# Patient Record
Sex: MALE | Race: WHITE | NOT HISPANIC OR LATINO | URBAN - METROPOLITAN AREA
[De-identification: names, ages, dates, MRNs, and addresses within clinical notes are randomized per-mention and may not be internally consistent; named-entity substitution may affect disease eponyms.]

---

## 2021-06-28 ENCOUNTER — INPATIENT (INPATIENT)
Facility: HOSPITAL | Age: 76
LOS: 3 days | Discharge: HOME | DRG: 308 | End: 2021-07-02
Attending: INTERNAL MEDICINE | Admitting: INTERNAL MEDICINE
Payer: MEDICARE

## 2021-06-28 VITALS
WEIGHT: 218.92 LBS | TEMPERATURE: 98 F | DIASTOLIC BLOOD PRESSURE: 94 MMHG | SYSTOLIC BLOOD PRESSURE: 137 MMHG | RESPIRATION RATE: 18 BRPM | HEART RATE: 66 BPM | HEIGHT: 70 IN | OXYGEN SATURATION: 96 %

## 2021-06-28 DIAGNOSIS — Z90.79 ACQUIRED ABSENCE OF OTHER GENITAL ORGAN(S): Chronic | ICD-10-CM

## 2021-06-28 DIAGNOSIS — Z98.890 OTHER SPECIFIED POSTPROCEDURAL STATES: Chronic | ICD-10-CM

## 2021-06-28 DIAGNOSIS — I50.33 ACUTE ON CHRONIC DIASTOLIC (CONGESTIVE) HEART FAILURE: ICD-10-CM

## 2021-06-28 DIAGNOSIS — D75.89 OTHER SPECIFIED DISEASES OF BLOOD AND BLOOD-FORMING ORGANS: ICD-10-CM

## 2021-06-28 DIAGNOSIS — E11.9 TYPE 2 DIABETES MELLITUS WITHOUT COMPLICATIONS: ICD-10-CM

## 2021-06-28 DIAGNOSIS — I48.91 UNSPECIFIED ATRIAL FIBRILLATION: ICD-10-CM

## 2021-06-28 DIAGNOSIS — I48.92 UNSPECIFIED ATRIAL FLUTTER: Chronic | ICD-10-CM

## 2021-06-28 LAB
ALBUMIN SERPL ELPH-MCNC: 4 G/DL — SIGNIFICANT CHANGE UP (ref 3.3–5)
ALP SERPL-CCNC: 136 U/L — HIGH (ref 40–120)
ALT FLD-CCNC: 18 U/L — SIGNIFICANT CHANGE UP (ref 10–45)
ANION GAP SERPL CALC-SCNC: 9 MMOL/L — SIGNIFICANT CHANGE UP (ref 5–17)
ANISOCYTOSIS BLD QL: SIGNIFICANT CHANGE UP
AST SERPL-CCNC: 17 U/L — SIGNIFICANT CHANGE UP (ref 10–40)
BASOPHILS # BLD AUTO: 0 K/UL — SIGNIFICANT CHANGE UP (ref 0–0.2)
BASOPHILS NFR BLD AUTO: 0 % — SIGNIFICANT CHANGE UP (ref 0–2)
BILIRUB SERPL-MCNC: 1.2 MG/DL — SIGNIFICANT CHANGE UP (ref 0.2–1.2)
BUN SERPL-MCNC: 18 MG/DL — SIGNIFICANT CHANGE UP (ref 7–23)
BURR CELLS BLD QL SMEAR: PRESENT — SIGNIFICANT CHANGE UP
CALCIUM SERPL-MCNC: 9.4 MG/DL — SIGNIFICANT CHANGE UP (ref 8.4–10.5)
CHLORIDE SERPL-SCNC: 108 MMOL/L — SIGNIFICANT CHANGE UP (ref 96–108)
CK MB CFR SERPL CALC: 2.2 NG/ML — SIGNIFICANT CHANGE UP (ref 0–6.7)
CK SERPL-CCNC: 55 U/L — SIGNIFICANT CHANGE UP (ref 30–200)
CO2 SERPL-SCNC: 27 MMOL/L — SIGNIFICANT CHANGE UP (ref 22–31)
CREAT SERPL-MCNC: 1.27 MG/DL — SIGNIFICANT CHANGE UP (ref 0.5–1.3)
DACRYOCYTES BLD QL SMEAR: SLIGHT — SIGNIFICANT CHANGE UP
EOSINOPHIL # BLD AUTO: 0 K/UL — SIGNIFICANT CHANGE UP (ref 0–0.5)
EOSINOPHIL NFR BLD AUTO: 0 % — SIGNIFICANT CHANGE UP (ref 0–6)
GIANT PLATELETS BLD QL SMEAR: PRESENT — SIGNIFICANT CHANGE UP
GLUCOSE BLDC GLUCOMTR-MCNC: 84 MG/DL — SIGNIFICANT CHANGE UP (ref 70–99)
GLUCOSE SERPL-MCNC: 95 MG/DL — SIGNIFICANT CHANGE UP (ref 70–99)
HCT VFR BLD CALC: 37.8 % — LOW (ref 39–50)
HGB BLD-MCNC: 12.9 G/DL — LOW (ref 13–17)
HIV 1+2 AB+HIV1 P24 AG SERPL QL IA: SIGNIFICANT CHANGE UP
LYMPHOCYTES # BLD AUTO: 1.08 K/UL — SIGNIFICANT CHANGE UP (ref 1–3.3)
LYMPHOCYTES # BLD AUTO: 30.7 % — SIGNIFICANT CHANGE UP (ref 13–44)
MACROCYTES BLD QL: SIGNIFICANT CHANGE UP
MANUAL SMEAR VERIFICATION: SIGNIFICANT CHANGE UP
MCHC RBC-ENTMCNC: 29.9 PG — SIGNIFICANT CHANGE UP (ref 27–34)
MCHC RBC-ENTMCNC: 34.1 GM/DL — SIGNIFICANT CHANGE UP (ref 32–36)
MCV RBC AUTO: 87.5 FL — SIGNIFICANT CHANGE UP (ref 80–100)
MONOCYTES # BLD AUTO: 0.25 K/UL — SIGNIFICANT CHANGE UP (ref 0–0.9)
MONOCYTES NFR BLD AUTO: 7 % — SIGNIFICANT CHANGE UP (ref 2–14)
NEUTROPHILS # BLD AUTO: 2.11 K/UL — SIGNIFICANT CHANGE UP (ref 1.8–7.4)
NEUTROPHILS NFR BLD AUTO: 59.7 % — SIGNIFICANT CHANGE UP (ref 43–77)
NT-PROBNP SERPL-SCNC: 7221 PG/ML — HIGH (ref 0–300)
PLAT MORPH BLD: ABNORMAL
PLATELET # BLD AUTO: 68 K/UL — LOW (ref 150–400)
POIKILOCYTOSIS BLD QL AUTO: SIGNIFICANT CHANGE UP
POLYCHROMASIA BLD QL SMEAR: SIGNIFICANT CHANGE UP
POTASSIUM SERPL-MCNC: 4.4 MMOL/L — SIGNIFICANT CHANGE UP (ref 3.5–5.3)
POTASSIUM SERPL-SCNC: 4.4 MMOL/L — SIGNIFICANT CHANGE UP (ref 3.5–5.3)
PROT SERPL-MCNC: 6.9 G/DL — SIGNIFICANT CHANGE UP (ref 6–8.3)
RBC # BLD: 4.32 M/UL — SIGNIFICANT CHANGE UP (ref 4.2–5.8)
RBC # FLD: 15.5 % — HIGH (ref 10.3–14.5)
RBC BLD AUTO: ABNORMAL
SARS-COV-2 RNA SPEC QL NAA+PROBE: NEGATIVE — SIGNIFICANT CHANGE UP
SCHISTOCYTES BLD QL AUTO: SLIGHT — SIGNIFICANT CHANGE UP
SODIUM SERPL-SCNC: 144 MMOL/L — SIGNIFICANT CHANGE UP (ref 135–145)
TROPONIN T SERPL-MCNC: 0.08 NG/ML — CRITICAL HIGH (ref 0–0.01)
TROPONIN T SERPL-MCNC: 0.08 NG/ML — CRITICAL HIGH (ref 0–0.01)
VARIANT LYMPHS # BLD: 2.6 % — SIGNIFICANT CHANGE UP (ref 0–6)
WBC # BLD: 3.53 K/UL — LOW (ref 3.8–10.5)
WBC # FLD AUTO: 3.53 K/UL — LOW (ref 3.8–10.5)

## 2021-06-28 PROCEDURE — 93010 ELECTROCARDIOGRAM REPORT: CPT

## 2021-06-28 PROCEDURE — 99285 EMERGENCY DEPT VISIT HI MDM: CPT | Mod: 25

## 2021-06-28 PROCEDURE — 71045 X-RAY EXAM CHEST 1 VIEW: CPT | Mod: 26

## 2021-06-28 RX ORDER — DEXTROSE 50 % IN WATER 50 %
15 SYRINGE (ML) INTRAVENOUS ONCE
Refills: 0 | Status: DISCONTINUED | OUTPATIENT
Start: 2021-06-28 | End: 2021-07-02

## 2021-06-28 RX ORDER — FUROSEMIDE 40 MG
40 TABLET ORAL ONCE
Refills: 0 | Status: COMPLETED | OUTPATIENT
Start: 2021-06-28 | End: 2021-06-28

## 2021-06-28 RX ORDER — INSULIN LISPRO 100/ML
VIAL (ML) SUBCUTANEOUS
Refills: 0 | Status: DISCONTINUED | OUTPATIENT
Start: 2021-06-28 | End: 2021-07-02

## 2021-06-28 RX ORDER — DEXTROSE 50 % IN WATER 50 %
12.5 SYRINGE (ML) INTRAVENOUS ONCE
Refills: 0 | Status: DISCONTINUED | OUTPATIENT
Start: 2021-06-28 | End: 2021-07-02

## 2021-06-28 RX ORDER — APIXABAN 2.5 MG/1
0 TABLET, FILM COATED ORAL
Qty: 0 | Refills: 0 | DISCHARGE

## 2021-06-28 RX ORDER — FUROSEMIDE 40 MG
80 TABLET ORAL EVERY 12 HOURS
Refills: 0 | Status: DISCONTINUED | OUTPATIENT
Start: 2021-06-29 | End: 2021-06-30

## 2021-06-28 RX ORDER — SODIUM CHLORIDE 9 MG/ML
1000 INJECTION, SOLUTION INTRAVENOUS
Refills: 0 | Status: DISCONTINUED | OUTPATIENT
Start: 2021-06-28 | End: 2021-07-02

## 2021-06-28 RX ORDER — TAMSULOSIN HYDROCHLORIDE 0.4 MG/1
0.4 CAPSULE ORAL AT BEDTIME
Refills: 0 | Status: DISCONTINUED | OUTPATIENT
Start: 2021-06-28 | End: 2021-07-02

## 2021-06-28 RX ORDER — IPRATROPIUM/ALBUTEROL SULFATE 18-103MCG
3 AEROSOL WITH ADAPTER (GRAM) INHALATION EVERY 6 HOURS
Refills: 0 | Status: DISCONTINUED | OUTPATIENT
Start: 2021-06-28 | End: 2021-07-02

## 2021-06-28 RX ORDER — DEXTROSE 50 % IN WATER 50 %
25 SYRINGE (ML) INTRAVENOUS ONCE
Refills: 0 | Status: DISCONTINUED | OUTPATIENT
Start: 2021-06-28 | End: 2021-07-02

## 2021-06-28 RX ORDER — FUROSEMIDE 40 MG
40 TABLET ORAL ONCE
Refills: 0 | Status: DISCONTINUED | OUTPATIENT
Start: 2021-06-28 | End: 2021-06-28

## 2021-06-28 RX ORDER — FUROSEMIDE 40 MG
0 TABLET ORAL
Qty: 0 | Refills: 0 | DISCHARGE

## 2021-06-28 RX ORDER — GLUCAGON INJECTION, SOLUTION 0.5 MG/.1ML
1 INJECTION, SOLUTION SUBCUTANEOUS ONCE
Refills: 0 | Status: DISCONTINUED | OUTPATIENT
Start: 2021-06-28 | End: 2021-07-02

## 2021-06-28 RX ORDER — SACUBITRIL AND VALSARTAN 24; 26 MG/1; MG/1
0 TABLET, FILM COATED ORAL
Qty: 0 | Refills: 0 | DISCHARGE

## 2021-06-28 RX ORDER — METOPROLOL TARTRATE 50 MG
12.5 TABLET ORAL EVERY 24 HOURS
Refills: 0 | Status: DISCONTINUED | OUTPATIENT
Start: 2021-06-29 | End: 2021-07-02

## 2021-06-28 RX ORDER — METFORMIN HYDROCHLORIDE 850 MG/1
0 TABLET ORAL
Qty: 0 | Refills: 0 | DISCHARGE

## 2021-06-28 RX ADMIN — TAMSULOSIN HYDROCHLORIDE 0.4 MILLIGRAM(S): 0.4 CAPSULE ORAL at 21:27

## 2021-06-28 RX ADMIN — Medication 40 MILLIGRAM(S): at 18:13

## 2021-06-28 NOTE — H&P ADULT - ASSESSMENT
76 yo M, h/o medication non-compliance, works with [employer] and was in [place] until 5/2021, PMHx DM2, Aflutter s/p ablatio  6/2016 & found to be in Afib as of 5/2021 (on Eliquis), HFmrEF (EF 45-50% by outpatient ECHO 5/2017, EF was normal per outpatient ECHO 3/2019), non-obstructive CAD (s/p diagnostic cath in 2014 @ [facility]), CKD stage III (baseline Cr unknown), BPH s/p TURP,  pituitary adenoma s/p transphenoidal rxn in 1990s,  prostate CA (dx in 2018 s/p chemo), chronic venous insufficiency who was referred from. Dr. Anton’s office with c/o progressively worsening SOB on minimal exertion and when speaking/increasing abdominal girth/LE edema over past 3 month despite resuming CHF meds including PO Lasix.  Pt also endorses scrotal edema over past 1 month and orthopnea to the point where he has to sit in chair to sleep. Patient states he ran out of Entresto ~1 year ago while in [place] but had continued on comparable meds to BB/Eliquis/Lasix while in [place] but he ran out ~a couple weeks before seeing Dr. Anton in 5/2021.   Pt endorses 2 syncopal episodes with prodrome dizziness (2/2021 & 3/2021) with head strike 2/2021. Went to clinic with unremarkable workup.  Furthermore, pt endorses 2 episodes of non-radiating SSCP described as sharp and of severe intensity, occurring independent of exertion, lasting <1 minute before spontaneously resolving (last episode 8/2020).  Pt admitted to [facility] for r/o ACS, management of exacerbation of HFmrEF likely in setting of Afib with medication non-compliance, heme consult for thrombocytopenia.  74 yo M, h/o medication non-compliance, works with [employer] and was in [place] until 5/2021, PMHx DM2, Aflutter s/p ablation  6/2016 & found to be in Afib as of 5/2021 (on Eliquis), HFmrEF (EF 45-50% by outpatient ECHO 5/2021, EF was normal per outpatient ECHO 3/2019), non-obstructive CAD (s/p diagnostic cath in 2014 @ [facility]), CKD stage III (baseline Cr unknown), BPH s/p TURP,  pituitary adenoma s/p transphenoidal rxn in 1990s,  prostate CA (dx in 2018 s/p chemo), chronic venous insufficiency who was referred from. Dr. Anton’s office with c/o progressively worsening SOB on minimal exertion and when speaking/increasing abdominal girth/LE edema over past 3 month despite resuming CHF meds including PO Lasix.  Pt also endorses scrotal edema over past 1 month and orthopnea to the point where he has to sit in chair to sleep. Patient states he ran out of Entresto ~1 year ago while in [place] but had continued on comparable meds to BB/Eliquis/Lasix while in [place] but he ran out ~a couple weeks before seeing Dr. Anton in 5/2021.   Pt endorses 2 syncopal episodes with prodrome dizziness (2/2021 & 3/2021) with head strike 2/2021. Went to clinic with unremarkable workup.  Furthermore, pt endorses 2 episodes of non-radiating SSCP described as sharp and of severe intensity, occurring independent of exertion, lasting <1 minute before spontaneously resolving (last episode 8/2020).  Pt admitted to [facility] for r/o ACS, management of exacerbation of HFmrEF likely in setting of Afib with medication non-compliance, heme consult for thrombocytopenia.

## 2021-06-28 NOTE — PATIENT PROFILE ADULT - NSPRESCRALCSIXMORE_GEN_A_NUR
LVM for patient to call back. Due for apt for annual physical and BP check. Patient not seen since 2016. Never

## 2021-06-28 NOTE — H&P ADULT - PROBLEM SELECTOR PLAN 1
- Trop 0.08, f/u repeat CE @ 10pm, AM  - BNP 7221  - ECG 6/28/21:  - CXR wet read 6/28/21: pulmonary venous congestion, B/L pleural effusions, f/u official read  - ECHO 5/17/21: EF 45-50%, indeterminate diastolic pattern, severe LVH, infiltrative disorder, moderate MR/TR, D-shaped septum which is indicative of significant pulmonary HTN, mild SC, LA severely dilated, RA moderately dilated.   - ECHO 3/2019: normal LVEF per outpatient records  - Obtain repeat ECHO (ordered)  - Concern for possible infiltrative disease, ?cardiac MRI when more euvolemic  - Last [facility] in 2014 @ [place]: non-obstructive CAD, obtain cath report as able  - c/w Lasix 80 mg IV BID (home med Lasix 40 mg PO QD)   - Strict I&Os, daily weights, 1L fluid restriction, core measures ordered 3+ pitting edema to B/L ankles/shins, 1+ pitting to dependent thighs into posterior back, +ascites, +scrotal edema,  +JVD, bibasilar crackles, expiratory wheezing anteriorly, saturating well on RA,  cc while in ED at time of exam  - Trop 0.08, f/u repeat CE @ 10pm, AM; BNP 7221  - ECG 6/28/21: Afib @ 67 BPM with LAD & flattened T waves in I/AVL & poor R wave progression.   - CXR wet read 6/28/21: pulmonary venous congestion, B/L pleural effusions, f/u official read  - ECHO 5/17/21: EF 45-50%, indeterminate diastolic pattern, severe LVH, infiltrative disorder, moderate MR/TR, D-shaped septum which is indicative of significant pulmonary HTN, mild MS, LA severely dilated, RA moderately dilated.   - ECHO 3/2019: normal LVEF per outpatient records  - Obtain repeat ECHO (ordered)  - US B/L LE venous duplex outpatient 6/28/21 as per Dr. Anton: No DVT  - Concern for possible infiltrative disease, ?cardiac MRI when more euvolemic  - h/o syncope x2 with head strike 2/2021, f/u CT head (ordered)  - Last [facility] in 2014 @ [facility]: non-obstructive CAD, obtain cath report as able, ?ischemic w/u this admit  - c/w Lasix 80 mg IV BID (home med Lasix 40 mg PO QD)   - Holding home Entresto 24-26 BID in setting of aggressive diuresis with h/o CKD III  - Holding home Toprol XL 25 mg PO QD in setting over CHF exacerbation  - Strict I&Os, daily weights, 1L fluid restriction, core measures ordered 3+ pitting edema to B/L ankles/shins, 1+ pitting to dependent thighs into posterior back, +ascites, +scrotal edema,  +JVD, bibasilar crackles, expiratory wheezing anteriorly, saturating well on RA,  cc while in ED at time of exam  - Trop 0.08, f/u repeat CE @ 10pm, AM; BNP 7221  - ECG 6/28/21: Afib @ 67 BPM with LAD & flattened T waves in I/AVL & poor R wave progression.   - CXR wet read 6/28/21: pulmonary venous congestion, B/L pleural effusions, f/u official read  - ECHO 5/17/21: EF 45-50%, indeterminate diastolic pattern, severe LVH, infiltrative disorder, moderate MR/TR, D-shaped septum which is indicative of significant pulmonary HTN, mild SD, LA severely dilated, RA moderately dilated.   - ECHO 3/2019: normal LVEF per outpatient records  - Obtain repeat ECHO (ordered)  - US B/L LE venous duplex outpatient 6/28/21 as per Dr. Anton: No DVT  - Concern for possible infiltrative disease, ?cardiac MRI when more euvolemic  - h/o syncope x2 with head strike 2/2021, f/u CT head (ordered)  - Last [facility] in 2014 @ [facility]: non-obstructive CAD, obtain cath report as able, ?ischemic w/u this admit  - c/w Lasix 80 mg IV BID (home med Lasix 40 mg PO QD)   - Holding home Entresto 24-26 BID in setting of aggressive diuresis with h/o CKD III  - Holding home Toprol XL 12.5 mg PO QD (1/2 dose home Toprol XL) in setting of CHF exacerbation  - Strict I&Os, daily weights, 1L fluid restriction, core measures ordered 3+ pitting edema to B/L ankles/shins, 1+ pitting to dependent thighs into posterior back, +ascites, +scrotal edema,  +JVD, bibasilar crackles, expiratory wheezing anteriorly, saturating well on RA,  cc while in ED at time of exam  - Trop 0.08, f/u repeat CE @ 10pm, AM; BNP 7221  - ECG 6/28/21: Afib @ 67 BPM with LAD & flattened T waves in I/AVL & poor R wave progression.   - CXR wet read 6/28/21: pulmonary venous congestion, B/L pleural effusions, f/u official read  - ECHO 5/17/21: EF 45-50%, indeterminate diastolic pattern, severe LVH, infiltrative disorder, moderate MR/TR, D-shaped septum which is indicative of significant pulmonary HTN, mild TN, LA severely dilated, RA moderately dilated.   - ECHO 3/2019: normal LVEF per outpatient records  - Obtain repeat ECHO (ordered)  - US B/L LE venous duplex outpatient 6/28/21 as per Dr. Anton: No DVT  - Concern for possible infiltrative disease, ?cardiac MRI when more euvolemic  - h/o syncope x2 with head strike 2/2021, f/u CT head (ordered)  - Last [facility] in 2014 @ [facility]: non-obstructive CAD, obtain cath report as able, ?ischemic w/u this admit  - c/w Lasix 80 mg IV BID (home med Lasix 40 mg PO QD)   - Holding home Entresto 24-26 BID in setting of aggressive diuresis with h/o CKD III  - Holding home Toprol XL 12.5 mg PO QD (1/2 dose home Toprol XL) in setting of CHF exacerbation  - Strict I&Os, daily weights, 1L fluid restriction, core measures ordered

## 2021-06-28 NOTE — ED PROVIDER NOTE - INTERPRETATION
Mom called, Eben Mcgovern had surgery today and wants to know if you can order some tylenol abnormal

## 2021-06-28 NOTE — ED PROVIDER NOTE - CLINICAL SUMMARY MEDICAL DECISION MAKING FREE TEXT BOX
Pt w diffuse anasarca Pt w diffuse anasarca, no acute resp distress, will admit for cont'd diuresis.

## 2021-06-28 NOTE — ED ADULT NURSE NOTE - PMH
Atrial flutter  s/p Ablation 2016  Chronic venous insufficiency of lower extremity    Congestive heart failure (CHF)    Diabetes    Prostate CA

## 2021-06-28 NOTE — H&P ADULT - PROBLEM SELECTOR PLAN 4
- YANCI  - f/u AM HgbA1C, lipid panel - Hold home Metformin 500 mg PO BID  - [place]  - f/u AM HgbA1C, lipid panel    VTE ppx: SCDs only  Dispo: Pending euvoelmia, heme consult    Case d/w Dr. Anton

## 2021-06-28 NOTE — ED ADULT NURSE REASSESSMENT NOTE - NS ED NURSE REASSESS COMMENT FT1
Patient a/oX3, anxious, c/o of dyspnea on exertion w/ bilateral lower leg swelling, abdominal distention, no chest pain, no cough, no fever.  Afib on EKG, vital signs stable.  Noted bilateral lower leg swelling/edema +2, unable to palpate pedal pulses, and abdominal distention/ anasarca, no tenderness.  Left AC PIV #18 in place, all labs sent, no complications.  Results and disposition pending. Patient a/oX3, anxious, c/o of dyspnea on exertion w/ bilateral lower leg swelling, abdominal distention, no chest pain, no cough, no fever.  Afib on EKG, vital signs stable.  Noted bilateral lower leg swelling/edema +2, unable to palpate pedal pulses, and abdominal distention/ anasarca, no tenderness.  Noted bilateral lower lobe lung rales.   Left AC PIV #18 in place, all labs sent, no complications.  Results and disposition pending.

## 2021-06-28 NOTE — H&P ADULT - PROBLEM SELECTOR PLAN 3
- H/H 12.9/37.8, platelet 68  - f/u iron studies, vitamin B12, folate, haptoglobin, LDH  - Consult Dr. Concepcion in AM

## 2021-06-28 NOTE — H&P ADULT - HISTORY OF PRESENT ILLNESS
74 yo M, h/o medication non-compliance, works with [employer] and was in [place] until 1 month ago, PMHx of  DM2, Aflutter s/p ablation 6/2016 & found to be in Afib as of 5/2021 (on Eliquis), HFmrEF (EF 45-50% by outpatient ECHO 5/2017, EF was normal per outpatient ECHO 3/2019), non-obstructive CAD (s/p diagnostic cath in 2014 @ [facility]), ??Amyloid,  Prostate CA (dx ___, ?XRT, surgery), chronic venous insufficiency who was referred from. Dr. Anton’s office with c/o progressively worsening SOB on minimal exertion & abdominal/scrotal/LE edema over past 1 month despite resuming CHF meds including PO Lasix.   Denies CP, dizziness, diaphoresis, palpitations, fatigue, LE edema, orthopnea, PND, syncope, N/V, abdominal pain.   Upon admit, VS – T 98.3F, HR 66 BPM, /94, RR 18, SpO2 96% on RA. Labs significant H/H 12.9/37.8, platelet 68, BUN/Cr 18/1.27, Alk phos 136, BNP 7221, Trop 0.08, HIV nonreactive, covid neg x1. CXR wet read 6/28/21: pulmonary venous congestion, B/L pleural effusions. ECG 6/28/21: ____ While in ED, pt received Lasix 40 mg IV x1. Pt admitted to [facility] for r/o ACS, management of exacerbation of HFmrEF likely in setting of Afib with medication non-compliance, ?ischemic w/u this admit. 76 yo M, h/o medication non-compliance, works with [employer] and was in [place] until 5/2021, PMHx DM2, Aflutter s/p ablatio  6/2016 & found to be in Afib as of 5/2021 (on Eliquis), HFmrEF (EF 45-50% by outpatient ECHO 5/2017, EF was normal per outpatient ECHO 3/2019), non-obstructive CAD (s/p diagnostic cath in 2014 @ [facility]),  CKD stage III (baseline Cr unknown), BPH s/p TURP,  pituitary adenoma s/p transphenoidal rxn in 1990s,  prostate CA (dx in 2018 s/p chemo), chronic venous insufficiency who was referred from. Dr. Anton’s office with c/o progressively worsening SOB on minimal exertion and when speaking/increasing abdominal girth/LE edema over past 3 month despite resuming CHF meds including PO Lasix.  Pt also endorses scrotal edema over past 1 month and orthopnea to the point where he has to sit in chair to sleep. Patient states he ran out of Entresto ~1 year ago while in [place] but had continued on comparable meds to BB/Eliquis/Lasix while in [place] but he ran out ~a couple weeks before seeing Dr. Anton in 5/2021.  Pt state LE edema/SOB has been ongoing over the past 1 year but Lasix increased to 40 mg PO TID while in [place] 2/2021 which was decreased back to 40 mg PO QD 3/2021 after his LE edema/SOB resolved.  Pt endorses 2 syncopal episodes with prodrome dizziness (2/2021 & 3/2021) with head strike 2/2021. Went to clinic with unremarkable workup.  Pt endorses MARTINEZ/fatigue upon ambulation of 1-2 city blocks, resolving with rest over past 3 years. Furthermore, pt endorses 2 episodes of non-radiating SSCP described as sharp and of severe intensity, occurring independent of exertion, lasting <1 minute before spontaneously resolving (last episode 8/2020). Denies  diaphoresis, palpitations, , PND, N/V, abdominal pain. Upon admit, VS – T 98.3F, HR 66 BPM, /94, RR 18, SpO2 96% on RA. Labs significant H/H 12.9/37.8, platelet 68, BUN/Cr 18/1.27, Alk phos 136, BNP 7221, Trop 0.08, HIV nonreactive, covid neg x1. CXR wet read 6/28/21: pulmonary venous congestion, B/L pleural effusions. ECG 6/28/21: Afib @ 67 BPM with LAD & flattened T waves in I/AVL & poor R wave progression. While in ED, pt received Lasix 40 mg IV x1.  Pt admitted to [facility] tele for r/o ACS, management of exacerbation of HFmrEF likely in setting of Afib with medication non-compliance, heme consult for thrombocytopenia.  74 yo M, h/o medication non-compliance, works with [employer] and was in [place] until 5/2021, PMHx DM2, Aflutter s/p ablation  6/2016 & found to be in Afib as of 5/2021 (on Eliquis), HFmrEF (EF 45-50% by outpatient ECHO 5/2021, EF was normal per outpatient ECHO 3/2019), non-obstructive CAD (s/p diagnostic cath in 2014 @ [facility]),  CKD stage III (baseline Cr unknown), BPH s/p TURP,  pituitary adenoma s/p transphenoidal rxn in 1990s,  prostate CA (dx in 2018 s/p chemo), chronic venous insufficiency who was referred from. Dr. Anton’s office with c/o progressively worsening SOB on minimal exertion and when speaking/increasing abdominal girth/LE edema over past 3 month despite resuming CHF meds including PO Lasix.  Pt also endorses scrotal edema over past 1 month and orthopnea to the point where he has to sit in chair to sleep. Patient states he ran out of Entresto ~1 year ago while in [place] but had continued on comparable meds to BB/Eliquis/Lasix while in [place] but he ran out ~a couple weeks before seeing Dr. Anton in 5/2021.  Pt state LE edema/SOB has been ongoing over the past 1 year but Lasix increased to 40 mg PO TID while in [place] 2/2021 which was decreased back to 40 mg PO QD 3/2021 after his LE edema/SOB resolved.  Pt endorses 2 syncopal episodes with prodrome dizziness (2/2021 & 3/2021) with head strike 2/2021. Went to clinic with unremarkable workup.  Pt endorses MARTINEZ/fatigue upon ambulation of 1-2 city blocks, resolving with rest over past 3 years. Furthermore, pt endorses 2 episodes of non-radiating SSCP described as sharp and of severe intensity, occurring independent of exertion, lasting <1 minute before spontaneously resolving (last episode 8/2020). Denies  diaphoresis, palpitations, , PND, N/V, abdominal pain. Upon admit, VS – T 98.3F, HR 66 BPM, /94, RR 18, SpO2 96% on RA. Labs significant H/H 12.9/37.8, platelet 68, BUN/Cr 18/1.27, Alk phos 136, BNP 7221, Trop 0.08, HIV nonreactive, covid neg x1. CXR wet read 6/28/21: pulmonary venous congestion, B/L pleural effusions. ECG 6/28/21: Afib @ 67 BPM with LAD & flattened T waves in I/AVL & poor R wave progression. While in ED, pt received Lasix 40 mg IV x1.  Pt admitted to [facility] tele for r/o ACS, management of exacerbation of HFmrEF likely in setting of Afib with medication non-compliance, heme consult for thrombocytopenia.

## 2021-06-28 NOTE — ED PROVIDER NOTE - NS ED MD DISPO DIVISION
Pt is belligerent and talking about wanting to call a .  Family member asking if radiology can be called about the gastrograffin results.  Radiology has already been called at 7pm.  Will continue to monitor.   [facility]

## 2021-06-28 NOTE — ED ADULT NURSE NOTE - CHIEF COMPLAINT
The patient is a 75y Male complaining of  The patient is a 75y Male complaining of bilateral lower leg swelling.
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
## 2021-06-28 NOTE — H&P ADULT - NSHPSOCIALHISTORY_GEN_ALL_CORE
Denies tobacco/alcohol/illicit drug use Denies tobacco/illicit drug use  Occasional glass of wine/beer

## 2021-06-28 NOTE — ED ADULT TRIAGE NOTE - OTHER COMPLAINTS
Pt was sent here by MD Anton due to "CHF" for IV dieresis. pt complains of swelling of lower extremities along with SOB on exertion. Pt denies chest pain,n/v, or dizziness.

## 2021-06-28 NOTE — H&P ADULT - NSHPLABSRESULTS_GEN_ALL_CORE
12.9   3.53  )-----------( 68       ( 28 Jun 2021 17:07 )             37.8       06-28    144  |  108  |  18  ----------------------------<  95  4.4   |  27  |  1.27    Ca    9.4      28 Jun 2021 17:07    TPro  6.9  /  Alb  4.0  /  TBili  1.2  /  DBili  x   /  AST  17  /  ALT  18  /  AlkPhos  136<H>  06-28          CARDIAC MARKERS ( 28 Jun 2021 17:07 )  x     / 0.08 ng/mL / x     / x     / x

## 2021-06-28 NOTE — ED ADULT NURSE NOTE - OBJECTIVE STATEMENT
Patient w/ Hx of CHF, Aflutter s/p ablation, Prostate problem, chronic bilateral lower leg venous insufficiency, on ELiquis, sent by PMD due to CHF and anasarca and for IV diuretic treatment.  Patient c/o of dyspnea on exertion, no chest pain or SOB.  EKG Afib in ED.

## 2021-06-28 NOTE — H&P ADULT - NSICDXPASTMEDICALHX_GEN_ALL_CORE_FT
PAST MEDICAL HISTORY:  Atrial flutter s/p Ablation 2016    Chronic venous insufficiency of lower extremity     Congestive heart failure (CHF)     Diabetes     Prostate CA      PAST MEDICAL HISTORY:  Atrial flutter s/p Ablation 2016    Chronic venous insufficiency of lower extremity     Congestive heart failure (CHF)     Diabetes     Prostate CA     Stage 3 chronic kidney disease

## 2021-06-28 NOTE — H&P ADULT - NSICDXPASTSURGICALHX_GEN_ALL_CORE_FT
PAST SURGICAL HISTORY:  Atrial flutter s/p ablation in 2016     PAST SURGICAL HISTORY:  Atrial flutter s/p ablation in 2016    History of surgical removal of pituitary gland 1990s    S/P TURP for BPH

## 2021-06-28 NOTE — H&P ADULT - PROBLEM SELECTOR PLAN 2
Currently in rate controlled Afib  - Aflutter s/p ablation 6/2016 & found to be in Afib as of 5/2021 (on Eliquis)  - f/u TSH  - Holding ELiquis/heparin gtt in setting of thrombocytopenia Currently in rate controlled Afib  - Aflutter s/p ablation 6/2016 & found to be in Afib as of 5/2021 (on Eliquis)  - f/u TSH  - Holding ELiquis/heparin gtt in setting of thrombocytopenia  - Ordered for Toprol XL 12.5 mg PO QD (1/2 dose home Toprol XL) Currently in rate controlled Afib  - Aflutter s/p ablation 6/2016 & found to be in Afib as of 5/2021 (on Eliquis)  - f/u TSH  - Holding ELiquis/heparin gtt in setting of thrombocytopenia  - Ordered for Toprol XL 12.5 mg PO QD (1/2 dose home Toprol XL)  - Discuss possible EP consult with Dr. Anton in AM

## 2021-06-29 DIAGNOSIS — R63.8 OTHER SYMPTOMS AND SIGNS CONCERNING FOOD AND FLUID INTAKE: ICD-10-CM

## 2021-06-29 DIAGNOSIS — D35.2 BENIGN NEOPLASM OF PITUITARY GLAND: ICD-10-CM

## 2021-06-29 LAB
A1C WITH ESTIMATED AVERAGE GLUCOSE RESULT: 5.8 % — HIGH (ref 4–5.6)
ALBUMIN SERPL ELPH-MCNC: 3.9 G/DL — SIGNIFICANT CHANGE UP (ref 3.3–5)
ALP SERPL-CCNC: 124 U/L — HIGH (ref 40–120)
ALT FLD-CCNC: 15 U/L — SIGNIFICANT CHANGE UP (ref 10–45)
ANION GAP SERPL CALC-SCNC: 11 MMOL/L — SIGNIFICANT CHANGE UP (ref 5–17)
AST SERPL-CCNC: 15 U/L — SIGNIFICANT CHANGE UP (ref 10–40)
BILIRUB SERPL-MCNC: 1.1 MG/DL — SIGNIFICANT CHANGE UP (ref 0.2–1.2)
BUN SERPL-MCNC: 21 MG/DL — SIGNIFICANT CHANGE UP (ref 7–23)
CALCIUM SERPL-MCNC: 9.1 MG/DL — SIGNIFICANT CHANGE UP (ref 8.4–10.5)
CHLORIDE SERPL-SCNC: 106 MMOL/L — SIGNIFICANT CHANGE UP (ref 96–108)
CHOLEST SERPL-MCNC: 127 MG/DL — SIGNIFICANT CHANGE UP
CK MB CFR SERPL CALC: 2.6 NG/ML — SIGNIFICANT CHANGE UP (ref 0–6.7)
CK SERPL-CCNC: 57 U/L — SIGNIFICANT CHANGE UP (ref 30–200)
CK SERPL-CCNC: 57 U/L — SIGNIFICANT CHANGE UP (ref 30–200)
CO2 SERPL-SCNC: 29 MMOL/L — SIGNIFICANT CHANGE UP (ref 22–31)
COVID-19 SPIKE DOMAIN AB INTERP: POSITIVE
COVID-19 SPIKE DOMAIN ANTIBODY RESULT: 3.52 U/ML — HIGH
CREAT SERPL-MCNC: 1.25 MG/DL — SIGNIFICANT CHANGE UP (ref 0.5–1.3)
ESTIMATED AVERAGE GLUCOSE: 120 MG/DL — HIGH (ref 68–114)
FERRITIN SERPL-MCNC: 108 NG/ML — SIGNIFICANT CHANGE UP (ref 30–400)
FOLATE SERPL-MCNC: 8 NG/ML — SIGNIFICANT CHANGE UP
GLUCOSE BLDC GLUCOMTR-MCNC: 116 MG/DL — HIGH (ref 70–99)
GLUCOSE BLDC GLUCOMTR-MCNC: 124 MG/DL — HIGH (ref 70–99)
GLUCOSE BLDC GLUCOMTR-MCNC: 138 MG/DL — HIGH (ref 70–99)
GLUCOSE BLDC GLUCOMTR-MCNC: 86 MG/DL — SIGNIFICANT CHANGE UP (ref 70–99)
GLUCOSE SERPL-MCNC: 86 MG/DL — SIGNIFICANT CHANGE UP (ref 70–99)
HAPTOGLOB SERPL-MCNC: 30 MG/DL — LOW (ref 34–200)
HCT VFR BLD CALC: 38.7 % — LOW (ref 39–50)
HCV AB S/CO SERPL IA: 0.04 S/CO — SIGNIFICANT CHANGE UP
HCV AB SERPL-IMP: SIGNIFICANT CHANGE UP
HDLC SERPL-MCNC: 42 MG/DL — SIGNIFICANT CHANGE UP
HGB BLD-MCNC: 13 G/DL — SIGNIFICANT CHANGE UP (ref 13–17)
IRON SATN MFR SERPL: 22 % — SIGNIFICANT CHANGE UP (ref 16–55)
IRON SATN MFR SERPL: 54 UG/DL — SIGNIFICANT CHANGE UP (ref 45–165)
LDH SERPL L TO P-CCNC: 194 U/L — SIGNIFICANT CHANGE UP (ref 50–242)
LIPID PNL WITH DIRECT LDL SERPL: 76 MG/DL — SIGNIFICANT CHANGE UP
MAGNESIUM SERPL-MCNC: 2.2 MG/DL — SIGNIFICANT CHANGE UP (ref 1.6–2.6)
MCHC RBC-ENTMCNC: 30.2 PG — SIGNIFICANT CHANGE UP (ref 27–34)
MCHC RBC-ENTMCNC: 33.6 GM/DL — SIGNIFICANT CHANGE UP (ref 32–36)
MCV RBC AUTO: 89.8 FL — SIGNIFICANT CHANGE UP (ref 80–100)
NON HDL CHOLESTEROL: 85 MG/DL — SIGNIFICANT CHANGE UP
NRBC # BLD: 0 /100 WBCS — SIGNIFICANT CHANGE UP (ref 0–0)
PLATELET # BLD AUTO: 67 K/UL — LOW (ref 150–400)
POTASSIUM SERPL-MCNC: 3.5 MMOL/L — SIGNIFICANT CHANGE UP (ref 3.5–5.3)
POTASSIUM SERPL-SCNC: 3.5 MMOL/L — SIGNIFICANT CHANGE UP (ref 3.5–5.3)
PROT SERPL-MCNC: 6.7 G/DL — SIGNIFICANT CHANGE UP (ref 6–8.3)
RBC # BLD: 4.31 M/UL — SIGNIFICANT CHANGE UP (ref 4.2–5.8)
RBC # FLD: 15.3 % — HIGH (ref 10.3–14.5)
SARS-COV-2 IGG+IGM SERPL QL IA: 3.52 U/ML — HIGH
SARS-COV-2 IGG+IGM SERPL QL IA: POSITIVE
SODIUM SERPL-SCNC: 146 MMOL/L — HIGH (ref 135–145)
TIBC SERPL-MCNC: 240 UG/DL — SIGNIFICANT CHANGE UP (ref 220–430)
TRANSFERRIN SERPL-MCNC: 198 MG/DL — LOW (ref 200–360)
TRIGL SERPL-MCNC: 46 MG/DL — SIGNIFICANT CHANGE UP
TROPONIN T SERPL-MCNC: 0.09 NG/ML — CRITICAL HIGH (ref 0–0.01)
TROPONIN T SERPL-MCNC: 0.09 NG/ML — CRITICAL HIGH (ref 0–0.01)
TSH SERPL-MCNC: 1.78 UIU/ML — SIGNIFICANT CHANGE UP (ref 0.27–4.2)
UIBC SERPL-MCNC: 186 UG/DL — SIGNIFICANT CHANGE UP (ref 110–370)
VIT B12 SERPL-MCNC: 1654 PG/ML — HIGH (ref 232–1245)
WBC # BLD: 2.62 K/UL — LOW (ref 3.8–10.5)
WBC # FLD AUTO: 2.62 K/UL — LOW (ref 3.8–10.5)

## 2021-06-29 PROCEDURE — 93306 TTE W/DOPPLER COMPLETE: CPT | Mod: 26

## 2021-06-29 PROCEDURE — 70450 CT HEAD/BRAIN W/O DYE: CPT | Mod: 26

## 2021-06-29 RX ADMIN — Medication 12.5 MILLIGRAM(S): at 12:44

## 2021-06-29 RX ADMIN — Medication 3 MILLILITER(S): at 02:34

## 2021-06-29 RX ADMIN — TAMSULOSIN HYDROCHLORIDE 0.4 MILLIGRAM(S): 0.4 CAPSULE ORAL at 21:26

## 2021-06-29 RX ADMIN — Medication 80 MILLIGRAM(S): at 05:54

## 2021-06-29 RX ADMIN — Medication 80 MILLIGRAM(S): at 18:50

## 2021-06-29 NOTE — PROGRESS NOTE ADULT - PROBLEM SELECTOR PLAN 2
Currently in rate controlled Afib  - Aflutter s/p ablation 6/2016 & found to be in Afib as of 5/2021 (on Eliquis)  - f/u TSH  - Holding ELiquis/heparin gtt in setting of thrombocytopenia  - Ordered for Toprol XL 12.5 mg PO QD (1/2 dose home Toprol XL)  - Discuss possible EP consult with Dr. Anton in AM Currently in rate controlled Afib  - Aflutter s/p ablation 6/2016 & found to be in Afib as of 5/2021 (on Eliquis)  - TSH 1.78  - Holding ELiquis/heparin gtt in setting of thrombocytopenia  - continue Toprol XL 12.5 mg PO QD (1/2 dose home Toprol XL)

## 2021-06-29 NOTE — PROGRESS NOTE ADULT - ASSESSMENT
76 yo M, h/o medication non-compliance, works with [employer] and was in [place] until 5/2021, PMHx DM2, Aflutter s/p ablation  6/2016 & found to be in Afib as of 5/2021 (on Eliquis), HFmrEF (EF 45-50% by outpatient ECHO 5/2021, EF was normal per outpatient ECHO 3/2019), non-obstructive CAD (s/p diagnostic cath in 2014 @ [facility]), CKD stage III (baseline Cr unknown), BPH s/p TURP,  pituitary adenoma s/p transphenoidal rxn in 1990s,  prostate CA (dx in 2018 s/p chemo), chronic venous insufficiency who was referred from. Dr. Anton’s office with c/o progressively worsening SOB on minimal exertion and when speaking/increasing abdominal girth/LE edema over past 3 month despite resuming CHF meds including PO Lasix.  Pt also endorses scrotal edema over past 1 month and orthopnea to the point where he has to sit in chair to sleep. Patient states he ran out of Entresto ~1 year ago while in [place] but had continued on comparable meds to BB/Eliquis/Lasix while in [place] but he ran out ~a couple weeks before seeing Dr. Anton in 5/2021.   Pt endorses 2 syncopal episodes with prodrome dizziness (2/2021 & 3/2021) with head strike 2/2021. Went to clinic with unremarkable workup.  Furthermore, pt endorses 2 episodes of non-radiating SSCP described as sharp and of severe intensity, occurring independent of exertion, lasting <1 minute before spontaneously resolving (last episode 8/2020).  Pt admitted to [facility] for r/o ACS, management of exacerbation of HFmrEF likely in setting of Afib with medication non-compliance, heme consult for thrombocytopenia.

## 2021-06-29 NOTE — PROGRESS NOTE ADULT - SUBJECTIVE AND OBJECTIVE BOX
O/N Events:    Subjective/ROS: Patient seen and examined at bedside.     Denies Fever/Chills, HA, CP, SOB, n/v, changes in bowel/urinary habits.  12pt ROS otherwise negative.    VITALS  Vital Signs Last 24 Hrs  T(C): 36.6 (29 Jun 2021 05:08), Max: 36.8 (28 Jun 2021 16:26)  T(F): 97.9 (29 Jun 2021 05:08), Max: 98.3 (28 Jun 2021 16:26)  HR: 61 (29 Jun 2021 05:54) (59 - 75)  BP: 128/87 (29 Jun 2021 05:54) (128/87 - 147/72)  BP(mean): 103 (29 Jun 2021 05:54) (103 - 105)  RR: 18 (29 Jun 2021 05:54) (16 - 19)  SpO2: 99% (29 Jun 2021 05:54) (96% - 100%)    CAPILLARY BLOOD GLUCOSE      POCT Blood Glucose.: 86 mg/dL (29 Jun 2021 06:42)  POCT Blood Glucose.: 84 mg/dL (28 Jun 2021 20:09)      PHYSICAL EXAM  General: NAD  Head: NC/AT; MMM; PERRL; EOMI;  Neck: Supple; no JVD  Respiratory: CTAB; no wheezes/rales/rhonchi  Cardiovascular: Regular rhythm/rate; S1/S2+, no murmurs, rubs gallops   Gastrointestinal: Soft; NTND; bowel sounds normal and present  Extremities: WWP; no edema/cyanosis  Neurological: A&Ox3, CNII-XII grossly intact; no obvious focal deficits    MEDICATIONS  (STANDING):  dextrose 40% Gel 15 Gram(s) Oral once  dextrose 5%. 1000 milliLiter(s) (50 mL/Hr) IV Continuous <Continuous>  dextrose 5%. 1000 milliLiter(s) (100 mL/Hr) IV Continuous <Continuous>  dextrose 50% Injectable 25 Gram(s) IV Push once  dextrose 50% Injectable 12.5 Gram(s) IV Push once  dextrose 50% Injectable 25 Gram(s) IV Push once  furosemide   Injectable 80 milliGRAM(s) IV Push every 12 hours  glucagon  Injectable 1 milliGRAM(s) IntraMuscular once  insulin lispro (ADMELOG) corrective regimen sliding scale   SubCutaneous Before meals and at bedtime  metoprolol succinate ER 12.5 milliGRAM(s) Oral every 24 hours  tamsulosin 0.4 milliGRAM(s) Oral at bedtime    MEDICATIONS  (PRN):  albuterol/ipratropium for Nebulization 3 milliLiter(s) Nebulizer every 6 hours PRN Shortness of Breath and/or Wheezing      No Known Allergies      LABS                        12.9   3.53  )-----------( 68       ( 28 Jun 2021 17:07 )             37.8     06-28    144  |  108  |  18  ----------------------------<  95  4.4   |  27  |  1.27    Ca    9.4      28 Jun 2021 17:07    TPro  6.9  /  Alb  4.0  /  TBili  1.2  /  DBili  x   /  AST  17  /  ALT  18  /  AlkPhos  136<H>  06-28        CARDIAC MARKERS ( 28 Jun 2021 22:10 )  x     / 0.08 ng/mL / 55 U/L / x     / 2.2 ng/mL  CARDIAC MARKERS ( 28 Jun 2021 17:07 )  x     / 0.08 ng/mL / x     / x     / x              IMAGING/EKG/ETC   O/N Events: patient was admitted last night. Trop came in at 0.08 and kasey 0.09    Subjective/ROS: Patient seen and examined at bedside.     Denies Fever/Chills, HA, CP, SOB, n/v, changes in bowel/urinary habits.  12pt ROS otherwise negative.    VITALS  Vital Signs Last 24 Hrs  T(C): 36.6 (29 Jun 2021 05:08), Max: 36.8 (28 Jun 2021 16:26)  T(F): 97.9 (29 Jun 2021 05:08), Max: 98.3 (28 Jun 2021 16:26)  HR: 61 (29 Jun 2021 05:54) (59 - 75)  BP: 128/87 (29 Jun 2021 05:54) (128/87 - 147/72)  BP(mean): 103 (29 Jun 2021 05:54) (103 - 105)  RR: 18 (29 Jun 2021 05:54) (16 - 19)  SpO2: 99% (29 Jun 2021 05:54) (96% - 100%)    CAPILLARY BLOOD GLUCOSE      POCT Blood Glucose.: 86 mg/dL (29 Jun 2021 06:42)  POCT Blood Glucose.: 84 mg/dL (28 Jun 2021 20:09)      PHYSICAL EXAM  General: NAD  Head: NC/AT; MMM; PERRL; EOMI;  Neck: Supple; no JVD  Respiratory: CTAB; no wheezes/rales/rhonchi  Cardiovascular: Regular rhythm/rate; S1/S2+, no murmurs, rubs gallops   Gastrointestinal: Soft; NTND; bowel sounds normal and present  Extremities: WWP; 1-2+ Pitting Edema  Neurological: A&Ox3, CNII-XII grossly intact; no obvious focal deficits    MEDICATIONS  (STANDING):  dextrose 40% Gel 15 Gram(s) Oral once  dextrose 5%. 1000 milliLiter(s) (50 mL/Hr) IV Continuous <Continuous>  dextrose 5%. 1000 milliLiter(s) (100 mL/Hr) IV Continuous <Continuous>  dextrose 50% Injectable 25 Gram(s) IV Push once  dextrose 50% Injectable 12.5 Gram(s) IV Push once  dextrose 50% Injectable 25 Gram(s) IV Push once  furosemide   Injectable 80 milliGRAM(s) IV Push every 12 hours  glucagon  Injectable 1 milliGRAM(s) IntraMuscular once  insulin lispro (ADMELOG) corrective regimen sliding scale   SubCutaneous Before meals and at bedtime  metoprolol succinate ER 12.5 milliGRAM(s) Oral every 24 hours  tamsulosin 0.4 milliGRAM(s) Oral at bedtime    MEDICATIONS  (PRN):  albuterol/ipratropium for Nebulization 3 milliLiter(s) Nebulizer every 6 hours PRN Shortness of Breath and/or Wheezing      No Known Allergies      LABS                        12.9   3.53  )-----------( 68       ( 28 Jun 2021 17:07 )             37.8     06-28    144  |  108  |  18  ----------------------------<  95  4.4   |  27  |  1.27    Ca    9.4      28 Jun 2021 17:07    TPro  6.9  /  Alb  4.0  /  TBili  1.2  /  DBili  x   /  AST  17  /  ALT  18  /  AlkPhos  136<H>  06-28        CARDIAC MARKERS ( 28 Jun 2021 22:10 )  x     / 0.08 ng/mL / 55 U/L / x     / 2.2 ng/mL  CARDIAC MARKERS ( 28 Jun 2021 17:07 )  x     / 0.08 ng/mL / x     / x     / x              IMAGING/EKG/ETC

## 2021-06-29 NOTE — PROGRESS NOTE ADULT - SUBJECTIVE AND OBJECTIVE BOX
CC/ HPI Patient is a 75 year old male with HFrEF, Afib, CKD stage II, chronic venous insufficiency, history syncopal episodes with prodrome dizziness, 2/2021 & 3/2021 Pt admitted to telemetry for r/o ACS, management of exacerbation of heart failure, thrombocytopenia, seen this morning feels less dyspneic denies acute complaint.        PAST MEDICAL & SURGICAL HISTORY:  Congestive heart failure (CHF)  Diabetes  Atrial flutter, s/p Ablation 2016  Chronic venous insufficiency of lower extremity  Prostate CA  Stage 3 chronic kidney disease  History of surgical removal of pituitary gland, 1990  S/P TURP for BPH    SOCHX:   tobacco,  -  alcohol    FAMILY HISTORY: FA/MO  No pertinent family history in first degree relatives      ROS reviewed below with positive findings marked (+) :  GEN:  fever, chills ENT: tracheostomy,   epistaxis,  sinusitis COR: +CAD, +CHF,  HTN, dysrhythmia PUL: COPD, ILD, asthma, pneumonia GI: PEG, dysphagia, hemorrhage, other KRISTIN: kidney disease, electrolyte disorder HEM:  anemia, thrombus, coagulopathy, cancer ENDO:  thyroid disease, diabetes mellitus CNS:  dementia, stroke, seizure, PSY:  depression, anxiety, other      MEDICATIONS  (STANDING):  dextrose 40% Gel 15 Gram(s) Oral once  dextrose 5%. 1000 milliLiter(s) (50 mL/Hr) IV Continuous <Continuous>  dextrose 5%. 1000 milliLiter(s) (100 mL/Hr) IV Continuous <Continuous>  dextrose 50% Injectable 25 Gram(s) IV Push once  dextrose 50% Injectable 12.5 Gram(s) IV Push once  dextrose 50% Injectable 25 Gram(s) IV Push once  furosemide   Injectable 80 milliGRAM(s) IV Push every 12 hours  glucagon  Injectable 1 milliGRAM(s) IntraMuscular once  insulin lispro (ADMELOG) corrective regimen sliding scale   SubCutaneous Before meals and at bedtime  metoprolol succinate ER 12.5 milliGRAM(s) Oral every 24 hours  tamsulosin 0.4 milliGRAM(s) Oral at bedtime    MEDICATIONS  (PRN):  albuterol/ipratropium for Nebulization 3 milliLiter(s) Nebulizer every 6 hours PRN Shortness of Breath and/or Wheezing      Vital Signs Last 24 Hrs  T(C): 36.7 (29 Jun 2021 18:17), Max: 36.8 (29 Jun 2021 11:30)  T(F): 98.1 (29 Jun 2021 18:17), Max: 98.3 (29 Jun 2021 11:30)  HR: 63 (29 Jun 2021 15:54) (59 - 75)  BP: 123/83 (29 Jun 2021 15:54) (120/80 - 147/72)  BP(mean): 100 (29 Jun 2021 15:54) (100 - 105)  RR: 19 (29 Jun 2021 15:54) (18 - 20)  SpO2: 97% (29 Jun 2021 15:54) (97% - 100%)    GENERAL:         comfortable,  - distress.  HEENT:            - trauma,  - icterus,  - injection,  - nasal discharge.  NECK:              - jugular venous distention, - thyromegaly.  LYMPH:           - lymphadenopathy, - masses.  RESP:               - clear,   - rales,   - rhonchi,   - wheezes.   COR:                S1S2   - gallops,  - rubs.  ABD:                bowel sounds,   soft, - tender, - distended.  EXT/MSC:         - cyanosis,  - clubbing,  edema.    NEURO:             alert                           13.0   2.62  )-----------( 67       ( 29 Jun 2021 08:24 )             38.7     06-29    146<H>  |  106  |  21  ----------------------------<  86  3.5   |  29  |  1.25    Ca    9.1      29 Jun 2021 08:24  Mg     2.2     06-29    TPro  6.7  /  Alb  3.9  /  TBili  1.1  /  DBili  x   /  AST  15  /  ALT  15  /  AlkPhos  124<H>  06-29    ASSESSMENT/PLAN    1)  2)  3)  4)    Oxygen as needed for a satisfactory SpO2  Bronchodilators:  Atrovent/ albuterol q 4 – 6 hours as needed  Corticosteroids:  ID/Antibiotics:  Cardiac/HTN:  GI: Rx/ prophylaxis c PPI/H2B  Heme: Rx/VT prophylaxis c SQH/SCD/AC  Discuss with medical team       CC/ HPI Patient is a 75 year old male with heart failure, Afib, CKD stage II, chronic venous insufficiency, history syncopal episodes with prodrome dizziness, 2/2021 & 3/2021 Pt admitted to telemetry for r/o ACS, management of exacerbation of heart failure, thrombocytopenia, seen this morning feels less dyspneic denies acute complaint.    PAST MEDICAL & SURGICAL HISTORY:  Congestive heart failure (CHF)  Diabetes  Atrial flutter, s/p Ablation 2016  Chronic venous insufficiency of lower extremity  Prostate CA  Stage 3 chronic kidney disease  History of surgical removal of pituitary gland, 1990  S/P TURP for BPH    SOCHX:   tobacco,  -  alcohol    FAMILY HISTORY: FA/MO  No pertinent family history in first degree relatives      ROS reviewed below with positive findings marked (+) :  GEN:  fever, chills ENT: tracheostomy,   epistaxis,  sinusitis COR: +CAD, +CHF,  HTN, dysrhythmia PUL: COPD, ILD, asthma, pneumonia GI: PEG, dysphagia, hemorrhage, other KRISTIN: kidney disease, electrolyte disorder HEM:  anemia, thrombus, coagulopathy, cancer ENDO:  thyroid disease, diabetes mellitus CNS:  dementia, stroke, seizure, PSY:  depression, anxiety, other      MEDICATIONS  (STANDING):  furosemide   Injectable 80 milliGRAM(s) IV Push every 12 hours  glucagon  Injectable 1 milliGRAM(s) IntraMuscular once  insulin lispro (ADMELOG) corrective regimen sliding scale   SubCutaneous Before meals and at bedtime  metoprolol succinate ER 12.5 milliGRAM(s) Oral every 24 hours  tamsulosin 0.4 milliGRAM(s) Oral at bedtime    MEDICATIONS  (PRN):  albuterol/ipratropium for Nebulization 3 milliLiter(s) Nebulizer every 6 hours PRN Shortness of Breath and/or Wheezing    Vital Signs Last 24 Hrs  T(C): 36.7 (29 Jun 2021 18:17), Max: 36.8 (29 Jun 2021 11:30)  T(F): 98.1 (29 Jun 2021 18:17), Max: 98.3 (29 Jun 2021 11:30)  HR: 63 (29 Jun 2021 15:54) (59 - 75)  BP: 123/83 (29 Jun 2021 15:54) (120/80 - 147/72)  BP(mean): 100 (29 Jun 2021 15:54) (100 - 105)  RR: 19 (29 Jun 2021 15:54) (18 - 20)  SpO2: 97% (29 Jun 2021 15:54) (97% - 100%)    GENERAL:         comfortable,  - distress.  HEENT:            - trauma,  - icterus,  - injection,  - nasal discharge.  NECK:              - jugular venous distention, - thyromegaly.  LYMPH:           - lymphadenopathy, - masses.  RESP:             + crackles,   - rhonchi,   - wheezes.   COR:                S1S2   - gallops,  - rubs.  ABD:                bowel sounds,   soft, - tender, - distended.  EXT/MSC:         - cyanosis,  - clubbing,  +edema.    NEURO:            + alert and oriented                          13.0   2.62  )-----------( 67       ( 29 Jun 2021 08:24 )             38.7     06-29    146<H>  |  106  |  21  ----------------------------<  86  3.5   |  29  |  1.25    Ca    9.1      29 Jun 2021 08:24  Mg     2.2     06-29      X-ray Chest (06.28.21)  Cardiomegaly, thoracic aortic calcification. Bilateral opacities/pleural effusions. Thoracic spine and bilateral AC joint degenerative changes.      ASSESSMENT/PLAN    1) Heart failure  2) Atrial fibrillation  3) Thrombocytopenia  4) Pleural effusions/atelectasis    Oxygen as needed for a satisfactory SpO2  Bedside spirometry  Bronchodilators:  Atrovent/ albuterol q 4 – 6 hours as needed  Cardiac/HTN: diuresis as needed.  PAS 36 mmHg  GI: Rx/ prophylaxis c PPI/H2B  Heme: heme w/u pending.  Discuss with medical team

## 2021-06-29 NOTE — PROGRESS NOTE ADULT - PROBLEM SELECTOR PLAN 3
- H/H 12.9/37.8, platelet 68  - f/u iron studies, vitamin B12, folate, haptoglobin, LDH  - Consult Dr. Concepcion in AM - H/H 12.9/37.8, platelet 68  - f/u iron studies, vitamin B12, folate, haptoglobin, LDH  - Consult heme/onc in AM

## 2021-06-29 NOTE — PROGRESS NOTE ADULT - SUBJECTIVE AND OBJECTIVE BOX
76 yo M, h/o medication non-compliance, works with [employer] and was in [place] until 5/2021, PMHx DM2, Aflutter s/p ablation  6/2016 & found to be in Afib as of 5/2021 (on Eliquis), HFmrEF (EF 45-50% by outpatient ECHO 5/2021, EF was normal per outpatient ECHO 3/2019), non-obstructive CAD (s/p diagnostic cath in 2014 @ [facility]),  CKD stage III (baseline Cr unknown), BPH s/p TURP,  pituitary adenoma s/p transphenoidal rxn in 1990s,  prostate CA (dx in 2018 s/p chemo), chronic venous insufficiency who was referred from. Dr. Anton’s office with c/o progressively worsening SOB on minimal exertion and when speaking/increasing abdominal girth/LE edema over past 3 month despite resuming CHF meds including PO Lasix.  Pt also endorses scrotal edema over past 1 month and orthopnea to the point where he has to sit in chair to sleep. Patient states he ran out of Entresto ~1 year ago while in [place] but had continued on comparable meds to BB/Eliquis/Lasix while in [place] but he ran out ~a couple weeks before seeing Dr. Anton in 5/2021.  Pt state LE edema/SOB has been ongoing over the past 1 year but Lasix increased to 40 mg PO TID while in [place] 2/2021 which was decreased back to 40 mg PO QD 3/2021 after his LE edema/SOB resolved.  Pt endorses 2 syncopal episodes with prodrome dizziness (2/2021 & 3/2021) with head strike 2/2021. Went to clinic with unremarkable workup.  Pt endorses MARTINEZ/fatigue upon ambulation of 1-2 city blocks, resolving with rest over past 3 years. Furthermore, pt endorses 2 episodes of non-radiating SSCP described as sharp and of severe intensity, occurring independent of exertion, lasting <1 minute before spontaneously resolving (last episode 8/2020). Denies  diaphoresis, palpitations, , PND, N/V, abdominal pain. Upon admit, VS – T 98.3F, HR 66 BPM, /94, RR 18, SpO2 96% on RA. Labs significant H/H 12.9/37.8, platelet 68, BUN/Cr 18/1.27, Alk phos 136, BNP 7221, Trop 0.08, HIV nonreactive, covid neg x1. CXR wet read 6/28/21: pulmonary venous congestion, B/L pleural effusions. ECG 6/28/21: Afib @ 67 BPM with LAD & flattened T waves in I/AVL & poor R wave progression. While in ED, pt received Lasix 40 mg IV x1.  Pt admitted to [facility] for r/o ACS, management of exacerbation of HFmrEF likely in setting of Afib with medication non-compliance, heme consult for thrombocytopenia.     Patient History:   Past Medical, Past Surgical, and Family History   PAST MEDICAL HISTORY:  Atrial flutter s/p Ablation 2016    Chronic venous insufficiency of lower extremity     Congestive heart failure (CHF)     Diabetes     Prostate CA     Stage 3 chronic kidney disease.     PAST SURGICAL HISTORY:  Atrial flutter s/p ablation in 2016    History of surgical removal of pituitary gland 1990s    S/P TURP for BPH.     	  MEDICATIONS:  furosemide   Injectable 80 milliGRAM(s) IV Push every 12 hours  metoprolol succinate ER 12.5 milliGRAM(s) Oral every 24 hours  tamsulosin 0.4 milliGRAM(s) Oral at bedtime      albuterol/ipratropium for Nebulization 3 milliLiter(s) Nebulizer every 6 hours PRN        dextrose 40% Gel 15 Gram(s) Oral once  dextrose 50% Injectable 25 Gram(s) IV Push once  dextrose 50% Injectable 12.5 Gram(s) IV Push once  dextrose 50% Injectable 25 Gram(s) IV Push once  glucagon  Injectable 1 milliGRAM(s) IntraMuscular once  insulin lispro (ADMELOG) corrective regimen sliding scale   SubCutaneous Before meals and at bedtime    dextrose 5%. 1000 milliLiter(s) IV Continuous <Continuous>  dextrose 5%. 1000 milliLiter(s) IV Continuous <Continuous>      Complaint:     Otherwise 12 point review of systems is normal.    PHYSICAL EXAM:    Constitutional: NAD  Eyes: PERRL, EOMI, sclera non-icteric  Neck: supple, no masses, no JVD  Respiratory: CTA b/l, good air entry b/l, no wheezing, rhonchi, rales,    Cardiovascular: RRR, normal S1S2, no M/R/G  Gastrointestinal: soft, NTND, no masses palpable,   Extremities:  +3 pitting edema  Neurological: AAOx3    ICU Vital Signs Last 24 Hrs  T(C): 36.6 (29 Jun 2021 13:56), Max: 36.8 (29 Jun 2021 11:30)  T(F): 97.9 (29 Jun 2021 13:56), Max: 98.3 (29 Jun 2021 11:30)  HR: 63 (29 Jun 2021 15:54) (59 - 75)  BP: 123/83 (29 Jun 2021 15:54) (120/80 - 147/72)  BP(mean): 100 (29 Jun 2021 15:54) (100 - 105)  ABP: --  ABP(mean): --  RR: 19 (29 Jun 2021 15:54) (16 - 20)  SpO2: 97% (29 Jun 2021 15:54) (97% - 100%)      < from: TTE Echo Complete w/o Contrast w/ Doppler (06.29.21 @ 10:07) >     1. Severe symmetric left ventricular hypertrophy.   2. There is severe concentric left ventricular hypertrophy. Borderline low left ventricular systolic function. Left ventricular ejection fraction is 50%.   3. The right ventricle is normal in size. Right ventricular systolic function is normal. Right ventricular hypertrophy is present.   4. Biatrial enlargement.   5. Mild mitral regurgitation.   6. Mild-to-moderate tricuspid regurgitation.   7. No evidence of pulmonary hypertension, pulmonary artery systolic pressure is 36 mmHg.   8. Small pericardial effusion without echocardiographic evidence of cardiac tamponade physiology.   9. Ascites present.      < end of copied text >      LABS:	 	  CARDIAC MARKERS:  Troponin T, Serum: 0.09 ng/mL *HH* [0.00 - 0.01] (06-29 @ 08:24)  Troponin T, Serum: 0.08 ng/mL *HH* [0.00 - 0.01] (06-28 @ 22:10)  Troponin T, Serum: 0.08 ng/mL *HH* [0.00 - 0.01] (06-28 @ 17:07)                              13.0   2.62  )-----------( 67       ( 29 Jun 2021 08:24 )             38.7     06-29    146<H>  |  106  |  21  ----------------------------<  86  3.5   |  29  |  1.25    Ca    9.1      29 Jun 2021 08:24  Mg     2.2     06-29    TPro  6.7  /  Alb  3.9  /  TBili  1.1  /  DBili  x   /  AST  15  /  ALT  15  /  AlkPhos  124<H>  06-29    proBNP: Serum Pro-Brain Natriuretic Peptide: 7221 pg/mL (06-28 @ 17:07)    TSH: Thyroid Stimulating Hormone, Serum: 1.78 uIU/mL (06-29 @ 13:30)            ASSESSMENT/PLAN: 	    76 yo M, h/o medication non-compliance, works with [employer] and was in [place] until 5/2021, PMHx DM2, Aflutter s/p ablation  6/2016 & found to be in Afib as of 5/2021 (on Eliquis), HFmrEF (EF 45-50% by outpatient ECHO 5/2021, EF was normal per outpatient ECHO 3/2019), non-obstructive CAD (s/p diagnostic cath in 2014 @ [facility]), CKD stage III (baseline Cr unknown), BPH s/p TURP,  pituitary adenoma s/p transphenoidal rxn in 1990s,  prostate CA (dx in 2018 s/p chemo), chronic venous insufficiency who was referred from. Dr. Anton’s office with c/o progressively worsening SOB on minimal exertion and when speaking/increasing abdominal girth/LE edema over past 3 month despite resuming CHF meds including PO Lasix.  Pt also endorses scrotal edema over past 1 month and orthopnea to the point where he has to sit in chair to sleep. Patient states he ran out of Entresto ~1 year ago while in [place] but had continued on comparable meds to BB/Eliquis/Lasix while in [place] but he ran out ~a couple weeks before seeing Dr. Anton in 5/2021.   Pt endorses 2 syncopal episodes with prodrome dizziness (2/2021 & 3/2021) with head strike 2/2021. Went to clinic with unremarkable workup.  Furthermore, pt endorses 2 episodes of non-radiating SSCP described as sharp and of severe intensity, occurring independent of exertion, lasting <1 minute before spontaneously resolving (last episode 8/2020).  Pt admitted to [facility] for r/o ACS, management of exacerbation of HFmrEF likely in setting of Afib with medication non-compliance, heme consult for thrombocytopenia.

## 2021-06-29 NOTE — CONSULT NOTE ADULT - TIME BILLING
74 yo M with hx of DM presenting for management of CHF exacerbation.   - glucose well controlled, HbA1C 5.8  - can c/w sliding scale moderate dose scale with meals and bedtimes  - continue consistent carb diet, glucose goal 100-180  - nutrition consult  - as outpatient, can dc metformin and start instead jardiance 10mg once daily.   - will follow  - outpatient weight management. 74 yo M with hx of DM presenting for management of CHF exacerbation.   - glucose well controlled, HbA1C 5.8  - can c/w sliding scale moderate dose scale with meals and bedtimes  - continue consistent carb diet, glucose goal 100-180  - nutrition consult  - as outpatient, can dc metformin and start instead jardiance 10mg once daily.   - will follow  - outpatient weight management.  - hx of pituitary adenoma - please check FSH,LH,ACTH,TSH, free T4, am cortisol, prolactin, IGF-1

## 2021-06-29 NOTE — PROGRESS NOTE ADULT - PROBLEM SELECTOR PLAN 1
3+ pitting edema to B/L ankles/shins, 1+ pitting to dependent thighs into posterior back, +ascites, +scrotal edema,  +JVD, bibasilar crackles, expiratory wheezing anteriorly, saturating well on RA,  cc while in ED at time of exam  - Trop 0.08, f/u repeat CE @ 10pm, AM; BNP 7221  - ECG 6/28/21: Afib @ 67 BPM with LAD & flattened T waves in I/AVL & poor R wave progression.   - CXR wet read 6/28/21: pulmonary venous congestion, B/L pleural effusions, f/u official read  - ECHO 5/17/21: EF 45-50%, indeterminate diastolic pattern, severe LVH, infiltrative disorder, moderate MR/TR, D-shaped septum which is indicative of significant pulmonary HTN, mild CA, LA severely dilated, RA moderately dilated.   - ECHO 3/2019: normal LVEF per outpatient records  - Obtain repeat ECHO (ordered)  - US B/L LE venous duplex outpatient 6/28/21 as per Dr. Anton: No DVT  - Concern for possible infiltrative disease, ?cardiac MRI when more euvolemic  - h/o syncope x2 with head strike 2/2021, f/u CT head (ordered)  - Last [facility] in 2014 @ [facility]: non-obstructive CAD, obtain cath report as able, ?ischemic w/u this admit  - c/w Lasix 80 mg IV BID (home med Lasix 40 mg PO QD)   - Holding home Entresto 24-26 BID in setting of aggressive diuresis with h/o CKD III  - Holding home Toprol XL 12.5 mg PO QD (1/2 dose home Toprol XL) in setting of CHF exacerbation  - Strict I&Os, daily weights, 1L fluid restriction, core measures ordered 3+ pitting edema to B/L ankles/shins, 1+ pitting to dependent thighs into posterior back, +ascites, +scrotal edema,  +JVD,bibasilar crackles, expiratory wheezing anteriorly, saturating well on RA,  cc while in ED at time of exam  - Trop 0.09, f/u ordered 6/29 @ 5pm, AM; BNP 7221  - ECG 6/28/21: Afib @ 67 BPM with LAD & flattened T waves in I/AVL & poor R wave progression.   - CXR wet read 6/28/21: pulmonary venous congestion, B/L pleural effusions, f/u official read  - ECHO 5/17/21: EF 45-50%, indeterminate diastolic pattern, severe LVH, infiltrative disorder, moderate MR/TR, D-shaped septum which is indicative of significant pulmonary HTN, mild DE, LA severely dilated, RA moderately dilated.   - ECHO 3/2019: normal LVEF per outpatient records  - Obtain repeat ECHO (ordered)  - US B/L LE venous duplex outpatient 6/28/21 as per Dr. Anton: No DVT  - Concern for possible infiltrative disease, ?cardiac MRI when more euvolemic  - h/o syncope x2 with head strike 2/2021, f/u CT head (ordered)  - Last [facility] in 2014 @ [facility]: non-obstructive CAD, obtain cath report as able, ?ischemic w/u this admit  - c/w Lasix 80 mg IV BID (home med Lasix 40 mg PO QD)   - Holding home Entresto 24-26 BID in setting of aggressive diuresis with h/o CKD III  - Holding home Toprol XL 12.5 mg PO QD (1/2 dose home Toprol XL) in setting of CHF exacerbation  - Strict I&Os, daily weights, 1L fluid restriction, core measures ordered

## 2021-06-29 NOTE — PROGRESS NOTE ADULT - PROBLEM SELECTOR PLAN 4
- Hold home Metformin 500 mg PO BID  - YANCI  - f/u AM HgbA1C, lipid panel    VTE ppx: SCDs only  Dispo: Pending euvoelmia, heme consult    Case d/w Dr. Anton - Hold home Metformin 500 mg PO BID  - YANCI  - f/u AM HgbA1C, lipid panel  -Consulted Endocrine, Dr. Somers reccomends GLP-1 inhibitor jardiance 10mg POD and d/c metformin due to HF mortality benefit. and patient is very well controlled on Metformin, A1c 5.8.     VTE ppx: SCDs only  Dispo: Pending euvoelmia, heme consult    Case d/w Dr. Anton - Hold home Metformin 500 mg PO BID  - YANCI  - f/u AM HgbA1C, lipid panel  -Consulted Endocrine, Dr. Somers reccomends GLP-1 inhibitor jardiance 10mg POD and d/c metformin due to HF mortality benefit. and patient is very well controlled on Metformin, A1c 5.8.

## 2021-06-30 DIAGNOSIS — D61.818 OTHER PANCYTOPENIA: ICD-10-CM

## 2021-06-30 LAB
ACTH SER-ACNC: 27.2 PG/ML — SIGNIFICANT CHANGE UP (ref 7.2–63.3)
ALBUMIN SERPL ELPH-MCNC: 3.7 G/DL — SIGNIFICANT CHANGE UP (ref 3.3–5)
ALP SERPL-CCNC: 115 U/L — SIGNIFICANT CHANGE UP (ref 40–120)
ALT FLD-CCNC: 15 U/L — SIGNIFICANT CHANGE UP (ref 10–45)
ANION GAP SERPL CALC-SCNC: 10 MMOL/L — SIGNIFICANT CHANGE UP (ref 5–17)
AST SERPL-CCNC: 14 U/L — SIGNIFICANT CHANGE UP (ref 10–40)
BASOPHILS # BLD AUTO: 0.02 K/UL — SIGNIFICANT CHANGE UP (ref 0–0.2)
BASOPHILS NFR BLD AUTO: 0.7 % — SIGNIFICANT CHANGE UP (ref 0–2)
BILIRUB SERPL-MCNC: 1 MG/DL — SIGNIFICANT CHANGE UP (ref 0.2–1.2)
BUN SERPL-MCNC: 20 MG/DL — SIGNIFICANT CHANGE UP (ref 7–23)
CALCIUM SERPL-MCNC: 9 MG/DL — SIGNIFICANT CHANGE UP (ref 8.4–10.5)
CHLORIDE SERPL-SCNC: 106 MMOL/L — SIGNIFICANT CHANGE UP (ref 96–108)
CO2 SERPL-SCNC: 29 MMOL/L — SIGNIFICANT CHANGE UP (ref 22–31)
CORTIS AM PEAK SERPL-MCNC: 8.87 UG/DL — SIGNIFICANT CHANGE UP (ref 6.02–18.4)
CREAT SERPL-MCNC: 1.25 MG/DL — SIGNIFICANT CHANGE UP (ref 0.5–1.3)
EOSINOPHIL # BLD AUTO: 0.03 K/UL — SIGNIFICANT CHANGE UP (ref 0–0.5)
EOSINOPHIL NFR BLD AUTO: 1.1 % — SIGNIFICANT CHANGE UP (ref 0–6)
FSH SERPL-MCNC: 14.5 IU/L — HIGH (ref 1.5–12.4)
GLUCOSE BLDC GLUCOMTR-MCNC: 108 MG/DL — HIGH (ref 70–99)
GLUCOSE BLDC GLUCOMTR-MCNC: 110 MG/DL — HIGH (ref 70–99)
GLUCOSE BLDC GLUCOMTR-MCNC: 121 MG/DL — HIGH (ref 70–99)
GLUCOSE BLDC GLUCOMTR-MCNC: 85 MG/DL — SIGNIFICANT CHANGE UP (ref 70–99)
GLUCOSE SERPL-MCNC: 90 MG/DL — SIGNIFICANT CHANGE UP (ref 70–99)
HCT VFR BLD CALC: 35.6 % — LOW (ref 39–50)
HGB BLD-MCNC: 12.1 G/DL — LOW (ref 13–17)
IMM GRANULOCYTES NFR BLD AUTO: 0.4 % — SIGNIFICANT CHANGE UP (ref 0–1.5)
INSULIN-LIKE GROWTH FACTOR 1 INTERPRETATION: SIGNIFICANT CHANGE UP
INSULIN-LIKE GROWTH FACTOR 1: 69 NG/ML — SIGNIFICANT CHANGE UP (ref 23–229)
LH SERPL-ACNC: 15.8 IU/L — SIGNIFICANT CHANGE UP
LYMPHOCYTES # BLD AUTO: 0.84 K/UL — LOW (ref 1–3.3)
LYMPHOCYTES # BLD AUTO: 30.1 % — SIGNIFICANT CHANGE UP (ref 13–44)
MAGNESIUM SERPL-MCNC: 2.3 MG/DL — SIGNIFICANT CHANGE UP (ref 1.6–2.6)
MCHC RBC-ENTMCNC: 29.7 PG — SIGNIFICANT CHANGE UP (ref 27–34)
MCHC RBC-ENTMCNC: 34 GM/DL — SIGNIFICANT CHANGE UP (ref 32–36)
MCV RBC AUTO: 87.5 FL — SIGNIFICANT CHANGE UP (ref 80–100)
MONOCYTES # BLD AUTO: 0.37 K/UL — SIGNIFICANT CHANGE UP (ref 0–0.9)
MONOCYTES NFR BLD AUTO: 13.3 % — SIGNIFICANT CHANGE UP (ref 2–14)
NEUTROPHILS # BLD AUTO: 1.52 K/UL — LOW (ref 1.8–7.4)
NEUTROPHILS NFR BLD AUTO: 54.4 % — SIGNIFICANT CHANGE UP (ref 43–77)
NRBC # BLD: 0 /100 WBCS — SIGNIFICANT CHANGE UP (ref 0–0)
PLATELET # BLD AUTO: 67 K/UL — LOW (ref 150–400)
POTASSIUM SERPL-MCNC: 4.1 MMOL/L — SIGNIFICANT CHANGE UP (ref 3.5–5.3)
POTASSIUM SERPL-SCNC: 4.1 MMOL/L — SIGNIFICANT CHANGE UP (ref 3.5–5.3)
PROLACTIN SERPL-MCNC: 9.7 NG/ML — SIGNIFICANT CHANGE UP (ref 4.1–18.4)
PROT SERPL-MCNC: 6.2 G/DL — SIGNIFICANT CHANGE UP (ref 6–8.3)
RBC # BLD: 4.07 M/UL — LOW (ref 4.2–5.8)
RBC # FLD: 15.5 % — HIGH (ref 10.3–14.5)
SODIUM SERPL-SCNC: 145 MMOL/L — SIGNIFICANT CHANGE UP (ref 135–145)
T4 FREE SERPL-MCNC: 1.18 NG/DL — SIGNIFICANT CHANGE UP (ref 0.93–1.7)
WBC # BLD: 2.79 K/UL — LOW (ref 3.8–10.5)
WBC # FLD AUTO: 2.79 K/UL — LOW (ref 3.8–10.5)

## 2021-06-30 PROCEDURE — 71250 CT THORAX DX C-: CPT | Mod: 26

## 2021-06-30 PROCEDURE — 99222 1ST HOSP IP/OBS MODERATE 55: CPT

## 2021-06-30 RX ORDER — FUROSEMIDE 40 MG
60 TABLET ORAL EVERY 12 HOURS
Refills: 0 | Status: DISCONTINUED | OUTPATIENT
Start: 2021-06-30 | End: 2021-07-02

## 2021-06-30 RX ADMIN — Medication 80 MILLIGRAM(S): at 05:50

## 2021-06-30 RX ADMIN — Medication 12.5 MILLIGRAM(S): at 05:50

## 2021-06-30 RX ADMIN — TAMSULOSIN HYDROCHLORIDE 0.4 MILLIGRAM(S): 0.4 CAPSULE ORAL at 21:44

## 2021-06-30 RX ADMIN — Medication 60 MILLIGRAM(S): at 18:32

## 2021-06-30 NOTE — PROGRESS NOTE ADULT - PROBLEM SELECTOR PLAN 2
Currently in rate controlled Afib  - Aflutter s/p ablation 6/2016 & found to be in Afib as of 5/2021 (on Eliquis)  - TSH 1.78  - Holding ELiquis/heparin gtt in setting of thrombocytopenia  - continue Toprol XL 12.5 mg PO QD (1/2 dose home Toprol XL)

## 2021-06-30 NOTE — PROGRESS NOTE ADULT - PROBLEM SELECTOR PLAN 1
3+ pitting edema to B/L ankles/shins, 1+ pitting to dependent thighs into posterior back, +ascites, +scrotal edema,  +JVD,bibasilar crackles, expiratory wheezing anteriorly, saturating well on RA,  cc while in ED at time of exam  - Trop 0.09, f/u ordered 6/29 @ 5pm, AM; BNP 7221  - ECG 6/28/21: Afib @ 67 BPM with LAD & flattened T waves in I/AVL & poor R wave progression.   - CXR wet read 6/28/21: pulmonary venous congestion, B/L pleural effusions, f/u official read  - ECHO 5/17/21: EF 45-50%, indeterminate diastolic pattern, severe LVH, infiltrative disorder, moderate MR/TR, D-shaped septum which is indicative of significant pulmonary HTN, mild [place], LA severely dilated, RA moderately dilated.   - ECHO 3/2019: normal LVEF per outpatient records  - Obtain repeat ECHO (ordered)  - US B/L LE venous duplex outpatient 6/28/21 as per Dr. Anton: No DVT  - Concern for possible infiltrative disease, ?cardiac MRI when more euvolemic  - h/o syncope x2 with head strike 2/2021, f/u CT head (ordered)  - Last [facility] in 2014 @ [facility]: non-obstructive CAD, obtain cath report as able, ?ischemic w/u this admit  - c/w Lasix 80 mg IV BID (home med Lasix 40 mg PO QD)   - Holding home Entresto 24-26 BID in setting of aggressive diuresis with h/o CKD III  - Holding home Toprol XL 12.5 mg PO QD (1/2 dose home Toprol XL) in setting of CHF exacerbation  - Strict I&Os, daily weights, 1L fluid restriction, core measures ordered

## 2021-06-30 NOTE — PROGRESS NOTE ADULT - PROBLEM SELECTOR PLAN 4
- Hold home Metformin 500 mg PO BID  - YANCI  - f/u AM HgbA1C, lipid panel  -Consulted Endocrine, Dr. Somers recommends GLP-1 inhibitor Jardiance 10mg POD upon discharge and d/c metformin due to HF mortality benefit. and patient is very well controlled on Metformin, A1c 5.8.

## 2021-06-30 NOTE — CONSULT NOTE ADULT - ASSESSMENT
75 year old male with DM2, Aflutter s/p ablation, A. fib (on Eliquis), HFmrEF (EF 45-50% by outpatient ECHO 5/2021, EF was normal per outpatient ECHO 3/2019), non-obstructive CAD, CKD stage III, BPH s/p TURP,  pituitary adenoma s/p transphenoidal rxn in 1990s, prostate CA (dx in 2018 s/p chemo, no longer on treatment), chronic venous insufficiency admitted for CHF exacerbation. Hematology consulted for anemia and thrombocytopenia.     Anemia   - Hb on admission 12.9, now 12.1   - no evidence of bleeding, hemodynamically stable  - Iron studies normal. Elevated B12 and normal folate   - Check retic count. Normal LDH, haptoglobin is >25, and normal T. bili which is less likely to be hemolysis.     Leukopenia w/ lymphocytopenia   - ANC 1520,    - Afebrile, no evidence of active infections   - HIV negative, Hep C negative. Check hepatitis B panel  - Check TANYA and RF     Thrombocytopenia   - platelet count on admission 68K, stable today   - No evidence of mucosal bleeding or bruising   - PBS reviewed: No platelet clumping, giant platelets, target cells, 0-2 schistocytes  - Plasmic score 4 less likely to be TTP   - Check PT/PTT/INR and fibrinogen     To discuss with Dr. Almonte

## 2021-06-30 NOTE — PROGRESS NOTE ADULT - SUBJECTIVE AND OBJECTIVE BOX
O/N Events: No acute events.    Subjective/ROS: Patient seen and examined at bedside.     Denies Fever/Chills, HA, CP, SOB, n/v, changes in bowel/urinary habits.  12pt ROS otherwise negative.    VITALS  Vital Signs Last 24 Hrs  T(C): 36.5 (30 Jun 2021 17:22), Max: 36.9 (30 Jun 2021 09:32)  T(F): 97.7 (30 Jun 2021 17:22), Max: 98.5 (30 Jun 2021 09:32)  HR: 58 (30 Jun 2021 09:30) (58 - 65)  BP: 100/64 (30 Jun 2021 09:30) (100/64 - 133/86)  BP(mean): 76 (30 Jun 2021 09:30) (76 - 105)  RR: 18 (30 Jun 2021 09:30) (18 - 19)  SpO2: 96% (30 Jun 2021 09:30) (95% - 98%)    CAPILLARY BLOOD GLUCOSE      POCT Blood Glucose.: 108 mg/dL (30 Jun 2021 16:40)  POCT Blood Glucose.: 110 mg/dL (30 Jun 2021 12:11)  POCT Blood Glucose.: 85 mg/dL (30 Jun 2021 06:42)  POCT Blood Glucose.: 124 mg/dL (29 Jun 2021 21:15)      PHYSICAL EXAM  General: NAD  Head: NC/AT; MMM; PERRL; EOMI;  Neck: Supple; no JVD  Respiratory: CTAB; no wheezes/rales/rhonchi  Cardiovascular: Regular rhythm/rate; S1/S2+, no murmurs, rubs gallops   Gastrointestinal: Soft; NTND; bowel sounds normal and present  Extremities: WWP; no edema/cyanosis  Neurological: A&Ox3, CNII-XII grossly intact; no obvious focal deficits    MEDICATIONS  (STANDING):  furosemide   Injectable 80 milliGRAM(s) IV Push every 12 hours  insulin lispro (ADMELOG) corrective regimen sliding scale   SubCutaneous Before meals and at bedtime  metoprolol succinate ER 12.5 milliGRAM(s) Oral every 24 hours  tamsulosin 0.4 milliGRAM(s) Oral at bedtime    MEDICATIONS  (PRN):  albuterol/ipratropium for Nebulization 3 milliLiter(s) Nebulizer every 6 hours PRN Shortness of Breath and/or Wheezing      No Known Allergies      LABS                        12.1   2.79  )-----------( 67       ( 30 Jun 2021 06:02 )             35.6     06-30    145  |  106  |  20  ----------------------------<  90  4.1   |  29  |  1.25    Ca    9.0      30 Jun 2021 06:02  Mg     2.3     06-30    TPro  6.2  /  Alb  3.7  /  TBili  1.0  /  DBili  x   /  AST  14  /  ALT  15  /  AlkPhos  115  06-30        CARDIAC MARKERS ( 29 Jun 2021 17:09 )  x     / 0.09 ng/mL / x     / x     / x      CARDIAC MARKERS ( 29 Jun 2021 08:24 )  x     / 0.09 ng/mL / 57 U/L / x     / 2.6 ng/mL  CARDIAC MARKERS ( 28 Jun 2021 22:10 )  x     / 0.08 ng/mL / 55 U/L / x     / 2.2 ng/mL          IMAGING/EKG/ETC

## 2021-06-30 NOTE — PROGRESS NOTE ADULT - ASSESSMENT
74 yo M, h/o medication non-compliance, works with [employer] and was in [place] until 5/2021, PMHx DM2, Aflutter s/p ablation  6/2016 & found to be in Afib as of 5/2021 (on Eliquis), HFmrEF (EF 45-50% by outpatient ECHO 5/2021, EF was normal per outpatient ECHO 3/2019), non-obstructive CAD (s/p diagnostic cath in 2014 @ [facility]), CKD stage III (baseline Cr unknown), BPH s/p TURP,  pituitary adenoma s/p transphenoidal rxn in 1990s,  prostate CA (dx in 2018 s/p chemo), chronic venous insufficiency who was referred from. Dr. Anton’s office with c/o progressively worsening SOB on minimal exertion and when speaking/increasing abdominal girth/LE edema over past 3 month despite resuming CHF meds including PO Lasix.  Pt also endorses scrotal edema over past 1 month and orthopnea to the point where he has to sit in chair to sleep. Patient states he ran out of Entresto ~1 year ago while in [place] but had continued on comparable meds to BB/Eliquis/Lasix while in [place] but he ran out ~a couple weeks before seeing Dr. Anton in 5/2021.   Pt endorses 2 syncopal episodes with prodrome dizziness (2/2021 & 3/2021) with head strike 2/2021. Went to clinic with unremarkable workup.  Furthermore, pt endorses 2 episodes of non-radiating SSCP described as sharp and of severe intensity, occurring independent of exertion, lasting <1 minute before spontaneously resolving (last episode 8/2020).  Pt admitted to [facility] for r/o ACS, management of exacerbation of HFmrEF likely in setting of Afib with medication non-compliance, heme consult for thrombocytopenia.

## 2021-06-30 NOTE — PROGRESS NOTE ADULT - SUBJECTIVE AND OBJECTIVE BOX
INTERVAL HPI/OVERNIGHT EVENTS:    Patient is a 75y old  Male who presents with a chief complaint of CHF (30 Jun 2021 17:06)  No acute overnight events  insulin administration reviewed  eating well      Pt reports the following symptoms:    CONSTITUTIONAL:  Negative fever or chills, feels well, good appetite  EYES:  Negative  blurry vision or double vision  CARDIOVASCULAR:  Negative for chest pain or palpitations  RESPIRATORY:  Negative for cough, wheezing, or SOB   GASTROINTESTINAL:  Negative for nausea, vomiting, diarrhea, constipation, or abdominal pain  GENITOURINARY:  Negative frequency, urgency or dysuria  NEUROLOGIC:  No headache, confusion, dizziness, lightheadedness      Past medical history reviewed  Family history reviewed  Social history reviewed    MEDICATIONS  (STANDING):  dextrose 40% Gel 15 Gram(s) Oral once  dextrose 5%. 1000 milliLiter(s) (50 mL/Hr) IV Continuous <Continuous>  dextrose 5%. 1000 milliLiter(s) (100 mL/Hr) IV Continuous <Continuous>  dextrose 50% Injectable 25 Gram(s) IV Push once  dextrose 50% Injectable 12.5 Gram(s) IV Push once  dextrose 50% Injectable 25 Gram(s) IV Push once  furosemide   Injectable 60 milliGRAM(s) IV Push every 12 hours  glucagon  Injectable 1 milliGRAM(s) IntraMuscular once  insulin lispro (ADMELOG) corrective regimen sliding scale   SubCutaneous Before meals and at bedtime  metoprolol succinate ER 12.5 milliGRAM(s) Oral every 24 hours  tamsulosin 0.4 milliGRAM(s) Oral at bedtime    MEDICATIONS  (PRN):  albuterol/ipratropium for Nebulization 3 milliLiter(s) Nebulizer every 6 hours PRN Shortness of Breath and/or Wheezing      Diet:  Diet, DASH/TLC:   Sodium & Cholesterol Restricted  Consistent Carbohydrate Evening Snack (CSTCHOSN)  1000mL Fluid Restriction ([id number]) (06-28-21 @ 19:11) [Active]      PHYSICAL EXAM  Vital Signs Last 24 Hrs  T(C): 36.7 (30 Jun 2021 22:20), Max: 36.9 (30 Jun 2021 09:32)  T(F): 98 (30 Jun 2021 22:20), Max: 98.5 (30 Jun 2021 09:32)  HR: 59 (30 Jun 2021 17:50) (58 - 64)  BP: 104/75 (30 Jun 2021 17:50) (100/64 - 133/86)  BP(mean): 86 (30 Jun 2021 17:50) (76 - 105)  RR: 18 (30 Jun 2021 09:30) (18 - 19)  SpO2: 98% (30 Jun 2021 17:50) (95% - 98%)    Constitutional: wn/wd in NAD.   HEENT: NCAT, MMM, OP clear, EOMI, no proptosis or lid retraction  Neck: no thyromegaly or palpable thyroid nodules   Respiratory: lungs CTAB.  Cardiovascular: regular rhythm, normal S1 and S2, no audible murmurs, no peripheral edema  GI: soft, NT/ND, no masses/HSM appreciated.  Neurology: no tremors, DTR 2+  Skin: no visible rashes/lesions  Psychiatric: AAO x 3, normal affect/mood.    LABS:                        12.1   2.79  )-----------( 67       ( 30 Jun 2021 06:02 )             35.6     06-30    145  |  106  |  20  ----------------------------<  90  4.1   |  29  |  1.25    Ca    9.0      30 Jun 2021 06:02  Mg     2.3     06-30    TPro  6.2  /  Alb  3.7  /  TBili  1.0  /  DBili  x   /  AST  14  /  ALT  15  /  AlkPhos  115  06-30        Thyroid Stimulating Hormone, Serum: 1.78 uIU/mL (06-29 @ 13:30)      HbA1C: 5.8 % (06-29 @ 08:24)      CAPILLARY BLOOD GLUCOSE      POCT Blood Glucose.: 121 mg/dL (30 Jun 2021 21:22)  POCT Blood Glucose.: 108 mg/dL (30 Jun 2021 16:40)  POCT Blood Glucose.: 110 mg/dL (30 Jun 2021 12:11)  POCT Blood Glucose.: 85 mg/dL (30 Jun 2021 06:42)      Free Thyroxine, Serum: 1.180 ng/dL (06-30-21 @ 06:02)  Cholesterol, Serum: 127 mg/dL (06-29-21 @ 08:24)  HDL Cholesterol, Serum: 42 mg/dL (06-29-21 @ 08:24)  Triglycerides, Serum: 46 mg/dL (06-29-21 @ 08:24)  LDL Cholesterol Calculated: 76 mg/dL (06-29-21 @ 08:24)    Free Thyroxine, Serum: 1.180 ng/dL (06.30.21 @ 06:02)    Adrenocorticotropic Hormone, Serum: 27.2 pg/mL (06.30.21 @ 11:33)    Cortisol AM, Serum: 8.870 ug/dL (06.30.21 @ 06:02)    Follicle Stimulating Hormone, Serum: 14.5: FSH (IU/L)  Follicular:                      3.5-12.5  Ovulation:                     4.7-21.5  Luteal:                           1.7-7.7  Postmenopause:         25.8-134.8  Children:                       0.2-3.8  Male:                             1.5-12.4 IU/L (06.30.21 @ 12:08)    Luteinizing Hormone, Serum (06.30.21 @ 12:08)    Luteinizing Hormone, Serum: 15.8: LH (IU/L)  Follicular:     2.4-12.6  Ovulation:    14.0-95.6  Luteal:        1.0-11.4  Postmenopause: 7.7-58.5  Children:      0.2-1.4  Male:          1.7-8.6 IU/L        Prolactin, Serum (06.30.21 @ 12:08)    Prolactin, Serum: 9.7 ng/mL

## 2021-06-30 NOTE — PROGRESS NOTE ADULT - SUBJECTIVE AND OBJECTIVE BOX
76 yo M, h/o medication non-compliance, works with [employer] and was in [place] until 5/2021, PMHx DM2, Aflutter s/p ablation  6/2016 & found to be in Afib as of 5/2021 (on Eliquis), HFmrEF (EF 45-50% by outpatient ECHO 5/2021, EF was normal per outpatient ECHO 3/2019), non-obstructive CAD (s/p diagnostic cath in 2014 @ [facility]),  CKD stage III (baseline Cr unknown), BPH s/p TURP,  pituitary adenoma s/p transphenoidal rxn in 1990s,  prostate CA (dx in 2018 s/p chemo), chronic venous insufficiency who was referred from. Dr. Anton’s office with c/o progressively worsening SOB on minimal exertion and when speaking/increasing abdominal girth/LE edema over past 3 month despite resuming CHF meds including PO Lasix.  Pt also endorses scrotal edema over past 1 month and orthopnea to the point where he has to sit in chair to sleep. Patient states he ran out of Entresto ~1 year ago while in [place] but had continued on comparable meds to BB/Eliquis/Lasix while in [place] but he ran out ~a couple weeks before seeing Dr. Anton in 5/2021.  Pt state LE edema/SOB has been ongoing over the past 1 year but Lasix increased to 40 mg PO TID while in [place] 2/2021 which was decreased back to 40 mg PO QD 3/2021 after his LE edema/SOB resolved.  Pt endorses 2 syncopal episodes with prodrome dizziness (2/2021 & 3/2021) with head strike 2/2021. Went to clinic with unremarkable workup.  Pt endorses MARTINEZ/fatigue upon ambulation of 1-2 city blocks, resolving with rest over past 3 years. Furthermore, pt endorses 2 episodes of non-radiating SSCP described as sharp and of severe intensity, occurring independent of exertion, lasting <1 minute before spontaneously resolving (last episode 8/2020). Denies  diaphoresis, palpitations, , PND, N/V, abdominal pain. Upon admit, VS – T 98.3F, HR 66 BPM, /94, RR 18, SpO2 96% on RA. Labs significant H/H 12.9/37.8, platelet 68, BUN/Cr 18/1.27, Alk phos 136, BNP 7221, Trop 0.08, HIV nonreactive, covid neg x1. CXR wet read 6/28/21: pulmonary venous congestion, B/L pleural effusions. ECG 6/28/21: Afib @ 67 BPM with LAD & flattened T waves in I/AVL & poor R wave progression. While in ED, pt received Lasix 40 mg IV x1.  Pt admitted to [facility] for r/o ACS, management of exacerbation of HFmrEF likely in setting of Afib with medication non-compliance, heme consult for thrombocytopenia.     Patient History:   Past Medical, Past Surgical, and Family History   PAST MEDICAL HISTORY:  Atrial flutter s/p Ablation 2016    Chronic venous insufficiency of lower extremity     Congestive heart failure (CHF)     Diabetes     Prostate CA     Stage 3 chronic kidney disease.     PAST SURGICAL HISTORY:  Atrial flutter s/p ablation in 2016    History of surgical removal of pituitary gland 1990s    S/P TURP for BPH.     	  MEDICATIONS:  furosemide   Injectable 60 milliGRAM(s) IV Push every 12 hours  metoprolol succinate ER 12.5 milliGRAM(s) Oral every 24 hours  tamsulosin 0.4 milliGRAM(s) Oral at bedtime      albuterol/ipratropium for Nebulization 3 milliLiter(s) Nebulizer every 6 hours PRN        dextrose 40% Gel 15 Gram(s) Oral once  dextrose 50% Injectable 25 Gram(s) IV Push once  dextrose 50% Injectable 12.5 Gram(s) IV Push once  dextrose 50% Injectable 25 Gram(s) IV Push once  glucagon  Injectable 1 milliGRAM(s) IntraMuscular once  insulin lispro (ADMELOG) corrective regimen sliding scale   SubCutaneous Before meals and at bedtime    dextrose 5%. 1000 milliLiter(s) IV Continuous <Continuous>  dextrose 5%. 1000 milliLiter(s) IV Continuous <Continuous>      Complaint:     Otherwise 12 point review of systems is normal.    PHYSICAL EXAM:    Constitutional: NAD  Eyes: PERRL, EOMI, sclera non-icteric  Neck: supple, no masses, no JVD  Respiratory: CTA b/l, good air entry b/l, +crackles  Cardiovascular: RRR, normal S1S2, no M/R/G  Gastrointestinal: soft, NTND, no masses palpable, BS normal in all four quadrants,   Extremities:  +edema  Neurological: AAOx3    ICU Vital Signs Last 24 Hrs  T(C): 36.5 (30 Jun 2021 17:22), Max: 36.9 (30 Jun 2021 09:32)  T(F): 97.7 (30 Jun 2021 17:22), Max: 98.5 (30 Jun 2021 09:32)  HR: 59 (30 Jun 2021 17:50) (58 - 65)  BP: 104/75 (30 Jun 2021 17:50) (100/64 - 133/86)  BP(mean): 86 (30 Jun 2021 17:50) (76 - 105)  ABP: --  ABP(mean): --  RR: 18 (30 Jun 2021 09:30) (18 - 19)  SpO2: 98% (30 Jun 2021 17:50) (95% - 98%)        LABS:	 	  CARDIAC MARKERS:  Troponin T, Serum: 0.09 ng/mL *HH* [0.00 - 0.01] (06-29 @ 17:09)  Troponin T, Serum: 0.09 ng/mL *HH* [0.00 - 0.01] (06-29 @ 08:24)  Troponin T, Serum: 0.08 ng/mL *HH* [0.00 - 0.01] (06-28 @ 22:10)  Troponin T, Serum: 0.08 ng/mL *HH* [0.00 - 0.01] (06-28 @ 17:07)                              12.1   2.79  )-----------( 67       ( 30 Jun 2021 06:02 )             35.6     06-30    145  |  106  |  20  ----------------------------<  90  4.1   |  29  |  1.25    Ca    9.0      30 Jun 2021 06:02  Mg     2.3     06-30    TPro  6.2  /  Alb  3.7  /  TBili  1.0  /  DBili  x   /  AST  14  /  ALT  15  /  AlkPhos  115  06-30    proBNP: Serum Pro-Brain Natriuretic Peptide: 7221 pg/mL (06-28 @ 17:07)  TSH: Thyroid Stimulating Hormone, Serum: 1.78 uIU/mL (06-29 @ 13:30)            ASSESSMENT/PLAN: 	    76 yo M, h/o medication non-compliance, works with [employer] and was in [place] until 5/2021, PMHx DM2, Aflutter s/p ablation  6/2016 & found to be in Afib as of 5/2021 (on Eliquis), HFmrEF (EF 45-50% by outpatient ECHO 5/2021, EF was normal per outpatient ECHO 3/2019), non-obstructive CAD (s/p diagnostic cath in 2014 @ [facility]), CKD stage III (baseline Cr unknown), BPH s/p TURP,  pituitary adenoma s/p transphenoidal rxn in 1990s,  prostate CA (dx in 2018 s/p chemo), chronic venous insufficiency who was referred from. Dr. Anton’s office with c/o progressively worsening SOB on minimal exertion and when speaking/increasing abdominal girth/LE edema over past 3 month despite resuming CHF meds including PO Lasix.  Pt also endorses scrotal edema over past 1 month and orthopnea to the point where he has to sit in chair to sleep. Patient states he ran out of Entresto ~1 year ago while in [place] but had continued on comparable meds to BB/Eliquis/Lasix while in [place] but he ran out ~a couple weeks before seeing Dr. Anton in 5/2021.   Pt endorses 2 syncopal episodes with prodrome dizziness (2/2021 & 3/2021) with head strike 2/2021. Went to clinic with unremarkable workup.  Furthermore, pt endorses 2 episodes of non-radiating SSCP described as sharp and of severe intensity, occurring independent of exertion, lasting <1 minute before spontaneously resolving (last episode 8/2020).  Pt admitted to [facility] for r/o ACS, management of exacerbation of HFmrEF likely in setting of Afib with medication non-compliance, heme consult for thrombocytopenia.     Problem/Plan - 1:  ·  Problem: Acute on chronic diastolic congestive heart failure.  Plan: 3+ pitting edema to B/L ankles/shins, 1+ pitting to dependent thighs into posterior back, +ascites, +scrotal edema,  +JVD,bibasilar crackles, expiratory wheezing anteriorly, saturating well on RA,  cc while in ED at time of exam  - Trop 0.09, f/u ordered 6/29 @ 5pm, AM; BNP 7221  - ECG 6/28/21: Afib @ 67 BPM with LAD & flattened T waves in I/AVL & poor R wave progression.   - CXR wet read 6/28/21: pulmonary venous congestion, B/L pleural effusions, f/u official read  - ECHO 5/17/21: EF 45-50%, indeterminate diastolic pattern, severe LVH, infiltrative disorder, moderate MR/TR, D-shaped septum which is indicative of significant pulmonary HTN, mild [place], LA severely dilated, RA moderately dilated.   - ECHO 3/2019: normal LVEF per outpatient records

## 2021-06-30 NOTE — PROGRESS NOTE ADULT - ASSESSMENT
ASSESSMENT/PLAN  1) CHF  Heart failure  2) Atrial fibrillation  3) Thrombocytopenia  4) Pleural effusions/atelectasis    1. O2 use 2LNC humidified at this time  2. Bronchodilators:  Atrovent/ albuterol q 4 – 6 hours as needed  3. Corticosteroids: off  4. ID/Antibiotics: off  5. Cardiac/HTN: optimize CHF mngmnt, continue diuresis  6. GI: Rx/ prophylaxis c PPI/H2B  7. Heme: Rx/VT prophylaxis c SCD in view of thrombocytopenia  8. Aspiration precautions  9. Obtain CT chest non contrast  Discussed with managing team,

## 2021-06-30 NOTE — CONSULT NOTE ADULT - SUBJECTIVE AND OBJECTIVE BOX
HPI:   76 yo M, h/o medication non-compliance, works with [employer] and was in [place] until 5/2021, PMHx DM2, Aflutter s/p ablation  6/2016 & found to be in Afib as of 5/2021 (on Eliquis), HFmrEF (EF 45-50% by outpatient ECHO 5/2021, EF was normal per outpatient ECHO 3/2019), non-obstructive CAD (s/p diagnostic cath in 2014 @ [facility]),  CKD stage III (baseline Cr unknown), BPH s/p TURP,  pituitary adenoma s/p transphenoidal rxn in 1990s,  prostate CA (dx in 2018 s/p chemo), chronic venous insufficiency who was referred from. Dr. Anton’s office with c/o progressively worsening SOB on minimal exertion and when speaking/increasing abdominal girth/LE edema over past 3 month despite resuming CHF meds including PO Lasix.  Pt also endorses scrotal edema over past 1 month and orthopnea to the point where he has to sit in chair to sleep. Patient states he ran out of Entresto ~1 year ago while in [place] but had continued on comparable meds to BB/Eliquis/Lasix while in [place] but he ran out ~a couple weeks before seeing Dr. Anton in 5/2021.  Pt state LE edema/SOB has been ongoing over the past 1 year but Lasix increased to 40 mg PO TID while in [place] 2/2021 which was decreased back to 40 mg PO QD 3/2021 after his LE edema/SOB resolved.  Pt endorses 2 syncopal episodes with prodrome dizziness (2/2021 & 3/2021) with head strike 2/2021. Went to clinic with unremarkable workup.  Pt endorses MARTINEZ/fatigue upon ambulation of 1-2 city blocks, resolving with rest over past 3 years. Furthermore, pt endorses 2 episodes of non-radiating SSCP described as sharp and of severe intensity, occurring independent of exertion, lasting <1 minute before spontaneously resolving (last episode 8/2020). Denies  diaphoresis, palpitations, , PND, N/V, abdominal pain. Upon admit, VS – T 98.3F, HR 66 BPM, /94, RR 18, SpO2 96% on RA. Labs significant H/H 12.9/37.8, platelet 68, BUN/Cr 18/1.27, Alk phos 136, BNP 7221, Trop 0.08, HIV nonreactive, covid neg x1. CXR wet read 6/28/21: pulmonary venous congestion, B/L pleural effusions. ECG 6/28/21: Afib @ 67 BPM with LAD & flattened T waves in I/AVL & poor R wave progression. While in ED, pt received Lasix 40 mg IV x1.  Pt admitted to [facility] for r/o ACS, management of exacerbation of HFmrEF likely in setting of Afib with medication non-compliance, heme consult for thrombocytopenia.     Pt endorses long hx of type 2 DM, on metformin 500mg BID, reports unrestricted diet, consumes juice, soda, does not engage in regular physical activity, does not check glucose at home.  Insulin administration since admission reviewed.     PMH:   DM  aflutter  CHF  CAD  CKD   BPH  pituitary adenoma s/p resection   prostate CA    FH:  type 2 DM in father and siblings    SH:  non smoker  minimal etoh  lives w wife  MEDICATIONS  (STANDING):  dextrose 40% Gel 15 Gram(s) Oral once  dextrose 5%. 1000 milliLiter(s) (50 mL/Hr) IV Continuous <Continuous>  dextrose 5%. 1000 milliLiter(s) (100 mL/Hr) IV Continuous <Continuous>  dextrose 50% Injectable 25 Gram(s) IV Push once  dextrose 50% Injectable 12.5 Gram(s) IV Push once  dextrose 50% Injectable 25 Gram(s) IV Push once  furosemide   Injectable 80 milliGRAM(s) IV Push every 12 hours  glucagon  Injectable 1 milliGRAM(s) IntraMuscular once  insulin lispro (ADMELOG) corrective regimen sliding scale   SubCutaneous Before meals and at bedtime  metoprolol succinate ER 12.5 milliGRAM(s) Oral every 24 hours  tamsulosin 0.4 milliGRAM(s) Oral at bedtime    MEDICATIONS  (PRN):  albuterol/ipratropium for Nebulization 3 milliLiter(s) Nebulizer every 6 hours PRN Shortness of Breath and/or Wheezing      No Known Allergies    Exam:   VITALS  Vital Signs Last 24 Hrs  T(C): 36.6 (29 Jun 2021 05:08), Max: 36.8 (28 Jun 2021 16:26)  T(F): 97.9 (29 Jun 2021 05:08), Max: 98.3 (28 Jun 2021 16:26)  HR: 61 (29 Jun 2021 05:54) (59 - 75)  BP: 128/87 (29 Jun 2021 05:54) (128/87 - 147/72)  BP(mean): 103 (29 Jun 2021 05:54) (103 - 105)  RR: 18 (29 Jun 2021 05:54) (16 - 19)  SpO2: 99% (29 Jun 2021 05:54) (96% - 100%)      PHYSICAL EXAM  General: NAD  Head: NC/AT; MMM; PERRL; EOMI;  Neck: Supple; no JVD  Respiratory: CTAB; no wheezes/rales/rhonchi  Cardiovascular: Regular rhythm/rate; S1/S2+, no murmurs, rubs gallops   Gastrointestinal: Soft; NTND; bowel sounds normal and present  Extremities: 3+ edema bilateral lower extremities to the hips  Neurological: A&Ox3, CNII-XII grossly intact; no obvious focal deficits        LABS                        12.9   3.53  )-----------( 68       ( 28 Jun 2021 17:07 )             37.8     06-28    144  |  108  |  18  ----------------------------<  95  4.4   |  27  |  1.27    Ca    9.4      28 Jun 2021 17:07    TPro  6.9  /  Alb  4.0  /  TBili  1.2  /  DBili  x   /  AST  17  /  ALT  18  /  AlkPhos  136<H>  06-28    CAPILLARY BLOOD GLUCOSE      POCT Blood Glucose.: 86 mg/dL (29 Jun 2021 06:42)  POCT Blood Glucose.: 84 mg/dL (28 Jun 2021 20:09)    HbA1C 5.8  LDL 76      
Hematology Oncology Consult Note (Dr. Almonte)  Discussed with Dr. Almonte and recommendations reviewed with the primary team.    HPI: 75 year old male with PMH of DM2, Aflutter s/p ablation  6/2016 & found to be in Afib as of 5/2021 (on Eliquis), HFmrEF (EF 45-50% by outpatient ECHO 5/2021, EF was normal per outpatient ECHO 3/2019), non-obstructive CAD (s/p diagnostic cath in 2014 @ [facility]), CKD stage III (baseline Cr unknown), BPH s/p TURP,  pituitary adenoma s/p transphenoidal rxn in 1990s, prostate CA (dx in 2018 s/p chemo, no longer on treatment), chronic venous insufficiency who was referred from. Dr. Anton’s office with progressively worsening SOB on minimal exertion and when speaking/increasing abdominal girth/LE edema over past 3 month despite resuming CHF meds including PO Lasix.  Pt also endorses scrotal edema over past 1 month and orthopnea to the point where he has to sit in chair to sleep. Patient states he ran out of Entresto ~1 year ago while in [place] but had continued on comparable meds to BB/Eliquis/Lasix while in [place] but he ran out ~a couple weeks before seeing Dr. Anton in 5/2021. Admitted for CHF exacerbation. Hematology consulted for anemia and thrombocytopenia.    Patient denies any prior h/o anemia or thrombocytopenia. He was never seen by a hematologist in the past. He denies starting any new medications or antibiotics recently. Takes OTC vitamins, but denies being on any herbal supplements. Denies any recent illness or infections. No recent sick contacts. Recent travel to [facility] from Jan 2021 to May 2021, [place] in mid[place]may. Denies any further travel or outdoor activities. Denies any h/o epistaxis, hematuria, melena, BRBPR, or excessive bruising. Denies any fevers, night sweats, or LAD. Reports mild gum bleeding when brushing his teeth. Reports mild weight loss while in [facility], but was able to gain it back after coming back to the [facility]. Denies any chest pain, sob, palpitations, abdominal pain, n/v, early satiety, or LUQ fullness. Denies any rashes or joint pain/swelling.  Has a prior history of prostate ca but completed chemo in 2018, not currently on treatment.       PAST MEDICAL & SURGICAL HISTORY:  Congestive heart failure (CHF)  Diabetes  Atrial flutter s/p Ablation 2016  Chronic venous insufficiency of lower extremity  Prostate CA  Stage 3 chronic kidney disease  History of surgical removal of pituitary gland 1990s  S/P TURP for BPH    Allergies: NKDA    Medications:  Home Medications:  Eliquis 5 mg oral tablet: 1 tab(s) orally 2 times a day (28 Jun 2021 19:26)  Entresto 24 mg-26 mg oral tablet: 1 tab(s) orally 2 times a day (28 Jun 2021 19:26)  Flomax 0.4 mg oral capsule: 1 cap(s) orally once a day (28 Jun 2021 19:26)  furosemide 40 mg oral tablet: 1 tab(s) orally once a day (28 Jun 2021 19:26)  metFORMIN 500 mg oral tablet: 1 tab(s) orally 2 times a day (28 Jun 2021 19:26)  Metoprolol Succinate ER 25 mg oral tablet, extended release: 1 tab(s) orally once a day (28 Jun 2021 19:26)    Social History: Denies any h/o smoking. Occasional alcohol use. Lives with his wife in [place]     FAMILY HISTORY:  No pertinent family history of anemia or thrombocytopenia.     MEDICATIONS  (STANDING):  dextrose 40% Gel 15 Gram(s) Oral once  dextrose 5%. 1000 milliLiter(s) (50 mL/Hr) IV Continuous <Continuous>  dextrose 5%. 1000 milliLiter(s) (100 mL/Hr) IV Continuous <Continuous>  dextrose 50% Injectable 25 Gram(s) IV Push once  dextrose 50% Injectable 12.5 Gram(s) IV Push once  dextrose 50% Injectable 25 Gram(s) IV Push once  furosemide   Injectable 80 milliGRAM(s) IV Push every 12 hours  glucagon  Injectable 1 milliGRAM(s) IntraMuscular once  insulin lispro (ADMELOG) corrective regimen sliding scale   SubCutaneous Before meals and at bedtime  metoprolol succinate ER 12.5 milliGRAM(s) Oral every 24 hours  tamsulosin 0.4 milliGRAM(s) Oral at bedtime    MEDICATIONS  (PRN):  albuterol/ipratropium for Nebulization 3 milliLiter(s) Nebulizer every 6 hours PRN Shortness of Breath and/or Wheezing      PHYSICAL EXAM:    Vital Signs Last 24 Hrs  T(C): 36.6 (30 Jun 2021 14:05), Max: 36.9 (30 Jun 2021 09:32)  T(F): 97.9 (30 Jun 2021 14:05), Max: 98.5 (30 Jun 2021 09:32)  HR: 58 (30 Jun 2021 09:30) (58 - 65)  BP: 100/64 (30 Jun 2021 09:30) (100/64 - 133/86)  BP(mean): 76 (30 Jun 2021 09:30) (76 - 105)  RR: 18 (30 Jun 2021 09:30) (18 - 19)  SpO2: 96% (30 Jun 2021 09:30) (95% - 98%)    Gen: comfortable in NAD   HEENT: normocephalic/atraumatic, no conjunctival pallor, no scleral icterus, no oral thrush/mucosal bleeding/mucositis  Neck: supple  Cardiovascular: RR, nl S1S2, no murmurs  Respiratory: clear air entry b/l  Gastrointestinal: BS+, soft, NT/ND  Extremities: b/l edema, no calf tenderness  Neurological: AAOx3, no focal deficits  Skin: no rash on visible skin  Lymph Nodes:  no cervical/supraclavicular LAD, no axillary LAD  Musculoskeletal:  full ROM  Psychiatric:  mood stable      Labs:                          12.1   2.79  )-----------( 67       ( 30 Jun 2021 06:02 )             35.6     CBC Full  -  ( 30 Jun 2021 06:02 )  WBC Count : 2.79 K/uL  RBC Count : 4.07 M/uL  Hemoglobin : 12.1 g/dL  Hematocrit : 35.6 %  Platelet Count - Automated : 67 K/uL  Mean Cell Volume : 87.5 fl  Mean Cell Hemoglobin : 29.7 pg  Mean Cell Hemoglobin Concentration : 34.0 gm/dL  Auto Neutrophil # : 1.52 K/uL  Auto Lymphocyte # : 0.84 K/uL  Auto Monocyte # : 0.37 K/uL  Auto Eosinophil # : 0.03 K/uL  Auto Basophil # : 0.02 K/uL  Auto Neutrophil % : 54.4 %  Auto Lymphocyte % : 30.1 %  Auto Monocyte % : 13.3 %  Auto Eosinophil % : 1.1 %  Auto Basophil % : 0.7 %    06-30    145  |  106  |  20  ----------------------------<  90  4.1   |  29  |  1.25    Ca    9.0      30 Jun 2021 06:02  Mg     2.3     06-30    TPro  6.2  /  Alb  3.7  /  TBili  1.0  /  DBili  x   /  AST  14  /  ALT  15  /  AlkPhos  115  06-30    Imaging Studies:    CT Head No Cont (06.29.21 @ 09:20)   IMPRESSION:  No gross evidence of acute intracranial hemorrhage or calvarial fracture. Examination is degraded by motion.      < end of copied text >    Xray Chest 1 View-PORTABLE IMMEDIATE (06.28.21 @ 17:28)     Findings/  impression: Cardiomegaly, thoracic aortic calcification. Bilateral opacities/pleural effusions. Thoracic spine and bilateral AC joint degenerative changes.    < end of copied text >

## 2021-06-30 NOTE — PROGRESS NOTE ADULT - SUBJECTIVE AND OBJECTIVE BOX
Interventional, Pulmonary, Critical, Chest Special Procedures.    Pt was seen and fully examined by myself.     Time spent with patient in minutes:127    Patient is a 75y old  Male who presents with a chief complaint of CHF exacerbation (29 Jun 2021 13:46) Events leading to this admission reviewed. The patient ill appearing, reporting SOB when talking, pain free and eupneic at rest.    HPI:  76 yo M, h/o medication non-compliance, works with [employer] and was in [place] until 5/2021, PMHx DM2, Aflutter s/p ablation  6/2016 & found to be in Afib as of 5/2021 (on Eliquis), HFmrEF (EF 45-50% by outpatient ECHO 5/2021, EF was normal per outpatient ECHO 3/2019), non-obstructive CAD (s/p diagnostic cath in 2014 @ [facility]),  CKD stage III (baseline Cr unknown), BPH s/p TURP,  pituitary adenoma s/p transphenoidal rxn in 1990s,  prostate CA (dx in 2018 s/p chemo), chronic venous insufficiency who was referred from. Dr. Anton’s office with c/o progressively worsening SOB on minimal exertion and when speaking/increasing abdominal girth/LE edema over past 3 month despite resuming CHF meds including PO Lasix.  Pt also endorses scrotal edema over past 1 month and orthopnea to the point where he has to sit in chair to sleep. Patient states he ran out of Entresto ~1 year ago while in [place] but had continued on comparable meds to BB/Eliquis/Lasix while in [place] but he ran out ~a couple weeks before seeing Dr. Anton in 5/2021.  Pt state LE edema/SOB has been ongoing over the past 1 year but Lasix increased to 40 mg PO TID while in [place] 2/2021 which was decreased back to 40 mg PO QD 3/2021 after his LE edema/SOB resolved.  Pt endorses 2 syncopal episodes with prodrome dizziness (2/2021 & 3/2021) with head strike 2/2021. Went to clinic with unremarkable workup.  Pt endorses MARTINEZ/fatigue upon ambulation of 1-2 city blocks, resolving with rest over past 3 years. Furthermore, pt endorses 2 episodes of non-radiating SSCP described as sharp and of severe intensity, occurring independent of exertion, lasting <1 minute before spontaneously resolving (last episode 8/2020). Denies  diaphoresis, palpitations, , PND, N/V, abdominal pain. Upon admit, VS – T 98.3F, HR 66 BPM, /94, RR 18, SpO2 96% on RA. Labs significant H/H 12.9/37.8, platelet 68, BUN/Cr 18/1.27, Alk phos 136, BNP 7221, Trop 0.08, HIV nonreactive, covid neg x1. CXR wet read 6/28/21: pulmonary venous congestion, B/L pleural effusions. ECG 6/28/21: Afib @ 67 BPM with LAD & flattened T waves in I/AVL & poor R wave progression. While in ED, pt received Lasix 40 mg IV x1.  Pt admitted to [facility] for r/o ACS, management of exacerbation of HFmrEF likely in setting of Afib with medication non-compliance, heme consult for thrombocytopenia.  (28 Jun 2021 18:07)      REVIEW OF SYSTEMS:  ROS reviewed below with positive findings marked (+) :  GEN:  fever, chills ENT: tracheostomy,   epistaxis,  sinusitis COR: +CAD, +CHF,  HTN, dysrhythmia PUL: COPD, ILD, asthma, pneumonia GI: PEG, dysphagia, hemorrhage, other KRISTIN: kidney disease, electrolyte disorder HEM:  anemia, thrombus, coagulopathy, cancer ENDO:  thyroid disease, diabetes mellitus CNS:  dementia, stroke, seizure, PSY:  depression, anxiety, other    PAST MEDICAL & SURGICAL HISTORY:  Congestive heart failure (CHF)    Diabetes    Atrial flutter  s/p Ablation 2016    Chronic venous insufficiency of lower extremity    Prostate CA    Stage 3 chronic kidney disease    Atrial flutter  s/p ablation in 2016    History of surgical removal of pituitary gland  1990s    S/P TURP  for BPH      FAMILY HISTORY:  No pertinent family history in first degree relatives      SOCIAL HISTORY:      - Tobacco     - ETOH    Allergies    No Known Allergies    Intolerances      Vital Signs Last 24 Hrs  T(C): 36.9 (30 Jun 2021 09:32), Max: 36.9 (30 Jun 2021 09:32)  T(F): 98.5 (30 Jun 2021 09:32), Max: 98.5 (30 Jun 2021 09:32)  HR: 58 (30 Jun 2021 09:30) (58 - 65)  BP: 100/64 (30 Jun 2021 09:30) (100/64 - 133/86)  BP(mean): 76 (30 Jun 2021 09:30) (76 - 105)  RR: 18 (30 Jun 2021 09:30) (18 - 19)  SpO2: 96% (30 Jun 2021 09:30) (95% - 98%)    06-29 @ 07:01 - 06-30 @ 07:00  --------------------------------------------------------  IN: 677 mL / OUT: 4900 mL / NET: -4223 mL    06-30 @ 07:01  - 06-30 @ 13:28  --------------------------------------------------------  IN: 630 mL / OUT: 1000 mL / NET: -370 mL          MEDICATIONS:  MEDICATIONS  (STANDING):  dextrose 40% Gel 15 Gram(s) Oral once  dextrose 5%. 1000 milliLiter(s) (50 mL/Hr) IV Continuous <Continuous>  dextrose 5%. 1000 milliLiter(s) (100 mL/Hr) IV Continuous <Continuous>  dextrose 50% Injectable 25 Gram(s) IV Push once  dextrose 50% Injectable 12.5 Gram(s) IV Push once  dextrose 50% Injectable 25 Gram(s) IV Push once  furosemide   Injectable 80 milliGRAM(s) IV Push every 12 hours  glucagon  Injectable 1 milliGRAM(s) IntraMuscular once  insulin lispro (ADMELOG) corrective regimen sliding scale   SubCutaneous Before meals and at bedtime  metoprolol succinate ER 12.5 milliGRAM(s) Oral every 24 hours  tamsulosin 0.4 milliGRAM(s) Oral at bedtime    MEDICATIONS  (PRN):  albuterol/ipratropium for Nebulization 3 milliLiter(s) Nebulizer every 6 hours PRN Shortness of Breath and/or Wheezing      PHYSICAL EXAM:  Un Comfortable, no immediate distress  Eyes: PERRL, EOM intact; conjunctiva and sclera clear  Head: Normocephalic;  No Trauma  ENMT: No nasal discharge, hoarseness, cough or hemoptysis  Neck: Supple; non tender; no masses or deformities.    No JVD  Respiratory:  - WHEEZING   - RHONCHI  - RALES  + posterior CRACKLES.  Diminished breath sounds  BILATERAL  RIGHT > LEFT base  Cardiovascular: Regular rate and rhythm. S1 and S2 Normal; No murmurs, gallops or rubs     - PPM/AICD  Gastrointestinal: Soft non-tender, non-distended; Normal bowel sounds; No hepatosplenomegaly.     -PEG    -  GT   - GONZALEZ  Genitourinary: No costovertebral angle tenderness. No dysuria  Extremities: AROM, No clubbing, cyanosis ++ LE  edema    Vascular: Peripheral pulses palpable 2+ bilaterally  Neurological: Alert and responisve to stimuli   Skin: Warm and dry. No obvious rash  Lymph Nodes: No acute cervical or supraclavicular adenopathy  Psychiatric: Cooperative and appropriate mood    DEVICES:  - DENTURES   +IV R / L     - ETUBE   -TRACH   -CTUBE  R / L    LABS:                          12.1   2.79  )-----------( 67       ( 30 Jun 2021 06:02 )             35.6     06-30    145  |  106  |  20  ----------------------------<  90  4.1   |  29  |  1.25    Ca    9.0      30 Jun 2021 06:02  Mg     2.3     06-30    TPro  6.2  /  Alb  3.7  /  TBili  1.0  /  DBili  x   /  AST  14  /  ALT  15  /  AlkPhos  115  06-30      RADIOLOGY & ADDITIONAL STUDIES (The following images were personally reviewed):< from: Xray Chest 1 View-PORTABLE IMMEDIATE (06.28.21 @ 17:28) >    EXAM:  XR CHEST PORTABLE IMMED 1V                          PROCEDURE DATE:  06/28/2021          INTERPRETATION:  Clinical history/reason for exam: Shortness of breath.    Frontal chest.    No comparison.    Findings/  impression: Cardiomegaly, thoracic aortic calcification. Bilateral opacities/pleural effusions. Thoracic spine and bilateral AC joint degenerative changes.    < end of copied text >

## 2021-06-30 NOTE — PROGRESS NOTE ADULT - PROBLEM SELECTOR PLAN 3
- H/H 12.9/37.8, platelet 68  - f/u iron studies, vitamin B12, folate, haptoglobin, LDH --> all normal  - Consult heme/onc in AM  -Recommendations from Heme/Onc  --retic count  --hep b panel  --TANYA and Rheumatoid factor  --PT,PTT, INR, fibrinogen levels  --Abdominal US

## 2021-07-01 LAB
ANION GAP SERPL CALC-SCNC: 11 MMOL/L — SIGNIFICANT CHANGE UP (ref 5–17)
APTT BLD: 30.6 SEC — SIGNIFICANT CHANGE UP (ref 27.5–35.5)
BASOPHILS # BLD AUTO: 0.02 K/UL — SIGNIFICANT CHANGE UP (ref 0–0.2)
BASOPHILS NFR BLD AUTO: 0.7 % — SIGNIFICANT CHANGE UP (ref 0–2)
BUN SERPL-MCNC: 25 MG/DL — HIGH (ref 7–23)
CALCIUM SERPL-MCNC: 9 MG/DL — SIGNIFICANT CHANGE UP (ref 8.4–10.5)
CHLORIDE SERPL-SCNC: 106 MMOL/L — SIGNIFICANT CHANGE UP (ref 96–108)
CO2 SERPL-SCNC: 29 MMOL/L — SIGNIFICANT CHANGE UP (ref 22–31)
CREAT SERPL-MCNC: 1.09 MG/DL — SIGNIFICANT CHANGE UP (ref 0.5–1.3)
EOSINOPHIL # BLD AUTO: 0.03 K/UL — SIGNIFICANT CHANGE UP (ref 0–0.5)
EOSINOPHIL NFR BLD AUTO: 1 % — SIGNIFICANT CHANGE UP (ref 0–6)
FIBRINOGEN PPP-MCNC: 200 MG/DL — LOW (ref 258–438)
GLUCOSE BLDC GLUCOMTR-MCNC: 106 MG/DL — HIGH (ref 70–99)
GLUCOSE BLDC GLUCOMTR-MCNC: 107 MG/DL — HIGH (ref 70–99)
GLUCOSE BLDC GLUCOMTR-MCNC: 109 MG/DL — HIGH (ref 70–99)
GLUCOSE BLDC GLUCOMTR-MCNC: 84 MG/DL — SIGNIFICANT CHANGE UP (ref 70–99)
GLUCOSE SERPL-MCNC: 98 MG/DL — SIGNIFICANT CHANGE UP (ref 70–99)
HBV CORE AB SER-ACNC: REACTIVE
HBV CORE IGM SER-ACNC: SIGNIFICANT CHANGE UP
HBV SURFACE AB SER-ACNC: SIGNIFICANT CHANGE UP
HBV SURFACE AG SER-ACNC: REACTIVE
HCT VFR BLD CALC: 36.7 % — LOW (ref 39–50)
HGB BLD-MCNC: 12.5 G/DL — LOW (ref 13–17)
IMM GRANULOCYTES NFR BLD AUTO: 0.3 % — SIGNIFICANT CHANGE UP (ref 0–1.5)
INR BLD: 1.23 — HIGH (ref 0.88–1.16)
LYMPHOCYTES # BLD AUTO: 0.79 K/UL — LOW (ref 1–3.3)
LYMPHOCYTES # BLD AUTO: 26.3 % — SIGNIFICANT CHANGE UP (ref 13–44)
MAGNESIUM SERPL-MCNC: 2.1 MG/DL — SIGNIFICANT CHANGE UP (ref 1.6–2.6)
MCHC RBC-ENTMCNC: 30.3 PG — SIGNIFICANT CHANGE UP (ref 27–34)
MCHC RBC-ENTMCNC: 34.1 GM/DL — SIGNIFICANT CHANGE UP (ref 32–36)
MCV RBC AUTO: 88.9 FL — SIGNIFICANT CHANGE UP (ref 80–100)
MONOCYTES # BLD AUTO: 0.38 K/UL — SIGNIFICANT CHANGE UP (ref 0–0.9)
MONOCYTES NFR BLD AUTO: 12.7 % — SIGNIFICANT CHANGE UP (ref 2–14)
NEUTROPHILS # BLD AUTO: 1.77 K/UL — LOW (ref 1.8–7.4)
NEUTROPHILS NFR BLD AUTO: 59 % — SIGNIFICANT CHANGE UP (ref 43–77)
NRBC # BLD: 0 /100 WBCS — SIGNIFICANT CHANGE UP (ref 0–0)
PHOSPHATE SERPL-MCNC: 3.4 MG/DL — SIGNIFICANT CHANGE UP (ref 2.5–4.5)
PLATELET # BLD AUTO: 67 K/UL — LOW (ref 150–400)
POTASSIUM SERPL-MCNC: 3.6 MMOL/L — SIGNIFICANT CHANGE UP (ref 3.5–5.3)
POTASSIUM SERPL-SCNC: 3.6 MMOL/L — SIGNIFICANT CHANGE UP (ref 3.5–5.3)
PROTHROM AB SERPL-ACNC: 14.6 SEC — HIGH (ref 10.6–13.6)
RBC # BLD: 4.13 M/UL — LOW (ref 4.2–5.8)
RBC # BLD: 4.13 M/UL — LOW (ref 4.2–5.8)
RBC # FLD: 15.1 % — HIGH (ref 10.3–14.5)
RETICS #: 27.3 K/UL — SIGNIFICANT CHANGE UP (ref 25–125)
RETICS/RBC NFR: 0.7 % — SIGNIFICANT CHANGE UP (ref 0.5–2.5)
SODIUM SERPL-SCNC: 146 MMOL/L — HIGH (ref 135–145)
WBC # BLD: 3 K/UL — LOW (ref 3.8–10.5)
WBC # FLD AUTO: 3 K/UL — LOW (ref 3.8–10.5)

## 2021-07-01 PROCEDURE — 76700 US EXAM ABDOM COMPLETE: CPT | Mod: 26

## 2021-07-01 RX ORDER — POTASSIUM CHLORIDE 20 MEQ
40 PACKET (EA) ORAL ONCE
Refills: 0 | Status: COMPLETED | OUTPATIENT
Start: 2021-07-01 | End: 2021-07-01

## 2021-07-01 RX ADMIN — Medication 40 MILLIEQUIVALENT(S): at 19:05

## 2021-07-01 RX ADMIN — Medication 12.5 MILLIGRAM(S): at 06:28

## 2021-07-01 RX ADMIN — Medication 60 MILLIGRAM(S): at 06:29

## 2021-07-01 RX ADMIN — TAMSULOSIN HYDROCHLORIDE 0.4 MILLIGRAM(S): 0.4 CAPSULE ORAL at 22:02

## 2021-07-01 RX ADMIN — Medication 60 MILLIGRAM(S): at 18:03

## 2021-07-01 NOTE — DIETITIAN INITIAL EVALUATION ADULT. - ADD RECOMMEND
1. Continue DASH/TLC with fluid restriction per team 2. Monitor %PO intake 3. Monitor BM 4. Monites BMP, BG q6hrs, lytes, replete prn 5. diet edu at next f/u 1. Continue DASH/TLC + CST CHO with fluid restriction per team 2. Monitor %PO intake 3. Monitor BM 4. Monites BMP, BG q6hrs, lytes, replete prn 5. diet edu at next f/u

## 2021-07-01 NOTE — DIETITIAN INITIAL EVALUATION ADULT. - PHYSCIAL ASSESSMENT
adm wt: 216  6/29 wt: 205.2 lbs    Wt difference likely d/t fluid retention, utilize lowest wt to in assessment obese

## 2021-07-01 NOTE — PROGRESS NOTE ADULT - SUBJECTIVE AND OBJECTIVE BOX
O/N Events: the patient had was wearing the ACE bandages overnight to help with the swelling.    Subjective/ROS: Patient seen and examined at bedside.     Denies Fever/Chills, HA, CP, SOB, n/v, changes in bowel/urinary habits.  12pt ROS otherwise negative.    VITALS  Vital Signs Last 24 Hrs  T(C): 36.4 (01 Jul 2021 09:26), Max: 36.7 (30 Jun 2021 22:20)  T(F): 97.6 (01 Jul 2021 09:26), Max: 98 (30 Jun 2021 22:20)  HR: 62 (01 Jul 2021 12:45) (59 - 71)  BP: 109/74 (01 Jul 2021 12:45) (104/75 - 128/86)  BP(mean): 86 (01 Jul 2021 12:45) (86 - 104)  RR: 18 (01 Jul 2021 12:45) (18 - 20)  SpO2: 96% (01 Jul 2021 12:45) (95% - 98%)    CAPILLARY BLOOD GLUCOSE      POCT Blood Glucose.: 106 mg/dL (01 Jul 2021 11:51)  POCT Blood Glucose.: 84 mg/dL (01 Jul 2021 06:57)  POCT Blood Glucose.: 121 mg/dL (30 Jun 2021 21:22)  POCT Blood Glucose.: 108 mg/dL (30 Jun 2021 16:40)      PHYSICAL EXAM  General: NAD  Head: NC/AT; MMM; PERRL; EOMI;  Neck: Supple; no JVD  Respiratory: CTAB; no wheezes/rales/rhonchi  Cardiovascular: Regular rhythm/rate; S1/S2+, no murmurs, rubs gallops   Gastrointestinal: Soft; NTND; bowel sounds normal and present  Extremities: WWP; no edema/cyanosis  Neurological: A&Ox3, CNII-XII grossly intact; no obvious focal deficits    MEDICATIONS  (STANDING):  dextrose 40% Gel 15 Gram(s) Oral once  dextrose 5%. 1000 milliLiter(s) (50 mL/Hr) IV Continuous <Continuous>  dextrose 5%. 1000 milliLiter(s) (100 mL/Hr) IV Continuous <Continuous>  dextrose 50% Injectable 25 Gram(s) IV Push once  dextrose 50% Injectable 12.5 Gram(s) IV Push once  dextrose 50% Injectable 25 Gram(s) IV Push once  furosemide   Injectable 60 milliGRAM(s) IV Push every 12 hours  glucagon  Injectable 1 milliGRAM(s) IntraMuscular once  insulin lispro (ADMELOG) corrective regimen sliding scale   SubCutaneous Before meals and at bedtime  metoprolol succinate ER 12.5 milliGRAM(s) Oral every 24 hours  tamsulosin 0.4 milliGRAM(s) Oral at bedtime    MEDICATIONS  (PRN):  albuterol/ipratropium for Nebulization 3 milliLiter(s) Nebulizer every 6 hours PRN Shortness of Breath and/or Wheezing      No Known Allergies      LABS                        12.5   3.00  )-----------( 67       ( 01 Jul 2021 12:18 )             36.7     07-01    146<H>  |  106  |  25<H>  ----------------------------<  98  3.6   |  29  |  1.09    Ca    9.0      01 Jul 2021 12:18  Phos  3.4     07-01  Mg     2.1     07-01    TPro  6.2  /  Alb  3.7  /  TBili  1.0  /  DBili  x   /  AST  14  /  ALT  15  /  AlkPhos  115  06-30    PT/INR - ( 01 Jul 2021 12:18 )   PT: 14.6 sec;   INR: 1.23          PTT - ( 01 Jul 2021 12:18 )  PTT:30.6 sec    CARDIAC MARKERS ( 29 Jun 2021 17:09 )  x     / 0.09 ng/mL / x     / x     / x              IMAGING/EKG/ETC

## 2021-07-01 NOTE — PROGRESS NOTE ADULT - ASSESSMENT
ASSESSMENT/PLAN  1) CHF  Heart failure  2) Atrial fibrillation  3) Thrombocytopenia  4) Pleural effusions/atelectasis    1. O2 use 2LNC humidified at this time  2. Bronchodilators:  Atrovent/ albuterol q 4 – 6 hours as needed  3. Corticosteroids: off  4. ID/Antibiotics: off  5. Cardiac/HTN: optimize CHF mngmnt, continue diuresis  6. GI: Rx/ prophylaxis c PPI/H2B  7. Heme: Rx/VT prophylaxis c SCD in view of thrombocytopenia, hematology feedback.work up appreciated  8. Aspiration precautions  9. IR evaluation for Right Thoracentesis in pt c Thrombocytopenia.   Discussed with managing team,

## 2021-07-01 NOTE — DIETITIAN INITIAL EVALUATION ADULT. - OTHER CALCULATIONS
IBW used to calculate energy needs due to pt's current body weight exceeding 120% of IBW (124%). Needs adjusted for age and wt. Increased needs 2/2 HF Fluids per team 2/2 HF/edema

## 2021-07-01 NOTE — DIETITIAN INITIAL EVALUATION ADULT. - OTHER INFO
76 yo M, h/o medication non-compliance, works with [employer] and was in [place] until 5/2021, PMHx DM2, Aflutter s/p ablation  6/2016 & found to be in Afib as of 5/2021 (on Eliquis), HFmrEF (EF 45-50% by outpatient ECHO 5/2021, EF was normal per outpatient ECHO 3/2019), non-obstructive CAD (s/p diagnostic cath in 2014 @ [facility]), CKD stage III (baseline Cr unknown), BPH s/p TURP,  pituitary adenoma s/p transphenoidal rxn in 1990s,  prostate CA (dx in 2018 s/p chemo), chronic venous insufficiency who was referred from. Dr. Anton’s office with c/o progressively worsening SOB on minimal exertion and when speaking/increasing abdominal girth/LE edema over past 3 month despite resuming CHF meds including PO Lasix.  Pt also endorses scrotal edema over past 1 month and orthopnea to the point where he has to sit in chair to sleep. Patient states he ran out of Entresto ~1 year ago while in [place] but had continued on comparable meds to BB/Eliquis/Lasix while in [place] but he ran out ~a couple weeks before seeing Dr. Anton in 5/2021.   Pt endorses 2 syncopal episodes with prodrome dizziness (2/2021 & 3/2021) with head strike 2/2021. Went to clinic with unremarkable workup.  Furthermore, pt endorses 2 episodes of non-radiating SSCP described as sharp and of severe intensity, occurring independent of exertion, lasting <1 minute before spontaneously resolving (last episode 8/2020).  Pt admitted to [facility] for r/o ACS, management of exacerbation of HFmrEF likely in setting of Afib with medication non-compliance, heme consult for thrombocytopenia.    Attempted to speak to pt this AM but pt reported fatigue and tiredness, unable to interview as pt wanted to get some rest and requested to RD to come back in afternoon. Attempted to see pt in afternoon but pt not in room. Per RN report, pt had good PO intake and good appetite in the AM. No complaints today. No reports of n/v/d/c. GI: WDL, no reported BM. Skin integrity: Toby 23, skin intact. Edema 4+ R and L leg. Will continue to follow per RD protocol.

## 2021-07-01 NOTE — GOALS OF CARE CONVERSATION - ADVANCED CARE PLANNING - CONVERSATION DETAILS
Patient states that he would want CPR if his hear were to stop and that he would want intubation if he was unable to breathe.

## 2021-07-01 NOTE — PROGRESS NOTE ADULT - SUBJECTIVE AND OBJECTIVE BOX
Interventional, Pulmonary, Critical, Chest Special Procedures.    Pt was seen and fully examined by myself.     Time spent with patient in minutes:77    Patient is a 75y old  Male who presents with a chief complaint of CHF (01 Jul 2021 06:52)The patient ill appearing, reporting SOB when talking,  Undergoing Thrombocytopenia work up    HPI:  76 yo M, h/o medication non-compliance, works with [employer] and was in [place] until 5/2021, PMHx DM2, Aflutter s/p ablation  6/2016 & found to be in Afib as of 5/2021 (on Eliquis), HFmrEF (EF 45-50% by outpatient ECHO 5/2021, EF was normal per outpatient ECHO 3/2019), non-obstructive CAD (s/p diagnostic cath in 2014 @ [facility]),  CKD stage III (baseline Cr unknown), BPH s/p TURP,  pituitary adenoma s/p transphenoidal rxn in 1990s,  prostate CA (dx in 2018 s/p chemo), chronic venous insufficiency who was referred from. Dr. Anton’s office with c/o progressively worsening SOB on minimal exertion and when speaking/increasing abdominal girth/LE edema over past 3 month despite resuming CHF meds including PO Lasix.  Pt also endorses scrotal edema over past 1 month and orthopnea to the point where he has to sit in chair to sleep. Patient states he ran out of Entresto ~1 year ago while in [place] but had continued on comparable meds to BB/Eliquis/Lasix while in [place] but he ran out ~a couple weeks before seeing Dr. Anton in 5/2021.  Pt state LE edema/SOB has been ongoing over the past 1 year but Lasix increased to 40 mg PO TID while in [place] 2/2021 which was decreased back to 40 mg PO QD 3/2021 after his LE edema/SOB resolved.  Pt endorses 2 syncopal episodes with prodrome dizziness (2/2021 & 3/2021) with head strike 2/2021. Went to clinic with unremarkable workup.  Pt endorses MARTINEZ/fatigue upon ambulation of 1-2 city blocks, resolving with rest over past 3 years. Furthermore, pt endorses 2 episodes of non-radiating SSCP described as sharp and of severe intensity, occurring independent of exertion, lasting <1 minute before spontaneously resolving (last episode 8/2020). Denies  diaphoresis, palpitations, , PND, N/V, abdominal pain. Upon admit, VS – T 98.3F, HR 66 BPM, /94, RR 18, SpO2 96% on RA. Labs significant H/H 12.9/37.8, platelet 68, BUN/Cr 18/1.27, Alk phos 136, BNP 7221, Trop 0.08, HIV nonreactive, covid neg x1. CXR wet read 6/28/21: pulmonary venous congestion, B/L pleural effusions. ECG 6/28/21: Afib @ 67 BPM with LAD & flattened T waves in I/AVL & poor R wave progression. While in ED, pt received Lasix 40 mg IV x1.  Pt admitted to [facility] for r/o ACS, management of exacerbation of HFmrEF likely in setting of Afib with medication non-compliance, heme consult for thrombocytopenia.  (28 Jun 2021 18:07)      REVIEW OF SYSTEMS:  ROS reviewed below with positive findings marked (+) :  GEN:  fever, chills ENT: tracheostomy,   epistaxis,  sinusitis COR: +CAD, +CHF,  HTN, dysrhythmia PUL: COPD, ILD, asthma, pneumonia GI: PEG, dysphagia, hemorrhage, other KRISTIN: kidney disease, electrolyte disorder HEM:  anemia, thrombus, coagulopathy, cancer ENDO:  thyroid disease, diabetes mellitus CNS:  dementia, stroke, seizure, PSY:  depression, anxiety, other    PAST MEDICAL & SURGICAL HISTORY:  Congestive heart failure (CHF)    Diabetes    Atrial flutter  s/p Ablation 2016    Chronic venous insufficiency of lower extremity    Prostate CA    Stage 3 chronic kidney disease    Atrial flutter  s/p ablation in 2016    History of surgical removal of pituitary gland  1990s    S/P TURP  for BPH      FAMILY HISTORY:  No pertinent family history in first degree relatives      SOCIAL HISTORY:      - Tobacco     - ETOH    Allergies    No Known Allergies    Intolerances      Vital Signs Last 24 Hrs  T(C): 36.4 (01 Jul 2021 09:26), Max: 36.7 (30 Jun 2021 22:20)  T(F): 97.6 (01 Jul 2021 09:26), Max: 98 (30 Jun 2021 22:20)  HR: 62 (01 Jul 2021 12:45) (59 - 71)  BP: 109/74 (01 Jul 2021 12:45) (104/75 - 128/86)  BP(mean): 86 (01 Jul 2021 12:45) (86 - 104)  RR: 18 (01 Jul 2021 12:45) (18 - 20)  SpO2: 96% (01 Jul 2021 12:45) (95% - 98%)    06-30 @ 07:01 - 07-01 @ 07:00  --------------------------------------------------------  IN: 1090 mL / OUT: 2700 mL / NET: -1610 mL    07-01 @ 07:01 - 07-01 @ 14:52  --------------------------------------------------------  IN: 225 mL / OUT: 2175 mL / NET: -1950 mL          MEDICATIONS:  MEDICATIONS  (STANDING):  dextrose 40% Gel 15 Gram(s) Oral once  dextrose 5%. 1000 milliLiter(s) (50 mL/Hr) IV Continuous <Continuous>  dextrose 5%. 1000 milliLiter(s) (100 mL/Hr) IV Continuous <Continuous>  dextrose 50% Injectable 25 Gram(s) IV Push once  dextrose 50% Injectable 12.5 Gram(s) IV Push once  dextrose 50% Injectable 25 Gram(s) IV Push once  furosemide   Injectable 60 milliGRAM(s) IV Push every 12 hours  glucagon  Injectable 1 milliGRAM(s) IntraMuscular once  insulin lispro (ADMELOG) corrective regimen sliding scale   SubCutaneous Before meals and at bedtime  metoprolol succinate ER 12.5 milliGRAM(s) Oral every 24 hours  tamsulosin 0.4 milliGRAM(s) Oral at bedtime    MEDICATIONS  (PRN):  albuterol/ipratropium for Nebulization 3 milliLiter(s) Nebulizer every 6 hours PRN Shortness of Breath and/or Wheezing      PHYSICAL EXAM:  Un Comfortable, no immediate distress  Eyes: PERRL, EOM intact; conjunctiva and sclera clear  Head: Normocephalic;  No Trauma  ENMT: No nasal discharge, hoarseness, cough or hemoptysis  Neck: Supple; non tender; no masses or deformities.    No JVD  Respiratory:  - WHEEZING   - RHONCHI  - RALES  + posterior CRACKLES.  Diminished breath sounds  BILATERAL  RIGHT > LEFT base  Cardiovascular: Regular rate and rhythm. S1 and S2 Normal; No murmurs, gallops or rubs     - PPM/AICD  Gastrointestinal: Soft non-tender, non-distended; Normal bowel sounds; No hepatosplenomegaly.     -PEG    -  GT   - GONZALEZ  Genitourinary: No costovertebral angle tenderness. No dysuria  Extremities: AROM, No clubbing, cyanosis ++ LE  edema    Vascular: Peripheral pulses palpable 2+ bilaterally  Neurological: Alert and responisve to stimuli   Skin: Warm and dry. No obvious rash  Lymph Nodes: No acute cervical or supraclavicular adenopathy  Psychiatric: Cooperative and appropriate mood    DEVICES:  - DENTURES   +IV R / L     - ETUBE   -TRACH   -CTUBE  R / L    LABS:                          12.5   3.00  )-----------( 67       ( 01 Jul 2021 12:18 )             36.7     07-01    146<H>  |  106  |  25<H>  ----------------------------<  98  3.6   |  29  |  1.09    Ca    9.0      01 Jul 2021 12:18  Phos  3.4     07-01  Mg     2.1     07-01    TPro  6.2  /  Alb  3.7  /  TBili  1.0  /  DBili  x   /  AST  14  /  ALT  15  /  AlkPhos  115  06-30    PT/INR - ( 01 Jul 2021 12:18 )   PT: 14.6 sec;   INR: 1.23          PTT - ( 01 Jul 2021 12:18 )  PTT:30.6 sec  RADIOLOGY & ADDITIONAL STUDIES (The following images were personally reviewed): CT chest preliminary Interventional, Pulmonary, Critical, Chest Special Procedures.    Pt was seen and fully examined by myself.     Time spent with patient in minutes:77    Patient is a 75y old  Male who presents with a chief complaint of CHF (01 Jul 2021 06:52)The patient ill appearing, reporting SOB when talking,  Undergoing Thrombocytopenia work up    HPI:  74 yo M, h/o medication non-compliance,  PMHx DM2, Aflutter s/p ablation  6/2016 & found to be in Afib as of 5/2021 (on Eliquis), HFmrEF (EF 45-50% by outpatient ECHO 5/2021, EF was normal per outpatient ECHO 3/2019), non-obstructive CAD (s/p diagnostic cath in 2014 @ [facility]),  CKD stage III (baseline Cr unknown), BPH s/p TURP,  pituitary adenoma s/p transphenoidal rxn in 1990s,  prostate CA (dx in 2018 s/p chemo), chronic venous insufficiency who was referred from. Dr. Anton’s office with c/o progressively worsening SOB on minimal exertion and when speaking/increasing abdominal girth/LE edema over past 3 month despite resuming CHF meds including PO Lasix.  Pt also endorses scrotal edema over past 1 month and orthopnea to the point where he has to sit in chair to sleep. Patient states he ran out of Entresto ~1 year ago while in [place] but had continued on comparable meds to BB/Eliquis/Lasix while in [place] but he ran out ~a couple weeks before seeing Dr. Anton in 5/2021.  Pt state LE edema/SOB has been ongoing over the past 1 year but Lasix increased to 40 mg PO TID while in [place] 2/2021 which was decreased back to 40 mg PO QD 3/2021 after his LE edema/SOB resolved.  Pt endorses 2 syncopal episodes with prodrome dizziness (2/2021 & 3/2021) with head strike 2/2021. Went to clinic with unremarkable workup.  Pt endorses MARTINEZ/fatigue upon ambulation of 1-2 city blocks, resolving with rest over past 3 years. Furthermore, pt endorses 2 episodes of non-radiating SSCP described as sharp and of severe intensity, occurring independent of exertion, lasting <1 minute before spontaneously resolving (last episode 8/2020). Denies  diaphoresis, palpitations, , PND, N/V, abdominal pain. Upon admit, VS – T 98.3F, HR 66 BPM, /94, RR 18, SpO2 96% on RA. Labs significant H/H 12.9/37.8, platelet 68, BUN/Cr 18/1.27, Alk phos 136, BNP 7221, Trop 0.08, HIV nonreactive, covid neg x1. CXR wet read 6/28/21: pulmonary venous congestion, B/L pleural effusions. ECG 6/28/21: Afib @ 67 BPM with LAD & flattened T waves in I/AVL & poor R wave progression. While in ED, pt received Lasix 40 mg IV x1.  Pt admitted to [facility] for r/o ACS, management of exacerbation of HFmrEF likely in setting of Afib with medication non-compliance, heme consult for thrombocytopenia.  (28 Jun 2021 18:07)      REVIEW OF SYSTEMS:  ROS reviewed below with positive findings marked (+) :  GEN:  fever, chills ENT: tracheostomy,   epistaxis,  sinusitis COR: +CAD, +CHF,  HTN, dysrhythmia PUL: COPD, ILD, asthma, pneumonia GI: PEG, dysphagia, hemorrhage, other KRISTIN: kidney disease, electrolyte disorder HEM:  anemia, thrombus, coagulopathy, cancer ENDO:  thyroid disease, diabetes mellitus CNS:  dementia, stroke, seizure, PSY:  depression, anxiety, other    PAST MEDICAL & SURGICAL HISTORY:  Congestive heart failure (CHF)    Diabetes    Atrial flutter  s/p Ablation 2016    Chronic venous insufficiency of lower extremity    Prostate CA    Stage 3 chronic kidney disease    Atrial flutter  s/p ablation in 2016    History of surgical removal of pituitary gland  1990s    S/P TURP  for BPH      FAMILY HISTORY:  No pertinent family history in first degree relatives      SOCIAL HISTORY:      - Tobacco     - ETOH    Allergies    No Known Allergies    Intolerances      Vital Signs Last 24 Hrs  T(C): 36.4 (01 Jul 2021 09:26), Max: 36.7 (30 Jun 2021 22:20)  T(F): 97.6 (01 Jul 2021 09:26), Max: 98 (30 Jun 2021 22:20)  HR: 62 (01 Jul 2021 12:45) (59 - 71)  BP: 109/74 (01 Jul 2021 12:45) (104/75 - 128/86)  BP(mean): 86 (01 Jul 2021 12:45) (86 - 104)  RR: 18 (01 Jul 2021 12:45) (18 - 20)  SpO2: 96% (01 Jul 2021 12:45) (95% - 98%)    06-30 @ 07:01 - 07-01 @ 07:00  --------------------------------------------------------  IN: 1090 mL / OUT: 2700 mL / NET: -1610 mL    07-01 @ 07:01 - 07-01 @ 14:52  --------------------------------------------------------  IN: 225 mL / OUT: 2175 mL / NET: -1950 mL          MEDICATIONS:  MEDICATIONS  (STANDING):  dextrose 40% Gel 15 Gram(s) Oral once  dextrose 5%. 1000 milliLiter(s) (50 mL/Hr) IV Continuous <Continuous>  dextrose 5%. 1000 milliLiter(s) (100 mL/Hr) IV Continuous <Continuous>  dextrose 50% Injectable 25 Gram(s) IV Push once  dextrose 50% Injectable 12.5 Gram(s) IV Push once  dextrose 50% Injectable 25 Gram(s) IV Push once  furosemide   Injectable 60 milliGRAM(s) IV Push every 12 hours  glucagon  Injectable 1 milliGRAM(s) IntraMuscular once  insulin lispro (ADMELOG) corrective regimen sliding scale   SubCutaneous Before meals and at bedtime  metoprolol succinate ER 12.5 milliGRAM(s) Oral every 24 hours  tamsulosin 0.4 milliGRAM(s) Oral at bedtime    MEDICATIONS  (PRN):  albuterol/ipratropium for Nebulization 3 milliLiter(s) Nebulizer every 6 hours PRN Shortness of Breath and/or Wheezing      PHYSICAL EXAM:  Un Comfortable, no immediate distress  Eyes: PERRL, EOM intact; conjunctiva and sclera clear  Head: Normocephalic;  No Trauma  ENMT: No nasal discharge, hoarseness, cough or hemoptysis  Neck: Supple; non tender; no masses or deformities.    No JVD  Respiratory:  - WHEEZING   - RHONCHI  - RALES  + posterior CRACKLES.  Diminished breath sounds  BILATERAL  RIGHT > LEFT base  Cardiovascular: Regular rate and rhythm. S1 and S2 Normal; No murmurs, gallops or rubs     - PPM/AICD  Gastrointestinal: Soft non-tender, non-distended; Normal bowel sounds; No hepatosplenomegaly.     -PEG    -  GT   - GONZALEZ  Genitourinary: No costovertebral angle tenderness. No dysuria  Extremities: AROM, No clubbing, cyanosis ++ LE  edema    Vascular: Peripheral pulses palpable 2+ bilaterally  Neurological: Alert and responisve to stimuli   Skin: Warm and dry. No obvious rash  Lymph Nodes: No acute cervical or supraclavicular adenopathy  Psychiatric: Cooperative and appropriate mood    DEVICES:  - DENTURES   +IV R / L     - ETUBE   -TRACH   -CTUBE  R / L    LABS:                          12.5   3.00  )-----------( 67       ( 01 Jul 2021 12:18 )             36.7     07-01    146<H>  |  106  |  25<H>  ----------------------------<  98  3.6   |  29  |  1.09    Ca    9.0      01 Jul 2021 12:18  Phos  3.4     07-01  Mg     2.1     07-01    TPro  6.2  /  Alb  3.7  /  TBili  1.0  /  DBili  x   /  AST  14  /  ALT  15  /  AlkPhos  115  06-30    PT/INR - ( 01 Jul 2021 12:18 )   PT: 14.6 sec;   INR: 1.23          PTT - ( 01 Jul 2021 12:18 )  PTT:30.6 sec  RADIOLOGY & ADDITIONAL STUDIES (The following images were personally reviewed): CT chest preliminary

## 2021-07-01 NOTE — DIETITIAN INITIAL EVALUATION ADULT. - PROBLEM SELECTOR PLAN 1
3+ pitting edema to B/L ankles/shins, 1+ pitting to dependent thighs into posterior back, +ascites, +scrotal edema,  +JVD, bibasilar crackles, expiratory wheezing anteriorly, saturating well on RA,  cc while in ED at time of exam  - Trop 0.08, f/u repeat CE @ 10pm, AM; BNP 7221  - ECG 6/28/21: Afib @ 67 BPM with LAD & flattened T waves in I/AVL & poor R wave progression.   - CXR wet read 6/28/21: pulmonary venous congestion, B/L pleural effusions, f/u official read  - ECHO 5/17/21: EF 45-50%, indeterminate diastolic pattern, severe LVH, infiltrative disorder, moderate MR/TR, D-shaped septum which is indicative of significant pulmonary HTN, mild SD, LA severely dilated, RA moderately dilated.   - ECHO 3/2019: normal LVEF per outpatient records  - Obtain repeat ECHO (ordered)  - US B/L LE venous duplex outpatient 6/28/21 as per Dr. Anton: No DVT  - Concern for possible infiltrative disease, ?cardiac MRI when more euvolemic  - h/o syncope x2 with head strike 2/2021, f/u CT head (ordered)  - Last [facility] in 2014 @ [facility]: non-obstructive CAD, obtain cath report as able, ?ischemic w/u this admit  - c/w Lasix 80 mg IV BID (home med Lasix 40 mg PO QD)   - Holding home Entresto 24-26 BID in setting of aggressive diuresis with h/o CKD III  - Holding home Toprol XL 12.5 mg PO QD (1/2 dose home Toprol XL) in setting of CHF exacerbation  - Strict I&Os, daily weights, 1L fluid restriction, core measures ordered

## 2021-07-01 NOTE — PROGRESS NOTE ADULT - PROBLEM SELECTOR PLAN 1
3+ pitting edema to B/L ankles/shins, 1+ pitting to dependent thighs into posterior back, +ascites, +scrotal edema,  +JVD,bibasilar crackles, expiratory wheezing anteriorly, saturating well on RA,  cc while in ED at time of exam  - Trop 0.09, f/u ordered 6/29 @ 5pm, AM; BNP 7221  - ECG 6/28/21: Afib @ 67 BPM with LAD & flattened T waves in I/AVL & poor R wave progression.   - CXR wet read 6/28/21: pulmonary venous congestion, B/L pleural effusions, f/u official read  - ECHO 5/17/21: EF 45-50%, indeterminate diastolic pattern, severe LVH, infiltrative disorder, moderate MR/TR, D-shaped septum which is indicative of significant pulmonary HTN, mild WA, LA severely dilated, RA moderately dilated.   - ECHO 3/2019: normal LVEF per outpatient records  - Obtain repeat ECHO (ordered)  - US B/L LE venous duplex outpatient 6/28/21 as per Dr. Anton: No DVT  - Concern for possible infiltrative disease, ?cardiac MRI when more euvolemic  - h/o syncope x2 with head strike 2/2021, f/u CT head (ordered)  - Last [facility] in 2014 @ [facility]: non-obstructive CAD, obtain cath report as able, ?ischemic w/u this admit  - c/w Lasix 80 mg IV BID (home med Lasix 40 mg PO QD)   - Holding home Entresto 24-26 BID in setting of aggressive diuresis with h/o CKD III  - Holding home Toprol XL 12.5 mg PO QD (1/2 dose home Toprol XL) in setting of CHF exacerbation  - Strict I&Os, daily weights, 1L fluid restriction, core measures ordered 3+ pitting edema to B/L ankles/shins, 1+ pitting to dependent thighs into posterior back, +ascites, +scrotal edema,  +JVD,bibasilar crackles, expiratory wheezing anteriorly, saturating well on RA,  cc while in ED at time of exam  - Trop 0.09, f/u ordered 6/29 @ 5pm, AM; BNP 7221  - ECG 6/28/21: Afib @ 67 BPM with LAD & flattened T waves in I/AVL & poor R wave progression.   - CXR wet read 6/28/21: pulmonary venous congestion, B/L pleural effusions vs atelectasis. CT scan ordered (7/1) to differentiate. CT scan showed asymmetric pleural effusions with right greater than left. Suspected liver pathology as cause due to abnormal Hep B panel. patient agreed to thoracentesis to drain right side.   - ECHO 5/17/21: EF 45-50%, indeterminate diastolic pattern, severe LVH, infiltrative disorder, moderate MR/TR, D-shaped septum which is indicative of significant pulmonary HTN, mild GA, LA severely dilated, RA moderately dilated.   - ECHO 3/2019: normal LVEF per outpatient records  - Obtain repeat ECHO (ordered)  - US B/L LE venous duplex outpatient 6/28/21 as per Dr. Anton: No DVT  - Concern for possible infiltrative disease, ?cardiac MRI when more euvolemic  - h/o syncope x2 with head strike 2/2021, f/u CT head (ordered)  - Last [facility] in 2014 @ [facility]: non-obstructive CAD, obtain cath report as able, ?ischemic w/u this admit  - c/w Lasix 80 mg IV BID (home med Lasix 40 mg PO QD)   - Holding home Entresto 24-26 BID in setting of aggressive diuresis with h/o CKD III  - Holding home Toprol XL 12.5 mg PO QD (1/2 dose home Toprol XL) in setting of CHF exacerbation  - Strict I&Os, daily weights, 1L fluid restriction, core measures ordered

## 2021-07-01 NOTE — PROGRESS NOTE ADULT - SUBJECTIVE AND OBJECTIVE BOX
INTERVAL HPI/OVERNIGHT EVENTS:    Patient is a 75y old  Male who presents with a chief complaint of CHF (01 Jul 2021 15:33)  No acute events  eating well  ambulating  insulin administration reviewed    Pt reports the following symptoms:    CONSTITUTIONAL:  Negative fever or chills, feels well, good appetite  EYES:  Negative  blurry vision or double vision  CARDIOVASCULAR:  Negative for chest pain or palpitations  RESPIRATORY:  Negative for cough, wheezing, or SOB   GASTROINTESTINAL:  Negative for nausea, vomiting, diarrhea, constipation, or abdominal pain  GENITOURINARY:  Negative frequency, urgency or dysuria  NEUROLOGIC:  No headache, confusion, dizziness, lightheadedness      Past medical history reviewed  Family history reviewed  Social history reviewed    MEDICATIONS  (STANDING):  dextrose 40% Gel 15 Gram(s) Oral once  dextrose 5%. 1000 milliLiter(s) (50 mL/Hr) IV Continuous <Continuous>  dextrose 5%. 1000 milliLiter(s) (100 mL/Hr) IV Continuous <Continuous>  dextrose 50% Injectable 25 Gram(s) IV Push once  dextrose 50% Injectable 12.5 Gram(s) IV Push once  dextrose 50% Injectable 25 Gram(s) IV Push once  furosemide   Injectable 60 milliGRAM(s) IV Push every 12 hours  glucagon  Injectable 1 milliGRAM(s) IntraMuscular once  insulin lispro (ADMELOG) corrective regimen sliding scale   SubCutaneous Before meals and at bedtime  metoprolol succinate ER 12.5 milliGRAM(s) Oral every 24 hours  tamsulosin 0.4 milliGRAM(s) Oral at bedtime    MEDICATIONS  (PRN):  albuterol/ipratropium for Nebulization 3 milliLiter(s) Nebulizer every 6 hours PRN Shortness of Breath and/or Wheezing      Diet:  Diet, DASH/TLC:   Sodium & Cholesterol Restricted  Consistent Carbohydrate Evening Snack (CSTCHOSN)  1000mL Fluid Restriction ([id number]) (06-28-21 @ 19:11) [Active]      PHYSICAL EXAM  Vital Signs Last 24 Hrs  T(C): 36.7 (01 Jul 2021 17:13), Max: 36.7 (30 Jun 2021 22:20)  T(F): 98 (01 Jul 2021 17:13), Max: 98 (30 Jun 2021 22:20)  HR: 63 (01 Jul 2021 18:25) (60 - 71)  BP: 119/82 (01 Jul 2021 18:25) (109/74 - 128/86)  BP(mean): 95 (01 Jul 2021 18:25) (86 - 104)  RR: 18 (01 Jul 2021 18:25) (18 - 20)  SpO2: 98% (01 Jul 2021 18:25) (95% - 98%)    Constitutional: wn/wd in NAD.   HEENT: NCAT, MMM, OP clear, EOMI, no proptosis or lid retraction  Neck: no thyromegaly or palpable thyroid nodules   Respiratory: lungs CTAB.  Cardiovascular: regular rhythm, normal S1 and S2, no audible murmurs, no peripheral edema  GI: soft, NT/ND, no masses/HSM appreciated.  Neurology: no tremors, DTR 2+  Skin: no visible rashes/lesions  Psychiatric: AAO x 3, normal affect/mood.    LABS:                        12.5   3.00  )-----------( 67       ( 01 Jul 2021 12:18 )             36.7     07-01    146<H>  |  106  |  25<H>  ----------------------------<  98  3.6   |  29  |  1.09    Ca    9.0      01 Jul 2021 12:18  Phos  3.4     07-01  Mg     2.1     07-01    TPro  6.2  /  Alb  3.7  /  TBili  1.0  /  DBili  x   /  AST  14  /  ALT  15  /  AlkPhos  115  06-30    PT/INR - ( 01 Jul 2021 12:18 )   PT: 14.6 sec;   INR: 1.23          PTT - ( 01 Jul 2021 12:18 )  PTT:30.6 sec    Thyroid Stimulating Hormone, Serum: 1.78 uIU/mL (06-29 @ 13:30)      HbA1C: 5.8 % (06-29 @ 08:24)      CAPILLARY BLOOD GLUCOSE      POCT Blood Glucose.: 107 mg/dL (01 Jul 2021 16:28)  POCT Blood Glucose.: 106 mg/dL (01 Jul 2021 11:51)  POCT Blood Glucose.: 84 mg/dL (01 Jul 2021 06:57)  POCT Blood Glucose.: 121 mg/dL (30 Jun 2021 21:22)      Free Thyroxine, Serum: 1.180 ng/dL (06-30-21 @ 06:02)  Cholesterol, Serum: 127 mg/dL (06-29-21 @ 08:24)  HDL Cholesterol, Serum: 42 mg/dL (06-29-21 @ 08:24)  Triglycerides, Serum: 46 mg/dL (06-29-21 @ 08:24)  LDL Cholesterol Calculated: 76 mg/dL (06-29-21 @ 08:24)

## 2021-07-01 NOTE — PROGRESS NOTE ADULT - ASSESSMENT
74 yo M, h/o medication non-compliance, works with [employer] and was in [place] until 5/2021, PMHx DM2, Aflutter s/p ablation  6/2016 & found to be in Afib as of 5/2021 (on Eliquis), HFmrEF (EF 45-50% by outpatient ECHO 5/2021, EF was normal per outpatient ECHO 3/2019), non-obstructive CAD (s/p diagnostic cath in 2014 @ [facility]), CKD stage III (baseline Cr unknown), BPH s/p TURP,  pituitary adenoma s/p transphenoidal rxn in 1990s,  prostate CA (dx in 2018 s/p chemo), chronic venous insufficiency who was referred from. Dr. Anton’s office with c/o progressively worsening SOB on minimal exertion and when speaking/increasing abdominal girth/LE edema over past 3 month despite resuming CHF meds including PO Lasix.  Pt also endorses scrotal edema over past 1 month and orthopnea to the point where he has to sit in chair to sleep. Patient states he ran out of Entresto ~1 year ago while in [place] but had continued on comparable meds to BB/Eliquis/Lasix while in [place] but he ran out ~a couple weeks before seeing Dr. Anton in 5/2021.   Pt endorses 2 syncopal episodes with prodrome dizziness (2/2021 & 3/2021) with head strike 2/2021. Went to clinic with unremarkable workup.  Furthermore, pt endorses 2 episodes of non-radiating SSCP described as sharp and of severe intensity, occurring independent of exertion, lasting <1 minute before spontaneously resolving (last episode 8/2020).  Pt admitted to [facility] for r/o ACS, management of exacerbation of HFmrEF likely in setting of Afib with medication non-compliance, heme consult for thrombocytopenia.

## 2021-07-01 NOTE — PROGRESS NOTE ADULT - TIME BILLING
A/P 75yMale with hx of DM presenting for management of CHF with good glycemic control    1.  DM: type 2, controlled  Continue lispro moderate dose scale with meals and at bedtime.   Continue consistent carb diet  FSG Goal 100-180    can start jardiance 10mg once daily as outpatient      Pt is advised to follow up with me at discharge by calling .      Airam Somers MD, PhD  Endocrinology  [address]  [place] [address]  [phone] Tel  [phone] Fax  [email or web address]
A/P 75yMale with hx of DM presenting for management of CHF exacerbation with good glycemic control    1.  DM: type 2, controlled  Continue lispro moderate dose scale with meals and at bedtime.   Continue consistent carb diet  FSG Goal 100-180    2.  Hyperlipidemia - goal LDL < 70      Pt is advised to follow up with me at discharge by calling .      Airam Somers MD, PhD  Endocrinology  [address]  [place] [address]  [phone] Tel  [phone] Fax  [email or web address]

## 2021-07-01 NOTE — DIETITIAN INITIAL EVALUATION ADULT. - PROBLEM SELECTOR PLAN 4
- Hold home Metformin 500 mg PO BID  - YANCI  - f/u AM HgbA1C, lipid panel    VTE ppx: SCDs only  Dispo: Pending euvoelmia, heme consult    Case d/w Dr. Anton

## 2021-07-01 NOTE — DIETITIAN INITIAL EVALUATION ADULT. - PROBLEM SELECTOR PLAN 2
Currently in rate controlled Afib  - Aflutter s/p ablation 6/2016 & found to be in Afib as of 5/2021 (on Eliquis)  - f/u TSH  - Holding ELiquis/heparin gtt in setting of thrombocytopenia  - Ordered for Toprol XL 12.5 mg PO QD (1/2 dose home Toprol XL)  - Discuss possible EP consult with Dr. Anton in AM

## 2021-07-02 ENCOUNTER — TRANSCRIPTION ENCOUNTER (OUTPATIENT)
Age: 76
End: 2021-07-02

## 2021-07-02 VITALS — TEMPERATURE: 99 F

## 2021-07-02 LAB
ANA TITR SER: NEGATIVE — SIGNIFICANT CHANGE UP
ANION GAP SERPL CALC-SCNC: 12 MMOL/L — SIGNIFICANT CHANGE UP (ref 5–17)
BASOPHILS # BLD AUTO: 0.02 K/UL — SIGNIFICANT CHANGE UP (ref 0–0.2)
BASOPHILS NFR BLD AUTO: 0.6 % — SIGNIFICANT CHANGE UP (ref 0–2)
BUN SERPL-MCNC: 23 MG/DL — SIGNIFICANT CHANGE UP (ref 7–23)
CALCIUM SERPL-MCNC: 10 MG/DL — SIGNIFICANT CHANGE UP (ref 8.4–10.5)
CHLORIDE SERPL-SCNC: 103 MMOL/L — SIGNIFICANT CHANGE UP (ref 96–108)
CO2 SERPL-SCNC: 30 MMOL/L — SIGNIFICANT CHANGE UP (ref 22–31)
CREAT SERPL-MCNC: 1.18 MG/DL — SIGNIFICANT CHANGE UP (ref 0.5–1.3)
EOSINOPHIL # BLD AUTO: 0.03 K/UL — SIGNIFICANT CHANGE UP (ref 0–0.5)
EOSINOPHIL NFR BLD AUTO: 0.9 % — SIGNIFICANT CHANGE UP (ref 0–6)
GLUCOSE BLDC GLUCOMTR-MCNC: 125 MG/DL — HIGH (ref 70–99)
GLUCOSE BLDC GLUCOMTR-MCNC: 71 MG/DL — SIGNIFICANT CHANGE UP (ref 70–99)
GLUCOSE SERPL-MCNC: 104 MG/DL — HIGH (ref 70–99)
HCT VFR BLD CALC: 40.8 % — SIGNIFICANT CHANGE UP (ref 39–50)
HGB BLD-MCNC: 14.1 G/DL — SIGNIFICANT CHANGE UP (ref 13–17)
IMM GRANULOCYTES NFR BLD AUTO: 0 % — SIGNIFICANT CHANGE UP (ref 0–1.5)
LYMPHOCYTES # BLD AUTO: 0.92 K/UL — LOW (ref 1–3.3)
LYMPHOCYTES # BLD AUTO: 28.8 % — SIGNIFICANT CHANGE UP (ref 13–44)
MAGNESIUM SERPL-MCNC: 2.4 MG/DL — SIGNIFICANT CHANGE UP (ref 1.6–2.6)
MCHC RBC-ENTMCNC: 30.7 PG — SIGNIFICANT CHANGE UP (ref 27–34)
MCHC RBC-ENTMCNC: 34.6 GM/DL — SIGNIFICANT CHANGE UP (ref 32–36)
MCV RBC AUTO: 88.9 FL — SIGNIFICANT CHANGE UP (ref 80–100)
MONOCYTES # BLD AUTO: 0.4 K/UL — SIGNIFICANT CHANGE UP (ref 0–0.9)
MONOCYTES NFR BLD AUTO: 12.5 % — SIGNIFICANT CHANGE UP (ref 2–14)
NEUTROPHILS # BLD AUTO: 1.83 K/UL — SIGNIFICANT CHANGE UP (ref 1.8–7.4)
NEUTROPHILS NFR BLD AUTO: 57.2 % — SIGNIFICANT CHANGE UP (ref 43–77)
NRBC # BLD: 0 /100 WBCS — SIGNIFICANT CHANGE UP (ref 0–0)
PLATELET # BLD AUTO: 78 K/UL — LOW (ref 150–400)
POTASSIUM SERPL-MCNC: 4.2 MMOL/L — SIGNIFICANT CHANGE UP (ref 3.5–5.3)
POTASSIUM SERPL-SCNC: 4.2 MMOL/L — SIGNIFICANT CHANGE UP (ref 3.5–5.3)
RBC # BLD: 4.59 M/UL — SIGNIFICANT CHANGE UP (ref 4.2–5.8)
RBC # FLD: 15.1 % — HIGH (ref 10.3–14.5)
RHEUMATOID FACT SERPL-ACNC: 10 IU/ML — SIGNIFICANT CHANGE UP (ref 0–13)
SODIUM SERPL-SCNC: 145 MMOL/L — SIGNIFICANT CHANGE UP (ref 135–145)
WBC # BLD: 3.2 K/UL — LOW (ref 3.8–10.5)
WBC # FLD AUTO: 3.2 K/UL — LOW (ref 3.8–10.5)

## 2021-07-02 PROCEDURE — 80053 COMPREHEN METABOLIC PANEL: CPT

## 2021-07-02 PROCEDURE — 83735 ASSAY OF MAGNESIUM: CPT

## 2021-07-02 PROCEDURE — 83615 LACTATE (LD) (LDH) ENZYME: CPT

## 2021-07-02 PROCEDURE — 82553 CREATINE MB FRACTION: CPT

## 2021-07-02 PROCEDURE — 86038 ANTINUCLEAR ANTIBODIES: CPT

## 2021-07-02 PROCEDURE — 87350 HEPATITIS BE AG IA: CPT

## 2021-07-02 PROCEDURE — 96374 THER/PROPH/DIAG INJ IV PUSH: CPT

## 2021-07-02 PROCEDURE — 93005 ELECTROCARDIOGRAM TRACING: CPT

## 2021-07-02 PROCEDURE — 84439 ASSAY OF FREE THYROXINE: CPT

## 2021-07-02 PROCEDURE — 80048 BASIC METABOLIC PNL TOTAL CA: CPT

## 2021-07-02 PROCEDURE — 87635 SARS-COV-2 COVID-19 AMP PRB: CPT

## 2021-07-02 PROCEDURE — 83010 ASSAY OF HAPTOGLOBIN QUANT: CPT

## 2021-07-02 PROCEDURE — 84443 ASSAY THYROID STIM HORMONE: CPT

## 2021-07-02 PROCEDURE — 70450 CT HEAD/BRAIN W/O DYE: CPT

## 2021-07-02 PROCEDURE — 76700 US EXAM ABDOM COMPLETE: CPT

## 2021-07-02 PROCEDURE — 87340 HEPATITIS B SURFACE AG IA: CPT

## 2021-07-02 PROCEDURE — 86803 HEPATITIS C AB TEST: CPT

## 2021-07-02 PROCEDURE — 86705 HEP B CORE ANTIBODY IGM: CPT

## 2021-07-02 PROCEDURE — 99285 EMERGENCY DEPT VISIT HI MDM: CPT | Mod: 25

## 2021-07-02 PROCEDURE — 83001 ASSAY OF GONADOTROPIN (FSH): CPT

## 2021-07-02 PROCEDURE — 93306 TTE W/DOPPLER COMPLETE: CPT

## 2021-07-02 PROCEDURE — 85045 AUTOMATED RETICULOCYTE COUNT: CPT

## 2021-07-02 PROCEDURE — 85025 COMPLETE CBC W/AUTO DIFF WBC: CPT

## 2021-07-02 PROCEDURE — 87389 HIV-1 AG W/HIV-1&-2 AB AG IA: CPT

## 2021-07-02 PROCEDURE — 85610 PROTHROMBIN TIME: CPT

## 2021-07-02 PROCEDURE — 83036 HEMOGLOBIN GLYCOSYLATED A1C: CPT

## 2021-07-02 PROCEDURE — 84466 ASSAY OF TRANSFERRIN: CPT

## 2021-07-02 PROCEDURE — 83550 IRON BINDING TEST: CPT

## 2021-07-02 PROCEDURE — 86769 SARS-COV-2 COVID-19 ANTIBODY: CPT

## 2021-07-02 PROCEDURE — 71045 X-RAY EXAM CHEST 1 VIEW: CPT

## 2021-07-02 PROCEDURE — 86706 HEP B SURFACE ANTIBODY: CPT

## 2021-07-02 PROCEDURE — 82607 VITAMIN B-12: CPT

## 2021-07-02 PROCEDURE — 87517 HEPATITIS B DNA QUANT: CPT

## 2021-07-02 PROCEDURE — 82550 ASSAY OF CK (CPK): CPT

## 2021-07-02 PROCEDURE — 84305 ASSAY OF SOMATOMEDIN: CPT

## 2021-07-02 PROCEDURE — 80061 LIPID PANEL: CPT

## 2021-07-02 PROCEDURE — 86431 RHEUMATOID FACTOR QUANT: CPT

## 2021-07-02 PROCEDURE — 85027 COMPLETE CBC AUTOMATED: CPT

## 2021-07-02 PROCEDURE — 82728 ASSAY OF FERRITIN: CPT

## 2021-07-02 PROCEDURE — 94640 AIRWAY INHALATION TREATMENT: CPT

## 2021-07-02 PROCEDURE — 82962 GLUCOSE BLOOD TEST: CPT

## 2021-07-02 PROCEDURE — 82533 TOTAL CORTISOL: CPT

## 2021-07-02 PROCEDURE — 84100 ASSAY OF PHOSPHORUS: CPT

## 2021-07-02 PROCEDURE — 83540 ASSAY OF IRON: CPT

## 2021-07-02 PROCEDURE — 83880 ASSAY OF NATRIURETIC PEPTIDE: CPT

## 2021-07-02 PROCEDURE — 82024 ASSAY OF ACTH: CPT

## 2021-07-02 PROCEDURE — 82746 ASSAY OF FOLIC ACID SERUM: CPT

## 2021-07-02 PROCEDURE — 85384 FIBRINOGEN ACTIVITY: CPT

## 2021-07-02 PROCEDURE — 86704 HEP B CORE ANTIBODY TOTAL: CPT

## 2021-07-02 PROCEDURE — 84484 ASSAY OF TROPONIN QUANT: CPT

## 2021-07-02 PROCEDURE — 85730 THROMBOPLASTIN TIME PARTIAL: CPT

## 2021-07-02 PROCEDURE — 83002 ASSAY OF GONADOTROPIN (LH): CPT

## 2021-07-02 PROCEDURE — 84146 ASSAY OF PROLACTIN: CPT

## 2021-07-02 PROCEDURE — 71250 CT THORAX DX C-: CPT

## 2021-07-02 PROCEDURE — 36415 COLL VENOUS BLD VENIPUNCTURE: CPT

## 2021-07-02 PROCEDURE — 86708 HEPATITIS A ANTIBODY: CPT

## 2021-07-02 RX ORDER — METFORMIN HYDROCHLORIDE 850 MG/1
1 TABLET ORAL
Qty: 0 | Refills: 0 | DISCHARGE

## 2021-07-02 RX ORDER — SACUBITRIL AND VALSARTAN 24; 26 MG/1; MG/1
1 TABLET, FILM COATED ORAL
Qty: 0 | Refills: 0 | DISCHARGE

## 2021-07-02 RX ORDER — FUROSEMIDE 40 MG
1 TABLET ORAL
Qty: 30 | Refills: 1
Start: 2021-07-02 | End: 2021-08-30

## 2021-07-02 RX ORDER — APIXABAN 2.5 MG/1
1 TABLET, FILM COATED ORAL
Qty: 0 | Refills: 0 | DISCHARGE

## 2021-07-02 RX ORDER — FUROSEMIDE 40 MG
1 TABLET ORAL
Qty: 0 | Refills: 0 | DISCHARGE

## 2021-07-02 RX ORDER — EMPAGLIFLOZIN 10 MG/1
1 TABLET, FILM COATED ORAL
Qty: 30 | Refills: 0
Start: 2021-07-02 | End: 2021-07-31

## 2021-07-02 RX ORDER — FUROSEMIDE 40 MG
1 TABLET ORAL
Qty: 30 | Refills: 0
Start: 2021-07-02 | End: 2021-07-31

## 2021-07-02 RX ORDER — POTASSIUM CHLORIDE 20 MEQ
1 PACKET (EA) ORAL
Qty: 30 | Refills: 0
Start: 2021-07-02 | End: 2021-07-31

## 2021-07-02 RX ADMIN — Medication 60 MILLIGRAM(S): at 06:05

## 2021-07-02 RX ADMIN — Medication 12.5 MILLIGRAM(S): at 06:04

## 2021-07-02 NOTE — DISCHARGE NOTE PROVIDER - NPI NUMBER (FOR SYSADMIN USE ONLY) :
[[id number]],[[id number]] [[id number]],[[id number]]

## 2021-07-02 NOTE — DISCHARGE NOTE PROVIDER - HOSPITAL COURSE
74 yo M, h/o medication non-compliance, works with [employer] and was in [place] until 5/2021, PMHx DM2, Aflutter s/p ablation  6/2016 & found to be in Afib as of 5/2021 (on Eliquis), HFmrEF (EF 45-50% by outpatient ECHO 5/2021, EF was normal per outpatient ECHO 3/2019), non-obstructive CAD (s/p diagnostic cath in 2014 @ [facility]), CKD stage III (baseline Cr unknown), BPH s/p TURP,  pituitary adenoma s/p transphenoidal rxn in 1990s,  prostate CA (dx in 2018 s/p chemo), chronic venous insufficiency who was referred from. Dr. Anton’s office with c/o progressively worsening SOB on minimal exertion and when speaking/increasing abdominal girth/LE edema over past 3 month despite resuming CHF meds including PO Lasix.  Pt also endorses scrotal edema over past 1 month and orthopnea to the point where he has to sit in chair to sleep. Patient states he ran out of Entresto ~1 year ago while in [place] but had continued on comparable meds to BB/Eliquis/Lasix while in [place] but he ran out ~a couple weeks before seeing Dr. Anton in 5/2021.   Pt endorses 2 syncopal episodes with prodrome dizziness (2/2021 & 3/2021) with head strike 2/2021. Furthermore, pt endorses 2 episodes of non-radiating substernal chest pain described as sharp and of severe intensity, occurring independent of exertion, lasting <1 minute before spontaneously resolving (last episode 8/2020).  Pt admitted to [facility] for r/o ACS, management of exacerbation of HFmrEF likely in setting of Afib with medication non-compliance, heme consult for pancytopenia.   The patient was diuresed and had a net negative of 14 liters.  Pancytopenia was worked up with PT, INR, Fibrinogen, haptoglobin, hepatitis panel which were inconclusive, except for the Hep B which revealed a chronic infection picture. But DIC was ruled out.   Chest CT scan showed asymmetric pleural effusion with the right cavity having a significant effusion. A diagnostic thoracentesis was considered but ultimately aborted due to low platelet count.  The patient also complained of scrotal/testicular pain and was scheduled for a testicular ultrasound; however, the patient refused the study and stated he would follow up outpatient.      Dx: Heart Failure this Admit or History of Heart Failure: YES           If Yes to CHF: 7 day Follow-Up Appointment Made: Yes. July 6th 10:30am           Cardiac Rehab Indications CHF            *Education on benefits of Cardiac Rehab provided to patient: Yes         *Referral and Prescription Given for Cardiac Rehab: Yes         *Pt given list of locations & instructed to contact their insurance company to review list of participating providers. Yes         *Pt instructed to bring Cardiac Rehab prescription with them to Cardiology Follow up appointment for assistance with enrollment: Yes      CHF: Beta Blocker Prescribed: Yes.           ACE-I/ARB/Entresto Prescribed: renal function  Statin Prescribed (STEMI/NSTEMI/ACS/UA &/OR Post PCI this admission):  No; Patient has chronic infection with Hepatitis B and it is questionable if the infection is latent or active. Will follow up with cardiologist.   DAPT: Prescriptions for Aspirin/Plavix/Brilinta/Effient e-prescribed to patient's pharmacy: No ACS event/history and patient has thrombocytopenia

## 2021-07-02 NOTE — PROGRESS NOTE ADULT - NSICDXPILOT_GEN_ALL_CORE
[place]

## 2021-07-02 NOTE — PROGRESS NOTE ADULT - SUBJECTIVE AND OBJECTIVE BOX
74 yo M, h/o medication non-compliance, works with [employer] and was in [place] until 5/2021, PMHx DM2, Aflutter s/p ablation  6/2016 & found to be in Afib as of 5/2021 (on Eliquis), HFmrEF (EF 45-50% by outpatient ECHO 5/2021, EF was normal per outpatient ECHO 3/2019), non-obstructive CAD (s/p diagnostic cath in 2014 @ [facility]),  CKD stage III (baseline Cr unknown), BPH s/p TURP,  pituitary adenoma s/p transphenoidal rxn in 1990s,  prostate CA (dx in 2018 s/p chemo), chronic venous insufficiency who was referred from. Dr. Anton’s office with c/o progressively worsening SOB on minimal exertion and when speaking/increasing abdominal girth/LE edema over past 3 month despite resuming CHF meds including PO Lasix.  Pt also endorses scrotal edema over past 1 month and orthopnea to the point where he has to sit in chair to sleep. Patient states he ran out of Entresto ~1 year ago while in [place] but had continued on comparable meds to BB/Eliquis/Lasix while in [place] but he ran out ~a couple weeks before seeing Dr. Anton in 5/2021.  Pt state LE edema/SOB has been ongoing over the past 1 year but Lasix increased to 40 mg PO TID while in [place] 2/2021 which was decreased back to 40 mg PO QD 3/2021 after his LE edema/SOB resolved.  Pt endorses 2 syncopal episodes with prodrome dizziness (2/2021 & 3/2021) with head strike 2/2021. Went to clinic with unremarkable workup.  Pt endorses MARTINEZ/fatigue upon ambulation of 1-2 city blocks, resolving with rest over past 3 years. Furthermore, pt endorses 2 episodes of non-radiating SSCP described as sharp and of severe intensity, occurring independent of exertion, lasting <1 minute before spontaneously resolving (last episode 8/2020). Denies  diaphoresis, palpitations, , PND, N/V, abdominal pain. Upon admit, VS – T 98.3F, HR 66 BPM, /94, RR 18, SpO2 96% on RA. Labs significant H/H 12.9/37.8, platelet 68, BUN/Cr 18/1.27, Alk phos 136, BNP 7221, Trop 0.08, HIV nonreactive, covid neg x1. CXR wet read 6/28/21: pulmonary venous congestion, B/L pleural effusions. ECG 6/28/21: Afib @ 67 BPM with LAD & flattened T waves in I/AVL & poor R wave progression. While in ED, pt received Lasix 40 mg IV x1.  Pt admitted to [facility] for r/o ACS, management of exacerbation of HFmrEF likely in setting of Afib with medication non-compliance, heme consult for thrombocytopenia.     Patient History:   Past Medical, Past Surgical, and Family History   PAST MEDICAL HISTORY:  Atrial flutter s/p Ablation 2016    Chronic venous insufficiency of lower extremity     Congestive heart failure (CHF)     Diabetes     Prostate CA     Stage 3 chronic kidney disease.     PAST SURGICAL HISTORY:  Atrial flutter s/p ablation in 2016      PAST SURGICAL HISTORY:  Atrial flutter s/p ablation in 2016    History of surgical removal of pituitary gland 1990s    S/P TURP for BPH.     	  MEDICATIONS:  furosemide   Injectable 60 milliGRAM(s) IV Push every 12 hours  metoprolol succinate ER 12.5 milliGRAM(s) Oral every 24 hours  tamsulosin 0.4 milliGRAM(s) Oral at bedtime      albuterol/ipratropium for Nebulization 3 milliLiter(s) Nebulizer every 6 hours PRN        dextrose 40% Gel 15 Gram(s) Oral once  dextrose 50% Injectable 25 Gram(s) IV Push once  dextrose 50% Injectable 12.5 Gram(s) IV Push once  dextrose 50% Injectable 25 Gram(s) IV Push once  glucagon  Injectable 1 milliGRAM(s) IntraMuscular once  insulin lispro (ADMELOG) corrective regimen sliding scale   SubCutaneous Before meals and at bedtime    dextrose 5%. 1000 milliLiter(s) IV Continuous <Continuous>  dextrose 5%. 1000 milliLiter(s) IV Continuous <Continuous>      Complaint:     Otherwise 12 point review of systems is normal.    PHYSICAL EXAM:    Constitutional: NAD  Eyes: PERRL, EOMI, sclera non-icteric  Neck: supple, no masses, no JVD  Respiratory: CTA b/l, good air entry b/l,+crackles  Cardiovascular: RRR, normal S1S2, no M/R/G  Gastrointestinal: soft, NTND, no masses palpable, BS normal in all four quadrants,   Extremities:    +edema  Neurological: AAOx3      Vital Signs Last 24 Hrs  T(C): 37 (07-02-21 @ 17:12), Max: 37.2 (07-02-21 @ 14:39)  T(F): 98.6 (07-02-21 @ 17:12), Max: 98.9 (07-02-21 @ 14:39)  HR: 63 (07-02-21 @ 12:10) (60 - 75)  BP: 124/77 (07-02-21 @ 12:10) (106/72 - 151/68)  BP(mean): 94 (07-02-21 @ 12:10) (85 - 101)  RR: 18 (07-02-21 @ 12:10) (18 - 18)  SpO2: 97% (07-02-21 @ 12:10) (97% - 99%)              LABS:	 	  CARDIAC MARKERS:                              14.1   3.20  )-----------( 78       ( 02 Jul 2021 08:14 )             40.8     07-02    145  |  103  |  23  ----------------------------<  104<H>  4.2   |  30  |  1.18    Ca    10.0      02 Jul 2021 08:14  Phos  3.4     07-01  Mg     2.4     07-02            ASSESSMENT/PLAN: 	    74 yo M, h/o medication non-compliance, works with [employer] and was in [place] until 5/2021, PMHx DM2, Aflutter s/p ablation  6/2016 & found to be in Afib as of 5/2021 (on Eliquis), HFmrEF (EF 45-50% by outpatient ECHO 5/2021, EF was normal per outpatient ECHO 3/2019), non-obstructive CAD (s/p diagnostic cath in 2014 @ [facility]), CKD stage III (baseline Cr unknown), BPH s/p TURP,  pituitary adenoma s/p transphenoidal rxn in 1990s,  prostate CA (dx in 2018 s/p chemo), chronic venous insufficiency who was referred from. Dr. Anton’s office with c/o progressively worsening SOB on minimal exertion and when speaking/increasing abdominal girth/LE edema over past 3 month despite resuming CHF meds including PO Lasix.  Pt also endorses scrotal edema over past 1 month and orthopnea to the point where he has to sit in chair to sleep. Patient states he ran out of Entresto ~1 year ago while in [place] but had continued on comparable meds to BB/Eliquis/Lasix while in [place] but he ran out ~a couple weeks before seeing Dr. Anton in 5/2021.   Pt endorses 2 syncopal episodes with prodrome dizziness (2/2021 & 3/2021) with head strike 2/2021. Furthermore, pt endorses 2 episodes of non-radiating substernal chest pain described as sharp and of severe intensity, occurring independent of exertion, lasting <1 minute before spontaneously resolving (last episode 8/2020).  Pt admitted to [facility] for r/o ACS, management of exacerbation of HFmrEF likely in setting of Afib with medication non-compliance, heme consult for pancytopenia.   The patient was diuresed and had a net negative of 14 liters.  Pancytopenia was worked up with PT, INR, Fibrinogen, haptoglobin, hepatitis panel which were inconclusive, except for the Hep B which revealed a chronic infection picture. But DIC was ruled out.   Chest CT scan showed asymmetric pleural effusion with the right cavity having a significant effusion. A diagnostic thoracentesis was considered but ultimately aborted due to low platelet count.  The patient also complained of scrotal/testicular pain and was scheduled for a testicular ultrasound; however, the patient refused the study and stated he would follow up outpatient.

## 2021-07-02 NOTE — PROGRESS NOTE ADULT - PROBLEM SELECTOR PLAN 1
3+ pitting edema to B/L ankles/shins, 1+ pitting to dependent thighs into posterior back, +ascites, +scrotal edema,  +JVD,bibasilar crackles, expiratory wheezing anteriorly, saturating well on RA,  cc while in ED at time of exam  - Trop 0.09, f/u ordered 6/29 @ 5pm, AM; BNP 7221  - ECG 6/28/21: Afib @ 67 BPM with LAD & flattened T waves in I/AVL & poor R wave progression.   - CXR wet read 6/28/21: pulmonary venous congestion, B/L pleural effusions vs atelectasis. CT scan ordered (7/1) to differentiate. CT scan showed asymmetric pleural effusions with right greater than left. Suspected liver pathology as cause due to abnormal Hep B panel. patient agreed to thoracentesis to drain right side.   - ECHO 5/17/21: EF 45-50%, indeterminate diastolic pattern, severe LVH, infiltrative disorder, moderate MR/TR, D-shaped septum which is indicative of significant pulmonary HTN, mild HI, LA severely dilated, RA moderately dilated.   - ECHO 3/2019: normal LVEF per outpatient records  - Obtain repeat ECHO (ordered)  - US B/L LE venous duplex outpatient 6/28/21 as per Dr. Anton: No DVT  - Concern for possible infiltrative disease, ?cardiac MRI when more euvolemic  - h/o syncope x2 with head strike 2/2021, f/u CT head (ordered)  - Last [facility] in 2014 @ [facility]: non-obstructive CAD, obtain cath report as able, ?ischemic w/u this admit  - c/w Lasix 80 mg IV BID (home med Lasix 40 mg PO QD)   - Holding home Entresto 24-26 BID in setting of aggressive diuresis with h/o CKD III  - Holding home Toprol XL 12.5 mg PO QD (1/2 dose home Toprol XL) in setting of CHF exacerbation  - Strict I&Os, daily weights, 1L fluid restriction, core measures ordered

## 2021-07-02 NOTE — DISCHARGE NOTE PROVIDER - NSDCMRMEDTOKEN_GEN_ALL_CORE_FT
Cardiac Rehab 3 days/week x 12 weeks for Diagnosis of CHF: Present script to your cardiologist at follow up  Flomax 0.4 mg oral capsule: 1 cap(s) orally once a day  furosemide 80 mg oral tablet: 1 tab(s) orally once a day   Jardiance 10 mg oral tablet: 1 tab(s) orally once a day   Metoprolol Succinate ER 25 mg oral tablet, extended release: 1 tab(s) orally once a day  potassium chloride 20 mEq oral tablet, extended release: 1 tab(s) orally once a day

## 2021-07-02 NOTE — PROGRESS NOTE ADULT - ASSESSMENT
75 year old male with DM2, Aflutter s/p ablation, A. fib (on Eliquis), HFmrEF (EF 45-50% by outpatient ECHO 5/2021, EF was normal per outpatient ECHO 3/2019), non-obstructive CAD, CKD stage III, BPH s/p TURP,  pituitary adenoma s/p transphenoidal rxn in 1990s, prostate CA (dx in 2018 s/p chemo, no longer on treatment), chronic venous insufficiency admitted for CHF exacerbation. Hematology consulted for anemia and thrombocytopenia.     Anemia   - Hb on admission 12.9, now 14.1  - no evidence of bleeding, hemodynamically stable  - Iron studies normal. Elevated B12 and normal folate   - Normal LDH, haptoglobin is >25, and normal T. bili which is less likely to be hemolysis.     Leukopenia w/ lymphocytopenia   - ANC 1520,    - Afebrile, no evidence of active infections   - HIV negative, Hep C negative. Hepatitis panel c/w chronic hepatitis B infection which could possibly explain the leukopenia and thrombocytopenia   - Pending TANYA. RF negative     Thrombocytopenia   - platelet count on admission 68K, stable today   - No evidence of mucosal bleeding or bruising   - PBS reviewed: No platelet clumping, giant platelets, target cells, 0-2 schistocytes  - Plasmic score 4 less likely to be TTP   - PT/INR mildly elevated, normal PTT and fibrinogen >150 less likely to DIC. Send mixing study      On discharge, can follow up with Dr. Sampson within 1 week at [facility] at [address] [place] [address]. Office number [phone]  To discuss with Dr. Sampson     75 year old male with DM2, Aflutter s/p ablation, A. fib (on Eliquis), HFmrEF (EF 45-50% by outpatient ECHO 5/2021, EF was normal per outpatient ECHO 3/2019), non-obstructive CAD, CKD stage III, BPH s/p TURP,  pituitary adenoma s/p transphenoidal rxn in 1990s, prostate CA (dx in 2018 s/p chemo, no longer on treatment), chronic venous insufficiency admitted for CHF exacerbation. Hematology consulted for anemia and thrombocytopenia.     Anemia   - Hb on admission 12.9, now 14.1  - no evidence of bleeding, hemodynamically stable  - Iron studies normal. Elevated B12 and normal folate   - Normal LDH, haptoglobin is >25, and normal T. bili which is less likely to be hemolysis.     Leukopenia w/ lymphocytopenia   - ANC 1520, . Neutropenia resolved, lymphocytopenia improving.    - Afebrile, no evidence of active infections   - HIV negative, Hep C negative. Hepatitis panel c/w chronic hepatitis B infection which could possibly explain the leukopenia/lymphocytopenia and thrombocytopenia. Needs follow up with PCP and possibly GI   - Pending TANYA. RF negative     Thrombocytopenia   - platelet count on admission 68K, improved to 78K  - No evidence of mucosal bleeding or bruising   - PBS reviewed: No platelet clumping, giant platelets, target cells, 0-2 schistocytes  - Plasmic score 4 less likely to be TTP   - PT/INR mildly elevated, normal PTT and fibrinogen >150 less likely to DIC. Send mixing study    - Abdominal US with hepatomegaly, no splenomegaly     On discharge, can follow up with Dr. Sampson within 1 week at [facility] at [address] [place] [address]. Office number [phone]  To discuss with Dr. Smapson

## 2021-07-02 NOTE — DISCHARGE NOTE PROVIDER - PROVIDER TOKENS
PROVIDER:[TOKEN:[[id number]],SCHEDULEDAPPT:[07/06/2021],SCHEDULEDAPPTTIME:[10:30 AM]],PROVIDER:[[id number]],SCHEDULEDAPPT:[07/06/2021],SCHEDULEDAPPTTIME:[02:30 PM]],PROVIDER:[TOKEN:[[id number]],FOLLOWUP:[1 week]],PROVIDER:[TOKEN:[[id number]],FOLLOWUP:[2 weeks]] PROVIDER:[TOKEN:[[id number]],SCHEDULEDAPPT:[07/06/2021],SCHEDULEDAPPTTIME:[10:30 AM]],PROVIDER:[TOKEN:[[id number]],SCHEDULEDAPPT:[07/06/2021],SCHEDULEDAPPTTIME:[02:30 PM]],PROVIDER:[TOKEN:[[id number]],FOLLOWUP:[1 week]],PROVIDER:[TOKEN:[[id number]],FOLLOWUP:[2 weeks]],PROVIDER:[TOKEN:[[id number]],FOLLOWUP:[2 weeks]]

## 2021-07-02 NOTE — PROGRESS NOTE ADULT - SUBJECTIVE AND OBJECTIVE BOX
O/N Events:    Subjective/ROS: Patient seen and examined at bedside.     Denies Fever/Chills, HA, CP, SOB, n/v, changes in bowel/urinary habits.  12pt ROS otherwise negative.    VITALS  Vital Signs Last 24 Hrs  T(C): 36.8 (02 Jul 2021 04:30), Max: 36.9 (01 Jul 2021 22:22)  T(F): 98.3 (02 Jul 2021 04:30), Max: 98.4 (01 Jul 2021 22:22)  HR: 75 (02 Jul 2021 05:13) (60 - 75)  BP: 124/85 (02 Jul 2021 05:13) (109/74 - 151/68)  BP(mean): 101 (02 Jul 2021 05:13) (86 - 101)  RR: 18 (02 Jul 2021 05:13) (18 - 18)  SpO2: 97% (02 Jul 2021 05:13) (96% - 98%)    CAPILLARY BLOOD GLUCOSE      POCT Blood Glucose.: 71 mg/dL (02 Jul 2021 07:00)  POCT Blood Glucose.: 109 mg/dL (01 Jul 2021 22:03)  POCT Blood Glucose.: 107 mg/dL (01 Jul 2021 16:28)  POCT Blood Glucose.: 106 mg/dL (01 Jul 2021 11:51)      PHYSICAL EXAM  General: NAD  Head: NC/AT; MMM; PERRL; EOMI;  Neck: Supple; no JVD  Respiratory: CTAB; no wheezes/rales/rhonchi  Cardiovascular: Regular rhythm/rate; S1/S2+, no murmurs, rubs gallops   Gastrointestinal: Soft; NTND; bowel sounds normal and present  Extremities: WWP; no edema/cyanosis  Neurological: A&Ox3, CNII-XII grossly intact; no obvious focal deficits    MEDICATIONS  (STANDING):  dextrose 40% Gel 15 Gram(s) Oral once  dextrose 5%. 1000 milliLiter(s) (50 mL/Hr) IV Continuous <Continuous>  dextrose 5%. 1000 milliLiter(s) (100 mL/Hr) IV Continuous <Continuous>  dextrose 50% Injectable 25 Gram(s) IV Push once  dextrose 50% Injectable 12.5 Gram(s) IV Push once  dextrose 50% Injectable 25 Gram(s) IV Push once  furosemide   Injectable 60 milliGRAM(s) IV Push every 12 hours  glucagon  Injectable 1 milliGRAM(s) IntraMuscular once  insulin lispro (ADMELOG) corrective regimen sliding scale   SubCutaneous Before meals and at bedtime  metoprolol succinate ER 12.5 milliGRAM(s) Oral every 24 hours  tamsulosin 0.4 milliGRAM(s) Oral at bedtime    MEDICATIONS  (PRN):  albuterol/ipratropium for Nebulization 3 milliLiter(s) Nebulizer every 6 hours PRN Shortness of Breath and/or Wheezing      No Known Allergies      LABS                        12.5   3.00  )-----------( 67       ( 01 Jul 2021 12:18 )             36.7     07-01    146<H>  |  106  |  25<H>  ----------------------------<  98  3.6   |  29  |  1.09    Ca    9.0      01 Jul 2021 12:18  Phos  3.4     07-01  Mg     2.1     07-01      PT/INR - ( 01 Jul 2021 12:18 )   PT: 14.6 sec;   INR: 1.23          PTT - ( 01 Jul 2021 12:18 )  PTT:30.6 sec            IMAGING/EKG/ETC

## 2021-07-02 NOTE — DISCHARGE NOTE PROVIDER - NSDCFUADDAPPT_GEN_ALL_CORE_FT
Please go to [address] [place] [address] instead of the address that has been listed. Call and confirm the appointment time and location with the number that has been provided.

## 2021-07-02 NOTE — PROGRESS NOTE ADULT - ASSESSMENT
ASSESSMENT/PLAN  1) CHF  Heart failure  2) Atrial fibrillation  3) Thrombocytopenia  4) Pleural effusions/atelectasis  5) Bonchiectases    1. O2 attempt of at this time  2. Bronchodilators:  Atrovent/ albuterol q 4 – 6 hours as needed  3. Corticosteroids: off  4. ID/Antibiotics: off  5. Cardiac/HTN: optimize CHF mngmnt, continue diuresis  6. GI: Rx/ prophylaxis c PPI/H2B  7. Heme: Rx/VT prophylaxis c SCD in view of thrombocytopenia, hematology feedback.work up appreciated  8. Aspiration precautions  9. IThoracentesis deferred by Cardiology in view of Thrombocytopenia.   Discussed with managing team, Dr Meyer  Follow up O/P c Dr. Smith secured, the patient stated he understood.

## 2021-07-02 NOTE — PROGRESS NOTE ADULT - SUBJECTIVE AND OBJECTIVE BOX
Hematology Oncology Progress Note (Dr. Sampson)  Discussed with Dr. Sampson and recommendations reviewed with the primary team.    SUBJECTIVE: Patient seen and examined. No new complaints. LE swelling improving. Denies any epistaxis, gum bleeding, hematuria, melena, hematochezia. Possible discharge to home today.     HPI: 75 year old male with PMH of DM2, Aflutter s/p ablation  6/2016 & found to be in Afib as of 5/2021 (on Eliquis), HFmrEF (EF 45-50% by outpatient ECHO 5/2021, EF was normal per outpatient ECHO 3/2019), non-obstructive CAD (s/p diagnostic cath in 2014 @ [facility]), CKD stage III (baseline Cr unknown), BPH s/p TURP,  pituitary adenoma s/p transphenoidal rxn in 1990s, prostate CA (dx in 2018 s/p chemo, no longer on treatment), chronic venous insufficiency who was referred from. Dr. Anton’s office with progressively worsening SOB on minimal exertion and when speaking/increasing abdominal girth/LE edema over past 3 month despite resuming CHF meds including PO Lasix.  Pt also endorses scrotal edema over past 1 month and orthopnea to the point where he has to sit in chair to sleep. Patient states he ran out of Entresto ~1 year ago while in [place] but had continued on comparable meds to BB/Eliquis/Lasix while in [place] but he ran out ~a couple weeks before seeing Dr. Anton in 5/2021. Admitted for CHF exacerbation. Hematology consulted for anemia and thrombocytopenia.    Patient denies any prior h/o anemia or thrombocytopenia. He was never seen by a hematologist in the past. He denies starting any new medications or antibiotics recently. Takes OTC vitamins, but denies being on any herbal supplements. Denies any recent illness or infections. No recent sick contacts. Recent travel to [facility] from Jan 2021 to May 2021, [place] in mid[place]may. Denies any further travel or outdoor activities. Denies any h/o epistaxis, hematuria, melena, BRBPR, or excessive bruising. Denies any fevers, night sweats, or LAD. Reports mild gum bleeding when brushing his teeth. Reports mild weight loss while in [facility], but was able to gain it back after coming back to the \[facility]\"". Denies any chest pain, sob, palpitations, abdominal pain, n/v, early satiety, or LUQ fullness. Denies any rashes or joint pain/swelling.  Has a prior history of prostate ca but completed chemo in 2018, not currently on treatment.       PAST MEDICAL & SURGICAL HISTORY:  Congestive heart failure (CHF)  Diabetes  Atrial flutter s/p Ablation 2016  Chronic venous insufficiency of lower extremity  Prostate CA  Stage 3 chronic kidney disease  History of surgical removal of pituitary gland 1990s  S/P TURP for BPH    Allergies: NKDA    Medications:  Home Medications:  Eliquis 5 mg oral tablet: 1 tab(s) orally 2 times a day (28 Jun 2021 19:26)  Entresto 24 mg-26 mg oral tablet: 1 tab(s) orally 2 times a day (28 Jun 2021 19:26)  Flomax 0.4 mg oral capsule: 1 cap(s) orally once a day (28 Jun 2021 19:26)  furosemide 40 mg oral tablet: 1 tab(s) orally once a day (28 Jun 2021 19:26)  metFORMIN 500 mg oral tablet: 1 tab(s) orally 2 times a day (28 Jun 2021 19:26)  Metoprolol Succinate ER 25 mg oral tablet, extended release: 1 tab(s) orally once a day (28 Jun 2021 19:26)    Social History: Denies any h/o smoking. Occasional alcohol use. Lives with his wife in [place]     FAMILY HISTORY:  No pertinent family history of anemia or thrombocytopenia.     MEDICATIONS  (STANDING):  dextrose 40% Gel 15 Gram(s) Oral once  dextrose 5%. 1000 milliLiter(s) (50 mL/Hr) IV Continuous <Continuous>  dextrose 5%. 1000 milliLiter(s) (100 mL/Hr) IV Continuous <Continuous>  dextrose 50% Injectable 25 Gram(s) IV Push once  dextrose 50% Injectable 12.5 Gram(s) IV Push once  dextrose 50% Injectable 25 Gram(s) IV Push once  furosemide   Injectable 60 milliGRAM(s) IV Push every 12 hours  glucagon  Injectable 1 milliGRAM(s) IntraMuscular once  insulin lispro (ADMELOG) corrective regimen sliding scale   SubCutaneous Before meals and at bedtime  metoprolol succinate ER 12.5 milliGRAM(s) Oral every 24 hours  tamsulosin 0.4 milliGRAM(s) Oral at bedtime    MEDICATIONS  (PRN):  albuterol/ipratropium for Nebulization 3 milliLiter(s) Nebulizer every 6 hours PRN Shortness of Breath and/or Wheezing    PHYSICAL EXAM:    Vital Signs Last 24 Hrs  T(C): 36.7 (02 Jul 2021 09:15), Max: 36.9 (01 Jul 2021 22:22)  T(F): 98 (02 Jul 2021 09:15), Max: 98.4 (01 Jul 2021 22:22)  HR: 70 (02 Jul 2021 08:30) (60 - 75)  BP: 106/72 (02 Jul 2021 08:30) (106/72 - 151/68)  BP(mean): 85 (02 Jul 2021 08:30) (85 - 101)  RR: 18 (02 Jul 2021 08:30) (18 - 18)  SpO2: 99% (02 Jul 2021 08:30) (96% - 99%)    Gen: comfortable in NAD   HEENT: normocephalic/atraumatic  Neck: supple  Cardiovascular: RR, nl S1S2, no murmurs  Respiratory: clear air entry b/l  Gastrointestinal: BS+, soft, NT/ND  Extremities: b/l edema, no calf tenderness  Neurological: AAOx3, no focal deficits  Skin: no rash on visible skin  Musculoskeletal:  full ROM  Psychiatric:  mood stable      Labs:                          14.1   3.20  )-----------( 78       ( 02 Jul 2021 08:14 )             40.8     07-02    145  |  103  |  23  ----------------------------<  104<H>  4.2   |  30  |  1.18    Ca    10.0      02 Jul 2021 08:14  Phos  3.4     07-01  Mg     2.4     07-02    PT/INR - ( 01 Jul 2021 12:18 )   PT: 14.6 sec;   INR: 1.23     PTT - ( 01 Jul 2021 12:18 )  PTT:30.6 sec    Imaging Studies:  US Abdomen Complete (US Abdomen Complete .) (07.01.21 @ 14:49)    IMPRESSION:    1.  Increased hepatic size measuring 19.4 cm. Normal echogenicity. No splenomegaly.  2.  Prominent main portal vein measuring 1.5 cm with mildly pulsatile portal venous waveform. Findings can be seen secondary to diastolic dysfunction or tricuspid regurgitation.  3.  Cholelithiasis without definite evidence of acute cholecystitis. Dilated common bile duct, correlation with MRCP for further characterization if clinically warranted.  4.  Small bilateral upper quadrant ascites. Right pleural effusion.  5.  Increased renal echogenicity bilaterally consistent with medical renal disease.    < end of copied text >    CT Head No Cont (06.29.21 @ 09:20)   IMPRESSION:  No gross evidence of acute intracranial hemorrhage or calvarial fracture. Examination is degraded by motion.      < end of copied text >    Xray Chest 1 View-PORTABLE IMMEDIATE (06.28.21 @ 17:28)     Findings/  impression: Cardiomegaly, thoracic aortic calcification. Bilateral opacities/pleural effusions. Thoracic spine and bilateral AC joint degenerative changes.    < end of copied text >

## 2021-07-02 NOTE — PROGRESS NOTE ADULT - SUBJECTIVE AND OBJECTIVE BOX
Interventional, Pulmonary, Critical, Chest Special Procedures.    Pt was seen and fully examined by myself.     Time spent with patient in minutes:37    Patient is a 75y old  Male who presents with a chief complaint of CHF (02 Jul 2021 07:57) The patient reports feeling better today, eupneic on RA and pain free when seen.  Risk of thoracentesis discussed c     HPI:  76 yo M, h/o medication non-compliance, works with [employer] and was in [place] until 5/2021, PMHx DM2, Aflutter s/p ablation  6/2016 & found to be in Afib as of 5/2021 (on Eliquis), HFmrEF (EF 45-50% by outpatient ECHO 5/2021, EF was normal per outpatient ECHO 3/2019), non-obstructive CAD (s/p diagnostic cath in 2014 @ [facility]),  CKD stage III (baseline Cr unknown), BPH s/p TURP,  pituitary adenoma s/p transphenoidal rxn in 1990s,  prostate CA (dx in 2018 s/p chemo), chronic venous insufficiency who was referred from. Dr. Anton’s office with c/o progressively worsening SOB on minimal exertion and when speaking/increasing abdominal girth/LE edema over past 3 month despite resuming CHF meds including PO Lasix.  Pt also endorses scrotal edema over past 1 month and orthopnea to the point where he has to sit in chair to sleep. Patient states he ran out of Entresto ~1 year ago while in [place] but had continued on comparable meds to BB/Eliquis/Lasix while in [place] but he ran out ~a couple weeks before seeing Dr. Anton in 5/2021.  Pt state LE edema/SOB has been ongoing over the past 1 year but Lasix increased to 40 mg PO TID while in [place] 2/2021 which was decreased back to 40 mg PO QD 3/2021 after his LE edema/SOB resolved.  Pt endorses 2 syncopal episodes with prodrome dizziness (2/2021 & 3/2021) with head strike 2/2021. Went to clinic with unremarkable workup.  Pt endorses MARTINEZ/fatigue upon ambulation of 1-2 city blocks, resolving with rest over past 3 years. Furthermore, pt endorses 2 episodes of non-radiating SSCP described as sharp and of severe intensity, occurring independent of exertion, lasting <1 minute before spontaneously resolving (last episode 8/2020). Denies  diaphoresis, palpitations, , PND, N/V, abdominal pain. Upon admit, VS – T 98.3F, HR 66 BPM, /94, RR 18, SpO2 96% on RA. Labs significant H/H 12.9/37.8, platelet 68, BUN/Cr 18/1.27, Alk phos 136, BNP 7221, Trop 0.08, HIV nonreactive, covid neg x1. CXR wet read 6/28/21: pulmonary venous congestion, B/L pleural effusions. ECG 6/28/21: Afib @ 67 BPM with LAD & flattened T waves in I/AVL & poor R wave progression. While in ED, pt received Lasix 40 mg IV x1.  Pt admitted to [facility] for r/o ACS, management of exacerbation of HFmrEF likely in setting of Afib with medication non-compliance, heme consult for thrombocytopenia.  (28 Jun 2021 18:07)      REVIEW OF SYSTEMS:  ROS reviewed below with positive findings marked (+) :  GEN:  fever, chills ENT: tracheostomy,   epistaxis,  sinusitis COR: +CAD, +CHF,  HTN, dysrhythmia PUL: COPD, ILD, asthma, pneumonia GI: PEG, dysphagia, hemorrhage, other KRISTIN: kidney disease, electrolyte disorder HEM:  anemia, thrombus, coagulopathy, cancer ENDO:  thyroid disease, diabetes mellitus CNS:  dementia, stroke, seizure, PSY:  depression, anxiety, other  PAST MEDICAL & SURGICAL HISTORY:  Congestive heart failure (CHF)    Diabetes    Atrial flutter  s/p Ablation 2016    Chronic venous insufficiency of lower extremity    Prostate CA    Stage 3 chronic kidney disease    Atrial flutter  s/p ablation in 2016    History of surgical removal of pituitary gland  1990s    S/P TURP  for BPH      FAMILY HISTORY:  No pertinent family history in first degree relatives      SOCIAL HISTORY:      - Tobacco     - ETOH    Allergies    No Known Allergies    Intolerances      Vital Signs Last 24 Hrs  T(C): 36.7 (02 Jul 2021 09:15), Max: 36.9 (01 Jul 2021 22:22)  T(F): 98 (02 Jul 2021 09:15), Max: 98.4 (01 Jul 2021 22:22)  HR: 63 (02 Jul 2021 12:10) (60 - 75)  BP: 124/77 (02 Jul 2021 12:10) (106/72 - 151/68)  BP(mean): 94 (02 Jul 2021 12:10) (85 - 101)  RR: 18 (02 Jul 2021 12:10) (18 - 18)  SpO2: 97% (02 Jul 2021 12:10) (97% - 99%)    07-01 @ 07:01 - 07-02 @ 07:00  --------------------------------------------------------  IN: 345 mL / OUT: 5000 mL / NET: -4655 mL    07-02 @ 07:01 - 07-02 @ 12:57  --------------------------------------------------------  IN: 180 mL / OUT: 1000 mL / NET: -820 mL          MEDICATIONS:  MEDICATIONS  (STANDING):  dextrose 40% Gel 15 Gram(s) Oral once  dextrose 5%. 1000 milliLiter(s) (50 mL/Hr) IV Continuous <Continuous>  dextrose 5%. 1000 milliLiter(s) (100 mL/Hr) IV Continuous <Continuous>  dextrose 50% Injectable 25 Gram(s) IV Push once  dextrose 50% Injectable 12.5 Gram(s) IV Push once  dextrose 50% Injectable 25 Gram(s) IV Push once  furosemide   Injectable 60 milliGRAM(s) IV Push every 12 hours  glucagon  Injectable 1 milliGRAM(s) IntraMuscular once  insulin lispro (ADMELOG) corrective regimen sliding scale   SubCutaneous Before meals and at bedtime  metoprolol succinate ER 12.5 milliGRAM(s) Oral every 24 hours  tamsulosin 0.4 milliGRAM(s) Oral at bedtime    MEDICATIONS  (PRN):  albuterol/ipratropium for Nebulization 3 milliLiter(s) Nebulizer every 6 hours PRN Shortness of Breath and/or Wheezing      PHYSICAL EXAM:  More Comfortable, no immediate distress  Eyes: PERRL, EOM intact; conjunctiva and sclera clear  Head: Normocephalic;  No Trauma  ENMT: No nasal discharge, hoarseness, cough or hemoptysis  Neck: Supple; non tender; no masses or deformities.    No JVD  Respiratory:  - WHEEZING   - RHONCHI  - RALES  + posterior CRACKLES.  Diminished breath sounds  BILATERAL  RIGHT > LEFT base  Cardiovascular: Regular rate and rhythm. S1 and S2 Normal; No murmurs, gallops or rubs     - PPM/AICD  Gastrointestinal: Soft non-tender, non-distended; Normal bowel sounds; No hepatosplenomegaly.     -PEG    -  GT   - GONZALEZ  Genitourinary: No costovertebral angle tenderness. No dysuria  Extremities: AROM, No clubbing, cyanosis ++ LE  edema    Vascular: Peripheral pulses palpable 2+ bilaterally  Neurological: Alert and responisve to stimuli   Skin: Warm and dry. No obvious rash  Lymph Nodes: No acute cervical or supraclavicular adenopathy  Psychiatric: Cooperative and appropriate mood    DEVICES:  - DENTURES   +IV R / L     - ETUBE   -TRACH   -CTUBE  R / L    LABS:                          14.1   3.20  )-----------( 78       ( 02 Jul 2021 08:14 )             40.8     07-02    145  |  103  |  23  ----------------------------<  104<H>  4.2   |  30  |  1.18    Ca    10.0      02 Jul 2021 08:14  Phos  3.4     07-01  Mg     2.4     07-02      PT/INR - ( 01 Jul 2021 12:18 )   PT: 14.6 sec;   INR: 1.23          PTT - ( 01 Jul 2021 12:18 )  PTT:30.6 sec  RADIOLOGY & ADDITIONAL STUDIES (The following images were personally reviewed):< from: CT Chest No Cont (06.30.21 @ 20:02) >  EXAM:  CT CHEST                          PROCEDURE DATE:  06/30/2021          INTERPRETATION:  CT SCAN OF CHEST    History: CHF exacerbation.    Technique: CT scan of chest performed from lung apices through lung bases. Axial, coronal, and sagittalmultiplanar reformatted images were produced. Thin section axial images and axial MIPS were also produced. Intravenous contrast material was not administered, as ordered.    Comparison: Radiographs 6/28/2021    Findings:    Lungs and large airways and pleura: The tracheal bronchial tree is patent. There are bibasilar effusions trace on the left side and mild to moderate on the right. There may be minimal interlobular septal thickening involving the lung bases (4:236-248). Passive atelectasis involving the posterior basal segments of both lower lobes more pronounced on the right side. Bibasilar parenchymal calcifications which may be dystrophic. There is an area of nodular consolidation/rounded atelectasis within the posterior basal segment ofthe left lower lobe measuring 38 x 17 mm (4:247). There may be a linear area of parenchymal banding representing linear atelectasis and/or scarring with adjacent traction bronchiectasis (4:243-247). Scattered both solid and calcified micronodules which are delineated with arrows in series 4 are noted. Clustered tree-in-bud micronodules are noted within the anterior aspect of the apical segment of the right upper lobe (4:81) mild cylindrical bronchiectasis most pronounced within the inferior aspect of the right middle lobe lingula and both lower lobes.    Mediastinum and hilar regions: No thoracic lymphadenopathy.    Heart and pericardium:  Cardiomegaly. No pericardial effusion.    Vessels:  Pulmonary artery dilatation measuring 3.8 cm. Small calcified aortic mural plaque. Aneurysmal dilatation of the aortic root at the sinus of Valsalva measuring 4.3 cm and the ascending aorta measuring 4 cm aortic arch measuring 3.3 cm, and proximal descending thoracic aorta measuring 3 cm.    Chest walland lower neck:  There is anasarca. Moderate symmetric gynecomastia.    Upper abdomen: Bilateral renal cortical cysts largest on the left measuring 5.1 cm and 5.6 cm on the right increased retained gastric contents which may represent gastroparesis with mild dilatation of the gastric lumen.    Bones: Mild multilevel degenerative disc disease more pronounced in the upper thoracic and lower thoracic spine. Small sclerotic focus involving T4 vertebral body.      Impression:  1. Cardiomegaly with pulmonary venous congestion.  2. Question an area of rounded atelectasis involving the posterior basal segment left lower lobe measuring up to 38 mm. Short interval surveillance in 3 months with a contrast-enhanced thoracic CT is suggested for more complete evaluation.  3. Mild tree-in-bud micronodules as well as bronchiectasis which may represent the sequela of chronic inflammatory process such as pulmonary nontuberculous mycobacterial infection.  4. Pulmonary artery dilatation measuring 3.8 cm. Pulmonary hypertension cannot be excluded.  5. Mild aneurysmal dilatation of the aortic root, ascending segment and proximal descending segments of the thoracic aorta measuring 4.3, 4 and 3 cm.    < end of copied text >

## 2021-07-02 NOTE — DISCHARGE NOTE PROVIDER - NSDCCPCAREPLAN_GEN_ALL_CORE_FT
PRINCIPAL DISCHARGE DIAGNOSIS  Diagnosis: Acute on chronic diastolic congestive heart failure  Assessment and Plan of Treatment: You have a known history of congestive heart failure prior to your admission. To manage this you are on the following medications: Lasix 80mg tab daily and Potassium Chloride 20mEq daily.  Congestive heart failure can cause make it harder for your heart to pump blood to the rest of your body. As a result, blood can get backed up into your lungs or into your legs. In order to avoid this, make sure to take all of your medications for heart failure as prescribed. This will keep your heart functioning well. If you notice increased difficulty with breathing, cough with bloody mucous, increased swelling in the legs, or chest pain be sure to contact your PCP or call 911 as you may need more treatment. Additionally be sure to follow up with your PCP and Cardiologist on a regular basis to make sure no additional medications or medication changes need to be mades        SECONDARY DISCHARGE DIAGNOSES  Diagnosis: Pancytopenia  Assessment and Plan of Treatment: Pancytopenia is a decrease in the number of cells from all the types of cells that you have in your blood. There are multiple causes of pancytopenia such as infecion and autoimmune disease. The cause of your pancytopenia is still being worked up. We have scheduled you an outpatient visit to continue looking into the cause.    Diagnosis: Diabetes mellitus  Assessment and Plan of Treatment: Stop taking metformin. You were started on Jardiance 10mg daily as this has been shown to provided benefits for heart failure as well as help with Diabetes.

## 2021-07-02 NOTE — PROGRESS NOTE ADULT - PROVIDER SPECIALTY LIST ADULT
Pulmonology
Internal Medicine
Endocrinology
Pulmonology
Cardiology
Endocrinology
Heme/Onc
Pulmonology
Pulmonology
Cardiology

## 2021-07-02 NOTE — DISCHARGE NOTE NURSING/CASE MANAGEMENT/SOCIAL WORK - PATIENT PORTAL LINK FT
You can access the FollowMyHealth Patient Portal offered by [facility] by registering at the following website: [email or web address]. By joining [employer]’s FollowMyHealth portal, you will also be able to view your health information using other applications (apps) compatible with our system.

## 2021-07-02 NOTE — PROGRESS NOTE ADULT - PROBLEM SELECTOR PLAN 6
F- restriction due to HF exacerbation. 1000mL daily  E- Replete when K < 4 or Mg < 2  N- DASH/TLC  VTE ppx: SCDs only given thrombocytopenia,

## 2021-07-02 NOTE — DISCHARGE NOTE PROVIDER - CARE PROVIDER_API CALL
Yara Anton (MD)  Cardiovascular Disease; Interventional Cardiology  [address] [facility], [address]  [place] [address]  Phone: [phone]  Fax: [phone]  Scheduled Appointment: 07/06/2021 10:30 AM    Rody Sampson)  Hematology; Medical Oncology  [address], [address]  [place] [address]  Phone: [phone]  Fax: [phone]  Scheduled Appointment: 07/06/2021 02:30 PM    Manjinder Mariano)  Medicine  [address] [place] [address]  Phone: [phone]  Fax: [phone]  Follow Up Time: 1 week    Brenden Smith)  Critical Care Medicine; Pulmonary Disease  [address] [place] [address], [address]  [place] [address]  Phone: [phone]  Fax: [phone]  Follow Up Time: 2 weeks   Yara Anton)  Cardiovascular Disease; Interventional Cardiology  [address] [facility], [address]  [place] [address]  Phone: [phone]  Fax: [phone]  Scheduled Appointment: 07/06/2021 10:30 AM    Rody Sampson)  Hematology; Medical Oncology  [address], [address]  [place] [address]  Phone: [phone]  Fax: [phone]  Scheduled Appointment: 07/06/2021 02:30 PM    Manjinder Mariano)  Medicine  [address] [place] [address]  Phone: [phone]  Fax: [phone]  Follow Up Time: 1 week    Brenden Smith)  Critical Care Medicine; Pulmonary Disease  [address] [place] [address], [address]  [place] [address]  Phone: [phone]  Fax: [phone]  Follow Up Time: 2 weeks    Airam Somers  INTERNAL MEDICINE  [address]  [place] [address]  Phone: [phone]  Fax: [phone]  Follow Up Time: 2 weeks

## 2021-07-02 NOTE — PROGRESS NOTE ADULT - PROBLEM SELECTOR PROBLEM 2
Atrial fibrillation and flutter

## 2021-07-02 NOTE — PROGRESS NOTE ADULT - PROBLEM SELECTOR PLAN 5
- S/P transphenoidal resection in 1990s  - hx of pituitary adenoma - please check FSH,LH,ACTH,TSH, free T4, am cortisol, prolactin, IGF-1.  -FSH was elevated to 14.5. LH is wnl. Prolactin wnl, ACTH & Cortisol wnl.  -TSH, fT4 wnl.  -IGF-1 still pending
- S/P transphenoidal resection in 1990s  - hx of pituitary adenoma - please check FSH,LH,ACTH,TSH, free T4, am cortisol, prolactin, IGF-1.

## 2021-07-04 LAB
HAV IGG SER QL IA: REACTIVE
HBV E AG SER-ACNC: SIGNIFICANT CHANGE UP

## 2021-07-06 LAB
HBV DNA # SERPL NAA+PROBE: 423 IU/ML — SIGNIFICANT CHANGE UP
HBV DNA SERPL NAA+PROBE-LOG#: 2.63 LOG10IU/ML — SIGNIFICANT CHANGE UP

## 2021-07-14 DIAGNOSIS — N50.82 SCROTAL PAIN: ICD-10-CM

## 2021-07-14 DIAGNOSIS — J90 PLEURAL EFFUSION, NOT ELSEWHERE CLASSIFIED: ICD-10-CM

## 2021-07-14 DIAGNOSIS — I48.92 UNSPECIFIED ATRIAL FLUTTER: ICD-10-CM

## 2021-07-14 DIAGNOSIS — Z53.09 PROCEDURE AND TREATMENT NOT CARRIED OUT BECAUSE OF OTHER CONTRAINDICATION: ICD-10-CM

## 2021-07-14 DIAGNOSIS — I25.10 ATHEROSCLEROTIC HEART DISEASE OF NATIVE CORONARY ARTERY WITHOUT ANGINA PECTORIS: ICD-10-CM

## 2021-07-14 DIAGNOSIS — I87.2 VENOUS INSUFFICIENCY (CHRONIC) (PERIPHERAL): ICD-10-CM

## 2021-07-14 DIAGNOSIS — J98.11 ATELECTASIS: ICD-10-CM

## 2021-07-14 DIAGNOSIS — R06.02 SHORTNESS OF BREATH: ICD-10-CM

## 2021-07-14 DIAGNOSIS — Z91.19 PATIENT'S NONCOMPLIANCE WITH OTHER MEDICAL TREATMENT AND REGIMEN: ICD-10-CM

## 2021-07-14 DIAGNOSIS — I27.20 PULMONARY HYPERTENSION, UNSPECIFIED: ICD-10-CM

## 2021-07-14 DIAGNOSIS — E11.22 TYPE 2 DIABETES MELLITUS WITH DIABETIC CHRONIC KIDNEY DISEASE: ICD-10-CM

## 2021-07-14 DIAGNOSIS — B18.1 CHRONIC VIRAL HEPATITIS B WITHOUT DELTA-AGENT: ICD-10-CM

## 2021-07-14 DIAGNOSIS — N18.30 CHRONIC KIDNEY DISEASE, STAGE 3 UNSPECIFIED: ICD-10-CM

## 2021-07-14 DIAGNOSIS — R18.8 OTHER ASCITES: ICD-10-CM

## 2021-07-14 DIAGNOSIS — I48.91 UNSPECIFIED ATRIAL FIBRILLATION: ICD-10-CM

## 2021-07-14 DIAGNOSIS — Z85.46 PERSONAL HISTORY OF MALIGNANT NEOPLASM OF PROSTATE: ICD-10-CM

## 2021-07-14 DIAGNOSIS — D61.818 OTHER PANCYTOPENIA: ICD-10-CM

## 2021-07-14 DIAGNOSIS — I50.33 ACUTE ON CHRONIC DIASTOLIC (CONGESTIVE) HEART FAILURE: ICD-10-CM

## 2021-07-17 LAB — HDV IGM SER QL IA: SIGNIFICANT CHANGE UP

## 2021-11-19 ENCOUNTER — INPATIENT (INPATIENT)
Facility: HOSPITAL | Age: 76
LOS: 4 days | Discharge: HOME HEALTH | DRG: 292 | End: 2021-11-24
Attending: INTERNAL MEDICINE | Admitting: INTERNAL MEDICINE
Payer: MEDICARE

## 2021-11-19 VITALS
OXYGEN SATURATION: 98 % | WEIGHT: 199.96 LBS | RESPIRATION RATE: 16 BRPM | TEMPERATURE: 97 F | HEIGHT: 70 IN | HEART RATE: 65 BPM | SYSTOLIC BLOOD PRESSURE: 127 MMHG | DIASTOLIC BLOOD PRESSURE: 92 MMHG

## 2021-11-19 DIAGNOSIS — N18.30 CHRONIC KIDNEY DISEASE, STAGE 3 UNSPECIFIED: ICD-10-CM

## 2021-11-19 DIAGNOSIS — I48.92 UNSPECIFIED ATRIAL FLUTTER: Chronic | ICD-10-CM

## 2021-11-19 DIAGNOSIS — D69.6 THROMBOCYTOPENIA, UNSPECIFIED: ICD-10-CM

## 2021-11-19 DIAGNOSIS — Z90.79 ACQUIRED ABSENCE OF OTHER GENITAL ORGAN(S): Chronic | ICD-10-CM

## 2021-11-19 DIAGNOSIS — N40.0 BENIGN PROSTATIC HYPERPLASIA WITHOUT LOWER URINARY TRACT SYMPTOMS: ICD-10-CM

## 2021-11-19 DIAGNOSIS — I48.92 UNSPECIFIED ATRIAL FLUTTER: ICD-10-CM

## 2021-11-19 DIAGNOSIS — Z98.890 OTHER SPECIFIED POSTPROCEDURAL STATES: Chronic | ICD-10-CM

## 2021-11-19 DIAGNOSIS — E11.9 TYPE 2 DIABETES MELLITUS WITHOUT COMPLICATIONS: ICD-10-CM

## 2021-11-19 DIAGNOSIS — I50.9 HEART FAILURE, UNSPECIFIED: ICD-10-CM

## 2021-11-19 PROBLEM — C61 MALIGNANT NEOPLASM OF PROSTATE: Chronic | Status: ACTIVE | Noted: 2021-06-28

## 2021-11-19 PROBLEM — I87.2 VENOUS INSUFFICIENCY (CHRONIC) (PERIPHERAL): Chronic | Status: ACTIVE | Noted: 2021-06-28

## 2021-11-19 LAB
ALBUMIN SERPL ELPH-MCNC: 4 G/DL — SIGNIFICANT CHANGE UP (ref 3.3–5)
ALP SERPL-CCNC: 151 U/L — HIGH (ref 40–120)
ALT FLD-CCNC: 23 U/L — SIGNIFICANT CHANGE UP (ref 10–45)
ANION GAP SERPL CALC-SCNC: 10 MMOL/L — SIGNIFICANT CHANGE UP (ref 5–17)
APPEARANCE UR: CLEAR — SIGNIFICANT CHANGE UP
AST SERPL-CCNC: 17 U/L — SIGNIFICANT CHANGE UP (ref 10–40)
BASOPHILS # BLD AUTO: 0.03 K/UL — SIGNIFICANT CHANGE UP (ref 0–0.2)
BASOPHILS NFR BLD AUTO: 0.8 % — SIGNIFICANT CHANGE UP (ref 0–2)
BILIRUB SERPL-MCNC: 1.8 MG/DL — HIGH (ref 0.2–1.2)
BILIRUB UR-MCNC: NEGATIVE — SIGNIFICANT CHANGE UP
BUN SERPL-MCNC: 20 MG/DL — SIGNIFICANT CHANGE UP (ref 7–23)
CALCIUM SERPL-MCNC: 9.2 MG/DL — SIGNIFICANT CHANGE UP (ref 8.4–10.5)
CHLORIDE SERPL-SCNC: 110 MMOL/L — HIGH (ref 96–108)
CO2 SERPL-SCNC: 27 MMOL/L — SIGNIFICANT CHANGE UP (ref 22–31)
COLOR SPEC: YELLOW — SIGNIFICANT CHANGE UP
CREAT SERPL-MCNC: 1.32 MG/DL — HIGH (ref 0.5–1.3)
DIFF PNL FLD: NEGATIVE — SIGNIFICANT CHANGE UP
EOSINOPHIL # BLD AUTO: 0.01 K/UL — SIGNIFICANT CHANGE UP (ref 0–0.5)
EOSINOPHIL NFR BLD AUTO: 0.3 % — SIGNIFICANT CHANGE UP (ref 0–6)
GLUCOSE SERPL-MCNC: 91 MG/DL — SIGNIFICANT CHANGE UP (ref 70–99)
GLUCOSE UR QL: NEGATIVE — SIGNIFICANT CHANGE UP
HCT VFR BLD CALC: 43.1 % — SIGNIFICANT CHANGE UP (ref 39–50)
HGB BLD-MCNC: 14.1 G/DL — SIGNIFICANT CHANGE UP (ref 13–17)
IMM GRANULOCYTES NFR BLD AUTO: 0.3 % — SIGNIFICANT CHANGE UP (ref 0–1.5)
KETONES UR-MCNC: NEGATIVE — SIGNIFICANT CHANGE UP
LEUKOCYTE ESTERASE UR-ACNC: NEGATIVE — SIGNIFICANT CHANGE UP
LYMPHOCYTES # BLD AUTO: 0.98 K/UL — LOW (ref 1–3.3)
LYMPHOCYTES # BLD AUTO: 26.7 % — SIGNIFICANT CHANGE UP (ref 13–44)
MAGNESIUM SERPL-MCNC: 2.5 MG/DL — SIGNIFICANT CHANGE UP (ref 1.6–2.6)
MCHC RBC-ENTMCNC: 29.2 PG — SIGNIFICANT CHANGE UP (ref 27–34)
MCHC RBC-ENTMCNC: 32.7 GM/DL — SIGNIFICANT CHANGE UP (ref 32–36)
MCV RBC AUTO: 89.2 FL — SIGNIFICANT CHANGE UP (ref 80–100)
MONOCYTES # BLD AUTO: 0.36 K/UL — SIGNIFICANT CHANGE UP (ref 0–0.9)
MONOCYTES NFR BLD AUTO: 9.8 % — SIGNIFICANT CHANGE UP (ref 2–14)
NEUTROPHILS # BLD AUTO: 2.28 K/UL — SIGNIFICANT CHANGE UP (ref 1.8–7.4)
NEUTROPHILS NFR BLD AUTO: 62.1 % — SIGNIFICANT CHANGE UP (ref 43–77)
NITRITE UR-MCNC: NEGATIVE — SIGNIFICANT CHANGE UP
NRBC # BLD: 0 /100 WBCS — SIGNIFICANT CHANGE UP (ref 0–0)
NT-PROBNP SERPL-SCNC: HIGH PG/ML (ref 0–300)
PH UR: 6.5 — SIGNIFICANT CHANGE UP (ref 5–8)
PLATELET # BLD AUTO: 89 K/UL — LOW (ref 150–400)
POTASSIUM SERPL-MCNC: 4.5 MMOL/L — SIGNIFICANT CHANGE UP (ref 3.5–5.3)
POTASSIUM SERPL-SCNC: 4.5 MMOL/L — SIGNIFICANT CHANGE UP (ref 3.5–5.3)
PROT SERPL-MCNC: 7 G/DL — SIGNIFICANT CHANGE UP (ref 6–8.3)
PROT UR-MCNC: NEGATIVE MG/DL — SIGNIFICANT CHANGE UP
RBC # BLD: 4.83 M/UL — SIGNIFICANT CHANGE UP (ref 4.2–5.8)
RBC # FLD: 18.5 % — HIGH (ref 10.3–14.5)
SARS-COV-2 RNA SPEC QL NAA+PROBE: SIGNIFICANT CHANGE UP
SODIUM SERPL-SCNC: 147 MMOL/L — HIGH (ref 135–145)
SP GR SPEC: 1.01 — SIGNIFICANT CHANGE UP (ref 1–1.03)
TROPONIN T SERPL-MCNC: 0.12 NG/ML — CRITICAL HIGH (ref 0–0.01)
UROBILINOGEN FLD QL: 0.2 E.U./DL — SIGNIFICANT CHANGE UP
WBC # BLD: 3.67 K/UL — LOW (ref 3.8–10.5)
WBC # FLD AUTO: 3.67 K/UL — LOW (ref 3.8–10.5)

## 2021-11-19 PROCEDURE — 93010 ELECTROCARDIOGRAM REPORT: CPT

## 2021-11-19 PROCEDURE — 71046 X-RAY EXAM CHEST 2 VIEWS: CPT | Mod: 26

## 2021-11-19 PROCEDURE — 99285 EMERGENCY DEPT VISIT HI MDM: CPT | Mod: CS

## 2021-11-19 RX ORDER — FUROSEMIDE 40 MG
80 TABLET ORAL ONCE
Refills: 0 | Status: DISCONTINUED | OUTPATIENT
Start: 2021-11-19 | End: 2021-11-19

## 2021-11-19 RX ORDER — FUROSEMIDE 40 MG
40 TABLET ORAL ONCE
Refills: 0 | Status: DISCONTINUED | OUTPATIENT
Start: 2021-11-19 | End: 2021-11-19

## 2021-11-19 RX ORDER — METOPROLOL TARTRATE 50 MG
1 TABLET ORAL
Qty: 0 | Refills: 0 | DISCHARGE

## 2021-11-19 RX ORDER — NITROGLYCERIN 6.5 MG
1 CAPSULE, EXTENDED RELEASE ORAL DAILY
Refills: 0 | Status: DISCONTINUED | OUTPATIENT
Start: 2021-11-19 | End: 2021-11-19

## 2021-11-19 RX ORDER — TAMSULOSIN HYDROCHLORIDE 0.4 MG/1
1 CAPSULE ORAL
Qty: 0 | Refills: 0 | DISCHARGE

## 2021-11-19 RX ORDER — FUROSEMIDE 40 MG
80 TABLET ORAL ONCE
Refills: 0 | Status: COMPLETED | OUTPATIENT
Start: 2021-11-19 | End: 2021-11-19

## 2021-11-19 RX ORDER — FUROSEMIDE 40 MG
80 TABLET ORAL
Refills: 0 | Status: DISCONTINUED | OUTPATIENT
Start: 2021-11-19 | End: 2021-11-21

## 2021-11-19 RX ADMIN — Medication 1 PATCH: at 16:22

## 2021-11-19 RX ADMIN — Medication 80 MILLIGRAM(S): at 16:10

## 2021-11-19 RX ADMIN — Medication 1 PATCH: at 19:42

## 2021-11-19 RX ADMIN — Medication 80 MILLIGRAM(S): at 22:56

## 2021-11-19 NOTE — H&P ADULT - PROBLEM SELECTOR PLAN 6
Home medication: Tamsulosin 0.4 mg daily   - continue home medication       VTE PPX: f/u in AM about AC initiation   Dispo: pending diuresis and clinical progression     PT consulted

## 2021-11-19 NOTE — ED PROVIDER NOTE - CLINICAL SUMMARY MEDICAL DECISION MAKING FREE TEXT BOX
Patient in ED as sent by Dr. Prescott for acute exacerbation HF.  Patient with mild conversational dyspnea, otherwise in NAD.  Rales to lung bases noted.  Labs, imaging reviewed and consistent with HR.  Trop noted to be elevated.  Case discussed with Dr. Prescott, cardio PA.  Lasix 80 mg IV given in addition to nitropaste to chest wall.  No additional ED recs from Dr. Prescott at this time.  Will admit to cardiac tele for close monitoring and continued diuresis.  Patient is aware of plan and in agreement.

## 2021-11-19 NOTE — H&P ADULT - NSHPLABSRESULTS_GEN_ALL_CORE
14.1   3.67  )-----------( 89       ( 19 Nov 2021 16:08 )             43.1       11-19    147<H>  |  110<H>  |  20  ----------------------------<  91  4.5   |  27  |  1.32<H>    Ca    9.2      19 Nov 2021 16:08  Mg     2.5     11-19    TPro  7.0  /  Alb  4.0  /  TBili  1.8<H>  /  DBili  x   /  AST  17  /  ALT  23  /  AlkPhos  151<H>  11-19

## 2021-11-19 NOTE — H&P ADULT - NSICDXPASTSURGICALHX_GEN_ALL_CORE_FT
PAST SURGICAL HISTORY:  Atrial flutter s/p ablation in 2016    History of surgical removal of pituitary gland 1990s    S/P TURP for BPH

## 2021-11-19 NOTE — H&P ADULT - ASSESSMENT
75 y/o male, prior history of medication non-compliance, w/ PMHx type II DM (not currently on medications), A-Flutter/A-Fib (s/p prior ablation in 06/2016, most recently found to be in A-fib in 05/2021, previously on Eliquis but was discontinued), thrombocytopenia (baseline Plt 60-70's on prior admission), stage III CKD (baseline Cr 1.0-1.2), non-obstructive CAD, HFmrEF (EF 45-50% by outpatient Echocardiogram from 05/2021), BPH (s/p TURP procedure), pituitary adenoma (s/p transphenoidal treatment in 1990's), prostate cancer (s/p chemotherapy), chronic venous insufficiency, and prior admission from 06/28/2021 to 07/02/2021 for CHF exacerbation who presented for worsening SOB and LE edema for the past 1-2 weeks, followed up w/ Dr. Anton and was instructed to present to the ED, and admits to not taking his home medication of Lasix as regularly as he should. Of note, patient had previously been on Metformin, transitioned to Jardiance, and now on no home diabetes medications but states he has noticed increased thirst and dry mouth during the evening. In ED, VS initially showed temp 97.4 F, HR 65 bpm, /92 mmHg, SPO2 98% on room air, and RR 16. EKG showed atrial fibrillation at 63 bpm w/o acute ischemic changes. Labs significant for Plt 89, BUN/Cr 20/1.32, troponin 0.12, BNP 98586. Chest X-ray showed significant pulmonary congestion. COVID PCR pending. In ED, patient was provided w/ Nitroglycerin 0.1 mg/hour patch and Lasix 80 mg IV once. Patient is now admitted to cardiology/telemetry for further management of acute on chronic heart failure exacerbation.

## 2021-11-19 NOTE — ED PROVIDER NOTE - PHYSICAL EXAMINATION
VITAL SIGNS: I have reviewed nursing notes and confirm.  CONSTITUTIONAL: Non toxic; in no acute distress.   SKIN:  Warm and dry, no acute rash.   HEAD:  Normocephalic, atraumatic.  EYES: PERRL.  EOM intact; conjunctiva and sclera clear.  ENT: No nasal discharge; airway clear.   NECK: Supple; non tender.  CARD: S1, S2 normal; no murmurs, gallops, or rubs. Regular rate and rhythm.   RESP:  + Rales to bilateral lung bases, + mild conversational dyspnea, no retractions.  ABD: Normal bowel sounds; soft; non-distended; non-tender; no guarding/rebound.  EXT: Normal ROM. No clubbing, cyanosis, 3+ pitting edema to bilateral LEs.   NEURO: Alert, oriented, grossly unremarkable.  5/5 strength x 4 extremities against gravity and external force.  No drift x 4 extremities.  Sensation intact and symmetric x 4 extremities.  No facial asymmetry.    PSYCH: Cooperative, mood and affect appropriate.

## 2021-11-19 NOTE — H&P ADULT - PROBLEM SELECTOR PLAN 3
Known history of A-flutter/A-Fib w/ prior ablation in 2016  - was previously on Eliquis but was discontinued due to thrombocytopenia as per patient   - EKG showed rate controlled A-fib w/o acute ischemic changes  - follow up about initiating AC in AM

## 2021-11-19 NOTE — ED PROVIDER NOTE - NS ED ROS FT
Constitutional: No fever or chills.   Eyes: No pain, blurry vision, or discharge.  ENMT: No hearing changes, pain, discharge or infections. No neck pain or stiffness.  Cardiac: No chest pain, + SOB, + LE edema.   Respiratory: + SOB.  No cough or respiratory distress. No hemoptysis.   GI: No nausea, vomiting, diarrhea or abdominal pain.  : No dysuria, frequency or burning.  MS: No myalgia, muscle weakness, joint pain or back pain.  Neuro: No headache or weakness. No LOC.  Skin: No skin rash.   Endocrine: No history of thyroid disease or diabetes.  Except as documented in the HPI, all other systems are negative.

## 2021-11-19 NOTE — ED PROVIDER NOTE - CARE PLAN
1 Principal Discharge DX:	Acute exacerbation of congestive heart failure  Secondary Diagnosis:	Shortness of breath

## 2021-11-19 NOTE — H&P ADULT - NSICDXPASTMEDICALHX_GEN_ALL_CORE_FT
PAST MEDICAL HISTORY:  Atrial flutter s/p Ablation 2016    Chronic venous insufficiency of lower extremity     Heart failure with mid-range ejection fraction     Prostate CA     Stage 3 chronic kidney disease     Thrombocytopenia     Type 2 diabetes mellitus

## 2021-11-19 NOTE — H&P ADULT - HISTORY OF PRESENT ILLNESS
77 y/o male, prior history of medication non-compliance, w/ PMHx type II DM (not currently on medications), A-Flutter/A-Fib (s/p prior ablation in 06/2016, most recently found to be in A-fib in 05/2021, previously on Eliquis but was discontinued), thrombocytopenia (baseline Plt 60-70's on prior admission), stage III CKD (baseline Cr 1.0-1.2), non-obstructive CAD, HFmrEF (EF 45-50% by outpatient Echocardiogram from 05/2021), BPH (s/p TURP procedure), pituitary adenoma (s/p transphenoidal treatment in 1990's), prostate cancer (s/p chemotherapy), chronic venous insufficiency, and prior admission from 06/28/2021 to 07/02/2021 for CHF exacerbation who presented to [facility] ED from Dr. Anton office due to worsening SOB and LE edema. Patient reports he has noticed increased LE edema for the last two weeks and worsening SOB w/ minimal exertion for the last week. Patient stated he followed up w/ Dr. Anton today and was directed to come to the ED. Of note, patient also admits to not taking his home medication of Lasix 40 mg daily on a regular basis. Patient denies any syncope, dizziness, chest pain, palpitations, abdominal pain, nausea/vomiting/diarrhea, melena, fever, chills, cough. Of note, patient had previously been on Metformin, transitioned to Jardiance, and now on no home diabetes medications but states he has noticed increased thirst and dry mouth during the evening.     In ED, VS initially showed temp 97.4 F, HR 65 bpm, /92 mmHg, SPO2 98% on room air, and RR 16. EKG showed atrial fibrillation at 63 bpm w/o acute ischemic changes. Labs significant for Plt 89, BUN/Cr 20/1.32, troponin 0.12, BNP 51843. Chest X-ray showed significant pulmonary congestion. COVID PCR pending. In ED, patient was provided w/ Nitroglycerin 0.1 mg/hour patch and Lasix 80 mg IV once. Patient is now admitted to cardiology/telemetry for further management of acute on chronic heart failure exacerbation.

## 2021-11-19 NOTE — H&P ADULT - PROBLEM SELECTOR PLAN 2
Previously on Metformin, transitioned to Jardiance, and now discontinued from both and on now home medications   - reports recently experiencing increased thirst at night   - A1c ordered for AM lab draw   - ISS ordered   - consider Endocrinology consult in AM

## 2021-11-19 NOTE — ED PROVIDER NOTE - OBJECTIVE STATEMENT
76 year old male with history of medication non-compliance, works with [employer] and was in [place] until 5/2021, PMHx DM2, Aflutter s/p ablation  6/2016 & found to be in Afib as of 5/2021 (on Eliquis), HFmrEF (EF 45-50% by outpatient ECHO 5/2021, EF was normal per outpatient ECHO 3/2019), non-obstructive CAD (s/p diagnostic cath in 2014 @ [facility]), CKD stage III (baseline Cr unknown), BPH s/p TURP,  pituitary adenoma s/p transphenoidal rxn in 1990s,  prostate CA (dx in 2018 s/p chemo), chronic venous insufficiency who was referred from. Dr. Anton’s office secondary to concern for progressive shortness of breath, swelling to bilateral LEs over the past 4 days.  Patient notes shortness of breath at rest and worse with any exertion.  Denies home oxygen dependence, states he is not taking his Lasix consistently as prescribed.  Patient denies associated fever, chills, headache, visual changes, chest pain, abdominal pain, nausea, emesis, changes to bowel movements, calf pain/tenderness, recent travel, known sick contacts or any additional acute complaints or concerns at this time. 76 year old male with history of medication non-compliance, works with [employer] and was in [place] until 5/2021, PMHx DM2, Aflutter s/p ablation 6/2016 & found to be in Afib as of 5/2021, HFmrEF (EF 45-50% by outpatient ECHO 5/2021, EF was normal per outpatient ECHO 3/2019), non-obstructive CAD (s/p diagnostic cath in 2014 @ [facility]), CKD stage III (baseline Cr unknown), BPH s/p TURP,  pituitary adenoma s/p transphenoidal rxn in 1990s,  prostate CA (dx in 2018 s/p chemo), chronic venous insufficiency who was referred from. Dr. Anton’s office secondary to concern for progressive shortness of breath, swelling to bilateral LEs over the past 4 days.  Patient notes shortness of breath at rest and worse with any exertion.  Denies home oxygen dependence, states he is not taking his Lasix consistently as prescribed.  Patient denies associated fever, chills, headache, visual changes, chest pain, abdominal pain, nausea, emesis, changes to bowel movements, calf pain/tenderness, recent travel, known sick contacts or any additional acute complaints or concerns at this time.

## 2021-11-19 NOTE — ED ADULT NURSE NOTE - OBJECTIVE STATEMENT
76 year old M patient, A+OX3, ambulatory w steady gait c/o of dyspnea on exertion and bilateral leg & stomach swelling "For a while".  Sent in by PMD due to CHF exacerbation.  Placed on CM.  IV #20G placed to LAC, flushing well no redness swelling or pain to site.  Denies chest pain.  Appears dyspneic when walking, at rest is comfortable.  Speaking full clear sentences without distress.

## 2021-11-19 NOTE — H&P ADULT - PROBLEM SELECTOR PLAN 4
Platelets 89 on admission, baseline from prior admission 60-70's  - monitor CBC  - follow up about initiating AC in AM (for A-Fib, see above)

## 2021-11-19 NOTE — H&P ADULT - PROBLEM SELECTOR PLAN 1
Presented w/ worsening LE edema for two weeks and worsening SOB w/ minimal exertion for one week   - known history of HFmrEF w/ EF 45-50% by outpatient Echocardiogram from 05/2021   - BNP 12K and trop 0.12 (likely in the setting of CHF)  - EKG showed atrial fibrillation at 63 bpm w/o acute ischemic changes   - Chest X-ray showed significant pulmonary congestion   - currently satting well on room air   - physical exam significant for 2-3+ pitting edema up to the thighs, ascites, and rales throughout lung fields   - given Lasix 80 mg IV once in ED   - home medications: Lasix 40 mg daily, Metolazone 5 mg daily, and Toprol XL 25 mg daily   - admits to not taking his home Lasix as regularly as he should   - continue Lasix 80 mg IV BID at this time   - continue home Metolazone 5 mg daily and Toprol XL 25 mg daily   - repeat Echocardiogram ordered   - core measures, strict I's and O's, daily weight, fluid restriction   - ACE wrap legs

## 2021-11-19 NOTE — H&P ADULT - PROBLEM SELECTOR PLAN 5
Baseline Cr 1.0-1.2, Cr 1.32 on admission   - monitor BMP  - IV diuresis as above   - above additional nephrotoxic agents

## 2021-11-19 NOTE — ED ADULT TRIAGE NOTE - CHIEF COMPLAINT QUOTE
Pt reports "I have fluid in my belly and in my legs." Reports sob, denies chest pain. Pt sent by MD for IV diuretics, has hx of chf.

## 2021-11-19 NOTE — ED ADULT NURSE NOTE - NSIMPLEMENTINTERV_GEN_ALL_ED
Implemented All Fall Risk Interventions:  [place] to call system. Call bell, personal items and telephone within reach. Instruct patient to call for assistance. Room bathroom lighting operational. Non-slip footwear when patient is off stretcher. Physically safe environment: no spills, clutter or unnecessary equipment. Stretcher in lowest position, wheels locked, appropriate side rails in place. Provide visual cue, wrist band, yellow gown, etc. Monitor gait and stability. Monitor for mental status changes and reorient to person, place, and time. Review medications for side effects contributing to fall risk. Reinforce activity limits and safety measures with patient and family.

## 2021-11-20 LAB
A1C WITH ESTIMATED AVERAGE GLUCOSE RESULT: 6.1 % — HIGH (ref 4–5.6)
ANION GAP SERPL CALC-SCNC: 9 MMOL/L — SIGNIFICANT CHANGE UP (ref 5–17)
APTT BLD: 31.1 SEC — SIGNIFICANT CHANGE UP (ref 27.5–35.5)
BASOPHILS # BLD AUTO: 0.01 K/UL — SIGNIFICANT CHANGE UP (ref 0–0.2)
BASOPHILS NFR BLD AUTO: 0.3 % — SIGNIFICANT CHANGE UP (ref 0–2)
BUN SERPL-MCNC: 25 MG/DL — HIGH (ref 7–23)
CALCIUM SERPL-MCNC: 9.3 MG/DL — SIGNIFICANT CHANGE UP (ref 8.4–10.5)
CHLORIDE SERPL-SCNC: 108 MMOL/L — SIGNIFICANT CHANGE UP (ref 96–108)
CHOLEST SERPL-MCNC: 132 MG/DL — SIGNIFICANT CHANGE UP
CK MB CFR SERPL CALC: 3.2 NG/ML — SIGNIFICANT CHANGE UP (ref 0–6.7)
CK SERPL-CCNC: 48 U/L — SIGNIFICANT CHANGE UP (ref 30–200)
CO2 SERPL-SCNC: 28 MMOL/L — SIGNIFICANT CHANGE UP (ref 22–31)
COVID-19 NUCLEOCAPSID GAM AB INTERP: NEGATIVE — SIGNIFICANT CHANGE UP
COVID-19 NUCLEOCAPSID TOTAL GAM ANTIBODY RESULT: 0.08 INDEX — SIGNIFICANT CHANGE UP
COVID-19 SPIKE DOMAIN AB INTERP: POSITIVE
COVID-19 SPIKE DOMAIN ANTIBODY RESULT: >250 U/ML — HIGH
CREAT SERPL-MCNC: 1.27 MG/DL — SIGNIFICANT CHANGE UP (ref 0.5–1.3)
EOSINOPHIL # BLD AUTO: 0.02 K/UL — SIGNIFICANT CHANGE UP (ref 0–0.5)
EOSINOPHIL NFR BLD AUTO: 0.5 % — SIGNIFICANT CHANGE UP (ref 0–6)
ESTIMATED AVERAGE GLUCOSE: 128 MG/DL — HIGH (ref 68–114)
GLUCOSE BLDC GLUCOMTR-MCNC: 126 MG/DL — HIGH (ref 70–99)
GLUCOSE SERPL-MCNC: 99 MG/DL — SIGNIFICANT CHANGE UP (ref 70–99)
HCT VFR BLD CALC: 40.4 % — SIGNIFICANT CHANGE UP (ref 39–50)
HDLC SERPL-MCNC: 43 MG/DL — SIGNIFICANT CHANGE UP
HGB BLD-MCNC: 13.2 G/DL — SIGNIFICANT CHANGE UP (ref 13–17)
IMM GRANULOCYTES NFR BLD AUTO: 0.3 % — SIGNIFICANT CHANGE UP (ref 0–1.5)
LIPID PNL WITH DIRECT LDL SERPL: 78 MG/DL — SIGNIFICANT CHANGE UP
LYMPHOCYTES # BLD AUTO: 1.14 K/UL — SIGNIFICANT CHANGE UP (ref 1–3.3)
LYMPHOCYTES # BLD AUTO: 28.6 % — SIGNIFICANT CHANGE UP (ref 13–44)
MAGNESIUM SERPL-MCNC: 2.3 MG/DL — SIGNIFICANT CHANGE UP (ref 1.6–2.6)
MCHC RBC-ENTMCNC: 28.3 PG — SIGNIFICANT CHANGE UP (ref 27–34)
MCHC RBC-ENTMCNC: 32.7 GM/DL — SIGNIFICANT CHANGE UP (ref 32–36)
MCV RBC AUTO: 86.7 FL — SIGNIFICANT CHANGE UP (ref 80–100)
MONOCYTES # BLD AUTO: 0.45 K/UL — SIGNIFICANT CHANGE UP (ref 0–0.9)
MONOCYTES NFR BLD AUTO: 11.3 % — SIGNIFICANT CHANGE UP (ref 2–14)
NEUTROPHILS # BLD AUTO: 2.36 K/UL — SIGNIFICANT CHANGE UP (ref 1.8–7.4)
NEUTROPHILS NFR BLD AUTO: 59 % — SIGNIFICANT CHANGE UP (ref 43–77)
NON HDL CHOLESTEROL: 89 MG/DL — SIGNIFICANT CHANGE UP
NRBC # BLD: 0 /100 WBCS — SIGNIFICANT CHANGE UP (ref 0–0)
PLATELET # BLD AUTO: 85 K/UL — LOW (ref 150–400)
POTASSIUM SERPL-MCNC: 3.9 MMOL/L — SIGNIFICANT CHANGE UP (ref 3.5–5.3)
POTASSIUM SERPL-SCNC: 3.9 MMOL/L — SIGNIFICANT CHANGE UP (ref 3.5–5.3)
RBC # BLD: 4.66 M/UL — SIGNIFICANT CHANGE UP (ref 4.2–5.8)
RBC # FLD: 18.1 % — HIGH (ref 10.3–14.5)
SARS-COV-2 IGG+IGM SERPL QL IA: 0.08 INDEX — SIGNIFICANT CHANGE UP
SARS-COV-2 IGG+IGM SERPL QL IA: >250 U/ML — HIGH
SARS-COV-2 IGG+IGM SERPL QL IA: NEGATIVE — SIGNIFICANT CHANGE UP
SARS-COV-2 IGG+IGM SERPL QL IA: POSITIVE
SODIUM SERPL-SCNC: 145 MMOL/L — SIGNIFICANT CHANGE UP (ref 135–145)
TRIGL SERPL-MCNC: 56 MG/DL — SIGNIFICANT CHANGE UP
TROPONIN T SERPL-MCNC: 0.11 NG/ML — CRITICAL HIGH (ref 0–0.01)
WBC # BLD: 3.99 K/UL — SIGNIFICANT CHANGE UP (ref 3.8–10.5)
WBC # FLD AUTO: 3.99 K/UL — SIGNIFICANT CHANGE UP (ref 3.8–10.5)

## 2021-11-20 PROCEDURE — 71045 X-RAY EXAM CHEST 1 VIEW: CPT | Mod: 26

## 2021-11-20 RX ORDER — TAMSULOSIN HYDROCHLORIDE 0.4 MG/1
0.4 CAPSULE ORAL AT BEDTIME
Refills: 0 | Status: DISCONTINUED | OUTPATIENT
Start: 2021-11-20 | End: 2021-11-24

## 2021-11-20 RX ORDER — METOPROLOL TARTRATE 50 MG
25 TABLET ORAL DAILY
Refills: 0 | Status: DISCONTINUED | OUTPATIENT
Start: 2021-11-20 | End: 2021-11-21

## 2021-11-20 RX ORDER — INSULIN LISPRO 100/ML
VIAL (ML) SUBCUTANEOUS
Refills: 0 | Status: DISCONTINUED | OUTPATIENT
Start: 2021-11-20 | End: 2021-11-24

## 2021-11-20 RX ORDER — SACUBITRIL AND VALSARTAN 24; 26 MG/1; MG/1
1 TABLET, FILM COATED ORAL
Refills: 0 | Status: DISCONTINUED | OUTPATIENT
Start: 2021-11-20 | End: 2021-11-21

## 2021-11-20 RX ORDER — SODIUM CHLORIDE 9 MG/ML
1000 INJECTION, SOLUTION INTRAVENOUS
Refills: 0 | Status: DISCONTINUED | OUTPATIENT
Start: 2021-11-20 | End: 2021-11-24

## 2021-11-20 RX ORDER — DEXTROSE 50 % IN WATER 50 %
12.5 SYRINGE (ML) INTRAVENOUS ONCE
Refills: 0 | Status: DISCONTINUED | OUTPATIENT
Start: 2021-11-20 | End: 2021-11-24

## 2021-11-20 RX ORDER — DEXTROSE 50 % IN WATER 50 %
25 SYRINGE (ML) INTRAVENOUS ONCE
Refills: 0 | Status: DISCONTINUED | OUTPATIENT
Start: 2021-11-20 | End: 2021-11-24

## 2021-11-20 RX ORDER — DEXTROSE 50 % IN WATER 50 %
15 SYRINGE (ML) INTRAVENOUS ONCE
Refills: 0 | Status: DISCONTINUED | OUTPATIENT
Start: 2021-11-20 | End: 2021-11-24

## 2021-11-20 RX ORDER — METOPROLOL TARTRATE 50 MG
25 TABLET ORAL DAILY
Refills: 0 | Status: DISCONTINUED | OUTPATIENT
Start: 2021-11-20 | End: 2021-11-20

## 2021-11-20 RX ORDER — GLUCAGON INJECTION, SOLUTION 0.5 MG/.1ML
1 INJECTION, SOLUTION SUBCUTANEOUS ONCE
Refills: 0 | Status: DISCONTINUED | OUTPATIENT
Start: 2021-11-20 | End: 2021-11-24

## 2021-11-20 RX ADMIN — Medication 80 MILLIGRAM(S): at 06:54

## 2021-11-20 RX ADMIN — Medication 1 PATCH: at 04:00

## 2021-11-20 RX ADMIN — Medication 80 MILLIGRAM(S): at 17:29

## 2021-11-20 RX ADMIN — TAMSULOSIN HYDROCHLORIDE 0.4 MILLIGRAM(S): 0.4 CAPSULE ORAL at 22:07

## 2021-11-20 RX ADMIN — SACUBITRIL AND VALSARTAN 1 TABLET(S): 24; 26 TABLET, FILM COATED ORAL at 17:30

## 2021-11-20 NOTE — PROGRESS NOTE ADULT - SUBJECTIVE AND OBJECTIVE BOX
INTERVAL HPI/OVERNIGHT EVENTS: NAOE      - physical exam significant for 2-3+ pitting edema up to the thighs, ascites, and rales throughout lung fields         VITAL SIGNS:  T(F): 98.3 (21 @ 17:31)  HR: 62 (21 @ 16:14)  BP: 128/89 (21 @ 16:14)  RR: 14 (21 @ 16:14)  SpO2: 93% (21 @ 16:14)  Wt(kg): --    PHYSICAL EXAM:    Constitutional: NAD, well-groomed, well-developed  HEENT: PERRLA, EOMI, Normal Hearing, MMM  Neck: No LAD, No JVD  Back: Normal spine flexure, No CVA tenderness  Respiratory: CTAB   Cardiovascular: S1 and S2, RRR, no M/G/R  Gastrointestinal: BS+, soft, NT/ND  Extremities: No peripheral edema  Vascular: 2+ peripheral pulses  Neurological: A/O x 3, no focal deficits  Psychiatric: Normal mood, normal affect  Musculoskeletal: 5/5 strength b/l upper and lower extremities  Skin: No rashes      MEDICATIONS  (STANDING):  furosemide   Injectable 80 milliGRAM(s) IV Push two times a day  metolazone 5 milliGRAM(s) Oral daily  metoprolol succinate ER 25 milliGRAM(s) Oral daily  sacubitril 24 mG/valsartan 26 mG 1 Tablet(s) Oral two times a day  tamsulosin 0.4 milliGRAM(s) Oral at bedtime    MEDICATIONS  (PRN):      Allergies    No Known Allergies    Intolerances        LABS:                        13.2   3.99  )-----------( 85       ( 2021 06:32 )             40.4         145  |  108  |  25<H>  ----------------------------<  99  3.9   |  28  |  1.27    Ca    9.3      2021 06:32  Mg     2.3         TPro  7.0  /  Alb  4.0  /  TBili  1.8<H>  /  DBili  x   /  AST  17  /  ALT  23  /  AlkPhos  151<H>      PTT - ( 2021 06:32 )  PTT:31.1 sec  Urinalysis Basic - ( 2021 18:46 )    Color: Yellow / Appearance: Clear / S.015 / pH: x  Gluc: x / Ketone: NEGATIVE  / Bili: Negative / Urobili: 0.2 E.U./dL   Blood: x / Protein: NEGATIVE mg/dL / Nitrite: NEGATIVE   Leuk Esterase: NEGATIVE / RBC: x / WBC x   Sq Epi: x / Non Sq Epi: x / Bacteria: x        RADIOLOGY & ADDITIONAL TESTS:     INTERVAL HPI/OVERNIGHT EVENTS: JACIEL    VITAL SIGNS:  T(F): 98.3 (21 @ 17:31)  HR: 62 (21 @ 16:14)  BP: 128/89 (21 @ 16:14)  RR: 14 (21 @ 16:14)  SpO2: 93% (21 @ 16:14)  Wt(kg): --    PHYSICAL EXAM:    Constitutional: NAD, well-groomed, well-developed  HEENT: PERRLA, EOMI, Normal Hearing, MMM  Respiratory: rales throughout lung fields   Cardiovascular: S1 and S2, RRR  Gastrointestinal: BS+, soft, NT/ND  Extremities: 2-3+ pitting edema up to the thighs, b/l ace wraps  Neurological: A/O x 3, no focal deficits  Psychiatric: Normal mood, normal affect  Musculoskeletal: 5/5 strength b/l upper and lower extremities  Skin: No rashes      MEDICATIONS  (STANDING):  furosemide   Injectable 80 milliGRAM(s) IV Push two times a day  metolazone 5 milliGRAM(s) Oral daily  metoprolol succinate ER 25 milliGRAM(s) Oral daily  sacubitril 24 mG/valsartan 26 mG 1 Tablet(s) Oral two times a day  tamsulosin 0.4 milliGRAM(s) Oral at bedtime    MEDICATIONS  (PRN):      Allergies    No Known Allergies    Intolerances        LABS:                        13.2   3.99  )-----------( 85       ( 2021 06:32 )             40.4     -    145  |  108  |  25<H>  ----------------------------<  99  3.9   |  28  |  1.27    Ca    9.3      2021 06:32  Mg     2.3         TPro  7.0  /  Alb  4.0  /  TBili  1.8<H>  /  DBili  x   /  AST  17  /  ALT  23  /  AlkPhos  151<H>  11-    PTT - ( 2021 06:32 )  PTT:31.1 sec  Urinalysis Basic - ( 2021 18:46 )    Color: Yellow / Appearance: Clear / S.015 / pH: x  Gluc: x / Ketone: NEGATIVE  / Bili: Negative / Urobili: 0.2 E.U./dL   Blood: x / Protein: NEGATIVE mg/dL / Nitrite: NEGATIVE   Leuk Esterase: NEGATIVE / RBC: x / WBC x   Sq Epi: x / Non Sq Epi: x / Bacteria: x        RADIOLOGY & ADDITIONAL TESTS:

## 2021-11-20 NOTE — PROGRESS NOTE ADULT - PROBLEM SELECTOR PLAN 3
Known history of A-flutter/A-Fib w/ prior ablation in 2016  - was previously on Eliquis but was discontinued due to thrombocytopenia as per patient   - EKG showed rate controlled A-fib w/o acute ischemic changes  - As per hematology, platelets over 50 for which AC can be started  - Will proceed to hold AC in the setting of possible bone marrow biopsy on Monday  - c/w w rate control w metoprolol succinate 25 mg daily

## 2021-11-20 NOTE — PROGRESS NOTE ADULT - PROBLEM SELECTOR PLAN 1
Presented w/ worsening LE edema for two weeks and worsening SOB w/ minimal exertion for one week. Not compliant w home medications: Lasix 40 mg daily, Metolazone 5 mg daily, Entresto 49/51 mg daily and Toprol XL 25 mg daily   - HFmrEF w/ EF 45-50% by outpatient Echocardiogram from 05/2021   - BNP 12K and trop 0.12 (likely in the setting of CHF)  - EKG showed atrial fibrillation at 63 bpm w/o acute ischemic changes   - Chest X-ray showed significant pulmonary congestion     Plan  - core measures, strict I's and O's, daily weight, fluid restriction   - c/w Lasix 80 mg IV BID at this time   - c/w Metolazone 5 mg daily   - c/w Toprol XL 25 mg daily   - started on Entresto 24/26 daily  - F/u Echocardiogram  - ACE wrap legs  - c/w daily UA, CMP  - Consulted Hematology for r/o amyloidosis        - As per Hematology, will schedule for possible BM biopsy on Monday, declined as an outpatient

## 2021-11-20 NOTE — PROGRESS NOTE ADULT - PROBLEM SELECTOR PLAN 6
Home medication: Tamsulosin 0.4 mg daily   - continue home medication       VTE PPX: Holding in the setting of thrombocytopenia and possible BM biopsy   Dispo: 5 [place]  Code: Full code

## 2021-11-20 NOTE — PROGRESS NOTE ADULT - PROBLEM SELECTOR PLAN 4
Platelets 89 on admission, baseline from prior admission 60-70's  - monitor CBC  - Will proceed to hold AC in the setting of possible bone marrow biopsy on Monday

## 2021-11-20 NOTE — PROGRESS NOTE ADULT - ASSESSMENT
75 y/o male, prior history of medication non-compliance, w/ PMHx type II DM (not currently on medications), A-Flutter/A-Fib (s/p prior ablation in 06/2016, most recently found to be in A-fib in 05/2021, previously on Eliquis but was discontinued), thrombocytopenia (baseline Plt 60-70's on prior admission), stage III CKD (baseline Cr 1.0-1.2), non-obstructive CAD, HFmrEF (EF 45-50% by outpatient Echocardiogram from 05/2021), BPH (s/p TURP procedure), pituitary adenoma (s/p transphenoidal treatment in 1990's), prostate cancer (s/p chemotherapy), chronic venous insufficiency, and prior admission from 06/28/2021 to 07/02/2021 for CHF exacerbation who presented for worsening SOB and LE edema for the past 1-2 weeks, followed up w/ Dr. Anton and was instructed to present to the ED, and admits to not taking his home medication of Lasix as regularly as he should. Of note, patient had previously been on Metformin, transitioned to Jardiance, and now on no home diabetes medications but states he has noticed increased thirst and dry mouth during the evening. In ED, VS initially showed temp 97.4 F, HR 65 bpm, /92 mmHg, SPO2 98% on room air, and RR 16. EKG showed atrial fibrillation at 63 bpm w/o acute ischemic changes. Labs significant for Plt 89, BUN/Cr 20/1.32, troponin 0.12, BNP 52216. Chest X-ray showed significant pulmonary congestion. COVID PCR pending. In ED, patient was provided w/ Nitroglycerin 0.1 mg/hour patch and Lasix 80 mg IV once. Patient is now admitted to cardiology/telemetry for further management of acute on chronic heart failure exacerbation.

## 2021-11-20 NOTE — PROGRESS NOTE ADULT - PROBLEM SELECTOR PLAN 2
Previously on Metformin, transitioned to Jardiance, and now discontinued from both and on now home medications   - reports recently experiencing increased thirst at night   - A1c 6.1  - moderate ISS

## 2021-11-21 LAB
ALBUMIN SERPL ELPH-MCNC: 3.1 G/DL — LOW (ref 3.3–5)
ALP SERPL-CCNC: 113 U/L — SIGNIFICANT CHANGE UP (ref 40–120)
ALT FLD-CCNC: 18 U/L — SIGNIFICANT CHANGE UP (ref 10–45)
ANION GAP SERPL CALC-SCNC: 9 MMOL/L — SIGNIFICANT CHANGE UP (ref 5–17)
APPEARANCE UR: CLEAR — SIGNIFICANT CHANGE UP
AST SERPL-CCNC: 14 U/L — SIGNIFICANT CHANGE UP (ref 10–40)
BILIRUB SERPL-MCNC: 1.3 MG/DL — HIGH (ref 0.2–1.2)
BILIRUB UR-MCNC: NEGATIVE — SIGNIFICANT CHANGE UP
BUN SERPL-MCNC: 26 MG/DL — HIGH (ref 7–23)
CALCIUM SERPL-MCNC: 8.7 MG/DL — SIGNIFICANT CHANGE UP (ref 8.4–10.5)
CHLORIDE SERPL-SCNC: 104 MMOL/L — SIGNIFICANT CHANGE UP (ref 96–108)
CO2 SERPL-SCNC: 29 MMOL/L — SIGNIFICANT CHANGE UP (ref 22–31)
COLOR SPEC: YELLOW — SIGNIFICANT CHANGE UP
CREAT SERPL-MCNC: 1.18 MG/DL — SIGNIFICANT CHANGE UP (ref 0.5–1.3)
DIFF PNL FLD: NEGATIVE — SIGNIFICANT CHANGE UP
GLUCOSE BLDC GLUCOMTR-MCNC: 137 MG/DL — HIGH (ref 70–99)
GLUCOSE BLDC GLUCOMTR-MCNC: 140 MG/DL — HIGH (ref 70–99)
GLUCOSE BLDC GLUCOMTR-MCNC: 168 MG/DL — HIGH (ref 70–99)
GLUCOSE BLDC GLUCOMTR-MCNC: 81 MG/DL — SIGNIFICANT CHANGE UP (ref 70–99)
GLUCOSE SERPL-MCNC: 178 MG/DL — HIGH (ref 70–99)
GLUCOSE UR QL: NEGATIVE — SIGNIFICANT CHANGE UP
HCT VFR BLD CALC: 38.5 % — LOW (ref 39–50)
HGB BLD-MCNC: 12.9 G/DL — LOW (ref 13–17)
KETONES UR-MCNC: NEGATIVE — SIGNIFICANT CHANGE UP
LEUKOCYTE ESTERASE UR-ACNC: NEGATIVE — SIGNIFICANT CHANGE UP
MAGNESIUM SERPL-MCNC: 2 MG/DL — SIGNIFICANT CHANGE UP (ref 1.6–2.6)
MCHC RBC-ENTMCNC: 28.9 PG — SIGNIFICANT CHANGE UP (ref 27–34)
MCHC RBC-ENTMCNC: 33.5 GM/DL — SIGNIFICANT CHANGE UP (ref 32–36)
MCV RBC AUTO: 86.1 FL — SIGNIFICANT CHANGE UP (ref 80–100)
NITRITE UR-MCNC: NEGATIVE — SIGNIFICANT CHANGE UP
NRBC # BLD: 0 /100 WBCS — SIGNIFICANT CHANGE UP (ref 0–0)
PH UR: 6 — SIGNIFICANT CHANGE UP (ref 5–8)
PHOSPHATE SERPL-MCNC: 3.4 MG/DL — SIGNIFICANT CHANGE UP (ref 2.5–4.5)
PLATELET # BLD AUTO: 89 K/UL — LOW (ref 150–400)
POTASSIUM SERPL-MCNC: 3.3 MMOL/L — LOW (ref 3.5–5.3)
POTASSIUM SERPL-SCNC: 3.3 MMOL/L — LOW (ref 3.5–5.3)
PROT SERPL-MCNC: 5.6 G/DL — LOW (ref 6–8.3)
PROT UR-MCNC: NEGATIVE MG/DL — SIGNIFICANT CHANGE UP
RBC # BLD: 4.47 M/UL — SIGNIFICANT CHANGE UP (ref 4.2–5.8)
RBC # FLD: 17.7 % — HIGH (ref 10.3–14.5)
SODIUM SERPL-SCNC: 142 MMOL/L — SIGNIFICANT CHANGE UP (ref 135–145)
SP GR SPEC: 1.01 — SIGNIFICANT CHANGE UP (ref 1–1.03)
UROBILINOGEN FLD QL: 1 E.U./DL — SIGNIFICANT CHANGE UP
WBC # BLD: 3.44 K/UL — LOW (ref 3.8–10.5)
WBC # FLD AUTO: 3.44 K/UL — LOW (ref 3.8–10.5)

## 2021-11-21 RX ORDER — POTASSIUM CHLORIDE 20 MEQ
40 PACKET (EA) ORAL ONCE
Refills: 0 | Status: COMPLETED | OUTPATIENT
Start: 2021-11-21 | End: 2021-11-21

## 2021-11-21 RX ORDER — SODIUM CHLORIDE 9 MG/ML
250 INJECTION INTRAMUSCULAR; INTRAVENOUS; SUBCUTANEOUS
Refills: 0 | Status: DISCONTINUED | OUTPATIENT
Start: 2021-11-21 | End: 2021-11-22

## 2021-11-21 RX ADMIN — SODIUM CHLORIDE 65 MILLILITER(S): 9 INJECTION INTRAMUSCULAR; INTRAVENOUS; SUBCUTANEOUS at 10:48

## 2021-11-21 RX ADMIN — Medication 80 MILLIGRAM(S): at 06:00

## 2021-11-21 RX ADMIN — SACUBITRIL AND VALSARTAN 1 TABLET(S): 24; 26 TABLET, FILM COATED ORAL at 05:03

## 2021-11-21 RX ADMIN — Medication 40 MILLIEQUIVALENT(S): at 13:45

## 2021-11-21 RX ADMIN — Medication 2: at 21:45

## 2021-11-21 RX ADMIN — Medication 25 MILLIGRAM(S): at 05:03

## 2021-11-21 RX ADMIN — TAMSULOSIN HYDROCHLORIDE 0.4 MILLIGRAM(S): 0.4 CAPSULE ORAL at 21:45

## 2021-11-21 NOTE — PHYSICAL THERAPY INITIAL EVALUATION ADULT - PERTINENT HX OF CURRENT PROBLEM, REHAB EVAL
76M presented to [facility] ED from Dr. Anton office due to worsening SOB and LE edema. Patient reports he has noticed increased LE edema for the last two weeks and worsening SOB w/ minimal exertion for the last week.

## 2021-11-21 NOTE — PROGRESS NOTE ADULT - PROBLEM SELECTOR PLAN 1
Presented w/ worsening LE edema for two weeks and worsening SOB w/ minimal exertion for one week. Not compliant w home medications: Lasix 40 mg daily, Metolazone 5 mg daily, Entresto 49/51 mg daily and Toprol XL 25 mg daily   - HFrEF w/ EF 45-50% by outpatient Echocardiogram from 05/2021   - BNP 12K and trop 0.12 (likely in the setting of CHF)  - EKG showed atrial fibrillation at 63 bpm w/o acute ischemic changes   - Chest X-ray showed significant pulmonary congestion     Plan  - core measures, strict I's and O's, daily weight, fluid restriction   - c/w Lasix 80 mg IV BID at this time   - c/w Metolazone 5 mg daily   - c/w Toprol XL 25 mg daily   - c/w Entresto 24/26 daily  - F/u Echocardiogram  - ACE wrap legs  - c/w daily UA, CMP  - Consulted Hematology for r/o amyloidosis. Plan for BM biopsy Monday 11/22 (declined as outpt)

## 2021-11-21 NOTE — PROGRESS NOTE ADULT - ASSESSMENT
77 yo M with hx as above being seen for cytopenias.    Cytopenias- evaluated in office back in July 2021.  No follow up due to cancellations until 10/22/21.  Initial extensive work up was inconclusive as to the cause of low counts.  CBC did remain stable btw July and Oct 2021.   A bone marrow was recommended however pt declined and opted for close observation.   Amyloidosis is in question given cardiac dysfunction and heme findings.   My prior work up for light chain amyloidosis was neg but other forms of amyloid will have to be ruled out. This will require more invasive testing.   Case was discussed with cardiology.  Will push for a bone marrow biopsy hopefully on Monday 11/22/21 if pt agrees. If bone marrow is neg for amyloid he may need a fat pad or cardiac biopsy.  Discussed the bone marrow biopsy with pt again today. He remains hesitant and would like to speak with Dr. Anton before committing to the procedure.   AC also discussed. If plt count remains above 50 blood thinners for the A-fib may be resumed.   Will follow with you.   Thank you.    Anjelica Bernal MD  [phone]

## 2021-11-21 NOTE — PROGRESS NOTE ADULT - SUBJECTIVE AND OBJECTIVE BOX
OVERNIGHT EVENTS: RE     SUBJECTIVE / INTERVAL HPI: Patient seen and examined at bedside. Reports feeling much better than when he presented to the hospital. He is aware that a bone marrow biopsy will be obtained tomorrow. Denies fevers, chills, HA, chest pain, sob, nausea, vomiting, abdominal pain, diarrhea, constipation, dysuria.      VITAL SIGNS:  Vital Signs Last 24 Hrs  T(C): 36.6 (2021 04:30), Max: 36.9 (2021 21:12)  T(F): 97.8 (2021 04:30), Max: 98.4 (2021 21:12)  HR: 64 (2021 06:00) (59 - 65)  BP: 111/63 (2021 06:00) (106/69 - 146/107)  BP(mean): 81 (2021 06:00) (81 - 120)  RR: 16 (2021 06:00) (14 - 18)  SpO2: 97% (2021 06:00) (93% - 98%)    PHYSICAL EXAM:    General: Appears stated age, NAD   HEENT: NC/AT; PERRL, anicteric sclera; MMM  Neck: supple  Cardiovascular: +S1/S2; sinus skip, regular rhythm   Respiratory: bibasilar crackles   Gastrointestinal: soft, NT/ND; +BSx4  Extremities: WWP; 1+ pitting edeam LEs b/l; LE wrapped in compression stockings b/l   Vascular: 2+ radial, DP/PT pulses B/L  Neurological: AAOx3; no focal deficits    MEDICATIONS:  MEDICATIONS  (STANDING):  dextrose 40% Gel 15 Gram(s) Oral once  dextrose 5%. 1000 milliLiter(s) (50 mL/Hr) IV Continuous <Continuous>  dextrose 5%. 1000 milliLiter(s) (100 mL/Hr) IV Continuous <Continuous>  dextrose 50% Injectable 25 Gram(s) IV Push once  dextrose 50% Injectable 12.5 Gram(s) IV Push once  dextrose 50% Injectable 25 Gram(s) IV Push once  furosemide   Injectable 80 milliGRAM(s) IV Push two times a day  glucagon  Injectable 1 milliGRAM(s) IntraMuscular once  insulin lispro (ADMELOG) corrective regimen sliding scale   SubCutaneous Before meals and at bedtime  metolazone 5 milliGRAM(s) Oral daily  metoprolol succinate ER 25 milliGRAM(s) Oral daily  sacubitril 24 mG/valsartan 26 mG 1 Tablet(s) Oral two times a day  tamsulosin 0.4 milliGRAM(s) Oral at bedtime    MEDICATIONS  (PRN):      ALLERGIES:  Allergies    No Known Allergies    Intolerances        LABS:                        13.2   3.99  )-----------( 85       ( 2021 06:32 )             40.4     11    145  |  108  |  25<H>  ----------------------------<  99  3.9   |  28  |  1.27    Ca    9.3      2021 06:32  Mg     2.3         TPro  7.0  /  Alb  4.0  /  TBili  1.8<H>  /  DBili  x   /  AST  17  /  ALT  23  /  AlkPhos  151<H>  11-    PTT - ( 2021 06:32 )  PTT:31.1 sec  Urinalysis Basic - ( 2021 18:46 )    Color: Yellow / Appearance: Clear / S.015 / pH: x  Gluc: x / Ketone: NEGATIVE  / Bili: Negative / Urobili: 0.2 E.U./dL   Blood: x / Protein: NEGATIVE mg/dL / Nitrite: NEGATIVE   Leuk Esterase: NEGATIVE / RBC: x / WBC x   Sq Epi: x / Non Sq Epi: x / Bacteria: x      CAPILLARY BLOOD GLUCOSE      POCT Blood Glucose.: 81 mg/dL (2021 06:28)      RADIOLOGY & ADDITIONAL TESTS: Reviewed.

## 2021-11-21 NOTE — PHYSICAL THERAPY INITIAL EVALUATION ADULT - GENERAL OBSERVATIONS, REHAB EVAL
Received supine denies pain +EKG, IV hep. left as found +hypotensive, asymptomatic, RN José Miguel aware

## 2021-11-21 NOTE — PHYSICAL THERAPY INITIAL EVALUATION ADULT - GAIT DEVIATIONS NOTED, PT EVAL
steady, no LOB, no SOB, further ambulation not appropriate 2/2 hypotensive, see flow sheet, asymptomatic

## 2021-11-21 NOTE — PROGRESS NOTE ADULT - SUBJECTIVE AND OBJECTIVE BOX
Interval history:  No new complaints.   Persistent leg swelling.     77 y/o male, prior history of medication non-compliance, w/ PMHx type II DM (not currently on medications), A-Flutter/A-Fib (s/p prior ablation in 06/2016, most recently found to be in A-fib in 05/2021, previously on Eliquis but was discontinued), thrombocytopenia (baseline Plt 60-70's on prior admission), stage III CKD (baseline Cr 1.0-1.2), non-obstructive CAD, HFmrEF (EF 45-50% by outpatient Echocardiogram from 05/2021), BPH (s/p TURP procedure), pituitary adenoma (s/p transphenoidal treatment in 1990's), prostate cancer (s/p chemotherapy), chronic venous insufficiency, and prior admission from 06/28/2021 to 07/02/2021 for CHF exacerbation who presented to [facility] ED from Dr. Anton office due to worsening SOB and LE edema. Patient reports he has noticed increased LE edema for the last two weeks and worsening SOB w/ minimal exertion for the last week. Patient stated he followed up w/ Dr. Anton today and was directed to come to the ED. Of note, patient also admits to not taking his home medication of Lasix 40 mg daily on a regular basis.    PAST MEDICAL HISTORY:  Atrial flutter s/p Ablation 2016  Chronic venous insufficiency of lower extremity   Heart failure with mid-range ejection fraction   Prostate CA   Stage 3 chronic kidney disease   Thrombocytopenia   Type 2 diabetes mellitus.     PAST SURGICAL HISTORY:  Atrial flutter s/p ablation in 2016  History of surgical removal of pituitary gland 1990s  S/P TURP for BPH.    SOCIAL HISTORY:  No drugs, no EtOH abuse    FAMILY HISTORY:  Not pertinent at this time    Home Medications:  ammonium lactate 12% topical cream: Apply topically to affected area 2 times a day (19 Nov 2021 18:36)  ibuprofen 600 mg oral tablet: 1 tab(s) orally every 6 hours, As Needed (19 Nov 2021 18:36)  lactulose 10 g/15 mL oral solution: 15 milliliter(s) orally once a day (at bedtime) (19 Nov 2021 18:36)  Lasix 40 mg oral tablet: 1 tab(s) orally once a day (19 Nov 2021 18:36)  metOLazone 5 mg oral tablet: 1 tab(s) orally once a day (19 Nov 2021 18:36)  potassium chloride 10 mEq oral capsule, extended release: 1 cap(s) orally once a day (19 Nov 2021 18:36)  tamsulosin 0.4 mg oral capsule: 1 cap(s) orally once a day (19 Nov 2021 18:36)  Toprol-XL 25 mg oral tablet, extended release: 1 tab(s) orally once a day (19 Nov 2021 18:36)    Allergies  No Known Allergies    Vital Signs Last 24 Hrs  T(C): 36.8 (21 Nov 2021 09:29), Max: 36.9 (20 Nov 2021 21:12)  T(F): 98.3 (21 Nov 2021 09:29), Max: 98.4 (20 Nov 2021 21:12)  HR: 57 (21 Nov 2021 13:21) (51 - 64)  BP: 82/52 (21 Nov 2021 13:21) (73/56 - 136/86)  BP(mean): 62 (21 Nov 2021 13:21) (61 - 107)  RR: 16 (21 Nov 2021 11:48) (14 - 17)  SpO2: 97% (21 Nov 2021 13:21) (82% - 98%)    AAOx3  NAD  Bibasilar crackles  Irregular rhythm  +BS, Soft, NT, no organomegaly  +Pulses, equal, bilateral LE edema    Labs: reviewed    Imaging: reviewed

## 2021-11-21 NOTE — PROGRESS NOTE ADULT - PROBLEM SELECTOR PLAN 3
Known history of A-flutter/A-Fib w/ prior ablation in 2016  - was previously on Eliquis but was discontinued due to thrombocytopenia as per patient   - EKG showed rate controlled A-fib w/o acute ischemic changes  - As per hematology, if platelets > 50k AC can be restarted  - Will proceed to hold AC in the setting of possible bone marrow biopsy on Monday  - c/w metoprolol succinate 25 mg daily for rate control

## 2021-11-21 NOTE — PHYSICAL THERAPY INITIAL EVALUATION ADULT - ADDITIONAL COMMENTS
lost straight cane on way to hospital, no falls in past year, apartment, elevator, however elevator frequently broken, on 3rd floor, independent prior to arrival

## 2021-11-21 NOTE — PROGRESS NOTE ADULT - PROBLEM SELECTOR PLAN 2
Previously on Metformin, transitioned to Jardiance, and now discontinued from both and on now home medications   - A1c 6.1  - moderate ISS  - monitor FSG

## 2021-11-21 NOTE — PROGRESS NOTE ADULT - ASSESSMENT
75 y/o male, prior history of medication non-compliance, w/ PMHx type II DM (not currently on medications), A-Flutter/A-Fib (s/p prior ablation in 06/2016, most recently found to be in A-fib in 05/2021, previously on Eliquis but was discontinued), thrombocytopenia (baseline Plt 60-70's on prior admission), stage III CKD (baseline Cr 1.0-1.2), non-obstructive CAD, HFmrEF (EF 45-50% by outpatient Echocardiogram from 05/2021), BPH (s/p TURP procedure), pituitary adenoma (s/p transphenoidal treatment in 1990's), prostate cancer (s/p chemotherapy), chronic venous insufficiency, and prior admission from 06/28/2021 to 07/02/2021 for CHF exacerbation who presented for worsening SOB and LE edema for the past 1-2 weeks, followed up w/ Dr. Anton and was instructed to present to the ED, and admits to not taking his home medication of Lasix as regularly as he should. Of note, patient had previously been on Metformin, transitioned to Jardiance, and now on no home diabetes medications but states he has noticed increased thirst and dry mouth during the evening. In ED, VS initially showed temp 97.4 F, HR 65 bpm, /92 mmHg, SPO2 98% on room air, and RR 16. EKG showed atrial fibrillation at 63 bpm w/o acute ischemic changes. Labs significant for Plt 89, BUN/Cr 20/1.32, troponin 0.12, BNP 13268. Chest X-ray showed significant pulmonary congestion. COVID PCR pending. In ED, patient was provided w/ Nitroglycerin 0.1 mg/hour patch and Lasix 80 mg IV once. Patient is now admitted to cardiology/telemetry for further management of acute on chronic heart failure exacerbation.

## 2021-11-22 DIAGNOSIS — R63.8 OTHER SYMPTOMS AND SIGNS CONCERNING FOOD AND FLUID INTAKE: ICD-10-CM

## 2021-11-22 LAB
ALBUMIN SERPL ELPH-MCNC: 2.6 G/DL — LOW (ref 3.3–5)
ALBUMIN SERPL ELPH-MCNC: 2.8 G/DL — LOW (ref 3.3–5)
ALP SERPL-CCNC: 105 U/L — SIGNIFICANT CHANGE UP (ref 40–120)
ALP SERPL-CCNC: 114 U/L — SIGNIFICANT CHANGE UP (ref 40–120)
ALT FLD-CCNC: 16 U/L — SIGNIFICANT CHANGE UP (ref 10–45)
ALT FLD-CCNC: 18 U/L — SIGNIFICANT CHANGE UP (ref 10–45)
ANION GAP SERPL CALC-SCNC: 8 MMOL/L — SIGNIFICANT CHANGE UP (ref 5–17)
ANION GAP SERPL CALC-SCNC: 9 MMOL/L — SIGNIFICANT CHANGE UP (ref 5–17)
AST SERPL-CCNC: 14 U/L — SIGNIFICANT CHANGE UP (ref 10–40)
AST SERPL-CCNC: 16 U/L — SIGNIFICANT CHANGE UP (ref 10–40)
BASE EXCESS BLDA CALC-SCNC: 5.8 MMOL/L — HIGH (ref -2–3)
BASOPHILS # BLD AUTO: 0.02 K/UL — SIGNIFICANT CHANGE UP (ref 0–0.2)
BASOPHILS NFR BLD AUTO: 0.5 % — SIGNIFICANT CHANGE UP (ref 0–2)
BILIRUB SERPL-MCNC: 1.2 MG/DL — SIGNIFICANT CHANGE UP (ref 0.2–1.2)
BILIRUB SERPL-MCNC: 1.2 MG/DL — SIGNIFICANT CHANGE UP (ref 0.2–1.2)
BUN SERPL-MCNC: 27 MG/DL — HIGH (ref 7–23)
BUN SERPL-MCNC: 30 MG/DL — HIGH (ref 7–23)
CALCIUM SERPL-MCNC: 8.3 MG/DL — LOW (ref 8.4–10.5)
CALCIUM SERPL-MCNC: 8.4 MG/DL — SIGNIFICANT CHANGE UP (ref 8.4–10.5)
CHLORIDE SERPL-SCNC: 106 MMOL/L — SIGNIFICANT CHANGE UP (ref 96–108)
CHLORIDE SERPL-SCNC: 108 MMOL/L — SIGNIFICANT CHANGE UP (ref 96–108)
CK MB CFR SERPL CALC: 2.3 NG/ML — SIGNIFICANT CHANGE UP (ref 0–6.7)
CK SERPL-CCNC: 50 U/L — SIGNIFICANT CHANGE UP (ref 30–200)
CO2 BLDA-SCNC: 32 MMOL/L — HIGH (ref 19–24)
CO2 SERPL-SCNC: 28 MMOL/L — SIGNIFICANT CHANGE UP (ref 22–31)
CO2 SERPL-SCNC: 30 MMOL/L — SIGNIFICANT CHANGE UP (ref 22–31)
CREAT SERPL-MCNC: 1.22 MG/DL — SIGNIFICANT CHANGE UP (ref 0.5–1.3)
CREAT SERPL-MCNC: 1.33 MG/DL — HIGH (ref 0.5–1.3)
EOSINOPHIL # BLD AUTO: 0.03 K/UL — SIGNIFICANT CHANGE UP (ref 0–0.5)
EOSINOPHIL NFR BLD AUTO: 0.7 % — SIGNIFICANT CHANGE UP (ref 0–6)
GAS PNL BLDA: SIGNIFICANT CHANGE UP
GLUCOSE BLDC GLUCOMTR-MCNC: 101 MG/DL — HIGH (ref 70–99)
GLUCOSE BLDC GLUCOMTR-MCNC: 222 MG/DL — HIGH (ref 70–99)
GLUCOSE BLDC GLUCOMTR-MCNC: 81 MG/DL — SIGNIFICANT CHANGE UP (ref 70–99)
GLUCOSE BLDC GLUCOMTR-MCNC: 88 MG/DL — SIGNIFICANT CHANGE UP (ref 70–99)
GLUCOSE SERPL-MCNC: 143 MG/DL — HIGH (ref 70–99)
GLUCOSE SERPL-MCNC: 89 MG/DL — SIGNIFICANT CHANGE UP (ref 70–99)
HCO3 BLDA-SCNC: 31 MMOL/L — HIGH (ref 21–28)
HCT VFR BLD CALC: 38.5 % — LOW (ref 39–50)
HCT VFR BLD CALC: 40.8 % — SIGNIFICANT CHANGE UP (ref 39–50)
HGB BLD-MCNC: 12.8 G/DL — LOW (ref 13–17)
HGB BLD-MCNC: 13.7 G/DL — SIGNIFICANT CHANGE UP (ref 13–17)
IMM GRANULOCYTES NFR BLD AUTO: 0.2 % — SIGNIFICANT CHANGE UP (ref 0–1.5)
LACTATE SERPL-SCNC: 1.7 MMOL/L — SIGNIFICANT CHANGE UP (ref 0.5–2)
LYMPHOCYTES # BLD AUTO: 0.79 K/UL — LOW (ref 1–3.3)
LYMPHOCYTES # BLD AUTO: 18.9 % — SIGNIFICANT CHANGE UP (ref 13–44)
MAGNESIUM SERPL-MCNC: 2 MG/DL — SIGNIFICANT CHANGE UP (ref 1.6–2.6)
MCHC RBC-ENTMCNC: 28.2 PG — SIGNIFICANT CHANGE UP (ref 27–34)
MCHC RBC-ENTMCNC: 28.8 PG — SIGNIFICANT CHANGE UP (ref 27–34)
MCHC RBC-ENTMCNC: 33.2 GM/DL — SIGNIFICANT CHANGE UP (ref 32–36)
MCHC RBC-ENTMCNC: 33.6 GM/DL — SIGNIFICANT CHANGE UP (ref 32–36)
MCV RBC AUTO: 84.8 FL — SIGNIFICANT CHANGE UP (ref 80–100)
MCV RBC AUTO: 85.7 FL — SIGNIFICANT CHANGE UP (ref 80–100)
MONOCYTES # BLD AUTO: 0.45 K/UL — SIGNIFICANT CHANGE UP (ref 0–0.9)
MONOCYTES NFR BLD AUTO: 10.7 % — SIGNIFICANT CHANGE UP (ref 2–14)
NEUTROPHILS # BLD AUTO: 2.89 K/UL — SIGNIFICANT CHANGE UP (ref 1.8–7.4)
NEUTROPHILS NFR BLD AUTO: 69 % — SIGNIFICANT CHANGE UP (ref 43–77)
NRBC # BLD: 0 /100 WBCS — SIGNIFICANT CHANGE UP (ref 0–0)
NRBC # BLD: 0 /100 WBCS — SIGNIFICANT CHANGE UP (ref 0–0)
PCO2 BLDA: 44 MMHG — SIGNIFICANT CHANGE UP (ref 35–48)
PH BLDA: 7.45 — SIGNIFICANT CHANGE UP (ref 7.35–7.45)
PLATELET # BLD AUTO: 88 K/UL — LOW (ref 150–400)
PLATELET # BLD AUTO: 95 K/UL — LOW (ref 150–400)
PO2 BLDA: 85 MMHG — SIGNIFICANT CHANGE UP (ref 83–108)
POTASSIUM SERPL-MCNC: 3.6 MMOL/L — SIGNIFICANT CHANGE UP (ref 3.5–5.3)
POTASSIUM SERPL-MCNC: 3.7 MMOL/L — SIGNIFICANT CHANGE UP (ref 3.5–5.3)
POTASSIUM SERPL-SCNC: 3.6 MMOL/L — SIGNIFICANT CHANGE UP (ref 3.5–5.3)
POTASSIUM SERPL-SCNC: 3.7 MMOL/L — SIGNIFICANT CHANGE UP (ref 3.5–5.3)
PROT SERPL-MCNC: 5.3 G/DL — LOW (ref 6–8.3)
PROT SERPL-MCNC: 5.6 G/DL — LOW (ref 6–8.3)
RBC # BLD: 4.54 M/UL — SIGNIFICANT CHANGE UP (ref 4.2–5.8)
RBC # BLD: 4.76 M/UL — SIGNIFICANT CHANGE UP (ref 4.2–5.8)
RBC # FLD: 17.5 % — HIGH (ref 10.3–14.5)
RBC # FLD: 17.7 % — HIGH (ref 10.3–14.5)
SAO2 % BLDA: 97.6 % — SIGNIFICANT CHANGE UP (ref 94–98)
SODIUM SERPL-SCNC: 144 MMOL/L — SIGNIFICANT CHANGE UP (ref 135–145)
SODIUM SERPL-SCNC: 145 MMOL/L — SIGNIFICANT CHANGE UP (ref 135–145)
TROPONIN T SERPL-MCNC: 0.13 NG/ML — CRITICAL HIGH (ref 0–0.01)
WBC # BLD: 3.72 K/UL — LOW (ref 3.8–10.5)
WBC # BLD: 4.19 K/UL — SIGNIFICANT CHANGE UP (ref 3.8–10.5)
WBC # FLD AUTO: 3.72 K/UL — LOW (ref 3.8–10.5)
WBC # FLD AUTO: 4.19 K/UL — SIGNIFICANT CHANGE UP (ref 3.8–10.5)

## 2021-11-22 PROCEDURE — 93306 TTE W/DOPPLER COMPLETE: CPT | Mod: 26

## 2021-11-22 RX ORDER — POTASSIUM CHLORIDE 20 MEQ
20 PACKET (EA) ORAL ONCE
Refills: 0 | Status: COMPLETED | OUTPATIENT
Start: 2021-11-22 | End: 2021-11-22

## 2021-11-22 RX ORDER — SODIUM CHLORIDE 9 MG/ML
250 INJECTION INTRAMUSCULAR; INTRAVENOUS; SUBCUTANEOUS ONCE
Refills: 0 | Status: COMPLETED | OUTPATIENT
Start: 2021-11-22 | End: 2021-11-22

## 2021-11-22 RX ORDER — SODIUM CHLORIDE 9 MG/ML
500 INJECTION INTRAMUSCULAR; INTRAVENOUS; SUBCUTANEOUS
Refills: 0 | Status: DISCONTINUED | OUTPATIENT
Start: 2021-11-22 | End: 2021-11-23

## 2021-11-22 RX ORDER — SACUBITRIL AND VALSARTAN 24; 26 MG/1; MG/1
1 TABLET, FILM COATED ORAL EVERY 12 HOURS
Refills: 0 | Status: DISCONTINUED | OUTPATIENT
Start: 2021-11-22 | End: 2021-11-22

## 2021-11-22 RX ADMIN — TAMSULOSIN HYDROCHLORIDE 0.4 MILLIGRAM(S): 0.4 CAPSULE ORAL at 22:25

## 2021-11-22 RX ADMIN — SODIUM CHLORIDE 100 MILLILITER(S): 9 INJECTION INTRAMUSCULAR; INTRAVENOUS; SUBCUTANEOUS at 19:17

## 2021-11-22 RX ADMIN — SODIUM CHLORIDE 500 MILLILITER(S): 9 INJECTION INTRAMUSCULAR; INTRAVENOUS; SUBCUTANEOUS at 19:17

## 2021-11-22 RX ADMIN — Medication 20 MILLIEQUIVALENT(S): at 13:13

## 2021-11-22 RX ADMIN — SODIUM CHLORIDE 500 MILLILITER(S): 9 INJECTION INTRAMUSCULAR; INTRAVENOUS; SUBCUTANEOUS at 18:54

## 2021-11-22 RX ADMIN — Medication 4: at 18:01

## 2021-11-22 NOTE — PROGRESS NOTE ADULT - PROBLEM SELECTOR PLAN 1
Presented w/ worsening LE edema for two weeks and worsening SOB w/ minimal exertion for one week. Not compliant w home medications: Lasix 40 mg daily, Metolazone 5 mg daily, Entresto 49/51 mg daily and Toprol XL 25 mg daily   - HFrEF w/ EF 45-50% by outpatient Echocardiogram from 05/2021   - BNP 12K and trop 0.12 (likely in the setting of CHF)  - EKG showed atrial fibrillation at 63 bpm w/o acute ischemic changes   - Chest X-ray showed significant pulmonary congestion     Plan  - core measures, strict I's and O's, daily weight, fluid restriction   - Holding Lasix 80 mg IV BID, Metolazone 5 mg daily, Toprol XL 25 mg daily, Entresto 24/26 daily due to overdiuresis with low blood pressures: patient with total output of net negative 11.7L   - restart BP and goal directed therapy as appropriated  - F/U Echocardiogram  - ACE wrap legs  - Consulted Hematology for r/o amyloidosis. Patient and son were informed of the indication of biopsy to rule in or rule out amyloidosis as a cause of his restrictive HF pattern.        Possible BM biopsy today 11/22, patient understands importance Presented w/ worsening LE edema for two weeks and worsening SOB w/ minimal exertion for one week. Not compliant w home medications: Lasix 40 mg daily, Metolazone 5 mg daily, Entresto 49/51 mg daily and Toprol XL 25 mg daily   - HFrEF w/ EF 45-50% by outpatient Echocardiogram from 05/2021   - BNP 12K and trop 0.12 (likely in the setting of CHF)  - EKG showed atrial fibrillation at 63 bpm w/o acute ischemic changes   - Chest X-ray showed significant pulmonary congestion     Plan  - core measures, strict I's and O's, daily weight, fluid restriction   - Holding Lasix 80 mg IV BID, Metolazone 5 mg daily, Toprol XL 25 mg daily, Entresto 24/26 daily due to overdiuresis with low blood pressures: patient with total output of net negative 11.7L   - restart BP and goal directed therapy as appropriated  - F/U Echocardiogram  - ACE wrap legs  - Consulted Hematology for r/o amyloidosis. Patient and son were informed of the indication of biopsy to rule in or rule out amyloidosis as a cause of his restrictive HF pattern.        Possible BM biopsy tomorrow 11/23, patient understands importance Presented w/ worsening LE edema for two weeks and worsening SOB w/ minimal exertion for one week. Not compliant w home medications: Lasix 40 mg daily, Metolazone 5 mg daily, Entresto 49/51 mg daily and Toprol XL 25 mg daily   - HFrEF w/ EF 45-50% by outpatient Echocardiogram from 05/2021   - BNP 12K and trop 0.12 (likely in the setting of CHF)  - EKG showed atrial fibrillation at 63 bpm w/o acute ischemic changes   - Chest X-ray showed significant pulmonary congestion   - TTE 11/22: Left ventricular systolic function is moderately reduced with a calculated LVEF 35% with global hypokinesis. Severe concentric LVH. Biatrial enlargement. Normal right ventricular size. Small-to-moderate pericardial effusion, no evidence of tamponade. The proximal ascending aorta is dilated measuring 4.10 cm. Findings suggestive of infiltrative cardiomyopathy.    Plan  - core measures, strict I's and O's, daily weight, fluid restriction   - Holding Lasix 80 mg IV BID, Metolazone 5 mg daily, Toprol XL 25 mg daily, Entresto 24/26 daily due to overdiuresis with low blood pressures: patient with total output of net negative 11.7L   - restart BP and goal directed therapy as appropriated  - ACE wrap legs  - Consulted Hematology for r/o amyloidosis. Patient and son were informed of the indication of biopsy to rule in or rule out amyloidosis as a cause of his restrictive HF pattern.        Possible BM biopsy tomorrow 11/23, patient understands importance Presented w/ worsening LE edema for two weeks and worsening SOB w/ minimal exertion for one week. Not compliant w home medications: Lasix 40 mg daily, Metolazone 5 mg daily, Entresto 49/51 mg daily and Toprol XL 25 mg daily.  - HFrEF w/ EF 50% by outpatient Echocardiogram from 06/2021   - TTE 11/22: Left ventricular systolic function is moderately reduced with a calculated LVEF 35% with global hypokinesis. Severe concentric LVH. Biatrial enlargement. Normal right ventricular size. Small-to-moderate pericardial effusion, no evidence of tamponade. The proximal ascending aorta is dilated measuring 4.10 cm. Findings suggestive of infiltrative cardiomyopathy.  - BNP 12K and trop 0.12 (likely in the setting of CHF)  - EKG showed atrial fibrillation at 63 bpm w/o acute ischemic changes   - Chest X-ray showed significant pulmonary congestion     Plan  - core measures, strict I's and O's, daily weight, fluid restriction   - Holding Lasix 80 mg IV BID, Metolazone 5 mg daily, Toprol XL 25 mg daily, Entresto 24/26 daily due to overdiuresis with low blood pressures: patient with total output of net negative 11.7L   - restart BP and goal directed therapy as appropriated  - ACE wrap legs  - Consulted Hematology for r/o amyloidosis. Patient and son were informed of the indication of biopsy to rule in or rule out amyloidosis as a cause of his restrictive HF pattern.        Possible BM biopsy tomorrow 11/23, patient understands importance Presented w/ worsening LE edema for two weeks and worsening SOB w/ minimal exertion for one week. Not compliant w home medications: Lasix 40 mg daily, Metolazone 5 mg daily, Entresto 49/51 mg daily and Toprol XL 25 mg daily.  - HFrEF w/ EF 50% by outpatient Echocardiogram from 06/2021   - TTE 11/22: Left ventricular systolic function is moderately reduced with a calculated LVEF 35% with global hypokinesis. Severe concentric LVH. Biatrial enlargement. Normal right ventricular size. Small-to-moderate pericardial effusion, no evidence of tamponade. The proximal ascending aorta is dilated measuring 4.10 cm. Findings suggestive of infiltrative cardiomyopathy.  - BNP 12K and trop 0.12 (likely in the setting of CHF)  - EKG showed atrial fibrillation at 63 bpm w/o acute ischemic changes   - Chest X-ray showed significant pulmonary congestion     Plan  - core measures, strict I's and O's, daily weight, fluid restriction   - Holding Lasix 80 mg IV BID, Metolazone 5 mg daily and  Toprol XL 25 mg daily due to overdiuresis with low blood pressures: patient with total output of net negative 11.7L   - restarted  Entresto 24/26 daily  - restart BP and goal directed therapy as appropriated  - ACE wrap legs  - Consulted Hematology for r/o amyloidosis. Patient and son were informed of the indication of biopsy to rule in or rule out amyloidosis as a cause of his restrictive HF pattern.        Possible BM biopsy tomorrow 11/23, patient understands importance

## 2021-11-22 NOTE — DIETITIAN INITIAL EVALUATION ADULT. - OTHER CALCULATIONS
%LIF=830; IBW used to calculate energy needs due to pt's current body weight exceeding 120% of IBW; adjusted for age and CHF-Fluids per team

## 2021-11-22 NOTE — DIETITIAN INITIAL EVALUATION ADULT. - PERTINENT LABORATORY DATA
low HH  sodium WDL, low prior  phosphorus WDL, low prior  CRP 22.5  A1c 5.7%  Lipids WDL   BUN Cr WDL

## 2021-11-22 NOTE — PROGRESS NOTE ADULT - ASSESSMENT
77 y/o male, prior history of medication non-compliance, w/ PMHx type II DM (not currently on medications), A-Flutter/A-Fib (s/p prior ablation in 06/2016, most recently found to be in A-fib in 05/2021, previously on Eliquis but was discontinued), thrombocytopenia (baseline Plt 60-70's on prior admission), stage III CKD (baseline Cr 1.0-1.2), non-obstructive CAD, HFmrEF (EF 45-50% by outpatient Echocardiogram from 05/2021), BPH (s/p TURP procedure), pituitary adenoma (s/p transphenoidal treatment in 1990's), prostate cancer (s/p chemotherapy), chronic venous insufficiency, and prior admission from 06/28/2021 to 07/02/2021 for CHF exacerbation who presented for worsening SOB and LE edema for the past 1-2 weeks, followed up w/ Dr. Anton and was instructed to present to the ED, and admits to not taking his home medication of Lasix as regularly as he should. Of note, patient had previously been on Metformin, transitioned to Jardiance, and now on no home diabetes medications but states he has noticed increased thirst and dry mouth during the evening. In ED, VS initially showed temp 97.4 F, HR 65 bpm, /92 mmHg, SPO2 98% on room air, and RR 16. EKG showed atrial fibrillation at 63 bpm w/o acute ischemic changes. Labs significant for Plt 89, BUN/Cr 20/1.32, troponin 0.12, BNP 89615. Chest X-ray showed significant pulmonary congestion. COVID PCR pending. In ED, patient was provided w/ Nitroglycerin 0.1 mg/hour patch and Lasix 80 mg IV once. Patient is now admitted to cardiology/telemetry for further management of acute on chronic heart failure exacerbation.

## 2021-11-22 NOTE — PROGRESS NOTE ADULT - SUBJECTIVE AND OBJECTIVE BOX
Interventional Cardiology Adult Progress Note    Subjective Assessment:  Improved dyspnea and hemodynamics  	  MEDICATIONS:  tamsulosin 0.4 milliGRAM(s) Oral at bedtime            dextrose 40% Gel 15 Gram(s) Oral once  dextrose 50% Injectable 25 Gram(s) IV Push once  dextrose 50% Injectable 12.5 Gram(s) IV Push once  dextrose 50% Injectable 25 Gram(s) IV Push once  glucagon  Injectable 1 milliGRAM(s) IntraMuscular once  insulin lispro (ADMELOG) corrective regimen sliding scale   SubCutaneous Before meals and at bedtime    dextrose 5%. 1000 milliLiter(s) IV Continuous <Continuous>  dextrose 5%. 1000 milliLiter(s) IV Continuous <Continuous>      	    [PHYSICAL EXAM:  TELEMETRY:  T(C): 36.9 (11-22-21 @ 09:45), Max: 36.9 (11-21-21 @ 17:19)  HR: 57 (11-22-21 @ 16:52) (57 - 66)  BP: 85/54 (11-22-21 @ 16:52) (82/56 - 124/77)  RR: 18 (11-22-21 @ 16:52) (16 - 18)  SpO2: 95% (11-22-21 @ 16:52) (95% - 100%)  Wt(kg): --  I&O's Summary    21 Nov 2021 07:01  -  22 Nov 2021 07:00  --------------------------------------------------------  IN: 997 mL / OUT: 4540 mL / NET: -3543 mL    22 Nov 2021 07:01  -  22 Nov 2021 17:09  --------------------------------------------------------  IN: 236 mL / OUT: 375 mL / NET: -139 mL        Moore:  Central/PICC/Mid Line:                                         Appearance: Normal	  HEENT:   Normal oral mucosa, PERRL, EOMI	  Neck: Supple, + JVD; Carotid Bruit   Cardiovascular: Normal S1 S2, No JVD, No murmurs,   Respiratory: Lungs clear to auscultation/Decreased Breath Sounds/No Rales, Rhonchi, Wheezing	  Gastrointestinal:  Soft, Non-tender, + BS	  Skin: No rashes, No ecchymoses, No cyanosis  1+edema    	    ECG:  	Afib  RADIOLOGY:   DIAGNOSTIC TESTING:  [ ] Echocardiogram:  [ ]  Catheterization:  [ ] Stress Test:    [ ] KHUSHI  OTHER: 	NSVT                              13.7   4.19  )-----------( 95       ( 22 Nov 2021 08:15 )             40.8     11-22    145  |  106  |  30<H>  ----------------------------<  89  3.7   |  30  |  1.33<H>    Ca    8.4      22 Nov 2021 08:15  Phos  3.4     11-21  Mg     2.0     11-22    TPro  5.6<L>  /  Alb  2.8<L>  /  TBili  1.2  /  DBili  x   /  AST  16  /  ALT  18  /  AlkPhos  114  11-22    proBNP:   Lipid Profile:   HgA1c:   TSH:

## 2021-11-22 NOTE — DIETITIAN INITIAL EVALUATION ADULT. - OTHER INFO
75 y/o male, prior history of medication non-compliance, PMHx type II DM (not currently on medications), A-Flutter/A-Fib (s/p prior ablation 6/2016, most recently found to be in A-fib in 5/2021), thrombocytopenia (baseline Plt 60-70's on prior admission), stage III CKD (baseline Cr 1.0-1.2), non-obstructive CAD, HFmrEF (EF 45-50% 05/2021), BPH (s/p TURP procedure), pituitary adenoma (s/p transphenoidal treatment in 1990s), prostate cancer (s/p chemotherapy), chronic venous insufficiency who presented to [facility] ED Due to worsening SOB and LE edema. Pt now admitted to cardiology/telemetry for further management of acute on chronic heart failure exacerbation. Consulted Hematology for r/o amyloidosis, Possible BM biopsy today 11/22.    Pt seen alert in room. Reports good PO PTA, wife cooks, tries to follow a low salt diet, likes pasta and fruit. Good PO remains now. UBW ~190 pounds, admitted at 197 pounds-assume wt increase d/t edema. 3+BL foot edema presently. +BM11/20, denies NVDC now however does get constipated from time to time PTA. NKFA. Toby 20. No pain. No pressure ulcers.   Please see below for nutritions recommendations.

## 2021-11-22 NOTE — PROGRESS NOTE ADULT - PROBLEM SELECTOR PLAN 4
Platelets 89 on admission, baseline from prior admission 60-70's  - monitor CBC  - Transfuse for plts <50k + bleeding, <20k + fever, or <10k   - Will proceed to hold AC in the setting of possible bone marrow biopsy on Monday Platelets 89 on admission, baseline from prior admission 60-70's  - monitor CBC  - Transfuse for plts <50k + bleeding, <20k + fever, or <10k   - Will proceed to hold AC in the setting of possible bone marrow biopsy

## 2021-11-22 NOTE — PROGRESS NOTE ADULT - PROBLEM SELECTOR PLAN 3
Known history of A-flutter/A-Fib w/ prior ablation in 2016  - was previously on Eliquis but was discontinued due to thrombocytopenia as per patient   - EKG showed rate controlled A-fib w/o acute ischemic changes  - As per hematology, if platelets > 50k AC can be restarted    Plan  - Will proceed to hold AC in the setting of possible bone marrow biopsy today  - holding metoprolol succinate 25 mg daily for rate control due to overdiuresis and hypotension, restart as appropriated Known history of A-flutter/A-Fib w/ prior ablation in 2016  - was previously on Eliquis but was discontinued due to thrombocytopenia as per patient   - EKG showed rate controlled A-fib w/o acute ischemic changes  - As per hematology, if platelets > 50k AC can be restarted    Plan  - Will proceed to hold AC in the setting of possible bone marrow biopsy tomorrow   - holding metoprolol succinate 25 mg daily for rate control due to overdiuresis and hypotension, restart as appropriated

## 2021-11-22 NOTE — PROGRESS NOTE ADULT - SUBJECTIVE AND OBJECTIVE BOX
INTERVAL HPI/OVERNIGHT EVENTS: NAOE    SUBJECTIVE: Patient evaluated at bedside and found in NAD and HD stable. Patient reports improvement of bl lower extremity edema, and denies chest pain, palpitation, SOB, weakness and dizziness. ROS negative.     VITAL SIGNS:  T(F): 98.4 (21 @ 09:45)  HR: 63 (21 @ 09:07)  BP: 106/74 (21 @ 09:07)  RR: 18 (21 @ 09:07)  SpO2: 100% (21 @ 09:07)  Wt(kg): --    PHYSICAL EXAM:    General: NAD, cooperative, well developed   HEENT: NC/AT; PERRL, anicteric sclera; MMM  Neck: supple, no LAD, no JVD  Cardiovascular: +S1/S2; sinus skip, regular rhythm   Respiratory: mild bibasilar crackles   Gastrointestinal: soft, NT/ND; +BSx4  Extremities: WWP; 1+ pitting edema LEs b/l; LE wrapped in compression stockings b/l   Vascular: 2+ radial, DP/PT pulses B/L  Neurological: AAOx3; no focal deficits      MEDICATIONS  (STANDING):  dextrose 40% Gel 15 Gram(s) Oral once  dextrose 5%. 1000 milliLiter(s) (50 mL/Hr) IV Continuous <Continuous>  dextrose 5%. 1000 milliLiter(s) (100 mL/Hr) IV Continuous <Continuous>  dextrose 50% Injectable 25 Gram(s) IV Push once  dextrose 50% Injectable 12.5 Gram(s) IV Push once  dextrose 50% Injectable 25 Gram(s) IV Push once  glucagon  Injectable 1 milliGRAM(s) IntraMuscular once  insulin lispro (ADMELOG) corrective regimen sliding scale   SubCutaneous Before meals and at bedtime  tamsulosin 0.4 milliGRAM(s) Oral at bedtime    MEDICATIONS  (PRN):      Allergies    No Known Allergies    Intolerances        LABS:                        13.7   4.19  )-----------( 95       ( 2021 08:15 )             40.8         145  |  106  |  30<H>  ----------------------------<  89  3.7   |  30  |  1.33<H>    Ca    8.4      2021 08:15  Phos  3.4     -  Mg     2.0         TPro  5.6<L>  /  Alb  2.8<L>  /  TBili  1.2  /  DBili  x   /  AST  16  /  ALT  18  /  AlkPhos  114  -      Urinalysis Basic - ( 2021 22:33 )    Color: Yellow / Appearance: Clear / S.015 / pH: x  Gluc: x / Ketone: NEGATIVE  / Bili: Negative / Urobili: 1.0 E.U./dL   Blood: x / Protein: NEGATIVE mg/dL / Nitrite: NEGATIVE   Leuk Esterase: NEGATIVE / RBC: x / WBC x   Sq Epi: x / Non Sq Epi: x / Bacteria: x        RADIOLOGY & ADDITIONAL TESTS:

## 2021-11-22 NOTE — PROGRESS NOTE ADULT - ASSESSMENT
77 y/o male, prior history of medication non-compliance, w/ PMHx type II DM (not currently on medications), A-Flutter/A-Fib (s/p prior ablation in 06/2016, most recently found to be in A-fib in 05/2021, previously on Eliquis but was discontinued), thrombocytopenia (baseline Plt 60-70's on prior admission), stage III CKD (baseline Cr 1.0-1.2), non-obstructive CAD, HFrEF (EF 45-50% by outpatient Echocardiogram from 05/2021), admitted to cardiology/telemetry for further management of acute on chronic heart failure exacerbation.  Held meds yesterday due to overdiuresis.  Will resume meds today Entresto, Metop and lasix 40 daily  EPS consult for NSVT  Bone marrow biopsy tomorrow   Resume AC with Eliquis 2.5 bid afterwards

## 2021-11-22 NOTE — DIETITIAN INITIAL EVALUATION ADULT. - ADD RECOMMEND
DASH TLC diet; Fluids per team. Monitor %PO intake, Diet tolerance. Monitor Skin, Pain, GI, GOC. RD to remain available for additional nutrition interventions as needed.

## 2021-11-22 NOTE — DIETITIAN INITIAL EVALUATION ADULT. - PERSON TAUGHT/METHOD
Tips for GI distress reviewed. Attempted to review low sodium diet education however pt reports [place] and limited extensive education as result - handout from [facility] with contact info left./verbal instruction/written material/patient instructed

## 2021-11-22 NOTE — PROGRESS NOTE ADULT - PROBLEM SELECTOR PLAN 7
F:  cc to run in 4 hrs  E: Replete PRN to K>4, Mg>2  N: DASH?TLC, consistent carb   DVT PPx: Holding in the setting of Thrombocytopenia and possible BM biopsy  GI PPx:None  Dispo: [facility]   Code: FULL CODE

## 2021-11-23 ENCOUNTER — RESULT REVIEW (OUTPATIENT)
Age: 76
End: 2021-11-23

## 2021-11-23 DIAGNOSIS — I47.2 VENTRICULAR TACHYCARDIA: ICD-10-CM

## 2021-11-23 LAB
ALBUMIN SERPL ELPH-MCNC: 3.1 G/DL — LOW (ref 3.3–5)
ALP SERPL-CCNC: 115 U/L — SIGNIFICANT CHANGE UP (ref 40–120)
ALT FLD-CCNC: 18 U/L — SIGNIFICANT CHANGE UP (ref 10–45)
ANION GAP SERPL CALC-SCNC: 5 MMOL/L — SIGNIFICANT CHANGE UP (ref 5–17)
APTT BLD: 30.5 SEC — SIGNIFICANT CHANGE UP (ref 27.5–35.5)
AST SERPL-CCNC: 14 U/L — SIGNIFICANT CHANGE UP (ref 10–40)
BILIRUB SERPL-MCNC: 1.3 MG/DL — HIGH (ref 0.2–1.2)
BUN SERPL-MCNC: 34 MG/DL — HIGH (ref 7–23)
CALCIUM SERPL-MCNC: 8.8 MG/DL — SIGNIFICANT CHANGE UP (ref 8.4–10.5)
CHLORIDE SERPL-SCNC: 105 MMOL/L — SIGNIFICANT CHANGE UP (ref 96–108)
CK MB CFR SERPL CALC: 2.3 NG/ML — SIGNIFICANT CHANGE UP (ref 0–6.7)
CK SERPL-CCNC: 44 U/L — SIGNIFICANT CHANGE UP (ref 30–200)
CO2 SERPL-SCNC: 32 MMOL/L — HIGH (ref 22–31)
CREAT SERPL-MCNC: 1.26 MG/DL — SIGNIFICANT CHANGE UP (ref 0.5–1.3)
GLUCOSE BLDC GLUCOMTR-MCNC: 105 MG/DL — HIGH (ref 70–99)
GLUCOSE BLDC GLUCOMTR-MCNC: 116 MG/DL — HIGH (ref 70–99)
GLUCOSE BLDC GLUCOMTR-MCNC: 128 MG/DL — HIGH (ref 70–99)
GLUCOSE BLDC GLUCOMTR-MCNC: 97 MG/DL — SIGNIFICANT CHANGE UP (ref 70–99)
GLUCOSE SERPL-MCNC: 114 MG/DL — HIGH (ref 70–99)
HCT VFR BLD CALC: 40.3 % — SIGNIFICANT CHANGE UP (ref 39–50)
HGB BLD-MCNC: 13.6 G/DL — SIGNIFICANT CHANGE UP (ref 13–17)
INR BLD: 1.33 — HIGH (ref 0.88–1.16)
MAGNESIUM SERPL-MCNC: 2 MG/DL — SIGNIFICANT CHANGE UP (ref 1.6–2.6)
MCHC RBC-ENTMCNC: 29.1 PG — SIGNIFICANT CHANGE UP (ref 27–34)
MCHC RBC-ENTMCNC: 33.7 GM/DL — SIGNIFICANT CHANGE UP (ref 32–36)
MCV RBC AUTO: 86.1 FL — SIGNIFICANT CHANGE UP (ref 80–100)
NRBC # BLD: 0 /100 WBCS — SIGNIFICANT CHANGE UP (ref 0–0)
PHOSPHATE SERPL-MCNC: 3 MG/DL — SIGNIFICANT CHANGE UP (ref 2.5–4.5)
PLATELET # BLD AUTO: 100 K/UL — LOW (ref 150–400)
POTASSIUM SERPL-MCNC: 3.9 MMOL/L — SIGNIFICANT CHANGE UP (ref 3.5–5.3)
POTASSIUM SERPL-SCNC: 3.9 MMOL/L — SIGNIFICANT CHANGE UP (ref 3.5–5.3)
PROT SERPL-MCNC: 5.8 G/DL — LOW (ref 6–8.3)
PROTHROM AB SERPL-ACNC: 15.8 SEC — HIGH (ref 10.6–13.6)
RBC # BLD: 4.68 M/UL — SIGNIFICANT CHANGE UP (ref 4.2–5.8)
RBC # FLD: 17.6 % — HIGH (ref 10.3–14.5)
SODIUM SERPL-SCNC: 142 MMOL/L — SIGNIFICANT CHANGE UP (ref 135–145)
TROPONIN T SERPL-MCNC: 0.13 NG/ML — CRITICAL HIGH (ref 0–0.01)
WBC # BLD: 4.56 K/UL — SIGNIFICANT CHANGE UP (ref 3.8–10.5)
WBC # FLD AUTO: 4.56 K/UL — SIGNIFICANT CHANGE UP (ref 3.8–10.5)

## 2021-11-23 PROCEDURE — 88365 INSITU HYBRIDIZATION (FISH): CPT | Mod: 26,59

## 2021-11-23 PROCEDURE — 88189 FLOWCYTOMETRY/READ 16 & >: CPT

## 2021-11-23 PROCEDURE — 88311 DECALCIFY TISSUE: CPT | Mod: 26

## 2021-11-23 PROCEDURE — 88341 IMHCHEM/IMCYTCHM EA ADD ANTB: CPT | Mod: 26,59

## 2021-11-23 PROCEDURE — 88342 IMHCHEM/IMCYTCHM 1ST ANTB: CPT | Mod: 26,59

## 2021-11-23 PROCEDURE — 85097 BONE MARROW INTERPRETATION: CPT

## 2021-11-23 PROCEDURE — 88364 INSITU HYBRIDIZATION (FISH): CPT | Mod: 26

## 2021-11-23 PROCEDURE — 88313 SPECIAL STAINS GROUP 2: CPT | Mod: 26

## 2021-11-23 PROCEDURE — 88305 TISSUE EXAM BY PATHOLOGIST: CPT | Mod: 26

## 2021-11-23 RX ORDER — SACUBITRIL AND VALSARTAN 24; 26 MG/1; MG/1
1 TABLET, FILM COATED ORAL EVERY 12 HOURS
Refills: 0 | Status: DISCONTINUED | OUTPATIENT
Start: 2021-11-23 | End: 2021-11-24

## 2021-11-23 RX ORDER — APIXABAN 2.5 MG/1
2.5 TABLET, FILM COATED ORAL EVERY 12 HOURS
Refills: 0 | Status: DISCONTINUED | OUTPATIENT
Start: 2021-11-23 | End: 2021-11-24

## 2021-11-23 RX ORDER — LIDOCAINE HCL 20 MG/ML
50 VIAL (ML) INJECTION ONCE
Refills: 0 | Status: COMPLETED | OUTPATIENT
Start: 2021-11-23 | End: 2021-11-23

## 2021-11-23 RX ADMIN — SACUBITRIL AND VALSARTAN 1 TABLET(S): 24; 26 TABLET, FILM COATED ORAL at 18:19

## 2021-11-23 RX ADMIN — APIXABAN 2.5 MILLIGRAM(S): 2.5 TABLET, FILM COATED ORAL at 22:29

## 2021-11-23 RX ADMIN — TAMSULOSIN HYDROCHLORIDE 0.4 MILLIGRAM(S): 0.4 CAPSULE ORAL at 22:28

## 2021-11-23 RX ADMIN — Medication 50 MILLILITER(S): at 07:42

## 2021-11-23 NOTE — PROGRESS NOTE ADULT - PROBLEM SELECTOR PLAN 7
F: s/p 1L NS   E: Replete PRN to K>4, Mg>2  N: DASH/TLC, consistent carb   DVT PPx: Eliquis 2.5 mg BID   GI PPx:None  Dispo: [facility]   Code: FULL CODE Home medication: Tamsulosin 0.4 mg daily   - continue home medication

## 2021-11-23 NOTE — PROGRESS NOTE ADULT - SUBJECTIVE AND OBJECTIVE BOX
INTERVAL HPI/OVERNIGHT EVENTS:    VITAL SIGNS:  T(F): 97 (21 @ 10:18)  HR: 64 (21 @ 08:39)  BP: 106/71 (21 @ 08:39)  RR: 16 (21 @ 08:39)  SpO2: 97% (21 @ 08:39)  Wt(kg): --    PHYSICAL EXAM:    Constitutional: NAD, well-groomed, well-developed  HEENT: PERRLA, EOMI, Normal Hearing, MMM  Neck: No LAD, No JVD  Back: Normal spine flexure, No CVA tenderness  Respiratory: CTAB   Cardiovascular: S1 and S2, RRR, no M/G/R  Gastrointestinal: BS+, soft, NT/ND  Extremities: No peripheral edema  Vascular: 2+ peripheral pulses  Neurological: A/O x 3, no focal deficits  Psychiatric: Normal mood, normal affect  Musculoskeletal: 5/5 strength b/l upper and lower extremities  Skin: No rashes      MEDICATIONS  (STANDING):  dextrose 40% Gel 15 Gram(s) Oral once  dextrose 5%. 1000 milliLiter(s) (50 mL/Hr) IV Continuous <Continuous>  dextrose 5%. 1000 milliLiter(s) (100 mL/Hr) IV Continuous <Continuous>  dextrose 50% Injectable 25 Gram(s) IV Push once  dextrose 50% Injectable 12.5 Gram(s) IV Push once  dextrose 50% Injectable 25 Gram(s) IV Push once  glucagon  Injectable 1 milliGRAM(s) IntraMuscular once  insulin lispro (ADMELOG) corrective regimen sliding scale   SubCutaneous Before meals and at bedtime  sodium chloride 0.9%. 500 milliLiter(s) (100 mL/Hr) IV Continuous <Continuous>  tamsulosin 0.4 milliGRAM(s) Oral at bedtime    MEDICATIONS  (PRN):      Allergies    No Known Allergies    Intolerances        LABS:                        13.6   4.56  )-----------( 100      ( 2021 06:08 )             40.3     11    142  |  105  |  34<H>  ----------------------------<  114<H>  3.9   |  32<H>  |  1.26    Ca    8.8      2021 06:08  Phos  3.0     11-  Mg     2.0         TPro  5.8<L>  /  Alb  3.1<L>  /  TBili  1.3<H>  /  DBili  x   /  AST  14  /  ALT  18  /  AlkPhos  115      PT/INR - ( 2021 06:08 )   PT: 15.8 sec;   INR: 1.33          PTT - ( 2021 06:08 )  PTT:30.5 sec  Urinalysis Basic - ( 2021 22:33 )    Color: Yellow / Appearance: Clear / S.015 / pH: x  Gluc: x / Ketone: NEGATIVE  / Bili: Negative / Urobili: 1.0 E.U./dL   Blood: x / Protein: NEGATIVE mg/dL / Nitrite: NEGATIVE   Leuk Esterase: NEGATIVE / RBC: x / WBC x   Sq Epi: x / Non Sq Epi: x / Bacteria: x        RADIOLOGY & ADDITIONAL TESTS:     INTERVAL HPI/OVERNIGHT EVENTS: NAOE     SUBJECTIVE: Patient evaluated at bedside and NAD and HD stable. Patient with no complaints and ROS negative. Patient denies chest pain, palpitations, SOB, MARTINEZ, headaches, changes in vision, lightheadedness and syncope.    VITAL SIGNS:  T(F): 97 (21 @ 10:18)  HR: 64 (21 @ 08:39)  BP: 106/71 (21 @ 08:39)  RR: 16 (21 @ 08:39)  SpO2: 97% (21 @ 08:39)  Wt(kg): --    PHYSICAL EXAM:    General: NAD, cooperative, well developed   HEENT: NC/AT; PERRL, anicteric sclera; dry MM  Neck: supple, no LAD, + JVD  Cardiovascular: +S1/S2; sinus skip, regular rhythm   Respiratory: CTA bl, no wheezes   Gastrointestinal: soft, NT/ND; +BSx4  Extremities: WWP; 1+ pitting edema LEs b/l; LE wrapped in compression stockings b/l   Vascular: 2+ radial, DP/PT pulses B/L  Neurological: AAOx3; no focal deficits      MEDICATIONS  (STANDING):  dextrose 40% Gel 15 Gram(s) Oral once  dextrose 5%. 1000 milliLiter(s) (50 mL/Hr) IV Continuous <Continuous>  dextrose 5%. 1000 milliLiter(s) (100 mL/Hr) IV Continuous <Continuous>  dextrose 50% Injectable 25 Gram(s) IV Push once  dextrose 50% Injectable 12.5 Gram(s) IV Push once  dextrose 50% Injectable 25 Gram(s) IV Push once  glucagon  Injectable 1 milliGRAM(s) IntraMuscular once  insulin lispro (ADMELOG) corrective regimen sliding scale   SubCutaneous Before meals and at bedtime  sodium chloride 0.9%. 500 milliLiter(s) (100 mL/Hr) IV Continuous <Continuous>  tamsulosin 0.4 milliGRAM(s) Oral at bedtime    MEDICATIONS  (PRN):      Allergies    No Known Allergies    Intolerances        LABS:                        13.6   4.56  )-----------( 100      ( 2021 06:08 )             40.3         142  |  105  |  34<H>  ----------------------------<  114<H>  3.9   |  32<H>  |  1.26    Ca    8.8      2021 06:08  Phos  3.0     11-  Mg     2.0     -    TPro  5.8<L>  /  Alb  3.1<L>  /  TBili  1.3<H>  /  DBili  x   /  AST  14  /  ALT  18  /  AlkPhos  115  11-    PT/INR - ( 2021 06:08 )   PT: 15.8 sec;   INR: 1.33          PTT - ( 2021 06:08 )  PTT:30.5 sec  Urinalysis Basic - ( 2021 22:33 )    Color: Yellow / Appearance: Clear / S.015 / pH: x  Gluc: x / Ketone: NEGATIVE  / Bili: Negative / Urobili: 1.0 E.U./dL   Blood: x / Protein: NEGATIVE mg/dL / Nitrite: NEGATIVE   Leuk Esterase: NEGATIVE / RBC: x / WBC x   Sq Epi: x / Non Sq Epi: x / Bacteria: x        RADIOLOGY & ADDITIONAL TESTS:

## 2021-11-23 NOTE — PROGRESS NOTE ADULT - ASSESSMENT
77 y/o male, prior history of medication non-compliance, w/ PMHx type II DM (not currently on medications), A-Flutter/A-Fib (s/p prior ablation in 06/2016, most recently found to be in A-fib in 05/2021, previously on Eliquis but was discontinued), thrombocytopenia (baseline Plt 60-70's on prior admission), stage III CKD (baseline Cr 1.0-1.2), non-obstructive CAD, HFrEF (EF 45-50% by outpatient Echocardiogram from 05/2021), admitted to cardiology/telemetry for further management of acute on chronic heart failure exacerbation.  Held meds yesterday due to overdiuresis.  Will resume meds today Entresto, Metop and lasix 40 daily  EPS consult for NSVT  Bone marrow biopsy tomorrow   Resume AC with Eliquis 2.5 bid afterwards 75 y/o male, prior history of medication non-compliance, w/ PMHx type II DM (not currently on medications), A-Flutter/A-Fib (s/p prior ablation in 06/2016, most recently found to be in A-fib in 05/2021, previously on Eliquis but was discontinued), thrombocytopenia (baseline Plt 60-70's on prior admission), stage III CKD (baseline Cr 1.0-1.2), non-obstructive CAD, HFrEF (EF 45-50% by outpatient Echocardiogram from 05/2021), admitted to cardiology/telemetry for further management of acute on chronic heart failure exacerbation.

## 2021-11-23 NOTE — PROCEDURE NOTE - ADDITIONAL PROCEDURE DETAILS
Verbal consent obtained from pt and his daughter with whom I spoke on the phone the morning of the BM biopsy.

## 2021-11-23 NOTE — PROGRESS NOTE ADULT - PROBLEM SELECTOR PLAN 4
Platelets 89 on admission, baseline from prior admission 60-70's  - monitor CBC  - Transfuse for plts <50k + bleeding, <20k + fever, or <10k   - F/U BM bipsy 11/23/21  - F/U hematology recs

## 2021-11-23 NOTE — CONSULT NOTE ADULT - SUBJECTIVE AND OBJECTIVE BOX
Electrophysiology Consult Note:     CHIEF COMPLAINT:  Patient is a 76y old  Male who presents with a chief complaint of Acute on Chronic Heart Failure Exacerbation (2021 10:55)        HISTORY OF PRESENT ILLNESS:   HPI:  77 y/o M  prior history of medication non-compliance, w/ PMHx type II DM (not currently on medications), A-Flutter/A-Fib (s/p prior ablation in 2016, most recently found to be in A-fib in 2021, previously on Eliquis but was discontinued), thrombocytopenia (baseline Plt 60-70's on prior admission), stage III CKD (baseline Cr 1.0-1.2), non-obstructive CAD, HFmrEF (EF 45-50% by outpatient Echocardiogram from 2021), BPH (s/p TURP procedure), pituitary adenoma (s/p transphenoidal treatment in ), prostate cancer (s/p chemotherapy), chronic venous insufficiency, and prior admission from 2021 to 2021 for CHF exacerbation who presented to [facility] ED from Dr. Anton office due to worsening SOB and LE edema.  Telemetry monitoring was significant for 4 beats of NSVT.  EPS called to evaluate.     PAST MEDICAL & SURGICAL HISTORY:  Atrial flutter  s/p Ablation     Chronic venous insufficiency of lower extremity    Prostate CA    Stage 3 chronic kidney disease    Heart failure with mid-range ejection fraction    Type 2 diabetes mellitus    Thrombocytopenia    Atrial flutter  s/p ablation in     History of surgical removal of pituitary gland  1990s    S/P TURP  for BPH        FAMILY HISTORY:      SOCIAL HISTORY:    [ x] Non-smoker      Allergies    No Known Allergies    Intolerances    	    MEDICATIONS:  MEDICATIONS  (STANDING):  dextrose 40% Gel 15 Gram(s) Oral once  dextrose 5%. 1000 milliLiter(s) (50 mL/Hr) IV Continuous <Continuous>  dextrose 5%. 1000 milliLiter(s) (100 mL/Hr) IV Continuous <Continuous>  dextrose 50% Injectable 25 Gram(s) IV Push once  dextrose 50% Injectable 12.5 Gram(s) IV Push once  dextrose 50% Injectable 25 Gram(s) IV Push once  glucagon  Injectable 1 milliGRAM(s) IntraMuscular once  insulin lispro (ADMELOG) corrective regimen sliding scale   SubCutaneous Before meals and at bedtime  sodium chloride 0.9%. 500 milliLiter(s) (100 mL/Hr) IV Continuous <Continuous>  tamsulosin 0.4 milliGRAM(s) Oral at bedtime    MEDICATIONS  (PRN):        REVIEW OF SYSTEMS:  CONSTITUTIONAL: No fever, weight loss, or fatigue  EYES: No eye pain, visual disturbances, or discharge  ENMT:  No difficulty hearing, tinnitus, vertigo; No sinus or throat pain  NECK: No pain or stiffness  BREASTS: No pain, masses, or nipple discharge  RESPIRATORY: No cough, wheezing, chills or hemoptysis; No Shortness of Breath  CARDIOVASCULAR: No chest pain, palpitations, dizziness, or leg swelling  GASTROINTESTINAL: No abdominal or epigastric pain. No nausea, vomiting, or hematemesis; No diarrhea or constipation.   GENITOURINARY: No dysuria, frequency, hematuria, or incontinence  NEUROLOGICAL: No headaches, memory loss, loss of strength, numbness, or tremors  SKIN: No itching, burning, rashes, or lesions   LYMPH Nodes: No enlarged glands    PHYSICAL EXAM:  Vital Signs Last 24 Hrs  T(C): 36.1 (2021 10:18), Max: 36.7 (2021 22:24)  T(F): 97 (2021 10:18), Max: 98 (2021 22:24)  HR: 64 (2021 08:39) (54 - 66)  BP: 106/71 (2021 08:39) (79/53 - 124/77)  BP(mean): 84 (2021 08:39) (60 - 94)  RR: 16 (2021 08:39) (16 - 18)  SpO2: 97% (2021 08:39) (95% - 99%)  Daily     Daily Weight in k.6 (2021 04:30)    Constitutional: NAD	  HEENT:   PERRL, EOMI	  CVS: S1 S2, No JVD, + edema  Pulm: Lungs clear to auscultation	  GI:  Soft, Non-tender, + BS	  Ext: + LE edema  Neurologic: A&O x 3, Non-focal  Skin: No rash or lesion       	  LABS:	                         13.6   4.56  )-----------( 100      ( 2021 06:08 )             40.3     11-23    142  |  105  |  34<H>  ----------------------------<  114<H>  3.9   |  32<H>  |  1.26    Ca    8.8      2021 06:08  Phos  3.0       Mg     2.0         TPro  5.8<L>  /  Alb  3.1<L>  /  TBili  1.3<H>  /  DBili  x   /  AST  14  /  ALT  18  /  AlkPhos  115      proBNP:   Lipid Profile:   HgA1c:   TSH: 	      EKG: < from: 12 Lead ECG (21 @ 16:50) >  Ventricular Rate 67 BPM    Atrial Rate 156 BPM    QRS Duration 92 ms    Q-T Interval 434 ms    QTC Calculation(Bazett) 458 ms    R Axis -78 degrees    T Axis -59 degrees    Diagnosis Line Atrial fibrillation with a competing junctional pacemaker  Poor R Wave Progression  Nonspecific ST - T abnormalities      Telemetry:  atrial fibrillation     Echo: < from: TTE Echo Complete w/o Contrast w/ Doppler (21 @ 12:56) >   1. Biatrial enlargement.   2. Mild pulmonic regurgitation.   3. Pulmonary artery systolic pressure is 38 mmHg.   4. No other significant valvular disease.   5. Normal right ventricular size.   6. Reduced right ventricular systolic function.   7. There is severe concentric left ventricular hypertrophy. Left ventricular systolic function is moderately reduced with a calculated ejection fraction of 35% with global hypokinesis.   8. Small-to-moderate pericardial effusion without echocardiographic evidence of cardiac tamponade physiology.   9. The proximal ascending aorta is dilated measuring 4.10 cm.  10. Findings suggestive of infiltrative cardiomyopathy.  11. Compared to the previous TTE performed on 2021, LV systolic function appears reduced.      
77 y/o male, prior history of medication non-compliance, w/ PMHx type II DM (not currently on medications), A-Flutter/A-Fib (s/p prior ablation in 06/2016, most recently found to be in A-fib in 05/2021, previously on Eliquis but was discontinued), thrombocytopenia (baseline Plt 60-70's on prior admission), stage III CKD (baseline Cr 1.0-1.2), non-obstructive CAD, HFmrEF (EF 45-50% by outpatient Echocardiogram from 05/2021), BPH (s/p TURP procedure), pituitary adenoma (s/p transphenoidal treatment in 1990's), prostate cancer (s/p chemotherapy), chronic venous insufficiency, and prior admission from 06/28/2021 to 07/02/2021 for CHF exacerbation who presented to [facility] ED from Dr. Anton office due to worsening SOB and LE edema. Patient reports he has noticed increased LE edema for the last two weeks and worsening SOB w/ minimal exertion for the last week. Patient stated he followed up w/ Dr. Anton today and was directed to come to the ED. Of note, patient also admits to not taking his home medication of Lasix 40 mg daily on a regular basis.    PAST MEDICAL HISTORY:  Atrial flutter s/p Ablation 2016  Chronic venous insufficiency of lower extremity   Heart failure with mid-range ejection fraction   Prostate CA   Stage 3 chronic kidney disease   Thrombocytopenia   Type 2 diabetes mellitus.     PAST SURGICAL HISTORY:  Atrial flutter s/p ablation in 2016  History of surgical removal of pituitary gland 1990s  S/P TURP for BPH.    SOCIAL HISTORY:  No drugs, no EtOH abuse    FAMILY HISTORY:  Not pertinent at this time    Home Medications:  ammonium lactate 12% topical cream: Apply topically to affected area 2 times a day (19 Nov 2021 18:36)  ibuprofen 600 mg oral tablet: 1 tab(s) orally every 6 hours, As Needed (19 Nov 2021 18:36)  lactulose 10 g/15 mL oral solution: 15 milliliter(s) orally once a day (at bedtime) (19 Nov 2021 18:36)  Lasix 40 mg oral tablet: 1 tab(s) orally once a day (19 Nov 2021 18:36)  metOLazone 5 mg oral tablet: 1 tab(s) orally once a day (19 Nov 2021 18:36)  potassium chloride 10 mEq oral capsule, extended release: 1 cap(s) orally once a day (19 Nov 2021 18:36)  tamsulosin 0.4 mg oral capsule: 1 cap(s) orally once a day (19 Nov 2021 18:36)  Toprol-XL 25 mg oral tablet, extended release: 1 tab(s) orally once a day (19 Nov 2021 18:36)    Allergies  No Known Allergies    Vital Signs Last 24 Hrs  T(C): 36.4 (20 Nov 2021 09:10), Max: 36.4 (19 Nov 2021 19:18)  T(F): 97.6 (20 Nov 2021 09:10), Max: 97.6 (19 Nov 2021 19:18)  HR: 61 (20 Nov 2021 09:10) (58 - 85)  BP: 146/107 (20 Nov 2021 09:10) (127/92 - 146/107)  BP(mean): 120 (20 Nov 2021 09:10) (120 - 120)  RR: 18 (20 Nov 2021 09:10) (16 - 18)  SpO2: 97% (20 Nov 2021 09:10) (94% - 100%)    AAOx3  NAD  Bibasilar crackles  Irregular rhythm  +BS, Soft, NT, no organomegaly  +Pulses, equal, bilateral LE edema    Labs: reviewed    Imaging: reviewed

## 2021-11-23 NOTE — PROGRESS NOTE ADULT - PROBLEM SELECTOR PLAN 5
Baseline Cr 1.0-1.2, Cr 1.32 on admission   - monitor BMP  - IV diuresis as above   - above additional nephrotoxic agents
Baseline Cr 1.0-1.2, Cr 1.32 on admission   - monitor BMP  - IV diuresis as above   - avoid additional nephrotoxic agents
Baseline Cr 1.0-1.2, Cr 1.32 on admission   - monitor BMP
Baseline Cr 1.0-1.2, Cr 1.32 on admission   - monitor BMP  - Holding IV diuresis at this time   - avoid additional nephrotoxic agents

## 2021-11-23 NOTE — PROGRESS NOTE ADULT - PROBLEM SELECTOR PLAN 1
Presented w/ worsening LE edema for two weeks and worsening SOB w/ minimal exertion for one week. Not compliant w home medications: Lasix 40 mg daily, Metolazone 5 mg daily, Entresto 49/51 mg daily and Toprol XL 25 mg daily.  - HFrEF w/ EF 50% by outpatient Echocardiogram from 06/2021   - TTE 11/22: Left ventricular systolic function is moderately reduced with a calculated LVEF 35% with global hypokinesis. Severe concentric LVH. Biatrial enlargement. Normal right ventricular size. Small-to-moderate pericardial effusion, no evidence of tamponade. The proximal ascending aorta is dilated measuring 4.10 cm. Findings suggestive of infiltrative cardiomyopathy.  - BNP 12K and trop 0.12 (likely in the setting of CHF)  - EKG showed atrial fibrillation at 63 bpm w/o acute ischemic changes   - Chest X-ray showed significant pulmonary congestion     Plan  - core measures, strict I's and O's, daily weight, fluid restriction   - ACE wrap legs  - BM biopsy done 11/23/21; AC resumed   - Holding Lasix 80 mg IV BID, Metolazone 5 mg daily and  Toprol XL 25 mg daily due to overdiuresis with low blood pressures: patient with total output of net negative 11.7L   - restart BP and goal directed therapy as appropriated  - restarted  Entresto 24/26 mg today at 6 pm  - monitor BP

## 2021-11-23 NOTE — PROGRESS NOTE ADULT - PROBLEM SELECTOR PLAN 3
Known history of A-flutter/A-Fib w/ prior ablation in 2016  - was previously on Eliquis but was discontinued due to thrombocytopenia as per patient   - EKG showed rate controlled A-fib w/o acute ischemic changes  - As per hematology, if platelets > 50k AC can be restarted    Plan  - holding metoprolol succinate 25 mg daily for rate control due to overdiuresis and hypotension, restart as appropriated  - started on eliquis 2.5 mg BID

## 2021-11-23 NOTE — CONSULT NOTE ADULT - ASSESSMENT
75 y/o M  prior history of medication non-compliance, w/ PMHx type II DM (not currently on medications), A-Flutter/A-Fib (s/p prior ablation in 06/2016, most recently found to be in A-fib in 05/2021, previously on Eliquis but was discontinued), thrombocytopenia (baseline Plt 60-70's on prior admission), stage III CKD (baseline Cr 1.0-1.2), non-obstructive CAD, HFmrEF (EF 45-50% by outpatient Echocardiogram from 05/2021), BPH (s/p TURP procedure), pituitary adenoma (s/p transphenoidal treatment in 1990's), prostate cancer (s/p chemotherapy), chronic venous insufficiency, and prior admission from 06/28/2021 to 07/02/2021 for CHF exacerbation who presented to [facility] ED from Dr. Anton office due to worsening SOB and LE edema.  Telemetry monitoring was significant for 4 beats of NSVT. Patient denies any palpitations, SOB, dizziness, syncope, he reports improvement in his LE edema. Patient needs to be on OMT for his HF, would continue anticoagulation, repeat echocardiogram in 3 month to re evaluate EF. His heart rate is controlled in atrial fibrillation and he is asymptomatic.    
75 yo M with hx as above being seen for cytopenias.    Cytopenias- evaluated in office back in July 2021.  No follow up due to cancellations until 10/22/21.  Initial extensive work up was inconclusive as to the cause of low counts.  CBC did remain stable btw July and Oct 2021.   A bone marrow was recommended however pt declined and opted for close observation.   Amyloidosis is in question given cardiac dysfunction and heme findings.   My prior work up for light chain amyloidosis was neg but other forms of amyloid will have to be ruled out. This will require more invasive testing.   Case was discussed with cardiology.  Will push for a bone marrow biopsy hopefully on Monday 11/22/21 if pt agrees. If bone marrow is neg for amyloid he may need a fat pad or cardiac biopsy.  Bone marrow may be done in inpt setting.   AC also discussed. If plt count remains above 50 blood thinners for the A-fib may be resumed.   Will follow with you.   Thank you.    Anjelica Bernal MD  [phone]

## 2021-11-23 NOTE — PROGRESS NOTE ADULT - PROBLEM SELECTOR PLAN 6
Home medication: Tamsulosin 0.4 mg daily   - continue home medication Previously on Metformin, transitioned to Jardiance, and now discontinued from both and on now home medications   - A1c 6.1  - moderate ISS  - monitor FSG

## 2021-11-23 NOTE — PROGRESS NOTE ADULT - PROBLEM SELECTOR PLAN 2
Previously on Metformin, transitioned to Jardiance, and now discontinued from both and on now home medications   - A1c 6.1  - moderate ISS  - monitor FSG EP consulted for telemetry recordings significant for 4 beats of NSVT.  - Outpatient EP evaluation for OMT   - As per EP recs, repeat TTE in 3 months

## 2021-11-23 NOTE — PROGRESS NOTE ADULT - PROBLEM SELECTOR PLAN 8
F: s/p 1L NS   E: Replete PRN to K>4, Mg>2  N: DASH/TLC, consistent carb   DVT PPx: Eliquis 2.5 mg BID   GI PPx:None  Dispo: 5 [facility]   Code: FULL CODE

## 2021-11-24 ENCOUNTER — TRANSCRIPTION ENCOUNTER (OUTPATIENT)
Age: 76
End: 2021-11-24

## 2021-11-24 VITALS
OXYGEN SATURATION: 99 % | RESPIRATION RATE: 18 BRPM | SYSTOLIC BLOOD PRESSURE: 128 MMHG | DIASTOLIC BLOOD PRESSURE: 76 MMHG | HEART RATE: 68 BPM

## 2021-11-24 LAB
ALBUMIN SERPL ELPH-MCNC: 3.1 G/DL — LOW (ref 3.3–5)
ALP SERPL-CCNC: 111 U/L — SIGNIFICANT CHANGE UP (ref 40–120)
ALT FLD-CCNC: 16 U/L — SIGNIFICANT CHANGE UP (ref 10–45)
ANION GAP SERPL CALC-SCNC: 5 MMOL/L — SIGNIFICANT CHANGE UP (ref 5–17)
AST SERPL-CCNC: 15 U/L — SIGNIFICANT CHANGE UP (ref 10–40)
BILIRUB SERPL-MCNC: 1.7 MG/DL — HIGH (ref 0.2–1.2)
BUN SERPL-MCNC: 27 MG/DL — HIGH (ref 7–23)
CALCIUM SERPL-MCNC: 8.8 MG/DL — SIGNIFICANT CHANGE UP (ref 8.4–10.5)
CHLORIDE SERPL-SCNC: 106 MMOL/L — SIGNIFICANT CHANGE UP (ref 96–108)
CO2 SERPL-SCNC: 30 MMOL/L — SIGNIFICANT CHANGE UP (ref 22–31)
CREAT SERPL-MCNC: 1.1 MG/DL — SIGNIFICANT CHANGE UP (ref 0.5–1.3)
GLUCOSE BLDC GLUCOMTR-MCNC: 121 MG/DL — HIGH (ref 70–99)
GLUCOSE BLDC GLUCOMTR-MCNC: 166 MG/DL — HIGH (ref 70–99)
GLUCOSE BLDC GLUCOMTR-MCNC: 96 MG/DL — SIGNIFICANT CHANGE UP (ref 70–99)
GLUCOSE SERPL-MCNC: 103 MG/DL — HIGH (ref 70–99)
HCT VFR BLD CALC: 41.7 % — SIGNIFICANT CHANGE UP (ref 39–50)
HGB BLD-MCNC: 13.8 G/DL — SIGNIFICANT CHANGE UP (ref 13–17)
MAGNESIUM SERPL-MCNC: 2.1 MG/DL — SIGNIFICANT CHANGE UP (ref 1.6–2.6)
MCHC RBC-ENTMCNC: 29.1 PG — SIGNIFICANT CHANGE UP (ref 27–34)
MCHC RBC-ENTMCNC: 33.1 GM/DL — SIGNIFICANT CHANGE UP (ref 32–36)
MCV RBC AUTO: 87.8 FL — SIGNIFICANT CHANGE UP (ref 80–100)
NRBC # BLD: 0 /100 WBCS — SIGNIFICANT CHANGE UP (ref 0–0)
PHOSPHATE SERPL-MCNC: 2.8 MG/DL — SIGNIFICANT CHANGE UP (ref 2.5–4.5)
PLATELET # BLD AUTO: 90 K/UL — LOW (ref 150–400)
POTASSIUM SERPL-MCNC: 3.6 MMOL/L — SIGNIFICANT CHANGE UP (ref 3.5–5.3)
POTASSIUM SERPL-SCNC: 3.6 MMOL/L — SIGNIFICANT CHANGE UP (ref 3.5–5.3)
PROT SERPL-MCNC: 6.1 G/DL — SIGNIFICANT CHANGE UP (ref 6–8.3)
RBC # BLD: 4.75 M/UL — SIGNIFICANT CHANGE UP (ref 4.2–5.8)
RBC # FLD: 17.5 % — HIGH (ref 10.3–14.5)
SODIUM SERPL-SCNC: 141 MMOL/L — SIGNIFICANT CHANGE UP (ref 135–145)
WBC # BLD: 5.76 K/UL — SIGNIFICANT CHANGE UP (ref 3.8–10.5)
WBC # FLD AUTO: 5.76 K/UL — SIGNIFICANT CHANGE UP (ref 3.8–10.5)

## 2021-11-24 RX ORDER — SOD,AMMONIUM,POTASSIUM LACTATE
1 CREAM (GRAM) TOPICAL
Qty: 0 | Refills: 0 | DISCHARGE

## 2021-11-24 RX ORDER — POTASSIUM CHLORIDE 20 MEQ
40 PACKET (EA) ORAL ONCE
Refills: 0 | Status: COMPLETED | OUTPATIENT
Start: 2021-11-24 | End: 2021-11-24

## 2021-11-24 RX ORDER — SACUBITRIL AND VALSARTAN 24; 26 MG/1; MG/1
1 TABLET, FILM COATED ORAL
Qty: 60 | Refills: 5
Start: 2021-11-24 | End: 2022-05-22

## 2021-11-24 RX ORDER — FUROSEMIDE 40 MG
40 TABLET ORAL DAILY
Refills: 0 | Status: DISCONTINUED | OUTPATIENT
Start: 2021-11-24 | End: 2021-11-24

## 2021-11-24 RX ORDER — METOPROLOL TARTRATE 50 MG
0.5 TABLET ORAL
Qty: 15 | Refills: 5
Start: 2021-11-24 | End: 2022-05-22

## 2021-11-24 RX ORDER — LACTULOSE 10 G/15ML
15 SOLUTION ORAL
Qty: 0 | Refills: 0 | DISCHARGE

## 2021-11-24 RX ORDER — FUROSEMIDE 40 MG
1 TABLET ORAL
Qty: 30 | Refills: 5
Start: 2021-11-24 | End: 2022-05-22

## 2021-11-24 RX ORDER — FUROSEMIDE 40 MG
1 TABLET ORAL
Qty: 0 | Refills: 0 | DISCHARGE

## 2021-11-24 RX ORDER — APIXABAN 2.5 MG/1
1 TABLET, FILM COATED ORAL
Qty: 60 | Refills: 5
Start: 2021-11-24 | End: 2022-05-22

## 2021-11-24 RX ORDER — APIXABAN 2.5 MG/1
1 TABLET, FILM COATED ORAL
Qty: 0 | Refills: 0 | DISCHARGE
Start: 2021-11-24

## 2021-11-24 RX ORDER — SACUBITRIL AND VALSARTAN 24; 26 MG/1; MG/1
1 TABLET, FILM COATED ORAL
Qty: 0 | Refills: 0 | DISCHARGE
Start: 2021-11-24

## 2021-11-24 RX ORDER — POTASSIUM CHLORIDE 20 MEQ
1 PACKET (EA) ORAL
Qty: 0 | Refills: 0 | DISCHARGE
Start: 2021-11-24

## 2021-11-24 RX ORDER — POTASSIUM CHLORIDE 20 MEQ
1 PACKET (EA) ORAL
Qty: 0 | Refills: 0 | DISCHARGE

## 2021-11-24 RX ORDER — IBUPROFEN 200 MG
1 TABLET ORAL
Qty: 0 | Refills: 0 | DISCHARGE

## 2021-11-24 RX ORDER — METOPROLOL TARTRATE 50 MG
1 TABLET ORAL
Qty: 0 | Refills: 0 | DISCHARGE

## 2021-11-24 RX ADMIN — APIXABAN 2.5 MILLIGRAM(S): 2.5 TABLET, FILM COATED ORAL at 06:06

## 2021-11-24 RX ADMIN — Medication 40 MILLIGRAM(S): at 11:45

## 2021-11-24 RX ADMIN — Medication 2: at 11:44

## 2021-11-24 RX ADMIN — SACUBITRIL AND VALSARTAN 1 TABLET(S): 24; 26 TABLET, FILM COATED ORAL at 06:06

## 2021-11-24 RX ADMIN — Medication 40 MILLIEQUIVALENT(S): at 11:45

## 2021-11-24 NOTE — PROGRESS NOTE ADULT - SUBJECTIVE AND OBJECTIVE BOX
EPS Progress Note    S: in bed, NAD, no c/o palpitations     MEDICATIONS  (STANDING):  apixaban 2.5 milliGRAM(s) Oral every 12 hours  dextrose 40% Gel 15 Gram(s) Oral once  dextrose 5%. 1000 milliLiter(s) (50 mL/Hr) IV Continuous <Continuous>  dextrose 5%. 1000 milliLiter(s) (100 mL/Hr) IV Continuous <Continuous>  dextrose 50% Injectable 25 Gram(s) IV Push once  dextrose 50% Injectable 12.5 Gram(s) IV Push once  dextrose 50% Injectable 25 Gram(s) IV Push once  furosemide    Tablet 40 milliGRAM(s) Oral daily  glucagon  Injectable 1 milliGRAM(s) IntraMuscular once  insulin lispro (ADMELOG) corrective regimen sliding scale   SubCutaneous Before meals and at bedtime  sacubitril 24 mG/valsartan 26 mG 1 Tablet(s) Oral every 12 hours  tamsulosin 0.4 milliGRAM(s) Oral at bedtime      Telemetry: atrial fibrillation           General:  NAD        HEENT:   PERRL, EOMI	  Neck: Supple, - JVD  Cardiovascular:  S1 S2, No JVD  Respiratory: CTA B/L       Gastrointestinal:  Soft, Non-tender, + BS	  Skin: No rashes, No ecchymoses, No cyanosis  Extremities: No edema  Psychiatry: A & O x 3	         Labs:                                                               13.8   5.76  )-----------( 90       ( 24 Nov 2021 06:17 )             41.7     11-24    141  |  106  |  27<H>  ----------------------------<  103<H>  3.6   |  30  |  1.10    Ca    8.8      24 Nov 2021 06:17  Phos  2.8     11-24  Mg     2.1     11-24    TPro  6.1  /  Alb  3.1<L>  /  TBili  1.7<H>  /  DBili  x   /  AST  15  /  ALT  16  /  AlkPhos  111  11-24    PT/INR - ( 23 Nov 2021 06:08 )   PT: 15.8 sec;   INR: 1.33          PTT - ( 23 Nov 2021 06:08 )  PTT:30.5 sec    Assessment/Plan:  75 y/o M  prior history of medication non-compliance, w/ PMHx type II DM (not currently on medications), A-Flutter/A-Fib (s/p prior ablation in 06/2016, most recently found to be in A-fib in 05/2021, previously on Eliquis but was discontinued), thrombocytopenia (baseline Plt 60-70's on prior admission), stage III CKD (baseline Cr 1.0-1.2), non-obstructive CAD, HFmrEF (EF 45-50% by outpatient Echocardiogram from 05/2021), BPH (s/p TURP procedure), pituitary adenoma (s/p transphenoidal treatment in 1990's), prostate cancer (s/p chemotherapy), chronic venous insufficiency, and prior admission from 06/28/2021 to 07/02/2021 for CHF exacerbation who presented to [facility] ED from Dr. Anton office due to worsening SOB and LE edema.  Telemetry monitoring was significant for 4 beats of NSVT. Patient denies any palpitations, SOB, dizziness, syncope, he reports improvement in his LE edema. Patient needs to be on OMT for his HF, would continue anticoagulation, repeat echocardiogram in 3 month to re evaluate EF. His heart rate is controlled in atrial fibrillation and he is asymptomatic. No NSVT on telemetry today,   no acute EEP interventions. Patient can follow up with  12/20/21 @ 10:40 am, [phone]     EPS Progress Note    S: in bed, NAD, no c/o palpitations     MEDICATIONS  (STANDING):  apixaban 2.5 milliGRAM(s) Oral every 12 hours  dextrose 40% Gel 15 Gram(s) Oral once  dextrose 5%. 1000 milliLiter(s) (50 mL/Hr) IV Continuous <Continuous>  dextrose 5%. 1000 milliLiter(s) (100 mL/Hr) IV Continuous <Continuous>  dextrose 50% Injectable 25 Gram(s) IV Push once  dextrose 50% Injectable 12.5 Gram(s) IV Push once  dextrose 50% Injectable 25 Gram(s) IV Push once  furosemide    Tablet 40 milliGRAM(s) Oral daily  glucagon  Injectable 1 milliGRAM(s) IntraMuscular once  insulin lispro (ADMELOG) corrective regimen sliding scale   SubCutaneous Before meals and at bedtime  sacubitril 24 mG/valsartan 26 mG 1 Tablet(s) Oral every 12 hours  tamsulosin 0.4 milliGRAM(s) Oral at bedtime      Telemetry: atrial fibrillation           General:  NAD        HEENT:   PERRL, EOMI	  Neck: Supple, - JVD  Cardiovascular:  S1 S2, No JVD  Respiratory: CTA B/L       Gastrointestinal:  Soft, Non-tender, + BS	  Skin: No rashes, No ecchymoses, No cyanosis  Extremities: No edema  Psychiatry: A & O x 3	         Labs:                                                               13.8   5.76  )-----------( 90       ( 24 Nov 2021 06:17 )             41.7     11-24    141  |  106  |  27<H>  ----------------------------<  103<H>  3.6   |  30  |  1.10    Ca    8.8      24 Nov 2021 06:17  Phos  2.8     11-24  Mg     2.1     11-24    TPro  6.1  /  Alb  3.1<L>  /  TBili  1.7<H>  /  DBili  x   /  AST  15  /  ALT  16  /  AlkPhos  111  11-24    PT/INR - ( 23 Nov 2021 06:08 )   PT: 15.8 sec;   INR: 1.33          PTT - ( 23 Nov 2021 06:08 )  PTT:30.5 sec    Assessment/Plan:  77 y/o M  prior history of medication non-compliance, w/ PMHx type II DM (not currently on medications), A-Flutter/A-Fib (s/p prior ablation in 06/2016, most recently found to be in A-fib in 05/2021, previously on Eliquis but was discontinued), thrombocytopenia (baseline Plt 60-70's on prior admission), stage III CKD (baseline Cr 1.0-1.2), non-obstructive CAD, HFmrEF (EF 45-50% by outpatient Echocardiogram from 05/2021), BPH (s/p TURP procedure), pituitary adenoma (s/p transphenoidal treatment in 1990's), prostate cancer (s/p chemotherapy), chronic venous insufficiency, and prior admission from 06/28/2021 to 07/02/2021 for CHF exacerbation who presented to [facility] ED from Dr. Anton office due to worsening SOB and LE edema.  Telemetry monitoring was significant for 4 beats of NSVT. Patient denies any palpitations, SOB, dizziness, syncope, he reports improvement in his LE edema.   Patient needs to be on OMT for his HF, would continue anticoagulation, repeat echocardiogram in 3 month to re evaluate EF. His heart rate is controlled in atrial fibrillation and he is asymptomatic. No NSVT on telemetry today,   no acute EP interventions. Patient can follow up with  12/20/21 @ 10:40 am, [phone]

## 2021-11-24 NOTE — DISCHARGE NOTE PROVIDER - CARE PROVIDER_API CALL
Yara Anton (MD)  Cardiovascular Disease; Interventional Cardiology  [address], [address]  [place] [address]  Phone: [phone]  Fax: [phone]  Scheduled Appointment: 11/29/2021 11:30 AM    Jimi Pemberton)  Cardiac Electrophysiology; Cardiovascular Disease; Internal Medicine  [address] [address]  Phone: [phone]  Fax: [phone]  Scheduled Appointment: 12/20/2021 10:40 AM    Anjelica Bernal  INTERNAL MEDICINE  [address], [address]  [place] [address]  Phone: [phone]  Fax: [phone]  Scheduled Appointment: 12/02/2021 01:45 AM

## 2021-11-24 NOTE — PROGRESS NOTE ADULT - REASON FOR ADMISSION
Acute on Chronic Heart Failure Exacerbation

## 2021-11-24 NOTE — DISCHARGE NOTE PROVIDER - PROVIDER TOKENS
PROVIDER:[TOKEN:[[id number]],SCHEDULEDAPPT:[11/29/2021],SCHEDULEDAPPTTIME:[11:30 AM]],PROVIDER:[TOKEN:[[id number]],SCHEDULEDAPPT:[12/20/2021],SCHEDULEDAPPTTIME:[10:40 AM]],PROVIDER:[TOKEN:[[id number]],SCHEDULEDAPPT:[12/02/2021],SCHEDULEDAPPTTIME:[01:45 AM]]

## 2021-11-24 NOTE — DISCHARGE NOTE PROVIDER - NSDCCPCAREPLAN_GEN_ALL_CORE_FT
PRINCIPAL DISCHARGE DIAGNOSIS  Diagnosis: Acute exacerbation of congestive heart failure  Assessment and Plan of Treatment: # Acute on chronic congestive heart failure.   You presented with worsening lower extremity edema for two weeks and worsening shortness of breath, and found to have acute on chronic congestive heart failure. You wer   w/ minimal exertion for one week. Not compliant w home medications: Lasix 40 mg daily, Metolazone 5 mg daily, Entresto 49/51 mg daily and Toprol XL 25 mg daily.  - HFrEF w/ EF 50% by outpatient Echocardiogram from 06/2021   - TTE 11/22: Left ventricular systolic function is moderately reduced with a calculated LVEF 35% with global hypokinesis. Severe concentric LVH. Biatrial enlargement. Normal right ventricular size. Small-to-moderate pericardial effusion, no evidence of tamponade. The proximal ascending aorta is dilated measuring 4.10 cm. Findings suggestive of infiltrative cardiomyopathy.  - BNP 12K and trop 0.12 (likely in the setting of CHF)  - EKG showed atrial fibrillation at 63 bpm w/o acute ischemic changes   - Chest X-ray showed significant pulmonary congestion   - BM biopsy done 11/23/21 for suspicious of infiltrative disease as IVSd (2D):1.80 cm  (men 0.6-1.0)    - will resume lasix 40 mg qd  - will modify to continue with Entresto 24/26 mg BID, Toprol 12.5 mg daily  - dc metolazone 5  mg daily         SECONDARY DISCHARGE DIAGNOSES  Diagnosis: NSVT (nonsustained ventricular tachycardia)  Assessment and Plan of Treatment: # NSVT (nonsustained ventricular tachycardia).  EP consulted for telemetry recordings significant for 4 beats of NSVT.  - Patient evaluated by EP, and no intervention needed and no need for life vest at this time. Will schedule outpatient EP evaluation for OMT   - As per EP recs, repeat TTE in 3 months.  - C/w with current medical management    Diagnosis: Atrial fibrillation and flutter  Assessment and Plan of Treatment:   # Atrial fibrillation and flutter.   Known history of A-flutter/A-Fib w/ prior ablation in 2016.   - EKG showed rate controlled A-fib w/o acute ischemic changes  - Was previously on Eliquis but was discontinued due to thrombocytopenia as per patient   - As per hematology, if platelets > 50k AC can be restarted  - holding metoprolol succinate 25 mg daily for rate control due to overdiuresis and hypotension      - restart metoprolol 12.5 mg daily as outpatient   - started on eliquis 2.5 mg BID.    Diagnosis: Thrombocytopenia  Assessment and Plan of Treatment: You have history of low platelet count (Moderate thrombocytopenia) for which anticoagulation for your atrial fibrillation was stopped. You underwent bone marrow biopsy on 11/23/21 and will be following results for further underlying process leading to your low platelets. You will follow up as outpatient with Hematologist Dr. Anjelica Bernal for results and further workup and/or management.       Diagnosis: Stage 3 chronic kidney disease  Assessment and Plan of Treatment: You have chronic kidney disease stage 3 with a baseline creatinine level of 1.0-1.2. On admission your creatinine was found to be elevated at 1.32, and we continued to monitor as appropiated. Upon discharge your creatinine improved to 1.10.     PRINCIPAL DISCHARGE DIAGNOSIS  Diagnosis: Acute exacerbation of congestive heart failure  Assessment and Plan of Treatment: You presented with worsening lower extremity edema for two weeks and worsening shortness of breath, and found to have acute on chronic congestive heart failure. You were found to have an worsening reduce of yoy left ventricular ejection fraction from 50% now at 35%. You were admitted and treated with diuresis for fluid overload management. You will be discharge home to continue lasix 40 mg daily, with a lower dose of Entresto 24/26 mg 2 times a day and metoprolol succinate 12.5 mg daily. You will stop taking your home metolazone 5  mg daily. Due to finding in your echocardiogram suggestive of infiltrative disease we pursued a bone marrow biopsy on 11/23/21 in [facility] to further evaluate the etiology of your heart failure and possible amyloidosis disease. You will continue to follow up outpatient with your cardiologist Dr. Anton in 1 weeks.      SECONDARY DISCHARGE DIAGNOSES  Diagnosis: NSVT (nonsustained ventricular tachycardia)  Assessment and Plan of Treatment: Duering your admission there was an abnormal rhythm seen on telemetry called nonsustained ventricular tachycardia for which the electrophysiologist were consulted. They found no indication for intervention nor placement of life vest at this time. We proceded to schedule an outpatient appointment with Dr. Pemberton for further evaluation and management.    Diagnosis: Atrial fibrillation and flutter  Assessment and Plan of Treatment: You have known history of Atrial fibrillation and flutter with history of prior ablation in 2016 currently on rate control. You will be restarted on anticoagulation with Eliquis 2.5 mg 2 times a day and will continue on rate control with metoprolol 12.5 mg daily.    Diagnosis: Thrombocytopenia  Assessment and Plan of Treatment: You have history of low platelet count (Moderate thrombocytopenia) for which anticoagulation for your atrial fibrillation was stopped. You underwent bone marrow biopsy on 11/23/21 and will be following results for further underlying process leading to your low platelets. You will follow up as outpatient with Hematologist Dr. Anjelica Bernal for results and further workup and/or management.       Diagnosis: Stage 3 chronic kidney disease  Assessment and Plan of Treatment: You have chronic kidney disease stage 3 with a baseline creatinine level of 1.0-1.2. On admission your creatinine was found to be elevated at 1.32, and we continued to monitor as appropiated. Upon discharge your creatinine improved to 1.10.

## 2021-11-24 NOTE — DISCHARGE NOTE PROVIDER - NSDCMRMEDTOKEN_GEN_ALL_CORE_FT
ammonium lactate 12% topical cream: Apply topically to affected area 2 times a day  ibuprofen 600 mg oral tablet: 1 tab(s) orally every 6 hours, As Needed  lactulose 10 g/15 mL oral solution: 15 milliliter(s) orally once a day (at bedtime)  Lasix 40 mg oral tablet: 1 tab(s) orally once a day  metOLazone 5 mg oral tablet: 1 tab(s) orally once a day  potassium chloride 10 mEq oral capsule, extended release: 1 cap(s) orally once a day  tamsulosin 0.4 mg oral capsule: 1 cap(s) orally once a day  Toprol-XL 25 mg oral tablet, extended release: 1 tab(s) orally once a day   apixaban 2.5 mg oral tablet: 1 tab(s) orally every 12 hours  Lasix 40 mg oral tablet: 1 tab(s) orally once a day  metoprolol succinate 25 mg oral tablet, extended release: 0.5 tab(s) orally once a day   potassium chloride 10 mEq oral capsule, extended release: 1 cap(s) orally once a day  sacubitril-valsartan 24 mg-26 mg oral tablet: 1 tab(s) orally every 12 hours  sacubitril-valsartan 24 mg-26 mg oral tablet: 1 tab(s) orally every 12 hours  tamsulosin 0.4 mg oral capsule: 1 cap(s) orally once a day

## 2021-11-24 NOTE — DISCHARGE NOTE PROVIDER - CARE PROVIDERS DIRECT ADDRESSES
,DirectAddress_Unknown,[email or web address],DirectAddress_Unknown

## 2021-11-24 NOTE — DISCHARGE NOTE NURSING/CASE MANAGEMENT/SOCIAL WORK - PATIENT PORTAL LINK FT
You can access the FollowMyHealth Patient Portal offered by [facility] by registering at the following website: [email or web address]. By joining [facility]’s FollowMyHealth portal, you will also be able to view your health information using other applications (apps) compatible with our system.

## 2021-11-24 NOTE — DISCHARGE NOTE PROVIDER - HOSPITAL COURSE
#Discharge: do not delete    Patient is __ yo M/F with past medical history of _____ presented with _____, found to have _____ (one liner)    Hospital course (by problem):     Patient was discharged to: (home/COMFORT/acute rehab/hospice, etc, and with what services – home health PT/RN? Home O2?)    New medications:   Changes to old medications:  Medications that were stopped:    Items to follow up as outpatient:Labs to be followed outpatient:     Exam to be followed outpatient:     Physical exam at the time of discharge:    Dx: TREATMENT FOR STEMI or HEART FAILURE THIS ADMISSION: YES/NO            If Yes to STEMI or Heart Failure: 7 day Follow-Up Appointment Made: Yes/No, Yes: Date/Time; IF No, why not?           Cardiac Rehab Indications (STEMI/NSTEMI/ACS/Unstable Angina/CHF/Chronic Stable Angina/Heart Surgery (CABG,Valve)/Post PCI):            *Education on benefits of Cardiac Rehab provided to patient: Yes         *Referral and Prescription Given for Cardiac Rehab: Yes/No.  If No, Why Not?  (see reasons below)         *Pt given list of locations & instructed to contact their insurance company to review list of participating providers. Yes          *Pt instructed to bring Cardiac Rehab prescription with them to Cardiology Follow up appointment for assistance with enrollment: Yes    (Reasons for No Cardiac Rehab Referral Rx - must document 1 or more options):                Patient Refused            Medical Reason: ex: needs Home Care, Home PT, severe or symptomatic AS            Patient lacks medical coverage for Cardiac Rehab            Pt discharged to Nursing Care/COMFORT/Long term Care Facility            Patient Lacks Transportation or no cardiac rehab within 60 minutes driving range            Patient already participates in Cardiac Rehab            Other: (provide details) ex: Pt discharged to Hospice; prescription printer not working & pt was instructed to obtain Rx from outpt Cardiologist.    AMI: Beta Blocker Prescribed: Yes/No. If no, Why not?            ACE-I/ARB Prescribed: Yes/No. If no, Why not? ex: Entresto prescribed, Not indicated at this time, worsening renal function  CHF: Beta Blocker Prescribed: Yes/No.  If No, Why Not?           ACE-I/ARB/Entresto Prescribed: Yes/No.  If No, Why Not? ex: hypotension, worsening renal function  Statin Prescribed (STEMI/NSTEMI/ACS/UA &/OR Post PCI this admission):  Yes/No; If No, No Statin Prescribed due to______  DAPT Post PCI: Prescriptions for Aspirin/Plavix/Brilinta/Effient e-prescribed to patient's pharmacy: Yes/No__.                *No Aspirin/Plavix/Brilinta/Effient prescribed due to ___.       75 y/o male, prior history of medication non-compliance, w/ PMHx type II DM (not currently on medications), A-Flutter/A-Fib (s/p prior ablation in 06/2016, most recently found to be in A-fib in 05/2021, previously on Eliquis but was discontinued), thrombocytopenia (baseline Plt 60-70's on prior admission), stage III CKD (baseline Cr 1.0-1.2), non-obstructive CAD, HFrEF (EF 45-50% by outpatient Echocardiogram from 05/2021), admitted to cardiology/telemetry for further management of acute on chronic heart failure exacerbation and infiltrative disease workup.     Hospital course (by problem):   # Acute on chronic congestive heart failure.   Presented w/ worsening LE edema for two weeks and worsening SOB w/ minimal exertion for one week. Not compliant w home medications: Lasix 40 mg daily, Metolazone 5 mg daily, Entresto 49/51 mg daily and Toprol XL 25 mg daily.  - HFrEF w/ EF 50% by outpatient Echocardiogram from 06/2021   - TTE 11/22: Left ventricular systolic function is moderately reduced with a calculated LVEF 35% with global hypokinesis. Severe concentric LVH. Biatrial enlargement. Normal right ventricular size. Small-to-moderate pericardial effusion, no evidence of tamponade. The proximal ascending aorta is dilated measuring 4.10 cm. Findings suggestive of infiltrative cardiomyopathy.  - BNP 12K and trop 0.12 (likely in the setting of CHF)  - EKG showed atrial fibrillation at 63 bpm w/o acute ischemic changes   - Chest X-ray showed significant pulmonary congestion       - ACE wrap legs  - BM biopsy done 11/23/21; AC resumed   - Holding Lasix 80 mg IV BID, Metolazone 5 mg daily and  Toprol XL 25 mg daily due to overdiuresis with low blood pressures: patient with total output of net negative 11.7L   - restart BP and goal directed therapy as appropriated  - restarted  Entresto 24/26 mg BID, lasix 40 mg daily  - upon discharge restart metoprolop       Problem/Plan - 2:  ·  Problem: NSVT (nonsustained ventricular tachycardia).  ·  Plan: EP consulted for telemetry recordings significant for 4 beats of NSVT.  - Outpatient EP evaluation for OMT   - As per EP recs, repeat TTE in 3 months.     Problem/Plan - 3:  ·  Problem: Atrial fibrillation and flutter.   ·  Plan: Known history of A-flutter/A-Fib w/ prior ablation in 2016  - was previously on Eliquis but was discontinued due to thrombocytopenia as per patient   - EKG showed rate controlled A-fib w/o acute ischemic changes  - As per hematology, if platelets > 50k AC can be restarted    Plan  - holding metoprolol succinate 25 mg daily for rate control due to overdiuresis and hypotension, restart as appropriated  - started on eliquis 2.5 mg BID.     Problem/Plan - 4:  ·  Problem: Thrombocytopenia.   ·  Plan: Platelets 89 on admission, baseline from prior admission 60-70's  - monitor CBC  - Transfuse for plts <50k + bleeding, <20k + fever, or <10k   - F/U BM bipsy 11/23/21  - F/U hematology recs.     Problem/Plan - 5:  ·  Problem: Stage 3 chronic kidney disease.   ·  Plan: Baseline Cr 1.0-1.2, Cr 1.32 on admission   - monitor BMP.     Problem/Plan - 6:    Patient was discharged to: Home  New medications:   Changes to old medications:  Medications that were stopped:    Items to follow up as outpatient: Bone marrow biopsy     Physical exam at the time of discharge:  General: NAD, cooperative, well developed   HEENT: NC/AT; PERRL, anicteric sclera; dry MM  Neck: supple, no LAD, + JVD  Cardiovascular: +S1/S2; sinus skip, regular rhythm   Respiratory: CTA bl, no wheezes   Gastrointestinal: soft, NT/ND; +BSx4  Extremities: WWP; 1+ pitting edema LEs b/l; LE wrapped in compression stockings b/l   Vascular: 2+ radial, DP/PT pulses B/L  Neurological: AAOx3; no focal deficits    Dx: TREATMENT FOR STEMI or HEART FAILURE THIS ADMISSION: YES/NO            If Yes to STEMI or Heart Failure: 7 day Follow-Up Appointment Made: Yes/No, Yes: Date/Time; IF No, why not?           Cardiac Rehab Indications (STEMI/NSTEMI/ACS/Unstable Angina/CHF/Chronic Stable Angina/Heart Surgery (CABG,Valve)/Post PCI):            *Education on benefits of Cardiac Rehab provided to patient: Yes         *Referral and Prescription Given for Cardiac Rehab: Yes/No.  If No, Why Not?  (see reasons below)         *Pt given list of locations & instructed to contact their insurance company to review list of participating providers. Yes          *Pt instructed to bring Cardiac Rehab prescription with them to Cardiology Follow up appointment for assistance with enrollment: Yes    (Reasons for No Cardiac Rehab Referral Rx - must document 1 or more options):              Patient Refused            Medical Reason: ex: needs Home Care, Home PT, severe or symptomatic AS            Patient lacks medical coverage for Cardiac Rehab            Pt discharged to Nursing Care/COMFORT/Long term Care Facility            Patient Lacks Transportation or no cardiac rehab within 60 minutes driving range            Patient already participates in Cardiac Rehab            Other: (provide details) ex: Pt discharged to Hospice; prescription printer not working & pt was instructed to obtain Rx from outpt Cardiologist.    CHF: Beta Blocker Prescribed: Yes, metoprolol succinate 12.5 mg daily___________           ACE-I/ARB/Entresto Prescribed: Yes, Entresto 24/26 mg BID_____________________ 77 y/o male, prior history of medication non-compliance, w/ PMHx type II DM (not currently on medications), A-Flutter/A-Fib (s/p prior ablation in 06/2016, most recently found to be in A-fib in 05/2021, previously on Eliquis but was discontinued), thrombocytopenia (baseline Plt 60-70's on prior admission), stage III CKD (baseline Cr 1.0-1.2), non-obstructive CAD, HFrEF (EF 45-50% by outpatient Echocardiogram from 05/2021), admitted to cardiology/telemetry for further management of acute on chronic heart failure exacerbation and infiltrative disease workup.     Hospital course (by problem):   # Acute on chronic congestive heart failure.   Presented w/ worsening LE edema for two weeks and worsening SOB w/ minimal exertion for one week. Not compliant w home medications: Lasix 40 mg daily, Metolazone 5 mg daily, Entresto 49/51 mg daily and Toprol XL 25 mg daily.  - HFrEF w/ EF 50% by outpatient Echocardiogram from 06/2021   - TTE 11/22: Left ventricular systolic function is moderately reduced with a calculated LVEF 35% with global hypokinesis. Severe concentric LVH. Biatrial enlargement. Normal right ventricular size. Small-to-moderate pericardial effusion, no evidence of tamponade. The proximal ascending aorta is dilated measuring 4.10 cm. Findings suggestive of infiltrative cardiomyopathy.  - BNP 12K and trop 0.12 (likely in the setting of CHF)  - EKG showed atrial fibrillation at 63 bpm w/o acute ischemic changes   - Chest X-ray showed significant pulmonary congestion   - BM biopsy done 11/23/21 for suspicious of infiltrative disease as IVSd (2D):1.80 cm  (men 0.6-1.0)    - will resume lasix 40 mg qd  - will modify to continue with Entresto 24/26 mg BID, Toprol 12.5 mg daily  - dc metolazone 5  mg daily     # NSVT (nonsustained ventricular tachycardia).  EP consulted for telemetry recordings significant for 4 beats of NSVT.  - Patient evaluated by EP, and no intervention needed and no need for life vest at this time. Will schedule outpatient EP evaluation for OMT   - As per EP recs, repeat TTE in 3 months.  - C/w with current medical management     # Atrial fibrillation and flutter.   Known history of A-flutter/A-Fib w/ prior ablation in 2016.   - EKG showed rate controlled A-fib w/o acute ischemic changes  - Was previously on Eliquis but was discontinued due to thrombocytopenia as per patient   - As per hematology, if platelets > 50k AC can be restarted  - holding metoprolol succinate 25 mg daily for rate control due to overdiuresis and hypotension      - restart metoprolol 12.5 mg daily as outpatient   - started on eliquis 2.5 mg BID.    # Thrombocytopenia.   Platelets 89 on admission, baseline from prior admission 60-70's  - Transfuse for plts <50k + bleeding, <20k + fever, or <10k   - F/U BM bipsy 11/23/21  - F/U hematology recs.    # Stage 3 chronic kidney disease.   Baseline Cr 1.0-1.2, Cr 1.32 on admission   - monitor BMP.    Patient was discharged to: Home  New medications: Eliquis 2.5mg BID   Changes to old medications: Entresto 24/26 mg BID, Toprol 12.5 mg daily  Medications that were stopped: metolazone 5  mg daily     Items to follow up as outpatient: Bone marrow biopsy     Physical exam at the time of discharge:  General: NAD, cooperative, well developed   HEENT: NC/AT; PERRL, anicteric sclera; dry MM  Neck: supple, no LAD, + JVD  Cardiovascular: +S1/S2; sinus skip, regular rhythm   Respiratory: CTA bl, no wheezes   Gastrointestinal: soft, NT/ND; +BSx4  Extremities: WWP; 1+ pitting edema LEs b/l; LE wrapped in compression stockings b/l   Vascular: 2+ radial, DP/PT pulses B/L  Neurological: AAOx3; no focal deficits    Dx: HEART FAILURE THIS ADMISSION: YES/NO            If Yes to STEMI or Heart Failure: 7 day Follow-Up Appointment            Cardiac Rehab Indications (STEMI/NSTEMI/ACS/Unstable Angina/CHF/Chronic Stable Angina/Heart Surgery (CABG,Valve)/Post PCI):            *Education on benefits of Cardiac Rehab provided to patient: Yes         *Referral and Prescription Given for Cardiac Rehab: No, pt was instructed to obtain Rx from outpt Cardiologist.         *Pt given list of locations & instructed to contact their insurance company to review list of participating providers. Yes          *Pt instructed to bring Cardiac Rehab prescription with them to Cardiology Follow up appointment for assistance with enrollment: Yes      CHF: Beta Blocker Prescribed: Yes, metoprolol succinate 12.5 mg daily           ACE-I/ARB/Entresto Prescribed: Yes, Entresto 24/26 mg BID

## 2021-11-30 DIAGNOSIS — E11.22 TYPE 2 DIABETES MELLITUS WITH DIABETIC CHRONIC KIDNEY DISEASE: ICD-10-CM

## 2021-11-30 DIAGNOSIS — I47.1 SUPRAVENTRICULAR TACHYCARDIA: ICD-10-CM

## 2021-11-30 DIAGNOSIS — N18.30 CHRONIC KIDNEY DISEASE, STAGE 3 UNSPECIFIED: ICD-10-CM

## 2021-11-30 DIAGNOSIS — N40.0 BENIGN PROSTATIC HYPERPLASIA WITHOUT LOWER URINARY TRACT SYMPTOMS: ICD-10-CM

## 2021-11-30 DIAGNOSIS — I48.20 CHRONIC ATRIAL FIBRILLATION, UNSPECIFIED: ICD-10-CM

## 2021-11-30 DIAGNOSIS — I48.92 UNSPECIFIED ATRIAL FLUTTER: ICD-10-CM

## 2021-11-30 DIAGNOSIS — I95.9 HYPOTENSION, UNSPECIFIED: ICD-10-CM

## 2021-11-30 DIAGNOSIS — D69.6 THROMBOCYTOPENIA, UNSPECIFIED: ICD-10-CM

## 2021-11-30 DIAGNOSIS — D75.9 DISEASE OF BLOOD AND BLOOD-FORMING ORGANS, UNSPECIFIED: ICD-10-CM

## 2021-11-30 DIAGNOSIS — I50.23 ACUTE ON CHRONIC SYSTOLIC (CONGESTIVE) HEART FAILURE: ICD-10-CM

## 2021-11-30 DIAGNOSIS — Z91.14 PATIENT'S OTHER NONCOMPLIANCE WITH MEDICATION REGIMEN: ICD-10-CM

## 2021-11-30 DIAGNOSIS — I25.10 ATHEROSCLEROTIC HEART DISEASE OF NATIVE CORONARY ARTERY WITHOUT ANGINA PECTORIS: ICD-10-CM

## 2021-11-30 DIAGNOSIS — I87.2 VENOUS INSUFFICIENCY (CHRONIC) (PERIPHERAL): ICD-10-CM

## 2021-12-20 ENCOUNTER — NON-APPOINTMENT (OUTPATIENT)
Age: 76
End: 2021-12-20

## 2021-12-20 ENCOUNTER — APPOINTMENT (OUTPATIENT)
Dept: HEART AND VASCULAR | Facility: CLINIC | Age: 76
End: 2021-12-20
Payer: MEDICARE

## 2021-12-20 VITALS
SYSTOLIC BLOOD PRESSURE: 112 MMHG | HEART RATE: 65 BPM | BODY MASS INDEX: 26.66 KG/M2 | HEIGHT: 69 IN | TEMPERATURE: 97.7 F | DIASTOLIC BLOOD PRESSURE: 74 MMHG | WEIGHT: 180 LBS

## 2021-12-20 PROCEDURE — 93000 ELECTROCARDIOGRAM COMPLETE: CPT

## 2021-12-20 PROCEDURE — 99214 OFFICE O/P EST MOD 30 MIN: CPT

## 2021-12-22 PROCEDURE — 84100 ASSAY OF PHOSPHORUS: CPT

## 2021-12-22 PROCEDURE — 71046 X-RAY EXAM CHEST 2 VIEWS: CPT

## 2021-12-22 PROCEDURE — 99285 EMERGENCY DEPT VISIT HI MDM: CPT | Mod: 25

## 2021-12-22 PROCEDURE — 85730 THROMBOPLASTIN TIME PARTIAL: CPT

## 2021-12-22 PROCEDURE — 85610 PROTHROMBIN TIME: CPT

## 2021-12-22 PROCEDURE — 97116 GAIT TRAINING THERAPY: CPT

## 2021-12-22 PROCEDURE — 82962 GLUCOSE BLOOD TEST: CPT

## 2021-12-22 PROCEDURE — 83735 ASSAY OF MAGNESIUM: CPT

## 2021-12-22 PROCEDURE — 88364 INSITU HYBRIDIZATION (FISH): CPT

## 2021-12-22 PROCEDURE — 82553 CREATINE MB FRACTION: CPT

## 2021-12-22 PROCEDURE — 81003 URINALYSIS AUTO W/O SCOPE: CPT

## 2021-12-22 PROCEDURE — 86769 SARS-COV-2 COVID-19 ANTIBODY: CPT

## 2021-12-22 PROCEDURE — 83605 ASSAY OF LACTIC ACID: CPT

## 2021-12-22 PROCEDURE — 82803 BLOOD GASES ANY COMBINATION: CPT

## 2021-12-22 PROCEDURE — 83880 ASSAY OF NATRIURETIC PEPTIDE: CPT

## 2021-12-22 PROCEDURE — 93306 TTE W/DOPPLER COMPLETE: CPT

## 2021-12-22 PROCEDURE — 80061 LIPID PANEL: CPT

## 2021-12-22 PROCEDURE — 96374 THER/PROPH/DIAG INJ IV PUSH: CPT

## 2021-12-22 PROCEDURE — 82550 ASSAY OF CK (CPK): CPT

## 2021-12-22 PROCEDURE — 36415 COLL VENOUS BLD VENIPUNCTURE: CPT

## 2021-12-22 PROCEDURE — 80048 BASIC METABOLIC PNL TOTAL CA: CPT

## 2021-12-22 PROCEDURE — 85025 COMPLETE CBC W/AUTO DIFF WBC: CPT

## 2021-12-22 PROCEDURE — 88365 INSITU HYBRIDIZATION (FISH): CPT

## 2021-12-22 PROCEDURE — U0005: CPT

## 2021-12-22 PROCEDURE — 88305 TISSUE EXAM BY PATHOLOGIST: CPT

## 2021-12-22 PROCEDURE — 71045 X-RAY EXAM CHEST 1 VIEW: CPT

## 2021-12-22 PROCEDURE — 88313 SPECIAL STAINS GROUP 2: CPT

## 2021-12-22 PROCEDURE — 84484 ASSAY OF TROPONIN QUANT: CPT

## 2021-12-22 PROCEDURE — 93005 ELECTROCARDIOGRAM TRACING: CPT

## 2021-12-22 PROCEDURE — 88311 DECALCIFY TISSUE: CPT

## 2021-12-22 PROCEDURE — 88341 IMHCHEM/IMCYTCHM EA ADD ANTB: CPT

## 2021-12-22 PROCEDURE — 85097 BONE MARROW INTERPRETATION: CPT

## 2021-12-22 PROCEDURE — 83036 HEMOGLOBIN GLYCOSYLATED A1C: CPT

## 2021-12-22 PROCEDURE — U0003: CPT

## 2021-12-22 PROCEDURE — 80053 COMPREHEN METABOLIC PANEL: CPT

## 2021-12-22 PROCEDURE — 97161 PT EVAL LOW COMPLEX 20 MIN: CPT

## 2021-12-22 PROCEDURE — 85027 COMPLETE CBC AUTOMATED: CPT

## 2021-12-23 NOTE — DISCUSSION/SUMMARY
[FreeTextEntry1] : Mr. Wise is a pleasant 76 year-old gentleman with a past medical history significant for DM II, Thrombocytopenia, CKD St III,  non-obstructive CAD, HFmrEF (EF 35%), BPH s/p TURP, pituitary adenoma (s/p transphenoidal treatment in 1990's), Prostate CA s/p chemo, chronic venous insufficiency, and persistent atrial flutter/fibrillation.  He had a prior ablation in 2016 at [facility].  He remains in persistent atrial fibrillation with CVR.  Unclear duration if his recurrent arrhythmia.  We discussed the importance of medical therapy for management of CHF.  I recommended a cardiac MRI to evaluate for infiltrative disease and reassess his LVEF in February.  Options for management of arrhythmia are limited at this time as he remains on reduced dose Eliquis pending further heme/onc recommendations.  He will return for follow-up in 1-2 months and knows to call with any questions or concerns in the interim.

## 2021-12-23 NOTE — CARDIOLOGY SUMMARY
[de-identified] : 12/20/21 AF @ 63 bpm  [de-identified] : 11/22/21\par  1. Biatrial enlargement.\par  2. Mild pulmonic regurgitation.\par  3. Pulmonary artery systolic pressure is 38 mmHg.\par  4. No other significant valvular disease.\par  5. Normal right ventricular size.\par  6. Reduced right ventricular systolic function.\par  7. There is severe concentric left ventricular hypertrophy. Left ventricular systolic function is moderately reduced with a calculated ejection fraction of 35% with global hypokinesis.\par  8. Small-to-moderate pericardial effusion without echocardiographic evidence of cardiac tamponade physiology.\par  9. The proximal ascending aorta is dilated measuring 4.10 cm.\par 10. Findings suggestive of infiltrative cardiomyopathy.\par 11. Compared to the previous TTE performed on 6/29/2021, LV systolic function appears reduced.\par \par

## 2021-12-23 NOTE — HISTORY OF PRESENT ILLNESS
[FreeTextEntry1] : Mr. Wise is a pleasant 76 year-old gentleman with a past medical history significant for DM II, Thrombocytopenia, CKD St III,  non-obstructive CAD, HFmrEF (EF 35%), BPH s/p TURP, pituitary adenoma (s/p transphenoidal treatment in 1990's), Prostate CA s/p chemo, chronic venous insufficiency, and persistent atrial flutter/fibrillation.  He had a prior ablation in 2016 at [facility].  He was noted to be in atrial fibrillation during an admission to [facility] 5/2021.  He was readmitted to [facility] 11/2021 with acute on chronic CHF.  Telemetry significant for atrial fibrillation with brief NSVT (4 beats).  He had a bone marrow biopsy to evaluate for amyloid; the [place] red stain is negative for amyloid, however, the biopsy is very small and may not be representative of the underlying pathology.  As per patient, his outpatient hematologist is considering a repeat biopsy.  He is on reduced dose Eliquis 2.5 mg BID. He reports compliance with his medication regimen and is able to recall them during office visit today.

## 2021-12-23 NOTE — PHYSICAL EXAM
[Well Developed] : well developed [Well Nourished] : well nourished [No Acute Distress] : no acute distress [Normal Conjunctiva] : normal conjunctiva [Normal Venous Pressure] : normal venous pressure [No Carotid Bruit] : no carotid bruit [Normal S1, S2] : normal S1, S2 [No Murmur] : no murmur [No Rub] : no rub [No Gallop] : no gallop [5th Left ICS - MCL] : palpated at the 5th LICS in the midclavicular line [Normal Rate] : normal [Irregularly Irregular] : irregularly irregular [Clear Lung Fields] : clear lung fields [Good Air Entry] : good air entry [No Respiratory Distress] : no respiratory distress  [Soft] : abdomen soft [Non Tender] : non-tender [No Masses/organomegaly] : no masses/organomegaly [Normal Bowel Sounds] : normal bowel sounds [Normal Gait] : normal gait [No Edema] : no edema [No Cyanosis] : no cyanosis [No Clubbing] : no clubbing [No Varicosities] : no varicosities [No Rash] : no rash [No Skin Lesions] : no skin lesions [Moves all extremities] : moves all extremities [No Focal Deficits] : no focal deficits [Normal Speech] : normal speech [Alert and Oriented] : alert and oriented [Normal memory] : normal memory

## 2021-12-23 NOTE — REVIEW OF SYSTEMS
[Dyspnea on exertion] : dyspnea during exertion [Palpitations] : no palpitations [Negative] : Heme/Lymph

## 2022-02-06 VITALS
WEIGHT: 188.05 LBS | HEIGHT: 69 IN | DIASTOLIC BLOOD PRESSURE: 78 MMHG | RESPIRATION RATE: 18 BRPM | SYSTOLIC BLOOD PRESSURE: 117 MMHG | HEART RATE: 90 BPM | TEMPERATURE: 98 F | OXYGEN SATURATION: 94 %

## 2022-02-06 LAB
BASE EXCESS BLDV CALC-SCNC: 1.1 MMOL/L — SIGNIFICANT CHANGE UP (ref -2–3)
CA-I SERPL-SCNC: 1.12 MMOL/L — LOW (ref 1.15–1.33)
CO2 BLDV-SCNC: 30 MMOL/L — HIGH (ref 22–26)
GAS PNL BLDV: 140 MMOL/L — SIGNIFICANT CHANGE UP (ref 136–145)
GAS PNL BLDV: SIGNIFICANT CHANGE UP
HCO3 BLDV-SCNC: 28 MMOL/L — SIGNIFICANT CHANGE UP (ref 22–29)
PCO2 BLDV: 55 MMHG — SIGNIFICANT CHANGE UP (ref 42–55)
PH BLDV: 7.32 — SIGNIFICANT CHANGE UP (ref 7.32–7.43)
PO2 BLDV: <29 MMHG — LOW (ref 25–45)
POTASSIUM BLDV-SCNC: 3.7 MMOL/L — SIGNIFICANT CHANGE UP (ref 3.5–5.1)
SAO2 % BLDV: 29.4 % — LOW (ref 67–88)

## 2022-02-06 PROCEDURE — 99285 EMERGENCY DEPT VISIT HI MDM: CPT | Mod: 25

## 2022-02-06 PROCEDURE — 93010 ELECTROCARDIOGRAM REPORT: CPT

## 2022-02-06 PROCEDURE — 71045 X-RAY EXAM CHEST 1 VIEW: CPT | Mod: 26

## 2022-02-06 RX ORDER — FUROSEMIDE 40 MG
40 TABLET ORAL ONCE
Refills: 0 | Status: COMPLETED | OUTPATIENT
Start: 2022-02-06 | End: 2022-02-06

## 2022-02-06 NOTE — ED ADULT TRIAGE NOTE - CHIEF COMPLAINT QUOTE
Patient c/o of facial swelling w/ SOB, abdominal and bilateral feet swelling, symptoms X 4 days ago, no chest pain.  Patient states has CHF.  EKG in progress.

## 2022-02-06 NOTE — ED ADULT NURSE NOTE - OBJECTIVE STATEMENT
Pt presented to the ED with complaints of edema. As per pt, he has a history of congestive heart failure, on lasix 40mg, pt states that he has been complaint with his medications. Pt states that in the last few day, his edema has worsen which prompted him to come to the ED. Pt is alert and oriented, ambulatory, denies chest pain, palpitations, fever, nausea, vomiting or diarrhea.

## 2022-02-07 ENCOUNTER — INPATIENT (INPATIENT)
Facility: HOSPITAL | Age: 77
LOS: 9 days | Discharge: HOME HEALTH | DRG: 242 | End: 2022-02-17
Attending: INTERNAL MEDICINE | Admitting: INTERNAL MEDICINE
Payer: MEDICARE

## 2022-02-07 DIAGNOSIS — Z29.9 ENCOUNTER FOR PROPHYLACTIC MEASURES, UNSPECIFIED: ICD-10-CM

## 2022-02-07 DIAGNOSIS — N18.30 CHRONIC KIDNEY DISEASE, STAGE 3 UNSPECIFIED: ICD-10-CM

## 2022-02-07 DIAGNOSIS — Z98.890 OTHER SPECIFIED POSTPROCEDURAL STATES: Chronic | ICD-10-CM

## 2022-02-07 DIAGNOSIS — I48.0 PAROXYSMAL ATRIAL FIBRILLATION: ICD-10-CM

## 2022-02-07 DIAGNOSIS — Z90.79 ACQUIRED ABSENCE OF OTHER GENITAL ORGAN(S): Chronic | ICD-10-CM

## 2022-02-07 DIAGNOSIS — D69.6 THROMBOCYTOPENIA, UNSPECIFIED: ICD-10-CM

## 2022-02-07 DIAGNOSIS — I50.23 ACUTE ON CHRONIC SYSTOLIC (CONGESTIVE) HEART FAILURE: ICD-10-CM

## 2022-02-07 DIAGNOSIS — N40.0 BENIGN PROSTATIC HYPERPLASIA WITHOUT LOWER URINARY TRACT SYMPTOMS: ICD-10-CM

## 2022-02-07 DIAGNOSIS — I48.92 UNSPECIFIED ATRIAL FLUTTER: Chronic | ICD-10-CM

## 2022-02-07 LAB
ALBUMIN SERPL ELPH-MCNC: 3.6 G/DL — SIGNIFICANT CHANGE UP (ref 3.3–5)
ALBUMIN SERPL ELPH-MCNC: 3.8 G/DL — SIGNIFICANT CHANGE UP (ref 3.3–5)
ALP SERPL-CCNC: 137 U/L — HIGH (ref 40–120)
ALP SERPL-CCNC: 143 U/L — HIGH (ref 40–120)
ALT FLD-CCNC: 19 U/L — SIGNIFICANT CHANGE UP (ref 10–45)
ALT FLD-CCNC: 19 U/L — SIGNIFICANT CHANGE UP (ref 10–45)
ANION GAP SERPL CALC-SCNC: 8 MMOL/L — SIGNIFICANT CHANGE UP (ref 5–17)
ANION GAP SERPL CALC-SCNC: 9 MMOL/L — SIGNIFICANT CHANGE UP (ref 5–17)
APPEARANCE UR: CLEAR — SIGNIFICANT CHANGE UP
APTT BLD: 33.2 SEC — SIGNIFICANT CHANGE UP (ref 27.5–35.5)
APTT BLD: 36.3 SEC — HIGH (ref 27.5–35.5)
AST SERPL-CCNC: 14 U/L — SIGNIFICANT CHANGE UP (ref 10–40)
AST SERPL-CCNC: 17 U/L — SIGNIFICANT CHANGE UP (ref 10–40)
BASOPHILS # BLD AUTO: 0.01 K/UL — SIGNIFICANT CHANGE UP (ref 0–0.2)
BASOPHILS # BLD AUTO: 0.02 K/UL — SIGNIFICANT CHANGE UP (ref 0–0.2)
BASOPHILS NFR BLD AUTO: 0.3 % — SIGNIFICANT CHANGE UP (ref 0–2)
BASOPHILS NFR BLD AUTO: 0.6 % — SIGNIFICANT CHANGE UP (ref 0–2)
BILIRUB DIRECT SERPL-MCNC: 0.5 MG/DL — HIGH (ref 0–0.3)
BILIRUB INDIRECT FLD-MCNC: 0.6 MG/DL — SIGNIFICANT CHANGE UP (ref 0.2–1)
BILIRUB SERPL-MCNC: 1 MG/DL — SIGNIFICANT CHANGE UP (ref 0.2–1.2)
BILIRUB SERPL-MCNC: 1 MG/DL — SIGNIFICANT CHANGE UP (ref 0.2–1.2)
BILIRUB UR-MCNC: NEGATIVE — SIGNIFICANT CHANGE UP
BUN SERPL-MCNC: 16 MG/DL — SIGNIFICANT CHANGE UP (ref 7–23)
BUN SERPL-MCNC: 17 MG/DL — SIGNIFICANT CHANGE UP (ref 7–23)
CALCIUM SERPL-MCNC: 8.8 MG/DL — SIGNIFICANT CHANGE UP (ref 8.4–10.5)
CALCIUM SERPL-MCNC: 9 MG/DL — SIGNIFICANT CHANGE UP (ref 8.4–10.5)
CHLORIDE SERPL-SCNC: 108 MMOL/L — SIGNIFICANT CHANGE UP (ref 96–108)
CHLORIDE SERPL-SCNC: 108 MMOL/L — SIGNIFICANT CHANGE UP (ref 96–108)
CHOLEST SERPL-MCNC: 123 MG/DL — SIGNIFICANT CHANGE UP
CK MB CFR SERPL CALC: 3.3 NG/ML — SIGNIFICANT CHANGE UP (ref 0–6.7)
CK SERPL-CCNC: 58 U/L — SIGNIFICANT CHANGE UP (ref 30–200)
CO2 SERPL-SCNC: 29 MMOL/L — SIGNIFICANT CHANGE UP (ref 22–31)
CO2 SERPL-SCNC: 29 MMOL/L — SIGNIFICANT CHANGE UP (ref 22–31)
COLOR SPEC: YELLOW — SIGNIFICANT CHANGE UP
CREAT SERPL-MCNC: 1.2 MG/DL — SIGNIFICANT CHANGE UP (ref 0.5–1.3)
CREAT SERPL-MCNC: 1.23 MG/DL — SIGNIFICANT CHANGE UP (ref 0.5–1.3)
DIFF PNL FLD: NEGATIVE — SIGNIFICANT CHANGE UP
EOSINOPHIL # BLD AUTO: 0.01 K/UL — SIGNIFICANT CHANGE UP (ref 0–0.5)
EOSINOPHIL # BLD AUTO: 0.02 K/UL — SIGNIFICANT CHANGE UP (ref 0–0.5)
EOSINOPHIL NFR BLD AUTO: 0.3 % — SIGNIFICANT CHANGE UP (ref 0–6)
EOSINOPHIL NFR BLD AUTO: 0.6 % — SIGNIFICANT CHANGE UP (ref 0–6)
GLUCOSE BLDC GLUCOMTR-MCNC: 102 MG/DL — HIGH (ref 70–99)
GLUCOSE BLDC GLUCOMTR-MCNC: 77 MG/DL — SIGNIFICANT CHANGE UP (ref 70–99)
GLUCOSE BLDC GLUCOMTR-MCNC: 89 MG/DL — SIGNIFICANT CHANGE UP (ref 70–99)
GLUCOSE BLDC GLUCOMTR-MCNC: 95 MG/DL — SIGNIFICANT CHANGE UP (ref 70–99)
GLUCOSE SERPL-MCNC: 116 MG/DL — HIGH (ref 70–99)
GLUCOSE SERPL-MCNC: 80 MG/DL — SIGNIFICANT CHANGE UP (ref 70–99)
GLUCOSE UR QL: NEGATIVE — SIGNIFICANT CHANGE UP
HCT VFR BLD CALC: 40.2 % — SIGNIFICANT CHANGE UP (ref 39–50)
HCT VFR BLD CALC: 42 % — SIGNIFICANT CHANGE UP (ref 39–50)
HDLC SERPL-MCNC: 48 MG/DL — SIGNIFICANT CHANGE UP
HGB BLD-MCNC: 13.3 G/DL — SIGNIFICANT CHANGE UP (ref 13–17)
HGB BLD-MCNC: 13.4 G/DL — SIGNIFICANT CHANGE UP (ref 13–17)
IMM GRANULOCYTES NFR BLD AUTO: 0.3 % — SIGNIFICANT CHANGE UP (ref 0–1.5)
IMM GRANULOCYTES NFR BLD AUTO: 0.3 % — SIGNIFICANT CHANGE UP (ref 0–1.5)
INR BLD: 1.49 — HIGH (ref 0.88–1.16)
INR BLD: 1.53 — HIGH (ref 0.88–1.16)
KETONES UR-MCNC: NEGATIVE — SIGNIFICANT CHANGE UP
LACTATE SERPL-SCNC: 1.5 MMOL/L — SIGNIFICANT CHANGE UP (ref 0.5–2)
LEUKOCYTE ESTERASE UR-ACNC: NEGATIVE — SIGNIFICANT CHANGE UP
LIPID PNL WITH DIRECT LDL SERPL: 64 MG/DL — SIGNIFICANT CHANGE UP
LYMPHOCYTES # BLD AUTO: 0.77 K/UL — LOW (ref 1–3.3)
LYMPHOCYTES # BLD AUTO: 0.81 K/UL — LOW (ref 1–3.3)
LYMPHOCYTES # BLD AUTO: 22.4 % — SIGNIFICANT CHANGE UP (ref 13–44)
LYMPHOCYTES # BLD AUTO: 22.8 % — SIGNIFICANT CHANGE UP (ref 13–44)
MAGNESIUM SERPL-MCNC: 2.2 MG/DL — SIGNIFICANT CHANGE UP (ref 1.6–2.6)
MAGNESIUM SERPL-MCNC: 2.2 MG/DL — SIGNIFICANT CHANGE UP (ref 1.6–2.6)
MCHC RBC-ENTMCNC: 27.7 PG — SIGNIFICANT CHANGE UP (ref 27–34)
MCHC RBC-ENTMCNC: 28.9 PG — SIGNIFICANT CHANGE UP (ref 27–34)
MCHC RBC-ENTMCNC: 31.9 GM/DL — LOW (ref 32–36)
MCHC RBC-ENTMCNC: 33.1 GM/DL — SIGNIFICANT CHANGE UP (ref 32–36)
MCV RBC AUTO: 87 FL — SIGNIFICANT CHANGE UP (ref 80–100)
MCV RBC AUTO: 87.4 FL — SIGNIFICANT CHANGE UP (ref 80–100)
MONOCYTES # BLD AUTO: 0.32 K/UL — SIGNIFICANT CHANGE UP (ref 0–0.9)
MONOCYTES # BLD AUTO: 0.33 K/UL — SIGNIFICANT CHANGE UP (ref 0–0.9)
MONOCYTES NFR BLD AUTO: 9.1 % — SIGNIFICANT CHANGE UP (ref 2–14)
MONOCYTES NFR BLD AUTO: 9.5 % — SIGNIFICANT CHANGE UP (ref 2–14)
NEUTROPHILS # BLD AUTO: 2.24 K/UL — SIGNIFICANT CHANGE UP (ref 1.8–7.4)
NEUTROPHILS # BLD AUTO: 2.45 K/UL — SIGNIFICANT CHANGE UP (ref 1.8–7.4)
NEUTROPHILS NFR BLD AUTO: 66.2 % — SIGNIFICANT CHANGE UP (ref 43–77)
NEUTROPHILS NFR BLD AUTO: 67.6 % — SIGNIFICANT CHANGE UP (ref 43–77)
NITRITE UR-MCNC: NEGATIVE — SIGNIFICANT CHANGE UP
NON HDL CHOLESTEROL: 75 MG/DL — SIGNIFICANT CHANGE UP
NRBC # BLD: 0 /100 WBCS — SIGNIFICANT CHANGE UP (ref 0–0)
NRBC # BLD: 0 /100 WBCS — SIGNIFICANT CHANGE UP (ref 0–0)
NT-PROBNP SERPL-SCNC: HIGH PG/ML (ref 0–300)
PH UR: 6 — SIGNIFICANT CHANGE UP (ref 5–8)
PHOSPHATE SERPL-MCNC: 3.3 MG/DL — SIGNIFICANT CHANGE UP (ref 2.5–4.5)
PLATELET # BLD AUTO: 104 K/UL — LOW (ref 150–400)
PLATELET # BLD AUTO: 107 K/UL — LOW (ref 150–400)
POTASSIUM SERPL-MCNC: 4 MMOL/L — SIGNIFICANT CHANGE UP (ref 3.5–5.3)
POTASSIUM SERPL-MCNC: 4.5 MMOL/L — SIGNIFICANT CHANGE UP (ref 3.5–5.3)
POTASSIUM SERPL-SCNC: 4 MMOL/L — SIGNIFICANT CHANGE UP (ref 3.5–5.3)
POTASSIUM SERPL-SCNC: 4.5 MMOL/L — SIGNIFICANT CHANGE UP (ref 3.5–5.3)
PROT SERPL-MCNC: 6.2 G/DL — SIGNIFICANT CHANGE UP (ref 6–8.3)
PROT SERPL-MCNC: 6.7 G/DL — SIGNIFICANT CHANGE UP (ref 6–8.3)
PROT UR-MCNC: NEGATIVE MG/DL — SIGNIFICANT CHANGE UP
PROTHROM AB SERPL-ACNC: 17.5 SEC — HIGH (ref 10.6–13.6)
PROTHROM AB SERPL-ACNC: 18 SEC — HIGH (ref 10.6–13.6)
RBC # BLD: 4.6 M/UL — SIGNIFICANT CHANGE UP (ref 4.2–5.8)
RBC # BLD: 4.83 M/UL — SIGNIFICANT CHANGE UP (ref 4.2–5.8)
RBC # FLD: 16.3 % — HIGH (ref 10.3–14.5)
RBC # FLD: 16.6 % — HIGH (ref 10.3–14.5)
SARS-COV-2 RNA SPEC QL NAA+PROBE: NEGATIVE — SIGNIFICANT CHANGE UP
SODIUM SERPL-SCNC: 145 MMOL/L — SIGNIFICANT CHANGE UP (ref 135–145)
SODIUM SERPL-SCNC: 146 MMOL/L — HIGH (ref 135–145)
SP GR SPEC: 1.02 — SIGNIFICANT CHANGE UP (ref 1–1.03)
TRIGL SERPL-MCNC: 56 MG/DL — SIGNIFICANT CHANGE UP
TROPONIN T SERPL-MCNC: 0.11 NG/ML — CRITICAL HIGH (ref 0–0.01)
TROPONIN T SERPL-MCNC: 0.11 NG/ML — CRITICAL HIGH (ref 0–0.01)
UROBILINOGEN FLD QL: 0.2 E.U./DL — SIGNIFICANT CHANGE UP
WBC # BLD: 3.38 K/UL — LOW (ref 3.8–10.5)
WBC # BLD: 3.62 K/UL — LOW (ref 3.8–10.5)
WBC # FLD AUTO: 3.38 K/UL — LOW (ref 3.8–10.5)
WBC # FLD AUTO: 3.62 K/UL — LOW (ref 3.8–10.5)

## 2022-02-07 PROCEDURE — 93010 ELECTROCARDIOGRAM REPORT: CPT

## 2022-02-07 PROCEDURE — 76705 ECHO EXAM OF ABDOMEN: CPT | Mod: 26

## 2022-02-07 PROCEDURE — 74176 CT ABD & PELVIS W/O CONTRAST: CPT | Mod: 26,MA

## 2022-02-07 PROCEDURE — 71045 X-RAY EXAM CHEST 1 VIEW: CPT | Mod: 26

## 2022-02-07 RX ORDER — DEXTROSE 50 % IN WATER 50 %
12.5 SYRINGE (ML) INTRAVENOUS ONCE
Refills: 0 | Status: ACTIVE | OUTPATIENT
Start: 2022-02-07

## 2022-02-07 RX ORDER — FUROSEMIDE 40 MG
20 TABLET ORAL ONCE
Refills: 0 | Status: COMPLETED | OUTPATIENT
Start: 2022-02-07 | End: 2022-02-07

## 2022-02-07 RX ORDER — SODIUM CHLORIDE 9 MG/ML
1000 INJECTION, SOLUTION INTRAVENOUS
Refills: 0 | Status: DISCONTINUED | OUTPATIENT
Start: 2022-02-07 | End: 2022-02-10

## 2022-02-07 RX ORDER — METOPROLOL TARTRATE 50 MG
12.5 TABLET ORAL DAILY
Refills: 0 | Status: DISCONTINUED | OUTPATIENT
Start: 2022-02-07 | End: 2022-02-09

## 2022-02-07 RX ORDER — FUROSEMIDE 40 MG
60 TABLET ORAL
Refills: 0 | Status: DISCONTINUED | OUTPATIENT
Start: 2022-02-07 | End: 2022-02-07

## 2022-02-07 RX ORDER — DEXTROSE 50 % IN WATER 50 %
15 SYRINGE (ML) INTRAVENOUS ONCE
Refills: 0 | Status: DISCONTINUED | OUTPATIENT
Start: 2022-02-07 | End: 2022-02-10

## 2022-02-07 RX ORDER — GLUCAGON INJECTION, SOLUTION 0.5 MG/.1ML
1 INJECTION, SOLUTION SUBCUTANEOUS ONCE
Refills: 0 | Status: ACTIVE | OUTPATIENT
Start: 2022-02-07 | End: 2023-01-06

## 2022-02-07 RX ORDER — APIXABAN 2.5 MG/1
2.5 TABLET, FILM COATED ORAL EVERY 12 HOURS
Refills: 0 | Status: DISCONTINUED | OUTPATIENT
Start: 2022-02-07 | End: 2022-02-09

## 2022-02-07 RX ORDER — FUROSEMIDE 40 MG
80 TABLET ORAL
Refills: 0 | Status: DISCONTINUED | OUTPATIENT
Start: 2022-02-07 | End: 2022-02-08

## 2022-02-07 RX ORDER — DEXTROSE 50 % IN WATER 50 %
25 SYRINGE (ML) INTRAVENOUS ONCE
Refills: 0 | Status: ACTIVE | OUTPATIENT
Start: 2022-02-07

## 2022-02-07 RX ORDER — TAMSULOSIN HYDROCHLORIDE 0.4 MG/1
0.4 CAPSULE ORAL AT BEDTIME
Refills: 0 | Status: ACTIVE | OUTPATIENT
Start: 2022-02-07 | End: 2023-01-06

## 2022-02-07 RX ORDER — IPRATROPIUM BROMIDE 0.2 MG/ML
500 SOLUTION, NON-ORAL INHALATION EVERY 6 HOURS
Refills: 0 | Status: DISCONTINUED | OUTPATIENT
Start: 2022-02-07 | End: 2022-02-07

## 2022-02-07 RX ORDER — SACUBITRIL AND VALSARTAN 24; 26 MG/1; MG/1
1 TABLET, FILM COATED ORAL
Refills: 0 | Status: DISCONTINUED | OUTPATIENT
Start: 2022-02-07 | End: 2022-02-15

## 2022-02-07 RX ORDER — FUROSEMIDE 40 MG
20 TABLET ORAL ONCE
Refills: 0 | Status: DISCONTINUED | OUTPATIENT
Start: 2022-02-07 | End: 2022-02-07

## 2022-02-07 RX ORDER — INSULIN LISPRO 100/ML
VIAL (ML) SUBCUTANEOUS
Refills: 0 | Status: ACTIVE | OUTPATIENT
Start: 2022-02-07 | End: 2023-01-06

## 2022-02-07 RX ADMIN — Medication 40 MILLIGRAM(S): at 00:00

## 2022-02-07 RX ADMIN — SACUBITRIL AND VALSARTAN 1 TABLET(S): 24; 26 TABLET, FILM COATED ORAL at 07:22

## 2022-02-07 RX ADMIN — TAMSULOSIN HYDROCHLORIDE 0.4 MILLIGRAM(S): 0.4 CAPSULE ORAL at 22:25

## 2022-02-07 RX ADMIN — Medication 60 MILLIGRAM(S): at 06:49

## 2022-02-07 RX ADMIN — APIXABAN 2.5 MILLIGRAM(S): 2.5 TABLET, FILM COATED ORAL at 17:37

## 2022-02-07 RX ADMIN — APIXABAN 2.5 MILLIGRAM(S): 2.5 TABLET, FILM COATED ORAL at 06:50

## 2022-02-07 RX ADMIN — Medication 80 MILLIGRAM(S): at 17:37

## 2022-02-07 RX ADMIN — Medication 12.5 MILLIGRAM(S): at 06:49

## 2022-02-07 RX ADMIN — Medication 500 MICROGRAM(S): at 06:49

## 2022-02-07 RX ADMIN — Medication 20 MILLIGRAM(S): at 07:54

## 2022-02-07 RX ADMIN — SACUBITRIL AND VALSARTAN 1 TABLET(S): 24; 26 TABLET, FILM COATED ORAL at 19:37

## 2022-02-07 NOTE — H&P ADULT - NSHPPHYSICALEXAM_GEN_ALL_CORE
T(C): 36.8 (02-07-22 @ 04:15), Max: 36.8 (02-07-22 @ 04:15)  HR: 62 (02-07-22 @ 04:15) (62 - 90)  BP: 132/95 (02-07-22 @ 04:15) (117/78 - 132/95)  RR: 18 (02-07-22 @ 04:15) (18 - 18)  SpO2: 100% (02-07-22 @ 04:15) (94% - 100%)  Wt(kg): --    Appearance: NAD	  HEENT:   Normal oral mucosa, PERRL, EOMI	  Neck: Supple, + JV; No Carotid Bruit and 2+ pulses B/L  Cardiovascular:Irregular, irregular,, No murmurs  Respiratory: Lungs  b/l  Gastrointestinal:  Soft, Non-tender, + BS	  Skin: No rashes, No ecchymoses, No cyanosis  Extremities: Normal range of motion, No clubbing, cyanosis or b/l +3 LE pittingedema  Vascular: Femoral pulses 2+ b/l without bruit, DP 1+ b/l, PT 1+ b/l  Neurologic: Non-focal  Psychiatry: A & O x 3, Mood & affect appropriate T(C): 36.8 (02-07-22 @ 04:15), Max: 36.8 (02-07-22 @ 04:15)  HR: 62 (02-07-22 @ 04:15) (62 - 90)  BP: 132/95 (02-07-22 @ 04:15) (117/78 - 132/95)  RR: 18 (02-07-22 @ 04:15) (18 - 18)  SpO2: 100% (02-07-22 @ 04:15) (94% - 100%)  Wt(kg): --    Appearance: NAD	  HEENT:   Normal oral mucosa, PERRL, EOMI	  Neck: Supple, + JV; No Carotid Bruit and 2+ pulses B/L  Cardiovascular:Irregular, irregular,, No murmurs  Respiratory: Lungs  wheezing throughout lung fields, decrease right base lung sounds  Gastrointestinal:  Soft, Non-tender, + BS	  Skin: No rashes, No ecchymoses, No cyanosis  Extremities: Normal range of motion, No clubbing, cyanosis or b/l +3-4 LE pittingedema  Vascular: Femoral pulses 2+ b/l without bruit, DP 1+ b/l, PT 1+ b/l  Neurologic: Non-focal  Psychiatry: A & O x 3, Mood & affect appropriate

## 2022-02-07 NOTE — H&P ADULT - NSICDXPASTMEDICALHX_GEN_ALL_CORE_FT
PAST MEDICAL HISTORY:  Atrial flutter s/p Ablation 2016    Chronic venous insufficiency of lower extremity     Heart failure with mid-range ejection fraction     Prostate CA     Stage 3 chronic kidney disease     Thrombocytopenia     Type 2 diabetes mellitus      PAST MEDICAL HISTORY:  Acute on chronic systolic congestive heart failure     Atrial flutter s/p Ablation 2016    Chronic atrial fibrillation     Chronic venous insufficiency of lower extremity     Prostate CA     Stage 3 chronic kidney disease 1.2-1.2 baseline   On admission 1.2    Thrombocytopenia 60-70's baseline    Type 2 diabetes mellitus not on medication

## 2022-02-07 NOTE — PROGRESS NOTE ADULT - PROBLEM SELECTOR PLAN 2
Atrial Fibrillation and Flutter    Known history of A-flutter/A-Fib w/ prior ablation in 2016.  -ECG reveals rate controlled A-Fib w/o acute ischemic changes  -Continue Eliquis 2.5mg PO bid  -As per Hematology from prior admission, if platelets <50 hold AC  -Metoprolol 12.5 mg daily for rate control Known history of A-flutter/A-Fib w/ prior ablation in 2016.  -ECG reveals rate controlled A-Fib w/o acute ischemic changes  -Continue Eliquis 2.5mg PO bid  -As per Hematology from prior admission, if platelets <50 hold AC  -Metoprolol 12.5 mg daily for rate control

## 2022-02-07 NOTE — H&P ADULT - PROBLEM SELECTOR PLAN 2
Atrial Fibrillation and Flutter    Known history of A-flutter/A-Fib w/ prior ablation in 2016.  -ECG reveals rate controlled A-Fib w/o acute ischemic changes  -Continue Eliquis 2.5mg PO bid  -As per Hematology from prior admission, if platelets <50 hold AC  -Metoprolol 12.5 mg daily for rate control

## 2022-02-07 NOTE — H&P ADULT - PROBLEM SELECTOR PLAN 1
Core Measures   Acute on Chronic systolic CHF  EF 35%  worsening b/l LE +3-4 pitting edema one week and dyspnea with minimal exertion. Orthopnea 3-4 pillows.  Pt. has hx of non compliance with medication; however, on this admission he reports compliance.  -TTE 11/2021 LV Systolic function is moderately reduced with calculated LVEF 35% with global hypokinesis.  Severe concentric LVH, b/l enlargement.  Normal RV size; small to moderate pericardial effusion, no evidence of tamponade.  The proximal ascending aorta is dilated measuring 4.10 cm.  Suggestive of infiltrative cardiomyopathy.  -BNP 42763 K and Trop 0.11 (baseline; likely in setting of acute on chronic CHF exacerbation)  -ECG showed atrial fibrillation @65bpm/w/o acute ischemic changes  -CXR AP showed significant pulmonary congestion R>L pleural effusion  -2/7/22 CT of chest reveals R pleural effusion, small left, ascites noted throughout abdomen/pelvis and Mesenteric infiltrate extension anasarca.  -2/7/22 US of abdomen reveals cholelithiasis, moderate ascites, R pleural effusion and dilatation of hepatic veins.  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 was on Metoprolol XL 25mg PO daily)

## 2022-02-07 NOTE — H&P ADULT - NSHPSOCIALHISTORY_GEN_ALL_CORE
Past History


Past Medical History:  Seizure


 (ALIX DAVIDSON)


Past Surgical History:  Hip Replacement, Tubal ligation


 (ALIX DAVIDSON)


Alcohol Use:  None


 (ALIX DAVIDSON)





General Adult


EDM:


Chief Complaint:  HYPERTENSION





HPI:


HPI:





Patient is a 70-year-old female who presents with high blood pressure.  Home 

health was at the patient's house today they took her blood pressure and told 

her it was 193/101.  Patient was instructed to come to the ER to be checked.   

Patient denies having a history of hypertension But admits she has not seen a 

primary care doctor in 7 years.Patient is asymptomatic.  Patient denies 

shortness of breath, dizziness, chest pain. 


 (ALIX DAVIDSON)





Review of Systems:


Review of Systems:


Constitutional:  Denies fever or chills 


Eyes:  Denies change in visual acuity 


HENT:  Denies nasal congestion or sore throat 


Respiratory:  Denies cough or shortness of breath 


Cardiovascular:  Denies chest pain or edema 


GI:  Denies abdominal pain, nausea, vomiting, bloody stools or diarrhea 


: Denies dysuria 


Musculoskeletal:  Denies back pain or joint pain 


Integument:  Denies rash 


Neurologic:  Denies headache, focal weakness or sensory changes 


Endocrine:  Denies polyuria or polydipsia 


Lymphatic:  Denies swollen glands 


Psychiatric:  Denies depression or anxiety


 (ALIX DAVIDSON)





Allergies:


Allergies:





Allergies








Coded Allergies Type Severity Reaction Last Updated Verified


 


  chlorzoxazone Allergy Severe Shortness of Air 6/16/14 Yes


 


  influenza virus vaccine, specific Allergy Severe Swelling 6/16/14 Yes








 (ALIX DAVIDSON)





Physical Exam:


PE:





Constitutional: Well developed, well nourished, no acute distress, non-toxic 

appearance. []


HENT: Normocephalic, atraumatic, bilateral external ears normal, oropharynx 

moist, no oral exudates, nose normal. []


Eyes: PERRLA, EOMI, conjunctiva normal, no discharge. [] 


Neck: Normal range of motion, no tenderness, supple, no stridor. [] 


Cardiovascular:Heart rate regular rhythm, no murmur []


Lungs & Thorax:  Bilateral breath sounds clear to auscultation []


Abdomen: Bowel sounds normal, soft, no tenderness, no masses, no pulsatile 

masses. [] 


Skin: Warm, dry, no erythema, no rash. [] 


Back: No tenderness, no CVA tenderness. [] 


Extremities: No tenderness, no cyanosis, no clubbing, ROM intact, no edema. [] 


Neurologic: Alert and oriented X 3, normal motor function, normal sensory 

function, no focal deficits noted. []


Psychologic: Affect normal, judgement normal, mood normal. []


 (ALIX DAVIDSON)





Current Patient Data:


Vital Signs:





                                   Vital Signs








  Date Time  Temp Pulse Resp B/P (MAP) Pulse Ox O2 Delivery O2 Flow Rate FiO2


 


12/30/20 17:59 97.0 132 16 195/102 (133) 97 Room Air  








 (ALIX DAVIDSON)





EKG:


EKG:


Sinus Tachycardia,  BPM[]


 (ALIX DAVIDSON)





Radiology/Procedures:


Radiology/Procedures:


[]


 (ALIX DAVIDSON)





Heart Score:


HEART Score for Chest Pain:  








HEART Score for Chest Pain Response (Comments) Value


 


History Slighlty/Non-Suspicious 0


 


ECG Normal 0


 


Age > 65 2


 


Risk Factors                            1 or 2 Risk Factors 1


 


Troponin < Normal Limit 0


 


Total  3








Risk Factors:


Risk Factors:  DM, Current or recent (<one month) smoker, HTN, HLP, family 

history of CAD, obesity.


Risk Scores:


Score 0 - 3:  2.5% MACE over next 6 weeks - Discharge Home


Score 4 - 6:  20.3% MACE over next 6 weeks - Admit for Clinical Observation


Score 7 - 10:  72.7% MACE over next 6 weeks - Early Invasive Strategies


 (ALIX DAVIDSON)





Course & Med Decision Making:


Course & Med Decision Making


Pertinent Labs and Imaging studies reviewed. (See chart for details)





[] C 10-year-old female presents with high blood pressure.  Home health was 

visiting with the patient today when they took her blood pressure is it was 

193/101.  Patient was instructed to come to the ER.  Patient is asymptomatic.





We will order EKG, chest x-ray.





EKG shows sinus tach, heart rate 108 bpm.  Chest x-ray is negative.  Labs are 

all within normal limits. Trop. is negative.  Heart score is 3.





Patients HR now 88BPM. Will repeat EKG. Repeat EKG- Normal Sinus Rhythm. 





1 nitro was given.  Patient's blood pressure is now 148/81.  Patient is still 

asymptomatic.





Will DC to home.  Referral for PCP given to patient.  Patient instructed to make

 an appointment and follow-up with PCP to evaluate blood pressure.


 (ALIX DAVIDSON)


Course & Med Decision Making


I oversaw care of patient while in ER. I discussed case with NP. Asymptomatic 

HTN. Patient improved with ER intervention. Has good access to care with PCP for

 follow-up for likely RX HTN med. 


 (RENETTA JAY DO)


Brandonon Disclaimer:


Dragon Disclaimer:


This electronic medical record was generated, in whole or in part, using a voice

 recognition dictation system.


 (ALIX DAVIDSON)





Departure


Departure:


Impression:  


   Primary Impression:  


   Hypertension


   Qualified Codes:  I10 - Essential (primary) hypertension


Disposition:  01 DC HOME SELF CARE/HOMELESS


Condition:  IMPROVED


Referrals:  


PCP,NO (PCP)


Patient Instructions:  Hypertension





Additional Instructions:  


FOLLOW UP WITH PCP. PCP LIST WITH DISCHARGE PAPERWORK.











EMERGENCY DEPARTMENT GENERAL DISCHARGE INSTRUCTIONS





Thank you for coming to [facility] (ED) today and trusting us

 with you 


care.  We trust that you had a positivie experience in our Emergency Department.

  If you 


wish to speak to the department management, you may call the director at 

[phone].





YOUR FOLLOW UP INSTRUCTIONS ARE AS FOLLOWS:





1.  Do you have a private Doctor?  If you do not have a private doctor, please 

ask for a 


resource list of physicians or clinics that may be able to assist you with fol

low up care.





2.  The Emergency Physician has interpreted your x-rays.  The X-Ray specialist 

will also 


review them.  If there is a change in the findings, you will be notified in 48 

hours when at 


all possible.





3.  A lab test or culture has been done, your results will be reviewed and you 

will be 


notified if you need a change in treatment.





ADDITIONAL INSTRUCTIONS AND INFORMATION:





1.  Your care today has been supervised by a physician who is specially trained 

in emergency 


care.  Many problems require more than one evaluation for a complete diagnosis 

and 


treatment.  We recommend that you schedule your follow up appointment as r

ecommended to 


ensure complete treatment of you illness or injury.  If you are unable to obtain

 follow up 


care and continue to have a problem, or if your condition worsens, we recommend 

that you 


return to the ED.





2.  We are not able to safely determine your condition over the phone nor are we

 able to 


give sound medical advice over the phone.  For these safety reasons, if you call

 for medical 


advice we will ask you to come to the ED for further evaluation.





3.  If you have any questions regarding these discharge instructions please call

 the ED at 


[phone].





SAFETY INFORMATION:





In the interest of safety, wellness, and injury prevention; we encourage you to 

wear your 


sealbelt, if you smoke; quite smoking, and we encourage family to use a 

protective helmet 


for bicycling and other sporting events that present an increased risk for head 

injury.





IF YOUR SYMPTOMS WORSEN OR NEW SYMPTOMS DEVELOP, OR YOU HAVE CONCERNS ABOUT YOUR

 CONDITION; 


OR IF YOUR CONDITION WORSENS WHILE YOU ARE WAITING FOR YOUR FOLLOW UP 

APPOINTMENT; EITHER 


CONTACT YOUR PRIMARY CARE DOCTOR, THE PHYSICIAN WHOSE NAME AND NUMBER YOU WERE 

GIVEN, OR 


RETURN TO THE ED IMMEDIATELY.











ALIX DAVIDSON               Dec 30, 2020 18:11


RENETTA JAY DO                  Jan 4, 2021 21:16
, lives with wife, denies Tobacco, EtOH and illicit drug use.

## 2022-02-07 NOTE — H&P ADULT - NSHPLABSRESULTS_GEN_ALL_CORE
13.3   3.62  )-----------( 107      ( 2022 00:03 )             40.2       02-07    145  |  108  |  17  ----------------------------<  116<H>  4.5   |  29  |  1.20    Ca    8.8      2022 00:03  Mg     2.2     02-07    TPro  6.2  /  Alb  3.6  /  TBili  1.0  /  DBili  x   /  AST  17  /  ALT  19  /  AlkPhos  137<H>  02-07      PT/INR - ( 2022 00:03 )   PT: 17.5 sec;   INR: 1.49          PTT - ( 2022 00:03 )  PTT:33.2 sec    CARDIAC MARKERS ( 2022 00:03 )  x     / 0.11 ng/mL / x     / x     / x            Urinalysis Basic - ( 2022 00:54 )    Color: Yellow / Appearance: Clear / S.020 / pH: x  Gluc: x / Ketone: NEGATIVE  / Bili: Negative / Urobili: 0.2 E.U./dL   Blood: x / Protein: NEGATIVE mg/dL / Nitrite: NEGATIVE   Leuk Esterase: NEGATIVE / RBC: x / WBC x   Sq Epi: x / Non Sq Epi: x / Bacteria: x        EKG: Atrial Fibrillation @65bpm with non specific ST-T wave changes

## 2022-02-07 NOTE — ED PROVIDER NOTE - CLINICAL SUMMARY MEDICAL DECISION MAKING FREE TEXT BOX
LE edema, swelling to abd and face, SOB, likely cardiac wheeze on exam d/t pulm vasc congestion. no chest pain  -check labs  -ekg  -cxr  -CT a/p  -lasix

## 2022-02-07 NOTE — PROGRESS NOTE ADULT - PROBLEM SELECTOR PLAN 1
Core Measures   Acute on Chronic systolic CHF  EF 35%  worsening b/l LE +3-4 pitting edema one week and dyspnea with minimal exertion. Orthopnea 3-4 pillows.  Pt. has hx of non compliance with medication; however, on this admission he reports compliance.  -TTE 11/2021 LV Systolic function is moderately reduced with calculated LVEF 35% with global hypokinesis.  Severe concentric LVH, b/l enlargement.  Normal RV size; small to moderate pericardial effusion, no evidence of tamponade.  The proximal ascending aorta is dilated measuring 4.10 cm.  Suggestive of infiltrative cardiomyopathy.  -BNP 69139 K and Trop 0.11 (baseline; likely in setting of acute on chronic CHF exacerbation)  -ECG showed atrial fibrillation @65bpm/w/o acute ischemic changes  -CXR AP showed significant pulmonary congestion R>L pleural effusion  -2/7/22 CT of chest reveals R pleural effusion, small left, ascites noted throughout abdomen/pelvis and Mesenteric infiltrate extension anasarca.  -2/7/22 US of abdomen reveals cholelithiasis, moderate ascites, R pleural effusion and dilatation of hepatic veins.  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 was on Metoprolol XL 25mg PO daily) -Hx HFrEF w/ EF 35-40%  -pt anasarcic, +JVD, originally requiring supplemental O2 now on RA, orthopnea 3-4 pillows. Pt. has hx of non compliance with medication; however, on this admission he reports compliance  -BNP 11K and Trop 0.11 (baseline; likely i/s/o CHF exacerbation)  -CXR AP showed significant pulmonary congestion R>L pleural effusion  -TTE 11/2021: LVEF 35% with global hypokinesis. Severe concentric LVH. Small to moderate pericardial effusion. The proximal ascending aorta is dilated measuring 4.10 cm.  Suggestive of infiltrative cardiomyopathy  -f/u repeat TTE  -2/7/22 CT of chest reveals R pleural effusion, small left, ascites noted throughout abdomen/pelvis and Mesenteric infiltrate extension anasarca   -Abd u/s 2/6 w/ moderate ascites   -BM biopsy done 11/23/2021 to r/o infiltrative cardiomyopathy. Negative for amyloid, however sample size was small, follows with Dr Bernal [phone]   -Called outpatient office for consult, but did not return calls. Please try in am  -On Lasix 40mg PO daily at home  -C/w lasix 80mg IVP BID   - Net neg ~4L since admission this am  -Continue Entresto 24/26 mg bid, Toprol 12.5mg daily  -strict I/Os, core measure, daily weight -Hx HFrEF w/ EF 35-40%  -pt anasarcic, +JVD, originally requiring supplemental O2 now on RA, orthopnea 3-4 pillows. Pt. has hx of non compliance with medication; however, on this admission he reports compliance  -BNP 11K and Trop 0.11 (baseline; likely i/s/o CHF exacerbation)  -CXR AP showed significant pulmonary congestion R>L pleural effusion  -TTE 11/2021: LVEF 35% with global hypokinesis. Severe concentric LVH. Small to moderate pericardial effusion. The proximal ascending aorta is dilated measuring 4.10 cm.  Suggestive of infiltrative cardiomyopathy  -f/u repeat TTE  -2/7/22 CT of chest reveals R pleural effusion, small left, ascites noted throughout abdomen/pelvis and Mesenteric infiltrate extension anasarca   -Abd u/s 2/6 w/ moderate ascites   -BM biopsy done 11/23/2021 to r/o infiltrative cardiomyopathy. Negative for amyloid, however sample size was small, follows with Dr Bernal [phone]   -Called outpatient office for consult, but did not return calls. Please try in am  -Pulm consult Dr Smith/Dr Guido, appreciate recs  -No thoracentesis at this time. Continue to diurese  -Rec CT chest non-con to r/o secondary cause of effusion, consider if respiratory status not improving w/ diuresis  -On Lasix 40mg PO daily at home  -C/w lasix 80mg IVP BID   - Net neg ~4L since admission this am  -Continue Entresto 24/26 mg bid, Toprol 12.5mg daily  -strict I/Os, core measure, daily weight

## 2022-02-07 NOTE — ED PROVIDER NOTE - WET READ LAUNCH FT
Attempt to reach pt unsuccessful. Unable to LVM due to VM not being set up. Will re-attempt at a later time. There are no Wet Read(s) to document.

## 2022-02-07 NOTE — ED PROVIDER NOTE - OBJECTIVE STATEMENT
76M hx dm, ckd, CHF (35%), afib, thrombocytopenia, prostate ca, c/o swelling and SOB.  pt states symptoms worsening over past 4-5 days.  increased LE swelling. worse SOB with laying flat.  states abd also feels swollen with some right sided pain. no n/v/d. no fevers. +cough. +facial swelling. pt states he takes his lasix daily. daughter states he has not been urinating as much lately.

## 2022-02-07 NOTE — PATIENT PROFILE ADULT - FALL HARM RISK - HARM RISK INTERVENTIONS

## 2022-02-07 NOTE — ED PROVIDER NOTE - CARE PLAN
1 Principal Discharge DX:	Pleural effusion  Secondary Diagnosis:	Elevated troponin  Secondary Diagnosis:	Peripheral edema

## 2022-02-07 NOTE — PROGRESS NOTE ADULT - PROBLEM SELECTOR PLAN 4
Baseline Creatinine 1.0-1.2; Creatinine on this admission baseline 1.2  -monitor BMP Baseline Creatinine 1.0-1.2; Creatinine on this admission baseline 1.23  -monitor BMP

## 2022-02-07 NOTE — PROGRESS NOTE ADULT - SUBJECTIVE AND OBJECTIVE BOX
Interventional, Pulmonary, Critical, Chest Special Procedures.    Pt was seen and fully examined by myself.     Time spent with patient in minutes:137    Patient is a 76y old  Male who presents with a chief complaint of worsening Dyspnea and recent weight gain 4-5 days (2022 09:33) events leading to this admission reviewed. The patient ill appearing, eupneic at rest on RA, denies any cough, denies any recent URI, denies chest trauma or aspiration.     HPI:  A&O X3 Full Code      In terms of COVID-19 pt denies hx of COVID-19 and has received full Pfizer COVID-19 vaccination.  Patient plans on receiving Pfizer booster in May 2022.    75 y/o male, prior history of medication non-compliance, w/ PMHx type II DM (not currently on medications), A-Flutter/A-Fib (s/p prior ablation in 2016, most recently found to be in A-fib on Eliquis 2.5mg PO bid), thrombocytopenia (baseline Plt 60-70's on prior admission), stage III CKD (baseline Cr 1.0-1.2 ), non-obstructive CAD, Chronic Systolic CHF(EF 35% by  Echocardiogram 2022), hx of NSVT (EP evaluated pt. 2021 recommended repeat TTE in three months), BPH (s/p TURP procedure), pituitary adenoma (s/p transphenoidal treatment in ), prostate cancer (s/p chemotherapy), chronic venous insufficiency, and multiple admissions for CHF exacerbation most recent 2021,  who presents to [facility] ER this morning, 22, with worsening dyspnea (with minimal exertion), increase in abdominal girth, b/l LE swelling, facial swelling and dry cough for 4-5 days.  Pt. reports compliance with medication and medical follow up.   In light of patient's risk factors, and acute on chronic systolic CHF exacerbation pt. is now admitted to [facility]  for further medical management.    In ER ECG reveals Atrial Fibrillation HR@65bpm with non specific ST-T wave changes, Troponin 0.11 (patient's baseline), BNP 11,635, WBC 3.6, H/H 13.3/40.2, Platelets 107 (base line 60-70's), Blood Glucose 116.  CXR AP reveals R>L pleural effusion with Cardiomegaly, CT of Abdomen reveals R>L pleural effusion, ascites noted throughout abdomen and plevis, mesenteric infiltrate extensive anasarca.  US Abdomen reveals cholelithiasis, moderate ascites, right pleural effusion (moderate) and dilatation of hepatic vein.        (2022 05:13)      REVIEW OF SYSTEMS:  Constitutional: No fever, weight loss, chills +++ fatigue  Eyes: No eye pain, visual disturbances, or discharge  ENMT:  No difficulty hearing, tinnitus, vertigo; No sinus or throat pain. No epistaxis, dysphagia, dysphonia, hoarseness or odynophagia  Neck: No pain, stiffness or neck swelling.  No masses or deformities  Respiratory: No cough, wheezing, hemoptysis  - COPD  - ILD   - PE   - ASTHMA     - PNEUMONIA  Cardiovascular: No chest pain, + AF dysrhythmia, palpitations, dizziness or edema - CAD   -+CHF   + HTN  Gastrointestinal: No abdominal or epigastric pain. No nausea, vomiting or hematemesis; No diarrhea or constipation. No melena or hematochezia, Icterus.          Genitourinary: No dysuria, frequency, hematuria or incontinence   + CKD/KAYLI      - ESRD  Neurological: No headaches, +memory loss, loss of strength, numbness or tremors      -DEMENTIA     - STROKE    - SEIZURE  Skin: No itching, burning, rashes or lesions   Lymph Nodes: No enlarged glands  Endocrine: No heat or cold intolerance; No hair loss       + DM     - THYROID DISORDER  Musculoskeletal: No joint pain or swelling; No muscle, back or extremity pain, No edema  Psychiatric: No depression, anxiety, mood swings or difficulty sleeping  Heme/Lymph: No easy bruising or bleeding gums         -+ANEMIA      + CANCER   -COAGULOPATHY  Allergy and Immunologic: No hives or eczema    PAST MEDICAL & SURGICAL HISTORY:  Atrial flutter  s/p Ablation 2016    Chronic venous insufficiency of lower extremity    Prostate CA    Stage 3 chronic kidney disease  1.2-1.2 baseline   On admission 1.2    Type 2 diabetes mellitus  not on medication    Thrombocytopenia  60-70&#x27;s baseline    Acute on chronic systolic congestive heart failure    Chronic atrial fibrillation    Atrial flutter  s/p ablation in 2016    History of surgical removal of pituitary gland  1990s    S/P TURP  for BPH      FAMILY HISTORY:    SOCIAL HISTORY:      - Tobacco     - ETOH    Allergies    No Known Allergies    Intolerances      Vital Signs Last 24 Hrs  T(C): 37.1 (2022 14:19), Max: 37.1 (2022 14:19)  T(F): 98.8 (2022 14:19), Max: 98.8 (2022 14:19)  HR: 56 (2022 12:21) (50 - 90)  BP: 114/75 (2022 12:21) (113/75 - 132/95)  BP(mean): 91 (:) (91 - 107)  RR: 17 (2022 12:21) (17 - 22)  SpO2: 97% (2022 12:) (94% - 100%)     @ 07: @ 07:00  --------------------------------------------------------  IN: 0 mL / OUT: 1325 mL / NET: -1325 mL     @ 07: @ 16:32  --------------------------------------------------------  IN: 420 mL / OUT: 3175 mL / NET: -2755 mL          MEDICATIONS:  MEDICATIONS  (STANDING):  apixaban 2.5 milliGRAM(s) Oral every 12 hours  dextrose 40% Gel 15 Gram(s) Oral once  dextrose 5%. 1000 milliLiter(s) (50 mL/Hr) IV Continuous <Continuous>  dextrose 5%. 1000 milliLiter(s) (100 mL/Hr) IV Continuous <Continuous>  dextrose 50% Injectable 25 Gram(s) IV Push once  dextrose 50% Injectable 12.5 Gram(s) IV Push once  dextrose 50% Injectable 25 Gram(s) IV Push once  furosemide   Injectable 80 milliGRAM(s) IV Push two times a day  glucagon  Injectable 1 milliGRAM(s) IntraMuscular once  insulin lispro (ADMELOG) corrective regimen sliding scale   SubCutaneous Before meals and at bedtime  metoprolol succinate ER 12.5 milliGRAM(s) Oral daily  sacubitril 24 mG/valsartan 26 mG 1 Tablet(s) Oral two times a day  tamsulosin 0.4 milliGRAM(s) Oral at bedtime    MEDICATIONS  (PRN):  ipratropium    for Nebulization 500 MICROGram(s) Nebulizer every 6 hours PRN Shortness of Breath and/or Wheezing      PHYSICAL EXAM:  Un Comfortable, no immediate distress  Eyes: PERRL, EOM intact; conjunctiva and sclera clear  Head: Normocephalic;  No Trauma  ENMT: No nasal discharge, hoarseness, cough or hemoptysis  Neck: Supple; non tender; no masses or deformities.    No JVD  Respiratory:  - WHEEZING   - RHONCHI  - RALES  - CRACKLES.  Diminished breath sounds  BILATERAL  RIGHT 1/4 caudad,   LEFT base   Cardiovascular: Regular rate and rhythm. S1 and S2 Normal; No murmurs, gallops or rubs     - PPM/AICD  Gastrointestinal: Soft non-tender, mildly distended; Normal bowel sounds; No hepatosplenomegaly.     -PEG    -  GT   - GONZALEZ  Genitourinary: No costovertebral angle tenderness. No dysuria  Extremities: AROM, No clubbing, cyanosis + diffuse  edema    Vascular: Peripheral pulses palpable 2+ bilaterally  Neurological: Alert and responisve to stimuli   Skin: Warm and dry. No obvious rash  Lymph Nodes: No acute cervical or supraclavicular adenopathy  Psychiatric: Cooperative and appropriate mood    DEVICES:  - DENTURES   +IV R / L     - ETUBE   -TRACH   -CTUBE  R / L    LABS:                          13.4   3.38  )-----------( 104      ( 2022 10:57 )             42.0     02-07    146<H>  |  108  |  16  ----------------------------<  80  4.0   |  29  |  1.23    Ca    9.0      2022 10:57  Phos  3.3     02-07  Mg     2.2     02-07    TPro  6.7  /  Alb  3.8  /  TBili  1.0  /  DBili  0.5<H>  /  AST  14  /  ALT  19  /  AlkPhos  143<H>  02-07    PT/INR - ( 2022 10:57 )   PT: 18.0 sec;   INR: 1.53          PTT - ( 2022 10:57 )  PTT:36.3 sec  RADIOLOGY & ADDITIONAL STUDIES (The following images were personally reviewed):< from: CT Abdomen and Pelvis No Cont (22 @ 00:56) >  ACC: [id number] EXAM:  CT ABDOMEN AND PELVIS                          PROCEDURE DATE:  2022          INTERPRETATION:  Attending over read. Agree with the below report with   following modifications. Severe anasarca. Severe cardiomegaly. Small   right pleural effusion. Trace left pleural effusion. Small amount of   ascites. Dilated IVC. Findings likely due to cardiac failure/fluid   overload. Fatty infiltration of liver. Liver appears enlarged. Radiopaque   cholelithiasis. Thick-walled urinary bladder could be due to bladder   outlet obstruction. Prostate measures 4.6 x 3.8 x 4.0 cm. normal   appendix. Rectum underdistended versus wall thickening. Collapse   consolidation right lower lobe containing radiopaque   material/calcification. Enlarged left groin node measuring 1.6 x 3.5 x   5.4 cm..    MD Judah        Patient Name: TINA ELISE MRN: [id number]   (Age): 1945 76 Gender: M  Date of Exam: 2022 Accession: [id number]  Referring Physician: ELMIRA ZAFAR # of Images: [id number]  Ordered As: CT ABDOMEN/PELVIS WO    PROCEDURE INFORMATION:  Exam: CT Abdomen And Pelvis Without Contrast  Exam date and time: 2022 12:24 AM  Age: 76 years old  Clinical indication: Other: Right sided abd pain    TECHNIQUE:  Imaging protocol: Computed tomography of the abdomen and pelvis without   contrast.    COMPARISON:  US ABDOMEN COMPLETE 2021 1:46 PM    FINDINGS:  Pleural spaces: As noted on recent abdominal sonogram there is a right   pleural effusion. Compressive atelectasiscannot be excluded. Small left   pleural effusion with likely mild atelectasis left lung base.  Liver: Unenhanced liver homogeneous.  Gallbladder and bile ducts: There are gallstones in the gallbladder.   Cannot exclude pericholecystic fluid and wallthickening. Poor assessment   common bile duct due to patient body habitus and motion artifact.   Consider further evaluation with right upper quadrant sonography.  Pancreas: The pancreas appears grossly unremarkable.  Spleen: Spleen unremarkable.  Adrenal glands: Adrenal glands poorly seen due to motion artifact in   fluid. This could be further evaluated with dedicated imaging if there is   a clinical suspicion of pathology.  Kidneys and ureters: Normal. No hydronephrosis.  Stomach and bowel: There is fecal impaction throughout the colon. There   is no evidence of bowel obstruction or pneumoperitoneum. A tubular   air-filled structure in the right lower quadrant likely represents a   normal appendix. However, if there is a strong clinical suspicion of   acute appendicitis, consider further evaluation with intravenous and if   possible enteric contrast. Cannot exclude pancolitis.  Appendix: See "Stomach and bowel" finding.  Intraperitoneal space: There is ascites noted throughout the abdomen and   pelvis with diffuse mesenteric infiltration. There is extensive anasarca   surrounding soft tissue of the lower chest abdomen and pelvis.  Vasculature: No aneurysm abdominal aorta.  Lymph nodes: Adenopathy difficult to assess due to extensive fluid and   absence of contrast.  Urinary bladder: Markedly thickened urinary bladder. Cannot exclude   cystitis. Clinical correlation.  Reproductive: An enlarged heterogeneous calcified prostate gland appears   to indent the undersurface of urinary bladder. Clinical and PSA   correlation recommended..  Bones/joints: Advanced degenerative changes lumbar spine particularly   L5-S1.  Soft tissues: See "Intraperitoneal space" finding.  Other findings: . This study is limited due to absence of contrast.   Multiple bilateral low-attenuation lesions likely represent cysts. See   results recent sonogram.    IMPRESSION:  1. This study is limited due to absence of contrast.  2. Markedly thickened urinary bladder. Cannot exclude cystitis. Clinical   correlation.  3. There are gallstones in the gallbladder. Cannot exclude   pericholecystic fluid and wall thickening. Poor assessment common bile   duct due to patient body habitus and motion artifact. Consider further   evaluation with right upper quadrant sonography.  4. There is colonic wall thickening particularly the rectosigmoid. There   is also some degree of colonic wall thickening involving the remainder of   the colon. Although this may in part be due to adjacent ascites, the   possibility of pancolitis cannot be excluded. Clinical correlation and if   needed colonoscopy for further assessment. There is fecal impaction   throughout the colon. There is no evidence of bowel obstruction or   pneumoperitoneum. A tubular air-filled structure in the right lower   quadrant likely represents a normal appendix. See coronal image 50-61 of   series 5. However, if there is a strong clinical suspicion of acute   appendicitis, consider further evaluation with intravenous and if   possible enteric contrast.  5. There is a right pleural effusion. Compressive atelectasis of the   right lower lobe is present. Small left pleural effusion with likely mild   atelectasis left lung base.  6. There is ascites noted throughout the abdomen and pelvis with diffuse   mesenteric infiltration. There is extensive anasarca surrounding soft   tissue of the lower chest abdomen and pelvis.  7. Other incidental findings as above.    < end of copied text >

## 2022-02-07 NOTE — PROGRESS NOTE ADULT - PROBLEM SELECTOR PLAN 3
Platelets 107 on admission, baseline from prior admission 60-70's  -Transfuse for plts <50K+bleeding, <20 +fever or <10K  -11/23/21 BM biopsy done for suspicious of infiltrative disease as IVSd (2D) 1.80cm (men 0.6-1.0)  -Hem Consult Platelets 107 on admission, baseline from prior admission 60-70's  -Transfuse for plts <50K and pt w/ evidence of bleeding, <20  and w/ fever or <10K  -11/23/21 BM biopsy done as above  -Hem Consult as above, retry tomorrow

## 2022-02-07 NOTE — PROGRESS NOTE ADULT - ASSESSMENT
75 y/o male, prior history of medication non-compliance, w/ PMHx type II DM (not currently on medications), A-Flutter/A-Fib (s/p prior ablation in 06/2016, most recently found to be in A-fib on Eliquis 2.5mg PO bid), thrombocytopenia (baseline Plt 60-70's on prior admission), stage III CKD (baseline Cr 1.0-1.2 ), non-obstructive CAD, Chronic Systolic CHF(EF 35% by  Echocardiogram 11/2022), hx of NSVT (EP evaluated pt. 11/2021 recommended repeat TTE in three months), BPH (s/p TURP procedure), pituitary adenoma (s/p transphenoidal treatment in 1990's), prostate cancer (s/p chemotherapy), chronic venous insufficiency, and multiple admissions for CHF exacerbation most recent 11/19/2021,  who presents to [facility] ER this morning, 2/7/22, with worsening dyspnea (with minimal exertion), increase in abdominal girth, b/l LE swelling, facial swelling and dry cough for 4-5 days.  Pt. reports compliance with medication and medical follow up.   In light of patient's risk factors, and acute on chronic systolic CHF exacerbation pt. is now admitted to [facility]  for further medical management.     75 y/o male, prior history of medication non-compliance, w/ PMHx type II DM (not currently on medications), A-Flutter/A-Fib (s/p prior ablation in 06/2016, most recently found to be in A-fib on Eliquis 2.5mg PO bid), thrombocytopenia (baseline Plt 60-70's on prior admission), stage III CKD (baseline Cr 1.0-1.2 ), non-obstructive CAD, Chronic HFrEF (EF 35%), hx of NSVT, pituitary adenoma (s/p transphenoidal treatment in 1990's), prostate cancer (s/p chemo), chronic venous insufficiency, and multiple admissions for CHF exacerbation most recent 11/19/2021,  who presents to [facility] ER with worsening dyspnea, increase in abdominal girth, b/l LE swelling, facial swelling and dry cough for 4-5 days. Pt admitted to HFrEF exacerbation and is continuing IV diuresis. Pulm consulted today by Dr Anton to evaluate for possible R thoracentesis for pleural effusion

## 2022-02-07 NOTE — H&P ADULT - NSHPREVIEWOFSYSTEMS_GEN_ALL_CORE
GENERAL, CONSTITUTIONAL : recent weight gain, fever, chills  EYES, VISION: denies changes in vision   EARS, NOSE, THROAT: denies hearing loss  HEART, CARDIOVASCULAR: denies chest pain, arrhythmia, palpitations, SOB,  LE edema, claudication  RESPIRATORY:admits to worsening Dyspnea with minimal exertion,  cough, and Orthopnea denies wheezing, PND  GASTROINTESTINAL: abdominal discomfort 2/2 ascites  heartburn, denies bloody stool, dark tarry stool  GENITOURINARY: Denies frequent urination, urgency  MUSCULOSKELETAL admits to joint pain or swelling,, musculoskeletal pain.   SKIN & INTEGUMENTARY Denies rashes, sores, blisters, blisters, growths.  NEUROLOGICAL: Denies numbness or tingling sensations, sensation loss, burning.   PSYCHIATRIC: Denies nervousness, anxiety, depression  ENDOCRINE Denies heat or cold intolerance, excessive thirst  HEMATOLOGIC/LYMPHATIC: Hx of Thrombocytopenia GENERAL, CONSTITUTIONAL : recent weight gain, fever, chills  EYES, VISION: denies changes in vision   EARS, NOSE, THROAT: denies hearing loss  HEART, CARDIOVASCULAR: denies chest pain, arrhythmia, palpitations,   RESPIRATORY:admits to worsening Dyspnea with minimal exertion,  cough, and Orthopnea wheezing, denies PND  GASTROINTESTINAL: abdominal discomfort 2/2 ascites  denies heartburn, denies bloody stool, dark tarry stool  GENITOURINARY: admits to  frequent urination, urgency  MUSCULOSKELETAL admits to joint pain or swelling,, musculoskeletal pain.   SKIN & INTEGUMENTARY Denies rashes, sores, blisters, blisters, growths.  NEUROLOGICAL: Denies numbness or tingling sensations, sensation loss, burning.   PSYCHIATRIC: Denies nervousness, anxiety, depression  ENDOCRINE Denies heat or cold intolerance, excessive thirst  HEMATOLOGIC/LYMPHATIC: Hx of Thrombocytopenia plts baseline 60-70's

## 2022-02-07 NOTE — H&P ADULT - HISTORY OF PRESENT ILLNESS
A&O X3 Full Code      In terms of COVID-19 pt       75 y/o male, prior history of medication non-compliance, w/ PMHx type II DM (not currently on medications), A-Flutter/A-Fib (s/p prior ablation in 06/2016, most recently found to be in A-fib in 05/2021, previously on Eliquis but was discontinued), thrombocytopenia (baseline Plt 60-70's on prior admission), stage III CKD (baseline Cr 1.0-1.2), non-obstructive CAD, HFrEF (EF 45-50% by outpatient Echocardiogram from 05/2021), admitted to cardiology/telemetry for further management of acute on chronic heart failure exacerbation and infiltrative disease workup.     Hospital course (by problem):   # Acute on chronic congestive heart failure.   Presented w/ worsening LE edema for two weeks and worsening SOB w/ minimal exertion for one week. Not compliant w home medications: Lasix 40 mg daily, Metolazone 5 mg daily, Entresto 49/51 mg daily and Toprol XL 25 mg daily.  - HFrEF w/ EF 50% by outpatient Echocardiogram from 06/2021   - TTE 11/22: Left ventricular systolic function is moderately reduced with a calculated LVEF 35% with global hypokinesis. Severe concentric LVH. Biatrial enlargement. Normal right ventricular size. Small-to-moderate pericardial effusion, no evidence of tamponade. The proximal ascending aorta is dilated measuring 4.10 cm. Findings suggestive of infiltrative cardiomyopathy.  - BNP 12K and trop 0.12 (likely in the setting of CHF)  - EKG showed atrial fibrillation at 63 bpm w/o acute ischemic changes   - Chest X-ray showed significant pulmonary congestion   - BM biopsy done 11/23/21 for suspicious of infiltrative disease as IVSd (2D):1.80 cm  (men 0.6-1.0)    - will resume lasix 40 mg qd  - will modify to continue with Entresto 24/26 mg BID, Toprol 12.5 mg daily  - dc metolazone 5  mg daily     # NSVT (nonsustained ventricular tachycardia).  EP consulted for telemetry recordings significant for 4 beats of NSVT.  - Patient evaluated by EP, and no intervention needed and no need for life vest at this time. Will schedule outpatient EP evaluation for OMT   - As per EP recs, repeat TTE in 3 months.  - C/w with current medical management     # Atrial fibrillation and flutter.   Known history of A-flutter/A-Fib w/ prior ablation in 2016.   - EKG showed rate controlled A-fib w/o acute ischemic changes  - Was previously on Eliquis but was discontinued due to thrombocytopenia as per patient   - As per hematology, if platelets > 50k AC can be restarted  - holding metoprolol succinate 25 mg daily for rate control due to overdiuresis and hypotension      - restart metoprolol 12.5 mg daily as outpatient   - started on eliquis 2.5 mg BID.    # Thrombocytopenia.   Platelets 89 on admission, baseline from prior admission 60-70's  - Transfuse for plts <50k + bleeding, <20k + fever, or <10k   - F/U BM bipsy 11/23/21  - F/U hematology recs.    # Stage 3 chronic kidney disease.   Baseline Cr 1.0-1.2, Cr 1.32 on admission   - monitor BMP.    Patient was discharged to: Home  New medications: Eliquis 2.5mg BID   Changes to old medications: Entresto 24/26 mg BID, Toprol 12.5 mg daily  Medications that were stopped: metolazone 5  mg daily     Items to follow up as outpatient: Bone marrow biopsy     Physical exam at the time of discharge:  General: NAD, cooperative, well developed   HEENT: NC/AT; PERRL, anicteric sclera; dry MM  Neck: supple, no LAD, + JVD  Cardiovascular: +S1/S2; sinus skip, regular rhythm   Respiratory: CTA bl, no wheezes   Gastrointestinal: soft, NT/ND; +BSx4  Extremities: WWP; 1+ pitting edema LEs b/l; LE wrapped in compression stockings b/l   Vascular: 2+ radial, DP/PT pulses B/L  Neurological: AAOx3; no focal deficits    Dx: HEART FAILURE THIS ADMISSION: YES/NO            If Yes to STEMI or Heart Failure: 7 day Follow-Up Appointment            Cardiac Rehab Indications (STEMI/NSTEMI/ACS/Unstable Angina/CHF/Chronic Stable Angina/Heart Surgery (CABG,Valve)/Post PCI):            *Education on benefits of Cardiac Rehab provided to patient: Yes         *Referral and Prescription Given for Cardiac Rehab: No, pt was instructed to obtain Rx from outpt Cardiologist.         *Pt given list of locations & instructed to contact their insurance company to review list of participating providers. Yes          *Pt instructed to bring Cardiac Rehab prescription with them to Cardiology Follow up appointment for assistance with enrollment: Yes      CHF: Beta Blocker Prescribed: Yes, metoprolol succinate 12.5 mg daily           ACE-I/ARB/Entresto Prescribed: Yes, Entresto 24/26 mg BID A&O X3 Full Code      In terms of COVID-19 pt     75 y/o male, prior history of medication non-compliance, w/ PMHx type II DM (not currently on medications), A-Flutter/A-Fib (s/p prior ablation in 06/2016, most recently found to be in A-fib in 05/2021, previously on Eliquis but was discontinued), thrombocytopenia (baseline Plt 60-70's on prior admission), stage III CKD (baseline Cr 1.0-1.2), non-obstructive CAD, HFmrEF (EF 45-50% by outpatient Echocardiogram from 05/2021), BPH (s/p TURP procedure), pituitary adenoma (s/p transphenoidal treatment in 1990's), prostate cancer (s/p chemotherapy), chronic venous insufficiency, and prior admission from 06/28/2021 to 07/02/2021 for CHF exacerbation who presented to [facility] ED           Hospital course (by problem):   # Acute on chronic congestive heart failure.   Presented w/ worsening LE edema for two weeks and worsening SOB w/ minimal exertion for one week. Not compliant w home medications: Lasix 40 mg daily, Metolazone 5 mg daily, Entresto 49/51 mg daily and Toprol XL 25 mg daily.  - HFrEF w/ EF 50% by outpatient Echocardiogram from 06/2021   - TTE 11/22: Left ventricular systolic function is moderately reduced with a calculated LVEF 35% with global hypokinesis. Severe concentric LVH. Biatrial enlargement. Normal right ventricular size. Small-to-moderate pericardial effusion, no evidence of tamponade. The proximal ascending aorta is dilated measuring 4.10 cm. Findings suggestive of infiltrative cardiomyopathy.  - BNP 12K and trop 0.12 (likely in the setting of CHF)  - EKG showed atrial fibrillation at 63 bpm w/o acute ischemic changes   - Chest X-ray showed significant pulmonary congestion   - BM biopsy done 11/23/21 for suspicious of infiltrative disease as IVSd (2D):1.80 cm  (men 0.6-1.0)    - will resume lasix 40 mg qd  - will modify to continue with Entresto 24/26 mg BID, Toprol 12.5 mg daily  - dc metolazone 5  mg daily     # NSVT (nonsustained ventricular tachycardia).  EP consulted for telemetry recordings significant for 4 beats of NSVT.  - Patient evaluated by EP, and no intervention needed and no need for life vest at this time. Will schedule outpatient EP evaluation for OMT   - As per EP recs, repeat TTE in 3 months.  - C/w with current medical management     # Atrial fibrillation and flutter.   Known history of A-flutter/A-Fib w/ prior ablation in 2016.   - EKG showed rate controlled A-fib w/o acute ischemic changes  - Was previously on Eliquis but was discontinued due to thrombocytopenia as per patient   - As per hematology, if platelets > 50k AC can be restarted  - holding metoprolol succinate 25 mg daily for rate control due to overdiuresis and hypotension      - restart metoprolol 12.5 mg daily as outpatient   - started on eliquis 2.5 mg BID.    # Thrombocytopenia.   Platelets 89 on admission, baseline from prior admission 60-70's  - Transfuse for plts <50k + bleeding, <20k + fever, or <10k   - F/U BM bipsy 11/23/21  - F/U hematology recs.    # Stage 3 chronic kidney disease.   Baseline Cr 1.0-1.2, Cr 1.32 on admission   - monitor BMP.    Patient was discharged to: Home  New medications: Eliquis 2.5mg BID   Changes to old medications: Entresto 24/26 mg BID, Toprol 12.5 mg daily  Medications that were stopped: metolazone 5  mg daily     Items to follow up as outpatient: Bone marrow biopsy     Physical exam at the time of discharge:  General: NAD, cooperative, well developed   HEENT: NC/AT; PERRL, anicteric sclera; dry MM  Neck: supple, no LAD, + JVD  Cardiovascular: +S1/S2; sinus skip, regular rhythm   Respiratory: CTA bl, no wheezes   Gastrointestinal: soft, NT/ND; +BSx4  Extremities: WWP; 1+ pitting edema LEs b/l; LE wrapped in compression stockings b/l   Vascular: 2+ radial, DP/PT pulses B/L  Neurological: AAOx3; no focal deficits    Dx: HEART FAILURE THIS ADMISSION: YES/NO            If Yes to STEMI or Heart Failure: 7 day Follow-Up Appointment            Cardiac Rehab Indications (STEMI/NSTEMI/ACS/Unstable Angina/CHF/Chronic Stable Angina/Heart Surgery (CABG,Valve)/Post PCI):            *Education on benefits of Cardiac Rehab provided to patient: Yes         *Referral and Prescription Given for Cardiac Rehab: No, pt was instructed to obtain Rx from outpt Cardiologist.         *Pt given list of locations & instructed to contact their insurance company to review list of participating providers. Yes          *Pt instructed to bring Cardiac Rehab prescription with them to Cardiology Follow up appointment for assistance with enrollment: Yes      CHF: Beta Blocker Prescribed: Yes, metoprolol succinate 12.5 mg daily           ACE-I/ARB/Entresto Prescribed: Yes, Entresto 24/26 mg BID A&O X3 Full Code      In terms of COVID-19 pt denies hx of COVID-19 and has received full Pfizer COVID-19 vaccination.  Patient plans on receiving Pfizer booster in May 2022.    75 y/o male, prior history of medication non-compliance, w/ PMHx type II DM (not currently on medications), A-Flutter/A-Fib (s/p prior ablation in 06/2016, most recently found to be in A-fib on Eliquis 2.5mg PO bid), thrombocytopenia (baseline Plt 60-70's on prior admission), stage III CKD (baseline Cr 1.0-1.2 ), non-obstructive CAD, Chronic Systolic CHF(EF 35% by  Echocardiogram 11/2022), hx of NSVT (EP evaluated pt. 11/2021 recommended repeat TTE in three months), BPH (s/p TURP procedure), pituitary adenoma (s/p transphenoidal treatment in 1990's), prostate cancer (s/p chemotherapy), chronic venous insufficiency, and multiple admissions for CHF exacerbation most recent 11/19/2021,  who presents to [facility] ER this morning, 2/7/22, with worsening dyspnea (with minimal exertion), increase in abdominal girth, b/l LE swelling, facial swelling and dry cough for 4-5 days.  Pt. reports compliance with medication and medical follow up.   In light of patient's risk factors, and acute on chronic systolic CHF exacerbation pt. is now admitted to [facility]  for further medical management.    In ER ECG reveals Atrial Fibrillation HR@65bpm with non specific ST-T wave changes, Troponin 0.11 (patient's baseline), BNP 11,635, WBC 3.6, H/H 13.3/40.2, Platelets 107 (base line 60-70's), Blood Glucose 116.  CXR AP reveals R>L pleural effusion with Cardiomegaly, CT of Abdomen reveals R>L pleural effusion, ascites noted throughout abdomen and plevis, mesenteric infiltrate extensive anasarca.  US Abdomen reveals cholelithiasis, moderate ascites, right pleural effusion (moderate) and dilatation of hepatic vein.       # Acute on chronic systolic congestive heart failure.   Worsening b/l  LE +3-4 pitting edema one week and worsening dyspnea,  w/ minimal exertion for one week.  Orthopnea 3-4 pillows.  Pt. has hx of non compliance with medication; however, on this admission he reports compliance.  - TTE 11/22: Left ventricular systolic function is moderately reduced with a calculated LVEF 35% with global hypokinesis. Severe concentric LVH. Biatrial enlargement. Normal right ventricular size. Small-to-moderate pericardial effusion, no evidence of tamponade. The proximal ascending aorta is dilated measuring 4.10 cm. Findings suggestive of infiltrative cardiomyopathy.  - BNP 11,635 K and trop 0.11 (baseline likely in the setting of CHF)  - EKG showed atrial fibrillation at 65 bpm w/o acute ischemic changes   - Chest X-ray showed significant pulmonary congestion R>L pleural effusion   - BM biopsy done 11/23/21 for suspicious of infiltrative disease as IVSd (2D):1.80 cm  (men 0.6-1.0)    - On Lasix 40mg PO daily at home; continue Lasix 60mg IV bid  (discuss in AM)  - continue with Entresto 24/26 mg BID, Toprol 12.5 mg daily (Pt. was on Metoprolol XL 25mg PO daily)      # Atrial fibrillation and flutter.   Known history of A-flutter/A-Fib w/ prior ablation in 2016.   - EKG showed rate controlled A-fib w/o acute ischemic changes  - Continue Eliquis 2.5mg PO bid  - As per hematology from prior admission, if platelets <50 hold AC  - holding metoprolol succinate 12.5mg daily for rate control due to overdiuresis and hypotension        # Thrombocytopenia.   [id number] on admission, baseline from prior admission 60-70's  - Transfuse for plts <50k + bleeding, <20k + fever, or <10k   - F/U BM bipsy 11/23/21BM biopsy done for suspicious of infiltrative disease as IVSd (2D):1.80 cm  (men 0.6-1.0)    - F/U hematology recs.    # Stage 3 chronic kidney disease.   Baseline Cr 1.0-1.2, Cr 1.2  on admission   - monitor BMP.    Patient was discharged to: Home  New medications: Eliquis 2.5mg BID   Changes to old medications: Entresto 24/26 mg BID, Toprol 12.5 mg daily   A&O X3 Full Code      In terms of COVID-19 pt denies hx of COVID-19 and has received full Pfizer COVID-19 vaccination.  Patient plans on receiving Pfizer booster in May 2022.    77 y/o male, prior history of medication non-compliance, w/ PMHx type II DM (not currently on medications), A-Flutter/A-Fib (s/p prior ablation in 06/2016, most recently found to be in A-fib on Eliquis 2.5mg PO bid), thrombocytopenia (baseline Plt 60-70's on prior admission), stage III CKD (baseline Cr 1.0-1.2 ), non-obstructive CAD, Chronic Systolic CHF(EF 35% by  Echocardiogram 11/2022), hx of NSVT (EP evaluated pt. 11/2021 recommended repeat TTE in three months), BPH (s/p TURP procedure), pituitary adenoma (s/p transphenoidal treatment in 1990's), prostate cancer (s/p chemotherapy), chronic venous insufficiency, and multiple admissions for CHF exacerbation most recent 11/19/2021,  who presents to [facility] ER this morning, 2/7/22, with worsening dyspnea (with minimal exertion), increase in abdominal girth, b/l LE swelling, facial swelling and dry cough for 4-5 days.  Pt. reports compliance with medication and medical follow up.   In light of patient's risk factors, and acute on chronic systolic CHF exacerbation pt. is now admitted to [facility]  for further medical management.    In ER ECG reveals Atrial Fibrillation HR@65bpm with non specific ST-T wave changes, Troponin 0.11 (patient's baseline), BNP 11,635, WBC 3.6, H/H 13.3/40.2, Platelets 107 (base line 60-70's), Blood Glucose 116.  CXR AP reveals R>L pleural effusion with Cardiomegaly, CT of Abdomen reveals R>L pleural effusion, ascites noted throughout abdomen and plevis, mesenteric infiltrate extensive anasarca.  US Abdomen reveals cholelithiasis, moderate ascites, right pleural effusion (moderate) and dilatation of hepatic vein.

## 2022-02-07 NOTE — PROGRESS NOTE ADULT - PROBLEM SELECTOR PLAN 6
-Pt. is on Eliquis 2.5mg PO bid; ambulate as tolerated. -Pt. is on Eliquis 2.5mg PO bid; ambulate as tolerated      Dispo: pending diuresis  D/w Dr Anton

## 2022-02-07 NOTE — ED PROVIDER NOTE - PROGRESS NOTE DETAILS
UA without evidence of infection. +anasarca on CT, +gallstones, will get US for further eval.  doubt appendicitis no acute rajinder on US.  spoke with Dr. Anton - will admit to cards for continued diuresis

## 2022-02-07 NOTE — H&P ADULT - PROBLEM SELECTOR PLAN 3
Platelets 107 on admission, baseline from prior admission 60-70's  -Transfuse for plts <50K+bleeding, <20 +fever or <10K  -11/23/21 BM biopsy done for suspicious of infiltrative disease as IVSd (2D) 1.80cm (men 0.6-1.0)  -Hem Consult

## 2022-02-07 NOTE — PROGRESS NOTE ADULT - SUBJECTIVE AND OBJECTIVE BOX
Interventional Cardiology PA Adult Progress Note    INCOMPLETE    Subjective Assessment:      ROS Negative except as per Subjective and HPI  	  MEDICATIONS:  furosemide   Injectable 80 milliGRAM(s) IV Push two times a day  metoprolol succinate ER 12.5 milliGRAM(s) Oral daily  sacubitril 24 mG/valsartan 26 mG 1 Tablet(s) Oral two times a day  tamsulosin 0.4 milliGRAM(s) Oral at bedtime      ipratropium    for Nebulization 500 MICROGram(s) Nebulizer every 6 hours PRN        dextrose 40% Gel 15 Gram(s) Oral once  dextrose 50% Injectable 25 Gram(s) IV Push once  dextrose 50% Injectable 12.5 Gram(s) IV Push once  dextrose 50% Injectable 25 Gram(s) IV Push once  glucagon  Injectable 1 milliGRAM(s) IntraMuscular once  insulin lispro (ADMELOG) corrective regimen sliding scale   SubCutaneous Before meals and at bedtime    apixaban 2.5 milliGRAM(s) Oral every 12 hours  dextrose 5%. 1000 milliLiter(s) IV Continuous <Continuous>  dextrose 5%. 1000 milliLiter(s) IV Continuous <Continuous>      	    [PHYSICAL EXAM:  TELEMETRY:  T(C): 36.8 (02-07-22 @ 04:15), Max: 36.8 (02-07-22 @ 04:15)  HR: 50 (02-07-22 @ 08:37) (50 - 90)  BP: 113/75 (02-07-22 @ 08:37) (113/75 - 132/95)  RR: 20 (02-07-22 @ 08:37) (18 - 22)  SpO2: 100% (02-07-22 @ 08:37) (94% - 100%)  Wt(kg): --  I&O's Summary    06 Feb 2022 07:01  -  07 Feb 2022 07:00  --------------------------------------------------------  IN: 0 mL / OUT: 1325 mL / NET: -1325 mL    07 Feb 2022 07:01  -  07 Feb 2022 09:34  --------------------------------------------------------  IN: 0 mL / OUT: 700 mL / NET: -700 mL      Height (cm): 175.3 (02-06 @ 23:10)  Weight (kg): 95.3 (02-07 @ 06:25)  BMI (kg/m2): 31 (02-07 @ 06:25)  BSA (m2): 2.11 (02-07 @ 06:25)  Moore:  Central/PICC/Mid Line:                                         Appearance: Normal	  HEENT:   Normal oral mucosa, PERRL, EOMI	  Neck: Supple, + JVD/ - JVD; Carotid Bruit   Cardiovascular: Normal S1 S2, No JVD, No murmurs,   Respiratory: Lungs clear to auscultation/Decreased Breath Sounds/No Rales, Rhonchi, Wheezing	  Gastrointestinal:  Soft, Non-tender, + BS	  Skin: No rashes, No ecchymoses, No cyanosis  Extremities: Normal range of motion, No clubbing, cyanosis or edema  Vascular: Peripheral pulses palpable 2+ bilaterally  Neurologic: Non-focal  Psychiatry: A & O x 3, Mood & affect appropriate      	    ECG:  	  RADIOLOGY:   DIAGNOSTIC TESTING:  [ ] Echocardiogram:  [ ]  Catheterization:  [ ] Stress Test:    [ ] KHUSHI  OTHER: 	    LABS:	 	  CARDIAC MARKERS:                                  13.3   3.62  )-----------( 107      ( 07 Feb 2022 00:03 )             40.2     02-07    145  |  108  |  17  ----------------------------<  116<H>  4.5   |  29  |  1.20    Ca    8.8      07 Feb 2022 00:03  Mg     2.2     02-07    TPro  6.2  /  Alb  3.6  /  TBili  1.0  /  DBili  x   /  AST  17  /  ALT  19  /  AlkPhos  137<H>  02-07    proBNP: Serum Pro-Brain Natriuretic Peptide: 75726 pg/mL (02-07 @ 00:03)    Lipid Profile:   HgA1c:   TSH:   PT/INR - ( 07 Feb 2022 00:03 )   PT: 17.5 sec;   INR: 1.49          PTT - ( 07 Feb 2022 00:03 )  PTT:33.2 sec    ASSESSMENT/PLAN: 	        DVT ppx:  Dispo:     Interventional Cardiology PA Adult Progress Note    Subjective Assessment:  Pt seen and examined at bedside. He is comfortable with no acute complaints.     ROS Negative except as per Subjective and HPI  	  MEDICATIONS:  furosemide   Injectable 80 milliGRAM(s) IV Push two times a day  metoprolol succinate ER 12.5 milliGRAM(s) Oral daily  sacubitril 24 mG/valsartan 26 mG 1 Tablet(s) Oral two times a day  tamsulosin 0.4 milliGRAM(s) Oral at bedtime  ipratropium    for Nebulization 500 MICROGram(s) Nebulizer every 6 hours PRN  dextrose 40% Gel 15 Gram(s) Oral once  dextrose 50% Injectable 25 Gram(s) IV Push once  dextrose 50% Injectable 12.5 Gram(s) IV Push once  dextrose 50% Injectable 25 Gram(s) IV Push once  glucagon  Injectable 1 milliGRAM(s) IntraMuscular once  insulin lispro (ADMELOG) corrective regimen sliding scale   SubCutaneous Before meals and at bedtime  apixaban 2.5 milliGRAM(s) Oral every 12 hours  dextrose 5%. 1000 milliLiter(s) IV Continuous <Continuous>  dextrose 5%. 1000 milliLiter(s) IV Continuous <Continuous>      [PHYSICAL EXAM:  TELEMETRY:  T(C): 36.8 (02-07-22 @ 04:15), Max: 36.8 (02-07-22 @ 04:15)  HR: 50 (02-07-22 @ 08:37) (50 - 90)  BP: 113/75 (02-07-22 @ 08:37) (113/75 - 132/95)  RR: 20 (02-07-22 @ 08:37) (18 - 22)  SpO2: 100% (02-07-22 @ 08:37) (94% - 100%)  Wt(kg): --  I&O's Summary    06 Feb 2022 07:01  -  07 Feb 2022 07:00  --------------------------------------------------------  IN: 0 mL / OUT: 1325 mL / NET: -1325 mL    07 Feb 2022 07:01  -  07 Feb 2022 09:34  --------------------------------------------------------  IN: 0 mL / OUT: 700 mL / NET: -700 mL      Height (cm): 175.3 (02-06 @ 23:10)  Weight (kg): 95.3 (02-07 @ 06:25)  BMI (kg/m2): 31 (02-07 @ 06:25)  BSA (m2): 2.11 (02-07 @ 06:25)  Moore:  Central/PICC/Mid Line:                                         Appearance: Normal	  Neck: Supple, + JVD  Cardiovascular: Normal S1 S2, No JVD, No murmurs,   Respiratory: Dec breath sounds R>L. No increased WOB  Gastrointestinal: Distended, firm. Nontender   Skin: No rashes, No ecchymoses, No cyanosis  Extremities: 3+ pitting edema b/l  Neurologic: Non-focal  Psychiatry: A & O x 3, Mood & affect appropriate      	    ECG:  	  RADIOLOGY:   DIAGNOSTIC TESTING:  [ ] Echocardiogram:  [ ]  Catheterization:  [ ] Stress Test:    [ ] KHUSHI  OTHER: 	    LABS:	 	  CARDIAC MARKERS:                                  13.3   3.62  )-----------( 107      ( 07 Feb 2022 00:03 )             40.2     02-07    145  |  108  |  17  ----------------------------<  116<H>  4.5   |  29  |  1.20    Ca    8.8      07 Feb 2022 00:03  Mg     2.2     02-07    TPro  6.2  /  Alb  3.6  /  TBili  1.0  /  DBili  x   /  AST  17  /  ALT  19  /  AlkPhos  137<H>  02-07    proBNP: Serum Pro-Brain Natriuretic Peptide: 74958 pg/mL (02-07 @ 00:03)    Lipid Profile:   HgA1c:   TSH:   PT/INR - ( 07 Feb 2022 00:03 )   PT: 17.5 sec;   INR: 1.49          PTT - ( 07 Feb 2022 00:03 )  PTT:33.2 sec    ASSESSMENT/PLAN: 	        DVT ppx:  Dispo:

## 2022-02-07 NOTE — PROGRESS NOTE ADULT - ASSESSMENT
ASSESSMENT/PLAN 77 y/o male,  w/ PMHx type II DM (not currently on medications), A-Flutter/A-Fib (s/p prior ablation in 06/2016, most  A-fib on Eliquis 2.5mg PO bid), thrombocytopenia (baseline Plt 60-70's on prior admission), stage III CKD (baseline Cr 1.0-1.2 ), non-obstructive CAD, Chronic Systolic CHF(EF 35% by  Echocardiogram 11/2022), hx of NSVT (EP evaluated pt. 11/2021 BPH (s/p TURP procedure), pituitary adenoma (s/p transphenoidal treatment in 1990's), prostate cancer (s/p chemotherapy), chronic venous insufficiency, Impression of acute on chronic CHF, R>L pleural effusions, compressive atelectases    1. O2 attempt now off   2. Bronchodilators: off, start and encourage incentive spirometry   3. Corticosteroids: off  4. ID/Antibiotics: off  5. Cardiac/HTN: diures, optimize CHF mngmnt   6. GI: Rx/ prophylaxis c PPI/H2B  7. Heme: Rx/VT prophylaxis c SQH/SCD/AC on Eliquis   8. Aspiration precautions  9. Obtain dedicated non contrast CTchest   Discussed with managing team Cardiology

## 2022-02-08 LAB
A1C WITH ESTIMATED AVERAGE GLUCOSE RESULT: 6.4 % — HIGH (ref 4–5.6)
ALBUMIN SERPL ELPH-MCNC: 3.3 G/DL — SIGNIFICANT CHANGE UP (ref 3.3–5)
ALP SERPL-CCNC: 123 U/L — HIGH (ref 40–120)
ALT FLD-CCNC: 16 U/L — SIGNIFICANT CHANGE UP (ref 10–45)
ANION GAP SERPL CALC-SCNC: 13 MMOL/L — SIGNIFICANT CHANGE UP (ref 5–17)
APTT BLD: 31.5 SEC — SIGNIFICANT CHANGE UP (ref 27.5–35.5)
AST SERPL-CCNC: 14 U/L — SIGNIFICANT CHANGE UP (ref 10–40)
BILIRUB SERPL-MCNC: 1 MG/DL — SIGNIFICANT CHANGE UP (ref 0.2–1.2)
BUN SERPL-MCNC: 22 MG/DL — SIGNIFICANT CHANGE UP (ref 7–23)
CALCIUM SERPL-MCNC: 8.2 MG/DL — LOW (ref 8.4–10.5)
CHLORIDE SERPL-SCNC: 111 MMOL/L — HIGH (ref 96–108)
CO2 SERPL-SCNC: 24 MMOL/L — SIGNIFICANT CHANGE UP (ref 22–31)
CREAT SERPL-MCNC: 1.14 MG/DL — SIGNIFICANT CHANGE UP (ref 0.5–1.3)
ESTIMATED AVERAGE GLUCOSE: 137 MG/DL — HIGH (ref 68–114)
GLUCOSE BLDC GLUCOMTR-MCNC: 108 MG/DL — HIGH (ref 70–99)
GLUCOSE BLDC GLUCOMTR-MCNC: 116 MG/DL — HIGH (ref 70–99)
GLUCOSE BLDC GLUCOMTR-MCNC: 131 MG/DL — HIGH (ref 70–99)
GLUCOSE BLDC GLUCOMTR-MCNC: 76 MG/DL — SIGNIFICANT CHANGE UP (ref 70–99)
GLUCOSE SERPL-MCNC: 79 MG/DL — SIGNIFICANT CHANGE UP (ref 70–99)
HCT VFR BLD CALC: 39 % — SIGNIFICANT CHANGE UP (ref 39–50)
HGB BLD-MCNC: 12.5 G/DL — LOW (ref 13–17)
INR BLD: 1.51 — HIGH (ref 0.88–1.16)
MAGNESIUM SERPL-MCNC: 2.1 MG/DL — SIGNIFICANT CHANGE UP (ref 1.6–2.6)
MCHC RBC-ENTMCNC: 27.8 PG — SIGNIFICANT CHANGE UP (ref 27–34)
MCHC RBC-ENTMCNC: 32.1 GM/DL — SIGNIFICANT CHANGE UP (ref 32–36)
MCV RBC AUTO: 86.9 FL — SIGNIFICANT CHANGE UP (ref 80–100)
NRBC # BLD: 0 /100 WBCS — SIGNIFICANT CHANGE UP (ref 0–0)
PLATELET # BLD AUTO: 100 K/UL — LOW (ref 150–400)
POTASSIUM SERPL-MCNC: 3.8 MMOL/L — SIGNIFICANT CHANGE UP (ref 3.5–5.3)
POTASSIUM SERPL-SCNC: 3.8 MMOL/L — SIGNIFICANT CHANGE UP (ref 3.5–5.3)
PROT SERPL-MCNC: 5.7 G/DL — LOW (ref 6–8.3)
PROTHROM AB SERPL-ACNC: 17.8 SEC — HIGH (ref 10.6–13.6)
RBC # BLD: 4.49 M/UL — SIGNIFICANT CHANGE UP (ref 4.2–5.8)
RBC # FLD: 16.6 % — HIGH (ref 10.3–14.5)
SODIUM SERPL-SCNC: 148 MMOL/L — HIGH (ref 135–145)
WBC # BLD: 3.11 K/UL — LOW (ref 3.8–10.5)
WBC # FLD AUTO: 3.11 K/UL — LOW (ref 3.8–10.5)

## 2022-02-08 PROCEDURE — 71045 X-RAY EXAM CHEST 1 VIEW: CPT | Mod: 26

## 2022-02-08 PROCEDURE — 93306 TTE W/DOPPLER COMPLETE: CPT | Mod: 26

## 2022-02-08 PROCEDURE — 71045 X-RAY EXAM CHEST 1 VIEW: CPT | Mod: 26,77

## 2022-02-08 RX ORDER — FUROSEMIDE 40 MG
40 TABLET ORAL
Refills: 0 | Status: DISCONTINUED | OUTPATIENT
Start: 2022-02-08 | End: 2022-02-12

## 2022-02-08 RX ORDER — POTASSIUM CHLORIDE 20 MEQ
20 PACKET (EA) ORAL ONCE
Refills: 0 | Status: COMPLETED | OUTPATIENT
Start: 2022-02-08 | End: 2022-02-08

## 2022-02-08 RX ADMIN — APIXABAN 2.5 MILLIGRAM(S): 2.5 TABLET, FILM COATED ORAL at 06:16

## 2022-02-08 RX ADMIN — APIXABAN 2.5 MILLIGRAM(S): 2.5 TABLET, FILM COATED ORAL at 17:43

## 2022-02-08 RX ADMIN — Medication 80 MILLIGRAM(S): at 06:15

## 2022-02-08 RX ADMIN — Medication 12.5 MILLIGRAM(S): at 10:15

## 2022-02-08 RX ADMIN — SACUBITRIL AND VALSARTAN 1 TABLET(S): 24; 26 TABLET, FILM COATED ORAL at 07:51

## 2022-02-08 RX ADMIN — TAMSULOSIN HYDROCHLORIDE 0.4 MILLIGRAM(S): 0.4 CAPSULE ORAL at 21:25

## 2022-02-08 RX ADMIN — Medication 40 MILLIGRAM(S): at 17:46

## 2022-02-08 RX ADMIN — SACUBITRIL AND VALSARTAN 1 TABLET(S): 24; 26 TABLET, FILM COATED ORAL at 17:43

## 2022-02-08 RX ADMIN — Medication 20 MILLIEQUIVALENT(S): at 10:06

## 2022-02-08 NOTE — DIETITIAN INITIAL EVALUATION ADULT. - PROBLEM SELECTOR PLAN 6
The Delivery OB Provider certifies that vaginal examination and/or abdominal examination after the delivery was done and no foreign body was found. -Pt. is on Eliquis 2.5mg PO bid; ambulate as tolerated.

## 2022-02-08 NOTE — PROGRESS NOTE ADULT - ASSESSMENT
Patient is a 77 y/o male, prior history of medication non-compliance, w/ PMHx type II DM (not currently on medications), A-Flutter/A-Fib (s/p prior ablation in 06/2016, most recently found to be in A-fib on Eliquis 2.5mg PO bid), thrombocytopenia (baseline Plt 60-70's on prior admission), stage III CKD (baseline Cr 1.0-1.2 ), non-obstructive CAD, Chronic HFrEF (EF 35%), hx of NSVT, pituitary adenoma (s/p transphenoidal treatment in 1990's), prostate cancer (s/p chemo), chronic venous insufficiency, and multiple admissions for CHF exacerbation most recent 11/19/2021,  who presents to [facility] ER with worsening dyspnea, increase in abdominal girth, b/l LE swelling, facial swelling and dry cough for 4-5 days. Pt admitted to HFrEF exacerbation, right sided pleural effusion and is continuing IV diuresis.

## 2022-02-08 NOTE — PROGRESS NOTE ADULT - PROBLEM SELECTOR PLAN 2
Known history of A-flutter/A-Fib w/ prior ablation in 2016.  - ECG reveals rate controlled A-Fib w/o acute ischemic changes  - Continue Eliquis 2.5mg PO bid  - As per Hematology from prior admission, if platelets <50 hold AC  - Metoprolol 12.5 mg daily for rate control  - EP consulted

## 2022-02-08 NOTE — PROGRESS NOTE ADULT - NUTRITIONAL ASSESSMENT
Severe symmetric left ventricular hypertrophy. Moderately reduced   left ventricular systolic function. Apical segments are more vigorous   than the base, consider infiltrative cardiomyopathy (such as amyloid).   2. Mildly reduced right ventricular systolic function.   3. Biatrial enlargement.   4. Tethered mitral valve. Mild mitral regurgitation.   5. Mild tricuspid regurgitation.   6. Pulmonary hypertension present, pulmonary artery systolic pressure is   40 mmHg.   7. Small pericardial effusion.   8. Bilateral pleural effusion.   9. Borderline dilated ascending aorta.  10. Compared to the previous TTE performed on 11/22/2021, given technical   differences, LV function is similar to slightly more vigorous. Comparable   views of proximal ascending aorta were not obtained.      ECHO 2/8/22: Severe symmetric LVH. Moderately reduced LVSF. Apical segments are more vigorous than the base, consider infiltrative cardiomyopathy, such as amyloid. Mildly reduced RVSF. Biatrial enlargement. Tethered MV. Mild MR/TR. Pulmonary HTN present. PASP: 40 mmHg. Small pericardial effusion. Bilateral pleural effusion. Compared to 11/22 - LV function is similar, but more vigorous.

## 2022-02-08 NOTE — PROGRESS NOTE ADULT - PROBLEM SELECTOR PLAN 1
Patient admitted with anasarca, + JVD, originally requiring supplemental O2, now on RA, orthopnea 3-4 pillows   - BNP 11K and Trop 0.11 (baseline; likely i/s/o CHF exacerbation)  - CXR AP showed significant pulmonary congestion R>L pleural effusion  - ECHO 2/8/22: Severe symmetric LVH. Moderately reduced LVSF. Apical segments are more vigorous than the base, consider infiltrative cardiomyopathy, such as amyloid. Mildly reduced RVSF. Biatrial enlargement. Tethered MV. Mild MR/TR. Pulmonary HTN present. PASP: 40 mmHg. Small pericardial effusion. Bilateral pleural effusion. Compared to 11/22 - LV function is similar, but more vigorous.   - CT of chest 2/7/22: R pleural effusion, small left, ascites noted throughout abdomen/pelvis and Mesenteric infiltrate extension anasarca   -Abd u/s 2/6 w/ moderate ascites   -BM biopsy done 11/23/2021 to r/o infiltrative cardiomyopathy. Negative for amyloid, however sample size was small, follows with Dr Bernal [phone] (called 2/8/22 - no return calls)   - Pulm consult Dr Smith/Dr Guido - no need for tap at this time, continue diuresis   - F/u CT chest non-contrast   - Diuresis: continue Lasix 40 mg IV BID, Entresto 24/26 mg BID, Toprol XL 12.5 mg once daily   - Strict I/Os, core measure, daily weight Patient admitted with anasarca, + JVD, originally requiring supplemental O2, now on RA, orthopnea 3-4 pillows   - BNP 11K and Trop 0.11 (baseline; likely i/s/o CHF exacerbation)  - CXR AP showed significant pulmonary congestion R>L pleural effusion  - ECHO 2/8/22: Severe symmetric LVH. Moderately reduced LVSF. Apical segments are more vigorous than the base, consider infiltrative cardiomyopathy, such as amyloid. Mildly reduced RVSF. Biatrial enlargement. Tethered MV. Mild MR/TR. Pulmonary HTN present. PASP: 40 mmHg. Small pericardial effusion. Bilateral pleural effusion. Compared to 11/22 - LV function is similar, but more vigorous.   - CT of chest 2/7/22: R pleural effusion, small left, ascites noted throughout abdomen/pelvis and Mesenteric infiltrate extension anasarca   -Abd u/s 2/6 w/ moderate ascites   -BM biopsy done 11/23/2021 to r/o infiltrative cardiomyopathy. Negative for amyloid, however sample size was small, follows with Dr Bernal [phone] (called 2/8/22 - no return calls)   - Pulm consult Dr Smith/Dr Guido - no need for tap at this time, continue diuresis   - F/u CT chest non-contrast   - Adv HF consulted - rec amyloid scan and serum light chains   - Diuresis: continue Lasix 40 mg IV BID, Entresto 24/26 mg BID, Toprol XL 12.5 mg once daily   - Strict I/Os, core measure, daily weight

## 2022-02-08 NOTE — PROGRESS NOTE ADULT - SUBJECTIVE AND OBJECTIVE BOX
Interventional Cardiology PA Adult Progress Note    C.C.:     Subjective Assessment:      ROS Negative except as per Subjective and HPI  	  MEDICATIONS:  furosemide   Injectable 80 milliGRAM(s) IV Push two times a day  metoprolol succinate ER 12.5 milliGRAM(s) Oral daily  sacubitril 24 mG/valsartan 26 mG 1 Tablet(s) Oral two times a day  tamsulosin 0.4 milliGRAM(s) Oral at bedtime            dextrose 40% Gel 15 Gram(s) Oral once  dextrose 50% Injectable 25 Gram(s) IV Push once  dextrose 50% Injectable 12.5 Gram(s) IV Push once  dextrose 50% Injectable 25 Gram(s) IV Push once  glucagon  Injectable 1 milliGRAM(s) IntraMuscular once  insulin lispro (ADMELOG) corrective regimen sliding scale   SubCutaneous Before meals and at bedtime    apixaban 2.5 milliGRAM(s) Oral every 12 hours  dextrose 5%. 1000 milliLiter(s) IV Continuous <Continuous>  dextrose 5%. 1000 milliLiter(s) IV Continuous <Continuous>      	    [PHYSICAL EXAM:  TELEMETRY:  T(C): 36.6 (02-08-22 @ 10:18), Max: 37.1 (02-07-22 @ 14:19)  HR: 57 (02-08-22 @ 12:22) (53 - 65)  BP: 101/67 (02-08-22 @ 12:22) (101/67 - 130/86)  RR: 18 (02-08-22 @ 06:11) (15 - 18)  SpO2: 98% (02-08-22 @ 12:22) (94% - 98%)  Wt(kg): --  I&O's Summary    07 Feb 2022 07:01  -  08 Feb 2022 07:00  --------------------------------------------------------  IN: 840 mL / OUT: 6250 mL / NET: -5410 mL    08 Feb 2022 07:01  -  08 Feb 2022 12:46  --------------------------------------------------------  IN: 180 mL / OUT: 1400 mL / NET: -1220 mL        Moore:  Central/PICC/Mid Line:                                         Appearance: Normal	  HEENT:   Normal oral mucosa, PERRL, EOMI	  Neck: Supple, + JVD/ - JVD; Carotid Bruit   Cardiovascular: Normal S1 S2, No JVD, No murmurs,   Respiratory: Lungs clear to auscultation/Decreased Breath Sounds/No Rales, Rhonchi, Wheezing	  Gastrointestinal:  Soft, Non-tender, + BS	  Skin: No rashes, No ecchymoses, No cyanosis  Extremities: Normal range of motion, No clubbing, cyanosis or edema  Vascular: Peripheral pulses palpable 2+ bilaterally  Neurologic: Non-focal  Psychiatry: A & O x 3, Mood & affect appropriate      	    ECG:  	  RADIOLOGY:   DIAGNOSTIC TESTING:  [ ] Echocardiogram:  [ ]  Catheterization:  [ ] Stress Test:    [ ] KHUSHI  OTHER: 	    LABS:	 	  CARDIAC MARKERS:                                  12.5   3.11  )-----------( 100      ( 08 Feb 2022 06:48 )             39.0     02-08    148<H>  |  111<H>  |  22  ----------------------------<  79  3.8   |  24  |  1.14    Ca    8.2<L>      08 Feb 2022 06:48  Phos  3.3     02-07  Mg     2.1     02-08    TPro  5.7<L>  /  Alb  3.3  /  TBili  1.0  /  DBili  x   /  AST  14  /  ALT  16  /  AlkPhos  123<H>  02-08    proBNP:   Lipid Profile:   HgA1c:   TSH:   PT/INR - ( 08 Feb 2022 06:48 )   PT: 17.8 sec;   INR: 1.51          PTT - ( 08 Feb 2022 06:48 )  PTT:31.5 sec    ASSESSMENT/PLAN: 	        DVT ppx:  Dispo:     Interventional Cardiology PA Adult Progress Note    Subjective Assessment: Patient seen and examined at bedside. Patient stating that his breathing has improved today, but his legs still feel swollen. Patient denies any other complaints.     ROS Negative except as per Subjective and HPI  	  MEDICATIONS:  furosemide   Injectable 80 milliGRAM(s) IV Push two times a day  metoprolol succinate ER 12.5 milliGRAM(s) Oral daily  sacubitril 24 mG/valsartan 26 mG 1 Tablet(s) Oral two times a day  tamsulosin 0.4 milliGRAM(s) Oral at bedtime  dextrose 40% Gel 15 Gram(s) Oral once  dextrose 50% Injectable 25 Gram(s) IV Push once  dextrose 50% Injectable 12.5 Gram(s) IV Push once  dextrose 50% Injectable 25 Gram(s) IV Push once  glucagon  Injectable 1 milliGRAM(s) IntraMuscular once  insulin lispro (ADMELOG) corrective regimen sliding scale   SubCutaneous Before meals and at bedtime  apixaban 2.5 milliGRAM(s) Oral every 12 hours  dextrose 5%. 1000 milliLiter(s) IV Continuous <Continuous>  dextrose 5%. 1000 milliLiter(s) IV Continuous <Continuous>      [PHYSICAL EXAM:  TELEMETRY:  T(C): 36.6 (02-08-22 @ 10:18), Max: 37.1 (02-07-22 @ 14:19)  HR: 57 (02-08-22 @ 12:22) (53 - 65)  BP: 101/67 (02-08-22 @ 12:22) (101/67 - 130/86)  RR: 18 (02-08-22 @ 06:11) (15 - 18)  SpO2: 98% (02-08-22 @ 12:22) (94% - 98%)  Wt(kg): --  I&O's Summary    07 Feb 2022 07:01  -  08 Feb 2022 07:00  --------------------------------------------------------  IN: 840 mL / OUT: 6250 mL / NET: -5410 mL    08 Feb 2022 07:01  -  08 Feb 2022 12:46  --------------------------------------------------------  IN: 180 mL / OUT: 1400 mL / NET: -1220 mL                                Appearance: Normal	  Neck: Supple, + JVD  Cardiovascular: Normal S1 S2, No JVD, No murmurs,   Respiratory: Dec breath sounds R>L. No increased WOB  Gastrointestinal: Distended, firm. Nontender   Skin: No rashes, No ecchymoses, No cyanosis  Extremities: 3+ pitting edema b/l  Neurologic: Non-focal  Psychiatry: A & O x 3, Mood & affect appropriate  	    ECG:  	  RADIOLOGY:     DIAGNOSTIC TESTING:  [x ] Echocardiogram 02/08/2022   1. Severe symmetric left ventricular hypertrophy. Moderately reduced   left ventricular systolic function. Apical segments are more vigorous   than the base, consider infiltrative cardiomyopathy (such as amyloid). EF: 37%   2. Mildly reduced right ventricular systolic function.   3. Biatrial enlargement.   4. Tethered mitral valve. Mild mitral regurgitation.   5. Mild tricuspid regurgitation.   6. Pulmonary hypertension present, pulmonary artery systolic pressure is   40 mmHg.   7. Small pericardial effusion.   8. Bilateral pleural effusion.   9. Borderline dilated ascending aorta.  10. Compared to the previous TTE performed on 11/22/2021, given technical   differences, LV function is similar to slightly more vigorous. Comparable   views of proximal ascending aorta were not obtained.        LABS:	 	             12.5   3.11  )-----------( 100      ( 08 Feb 2022 06:48 )             39.0     02-08    148<H>  |  111<H>  |  22  ----------------------------<  79  3.8   |  24  |  1.14    Ca    8.2<L>      08 Feb 2022 06:48  Phos  3.3     02-07  Mg     2.1     02-08    TPro  5.7<L>  /  Alb  3.3  /  TBili  1.0  /  DBili  x   /  AST  14  /  ALT  16  /  AlkPhos  123<H>  02-08    proBNP: 45672  Lipid Profile: Total cholesterol: 123, HDL: 48, LDL: 64  HgA1c: 6.4  PT/INR - ( 08 Feb 2022 06:48 )   PT: 17.8 sec;   INR: 1.51     PTT - ( 08 Feb 2022 06:48 )  PTT:31.5 sec

## 2022-02-08 NOTE — DIETITIAN INITIAL EVALUATION ADULT. - OTHER INFO
77 y/o male, PMHx type II DM, AFlutter/A-Fib, thrombocytopenia, stage III CKD (baseline Cr 1.0-1.2 ), non-obstructive CAD, Chronic Systolic CHF(EF 35% Echocardiogram 11/2022), NSVT, BPH (s/p TURP procedure), pituitary adenoma (s/p transphenoidal treatment in 1990's), prostate cancer (s/p chemotherapy), chronic venous insufficiency, and multiple admissions for CHF exacerbation most recent 11/19/2021, who presents to [facility] ER 2/7/22, with worsening dyspnea (with minimal exertion), increase in abdominal girth, b/l LE swelling, facial swelling and dry cough for 4-5 days. In light of risk factors, acute on chronic systolic CHF exacerbation pt now admitted to [facility]  for further medical management. CT of chest reveals 2/7 R pleural effusion, small left, ascites noted throughout abdomen/pelvis and Mesenteric infiltrate extension anasarca. Possible plan for R thoracentesis for pleural effusion.    Pt visited on 5UR. Pt appeared to have little concern for nutrition at this time - negative eye contact, falling in/out of sleep. Reports wife does cooking however when RD offered to call wife pt declined reporting she is at work. Wife does not tend to cook with salt. Makes homemade soup, does not use broth or stock. Drinks mostly water. Good PO intake PTA/now.  pounds, reports wt gain. Admit wt 210 pounds. Wt up 20 pounds, assume d/t fluid shifts. Pt with 2+edema (generalized), 3+edema (right leg; left ankle), 4+edema (left leg; right ankle). +BM 2/6. No pressure ulcers. No pain.   Please see below for nutritions recommendations.

## 2022-02-08 NOTE — PROVIDER CONTACT NOTE (CRITICAL VALUE NOTIFICATION) - SITUATION
Pt became sinus skip in the 40s briefly at 5:26 but RN was not notified until 6:37. At that time, pt's HR was in the 50s.

## 2022-02-08 NOTE — DIETITIAN INITIAL EVALUATION ADULT. - PROBLEM SELECTOR PLAN 1
Core Measures   Acute on Chronic systolic CHF  EF 35%  worsening b/l LE +3-4 pitting edema one week and dyspnea with minimal exertion. Orthopnea 3-4 pillows.  Pt. has hx of non compliance with medication; however, on this admission he reports compliance.  -TTE 11/2021 LV Systolic function is moderately reduced with calculated LVEF 35% with global hypokinesis.  Severe concentric LVH, b/l enlargement.  Normal RV size; small to moderate pericardial effusion, no evidence of tamponade.  The proximal ascending aorta is dilated measuring 4.10 cm.  Suggestive of infiltrative cardiomyopathy.  -BNP 32545 K and Trop 0.11 (baseline; likely in setting of acute on chronic CHF exacerbation)  -ECG showed atrial fibrillation @65bpm/w/o acute ischemic changes  -CXR AP showed significant pulmonary congestion R>L pleural effusion  -2/7/22 CT of chest reveals R pleural effusion, small left, ascites noted throughout abdomen/pelvis and Mesenteric infiltrate extension anasarca.  -2/7/22 US of abdomen reveals cholelithiasis, moderate ascites, R pleural effusion and dilatation of hepatic veins.  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 was on Metoprolol XL 25mg PO daily)

## 2022-02-08 NOTE — PROGRESS NOTE ADULT - ASSESSMENT
ASSESSMENT/PLAN 75 y/o male,  w/ PMHx type II DM (not currently on medications), A-Flutter/A-Fib (s/p prior ablation in 06/2016, most  A-fib on Eliquis 2.5mg PO bid), thrombocytopenia (baseline Plt 60-70's on prior admission), stage III CKD (baseline Cr 1.0-1.2 ), non-obstructive CAD, Chronic Systolic CHF(EF 35% by  Echocardiogram 11/2022), hx of NSVT (EP evaluated pt. 11/2021 BPH (s/p TURP procedure), pituitary adenoma (s/p transphenoidal treatment in 1990's), prostate cancer (s/p chemotherapy), chronic venous insufficiency, Impression of acute on chronic CHF, R>L pleural effusions, compressive atelectases    1. O2 attempt now off   2. Bronchodilators: off, start and encourage incentive spirometry   3. Corticosteroids: off  4. ID/Antibiotics: off  5. Cardiac/HTN: continue diuresis , optimize CHF mngmnt   6. GI: Rx/ prophylaxis c PPI/H2B  7. Heme: Rx/VT prophylaxis c SQH/SCD/AC on Eliquis   8. Aspiration precautions  9. Obtain dedicated non contrast CTchest  today to further elucidate on lung pathology   Discussed with managing team Cardiology

## 2022-02-08 NOTE — DIETITIAN INITIAL EVALUATION ADULT. - OTHER CALCULATIONS
%SVT=855; IBW used to calculate energy needs due to pt's current body weight exceeding 120% of IBW; adjusted for age and CHF; Fluids per team

## 2022-02-08 NOTE — PROGRESS NOTE ADULT - SUBJECTIVE AND OBJECTIVE BOX
Interventional, Pulmonary, Critical, Chest Special Procedures.    Pt was seen and fully examined by myself.     Time spent with patient in minutes: 67    Patient is a 76y old  Male who presents with a chief complaint of worsening Dyspnea and recent weight gain 4-5 days (08 Feb 2022 12:45) The patient tolerating diuresis, denies any dysuria, eupneic on RA, answering questions, not really engaging     HPI:  A&O X3 Full Code      In terms of COVID-19 pt denies hx of COVID-19 and has received full Pfizer COVID-19 vaccination.  Patient plans on receiving Pfizer booster in May 2022.    75 y/o male, prior history of medication non-compliance, w/ PMHx type II DM (not currently on medications), A-Flutter/A-Fib (s/p prior ablation in 06/2016, most recently found to be in A-fib on Eliquis 2.5mg PO bid), thrombocytopenia (baseline Plt 60-70's on prior admission), stage III CKD (baseline Cr 1.0-1.2 ), non-obstructive CAD, Chronic Systolic CHF(EF 35% by  Echocardiogram 11/2022), hx of NSVT (EP evaluated pt. 11/2021 recommended repeat TTE in three months), BPH (s/p TURP procedure), pituitary adenoma (s/p transphenoidal treatment in 1990's), prostate cancer (s/p chemotherapy), chronic venous insufficiency, and multiple admissions for CHF exacerbation most recent 11/19/2021,  who presents to [facility] ER this morning, 2/7/22, with worsening dyspnea (with minimal exertion), increase in abdominal girth, b/l LE swelling, facial swelling and dry cough for 4-5 days.  Pt. reports compliance with medication and medical follow up.   In light of patient's risk factors, and acute on chronic systolic CHF exacerbation pt. is now admitted to [facility]  for further medical management.    In ER ECG reveals Atrial Fibrillation HR@65bpm with non specific ST-T wave changes, Troponin 0.11 (patient's baseline), BNP 11,635, WBC 3.6, H/H 13.3/40.2, Platelets 107 (base line 60-70's), Blood Glucose 116.  CXR AP reveals R>L pleural effusion with Cardiomegaly, CT of Abdomen reveals R>L pleural effusion, ascites noted throughout abdomen and plevis, mesenteric infiltrate extensive anasarca.  US Abdomen reveals cholelithiasis, moderate ascites, right pleural effusion (moderate) and dilatation of hepatic vein.        (07 Feb 2022 05:13)      REVIEW OF SYSTEMS:  Constitutional: No fever, weight loss, chills +++ fatigue  Eyes: No eye pain, visual disturbances, or discharge  ENMT:  No difficulty hearing, tinnitus, vertigo; No sinus or throat pain. No epistaxis, dysphagia, dysphonia, hoarseness or odynophagia  Neck: No pain, stiffness or neck swelling.  No masses or deformities  Respiratory: No cough, wheezing, hemoptysis  - COPD  - ILD   - PE   - ASTHMA     - PNEUMONIA  Cardiovascular: No chest pain, + AF dysrhythmia, palpitations, dizziness or edema - CAD   -+CHF   + HTN  Gastrointestinal: No abdominal or epigastric pain. No nausea, vomiting or hematemesis; No diarrhea or constipation. No melena or hematochezia, Icterus.          Genitourinary: No dysuria, frequency, hematuria or incontinence   + CKD/KAYLI      - ESRD  Neurological: No headaches, +memory loss, loss of strength, numbness or tremors      -DEMENTIA     - STROKE    - SEIZURE  Skin: No itching, burning, rashes or lesions   Lymph Nodes: No enlarged glands  Endocrine: No heat or cold intolerance; No hair loss       + DM     - THYROID DISORDER  Musculoskeletal: No joint pain or swelling; No muscle, back or extremity pain, No edema  Psychiatric: No depression, anxiety, mood swings or difficulty sleeping  Heme/Lymph: No easy bruising or bleeding gums         -+ANEMIA      + CANCER   -COAGULOPATHY  Allergy and Immunologic: No hives or eczema    PAST MEDICAL & SURGICAL HISTORY:  Atrial flutter  s/p Ablation 2016    Chronic venous insufficiency of lower extremity    Prostate CA    Stage 3 chronic kidney disease  1.2-1.2 baseline   On admission 1.2    Type 2 diabetes mellitus  not on medication    Thrombocytopenia  60-70&#x27;s baseline    Acute on chronic systolic congestive heart failure    Chronic atrial fibrillation    Atrial flutter  s/p ablation in 2016    History of surgical removal of pituitary gland  1990s    S/P TURP  for BPH      FAMILY HISTORY:    SOCIAL HISTORY:      - Tobacco     - ETOH    Allergies    No Known Allergies    Intolerances      Vital Signs Last 24 Hrs  T(C): 36.5 (08 Feb 2022 13:41), Max: 36.8 (08 Feb 2022 05:58)  T(F): 97.7 (08 Feb 2022 13:41), Max: 98.2 (08 Feb 2022 05:58)  HR: 57 (08 Feb 2022 12:22) (53 - 65)  BP: 101/67 (08 Feb 2022 12:22) (101/67 - 130/86)  BP(mean): 102 (07 Feb 2022 19:36) (88 - 102)  RR: 18 (08 Feb 2022 06:11) (15 - 18)  SpO2: 98% (08 Feb 2022 12:22) (94% - 98%)    02-07 @ 07:01  -  02-08 @ 07:00  --------------------------------------------------------  IN: 840 mL / OUT: 6250 mL / NET: -5410 mL    02-08 @ 07:01 - 02-08 @ 16:10  --------------------------------------------------------  IN: 180 mL / OUT: 1400 mL / NET: -1220 mL          MEDICATIONS:  MEDICATIONS  (STANDING):  apixaban 2.5 milliGRAM(s) Oral every 12 hours  dextrose 40% Gel 15 Gram(s) Oral once  dextrose 5%. 1000 milliLiter(s) (50 mL/Hr) IV Continuous <Continuous>  dextrose 5%. 1000 milliLiter(s) (100 mL/Hr) IV Continuous <Continuous>  dextrose 50% Injectable 25 Gram(s) IV Push once  dextrose 50% Injectable 12.5 Gram(s) IV Push once  dextrose 50% Injectable 25 Gram(s) IV Push once  furosemide   Injectable 40 milliGRAM(s) IV Push two times a day  glucagon  Injectable 1 milliGRAM(s) IntraMuscular once  insulin lispro (ADMELOG) corrective regimen sliding scale   SubCutaneous Before meals and at bedtime  metoprolol succinate ER 12.5 milliGRAM(s) Oral daily  sacubitril 24 mG/valsartan 26 mG 1 Tablet(s) Oral two times a day  tamsulosin 0.4 milliGRAM(s) Oral at bedtime    MEDICATIONS  (PRN):      PHYSICAL EXAM:  Un Comfortable, no immediate distress  Eyes: PERRL, EOM intact; conjunctiva and sclera clear  Head: Normocephalic;  No Trauma  ENMT: No nasal discharge, hoarseness, cough or hemoptysis  Neck: Supple; non tender; no masses or deformities.    No JVD  Respiratory:  - WHEEZING   - RHONCHI  - RALES  - CRACKLES.  Diminished breath sounds  BILATERAL  RIGHT 1/4 caudad,   LEFT base   Cardiovascular: Regular rate and rhythm. S1 and S2 Normal; No murmurs, gallops or rubs     - PPM/AICD  Gastrointestinal: Soft non-tender, mildly distended; Normal bowel sounds; No hepatosplenomegaly.     -PEG    -  GT   - GONZALEZ  Genitourinary: No costovertebral angle tenderness. No dysuria  Extremities: AROM, No clubbing, cyanosis + diffuse  edema    Vascular: Peripheral pulses palpable 2+ bilaterally  Neurological: Alert and responisve to stimuli   Skin: Warm and dry. No obvious rash  Lymph Nodes: No acute cervical or supraclavicular adenopathy  Psychiatric: not really engaging     DEVICES:  - DENTURES   +IV R / L     - ETUBE   -TRACH   -CTUBE  R / L    LABS:                          12.5   3.11  )-----------( 100      ( 08 Feb 2022 06:48 )             39.0     02-08    148<H>  |  111<H>  |  22  ----------------------------<  79  3.8   |  24  |  1.14    Ca    8.2<L>      08 Feb 2022 06:48  Phos  3.3     02-07  Mg     2.1     02-08    TPro  5.7<L>  /  Alb  3.3  /  TBili  1.0  /  DBili  x   /  AST  14  /  ALT  16  /  AlkPhos  123<H>  02-08    PT/INR - ( 08 Feb 2022 06:48 )   PT: 17.8 sec;   INR: 1.51          PTT - ( 08 Feb 2022 06:48 )  PTT:31.5 sec  RADIOLOGY & ADDITIONAL STUDIES (The following images were personally reviewed):< from: Xray Chest 1 View- PORTABLE-Routine (Xray Chest 1 View- PORTABLE-Routine .) (02.08.22 @ 11:05) >  ACC: [id number] EXAM:  XR CHEST PORTABLE ROUTINE 1V                          PROCEDURE DATE:  02/08/2022          INTERPRETATION:  Clinical history/reason for exam: Pleural effusion.    Frontal chest.    COMPARISON: February 7, 2022.    Findings/  impression: Stable cardiomegaly, right opacity/pleural effusion and bony   structures.. Left pleural effusion.    < end of copied text >

## 2022-02-08 NOTE — PROGRESS NOTE ADULT - PROBLEM SELECTOR PLAN 3
Platelets 107 on admission, baseline from prior admission 60-70's  -Transfuse for plts <50K and pt w/ evidence of bleeding, <20  and w/ fever or <10K  -11/23/21 BM biopsy done as above  -Hem Consulted, awaiting call back

## 2022-02-08 NOTE — PROGRESS NOTE ADULT - PROBLEM SELECTOR PLAN 6
-Pt. is on Eliquis 2.5mg PO bid; ambulate as tolerated      F;None  E: K >4, Mg > 2  N: DASH/TLC, fluid restriction   Dvt: eliquis   Dispo: pending diuresis, amyloid workup     Case discussed with Dr. Anton

## 2022-02-09 LAB
ALBUMIN SERPL ELPH-MCNC: 3.3 G/DL — SIGNIFICANT CHANGE UP (ref 3.3–5)
ALP SERPL-CCNC: 111 U/L — SIGNIFICANT CHANGE UP (ref 40–120)
ALT FLD-CCNC: 13 U/L — SIGNIFICANT CHANGE UP (ref 10–45)
ANION GAP SERPL CALC-SCNC: 10 MMOL/L — SIGNIFICANT CHANGE UP (ref 5–17)
APTT BLD: 35.5 SEC — SIGNIFICANT CHANGE UP (ref 27.5–35.5)
AST SERPL-CCNC: 10 U/L — SIGNIFICANT CHANGE UP (ref 10–40)
BASOPHILS # BLD AUTO: 0.01 K/UL — SIGNIFICANT CHANGE UP (ref 0–0.2)
BASOPHILS NFR BLD AUTO: 0.3 % — SIGNIFICANT CHANGE UP (ref 0–2)
BILIRUB SERPL-MCNC: 0.9 MG/DL — SIGNIFICANT CHANGE UP (ref 0.2–1.2)
BUN SERPL-MCNC: 23 MG/DL — SIGNIFICANT CHANGE UP (ref 7–23)
CALCIUM SERPL-MCNC: 8.1 MG/DL — LOW (ref 8.4–10.5)
CHLORIDE SERPL-SCNC: 109 MMOL/L — HIGH (ref 96–108)
CO2 SERPL-SCNC: 27 MMOL/L — SIGNIFICANT CHANGE UP (ref 22–31)
CREAT SERPL-MCNC: 1.18 MG/DL — SIGNIFICANT CHANGE UP (ref 0.5–1.3)
EOSINOPHIL # BLD AUTO: 0.02 K/UL — SIGNIFICANT CHANGE UP (ref 0–0.5)
EOSINOPHIL NFR BLD AUTO: 0.7 % — SIGNIFICANT CHANGE UP (ref 0–6)
GLUCOSE BLDC GLUCOMTR-MCNC: 115 MG/DL — HIGH (ref 70–99)
GLUCOSE BLDC GLUCOMTR-MCNC: 126 MG/DL — HIGH (ref 70–99)
GLUCOSE BLDC GLUCOMTR-MCNC: 128 MG/DL — HIGH (ref 70–99)
GLUCOSE BLDC GLUCOMTR-MCNC: 88 MG/DL — SIGNIFICANT CHANGE UP (ref 70–99)
GLUCOSE SERPL-MCNC: 90 MG/DL — SIGNIFICANT CHANGE UP (ref 70–99)
HCT VFR BLD CALC: 37 % — LOW (ref 39–50)
HGB BLD-MCNC: 12.3 G/DL — LOW (ref 13–17)
IMM GRANULOCYTES NFR BLD AUTO: 0 % — SIGNIFICANT CHANGE UP (ref 0–1.5)
INR BLD: 1.41 — HIGH (ref 0.88–1.16)
KAPPA LC SER QL IFE: 5.66 MG/DL — HIGH (ref 0.33–1.94)
KAPPA/LAMBDA FREE LIGHT CHAIN RATIO, SERUM: 1.48 RATIO — SIGNIFICANT CHANGE UP (ref 0.26–1.65)
LAMBDA LC SER QL IFE: 3.83 MG/DL — HIGH (ref 0.57–2.63)
LYMPHOCYTES # BLD AUTO: 0.71 K/UL — LOW (ref 1–3.3)
LYMPHOCYTES # BLD AUTO: 23.1 % — SIGNIFICANT CHANGE UP (ref 13–44)
MAGNESIUM SERPL-MCNC: 1.9 MG/DL — SIGNIFICANT CHANGE UP (ref 1.6–2.6)
MCHC RBC-ENTMCNC: 28.5 PG — SIGNIFICANT CHANGE UP (ref 27–34)
MCHC RBC-ENTMCNC: 33.2 GM/DL — SIGNIFICANT CHANGE UP (ref 32–36)
MCV RBC AUTO: 85.6 FL — SIGNIFICANT CHANGE UP (ref 80–100)
MONOCYTES # BLD AUTO: 0.33 K/UL — SIGNIFICANT CHANGE UP (ref 0–0.9)
MONOCYTES NFR BLD AUTO: 10.7 % — SIGNIFICANT CHANGE UP (ref 2–14)
NEUTROPHILS # BLD AUTO: 2 K/UL — SIGNIFICANT CHANGE UP (ref 1.8–7.4)
NEUTROPHILS NFR BLD AUTO: 65.2 % — SIGNIFICANT CHANGE UP (ref 43–77)
NRBC # BLD: 0 /100 WBCS — SIGNIFICANT CHANGE UP (ref 0–0)
PLATELET # BLD AUTO: 99 K/UL — LOW (ref 150–400)
POTASSIUM SERPL-MCNC: 3.8 MMOL/L — SIGNIFICANT CHANGE UP (ref 3.5–5.3)
POTASSIUM SERPL-SCNC: 3.8 MMOL/L — SIGNIFICANT CHANGE UP (ref 3.5–5.3)
PROT SERPL-MCNC: 5.5 G/DL — LOW (ref 6–8.3)
PROTHROM AB SERPL-ACNC: 16.6 SEC — HIGH (ref 10.6–13.6)
RBC # BLD: 4.32 M/UL — SIGNIFICANT CHANGE UP (ref 4.2–5.8)
RBC # FLD: 16.2 % — HIGH (ref 10.3–14.5)
SODIUM SERPL-SCNC: 146 MMOL/L — HIGH (ref 135–145)
WBC # BLD: 3.07 K/UL — LOW (ref 3.8–10.5)
WBC # FLD AUTO: 3.07 K/UL — LOW (ref 3.8–10.5)

## 2022-02-09 PROCEDURE — 71250 CT THORAX DX C-: CPT | Mod: 26

## 2022-02-09 PROCEDURE — 99221 1ST HOSP IP/OBS SF/LOW 40: CPT

## 2022-02-09 RX ORDER — HEPARIN SODIUM 5000 [USP'U]/ML
7500 INJECTION INTRAVENOUS; SUBCUTANEOUS EVERY 6 HOURS
Refills: 0 | Status: DISCONTINUED | OUTPATIENT
Start: 2022-02-09 | End: 2022-02-10

## 2022-02-09 RX ORDER — POTASSIUM CHLORIDE 20 MEQ
20 PACKET (EA) ORAL ONCE
Refills: 0 | Status: COMPLETED | OUTPATIENT
Start: 2022-02-09 | End: 2022-02-09

## 2022-02-09 RX ORDER — APIXABAN 2.5 MG/1
2.5 TABLET, FILM COATED ORAL EVERY 12 HOURS
Refills: 0 | Status: DISCONTINUED | OUTPATIENT
Start: 2022-02-09 | End: 2022-02-09

## 2022-02-09 RX ORDER — HEPARIN SODIUM 5000 [USP'U]/ML
3500 INJECTION INTRAVENOUS; SUBCUTANEOUS EVERY 6 HOURS
Refills: 0 | Status: DISCONTINUED | OUTPATIENT
Start: 2022-02-09 | End: 2022-02-10

## 2022-02-09 RX ORDER — HEPARIN SODIUM 5000 [USP'U]/ML
INJECTION INTRAVENOUS; SUBCUTANEOUS
Qty: 25000 | Refills: 0 | Status: DISCONTINUED | OUTPATIENT
Start: 2022-02-09 | End: 2022-02-10

## 2022-02-09 RX ORDER — MAGNESIUM OXIDE 400 MG ORAL TABLET 241.3 MG
400 TABLET ORAL ONCE
Refills: 0 | Status: COMPLETED | OUTPATIENT
Start: 2022-02-09 | End: 2022-02-09

## 2022-02-09 RX ADMIN — Medication 40 MILLIGRAM(S): at 17:40

## 2022-02-09 RX ADMIN — SACUBITRIL AND VALSARTAN 1 TABLET(S): 24; 26 TABLET, FILM COATED ORAL at 07:08

## 2022-02-09 RX ADMIN — HEPARIN SODIUM 1700 UNIT(S)/HR: 5000 INJECTION INTRAVENOUS; SUBCUTANEOUS at 20:21

## 2022-02-09 RX ADMIN — SACUBITRIL AND VALSARTAN 1 TABLET(S): 24; 26 TABLET, FILM COATED ORAL at 21:18

## 2022-02-09 RX ADMIN — Medication 20 MILLIEQUIVALENT(S): at 12:30

## 2022-02-09 RX ADMIN — TAMSULOSIN HYDROCHLORIDE 0.4 MILLIGRAM(S): 0.4 CAPSULE ORAL at 21:18

## 2022-02-09 RX ADMIN — APIXABAN 2.5 MILLIGRAM(S): 2.5 TABLET, FILM COATED ORAL at 07:08

## 2022-02-09 RX ADMIN — Medication 12.5 MILLIGRAM(S): at 07:06

## 2022-02-09 RX ADMIN — Medication 40 MILLIGRAM(S): at 07:08

## 2022-02-09 RX ADMIN — MAGNESIUM OXIDE 400 MG ORAL TABLET 400 MILLIGRAM(S): 241.3 TABLET ORAL at 12:30

## 2022-02-09 NOTE — CONSULT NOTE ADULT - ATTENDING COMMENTS
76/M with HTN, DM, A.fib s/p ablation in 2016 who was admitted in June 2021 with leg swelling and MARTINEZ and noted to have severe LVH and EF 50% with recurrent admission in Nov 2021 with worsening symptoms, abdominal swelling and TTE showing worsening EF 35%, following with , with chronic thrombocytopenia s/p bone marrow biopsy without plasma call neoplasm, presents to ER with worsening leg swelling, SOB on exertion and abdominal swelling.   reports he can barely walk 1 block w/o stopping. orthopnoea +, Left leg swelling worse than right leg. reports taking lasix regularly but notes he use to urinate more with lasix in the beginning but recently u/o has decreased.     Exam:  JVP 10-12  b/l ae + crackles +  s1s2+ irregular rhythm,  soft, distention  b/l LE oedema    Labs:                        12.3   3.07  )-----------( 99       ( 09 Feb 2022 05:27 )             37.0   02-09    146<H>  |  109<H>  |  23  ----------------------------<  90  3.8   |  27  |  1.18    Ca    8.1<L>      09 Feb 2022 05:27  Mg     1.9     02-09    TPro  5.5<L>  /  Alb  3.3  /  TBili  0.9  /  DBili  x   /  AST  10  /  ALT  13  /  AlkPhos  111  02-09    [place]/lambda free light chain ratio: 1.4    ECHO:  concentric LVH,biatrial enlargement --> suggestive of infiltrative CM   EF 35%,     CT chest with large Rt pleural effusion    Imp:  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 if pt is on AC for a.fib  pt had rt pleural effusion and likely ascites  Will need drain   will cont follow

## 2022-02-09 NOTE — PROGRESS NOTE ADULT - PROBLEM SELECTOR PLAN 3
Platelets 107 on admission, baseline from prior admission 60-70's, today 99  -Transfuse for plts <50K and pt w/ evidence of bleeding, <20  and w/ fever or <10K  -11/23/21 BM biopsy done as above  -Heme Consulted, to see patient tomorrow. Platelets 107 on admission, baseline from prior admission 60-70's, today 99  -Transfuse for plts <50K and pt w/ evidence of bleeding, <20  and w/ fever or <10K  -11/23/21 BM biopsy done as above  -Heme Consulted, Dr. Rendon to see patient

## 2022-02-09 NOTE — PROGRESS NOTE ADULT - SUBJECTIVE AND OBJECTIVE BOX
Interventional Cardiology PA Adult Progress Note    C.C.:     Subjective Assessment:      ROS Negative except as per Subjective and HPI  	  MEDICATIONS:  furosemide   Injectable 40 milliGRAM(s) IV Push two times a day  metoprolol succinate ER 12.5 milliGRAM(s) Oral daily  sacubitril 24 mG/valsartan 26 mG 1 Tablet(s) Oral two times a day  tamsulosin 0.4 milliGRAM(s) Oral at bedtime            dextrose 40% Gel 15 Gram(s) Oral once  dextrose 50% Injectable 25 Gram(s) IV Push once  dextrose 50% Injectable 12.5 Gram(s) IV Push once  dextrose 50% Injectable 25 Gram(s) IV Push once  glucagon  Injectable 1 milliGRAM(s) IntraMuscular once  insulin lispro (ADMELOG) corrective regimen sliding scale   SubCutaneous Before meals and at bedtime    apixaban 2.5 milliGRAM(s) Oral every 12 hours  dextrose 5%. 1000 milliLiter(s) IV Continuous <Continuous>  dextrose 5%. 1000 milliLiter(s) IV Continuous <Continuous>  magnesium oxide 400 milliGRAM(s) Oral once  potassium chloride    Tablet ER 20 milliEquivalent(s) Oral once      	    [PHYSICAL EXAM:  TELEMETRY:  T(C): 36.5 (02-09-22 @ 06:28), Max: 36.6 (02-08-22 @ 10:18)  HR: 54 (02-09-22 @ 08:45) (54 - 64)  BP: 116/78 (02-09-22 @ 08:45) (101/67 - 119/80)  RR: 18 (02-09-22 @ 08:45) (15 - 18)  SpO2: 98% (02-09-22 @ 08:45) (97% - 98%)  Wt(kg): --  I&O's Summary    08 Feb 2022 07:01  -  09 Feb 2022 07:00  --------------------------------------------------------  IN: 1260 mL / OUT: 2950 mL / NET: -1690 mL        Moore:  Central/PICC/Mid Line:                                         Appearance: Normal	  HEENT:   Normal oral mucosa, PERRL, EOMI	  Neck: Supple, + JVD/ - JVD; Carotid Bruit   Cardiovascular: Normal S1 S2, No JVD, No murmurs,   Respiratory: Lungs clear to auscultation/Decreased Breath Sounds/No Rales, Rhonchi, Wheezing	  Gastrointestinal:  Soft, Non-tender, + BS	  Skin: No rashes, No ecchymoses, No cyanosis  Extremities: Normal range of motion, No clubbing, cyanosis or edema  Vascular: Peripheral pulses palpable 2+ bilaterally  Neurologic: Non-focal  Psychiatry: A & O x 3, Mood & affect appropriate      	    ECG:  	  RADIOLOGY:   DIAGNOSTIC TESTING:  [ ] Echocardiogram:  [ ]  Catheterization:  [ ] Stress Test:    [ ] KHUSHI  OTHER: 	    LABS:	 	  CARDIAC MARKERS:                                  12.3   3.07  )-----------( 99       ( 09 Feb 2022 05:27 )             37.0     02-09    146<H>  |  109<H>  |  23  ----------------------------<  90  3.8   |  27  |  1.18    Ca    8.1<L>      09 Feb 2022 05:27  Phos  3.3     02-07  Mg     1.9     02-09    TPro  5.5<L>  /  Alb  3.3  /  TBili  0.9  /  DBili  x   /  AST  10  /  ALT  13  /  AlkPhos  111  02-09    proBNP:   Lipid Profile:   HgA1c:   TSH:   PT/INR - ( 08 Feb 2022 06:48 )   PT: 17.8 sec;   INR: 1.51          PTT - ( 08 Feb 2022 06:48 )  PTT:31.5 sec    ASSESSMENT/PLAN: 	        DVT ppx:  Dispo:     Interventional Cardiology PA Adult Progress Note      Subjective Assessment:  Patient seen at bedside, no complaints. Denies CP, dizziness, SOB, HA, dizziness, lightheadedness HA, N/V/D.       ROS Negative except as per Subjective and HPI  	  MEDICATIONS:  furosemide   Injectable 40 milliGRAM(s) IV Push two times a day  metoprolol succinate ER 12.5 milliGRAM(s) Oral daily  sacubitril 24 mG/valsartan 26 mG 1 Tablet(s) Oral two times a day  tamsulosin 0.4 milliGRAM(s) Oral at bedtime  dextrose 40% Gel 15 Gram(s) Oral once  dextrose 50% Injectable 25 Gram(s) IV Push once  dextrose 50% Injectable 12.5 Gram(s) IV Push once  dextrose 50% Injectable 25 Gram(s) IV Push once  glucagon  Injectable 1 milliGRAM(s) IntraMuscular once  insulin lispro (ADMELOG) corrective regimen sliding scale   SubCutaneous Before meals and at bedtime  apixaban 2.5 milliGRAM(s) Oral every 12 hours  dextrose 5%. 1000 milliLiter(s) IV Continuous <Continuous>  dextrose 5%. 1000 milliLiter(s) IV Continuous <Continuous>  magnesium oxide 400 milliGRAM(s) Oral once  potassium chloride    Tablet ER 20 milliEquivalent(s) Oral once	    [PHYSICAL EXAM:  TELEMETRY:  T(C): 36.5 (02-09-22 @ 06:28), Max: 36.6 (02-08-22 @ 10:18)  HR: 54 (02-09-22 @ 08:45) (54 - 64)  BP: 116/78 (02-09-22 @ 08:45) (101/67 - 119/80)  RR: 18 (02-09-22 @ 08:45) (15 - 18)  SpO2: 98% (02-09-22 @ 08:45) (97% - 98%)  Wt(kg): --  I&O's Summary    08 Feb 2022 07:01  -  09 Feb 2022 07:00  --------------------------------------------------------  IN: 1260 mL / OUT: 2950 mL / NET: -1690 mL      Appearance: Normal	  HEENT:   Normal oral mucosa, PERRL, EOMI	  Neck: Supple, - JVD; - Carotid Bruit   Cardiovascular: Normal S1 S2, No JVD, No murmurs,   Respiratory: Lungs clear to auscultation  Gastrointestinal:  Soft, Non-tender, + BS	  Skin: No rashes, No ecchymoses, No cyanosis  Extremities: Normal range of motion, No clubbing, cyanosis or edema  Vascular: Peripheral pulses palpable 2+ bilaterally  Neurologic: Non-focal  Psychiatry: A & O x 3, Mood & affect appropriate  	    ECG:  	  RADIOLOGY:   DIAGNOSTIC TESTING:  [x ] Echocardiogram 02/08/2022   1. Severe symmetric left ventricular hypertrophy. Moderately reduced   left ventricular systolic function. Apical segments are more vigorous   than the base, consider infiltrative cardiomyopathy (such as amyloid). EF: 37%   2. Mildly reduced right ventricular systolic function.   3. Biatrial enlargement.   4. Tethered mitral valve. Mild mitral regurgitation.   5. Mild tricuspid regurgitation.   6. Pulmonary hypertension present, pulmonary artery systolic pressure is   40 mmHg.   7. Small pericardial effusion.   8. Bilateral pleural effusion.   9. Borderline dilated ascending aorta.  10. Compared to the previous TTE performed on 11/22/2021, given technical   differences, LV function is similar to slightly more vigorous. Comparable   views of proximal ascending aorta were not obtained.  	    LABS:	 	                   12.3   3.07  )-----------( 99       ( 09 Feb 2022 05:27 )             37.0     02-09    146<H>  |  109<H>  |  23  ----------------------------<  90  3.8   |  27  |  1.18    Ca    8.1<L>      09 Feb 2022 05:27  Phos  3.3     02-07  Mg     1.9     02-09    TPro  5.5<L>  /  Alb  3.3  /  TBili  0.9  /  DBili  x   /  AST  10  /  ALT  13  /  AlkPhos  111  02-09    proBNP: 44406  Lipid Profile: TC: 123, HDL 48, LDL 64, TG 56  HgA1c: 6.4  PT/INR - ( 08 Feb 2022 06:48 )   PT: 17.8 sec;   INR: 1.51     PTT - ( 08 Feb 2022 06:48 )  PTT:31.5 sec   Interventional Cardiology PA Adult Progress Note    Subjective Assessment: Patient seen at bedside, no complaints. Denies CP, dizziness, SOB, HA, dizziness, lightheadedness HA, N/V/D.     ROS Negative except as per Subjective and HPI  	  MEDICATIONS:  furosemide   Injectable 40 milliGRAM(s) IV Push two times a day  metoprolol succinate ER 12.5 milliGRAM(s) Oral daily  sacubitril 24 mG/valsartan 26 mG 1 Tablet(s) Oral two times a day  tamsulosin 0.4 milliGRAM(s) Oral at bedtime  dextrose 40% Gel 15 Gram(s) Oral once  dextrose 50% Injectable 25 Gram(s) IV Push once  dextrose 50% Injectable 12.5 Gram(s) IV Push once  dextrose 50% Injectable 25 Gram(s) IV Push once  glucagon  Injectable 1 milliGRAM(s) IntraMuscular once  insulin lispro (ADMELOG) corrective regimen sliding scale   SubCutaneous Before meals and at bedtime  apixaban 2.5 milliGRAM(s) Oral every 12 hours  dextrose 5%. 1000 milliLiter(s) IV Continuous <Continuous>  dextrose 5%. 1000 milliLiter(s) IV Continuous <Continuous>  magnesium oxide 400 milliGRAM(s) Oral once  potassium chloride    Tablet ER 20 milliEquivalent(s) Oral once	    [PHYSICAL EXAM:  TELEMETRY:  T(C): 36.5 (02-09-22 @ 06:28), Max: 36.6 (02-08-22 @ 10:18)  HR: 54 (02-09-22 @ 08:45) (54 - 64)  BP: 116/78 (02-09-22 @ 08:45) (101/67 - 119/80)  RR: 18 (02-09-22 @ 08:45) (15 - 18)  SpO2: 98% (02-09-22 @ 08:45) (97% - 98%)  Wt(kg): --  I&O's Summary    08 Feb 2022 07:01  -  09 Feb 2022 07:00  --------------------------------------------------------  IN: 1260 mL / OUT: 2950 mL / NET: -1690 mL      Appearance: Normal	  HEENT:   Normal oral mucosa, PERRL, EOMI	  Neck: Supple, - JVD; - Carotid Bruit   Cardiovascular: Normal S1 S2, + JVD, No murmurs,   Respiratory: + bibasilar rales   Gastrointestinal:  Soft, Non-tender, + BS	  Skin: No rashes, No ecchymoses, No cyanosis  Extremities: Normal range of motion, No clubbing, cyanosis or edema  Vascular: Peripheral pulses palpable 2+ bilaterally  Neurologic: Non-focal  Psychiatry: A & O x 3, Mood & affect appropriate  	    ECG:  	  RADIOLOGY:   DIAGNOSTIC TESTING:  [x ] Echocardiogram 02/08/2022   1. Severe symmetric left ventricular hypertrophy. Moderately reduced   left ventricular systolic function. Apical segments are more vigorous   than the base, consider infiltrative cardiomyopathy (such as amyloid). EF: 37%   2. Mildly reduced right ventricular systolic function.   3. Biatrial enlargement.   4. Tethered mitral valve. Mild mitral regurgitation.   5. Mild tricuspid regurgitation.   6. Pulmonary hypertension present, pulmonary artery systolic pressure is   40 mmHg.   7. Small pericardial effusion.   8. Bilateral pleural effusion.   9. Borderline dilated ascending aorta.  10. Compared to the previous TTE performed on 11/22/2021, given technical   differences, LV function is similar to slightly more vigorous. Comparable   views of proximal ascending aorta were not obtained.  	    LABS:	 	                   12.3   3.07  )-----------( 99       ( 09 Feb 2022 05:27 )             37.0     02-09    146<H>  |  109<H>  |  23  ----------------------------<  90  3.8   |  27  |  1.18    Ca    8.1<L>      09 Feb 2022 05:27  Phos  3.3     02-07  Mg     1.9     02-09    TPro  5.5<L>  /  Alb  3.3  /  TBili  0.9  /  DBili  x   /  AST  10  /  ALT  13  /  AlkPhos  111  02-09    proBNP: 34266  Lipid Profile: TC: 123, HDL 48, LDL 64, TG 56  HgA1c: 6.4  PT/INR - ( 08 Feb 2022 06:48 )   PT: 17.8 sec;   INR: 1.51     PTT - ( 08 Feb 2022 06:48 )  PTT:31.5 sec   Interventional Cardiology PA Adult Progress Note    Subjective Assessment: Patient seen at bedside, no complaints, notes feeling "so-so", states breathing is improving. Denies CP, dizziness, SOB, HA, dizziness, lightheadedness HA, N/V/D.     ROS Negative except as per Subjective and HPI  	  MEDICATIONS:  furosemide   Injectable 40 milliGRAM(s) IV Push two times a day  metoprolol succinate ER 12.5 milliGRAM(s) Oral daily  sacubitril 24 mG/valsartan 26 mG 1 Tablet(s) Oral two times a day  tamsulosin 0.4 milliGRAM(s) Oral at bedtime  dextrose 40% Gel 15 Gram(s) Oral once  dextrose 50% Injectable 25 Gram(s) IV Push once  dextrose 50% Injectable 12.5 Gram(s) IV Push once  dextrose 50% Injectable 25 Gram(s) IV Push once  glucagon  Injectable 1 milliGRAM(s) IntraMuscular once  insulin lispro (ADMELOG) corrective regimen sliding scale   SubCutaneous Before meals and at bedtime  apixaban 2.5 milliGRAM(s) Oral every 12 hours  dextrose 5%. 1000 milliLiter(s) IV Continuous <Continuous>  dextrose 5%. 1000 milliLiter(s) IV Continuous <Continuous>  magnesium oxide 400 milliGRAM(s) Oral once  potassium chloride    Tablet ER 20 milliEquivalent(s) Oral once	    [PHYSICAL EXAM:  TELEMETRY:  T(C): 36.5 (02-09-22 @ 06:28), Max: 36.6 (02-08-22 @ 10:18)  HR: 54 (02-09-22 @ 08:45) (54 - 64)  BP: 116/78 (02-09-22 @ 08:45) (101/67 - 119/80)  RR: 18 (02-09-22 @ 08:45) (15 - 18)  SpO2: 98% (02-09-22 @ 08:45) (97% - 98%)  Wt(kg): --  I&O's Summary    08 Feb 2022 07:01  -  09 Feb 2022 07:00  --------------------------------------------------------  IN: 1260 mL / OUT: 2950 mL / NET: -1690 mL      Appearance: Normal	  HEENT:   Normal oral mucosa, PERRL, EOMI	  Neck: Supple, - JVD; - Carotid Bruit   Cardiovascular: Normal S1 S2, + JVD, No murmurs,   Respiratory: + bibasilar rales   Gastrointestinal:  Soft, Non-tender, + BS	  Skin: No rashes, No ecchymoses, No cyanosis  Extremities: Normal range of motion, No clubbing, cyanosis, b/l 1-2+ edema , ACE wrapped.   Vascular: Peripheral pulses palpable 2+ bilaterally  Neurologic: Non-focal  Psychiatry: A & O x 3, Mood & affect appropriate  	    ECG:  	  RADIOLOGY:   DIAGNOSTIC TESTING:  [x ] Echocardiogram 02/08/2022   1. Severe symmetric left ventricular hypertrophy. Moderately reduced   left ventricular systolic function. Apical segments are more vigorous   than the base, consider infiltrative cardiomyopathy (such as amyloid). EF: 37%   2. Mildly reduced right ventricular systolic function.   3. Biatrial enlargement.   4. Tethered mitral valve. Mild mitral regurgitation.   5. Mild tricuspid regurgitation.   6. Pulmonary hypertension present, pulmonary artery systolic pressure is   40 mmHg.   7. Small pericardial effusion.   8. Bilateral pleural effusion.   9. Borderline dilated ascending aorta.  10. Compared to the previous TTE performed on 11/22/2021, given technical   differences, LV function is similar to slightly more vigorous. Comparable   views of proximal ascending aorta were not obtained.  	    LABS:	 	                   12.3   3.07  )-----------( 99       ( 09 Feb 2022 05:27 )             37.0     02-09    146<H>  |  109<H>  |  23  ----------------------------<  90  3.8   |  27  |  1.18    Ca    8.1<L>      09 Feb 2022 05:27  Phos  3.3     02-07  Mg     1.9     02-09    TPro  5.5<L>  /  Alb  3.3  /  TBili  0.9  /  DBili  x   /  AST  10  /  ALT  13  /  AlkPhos  111  02-09    proBNP: 02104  Lipid Profile: TC: 123, HDL 48, LDL 64, TG 56  HgA1c: 6.4  PT/INR - ( 08 Feb 2022 06:48 )   PT: 17.8 sec;   INR: 1.51     PTT - ( 08 Feb 2022 06:48 )  PTT:31.5 sec

## 2022-02-09 NOTE — PROGRESS NOTE ADULT - PROBLEM SELECTOR PLAN 1
Patient admitted with anasarca, + JVD, originally requiring supplemental O2, now on RA, orthopnea 3-4 pillows   - BNP 11K and Trop 0.11 (baseline; likely i/s/o CHF exacerbation)  - CXR AP showed significant pulmonary congestion R>L pleural effusion  - ECHO 2/8/22: Severe symmetric LVH. Moderately reduced LVSF. Apical segments are more vigorous than the base, consider infiltrative cardiomyopathy, such as amyloid. Mildly reduced RVSF. Biatrial enlargement. Tethered MV. Mild MR/TR. Pulmonary HTN present. PASP: 40 mmHg. Small pericardial effusion. Bilateral pleural effusion. Compared to 11/22 - LV function is similar, but more vigorous.   - CT of chest 2/7/22: R pleural effusion, small left, ascites noted throughout abdomen/pelvis and Mesenteric infiltrate extension anasarca   -Abd u/s 2/6 w/ moderate ascites   -BM biopsy done 11/23/2021 to r/o infiltrative cardiomyopathy. Negative for amyloid, however sample size was small, follows with Dr Bernal [phone] (called 2/8/22 - no return calls)   - Pulm consult Dr Smith/Dr Guido - no need for tap at this time, continue diuresis   - Adv HF following--plan for Amyloid scan tomorrow. Will d/c bbl as per HF as likely Amyloidosis.   - Diuresis: continue Lasix 40 mg IV BID, Entresto 24/26 mg BID  - Strict I/Os, core measure, daily weight Patient admitted with anasarca, + JVD, originally requiring supplemental O2, now on RA, orthopnea 3-4 pillows   - BNP 11K and Trop 0.11 (baseline; likely i/s/o CHF exacerbation)  - CXR AP showed significant pulmonary congestion R>L pleural effusion  - ECHO 2/8/22: Severe symmetric LVH. Moderately reduced LVSF. Apical segments are more vigorous than the base, consider infiltrative cardiomyopathy, such as amyloid. Mildly reduced RVSF. Biatrial enlargement. Tethered MV. Mild MR/TR. Pulmonary HTN present. PASP: 40 mmHg. Small pericardial effusion. Bilateral pleural effusion. Compared to 11/22 - LV function is similar, but more vigorous.   - CT of chest 2/7/22: R pleural effusion, small left, ascites noted throughout abdomen/pelvis and Mesenteric infiltrate extension anasarca   - Abd u/s 2/6 w/ moderate ascites   - BM biopsy done 11/23/2021 to r/o infiltrative cardiomyopathy. Negative for amyloid, however sample size was small, follows with Dr Bernal [phone] (called 2/8/22 - no return calls)   - Pulm consult Dr Smith/Dr Guido - no need for tap at this time, continue diuresis   - Adv HF following--plan for Amyloid scan tomorrow. Will d/c bbl as per HF as likely Amyloidosis.   - Diuresis: continue Lasix 40 mg IV BID, Entresto 24/26 mg BID  - Strict I/Os, core measure, daily weight Patient admitted with anasarca, + JVD, originally requiring supplemental O2, now on RA, orthopnea 3-4 pillows   - BNP 11K and Trop 0.11 (baseline; likely i/s/o CHF exacerbation)  - CXR AP showed significant pulmonary congestion R>L pleural effusion  - ECHO 2/8/22: Severe symmetric LVH. Moderately reduced LVSF. Apical segments are more vigorous than the base, consider infiltrative cardiomyopathy, such as amyloid. Mildly reduced RVSF. Biatrial enlargement. Tethered MV. Mild MR/TR. Pulmonary HTN present. PASP: 40 mmHg. Small pericardial effusion. Bilateral pleural effusion. Compared to 11/22 - LV function is similar, but more vigorous.   - CT of chest 2/7/22: R pleural effusion, small left, ascites noted throughout abdomen/pelvis and Mesenteric infiltrate extension anasarca   - Abd u/s 2/6 w/ moderate ascites   - BM biopsy done 11/23/2021 to r/o infiltrative cardiomyopathy. Negative for amyloid, however sample size was small, follows with Dr Bernal [phone] (called 2/8/22 - no return calls)   - Pulm consult Dr Smith/Dr Guido - no need for tap at this time, continue diuresis   - Adv HF following--plan for Amyloid scan tomorrow. Will d/c bbl as per HF as likely Amyloidosis.   - Serum light chain sent, SAUMYA kappa 5.6, SAUMYA Lambda 3.8, Ratio 1.48.   - Diuresis: continue Lasix 40 mg IV BID, Entresto 24/26 mg BID  - Strict I/Os, core measure, daily weight

## 2022-02-09 NOTE — PROGRESS NOTE ADULT - PROBLEM SELECTOR PLAN 2
Known history of A-flutter/A-Fib w/ prior ablation in 2016.  - ECG reveals rate controlled A-Fib w/o acute ischemic changes  - Continue Eliquis 2.5mg PO bid  - As per Hematology from prior admission, if platelets <50 hold AC  - Discontinued bbl as per HF as likely w/ amyloidosis.   - EP consulted, to see patient, recs appreciated.

## 2022-02-09 NOTE — PROGRESS NOTE ADULT - ASSESSMENT
ASSESSMENT/PLAN 75 y/o male,  w/ PMHx type II DM (not currently on medications), A-Flutter/A-Fib (s/p prior ablation in 06/2016, most  A-fib on Eliquis 2.5mg PO bid), thrombocytopenia (baseline Plt 60-70's on prior admission), stage III CKD (baseline Cr 1.0-1.2 ), non-obstructive CAD, Chronic Systolic CHF(EF 35% by  Echocardiogram 11/2022), hx of NSVT (EP evaluated pt. 11/2021 BPH (s/p TURP procedure), pituitary adenoma (s/p transphenoidal treatment in 1990's), prostate cancer (s/p chemotherapy), chronic venous insufficiency, Impression of acute on chronic CHF, R>L pleural effusions, compressive atelectases, concern for amyloidosis     1. O2 attempt now off   2. Bronchodilators: off, conmtinue  and encourage incentive spirometry   3. Corticosteroids: off  4. ID/Antibiotics: off  5. Cardiac/HTN: continue diuresis , optimize CHF mngmnt   6. GI: Rx/ prophylaxis c PPI/H2B  7. Heme: Rx/VT prophylaxis c SQH/SCD/AC on Eliquis   8. Aspiration precautions  9. In view of worsening R pleural effusion recommend Right thoracentesis  Discussed with managing team Cardiology,

## 2022-02-09 NOTE — GOALS OF CARE CONVERSATION - ADVANCED CARE PLANNING - CONVERSATION DETAILS
Spoke with patient and his daughter about current condition and plan to do pleurocentesis for right pleural effusion as it would be both diagnostic and therapeutic. Patient made aware that his pleural effusion has increased in size since his last CT scan on Sunday 2/6/22 and that Dr. Guido would like patient to undergo pleurocentesis. Patient made aware of his options and would like to discuss it with his daughter before making a decision. Dr. Anton on phone for conversation as well.

## 2022-02-09 NOTE — PROGRESS NOTE ADULT - SUBJECTIVE AND OBJECTIVE BOX
Interventional, Pulmonary, Critical, Chest Special Procedures.    Pt was seen and fully examined by myself.     Time spent with patient in minutes:     Patient is a 76y old  Male who presents with a chief complaint of worsening Dyspnea and recent weight gain 4-5 days (09 Feb 2022 15:55) The patient more engaging today, pain free when seen, sob when talking.     HPI:  A&O X3 Full Code      In terms of COVID-19 pt denies hx of COVID-19 and has received full Pfizer COVID-19 vaccination.  Patient plans on receiving Pfizer booster in May 2022.    77 y/o male, prior history of medication non-compliance, w/ PMHx type II DM (not currently on medications), A-Flutter/A-Fib (s/p prior ablation in 06/2016, most recently found to be in A-fib on Eliquis 2.5mg PO bid), thrombocytopenia (baseline Plt 60-70's on prior admission), stage III CKD (baseline Cr 1.0-1.2 ), non-obstructive CAD, Chronic Systolic CHF(EF 35% by  Echocardiogram 11/2022), hx of NSVT (EP evaluated pt. 11/2021 recommended repeat TTE in three months), BPH (s/p TURP procedure), pituitary adenoma (s/p transphenoidal treatment in 1990's), prostate cancer (s/p chemotherapy), chronic venous insufficiency, and multiple admissions for CHF exacerbation most recent 11/19/2021,  who presents to [facility] ER this morning, 2/7/22, with worsening dyspnea (with minimal exertion), increase in abdominal girth, b/l LE swelling, facial swelling and dry cough for 4-5 days.  Pt. reports compliance with medication and medical follow up.   In light of patient's risk factors, and acute on chronic systolic CHF exacerbation pt. is now admitted to [facility]  for further medical management.    In ER ECG reveals Atrial Fibrillation HR@65bpm with non specific ST-T wave changes, Troponin 0.11 (patient's baseline), BNP 11,635, WBC 3.6, H/H 13.3/40.2, Platelets 107 (base line 60-70's), Blood Glucose 116.  CXR AP reveals R>L pleural effusion with Cardiomegaly, CT of Abdomen reveals R>L pleural effusion, ascites noted throughout abdomen and plevis, mesenteric infiltrate extensive anasarca.  US Abdomen reveals cholelithiasis, moderate ascites, right pleural effusion (moderate) and dilatation of hepatic vein.        (07 Feb 2022 05:13)      REVIEW OF SYSTEMS:  Constitutional: No fever, weight loss, chills +++ fatigue  Eyes: No eye pain, visual disturbances, or discharge  ENMT:  No difficulty hearing, tinnitus, vertigo; No sinus or throat pain. No epistaxis, dysphagia, dysphonia, hoarseness or odynophagia  Neck: No pain, stiffness or neck swelling.  No masses or deformities  Respiratory: No cough, wheezing, hemoptysis  - COPD  - ILD   - PE   - ASTHMA     - PNEUMONIA  Cardiovascular: No chest pain, + AF dysrhythmia, palpitations, dizziness or edema - CAD   -+CHF   + HTN  Gastrointestinal: No abdominal or epigastric pain. No nausea, vomiting or hematemesis; No diarrhea or constipation. No melena or hematochezia, Icterus.          Genitourinary: No dysuria, frequency, hematuria or incontinence   + CKD/KAYLI      - ESRD  Neurological: No headaches, +memory loss, loss of strength, numbness or tremors      -DEMENTIA     - STROKE    - SEIZURE  Skin: No itching, burning, rashes or lesions   Lymph Nodes: No enlarged glands  Endocrine: No heat or cold intolerance; No hair loss       + DM     - THYROID DISORDER  Musculoskeletal: No joint pain or swelling; No muscle, back or extremity pain, No edema  Psychiatric: No depression, anxiety, mood swings or difficulty sleeping  Heme/Lymph: No easy bruising or bleeding gums         -+ANEMIA      + CANCER   -COAGULOPATHY  Allergy and Immunologic: No hives or eczema    PAST MEDICAL & SURGICAL HISTORY:  Atrial flutter  s/p Ablation 2016    Chronic venous insufficiency of lower extremity    Prostate CA    Stage 3 chronic kidney disease  1.2-1.2 baseline   On admission 1.2    Type 2 diabetes mellitus  not on medication    Thrombocytopenia  60-70&#x27;s baseline    Acute on chronic systolic congestive heart failure    Chronic atrial fibrillation    Atrial flutter  s/p ablation in 2016    History of surgical removal of pituitary gland  1990s    S/P TURP  for BPH      FAMILY HISTORY:    SOCIAL HISTORY:      - Tobacco     - ETOH    Allergies    No Known Allergies    Intolerances      Vital Signs Last 24 Hrs  T(C): 36.9 (09 Feb 2022 14:17), Max: 37 (09 Feb 2022 10:13)  T(F): 98.4 (09 Feb 2022 14:17), Max: 98.6 (09 Feb 2022 10:13)  HR: 55 (09 Feb 2022 12:32) (54 - 64)  BP: 101/71 (09 Feb 2022 12:32) (101/71 - 119/80)  BP(mean): 95 (09 Feb 2022 06:00) (83 - 95)  RR: 18 (09 Feb 2022 12:32) (15 - 18)  SpO2: 95% (09 Feb 2022 12:32) (95% - 98%)    02-08 @ 07:01  -  02-09 @ 07:00  --------------------------------------------------------  IN: 1260 mL / OUT: 2950 mL / NET: -1690 mL    02-09 @ 07:01  -  02-09 @ 16:46  --------------------------------------------------------  IN: 840 mL / OUT: 1450 mL / NET: -610 mL          MEDICATIONS:  MEDICATIONS  (STANDING):  dextrose 40% Gel 15 Gram(s) Oral once  dextrose 5%. 1000 milliLiter(s) (50 mL/Hr) IV Continuous <Continuous>  dextrose 5%. 1000 milliLiter(s) (100 mL/Hr) IV Continuous <Continuous>  dextrose 50% Injectable 25 Gram(s) IV Push once  dextrose 50% Injectable 12.5 Gram(s) IV Push once  dextrose 50% Injectable 25 Gram(s) IV Push once  furosemide   Injectable 40 milliGRAM(s) IV Push two times a day  glucagon  Injectable 1 milliGRAM(s) IntraMuscular once  insulin lispro (ADMELOG) corrective regimen sliding scale   SubCutaneous Before meals and at bedtime  sacubitril 24 mG/valsartan 26 mG 1 Tablet(s) Oral two times a day  tamsulosin 0.4 milliGRAM(s) Oral at bedtime    MEDICATIONS  (PRN):      PHYSICAL EXAM:  Un Comfortable, no immediate distress  Eyes: PERRL, EOM intact; conjunctiva and sclera clear  Head: Normocephalic;  No Trauma  ENMT: No nasal discharge, hoarseness, cough or hemoptysis  Neck: Supple; non tender; no masses or deformities.    No JVD  Respiratory:  - WHEEZING   - RHONCHI  - RALES  - CRACKLES.  Diminished breath sounds  BILATERAL  RIGHT 1/4 caudad,   LEFT base   Cardiovascular: Regular rate and rhythm. S1 and S2 Normal; No murmurs, gallops or rubs     - PPM/AICD  Gastrointestinal: Soft non-tender, mildly distended; Normal bowel sounds; No hepatosplenomegaly.     -PEG    -  GT   - GONZALEZ  Genitourinary: No costovertebral angle tenderness. No dysuria  Extremities: AROM, No clubbing, cyanosis + diffuse  edema    Vascular: Peripheral pulses palpable 2+ bilaterally  Neurological: Alert and responisve to stimuli   Skin: Warm and dry. No obvious rash  Lymph Nodes: No acute cervical or supraclavicular adenopathy  Psychiatric: More engaging today   DEVICES:  - DENTURES   +IV R / L     - ETUBE   -TRACH   -CTUBE  R / L    LABS:                          12.3   3.07  )-----------( 99       ( 09 Feb 2022 05:27 )             37.0     02-09    146<H>  |  109<H>  |  23  ----------------------------<  90  3.8   |  27  |  1.18    Ca    8.1<L>      09 Feb 2022 05:27  Mg     1.9     02-09    TPro  5.5<L>  /  Alb  3.3  /  TBili  0.9  /  DBili  x   /  AST  10  /  ALT  13  /  AlkPhos  111  02-09    PT/INR - ( 08 Feb 2022 06:48 )   PT: 17.8 sec;   INR: 1.51          PTT - ( 08 Feb 2022 06:48 )  PTT:31.5 sec  RADIOLOGY & ADDITIONAL STUDIES (The following images were personally reviewed):< from: CT Chest No Cont (02.09.22 @ 11:59) >  ACC: [id number] EXAM:  CT CHEST                          PROCEDURE DATE:  02/09/2022          INTERPRETATION:  CT SCAN OF CHEST    History: Shortness of breath, lower extremity swelling. History of   prostate cancer. Rule out malignancy.    Technique: CT scan of chest performed from lung apices through lung   bases. Axial, coronal, and sagittal multiplanar reformatted images were   produced. Thin section axial images and axial MIPS were also produced.   Intravenous contrast material was not administered, as ordered.    Comparison: CT chest June 30, 2021.    Findings:    Lungs and large airways: Patent central airways. Chronic partial   compressive atelectasis right middle lobe and right lower lobe. Mild   dependent groundglass attenuation mayrepresent edema or atelectatic   changes. Unchanged probable round atelectasis left lower lobe posterior   basal segment. Unchanged right basilar parenchymal calcifications.    Pleura:  Increased moderate to large right pleural effusion. Unchanged   small left pleural effusion.    Mediastinum and hilar regions: No thoracic lymphadenopathy.    Heart and pericardium:  Markedly enlarged heart. No pericardial effusion.    Vessels: Dilated main pulmonary artery up to 3.4 cm suggesting pulmonary   hypertension. Borderline fusiform dilatation ascending aorta up to 4.0   cm. Mild calcific plaque at aortic arch.    Chest wall and lower neck:  Anasarca.    Upper abdomen: Bilateral renal cysts. Upper abdominal ascites.   Cholelithiasis.    Bones: Mild spinal degenerative changes. Unchanged punctate sclerotic   focus T4 vertebral body.      Impression:  1.  Increased moderate to large right pleural effusion. Might consider   fluid sampling with cytologic analysis if clinically warranted.  2.  Unchanged small left pleural effusion.  3.  Ascites and upper abdomen with anasarca suggesting fluid overload.  4.  Cardiomegaly.  5.  Probable pulmonary hypertension.    < end of copied text >

## 2022-02-09 NOTE — CONSULT NOTE ADULT - ASSESSMENT
A/P: 76M w/HFrEF (35%), nonobs CAD, AF/fib w/prior ablation in 2016, DM2, CKD3, pituitary adenoma, prostate cancer s/p chemo, chronic venous insufficiency adm on 2/7 w/acute heart failure exacerbation.     #Acute on chronic HFrEF exacerbation - warm and wet. High  A/P: 76M w/HFrEF (35%), nonobs CAD, AF/fib w/prior ablation in 2016, DM2, CKD3, pituitary adenoma, prostate cancer s/p chemo, chronic venous insufficiency adm on 2/7 w/acute heart failure exacerbation.     #Acute on chronic HFrEF exacerbation - warm and wet. High suspicion for cardiac amyloidosis given appearance on transthoracic echo, low voltage on ECG, possible carpal tunnel syndrome on history.  - c/w IV lasix 40mg q12h  - c/w Entresto 24-26 bid  - would discontinue Metoprolol for now. Patient is bradycardic and if the patient may not benefit from beta blockade if he truly has cardiac amyloid  - check serum light chains  - will f/u results of ordered pyrophosphate scan  - strict I/Os  - daily standing weights    Recommendations are final when signed by attending.    --  Keegan Del Angel MD  Cardiology PGY6

## 2022-02-09 NOTE — PROGRESS NOTE ADULT - ASSESSMENT
Patient is a 75 y/o male, prior history of medication non-compliance, w/ PMHx type II DM (not currently on medications), A-Flutter/A-Fib (s/p prior ablation in 06/2016, most recently found to be in A-fib on Eliquis 2.5mg PO bid), thrombocytopenia (baseline Plt 60-70's on prior admission), stage III CKD (baseline Cr 1.0-1.2 ), non-obstructive CAD, Chronic HFrEF (EF 35%), hx of NSVT, pituitary adenoma (s/p transphenoidal treatment in 1990's), prostate cancer (s/p chemo), chronic venous insufficiency, and multiple admissions for CHF exacerbation most recent 11/19/2021,  who presents to [facility] ER with worsening dyspnea, increase in abdominal girth, b/l LE swelling, facial swelling and dry cough for 4-5 days. Pt admitted to HFrEF exacerbation, right sided pleural effusion and is continuing IV diuresis. Undergoing workup for possible cardiac amyloidosis.

## 2022-02-09 NOTE — CONSULT NOTE ADULT - SUBJECTIVE AND OBJECTIVE BOX
Clinical History: 77 y/o male, prior history of medication non-compliance PMHx type II DM, A-Flutter/A-Fib (s/p prior ablation in 06/2016, most recently found to be in A-fib on Eliquis 2.5mg), thrombocytopenia (baseline Plt 60-70's), stage III CKD, CAD, Chronic Systolic CHF, prostate cancer (s/p chemotherapy), chronic venous insufficiency, and multiple admissions for CHF exacerbation. Presented to [facility] 2/7/22 for dyspnea and abdominal swelling.    No pertinent family history in first degree relatives    Handoff    MEWS Score    Congestive heart failure (CHF)    Diabetes    Atrial flutter    Chronic venous insufficiency of lower extremity    Prostate CA    Stage 3 chronic kidney disease    Heart failure with mid-range ejection fraction    Type 2 diabetes mellitus    Thrombocytopenia    Acute on chronic systolic congestive heart failure    Chronic atrial fibrillation    Pleural effusion    Acute on chronic systolic congestive heart failure    Paroxysmal atrial fibrillation    Thrombocytopenic    Stage 3 chronic kidney disease    DVT prophylaxis    BPH (benign prostatic hyperplasia)    Atrial flutter    History of surgical removal of pituitary gland    S/P TURP    EDEMA    76    Elevated troponin    Peripheral edema    [email or web address]        Meds:dextrose 40% Gel 15 Gram(s) Oral once  dextrose 5%. 1000 milliLiter(s) IV Continuous <Continuous>  dextrose 5%. 1000 milliLiter(s) IV Continuous <Continuous>  dextrose 50% Injectable 25 Gram(s) IV Push once  dextrose 50% Injectable 12.5 Gram(s) IV Push once  dextrose 50% Injectable 25 Gram(s) IV Push once  furosemide   Injectable 40 milliGRAM(s) IV Push two times a day  glucagon  Injectable 1 milliGRAM(s) IntraMuscular once  insulin lispro (ADMELOG) corrective regimen sliding scale   SubCutaneous Before meals and at bedtime  sacubitril 24 mG/valsartan 26 mG 1 Tablet(s) Oral two times a day  tamsulosin 0.4 milliGRAM(s) Oral at bedtime      Allergies:No Known Allergies        Labs:                           12.3   3.07  )-----------( 99       ( 09 Feb 2022 05:27 )             37.0     PT/INR - ( 08 Feb 2022 06:48 )   PT: 17.8 sec;   INR: 1.51          PTT - ( 08 Feb 2022 06:48 )  PTT:31.5 sec  02-09    146<H>  |  109<H>  |  23  ----------------------------<  90  3.8   |  27  |  1.18    Ca    8.1<L>      09 Feb 2022 05:27  Mg     1.9     02-09    TPro  5.5<L>  /  Alb  3.3  /  TBili  0.9  /  DBili  x   /  AST  10  /  ALT  13  /  AlkPhos  111  02-09            Imaging Findings:    1. CT chest 2/9: "Increased moderate to large right pleural effusion."    Assessment: 77 yo M with moderate to large right pleural effusion. IR consulted for thoracentesis and pleural fluid specimen.         Recommendations: Hold Eliquis, NPO at midnight. IR amenable to thoracentesis        Communicated with: Dr. Dickson

## 2022-02-09 NOTE — CONSULT NOTE ADULT - SUBJECTIVE AND OBJECTIVE BOX
HPI:  A&O X3 Full Code      In terms of COVID-19 pt denies hx of COVID-19 and has received full Pfizer COVID-19 vaccination.  Patient plans on receiving Pfizer booster in May 2022.    77 y/o male, prior history of medication non-compliance, w/ PMHx type II DM (not currently on medications), A-Flutter/A-Fib (s/p prior ablation in 06/2016, most recently found to be in A-fib on Eliquis 2.5mg PO bid), thrombocytopenia (baseline Plt 60-70's on prior admission), stage III CKD (baseline Cr 1.0-1.2 ), non-obstructive CAD, Chronic Systolic CHF(EF 35% by  Echocardiogram 11/2022), hx of NSVT (EP evaluated pt. 11/2021 recommended repeat TTE in three months), BPH (s/p TURP procedure), pituitary adenoma (s/p transphenoidal treatment in 1990's), prostate cancer (s/p chemotherapy), chronic venous insufficiency, and multiple admissions for CHF exacerbation most recent 11/19/2021,  who presents to [facility] ER this morning, 2/7/22, with worsening dyspnea (with minimal exertion), increase in abdominal girth, b/l LE swelling, facial swelling and dry cough for 4-5 days.  Pt. reports compliance with medication and medical follow up.   In light of patient's risk factors, and acute on chronic systolic CHF exacerbation pt. is now admitted to [facility]  for further medical management.    In ER ECG reveals Atrial Fibrillation HR@65bpm with non specific ST-T wave changes, Troponin 0.11 (patient's baseline), BNP 11,635, WBC 3.6, H/H 13.3/40.2, Platelets 107 (base line 60-70's), Blood Glucose 116.  CXR AP reveals R>L pleural effusion with Cardiomegaly, CT of Abdomen reveals R>L pleural effusion, ascites noted throughout abdomen and plevis, mesenteric infiltrate extensive anasarca.  US Abdomen reveals cholelithiasis, moderate ascites, right pleural effusion (moderate) and dilatation of hepatic vein.        (07 Feb 2022 05:13)    The patient was seen and examined at bedside.    ROS: A 10-point review of systems was otherwise negative.    PAST MEDICAL & SURGICAL HISTORY:  Atrial flutter  s/p Ablation 2016    Chronic venous insufficiency of lower extremity    Prostate CA    Stage 3 chronic kidney disease  1.2-1.2 baseline   On admission 1.2    Type 2 diabetes mellitus  not on medication    Thrombocytopenia  60-70&#x27;s baseline    Acute on chronic systolic congestive heart failure    Chronic atrial fibrillation    Atrial flutter  s/p ablation in 2016    History of surgical removal of pituitary gland  1990s    S/P TURP  for BPH        Home Medications:  potassium chloride 10 mEq oral capsule, extended release: 1 cap(s) orally once a day (24 Nov 2021 13:03)  sacubitril-valsartan 24 mg-26 mg oral tablet: 1 tab(s) orally every 12 hours (24 Nov 2021 13:03)  tamsulosin 0.4 mg oral capsule: 1 cap(s) orally once a day (19 Nov 2021 18:36)      SOCIAL HISTORY:  FAMILY HISTORY:      ALLERGIES: 	  No Known Allergies            MEDICATIONS:  apixaban 2.5 milliGRAM(s) Oral every 12 hours  dextrose 40% Gel 15 Gram(s) Oral once  dextrose 5%. 1000 milliLiter(s) IV Continuous <Continuous>  dextrose 5%. 1000 milliLiter(s) IV Continuous <Continuous>  dextrose 50% Injectable 25 Gram(s) IV Push once  dextrose 50% Injectable 12.5 Gram(s) IV Push once  dextrose 50% Injectable 25 Gram(s) IV Push once  furosemide   Injectable 40 milliGRAM(s) IV Push two times a day  glucagon  Injectable 1 milliGRAM(s) IntraMuscular once  insulin lispro (ADMELOG) corrective regimen sliding scale   SubCutaneous Before meals and at bedtime  metoprolol succinate ER 12.5 milliGRAM(s) Oral daily  sacubitril 24 mG/valsartan 26 mG 1 Tablet(s) Oral two times a day  tamsulosin 0.4 milliGRAM(s) Oral at bedtime      PHYSICAL EXAM:  T(C): 37 (02-09-22 @ 10:13), Max: 37 (02-09-22 @ 10:13)  HR: 55 (02-09-22 @ 12:32) (54 - 64)  BP: 101/71 (02-09-22 @ 12:32) (101/71 - 119/80)  RR: 18 (02-09-22 @ 12:32) (15 - 18)  SpO2: 95% (02-09-22 @ 12:32) (95% - 98%)  Wt(kg): --    02-08-22 @ 07:01  -  02-09-22 @ 07:00  --------------------------------------------------------  IN: 1260 mL / OUT: 2950 mL / NET: -1690 mL    02-09-22 @ 07:01  -  02-09-22 @ 13:24  --------------------------------------------------------  IN: 240 mL / OUT: 1000 mL / NET: -760 mL        GEN: Awake, comfortable. NAD.   HEENT: NCAT, PERRL, EOMI. Mucosa moist.   NECK: Supple, no JVD.   RESP: CTA b/l  CV: RRR, normal s1/s2. No m/r/g.  ABD: Soft, NTND. BS+  EXT: Warm. No edema, clubbing, or cyanosis.   NEURO: AAOx3. No focal deficits.    I&O's Summary    08 Feb 2022 07:01  -  09 Feb 2022 07:00  --------------------------------------------------------  IN: 1260 mL / OUT: 2950 mL / NET: -1690 mL    09 Feb 2022 07:01  -  09 Feb 2022 13:24  --------------------------------------------------------  IN: 240 mL / OUT: 1000 mL / NET: -760 mL        	  LABS:	 	    CARDIAC MARKERS:                                  12.3   3.07  )-----------( 99       ( 09 Feb 2022 05:27 )             37.0     02-09    146<H>  |  109<H>  |  23  ----------------------------<  90  3.8   |  27  |  1.18    Ca    8.1<L>      09 Feb 2022 05:27  Mg     1.9     02-09    TPro  5.5<L>  /  Alb  3.3  /  TBili  0.9  /  DBili  x   /  AST  10  /  ALT  13  /  AlkPhos  111  02-09    ECG:  	  ECHO: HPI:  A&O X3 Full Code      In terms of COVID-19 pt denies hx of COVID-19 and has received full Pfizer COVID-19 vaccination.  Patient plans on receiving Pfizer booster in May 2022.    75 y/o male, prior history of medication non-compliance, w/ PMHx type II DM (not currently on medications), A-Flutter/A-Fib (s/p prior ablation in 06/2016, most recently found to be in A-fib on Eliquis 2.5mg PO bid), thrombocytopenia (baseline Plt 60-70's on prior admission), stage III CKD (baseline Cr 1.0-1.2 ), non-obstructive CAD, Chronic Systolic CHF(EF 35% by  Echocardiogram 11/2022), hx of NSVT (EP evaluated pt. 11/2021 recommended repeat TTE in three months), BPH (s/p TURP procedure), pituitary adenoma (s/p transphenoidal treatment in 1990's), prostate cancer (s/p chemotherapy), chronic venous insufficiency, and multiple admissions for CHF exacerbation most recent 11/19/2021,  who presents to [facility] ER this morning, 2/7/22, with worsening dyspnea (with minimal exertion), increase in abdominal girth, b/l LE swelling, facial swelling and dry cough for 4-5 days.  Pt. reports compliance with medication and medical follow up.   In light of patient's risk factors, and acute on chronic systolic CHF exacerbation pt. is now admitted to [facility]  for further medical management.    In ER ECG reveals Atrial Fibrillation HR@65bpm with non specific ST-T wave changes, Troponin 0.11 (patient's baseline), BNP 11,635, WBC 3.6, H/H 13.3/40.2, Platelets 107 (base line 60-70's), Blood Glucose 116.  CXR AP reveals R>L pleural effusion with Cardiomegaly, CT of Abdomen reveals R>L pleural effusion, ascites noted throughout abdomen and plevis, mesenteric infiltrate extensive anasarca.  US Abdomen reveals cholelithiasis, moderate ascites, right pleural effusion (moderate) and dilatation of hepatic vein.        (07 Feb 2022 05:13)    Admitted to cardiac telemetry on 2/7 for acute heart failure exacerbation  The patient was seen and examined at bedside.    ROS: A 10-point review of systems was otherwise negative.    PAST MEDICAL & SURGICAL HISTORY:  Atrial flutter  s/p Ablation 2016    Chronic venous insufficiency of lower extremity    Prostate CA    Stage 3 chronic kidney disease  1.2-1.2 baseline   On admission 1.2    Type 2 diabetes mellitus  not on medication    Thrombocytopenia  60-70&#x27;s baseline    Acute on chronic systolic congestive heart failure    Chronic atrial fibrillation    Atrial flutter  s/p ablation in 2016    History of surgical removal of pituitary gland  1990s    S/P TURP  for BPH        Home Medications:  potassium chloride 10 mEq oral capsule, extended release: 1 cap(s) orally once a day (24 Nov 2021 13:03)  sacubitril-valsartan 24 mg-26 mg oral tablet: 1 tab(s) orally every 12 hours (24 Nov 2021 13:03)  tamsulosin 0.4 mg oral capsule: 1 cap(s) orally once a day (19 Nov 2021 18:36)      SOCIAL HISTORY:  FAMILY HISTORY:      ALLERGIES: 	  No Known Allergies            MEDICATIONS:  apixaban 2.5 milliGRAM(s) Oral every 12 hours  dextrose 40% Gel 15 Gram(s) Oral once  dextrose 5%. 1000 milliLiter(s) IV Continuous <Continuous>  dextrose 5%. 1000 milliLiter(s) IV Continuous <Continuous>  dextrose 50% Injectable 25 Gram(s) IV Push once  dextrose 50% Injectable 12.5 Gram(s) IV Push once  dextrose 50% Injectable 25 Gram(s) IV Push once  furosemide   Injectable 40 milliGRAM(s) IV Push two times a day  glucagon  Injectable 1 milliGRAM(s) IntraMuscular once  insulin lispro (ADMELOG) corrective regimen sliding scale   SubCutaneous Before meals and at bedtime  metoprolol succinate ER 12.5 milliGRAM(s) Oral daily  sacubitril 24 mG/valsartan 26 mG 1 Tablet(s) Oral two times a day  tamsulosin 0.4 milliGRAM(s) Oral at bedtime      PHYSICAL EXAM:  T(C): 37 (02-09-22 @ 10:13), Max: 37 (02-09-22 @ 10:13)  HR: 55 (02-09-22 @ 12:32) (54 - 64)  BP: 101/71 (02-09-22 @ 12:32) (101/71 - 119/80)  RR: 18 (02-09-22 @ 12:32) (15 - 18)  SpO2: 95% (02-09-22 @ 12:32) (95% - 98%)  Wt(kg): --    02-08-22 @ 07:01  -  02-09-22 @ 07:00  --------------------------------------------------------  IN: 1260 mL / OUT: 2950 mL / NET: -1690 mL    02-09-22 @ 07:01  -  02-09-22 @ 13:24  --------------------------------------------------------  IN: 240 mL / OUT: 1000 mL / NET: -760 mL        GEN: Awake, comfortable. NAD.   HEENT: NCAT, PERRL, EOMI. Mucosa moist.   NECK: Supple, no JVD.   RESP: CTA b/l  CV: RRR, normal s1/s2. No m/r/g.  ABD: Soft, NTND. BS+  EXT: Warm. No edema, clubbing, or cyanosis.   NEURO: AAOx3. No focal deficits.    I&O's Summary    08 Feb 2022 07:01  -  09 Feb 2022 07:00  --------------------------------------------------------  IN: 1260 mL / OUT: 2950 mL / NET: -1690 mL    09 Feb 2022 07:01  -  09 Feb 2022 13:24  --------------------------------------------------------  IN: 240 mL / OUT: 1000 mL / NET: -760 mL        	  LABS:	 	    CARDIAC MARKERS:                                  12.3   3.07  )-----------( 99       ( 09 Feb 2022 05:27 )             37.0     02-09    146<H>  |  109<H>  |  23  ----------------------------<  90  3.8   |  27  |  1.18    Ca    8.1<L>      09 Feb 2022 05:27  Mg     1.9     02-09    TPro  5.5<L>  /  Alb  3.3  /  TBili  0.9  /  DBili  x   /  AST  10  /  ALT  13  /  AlkPhos  111  02-09    ECG:  	  ECHO: HPI:  A&O X3 Full Code      In terms of COVID-19 pt denies hx of COVID-19 and has received full Pfizer COVID-19 vaccination.  Patient plans on receiving Pfizer booster in May 2022.    75 y/o male, prior history of medication non-compliance, w/ PMHx type II DM (not currently on medications), A-Flutter/A-Fib (s/p prior ablation in 06/2016, most recently found to be in A-fib on Eliquis 2.5mg PO bid), thrombocytopenia (baseline Plt 60-70's on prior admission), stage III CKD (baseline Cr 1.0-1.2 ), non-obstructive CAD, Chronic Systolic CHF(EF 35% by  Echocardiogram 11/2022), hx of NSVT (EP evaluated pt. 11/2021 recommended repeat TTE in three months), BPH (s/p TURP procedure), pituitary adenoma (s/p transphenoidal treatment in 1990's), prostate cancer (s/p chemotherapy), chronic venous insufficiency, and multiple admissions for CHF exacerbation most recent 11/19/2021,  who presents to [facility] ER this morning, 2/7/22, with worsening dyspnea (with minimal exertion), increase in abdominal girth, b/l LE swelling, facial swelling and dry cough for 4-5 days.  Pt. reports compliance with medication and medical follow up.   In light of patient's risk factors, and acute on chronic systolic CHF exacerbation pt. is now admitted to [facility]  for further medical management.    In ER ECG reveals Atrial Fibrillation HR@65bpm with non specific ST-T wave changes, Troponin 0.11 (patient's baseline), BNP 11,635, WBC 3.6, H/H 13.3/40.2, Platelets 107 (base line 60-70's), Blood Glucose 116.  CXR AP reveals R>L pleural effusion with Cardiomegaly, CT of Abdomen reveals R>L pleural effusion, ascites noted throughout abdomen and plevis, mesenteric infiltrate extensive anasarca.  US Abdomen reveals cholelithiasis, moderate ascites, right pleural effusion (moderate) and dilatation of hepatic vein.     (07 Feb 2022 05:13)    Admitted to cardiac telemetry on 2/7 for acute heart failure exacerbation. HF consulted for management recommendations.    The patient was seen and examined at bedside. Denies chest pain, palpitations, SOB. Endorses numbness and tingling in fingers in an ulnar distribution.    ROS: A 10-point review of systems was otherwise negative.    PAST MEDICAL & SURGICAL HISTORY:  Atrial flutter  s/p Ablation 2016    Chronic venous insufficiency of lower extremity    Prostate CA    Stage 3 chronic kidney disease  1.2-1.2 baseline   On admission 1.2    Type 2 diabetes mellitus  not on medication    Thrombocytopenia  60-70&#x27;s baseline    Acute on chronic systolic congestive heart failure    Chronic atrial fibrillation    Atrial flutter  s/p ablation in 2016    History of surgical removal of pituitary gland  1990s    S/P TURP  for BPH        Home Medications:  potassium chloride 10 mEq oral capsule, extended release: 1 cap(s) orally once a day (24 Nov 2021 13:03)  sacubitril-valsartan 24 mg-26 mg oral tablet: 1 tab(s) orally every 12 hours (24 Nov 2021 13:03)  tamsulosin 0.4 mg oral capsule: 1 cap(s) orally once a day (19 Nov 2021 18:36)      SOCIAL HISTORY:  FAMILY HISTORY:      ALLERGIES: 	  No Known Allergies            MEDICATIONS:  apixaban 2.5 milliGRAM(s) Oral every 12 hours  dextrose 40% Gel 15 Gram(s) Oral once  dextrose 5%. 1000 milliLiter(s) IV Continuous <Continuous>  dextrose 5%. 1000 milliLiter(s) IV Continuous <Continuous>  dextrose 50% Injectable 25 Gram(s) IV Push once  dextrose 50% Injectable 12.5 Gram(s) IV Push once  dextrose 50% Injectable 25 Gram(s) IV Push once  furosemide   Injectable 40 milliGRAM(s) IV Push two times a day  glucagon  Injectable 1 milliGRAM(s) IntraMuscular once  insulin lispro (ADMELOG) corrective regimen sliding scale   SubCutaneous Before meals and at bedtime  metoprolol succinate ER 12.5 milliGRAM(s) Oral daily  sacubitril 24 mG/valsartan 26 mG 1 Tablet(s) Oral two times a day  tamsulosin 0.4 milliGRAM(s) Oral at bedtime      PHYSICAL EXAM:  T(C): 37 (02-09-22 @ 10:13), Max: 37 (02-09-22 @ 10:13)  HR: 55 (02-09-22 @ 12:32) (54 - 64)  BP: 101/71 (02-09-22 @ 12:32) (101/71 - 119/80)  RR: 18 (02-09-22 @ 12:32) (15 - 18)  SpO2: 95% (02-09-22 @ 12:32) (95% - 98%)  Wt(kg): --    02-08-22 @ 07:01  -  02-09-22 @ 07:00  --------------------------------------------------------  IN: 1260 mL / OUT: 2950 mL / NET: -1690 mL    02-09-22 @ 07:01  -  02-09-22 @ 13:24  --------------------------------------------------------  IN: 240 mL / OUT: 1000 mL / NET: -760 mL        GEN: Awake, comfortable. NAD.   HEENT: NCAT, PERRL, EOMI. Mucosa moist.   NECK: Supple, elevated JVP.   RESP: CTA b/l  CV: bradycardic, irregularly irregular, normal s1/s2. No m/r/g.  ABD: Soft, NTND. BS+  EXT: Warm. b/l LE edema extending to hips. No clubbing, or cyanosis.   NEURO: AAOx3. No focal deficits.    I&O's Summary    08 Feb 2022 07:01  -  09 Feb 2022 07:00  --------------------------------------------------------  IN: 1260 mL / OUT: 2950 mL / NET: -1690 mL    09 Feb 2022 07:01  -  09 Feb 2022 13:24  --------------------------------------------------------  IN: 240 mL / OUT: 1000 mL / NET: -760 mL        	  LABS:	 	    CARDIAC MARKERS:                                  12.3   3.07  )-----------( 99       ( 09 Feb 2022 05:27 )             37.0     02-09    146<H>  |  109<H>  |  23  ----------------------------<  90  3.8   |  27  |  1.18    Ca    8.1<L>      09 Feb 2022 05:27  Mg     1.9     02-09    TPro  5.5<L>  /  Alb  3.3  /  TBili  0.9  /  DBili  x   /  AST  10  /  ALT  13  /  AlkPhos  111  02-09    ECG:  	  ECHO: < from: TTE Echo Complete w/o Contrast w/ Doppler (02.08.22 @ 11:51) >  ---------------------------------------------------------------------------  -----  CONCLUSIONS:     1. Severe symmetric left ventricular hypertrophy. Moderately reduced   left ventricular systolic function. Apical segments are more vigorous   than the base, consider infiltrative cardiomyopathy (such as amyloid).   2. Mildly reduced right ventricular systolic function.   3. Biatrial enlargement.   4. Tethered mitral valve. Mild mitral regurgitation.   5. Mild tricuspid regurgitation.   6. Pulmonary hypertension present, pulmonary artery systolic pressure is   40 mmHg.   7. Small pericardial effusion.   8. Bilateral pleural effusion.   9. Borderline dilated ascending aorta.  10. Compared to the previous TTE performed on 11/22/2021, given technical   differences, LV function is similar to slightly more vigorous. Comparable   views ofproximal ascending aorta were not obtained.    ---------------------------------------------------------------------------    < end of copied text >   HPI:  A&O X3 Full Code      In terms of COVID-19 pt denies hx of COVID-19 and has received full Pfizer COVID-19 vaccination.  Patient plans on receiving Pfizer booster in May 2022.    75 y/o male, prior history of medication non-compliance, w/ PMHx type II DM (not currently on medications), A-Flutter/A-Fib (s/p prior ablation in 06/2016, most recently found to be in A-fib on Eliquis 2.5mg PO bid), thrombocytopenia (baseline Plt 60-70's on prior admission), stage III CKD (baseline Cr 1.0-1.2 ), non-obstructive CAD, Chronic Systolic CHF(EF 35% by  Echocardiogram 11/2022), hx of NSVT (EP evaluated pt. 11/2021 recommended repeat TTE in three months), BPH (s/p TURP procedure), pituitary adenoma (s/p transphenoidal treatment in 1990's), prostate cancer (s/p chemotherapy), chronic venous insufficiency, and multiple admissions for CHF exacerbation most recent 11/19/2021,  who presents to [facility] ER this morning, 2/7/22, with worsening dyspnea (with minimal exertion), increase in abdominal girth, b/l LE swelling, facial swelling and dry cough for 4-5 days.  Pt. reports compliance with medication and medical follow up.   In light of patient's risk factors, and acute on chronic systolic CHF exacerbation pt. is now admitted to [facility]  for further medical management.    In ER ECG reveals Atrial Fibrillation HR@65bpm with non specific ST-T wave changes, Troponin 0.11 (patient's baseline), BNP 11,635, WBC 3.6, H/H 13.3/40.2, Platelets 107 (base line 60-70's), Blood Glucose 116.  CXR AP reveals R>L pleural effusion with Cardiomegaly, CT of Abdomen reveals R>L pleural effusion, ascites noted throughout abdomen and plevis, mesenteric infiltrate extensive anasarca.  US Abdomen reveals cholelithiasis, moderate ascites, right pleural effusion (moderate) and dilatation of hepatic vein.     (07 Feb 2022 05:13)    Admitted to cardiac telemetry on 2/7 for acute heart failure exacerbation. HF consulted for management recommendations.    The patient was seen and examined at bedside. Denies chest pain, palpitations, SOB. Endorses numbness and tingling in fingers in an ulnar distribution.    ROS: A 10-point review of systems was otherwise negative.    PAST MEDICAL & SURGICAL HISTORY:  Atrial flutter  s/p Ablation 2016    Chronic venous insufficiency of lower extremity    Prostate CA    Stage 3 chronic kidney disease  1.2-1.2 baseline   On admission 1.2    Type 2 diabetes mellitus  not on medication    Thrombocytopenia  60-70&#x27;s baseline    Acute on chronic systolic congestive heart failure    Chronic atrial fibrillation    Atrial flutter  s/p ablation in 2016    History of surgical removal of pituitary gland  1990s    S/P TURP  for BPH        Home Medications:  potassium chloride 10 mEq oral capsule, extended release: 1 cap(s) orally once a day (24 Nov 2021 13:03)  sacubitril-valsartan 24 mg-26 mg oral tablet: 1 tab(s) orally every 12 hours (24 Nov 2021 13:03)  tamsulosin 0.4 mg oral capsule: 1 cap(s) orally once a day (19 Nov 2021 18:36)      SOCIAL HISTORY:  FAMILY HISTORY:      ALLERGIES: 	  No Known Allergies            MEDICATIONS:  apixaban 2.5 milliGRAM(s) Oral every 12 hours  dextrose 40% Gel 15 Gram(s) Oral once  dextrose 5%. 1000 milliLiter(s) IV Continuous <Continuous>  dextrose 5%. 1000 milliLiter(s) IV Continuous <Continuous>  dextrose 50% Injectable 25 Gram(s) IV Push once  dextrose 50% Injectable 12.5 Gram(s) IV Push once  dextrose 50% Injectable 25 Gram(s) IV Push once  furosemide   Injectable 40 milliGRAM(s) IV Push two times a day  glucagon  Injectable 1 milliGRAM(s) IntraMuscular once  insulin lispro (ADMELOG) corrective regimen sliding scale   SubCutaneous Before meals and at bedtime  metoprolol succinate ER 12.5 milliGRAM(s) Oral daily  sacubitril 24 mG/valsartan 26 mG 1 Tablet(s) Oral two times a day  tamsulosin 0.4 milliGRAM(s) Oral at bedtime      PHYSICAL EXAM:  T(C): 37 (02-09-22 @ 10:13), Max: 37 (02-09-22 @ 10:13)  HR: 55 (02-09-22 @ 12:32) (54 - 64)  BP: 101/71 (02-09-22 @ 12:32) (101/71 - 119/80)  RR: 18 (02-09-22 @ 12:32) (15 - 18)  SpO2: 95% (02-09-22 @ 12:32) (95% - 98%)  Wt(kg): --    02-08-22 @ 07:01  -  02-09-22 @ 07:00  --------------------------------------------------------  IN: 1260 mL / OUT: 2950 mL / NET: -1690 mL    02-09-22 @ 07:01  -  02-09-22 @ 13:24  --------------------------------------------------------  IN: 240 mL / OUT: 1000 mL / NET: -760 mL        GEN: Awake, comfortable. NAD.   HEENT: NCAT, PERRL, EOMI. Mucosa moist.   NECK: Supple, elevated JVP.   RESP: CTA b/l  CV: bradycardic, irregularly irregular, normal s1/s2. No m/r/g.  ABD: Soft, NTND. BS+  EXT: Warm. b/l LE edema extending to hips. No clubbing, or cyanosis.   NEURO: AAOx3. No focal deficits.    I&O's Summary    08 Feb 2022 07:01  -  09 Feb 2022 07:00  --------------------------------------------------------  IN: 1260 mL / OUT: 2950 mL / NET: -1690 mL    09 Feb 2022 07:01  -  09 Feb 2022 13:24  --------------------------------------------------------  IN: 240 mL / OUT: 1000 mL / NET: -760 mL        	  LABS:	 	    CARDIAC MARKERS:                                  12.3   3.07  )-----------( 99       ( 09 Feb 2022 05:27 )             37.0     02-09    146<H>  |  109<H>  |  23  ----------------------------<  90  3.8   |  27  |  1.18    Ca    8.1<L>      09 Feb 2022 05:27  Mg     1.9     02-09    TPro  5.5<L>  /  Alb  3.3  /  TBili  0.9  /  DBili  x   /  AST  10  /  ALT  13  /  AlkPhos  111  02-09    ECG:  AF, Low voltage, Age indeterminate or old inferior infarct.	  ECHO: < from: TTE Echo Complete w/o Contrast w/ Doppler (02.08.22 @ 11:51) >  ---------------------------------------------------------------------------  -----  CONCLUSIONS:     1. Severe symmetric left ventricular hypertrophy. Moderately reduced   left ventricular systolic function. Apical segments are more vigorous   than the base, consider infiltrative cardiomyopathy (such as amyloid).   2. Mildly reduced right ventricular systolic function.   3. Biatrial enlargement.   4. Tethered mitral valve. Mild mitral regurgitation.   5. Mild tricuspid regurgitation.   6. Pulmonary hypertension present, pulmonary artery systolic pressure is   40 mmHg.   7. Small pericardial effusion.   8. Bilateral pleural effusion.   9. Borderline dilated ascending aorta.  10. Compared to the previous TTE performed on 11/22/2021, given technical   differences, LV function is similar to slightly more vigorous. Comparable   views ofproximal ascending aorta were not obtained.    ---------------------------------------------------------------------------    < end of copied text >

## 2022-02-10 DIAGNOSIS — J90 PLEURAL EFFUSION, NOT ELSEWHERE CLASSIFIED: ICD-10-CM

## 2022-02-10 LAB
ANION GAP SERPL CALC-SCNC: 9 MMOL/L — SIGNIFICANT CHANGE UP (ref 5–17)
APTT BLD: 161.4 SEC — CRITICAL HIGH (ref 27.5–35.5)
APTT BLD: 34.2 SEC — SIGNIFICANT CHANGE UP (ref 27.5–35.5)
BASOPHILS # BLD AUTO: 0.01 K/UL — SIGNIFICANT CHANGE UP (ref 0–0.2)
BASOPHILS NFR BLD AUTO: 0.3 % — SIGNIFICANT CHANGE UP (ref 0–2)
BUN SERPL-MCNC: 26 MG/DL — HIGH (ref 7–23)
CALCIUM SERPL-MCNC: 8.8 MG/DL — SIGNIFICANT CHANGE UP (ref 8.4–10.5)
CHLORIDE SERPL-SCNC: 106 MMOL/L — SIGNIFICANT CHANGE UP (ref 96–108)
CO2 SERPL-SCNC: 29 MMOL/L — SIGNIFICANT CHANGE UP (ref 22–31)
CREAT SERPL-MCNC: 1.15 MG/DL — SIGNIFICANT CHANGE UP (ref 0.5–1.3)
EOSINOPHIL # BLD AUTO: 0.02 K/UL — SIGNIFICANT CHANGE UP (ref 0–0.5)
EOSINOPHIL NFR BLD AUTO: 0.6 % — SIGNIFICANT CHANGE UP (ref 0–6)
GLUCOSE BLDC GLUCOMTR-MCNC: 103 MG/DL — HIGH (ref 70–99)
GLUCOSE BLDC GLUCOMTR-MCNC: 121 MG/DL — HIGH (ref 70–99)
GLUCOSE BLDC GLUCOMTR-MCNC: 88 MG/DL — SIGNIFICANT CHANGE UP (ref 70–99)
GLUCOSE BLDC GLUCOMTR-MCNC: 93 MG/DL — SIGNIFICANT CHANGE UP (ref 70–99)
GLUCOSE SERPL-MCNC: 91 MG/DL — SIGNIFICANT CHANGE UP (ref 70–99)
HCT VFR BLD CALC: 37.6 % — LOW (ref 39–50)
HGB BLD-MCNC: 12.6 G/DL — LOW (ref 13–17)
IMM GRANULOCYTES NFR BLD AUTO: 0.3 % — SIGNIFICANT CHANGE UP (ref 0–1.5)
LYMPHOCYTES # BLD AUTO: 0.91 K/UL — LOW (ref 1–3.3)
LYMPHOCYTES # BLD AUTO: 26.5 % — SIGNIFICANT CHANGE UP (ref 13–44)
MAGNESIUM SERPL-MCNC: 2 MG/DL — SIGNIFICANT CHANGE UP (ref 1.6–2.6)
MCHC RBC-ENTMCNC: 28.1 PG — SIGNIFICANT CHANGE UP (ref 27–34)
MCHC RBC-ENTMCNC: 33.5 GM/DL — SIGNIFICANT CHANGE UP (ref 32–36)
MCV RBC AUTO: 83.7 FL — SIGNIFICANT CHANGE UP (ref 80–100)
MONOCYTES # BLD AUTO: 0.35 K/UL — SIGNIFICANT CHANGE UP (ref 0–0.9)
MONOCYTES NFR BLD AUTO: 10.2 % — SIGNIFICANT CHANGE UP (ref 2–14)
NEUTROPHILS # BLD AUTO: 2.13 K/UL — SIGNIFICANT CHANGE UP (ref 1.8–7.4)
NEUTROPHILS NFR BLD AUTO: 62.1 % — SIGNIFICANT CHANGE UP (ref 43–77)
NRBC # BLD: 0 /100 WBCS — SIGNIFICANT CHANGE UP (ref 0–0)
PLATELET # BLD AUTO: 100 K/UL — LOW (ref 150–400)
POTASSIUM SERPL-MCNC: 3.8 MMOL/L — SIGNIFICANT CHANGE UP (ref 3.5–5.3)
POTASSIUM SERPL-SCNC: 3.8 MMOL/L — SIGNIFICANT CHANGE UP (ref 3.5–5.3)
RBC # BLD: 4.49 M/UL — SIGNIFICANT CHANGE UP (ref 4.2–5.8)
RBC # FLD: 16.1 % — HIGH (ref 10.3–14.5)
SARS-COV-2 RNA SPEC QL NAA+PROBE: SIGNIFICANT CHANGE UP
SODIUM SERPL-SCNC: 144 MMOL/L — SIGNIFICANT CHANGE UP (ref 135–145)
WBC # BLD: 3.43 K/UL — LOW (ref 3.8–10.5)
WBC # FLD AUTO: 3.43 K/UL — LOW (ref 3.8–10.5)

## 2022-02-10 PROCEDURE — 78830 RP LOCLZJ TUM SPECT W/CT 1: CPT | Mod: 26

## 2022-02-10 RX ORDER — HEPARIN SODIUM 5000 [USP'U]/ML
INJECTION INTRAVENOUS; SUBCUTANEOUS
Qty: 25000 | Refills: 0 | Status: DISCONTINUED | OUTPATIENT
Start: 2022-02-10 | End: 2022-02-11

## 2022-02-10 RX ADMIN — HEPARIN SODIUM 1700 UNIT(S)/HR: 5000 INJECTION INTRAVENOUS; SUBCUTANEOUS at 18:47

## 2022-02-10 RX ADMIN — Medication 40 MILLIGRAM(S): at 06:27

## 2022-02-10 RX ADMIN — TAMSULOSIN HYDROCHLORIDE 0.4 MILLIGRAM(S): 0.4 CAPSULE ORAL at 21:48

## 2022-02-10 RX ADMIN — SACUBITRIL AND VALSARTAN 1 TABLET(S): 24; 26 TABLET, FILM COATED ORAL at 06:35

## 2022-02-10 RX ADMIN — HEPARIN SODIUM 1400 UNIT(S)/HR: 5000 INJECTION INTRAVENOUS; SUBCUTANEOUS at 03:58

## 2022-02-10 RX ADMIN — HEPARIN SODIUM 0 UNIT(S)/HR: 5000 INJECTION INTRAVENOUS; SUBCUTANEOUS at 02:55

## 2022-02-10 RX ADMIN — SACUBITRIL AND VALSARTAN 1 TABLET(S): 24; 26 TABLET, FILM COATED ORAL at 17:28

## 2022-02-10 RX ADMIN — Medication 40 MILLIGRAM(S): at 17:28

## 2022-02-10 NOTE — PROGRESS NOTE ADULT - PROBLEM SELECTOR PLAN 2
persistent right sided pleural effusion, despite IV diuretics; SpO2 98% RA  -Pulm following  -CT Chest 2/9: Increased mod-large right pleural effusion, unchanged small left pleural effusion, ascites and upper abd w/ anasarca, cardiomegaly, probably pulm HTN  -IR guided pleurocentesis friday 2/11

## 2022-02-10 NOTE — PROGRESS NOTE ADULT - ASSESSMENT
ASSESSMENT/PLAN 75 y/o male,  w/ PMHx type II DM (not currently on medications), A-Flutter/A-Fib (s/p prior ablation in 06/2016, most  A-fib on Eliquis 2.5mg PO bid), thrombocytopenia (baseline Plt 60-70's on prior admission), stage III CKD (baseline Cr 1.0-1.2 ), non-obstructive CAD, Chronic Systolic CHF(EF 35% by  Echocardiogram 11/2022), hx of NSVT (EP evaluated pt. 11/2021 BPH (s/p TURP procedure), pituitary adenoma (s/p transphenoidal treatment in 1990's), prostate cancer (s/p chemotherapy), chronic venous insufficiency, Impression of acute on chronic CHF, R>L pleural effusions, compressive atelectases, concern for amyloidosis     1. O2 attempt now off   2. Bronchodilators: off, continue  and encourage incentive spirometry   3. Corticosteroids: off  4. ID/Antibiotics: off  5. Cardiac/HTN: continue diuresis , optimize CHF mngmnt   6. GI: Rx/ prophylaxis c PPI/H2B  7. Heme: Rx/VT prophylaxis c SQH/SCD/AC on Eliquis   8. Aspiration precautions  9. In view of worsening R pleural effusion recommend Right thoracentesis tgoday   Discussed with managing team Cardiology,

## 2022-02-10 NOTE — PROGRESS NOTE ADULT - SUBJECTIVE AND OBJECTIVE BOX
Cardiology PA Adult Progress Note    SUBJECTIVE ASSESSMENT:  	  MEDICATIONS:  furosemide   Injectable 40 milliGRAM(s) IV Push two times a day  sacubitril 24 mG/valsartan 26 mG 1 Tablet(s) Oral two times a day  tamsulosin 0.4 milliGRAM(s) Oral at bedtime  insulin lispro (ADMELOG) corrective regimen sliding scale   SubCutaneous Before meals and at bedtime  	  VITAL SIGNS:  T(C): 37 (02-10-22 @ 06:17), Max: 37 (02-09-22 @ 10:13)  HR: 67 (02-09-22 @ 20:00) (54 - 67)  BP: 108/60 (02-10-22 @ 05:30) (93/61 - 116/78)  RR: 17 (02-10-22 @ 05:30) (17 - 18)  SpO2: 96% (02-10-22 @ 05:30) (93% - 98%)    I&O's Summary  09 Feb 2022 07:01  -  10 Feb 2022 07:00  --------------------------------------------------------  IN: 1020 mL / OUT: 3380 mL / NET: -2360 mL                                     PHYSICAL EXAM:  Appearance: Normal	  HEENT: Normal oral mucosa, PERRL, EOMI	  Neck: Supple, + JVD/ - JVD; ___ Carotid Bruit   Cardiovascular: Normal S1 S2, No murmurs  Respiratory: Lungs clear to auscultation/Decreased Breath Sounds/No Rales, Rhonchi, Wheezing	  Gastrointestinal:  Soft, Non-tender, + BS	  Skin: No rashes, No ecchymoses, No cyanosis  Extremities: Normal range of motion, No clubbing, cyanosis or edema  Vascular: Peripheral pulses palpable 2+ bilaterally  Neurologic: Non-focal  Psychiatry: A & O x 3, Mood & affect appropriate    LABS:	 	             12.6   3.43  )-----------( 100      ( 10 Feb 2022 06:17 )             37.6     144  |  106  |  26<H>  ----------------------------<  91  3.8   |  29  |  1.15    Ca    8.8      10 Feb 2022 06:17  Mg     2.0     02-10    TPro  5.5<L>  /  Alb  3.3  /  TBili  0.9  /  DBili  x   /  AST  10  /  ALT  13  /  AlkPhos  111  02-09    PT/INR - ( 09 Feb 2022 19:58 )   PT: 16.6 sec;   INR: 1.41       PTT - ( 10 Feb 2022 02:17 )  PTT:161.4 sec Cardiology PA Adult Progress Note    SUBJECTIVE ASSESSMENT: Pt seen and examined at bedside this AM laying comfortably in bed w/o any complaints or events overnight. He denies   	  MEDICATIONS:  furosemide   Injectable 40 milliGRAM(s) IV Push two times a day  sacubitril 24 mG/valsartan 26 mG 1 Tablet(s) Oral two times a day  tamsulosin 0.4 milliGRAM(s) Oral at bedtime  insulin lispro (ADMELOG) corrective regimen sliding scale   SubCutaneous Before meals and at bedtime  	  VITAL SIGNS:  T(C): 37 (02-10-22 @ 06:17), Max: 37 (02-09-22 @ 10:13)  HR: 67 (02-09-22 @ 20:00) (54 - 67)  BP: 108/60 (02-10-22 @ 05:30) (93/61 - 116/78)  RR: 17 (02-10-22 @ 05:30) (17 - 18)  SpO2: 96% (02-10-22 @ 05:30) (93% - 98%)    I&O's Summary  09 Feb 2022 07:01  -  10 Feb 2022 07:00  --------------------------------------------------------  IN: 1020 mL / OUT: 3380 mL / NET: -2360 mL                                     PHYSICAL EXAM:  Appearance: Normal	  HEENT: Normal oral mucosa, PERRL, EOMI	  Neck: Supple, + JVD/ - JVD; ___ Carotid Bruit   Cardiovascular: Normal S1 S2, No murmurs  Respiratory: Lungs clear to auscultation/Decreased Breath Sounds/No Rales, Rhonchi, Wheezing	  Gastrointestinal:  Soft, Non-tender, + BS	  Skin: No rashes, No ecchymoses, No cyanosis  Extremities: Normal range of motion, No clubbing, cyanosis or edema  Vascular: Peripheral pulses palpable 2+ bilaterally  Neurologic: Non-focal  Psychiatry: A & O x 3, Mood & affect appropriate    LABS:	 	             12.6   3.43  )-----------( 100      ( 10 Feb 2022 06:17 )             37.6     144  |  106  |  26<H>  ----------------------------<  91  3.8   |  29  |  1.15    Ca    8.8      10 Feb 2022 06:17  Mg     2.0     02-10    TPro  5.5<L>  /  Alb  3.3  /  TBili  0.9  /  DBili  x   /  AST  10  /  ALT  13  /  AlkPhos  111  02-09    PT/INR - ( 09 Feb 2022 19:58 )   PT: 16.6 sec;   INR: 1.41       PTT - ( 10 Feb 2022 02:17 )  PTT:161.4 sec Cardiology PA Adult Progress Note    SUBJECTIVE ASSESSMENT: Pt seen and examined at bedside this AM laying comfortably in bed w/o any complaints or events overnight. He states that he feels well and reports that SOB has significantly improved. He denies any CP, palpitations, dizziness/lightheadedness, abd pain or orhtopnea.   	  MEDICATIONS:  furosemide   Injectable 40 milliGRAM(s) IV Push two times a day  sacubitril 24 mG/valsartan 26 mG 1 Tablet(s) Oral two times a day  tamsulosin 0.4 milliGRAM(s) Oral at bedtime  insulin lispro (ADMELOG) corrective regimen sliding scale   SubCutaneous Before meals and at bedtime  	  VITAL SIGNS:  T(C): 37 (02-10-22 @ 06:17), Max: 37 (02-09-22 @ 10:13)  HR: 67 (02-09-22 @ 20:00) (54 - 67)  BP: 108/60 (02-10-22 @ 05:30) (93/61 - 116/78)  RR: 17 (02-10-22 @ 05:30) (17 - 18)  SpO2: 96% (02-10-22 @ 05:30) (93% - 98%)    I&O's Summary  09 Feb 2022 07:01  -  10 Feb 2022 07:00  --------------------------------------------------------  IN: 1020 mL / OUT: 3380 mL / NET: -2360 mL                                     PHYSICAL EXAM:  Appearance: Normal	  HEENT: Normal oral mucosa, PERRL, EOMI	  Neck: Supple, - JVD; no Carotid Bruit   Cardiovascular: Normal S1 S2, No murmurs  Respiratory: Decreased Breath Sounds, RLE crackles  Gastrointestinal:  Soft, Non-tender, + BS	  Skin: No rashes, No ecchymoses, No cyanosis  Extremities: Normal range of motion, No clubbing, cyanosis or edema  Vascular: Peripheral pulses palpable 2+ bilaterally  Neurologic: Non-focal  Psychiatry: A & O x 3, Mood & affect appropriate    LABS:	 	             12.6   3.43  )-----------( 100      ( 10 Feb 2022 06:17 )             37.6     144  |  106  |  26<H>  ----------------------------<  91  3.8   |  29  |  1.15    Ca    8.8      10 Feb 2022 06:17  Mg     2.0     02-10    TPro  5.5<L>  /  Alb  3.3  /  TBili  0.9  /  DBili  x   /  AST  10  /  ALT  13  /  AlkPhos  111  02-09    PT/INR - ( 09 Feb 2022 19:58 )   PT: 16.6 sec;   INR: 1.41       PTT - ( 10 Feb 2022 02:17 )  PTT:161.4 sec Cardiology PA Adult Progress Note    SUBJECTIVE ASSESSMENT: Pt seen and examined at bedside this AM laying comfortably in bed w/o any complaints or events overnight. He states that he feels well and reports that SOB has significantly improved. He denies any CP, palpitations, dizziness/lightheadedness, abd pain or orhtopnea.   	  MEDICATIONS:  furosemide   Injectable 40 milliGRAM(s) IV Push two times a day  sacubitril 24 mG/valsartan 26 mG 1 Tablet(s) Oral two times a day  tamsulosin 0.4 milliGRAM(s) Oral at bedtime  insulin lispro (ADMELOG) corrective regimen sliding scale   SubCutaneous Before meals and at bedtime  	  VITAL SIGNS:  T(C): 37 (02-10-22 @ 06:17), Max: 37 (02-09-22 @ 10:13)  HR: 67 (02-09-22 @ 20:00) (54 - 67)  BP: 108/60 (02-10-22 @ 05:30) (93/61 - 116/78)  RR: 17 (02-10-22 @ 05:30) (17 - 18)  SpO2: 96% (02-10-22 @ 05:30) (93% - 98%)    I&O's Summary  09 Feb 2022 07:01  -  10 Feb 2022 07:00  --------------------------------------------------------  IN: 1020 mL / OUT: 3380 mL / NET: -2360 mL                                     PHYSICAL EXAM:  Appearance: Normal	  HEENT: Normal oral mucosa, PERRL, EOMI	  Neck: Supple, - JVD; no Carotid Bruit   Cardiovascular: Normal S1 S2, No murmurs  Respiratory: Decreased Breath Sounds, RLE crackles  Gastrointestinal:  Soft, Non-tender, + BS	  Skin: No rashes, No ecchymoses, No cyanosis  Extremities: 1+ pitting edema B/L  Vascular: Peripheral pulses palpable 2+ bilaterally  Neurologic: Non-focal  Psychiatry: A & O x 3, Mood & affect appropriate    LABS:	 	             12.6   3.43  )-----------( 100      ( 10 Feb 2022 06:17 )             37.6     144  |  106  |  26<H>  ----------------------------<  91  3.8   |  29  |  1.15    Ca    8.8      10 Feb 2022 06:17  Mg     2.0     02-10    TPro  5.5<L>  /  Alb  3.3  /  TBili  0.9  /  DBili  x   /  AST  10  /  ALT  13  /  AlkPhos  111  02-09    PT/INR - ( 09 Feb 2022 19:58 )   PT: 16.6 sec;   INR: 1.41       PTT - ( 10 Feb 2022 02:17 )  PTT:161.4 sec

## 2022-02-10 NOTE — PROGRESS NOTE ADULT - PROBLEM SELECTOR PLAN 4
h/o thrombocytopenia w/ baseline PLT 60-70s  -PLT remains stable, today 100  -Transfuse for PLT <50 and hold AC

## 2022-02-10 NOTE — PROGRESS NOTE ADULT - PROBLEM SELECTOR PLAN 1
volume overload improving; SpO2 99% RA and net neg 2L/24H  -TTE 2/8: LVEF 35-40%, apical segments are more vigorous than the base (consider infiltrative CMP such as amyloid), severe LVH, mildly reduced RVSF, HYACINTH, tethered MV w/ MR, mild TR, PASP 40mmHg, small pericardial effusion, B/L pleural effusion, borderline dilated ascending aorta  -Advanced HF following- NM amyloid scan performed, pending results  -s/p BM Bx w/ Dr. Bernal 11/23/21 to r/o infiltrative CMP which was negative for amyloid however was a small sample size  -Continue Lasix 40mg IV BID and Entresto 24/26mg BID  -Discontinue BB in setting of possible amyloidosis  -Core measures, daily wt and strict I&Os

## 2022-02-10 NOTE — CONSULT NOTE ADULT - ASSESSMENT
Dyspnea 2/2 CHF exacerbation, cardiac amyloid is suspected  --For NM study to r/o cardiac amyloid deposition  --If inconclusive MRI or the heart and/or cardiac biopsy can be also considered   --Bone marrow biopsy 11/2021 did not reveal amyloid, however sample was small, can be repeated   --Fat pad biopsy can also be considered   --Please repeat MM panel including SPEP, serum immunofixation, quantitative immunoglobulins, light chain ratio    Thrombocytopenia  --? splenomegaly in the setting of CHF exacerbation   --At baseline, will follow     Case was discussed with PABLO Hodge    Will follow

## 2022-02-10 NOTE — PROGRESS NOTE ADULT - SUBJECTIVE AND OBJECTIVE BOX
Interventional, Pulmonary, Critical, Chest Special Procedures.    Pt was seen and fully examined by myself.     Time spent with patient in minutes: 37    Patient is a 76y old  Male who presents with a chief complaint of CHF (10 Feb 2022 08:31) The patient eupneic at rest, pain free when seen. Awaiting thoracentesis    HPI:  A&O X3 Full Code      In terms of COVID-19 pt denies hx of COVID-19 and has received full Pfizer COVID-19 vaccination.  Patient plans on receiving Pfizer booster in May 2022.    75 y/o male, prior history of medication non-compliance, w/ PMHx type II DM (not currently on medications), A-Flutter/A-Fib (s/p prior ablation in 06/2016, most recently found to be in A-fib on Eliquis 2.5mg PO bid), thrombocytopenia (baseline Plt 60-70's on prior admission), stage III CKD (baseline Cr 1.0-1.2 ), non-obstructive CAD, Chronic Systolic CHF(EF 35% by  Echocardiogram 11/2022), hx of NSVT (EP evaluated pt. 11/2021 recommended repeat TTE in three months), BPH (s/p TURP procedure), pituitary adenoma (s/p transphenoidal treatment in 1990's), prostate cancer (s/p chemotherapy), chronic venous insufficiency, and multiple admissions for CHF exacerbation most recent 11/19/2021,  who presents to [facility] ER this morning, 2/7/22, with worsening dyspnea (with minimal exertion), increase in abdominal girth, b/l LE swelling, facial swelling and dry cough for 4-5 days.  Pt. reports compliance with medication and medical follow up.   In light of patient's risk factors, and acute on chronic systolic CHF exacerbation pt. is now admitted to [facility]  for further medical management.    In ER ECG reveals Atrial Fibrillation HR@65bpm with non specific ST-T wave changes, Troponin 0.11 (patient's baseline), BNP 11,635, WBC 3.6, H/H 13.3/40.2, Platelets 107 (base line 60-70's), Blood Glucose 116.  CXR AP reveals R>L pleural effusion with Cardiomegaly, CT of Abdomen reveals R>L pleural effusion, ascites noted throughout abdomen and plevis, mesenteric infiltrate extensive anasarca.  US Abdomen reveals cholelithiasis, moderate ascites, right pleural effusion (moderate) and dilatation of hepatic vein.        (07 Feb 2022 05:13)      REVIEW OF SYSTEMS:  Constitutional: No fever, weight loss, chills +++ fatigue  Eyes: No eye pain, visual disturbances, or discharge  ENMT:  No difficulty hearing, tinnitus, vertigo; No sinus or throat pain. No epistaxis, dysphagia, dysphonia, hoarseness or odynophagia  Neck: No pain, stiffness or neck swelling.  No masses or deformities  Respiratory: No cough, wheezing, hemoptysis  - COPD  - ILD   - PE   - ASTHMA     - PNEUMONIA  Cardiovascular: No chest pain, + AF dysrhythmia, palpitations, dizziness or edema - CAD   -+CHF   + HTN  Gastrointestinal: No abdominal or epigastric pain. No nausea, vomiting or hematemesis; No diarrhea or constipation. No melena or hematochezia, Icterus.          Genitourinary: No dysuria, frequency, hematuria or incontinence   + CKD/KAYLI      - ESRD  Neurological: No headaches, +memory loss, loss of strength, numbness or tremors      -DEMENTIA     - STROKE    - SEIZURE  Skin: No itching, burning, rashes or lesions   Lymph Nodes: No enlarged glands  Endocrine: No heat or cold intolerance; No hair loss       + DM     - THYROID DISORDER  Musculoskeletal: No joint pain or swelling; No muscle, back or extremity pain, No edema  Psychiatric: No depression, anxiety, mood swings or difficulty sleeping  Heme/Lymph: No easy bruising or bleeding gums         -+ANEMIA      + CANCER   -COAGULOPATHY  Allergy and Immunologic: No hives or eczema  PAST MEDICAL & SURGICAL HISTORY:  Atrial flutter  s/p Ablation 2016    Chronic venous insufficiency of lower extremity    Prostate CA    Stage 3 chronic kidney disease  1.2-1.2 baseline   On admission 1.2    Type 2 diabetes mellitus  not on medication    Thrombocytopenia  60-70&#x27;s baseline    Acute on chronic systolic congestive heart failure    Chronic atrial fibrillation    Atrial flutter  s/p ablation in 2016    History of surgical removal of pituitary gland  1990s    S/P TURP  for BPH      FAMILY HISTORY:    SOCIAL HISTORY:      - Tobacco     - ETOH    Allergies    No Known Allergies    Intolerances      Vital Signs Last 24 Hrs  T(C): 36.4 (10 Feb 2022 18:09), Max: 37 (10 Feb 2022 06:17)  T(F): 97.5 (10 Feb 2022 18:09), Max: 98.6 (10 Feb 2022 06:17)  HR: 60 (10 Feb 2022 17:30) (54 - 67)  BP: 109/74 (10 Feb 2022 17:30) (93/61 - 116/74)  BP(mean): 74 (10 Feb 2022 05:30) (73 - 74)  RR: 17 (10 Feb 2022 17:30) (17 - 18)  SpO2: 99% (10 Feb 2022 17:30) (95% - 99%)    02-09 @ 07:01  -  02-10 @ 07:00  --------------------------------------------------------  IN: 1020 mL / OUT: 3380 mL / NET: -2360 mL    02-10 @ 07:01  -  02-10 @ 19:16  --------------------------------------------------------  IN: 360 mL / OUT: 1600 mL / NET: -1240 mL          MEDICATIONS:  MEDICATIONS  (STANDING):  dextrose 50% Injectable 25 Gram(s) IV Push once  dextrose 50% Injectable 12.5 Gram(s) IV Push once  dextrose 50% Injectable 25 Gram(s) IV Push once  furosemide   Injectable 40 milliGRAM(s) IV Push two times a day  glucagon  Injectable 1 milliGRAM(s) IntraMuscular once  heparin  Infusion.  Unit(s)/Hr (17 mL/Hr) IV Continuous <Continuous>  insulin lispro (ADMELOG) corrective regimen sliding scale   SubCutaneous Before meals and at bedtime  sacubitril 24 mG/valsartan 26 mG 1 Tablet(s) Oral two times a day  tamsulosin 0.4 milliGRAM(s) Oral at bedtime    MEDICATIONS  (PRN):      PHYSICAL EXAM:  Un Comfortable, no immediate distress  Eyes: PERRL, EOM intact; conjunctiva and sclera clear  Head: Normocephalic;  No Trauma  ENMT: No nasal discharge, hoarseness, cough or hemoptysis  Neck: Supple; non tender; no masses or deformities.    No JVD  Respiratory:  - WHEEZING   - RHONCHI  - RALES  - CRACKLES.  Diminished breath sounds  BILATERAL  RIGHT 1/4 caudad,   LEFT base   Cardiovascular: IR Regular rate and rhythm. S1 and S2 Normal; No murmurs, gallops or rubs     - PPM/AICD  Gastrointestinal: Soft non-tender, mildly distended; Normal bowel sounds; No hepatosplenomegaly.     -PEG    -  GT   - GONZALEZ  Genitourinary: No costovertebral angle tenderness. No dysuria  Extremities: AROM, No clubbing, cyanosis + diffuse  edema    Vascular: Peripheral pulses palpable 2+ bilaterally  Neurological: Alert and responisve to stimuli   Skin: Warm and dry. No obvious rash  Lymph Nodes: No acute cervical or supraclavicular adenopathy  Psychiatric: More engaging today     DEVICES:  - DENTURES   +IV R / L     - ETUBE   -TRACH   -CTUBE  R / L    LABS:                          12.6   3.43  )-----------( 100      ( 10 Feb 2022 06:17 )             37.6     02-10    144  |  106  |  26<H>  ----------------------------<  91  3.8   |  29  |  1.15    Ca    8.8      10 Feb 2022 06:17  Mg     2.0     02-10    TPro  5.5<L>  /  Alb  3.3  /  TBili  0.9  /  DBili  x   /  AST  10  /  ALT  13  /  AlkPhos  111  02-09    PT/INR - ( 09 Feb 2022 19:58 )   PT: 16.6 sec;   INR: 1.41          PTT - ( 10 Feb 2022 16:23 )  PTT:34.2 sec  RADIOLOGY & ADDITIONAL STUDIES (The following images were personally reviewed):

## 2022-02-10 NOTE — CONSULT NOTE ADULT - SUBJECTIVE AND OBJECTIVE BOX
Patient is a 76y old  Male who presents with a chief complaint of worsening Dyspnea and recent weight gain 4-5 days (10 Feb 2022 19:16)        HPI:  A&O X3 Full Code      In terms of COVID-19 pt denies hx of COVID-19 and has received full Pfizer COVID-19 vaccination.  Patient plans on receiving Pfizer booster in May 2022.    77 y/o male, prior history of medication non-compliance, w/ PMHx type II DM (not currently on medications), A-Flutter/A-Fib (s/p prior ablation in 06/2016, most recently found to be in A-fib on Eliquis 2.5mg PO bid), thrombocytopenia (baseline Plt 60-70's on prior admission), stage III CKD (baseline Cr 1.0-1.2 ), non-obstructive CAD, Chronic Systolic CHF(EF 35% by  Echocardiogram 11/2022), hx of NSVT (EP evaluated pt. 11/2021 recommended repeat TTE in three months), BPH (s/p TURP procedure), pituitary adenoma (s/p transphenoidal treatment in 1990's), prostate cancer (s/p chemotherapy), chronic venous insufficiency, and multiple admissions for CHF exacerbation most recent 11/19/2021,  who presents to [facility] ER this morning, 2/7/22, with worsening dyspnea (with minimal exertion), increase in abdominal girth, b/l LE swelling, facial swelling and dry cough for 4-5 days.  Pt. reports compliance with medication and medical follow up.   In light of patient's risk factors, and acute on chronic systolic CHF exacerbation pt. is now admitted to [facility]  for further medical management.    In ER ECG reveals Atrial Fibrillation HR@65bpm with non specific ST-T wave changes, Troponin 0.11 (patient's baseline), BNP 11,635, WBC 3.6, H/H 13.3/40.2, Platelets 107 (base line 60-70's), Blood Glucose 116.  CXR AP reveals R>L pleural effusion with Cardiomegaly, CT of Abdomen reveals R>L pleural effusion, ascites noted throughout abdomen and plevis, mesenteric infiltrate extensive anasarca.  US Abdomen reveals cholelithiasis, moderate ascites, right pleural effusion (moderate) and dilatation of hepatic vein.     2/10/2022: Reports improved SOB for NM amyloid study today.         (07 Feb 2022 05:13)           PAST MEDICAL & SURGICAL HISTORY:  Atrial flutter  s/p Ablation 2016    Chronic venous insufficiency of lower extremity    Prostate CA    Stage 3 chronic kidney disease  1.2-1.2 baseline   On admission 1.2    Type 2 diabetes mellitus  not on medication    Thrombocytopenia  60-70&#x27;s baseline    Acute on chronic systolic congestive heart failure    Chronic atrial fibrillation    Atrial flutter  s/p ablation in 2016    History of surgical removal of pituitary gland  1990s    S/P TURP  for BPH          FAMILY HISTORY:      Social History:    Allergies    No Known Allergies    Intolerances        MEDICATIONS  (STANDING):  dextrose 50% Injectable 25 Gram(s) IV Push once  dextrose 50% Injectable 12.5 Gram(s) IV Push once  dextrose 50% Injectable 25 Gram(s) IV Push once  furosemide   Injectable 40 milliGRAM(s) IV Push two times a day  glucagon  Injectable 1 milliGRAM(s) IntraMuscular once  heparin  Infusion.  Unit(s)/Hr (17 mL/Hr) IV Continuous <Continuous>  insulin lispro (ADMELOG) corrective regimen sliding scale   SubCutaneous Before meals and at bedtime  sacubitril 24 mG/valsartan 26 mG 1 Tablet(s) Oral two times a day  tamsulosin 0.4 milliGRAM(s) Oral at bedtime    MEDICATIONS  (PRN):      Vital Signs Last 24 Hrs  T(C): 36.4 (10 Feb 2022 18:09), Max: 36.7 (10 Feb 2022 14:03)  T(F): 97.5 (10 Feb 2022 18:09), Max: 98 (10 Feb 2022 14:03)  HR: 58 (11 Feb 2022 05:14) (54 - 60)  BP: 108/55 (11 Feb 2022 05:14) (91/63 - 109/74)  BP(mean): 74 (11 Feb 2022 05:14) (72 - 84)  RR: 16 (11 Feb 2022 05:14) (16 - 18)  SpO2: 93% (11 Feb 2022 05:14) (93% - 99%)    ROS:  Negative except for:    PHYSICAL EXAM  General: adult in NAD  HEENT: clear oropharynx, anicteric sclera, pink conjunctiva  Neck: supple  CV: normal S1/S2 with no murmur rubs or gallops  Lungs: positive air movement b/l ant lungs,clear to auscultation, no wheezes, no rales  Abdomen: soft non-tender non-distended, no hepatosplenomegaly  Ext: no clubbing cyanosis or edema  Skin: no rashes and no petechiae  Neuro: alert and oriented X 4, no focal deficits      LABS:                          12.9   3.21  )-----------( 96       ( 11 Feb 2022 00:38 )             37.4         Mean Cell Volume : 84.2 fl  Mean Cell Hemoglobin : 29.1 pg  Mean Cell Hemoglobin Concentration : 34.5 gm/dL  Auto Neutrophil # : x  Auto Lymphocyte # : x  Auto Monocyte # : x  Auto Eosinophil # : x  Auto Basophil # : x  Auto Neutrophil % : x  Auto Lymphocyte % : x  Auto Monocyte % : x  Auto Eosinophil % : x  Auto Basophil % : x      Serial CBC's  02-11 @ 00:38  Hct-37.4 / Hgb-12.9 / Plat-96 / RBC-4.44 / WBC-3.21  Serial CBC's  02-10 @ 06:17  Hct-37.6 / Hgb-12.6 / Plat-100 / RBC-4.49 / WBC-3.43  Serial CBC's  02-09 @ 05:27  Hct-37.0 / Hgb-12.3 / Plat-99 / RBC-4.32 / WBC-3.07  Serial CBC's  02-08 @ 06:48  Hct-39.0 / Hgb-12.5 / Plat-100 / RBC-4.49 / WBC-3.11  Serial CBC's  02-07 @ 10:57  Hct-42.0 / Hgb-13.4 / Plat-104 / RBC-4.83 / WBC-3.38      02-10    144  |  106  |  26<H>  ----------------------------<  91  3.8   |  29  |  1.15    Ca    8.8      10 Feb 2022 06:17  Mg     2.0     02-10        PT/INR - ( 09 Feb 2022 19:58 )   PT: 16.6 sec;   INR: 1.41          PTT - ( 11 Feb 2022 00:39 )  PTT:144.2 sec        SAUMYA Kappa: 5.66 mg/dL (02-09 @ 11:44)  SAUMYA Lambda: 3.83 mg/dL (02-09 @ 11:44)          BLOOD SMEAR INTERPRETATION:       RADIOLOGY & ADDITIONAL STUDIES:    Assessment And Plan:

## 2022-02-10 NOTE — PROGRESS NOTE ADULT - PROBLEM SELECTOR PLAN 3
rate controlled Afib @ 60bpm  -Continue w/ Heparin gtt. Holding home Eliquis 2/2 pleurocentesis Friday  -Hold AC if PLT <50, per hematology  -Discontinue BB due to possible amyloidosis

## 2022-02-10 NOTE — PROGRESS NOTE ADULT - ASSESSMENT
76Y M w/ h/o medication non compliance w/ PMHx Afib/Aflutter s/p ablation 6/2016 (on Eliquis), Thrombocytopenia (baseline PLT 60-70%), non obstructive CAD, HFrEF (EF 35%), CKD-III (baseline Cr 1-1.2), pituitary adenoma (s/p transphenoidal treatment in 1990's), Prostate CA (s/p chemo) and chronic venous insufficiency, presented to [facility] ED w/ anasarca, pt now admitted to cardiac telemetry for further management of acute CHF w/ concern for amyloidosis. Pt now pending IR guided pleurocentesis Friday 2/11 for persistent R pleural effusion

## 2022-02-11 ENCOUNTER — RESULT REVIEW (OUTPATIENT)
Age: 77
End: 2022-02-11

## 2022-02-11 DIAGNOSIS — E85.9 AMYLOIDOSIS, UNSPECIFIED: ICD-10-CM

## 2022-02-11 LAB
ALBUMIN FLD-MCNC: 0.5 G/DL — SIGNIFICANT CHANGE UP
ANION GAP SERPL CALC-SCNC: 10 MMOL/L — SIGNIFICANT CHANGE UP (ref 5–17)
APTT BLD: 127 SEC — CRITICAL HIGH (ref 27.5–35.5)
APTT BLD: 144.2 SEC — CRITICAL HIGH (ref 27.5–35.5)
APTT BLD: 54.7 SEC — HIGH (ref 27.5–35.5)
B PERT IGG+IGM PNL SER: CLEAR — SIGNIFICANT CHANGE UP
BUN SERPL-MCNC: 25 MG/DL — HIGH (ref 7–23)
CALCIUM SERPL-MCNC: 8.8 MG/DL — SIGNIFICANT CHANGE UP (ref 8.4–10.5)
CHLORIDE SERPL-SCNC: 104 MMOL/L — SIGNIFICANT CHANGE UP (ref 96–108)
CO2 SERPL-SCNC: 30 MMOL/L — SIGNIFICANT CHANGE UP (ref 22–31)
COLOR FLD: YELLOW — SIGNIFICANT CHANGE UP
COMMENT - FLUIDS: SIGNIFICANT CHANGE UP
CREAT SERPL-MCNC: 1.18 MG/DL — SIGNIFICANT CHANGE UP (ref 0.5–1.3)
FLUID INTAKE SUBSTANCE CLASS: SIGNIFICANT CHANGE UP
FLUID SEGMENTED GRANULOCYTES: 13 % — SIGNIFICANT CHANGE UP
GLUCOSE BLDC GLUCOMTR-MCNC: 115 MG/DL — HIGH (ref 70–99)
GLUCOSE BLDC GLUCOMTR-MCNC: 141 MG/DL — HIGH (ref 70–99)
GLUCOSE BLDC GLUCOMTR-MCNC: 89 MG/DL — SIGNIFICANT CHANGE UP (ref 70–99)
GLUCOSE BLDC GLUCOMTR-MCNC: 91 MG/DL — SIGNIFICANT CHANGE UP (ref 70–99)
GLUCOSE FLD-MCNC: 86 MG/DL — SIGNIFICANT CHANGE UP
GLUCOSE SERPL-MCNC: 89 MG/DL — SIGNIFICANT CHANGE UP (ref 70–99)
HCT VFR BLD CALC: 37.4 % — LOW (ref 39–50)
HCT VFR BLD CALC: 41.6 % — SIGNIFICANT CHANGE UP (ref 39–50)
HGB BLD-MCNC: 12.9 G/DL — LOW (ref 13–17)
HGB BLD-MCNC: 13.8 G/DL — SIGNIFICANT CHANGE UP (ref 13–17)
INR BLD: 1.27 — HIGH (ref 0.88–1.16)
LDH SERPL L TO P-CCNC: 41 U/L — SIGNIFICANT CHANGE UP
LYMPHOCYTES # FLD: 16 % — SIGNIFICANT CHANGE UP
MAGNESIUM SERPL-MCNC: 2.2 MG/DL — SIGNIFICANT CHANGE UP (ref 1.6–2.6)
MCHC RBC-ENTMCNC: 28.9 PG — SIGNIFICANT CHANGE UP (ref 27–34)
MCHC RBC-ENTMCNC: 29.1 PG — SIGNIFICANT CHANGE UP (ref 27–34)
MCHC RBC-ENTMCNC: 33.2 GM/DL — SIGNIFICANT CHANGE UP (ref 32–36)
MCHC RBC-ENTMCNC: 34.5 GM/DL — SIGNIFICANT CHANGE UP (ref 32–36)
MCV RBC AUTO: 84.2 FL — SIGNIFICANT CHANGE UP (ref 80–100)
MCV RBC AUTO: 87.2 FL — SIGNIFICANT CHANGE UP (ref 80–100)
MESOTHL CELL # FLD: 1 % — SIGNIFICANT CHANGE UP
MONOS+MACROS # FLD: 56 % — SIGNIFICANT CHANGE UP
NRBC # BLD: 0 /100 WBCS — SIGNIFICANT CHANGE UP (ref 0–0)
NRBC # BLD: 0 /100 WBCS — SIGNIFICANT CHANGE UP (ref 0–0)
PLATELET # BLD AUTO: 96 K/UL — LOW (ref 150–400)
PLATELET # BLD AUTO: 99 K/UL — LOW (ref 150–400)
POTASSIUM SERPL-MCNC: 3.8 MMOL/L — SIGNIFICANT CHANGE UP (ref 3.5–5.3)
POTASSIUM SERPL-SCNC: 3.8 MMOL/L — SIGNIFICANT CHANGE UP (ref 3.5–5.3)
PROT FLD-MCNC: 1 G/DL — SIGNIFICANT CHANGE UP
PROTHROM AB SERPL-ACNC: 15.1 SEC — HIGH (ref 10.6–13.6)
RBC # BLD: 4.44 M/UL — SIGNIFICANT CHANGE UP (ref 4.2–5.8)
RBC # BLD: 4.77 M/UL — SIGNIFICANT CHANGE UP (ref 4.2–5.8)
RBC # FLD: 16.3 % — HIGH (ref 10.3–14.5)
RBC # FLD: 16.6 % — HIGH (ref 10.3–14.5)
RCV VOL RI: 0 /UL — SIGNIFICANT CHANGE UP (ref 0–0)
SODIUM SERPL-SCNC: 144 MMOL/L — SIGNIFICANT CHANGE UP (ref 135–145)
SPECIMEN SOURCE FLD: SIGNIFICANT CHANGE UP
TOTAL NUCLEATED CELL COUNT, BODY FLUID: 86 /UL — SIGNIFICANT CHANGE UP
TUBE TYPE: SIGNIFICANT CHANGE UP
WBC # BLD: 3.17 K/UL — LOW (ref 3.8–10.5)
WBC # BLD: 3.21 K/UL — LOW (ref 3.8–10.5)
WBC # FLD AUTO: 3.17 K/UL — LOW (ref 3.8–10.5)
WBC # FLD AUTO: 3.21 K/UL — LOW (ref 3.8–10.5)

## 2022-02-11 PROCEDURE — 88341 IMHCHEM/IMCYTCHM EA ADD ANTB: CPT | Mod: 26

## 2022-02-11 PROCEDURE — 71045 X-RAY EXAM CHEST 1 VIEW: CPT | Mod: 26,76

## 2022-02-11 PROCEDURE — 88342 IMHCHEM/IMCYTCHM 1ST ANTB: CPT | Mod: 26

## 2022-02-11 PROCEDURE — 99232 SBSQ HOSP IP/OBS MODERATE 35: CPT

## 2022-02-11 PROCEDURE — 32555 ASPIRATE PLEURA W/ IMAGING: CPT | Mod: RT

## 2022-02-11 PROCEDURE — 32557 INSERT CATH PLEURA W/ IMAGE: CPT | Mod: RT

## 2022-02-11 PROCEDURE — 88305 TISSUE EXAM BY PATHOLOGIST: CPT | Mod: 26

## 2022-02-11 PROCEDURE — 88112 CYTOPATH CELL ENHANCE TECH: CPT | Mod: 26

## 2022-02-11 RX ORDER — HEPARIN SODIUM 5000 [USP'U]/ML
7500 INJECTION INTRAVENOUS; SUBCUTANEOUS EVERY 6 HOURS
Refills: 0 | Status: DISCONTINUED | OUTPATIENT
Start: 2022-02-11 | End: 2022-02-15

## 2022-02-11 RX ORDER — ACETAMINOPHEN 500 MG
650 TABLET ORAL ONCE
Refills: 0 | Status: COMPLETED | OUTPATIENT
Start: 2022-02-11 | End: 2022-02-11

## 2022-02-11 RX ORDER — HEPARIN SODIUM 5000 [USP'U]/ML
INJECTION INTRAVENOUS; SUBCUTANEOUS
Qty: 25000 | Refills: 0 | Status: DISCONTINUED | OUTPATIENT
Start: 2022-02-11 | End: 2022-02-15

## 2022-02-11 RX ORDER — HEPARIN SODIUM 5000 [USP'U]/ML
3500 INJECTION INTRAVENOUS; SUBCUTANEOUS EVERY 6 HOURS
Refills: 0 | Status: DISCONTINUED | OUTPATIENT
Start: 2022-02-11 | End: 2022-02-15

## 2022-02-11 RX ADMIN — Medication 40 MILLIGRAM(S): at 05:47

## 2022-02-11 RX ADMIN — TAMSULOSIN HYDROCHLORIDE 0.4 MILLIGRAM(S): 0.4 CAPSULE ORAL at 21:43

## 2022-02-11 RX ADMIN — HEPARIN SODIUM 1200 UNIT(S)/HR: 5000 INJECTION INTRAVENOUS; SUBCUTANEOUS at 07:44

## 2022-02-11 RX ADMIN — HEPARIN SODIUM 1700 UNIT(S)/HR: 5000 INJECTION INTRAVENOUS; SUBCUTANEOUS at 22:01

## 2022-02-11 RX ADMIN — HEPARIN SODIUM 1400 UNIT(S)/HR: 5000 INJECTION INTRAVENOUS; SUBCUTANEOUS at 02:31

## 2022-02-11 RX ADMIN — Medication 650 MILLIGRAM(S): at 17:36

## 2022-02-11 RX ADMIN — Medication 40 MILLIGRAM(S): at 19:25

## 2022-02-11 RX ADMIN — Medication 650 MILLIGRAM(S): at 18:36

## 2022-02-11 RX ADMIN — HEPARIN SODIUM 0 UNIT(S)/HR: 5000 INJECTION INTRAVENOUS; SUBCUTANEOUS at 01:30

## 2022-02-11 RX ADMIN — SACUBITRIL AND VALSARTAN 1 TABLET(S): 24; 26 TABLET, FILM COATED ORAL at 17:36

## 2022-02-11 NOTE — PROGRESS NOTE ADULT - ASSESSMENT
76/M with HTN, DM, A.fib s/p ablation in 2016 who was admitted in June 2021 with leg swelling and MARTINEZ and noted to have severe LVH and EF 50% with recurrent admission in Nov 2021 with worsening symptoms, abdominal swelling and TTE showing worsening EF 35%, following with , with chronic thrombocytopenia s/p bone marrow biopsy without plasma call neoplasm, presents to ER with worsening leg swelling, SOB on exertion and abdominal swelling.   reports he can barely walk 1 block w/o stopping. orthopnoea +, Left leg swelling worse than right leg. reports taking lasix regularly but notes he use to urinate more with lasix in the beginning but recently u/o has decreased.     Exam:  JVP 10-12  b/l ae + crackles +  s1s2+ irregular rhythm,  soft, distention  b/l LE oedema    Labs:                        12.3   3.07  )-----------( 99       ( 09 Feb 2022 05:27 )             37.0   02-09    146<H>  |  109<H>  |  23  ----------------------------<  90  3.8   |  27  |  1.18    Ca    8.1<L>      09 Feb 2022 05:27  Mg     1.9     02-09    TPro  5.5<L>  /  Alb  3.3  /  TBili  0.9  /  DBili  x   /  AST  10  /  ALT  13  /  AlkPhos  111  02-09    Soquel/lambda free light chain ratio: 1.4    ECHO:  concentric LVH,biatrial enlargement --> suggestive of infiltrative CM   EF 35%,     CT chest with large Rt pleural effusion    Imp:  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 if pt is on AC for a.fib  pt had rt pleural effusion and likely ascites  Will need drain   will cont follow 76/M with HTN, DM, A.fib s/p ablation in 2016 who was admitted in June 2021 with leg swelling and MARTINEZ and noted to have severe LVH and EF 50% with recurrent admission in Nov 2021 with worsening symptoms, abdominal swelling and TTE showing worsening EF 35%, following with , with chronic thrombocytopenia s/p bone marrow biopsy without plasma call neoplasm, presents to ER with worsening leg swelling, SOB on exertion and abdominal swelling.   reports he can barely walk 1 block w/o stopping. orthopnoea +, Left leg swelling worse than right leg. reports taking lasix regularly but notes he use to urinate more with lasix in the beginning but recently u/o has decreased.     Kappa/lambda free light chain ratio: 1.4  ECHO:  concentric LVH,biatrial enlargement --> suggestive of infiltrative CM   EF 35%,   CT chest with large Rt pleural effusion    PYP scan:  Intense uptake in the left ventricular and part of the right ventricular myocardium in a pattern consistent with TTR-mediated   amyloidosis.    Imp:  TTR Cardiac amyloidosis  ADHF   A.fib with slow ventricular rate  CAD  HTN    Plan:  lost weight from 90 to 83  Will cont lasix IV 40 q12  PYP scan positive for TTR cardiac amyloidosis  Will need outpt genetic testing to assess for Edilia 122 Ile mutation  Will start Tafamidis as outpt, will need to schedule follow up with Dr. Bradley   Will cont Entresto 24-26mg but will   Pt with A.fib, and has high risk of atrial thrombus in setting of cardiac amyloidosis  Will start AC with NOAC  will cont follow 76/M with HTN, DM, A.fib s/p ablation in 2016 who was admitted in June 2021 with leg swelling and MARTINEZ and noted to have severe LVH and EF 50% with recurrent admission in Nov 2021 with worsening symptoms, abdominal swelling and TTE showing worsening EF 35%, following with , with chronic thrombocytopenia s/p bone marrow biopsy without plasma call neoplasm, presents to ER with worsening leg swelling, SOB on exertion and abdominal swelling.   reports he can barely walk 1 block w/o stopping. orthopnoea +, Left leg swelling worse than right leg. reports taking lasix regularly but notes he use to urinate more with lasix in the beginning but recently u/o has decreased.     Kappa/lambda free light chain ratio: 1.4  ECHO:  concentric LVH,biatrial enlargement --> suggestive of infiltrative CM   EF 35%,   CT chest with large Rt pleural effusion  PYP scan:  Intense uptake in the left ventricular and part of the right ventricular myocardium in a pattern consistent with TTR-mediated   amyloidosis.    Imp:  TTR Cardiac amyloidosis  ADHF   A.fib with slow ventricular rate  CAD  HTN    Plan:  lost weight from 90 to 83kg   Will cont lasix IV 40 q12--> can switch to torsemide 20mg PO BID  PYP scan positive for TTR cardiac amyloidosis  Will need outpt genetic testing to assess for Edilia 122 Ile mutation  Will start Tafamidis as outpt, will need to schedule follow up with Dr. Bradley   Will cont Entresto 24-26mg but if hypotensive will d/c   Pt with A.fib, and has high risk of atrial thrombus in setting of cardiac amyloidosis  Will cont AC with NOAC  d/w cardiology

## 2022-02-11 NOTE — PROGRESS NOTE ADULT - PROBLEM SELECTOR PLAN 3
persistent right sided pleural effusion, despite IV diuretics; SpO2 98% RA  -Pulm following  -CT Chest 2/9: Increased mod-large right pleural effusion, unchanged small left pleural effusion, ascites and upper abd w/ anasarca, cardiomegaly, probably pulm HTN  -IR guided pleurocentesis friday 2/11 persistent right sided pleural effusion despite IV diuretics, SpO2 98% RA, pulm following  -CT Chest 2/9: Increased mod-large right pleural effusion, unchanged small left pleural effusion, ascites and upper abd w/ anasarca, cardiomegaly, probably pulm HTN  -s/p IR guided pleurocentesis 2/11 w/ 1L drained, c/b small pneumothorax  w/ chest tube in place  -Continue w/ low wall suction and trial clamp in AM. Per IR, chest tube can likely be removed in AM

## 2022-02-11 NOTE — PROGRESS NOTE ADULT - PROBLEM SELECTOR PLAN 2
Advanced HF following  -NM Amyloidosis scan 2/10: Intense uptake in the left ventricular and part of the right ventricular myocardium in a pattern consistent with TTR-mediated amyloidosis.  -s/p BM Bx w/ Dr. Bernal 11/23/21 to r/o infiltrative CMP which was negative for amyloid however was a small sample size  -Hematologist Dr. Escobar consulted, pt will need further w/u at [facility] Advanced HF following  -NM Amyloidosis scan 2/10: Intense uptake in the left ventricular and part of the right ventricular myocardium in a pattern consistent with TTR-mediated amyloidosis.  -s/p BM Bx w/ Dr. Bernal 11/23/21 to r/o infiltrative CMP which was negative for amyloid however was a small sample size  -Hematologist Dr. Escobar consulted and signed off due to amyloid is TTR mediated and not originating in BM.  -Pt is to follow up w/ cardiologist Dr. Finkelstein on d/c for further management of amyloid  -NO BETA BLOCKERS

## 2022-02-11 NOTE — PROGRESS NOTE ADULT - PROBLEM SELECTOR PLAN 1
near euvolemia; SpO2 99% RA and net neg 2L/24H; Advanced HF following  -TTE 2/8: LVEF 35-40%, apical segments are more vigorous than the base (consider infiltrative CMP such as amyloid), severe LVH, mildly reduced RVSF, HYACINTH, tethered MV w/ MR, mild TR, PASP 40mmHg, small pericardial effusion, B/L pleural effusion, borderline dilated ascending aorta  -Continue Lasix 40mg IV BID and likely transition to PO in AM  -Continue Entresto 24/26mg BID  -Discontinue BB in setting of possible amyloidosis  -Core measures, daily wt and strict I&Os near euvolemia; SpO2 99% RA and net neg 2L/24H; Advanced HF following  -TTE 2/8: LVEF 35-40%, apical segments are more vigorous than the base (consider infiltrative CMP such as amyloid), severe LVH, mildly reduced RVSF, HYACINTH, tethered MV w/ MR, mild TR, PASP 40mmHg, small pericardial effusion, B/L pleural effusion, borderline dilated ascending aorta  -Continue Lasix 40mg IV BID and likely transition to PO in AM  -Continue Entresto 24/26mg BID  -Discontinue BB 2/2 amyloidosis  -Core measures, daily wt and strict I&Os

## 2022-02-11 NOTE — CONSULT NOTE ADULT - SUBJECTIVE AND OBJECTIVE BOX
HPI:    PAST MEDICAL & SURGICAL HISTORY:  Atrial flutter s/p Ablation 2016  Chronic venous insufficiency of lower extremity  Prostate CA  Stage 3 chronic kidney disease 1.2-1.2 baseline   On admission 1.2  Type 2 diabetes mellitus not on medication  Thrombocytopenia --60-70 baseline  Acute on chronic systolic congestive heart failure  Chronic atrial fibrillation  Atrial flutter s/p ablation in 2016  History of surgical removal of pituitary gland 1990s  S/P TURP for BPH    Prior EP procedures:  aflutter ablation 2016- [facility]    No pertinent family history in first degree relatives    Social History:   no smoking, no drugs, no alcohol    Home medications: pt endorses missing doses of some medications including eliquis due to medications running out*  · 	sacubitril-valsartan 24 mg-26 mg oral tablet: 1 tab(s) orally every 12 hours  · 	Lasix 40 mg oral tablet: 1 tab(s) orally once a day  · 	metoprolol succinate 25 mg oral tablet, extended release: 0.5 tab(s) orally once a day   · 	sacubitril-valsartan 24 mg-26 mg oral tablet: 1 tab(s) orally every 12 hours  · 	apixaban 2.5 mg oral tablet: 1 tab(s) orally every 12 hours  · 	potassium chloride 10 mEq oral capsule, extended release: 1 cap(s) orally once a day          · 	tamsulosin 0.4 mg oral capsule: 1 cap(s) orally once a day    Inpatient Medications:   furosemide   Injectable 40 milliGRAM(s) IV Push two times a day  insulin lispro (ADMELOG) corrective regimen sliding scale   SubCutaneous Before meals and at bedtime  sacubitril 24 mG/valsartan 26 mG 1 Tablet(s) Oral two times a day  tamsulosin 0.4 milliGRAM(s) Oral at bedtime    Allergies:  No Known Allergies    ROS:   CONSTITUTIONAL: No fever, weight loss + fatigue  EYES: Pt denies  RESPIRATORY: No cough, wheezing, chills or hemoptysis; +Shortness of Breath, MARTINEZ  CARDIOVASCULAR: denies palpitations  GASTROINTESTINAL: Pt denies  NEUROLOGICAL: syncope w/o prodrome 1 year ago in January in [facility]; denies dizziness or lightheadedness  SKIN: Pt denies   PSYCHIATRIC: Pt denies  HEME/LYMPH: Pt denies    PHYSICAL:  T(C): 36.7 (02-11-22 @ 09:28), Max: 37.1 (02-11-22 @ 06:27)  HR: 54 (02-11-22 @ 09:17) (54 - 60)  BP: 94/58 (02-11-22 @ 09:17) (91/63 - 109/74)  RR: 18 (02-11-22 @ 09:17) (16 - 18)  SpO2: 96% (02-11-22 @ 09:17) (93% - 99%)    Appearance: No acute distress, well developed  Eyes: normal appearing conjunctiva, pupils and eyelids  Cardiovascular: irregular rate and rhythm, bilateral LE edema  Respiratory: fine crackles throughout and diminished at b/l bases.  No wheeze, rhonchi  Gastrointestinal:  Soft, NT/ND 	  Neurologic:  No deficit noted  Psych: A&Ox3, normal mood/affect  Skin: diffuse facial hyperpigmentation noted, no rash noted. B/l LEs are wrapped in ACE banadges     LABS:                      13.8   3.17  )-----------( 99       ( 11 Feb 2022 06:53 )             41.6     144  |  104  |  25<H>  ----------------------------<  89  3.8   |  30  |  1.18    Ca    8.8      11 Feb 2022 06:53  Mg     2.2     02-11      PT/INR - ( 11 Feb 2022 06:53 )   PT: 15.1 sec;   INR: 1.27     PTT - ( 11 Feb 2022 06:53 )  PTT:127.0 sec    TSH  Troponin    SAUMYA Kappa: 5.66 mg/dL (02-09 @ 11:44)  SAUMYA Lambda: 3.83 mg/dL (02-09 @ 11:44)    EKG:  Afib v-rate 50    Telemetry:   Afib with v-rates 50-60s      TTE Echo Complete w/o Contrast w/ Doppler (02.08.22 @ 11:51) >  CONCLUSIONS:   1. Severe symmetric left ventricular hypertrophy. Moderately reduced left ventricular systolic function. Apical segments are more vigorous than the base, consider infiltrative cardiomyopathy (such as amyloid).   2. Mildly reduced right ventricular systolic function.   3. Biatrial enlargement.   4. Tethered mitral valve. Mild mitral regurgitation.   5. Mild tricuspid regurgitation.   6. Pulmonary hypertension present, pulmonary artery systolic pressure is 40 mmHg.   7. Small pericardial effusion.   8. Bilateral pleural effusion.   9. Borderline dilated ascending aorta.  10. Compared to the previous TTE performed on 11/22/2021, given technical differences, LV function is similar to slightly more vigorous. Comparable views ofproximal ascending aorta were not obtained.  LV EF (MOD BP):  37 %      (>55%)    TTE Echo Complete w/o Contrast w/ Doppler (11.22.21 @ 12:56) >  CONCLUSIONS:   1. Biatrial enlargement.   2. Mild pulmonic regurgitation.   3. Pulmonary artery systolic pressure is 38 mmHg.   4. No other significant valvular disease.   5. Normal right ventricular size.   6. Reduced right ventricular systolic function.   7. There is severe concentric left ventricular hypertrophy. Left ventricular systolic function is moderately reduced with a calculated ejection fraction of 35% with global hypokinesis.   8. Small-to-moderate pericardial effusion without echocardiographic evidence of cardiac tamponade physiology.   9. The proximal ascending aorta is dilated measuring 4.10 cm.  10. Findings suggestive of infiltrative cardiomyopathy.  11. Compared to the previous TTE performed on 6/29/2021, LV systolic function appears reduced.    TTE Echo Complete w/o Contrast w/ Doppler (06.29.21 @ 10:07) >  CONCLUSIONS:   1. Severe symmetric left ventricular hypertrophy.   2. There is severe concentric left ventricular hypertrophy. Borderline low left ventricular systolic function. Left ventricular ejection fraction is 50%.   3. The right ventricle is normal in size. Right ventricular systolic function is normal. Right ventricular hypertrophy is present.   4. Biatrial enlargement.   5. Mild mitral regurgitation.   6. Mild-to-moderate tricuspid regurgitation.   7. No evidence of pulmonary hypertension, pulmonary artery systolic pressure is 36 mmHg.   8. Small pericardial effusion without echocardiographic evidence of cardiac tamponade physiology.   9. Ascites present.  LV EF: 50 %      (>55%)    NM Amyloidosis SPECT/CT, Single Area Single Day (02.10.22 @ 15:50) >  Indication: Congestive heart failure. This study is being performed to determine whether TTR-mediated amyloidosis can be detected.    Procedure: The patient was injected with approximately 20 mCi of technetium 99m labeled pyrophosphate and SPECT-CT imaging of the chest was performed at approximately two hours.    SPECT images were reconstructed in axial, sagittal and coronal planes using CT based scatter correction and attenuation correction as well as geometry based resolution recovery.  Images were displayed as SPECT, CT and fused data sets as well as maximum intensity pixel projections.    Findings: Planar imaging shows increased activity in the left anterior chest corresponding to the outline of the heart.    SPECT-CT imaging confirms the presence of intense uptake in the left ventricular myocardium and also significant uptake in the distal portion of the right ventricular myocardium.    Impression: Intense uptake in the left ventricular and part of the right ventricular myocardium in a pattern consistent with TTR-mediated amyloidosis.              Assessment Plan:         HPI:    Mr. Wise is a pleasant 76 year-old gentleman with a past medical history significant for DM II, chronic thrombocytopenia, CKD III, non-obstructive CAD, HFmrEF (EF 35%), BPH s/p TURP, pituitary adenoma (s/p transphenoidal treatment in 1990's), Prostate CA s/p chemo, chronic venous insufficiency, and persistent atrial flutter/fibrillation (s/p ablation in 2016). He presented to ER with worsening leg swelling, SOB on exertion and abdominal swelling in HF exacerbation. EP had been initially consulted for evaluation for device.      Briefly, He had a prior ablation in 2016 at [facility]. He was noted to be in atrial fibrillation during an admission for with leg swelling and MARTINEZ and noted to have severe LVH and EF 50% at [facility] in 5/2021. He was readmitted to [facility] 11/2021 with acute on chronic CHF. Telemetry at that time was significant for atrial fibrillation with brief NSVT (4 beats) with worsening symptoms, abdominal swelling and TTE showing worsening EF 37%, following with Dr. Anton. He had a bone marrow biopsy to evaluate for amyloid; the [place] red stain is negative for amyloid, however, the biopsy is very small and may not be representative of the underlying pathology. As per patient, his outpatient hematologist is considering a repeat biopsy. He is on reduced dose Eliquis 2.5 mg BID. He reports compliance with his medication regimen and is able to recall them during office visit today, however endorses that he has missed some doses of all of his medications due to running out of prescription.    On this admission, TTE shows EF 37%, he has asymptomatic slow afib with v-rates 50-60s without significant pauses or symptoms of dizziness. Of note, he endorsed a single episode of syncope without prodrome a year ago while in [place] which he described as seconds long and without warning without recurrence. He endorses that his symptoms have been worse since his bone marrow bopsy. While on 5 uris, he received a BB challenge and had v-rates down to 30s while awake. He had a nuclear scan which was c/w cardiac amyloidosis. BB has since been discontinued without recurrent skip or pause episode. He endorses breathing is better with diuresis. Pt now s/p IR guided right sided pleurocentesis 2/11, c/b pneumothorax, now w/ chest tube in place.    PAST MEDICAL & SURGICAL HISTORY:  Atrial flutter s/p Ablation 2016  Chronic venous insufficiency of lower extremity  Prostate CA  Stage 3 chronic kidney disease 1.2-1.2 baseline   On admission 1.2  Type 2 diabetes mellitus not on medication  Thrombocytopenia --60-70 baseline  Acute on chronic systolic congestive heart failure  Chronic atrial fibrillation  Atrial flutter s/p ablation in 2016  History of surgical removal of pituitary gland 1990s  S/P TURP for BPH    Prior EP procedures:  aflutter ablation 2016- [facility]    No pertinent family history in first degree relatives    Social History:   no smoking, no drugs, no alcohol    Home medications: pt endorses missing doses of some medications including eliquis due to medications running out*  · 	sacubitril-valsartan 24 mg-26 mg oral tablet: 1 tab(s) orally every 12 hours  · 	Lasix 40 mg oral tablet: 1 tab(s) orally once a day  · 	metoprolol succinate 25 mg oral tablet, extended release: 0.5 tab(s) orally once a day   · 	sacubitril-valsartan 24 mg-26 mg oral tablet: 1 tab(s) orally every 12 hours  · 	apixaban 2.5 mg oral tablet: 1 tab(s) orally every 12 hours  · 	potassium chloride 10 mEq oral capsule, extended release: 1 cap(s) orally once a day          · 	tamsulosin 0.4 mg oral capsule: 1 cap(s) orally once a day    Inpatient Medications:   furosemide   Injectable 40 milliGRAM(s) IV Push two times a day  insulin lispro (ADMELOG) corrective regimen sliding scale   SubCutaneous Before meals and at bedtime  sacubitril 24 mG/valsartan 26 mG 1 Tablet(s) Oral two times a day  tamsulosin 0.4 milliGRAM(s) Oral at bedtime    Allergies:  No Known Allergies    ROS:   CONSTITUTIONAL: No fever, weight loss + fatigue  EYES: Pt denies  RESPIRATORY: No cough, wheezing, chills or hemoptysis; +Shortness of Breath, MARTINEZ  CARDIOVASCULAR: denies palpitations  GASTROINTESTINAL: Pt denies  NEUROLOGICAL: syncope w/o prodrome 1 year ago in January in [facility]; denies dizziness or lightheadedness  SKIN: Pt denies   PSYCHIATRIC: Pt denies  HEME/LYMPH: Pt denies    PHYSICAL:  T(C): 36.7 (02-11-22 @ 09:28), Max: 37.1 (02-11-22 @ 06:27)  HR: 54 (02-11-22 @ 09:17) (54 - 60)  BP: 94/58 (02-11-22 @ 09:17) (91/63 - 109/74)  RR: 18 (02-11-22 @ 09:17) (16 - 18)  SpO2: 96% (02-11-22 @ 09:17) (93% - 99%)    Appearance: No acute distress, well developed  Eyes: normal appearing conjunctiva, pupils and eyelids  Cardiovascular: irregular rate and rhythm, bilateral LE edema  Respiratory: fine crackles throughout and diminished at b/l bases.  No wheeze, rhonchi  Gastrointestinal:  Soft, NT/ND 	  Neurologic:  No deficit noted  Psych: A&Ox3, normal mood/affect  Skin: diffuse facial hyperpigmentation noted, no rash noted. B/l LEs are wrapped in ACE banadges     LABS:                      13.8   3.17  )-----------( 99       ( 11 Feb 2022 06:53 )             41.6     144  |  104  |  25<H>  ----------------------------<  89  3.8   |  30  |  1.18    Ca    8.8      11 Feb 2022 06:53  Mg     2.2     02-11    PT/INR - ( 11 Feb 2022 06:53 )   PT: 15.1 sec;   INR: 1.27     PTT - ( 11 Feb 2022 06:53 )  PTT:127.0 sec    SAUMYA Kappa: 5.66 mg/dL (02-09 @ 11:44)  SAUMYA Lambda: 3.83 mg/dL (02-09 @ 11:44)  Oval/lambda free light chain ratio: 1.4    EKG:  Afib v-rate 50    Telemetry:   Afib with v-rates 50-60s      TTE Echo Complete w/o Contrast w/ Doppler (02.08.22 @ 11:51) >  CONCLUSIONS:   1. Severe symmetric left ventricular hypertrophy. Moderately reduced left ventricular systolic function. Apical segments are more vigorous than the base, consider infiltrative cardiomyopathy (such as amyloid).   2. Mildly reduced right ventricular systolic function.   3. Biatrial enlargement.   4. Tethered mitral valve. Mild mitral regurgitation.   5. Mild tricuspid regurgitation.   6. Pulmonary hypertension present, pulmonary artery systolic pressure is 40 mmHg.   7. Small pericardial effusion.   8. Bilateral pleural effusion.   9. Borderline dilated ascending aorta.  10. Compared to the previous TTE performed on 11/22/2021, given technical differences, LV function is similar to slightly more vigorous. Comparable views ofproximal ascending aorta were not obtained.  LV EF (MOD BP):  37 %      (>55%)    TTE Echo Complete w/o Contrast w/ Doppler (11.22.21 @ 12:56) >  CONCLUSIONS:   1. Biatrial enlargement.   2. Mild pulmonic regurgitation.   3. Pulmonary artery systolic pressure is 38 mmHg.   4. No other significant valvular disease.   5. Normal right ventricular size.   6. Reduced right ventricular systolic function.   7. There is severe concentric left ventricular hypertrophy. Left ventricular systolic function is moderately reduced with a calculated ejection fraction of 35% with global hypokinesis.   8. Small-to-moderate pericardial effusion without echocardiographic evidence of cardiac tamponade physiology.   9. The proximal ascending aorta is dilated measuring 4.10 cm.  10. Findings suggestive of infiltrative cardiomyopathy.  11. Compared to the previous TTE performed on 6/29/2021, LV systolic function appears reduced.    TTE Echo Complete w/o Contrast w/ Doppler (06.29.21 @ 10:07) >  CONCLUSIONS:   1. Severe symmetric left ventricular hypertrophy.   2. There is severe concentric left ventricular hypertrophy. Borderline low left ventricular systolic function. Left ventricular ejection fraction is 50%.   3. The right ventricle is normal in size. Right ventricular systolic function is normal. Right ventricular hypertrophy is present.   4. Biatrial enlargement.   5. Mild mitral regurgitation.   6. Mild-to-moderate tricuspid regurgitation.   7. No evidence of pulmonary hypertension, pulmonary artery systolic pressure is 36 mmHg.   8. Small pericardial effusion without echocardiographic evidence of cardiac tamponade physiology.   9. Ascites present.  LV EF: 50 %      (>55%)    NM Amyloidosis SPECT/CT, Single Area Single Day (PYP scan) (02.10.22 @ 15:50) >  Indication: Congestive heart failure. This study is being performed to determine whether TTR-mediated amyloidosis can be detected.    Procedure: pt was injected with approximately 20 mCi of technetium 99m labeled pyrophosphate and SPECT-CT imaging of the chest was performed at approximately two hours.    SPECT images were reconstructed in axial, sagittal and coronal planes using CT based scatter correction and attenuation correction as well as geometry based resolution recovery.  Images were displayed as SPECT, CT and fused data sets as well as maximum intensity pixel projections.    Findings: Planar imaging shows increased activity in the left anterior chest corresponding to the outline of the heart.    SPECT-CT imaging confirms the presence of intense uptake in the left ventricular myocardium and also significant uptake in the distal portion of the right ventricular myocardium.    Impression: Intense uptake in the left ventricular and part of the right ventricular myocardium in a pattern consistent with TTR-mediated amyloidosis.    CT Chest No Cont (02.09.22 @ 11:59) >  Impression:  1.  Increased moderate to large right pleural effusion. Might consider fluid sampling with cytologic analysis if clinically warranted.  2.  Unchanged small left pleural effusion.  3.  Ascites and upper abdomen with anasarca suggesting fluid overload.  4.  Cardiomegaly.  5.  Probable pulmonary hypertension.    Assessment & Plan:  77 yo M with HTN, DM, A.fib s/p ablation in 2016 who was recently admitted in June 2021 with leg swelling and MARTINEZ and noted to have severe LVH and EF 50% with recurrent admission in Nov 2021 with worsening symptoms, abdominal swelling and TTE showing worsening EF 37%, following with , with chronic thrombocytopenia s/p bone marrow biopsy without plasma call neoplasm who presented to ER with worsening leg swelling, SOB on exertion and abdominal swelling in HF exacerbation. EP had been initially consulted for evaluation for device. He has asymptomatic slow afib with v-rates 50-60s. BB challenged and had v-rates down to 30s awake. He had a nuclear scan which was c/w cardiac amyloidosis. BB has since been discontinued without recurrent skip or pause episode. He endorses breathing is better with diuresis. Pt now s/p IR guided right sided pleurocentesis 2/11, c/b pneumothorax, now w/ chest tube in place.    - No pauses or bradycardia on tele today  - actively being diuresed  - s/p pleurocentesis cb pneumthorax, now with CT in place  - reassess tele and need for device on Monday   - case and plan discussed with EP Dr. Jimi Pemberton

## 2022-02-11 NOTE — PROGRESS NOTE ADULT - SUBJECTIVE AND OBJECTIVE BOX
Cardiology PA Adult Progress Note    SUBJECTIVE ASSESSMENT:  	  MEDICATIONS:  furosemide   Injectable 40 milliGRAM(s) IV Push two times a day  sacubitril 24 mG/valsartan 26 mG 1 Tablet(s) Oral two times a day  tamsulosin 0.4 milliGRAM(s) Oral at bedtime  insulin lispro (ADMELOG) corrective regimen sliding scale   SubCutaneous Before meals and at bedtime  	  VITAL SIGNS:  T(C): 37.1 (02-11-22 @ 06:27), Max: 37.1 (02-11-22 @ 06:27)  HR: 58 (02-11-22 @ 05:14) (54 - 60)  BP: 108/55 (02-11-22 @ 05:14) (91/63 - 109/74)  RR: 16 (02-11-22 @ 05:14) (16 - 18)  SpO2: 93% (02-11-22 @ 05:14) (93% - 99%)    I&O's Summary  10 Feb 2022 07:01  -  11 Feb 2022 07:00  --------------------------------------------------------  IN: 532 mL / OUT: 2550 mL / NET: -2018 mL                                     PHYSICAL EXAM:  Appearance: Normal	  HEENT: Normal oral mucosa, PERRL, EOMI	  Neck: Supple, + JVD/ - JVD; ___ Carotid Bruit   Cardiovascular: Normal S1 S2, No murmurs  Respiratory: Lungs clear to auscultation/Decreased Breath Sounds/No Rales, Rhonchi, Wheezing	  Gastrointestinal:  Soft, Non-tender, + BS	  Skin: No rashes, No ecchymoses, No cyanosis  Extremities: Normal range of motion, No clubbing, cyanosis or edema  Vascular: Peripheral pulses palpable 2+ bilaterally  Neurologic: Non-focal  Psychiatry: A & O x 3, Mood & affect appropriate    LABS:	 	                     13.8   3.17  )-----------( 99       ( 11 Feb 2022 06:53 )             41.6     144  |  104  |  25<H>  ----------------------------<  89  3.8   |  30  |  1.18    Ca    8.8      11 Feb 2022 06:53  Mg     2.2     02-11    PT/INR - ( 11 Feb 2022 06:53 )   PT: 15.1 sec;   INR: 1.27        PTT - ( 11 Feb 2022 06:53 )  PTT:127.0 sec Cardiology PA Adult Progress Note    SUBJECTIVE ASSESSMENT: Pt seen and examined at bedside laying comfortably in bed w/o any complaints or events overnight. He reports that SOB has improved and denies any orthopnea, palpitations, CP, lightheadedness, dizziness, N/V or abd pain.  	  MEDICATIONS:  furosemide   Injectable 40 milliGRAM(s) IV Push two times a day  sacubitril 24 mG/valsartan 26 mG 1 Tablet(s) Oral two times a day  tamsulosin 0.4 milliGRAM(s) Oral at bedtime  insulin lispro (ADMELOG) corrective regimen sliding scale   SubCutaneous Before meals and at bedtime  	  VITAL SIGNS:  T(C): 37.1 (02-11-22 @ 06:27), Max: 37.1 (02-11-22 @ 06:27)  HR: 58 (02-11-22 @ 05:14) (54 - 60)  BP: 108/55 (02-11-22 @ 05:14) (91/63 - 109/74)  RR: 16 (02-11-22 @ 05:14) (16 - 18)  SpO2: 93% (02-11-22 @ 05:14) (93% - 99%)    I&O's Summary  10 Feb 2022 07:01  -  11 Feb 2022 07:00  --------------------------------------------------------  IN: 532 mL / OUT: 2550 mL / NET: -2018 mL                                     PHYSICAL EXAM:  Appearance: Normal	  HEENT: Normal oral mucosa, PERRL, EOMI	  Neck: Supple, - JVD; no Carotid Bruit   Cardiovascular: Normal S1 S2, No murmurs  Respiratory: Lungs clear to auscultation, No Rales, Rhonchi, Wheezing	  Gastrointestinal:  Soft, Non-tender, + BS	  Skin: No rashes, No ecchymoses, No cyanosis  Extremities: Normal range of motion, No clubbing, cyanosis or edema  Vascular: Peripheral pulses palpable 2+ bilaterally  Neurologic: Non-focal  Psychiatry: A & O x 3, Mood & affect appropriate    LABS:	 	                     13.8   3.17  )-----------( 99       ( 11 Feb 2022 06:53 )             41.6     144  |  104  |  25<H>  ----------------------------<  89  3.8   |  30  |  1.18    Ca    8.8      11 Feb 2022 06:53  Mg     2.2     02-11    PT/INR - ( 11 Feb 2022 06:53 )   PT: 15.1 sec;   INR: 1.27        PTT - ( 11 Feb 2022 06:53 )  PTT:127.0 sec

## 2022-02-11 NOTE — PROGRESS NOTE ADULT - PROBLEM SELECTOR PLAN 6
-Continue Tamsulosin 0.4mg QD    F: None  E: Replete if K<4 or Mag<2  N: DASH Diet  VTEppx: Heparin SQ   Dispo: cardiac telemetry  PT: pending eval

## 2022-02-11 NOTE — PROGRESS NOTE ADULT - SUBJECTIVE AND OBJECTIVE BOX
HPI:  A&O X3 Full Code      In terms of COVID-19 pt denies hx of COVID-19 and has received full Pfizer COVID-19 vaccination.  Patient plans on receiving Pfizer booster in May 2022.    75 y/o male, prior history of medication non-compliance, w/ PMHx type II DM (not currently on medications), A-Flutter/A-Fib (s/p prior ablation in 06/2016, most recently found to be in A-fib on Eliquis 2.5mg PO bid), thrombocytopenia (baseline Plt 60-70's on prior admission), stage III CKD (baseline Cr 1.0-1.2 ), non-obstructive CAD, Chronic Systolic CHF(EF 35% by  Echocardiogram 11/2022), hx of NSVT (EP evaluated pt. 11/2021 recommended repeat TTE in three months), BPH (s/p TURP procedure), pituitary adenoma (s/p transphenoidal treatment in 1990's), prostate cancer (s/p chemotherapy), chronic venous insufficiency, and multiple admissions for CHF exacerbation most recent 11/19/2021,  who presents to [facility] ER this morning, 2/7/22, with worsening dyspnea (with minimal exertion), increase in abdominal girth, b/l LE swelling, facial swelling and dry cough for 4-5 days.  Pt. reports compliance with medication and medical follow up.   In light of patient's risk factors, and acute on chronic systolic CHF exacerbation pt. is now admitted to [facility]  for further medical management.    In ER ECG reveals Atrial Fibrillation HR@65bpm with non specific ST-T wave changes, Troponin 0.11 (patient's baseline), BNP 11,635, WBC 3.6, H/H 13.3/40.2, Platelets 107 (base line 60-70's), Blood Glucose 116.  CXR AP reveals R>L pleural effusion with Cardiomegaly, CT of Abdomen reveals R>L pleural effusion, ascites noted throughout abdomen and plevis, mesenteric infiltrate extensive anasarca.  US Abdomen reveals cholelithiasis, moderate ascites, right pleural effusion (moderate) and dilatation of hepatic vein.     (07 Feb 2022 05:13)    Admitted to cardiac telemetry on 2/7 for acute heart failure exacerbation. HF consulted for management recommendations.    The patient was seen and examined at bedside. Denies chest pain, palpitations, SOB. Endorses numbness and tingling in fingers in an ulnar distribution.    ROS: A 10-point review of systems was otherwise negative.    PAST MEDICAL & SURGICAL HISTORY:  Atrial flutter  s/p Ablation 2016    Chronic venous insufficiency of lower extremity    Prostate CA    Stage 3 chronic kidney disease  1.2-1.2 baseline   On admission 1.2    Type 2 diabetes mellitus  not on medication    Thrombocytopenia  60-70&#x27;s baseline    Acute on chronic systolic congestive heart failure    Chronic atrial fibrillation    Atrial flutter  s/p ablation in 2016    History of surgical removal of pituitary gland  1990s    S/P TURP  for BPH        Home Medications:  potassium chloride 10 mEq oral capsule, extended release: 1 cap(s) orally once a day (24 Nov 2021 13:03)  sacubitril-valsartan 24 mg-26 mg oral tablet: 1 tab(s) orally every 12 hours (24 Nov 2021 13:03)  tamsulosin 0.4 mg oral capsule: 1 cap(s) orally once a day (19 Nov 2021 18:36)      SOCIAL HISTORY:  FAMILY HISTORY:      ALLERGIES: 	  No Known Allergies            MEDICATIONS:  apixaban 2.5 milliGRAM(s) Oral every 12 hours  dextrose 40% Gel 15 Gram(s) Oral once  dextrose 5%. 1000 milliLiter(s) IV Continuous <Continuous>  dextrose 5%. 1000 milliLiter(s) IV Continuous <Continuous>  dextrose 50% Injectable 25 Gram(s) IV Push once  dextrose 50% Injectable 12.5 Gram(s) IV Push once  dextrose 50% Injectable 25 Gram(s) IV Push once  furosemide   Injectable 40 milliGRAM(s) IV Push two times a day  glucagon  Injectable 1 milliGRAM(s) IntraMuscular once  insulin lispro (ADMELOG) corrective regimen sliding scale   SubCutaneous Before meals and at bedtime  metoprolol succinate ER 12.5 milliGRAM(s) Oral daily  sacubitril 24 mG/valsartan 26 mG 1 Tablet(s) Oral two times a day  tamsulosin 0.4 milliGRAM(s) Oral at bedtime      PHYSICAL EXAM:  T(C): 37 (02-09-22 @ 10:13), Max: 37 (02-09-22 @ 10:13)  HR: 55 (02-09-22 @ 12:32) (54 - 64)  BP: 101/71 (02-09-22 @ 12:32) (101/71 - 119/80)  RR: 18 (02-09-22 @ 12:32) (15 - 18)  SpO2: 95% (02-09-22 @ 12:32) (95% - 98%)  Wt(kg): --    02-08-22 @ 07:01  -  02-09-22 @ 07:00  --------------------------------------------------------  IN: 1260 mL / OUT: 2950 mL / NET: -1690 mL    02-09-22 @ 07:01  -  02-09-22 @ 13:24  --------------------------------------------------------  IN: 240 mL / OUT: 1000 mL / NET: -760 mL        GEN: Awake, comfortable. NAD.   HEENT: NCAT, PERRL, EOMI. Mucosa moist.   NECK: Supple, elevated JVP.   RESP: CTA b/l  CV: bradycardic, irregularly irregular, normal s1/s2. No m/r/g.  ABD: Soft, NTND. BS+  EXT: Warm. b/l LE edema extending to hips. No clubbing, or cyanosis.   NEURO: AAOx3. No focal deficits.    I&O's Summary    08 Feb 2022 07:01  -  09 Feb 2022 07:00  --------------------------------------------------------  IN: 1260 mL / OUT: 2950 mL / NET: -1690 mL    09 Feb 2022 07:01  -  09 Feb 2022 13:24  --------------------------------------------------------  IN: 240 mL / OUT: 1000 mL / NET: -760 mL        	  LABS:	 	    CARDIAC MARKERS:                                  12.3   3.07  )-----------( 99       ( 09 Feb 2022 05:27 )             37.0     02-09    146<H>  |  109<H>  |  23  ----------------------------<  90  3.8   |  27  |  1.18    Ca    8.1<L>      09 Feb 2022 05:27  Mg     1.9     02-09    TPro  5.5<L>  /  Alb  3.3  /  TBili  0.9  /  DBili  x   /  AST  10  /  ALT  13  /  AlkPhos  111  02-09    ECG:  AF, Low voltage, Age indeterminate or old inferior infarct.	  ECHO: < from: TTE Echo Complete w/o Contrast w/ Doppler (02.08.22 @ 11:51) >  ---------------------------------------------------------------------------  -----  CONCLUSIONS:     1. Severe symmetric left ventricular hypertrophy. Moderately reduced   left ventricular systolic function. Apical segments are more vigorous   than the base, consider infiltrative cardiomyopathy (such as amyloid).   2. Mildly reduced right ventricular systolic function.   3. Biatrial enlargement.   4. Tethered mitral valve. Mild mitral regurgitation.   5. Mild tricuspid regurgitation.   6. Pulmonary hypertension present, pulmonary artery systolic pressure is   40 mmHg.   7. Small pericardial effusion.   8. Bilateral pleural effusion.   9. Borderline dilated ascending aorta.  10. Compared to the previous TTE performed on 11/22/2021, given technical   differences, LV function is similar to slightly more vigorous. Comparable   views ofproximal ascending aorta were not obtained.    ---------------------------------------------------------------------------    < end of copied text >   Subjective:    Medications:  dextrose 50% Injectable 25 Gram(s) IV Push once  dextrose 50% Injectable 12.5 Gram(s) IV Push once  dextrose 50% Injectable 25 Gram(s) IV Push once  furosemide   Injectable 40 milliGRAM(s) IV Push two times a day  glucagon  Injectable 1 milliGRAM(s) IntraMuscular once  insulin lispro (ADMELOG) corrective regimen sliding scale   SubCutaneous Before meals and at bedtime  sacubitril 24 mG/valsartan 26 mG 1 Tablet(s) Oral two times a day  tamsulosin 0.4 milliGRAM(s) Oral at bedtime    Vitals:  T(C): 36.7 (22 @ 09:28), Max: 37.1 (22 @ 06:27)  HR: 54 (22 @ 09:17) (54 - 60)  BP: 94/58 (22 @ 09:17) (91/63 - 109/74)  BP(mean): 74 (22 @ 05:14) (72 - 84)  RR: 18 (22 @ 09:17) (16 - 18)  SpO2: 96% (22 @ 09:17) (93% - 99%)    Telemetry:    Daily     Daily Weight in k.9 (2022 06:27)      I&O's Summary    10 Feb 2022 07:01  -  2022 07:00  --------------------------------------------------------  IN: 532 mL / OUT: 2550 mL / NET: - mL    2022 07:01  -  2022 11:55  --------------------------------------------------------  IN: 180 mL / OUT: 400 mL / NET: -220 mL        Physical Exam:  Appearance: No Acute Distress  JVP 10-12  b/l ae + crackles +  s1s2+ irregular rhythm,  soft, distention  b/l LE oedema  Psychiatry: A & O x 3, Mood & affect appropriate      Labs:                        13.8   3.17  )-----------( 99       ( 2022 06:53 )             41.6     02    144  |  104  |  25<H>  ----------------------------<  89  3.8   |  30  |  1.18    Ca    8.8      2022 06:53  Mg     2.2     02-11        PT/INR - ( 2022 06:53 )   PT: 15.1 sec;   INR: 1.27          PTT - ( 2022 10:29 )  PTT:54.7 sec      Serum Pro-Brain Natriuretic Peptide: [id number] pg/mL ( @ 00:03)                   Subjective:  urinating well with wt decreasing by > 10 lbs  undergoing pleural drain with IR today    Medications:  dextrose 50% Injectable 25 Gram(s) IV Push once  dextrose 50% Injectable 12.5 Gram(s) IV Push once  dextrose 50% Injectable 25 Gram(s) IV Push once  furosemide   Injectable 40 milliGRAM(s) IV Push two times a day  glucagon  Injectable 1 milliGRAM(s) IntraMuscular once  insulin lispro (ADMELOG) corrective regimen sliding scale   SubCutaneous Before meals and at bedtime  sacubitril 24 mG/valsartan 26 mG 1 Tablet(s) Oral two times a day  tamsulosin 0.4 milliGRAM(s) Oral at bedtime    Vitals:  T(C): 36.7 (22 @ 09:28), Max: 37.1 (22 @ 06:27)  HR: 54 (22 @ 09:17) (54 - 60)  BP: 94/58 (22 @ 09:17) (91/63 - 109/74)  BP(mean): 74 (22 @ 05:14) (72 - 84)  RR: 18 (22 @ 09:17) (16 - 18)  SpO2: 96% (22 @ 09:17) (93% - 99%)    Daily     Daily Weight in k.9 (2022 06:27)      I&O's Summary    10 Feb 2022 07:01  -  2022 07:00  --------------------------------------------------------  IN: 532 mL / OUT: 2550 mL / NET: - mL    2022 07:01  -  2022 11:55  --------------------------------------------------------  IN: 180 mL / OUT: 400 mL / NET: -220 mL        Physical Exam:  Appearance: No Acute Distress  JVP 10-12  b/l ae + decreased BS at bases Rt> lt  s1s2+ irregular rhythm,  soft, distention  b/l LE oedema  Psychiatry: A & O x 3, Mood & affect appropriate      Labs:                        13.8   3.17  )-----------( 99       ( 2022 06:53 )             41.6     02-11    144  |  104  |  25<H>  ----------------------------<  89  3.8   |  30  |  1.18    Ca    8.8      2022 06:53  Mg     2.2     02-11        PT/INR - ( 2022 06:53 )   PT: 15.1 sec;   INR: 1.27          PTT - ( 2022 10:29 )  PTT:54.7 sec      Serum Pro-Brain Natriuretic Peptide: 20502 pg/mL ( @ 00:03)

## 2022-02-11 NOTE — PROGRESS NOTE ADULT - PROBLEM SELECTOR PLAN 4
rate controlled Afib @ 60bpm  -Continue w/ Heparin gtt. Holding home Eliquis 2/2 pleurocentesis Friday  -Hold AC if PLT <50, per hematology  -Discontinue BB due to possible amyloidosis rate controlled Afib @ 60bpm  -Restart home Eliquis in AM, held in setting of pleurocentesis today  -Hold AC if PLT <50, per hematology

## 2022-02-11 NOTE — PROGRESS NOTE ADULT - ASSESSMENT
76Y M w/ h/o medication non compliance and PMHx Afib/Aflutter s/p ablation 6/2016 (on Eliquis), Thrombocytopenia (baseline PLT 60-70%), non obstructive CAD, HFrEF (EF 35%), CKD-III (baseline Cr 1-1.2), pituitary adenoma (s/p transphenoidal treatment in 1990's), Prostate CA (s/p chemo) and chronic venous insufficiency, presented to [facility] ED w/ anasarca, pt now admitted to cardiac telemetry for further management of acute CHF w/ concern for amyloidosis. Pt now pending IR guided pleurocentesis Friday 2/11 for persistent R pleural effusion.  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 Pt now s/p IR guided right sided pleurocentesis 2/11, c/b pneumothorax, now w/ chest tube in place.

## 2022-02-11 NOTE — PROGRESS NOTE ADULT - ASSESSMENT
ASSESSMENT/PLAN 77 y/o male,  w/ PMHx type II DM (not currently on medications), A-Flutter/A-Fib (s/p prior ablation in 06/2016, most  A-fib on Eliquis 2.5mg PO bid), thrombocytopenia (baseline Plt 60-70's on prior admission), stage III CKD (baseline Cr 1.0-1.2 ), non-obstructive CAD, Chronic Systolic CHF(EF 35% by  Echocardiogram 11/2022), hx of NSVT (EP evaluated pt. 11/2021 BPH (s/p TURP procedure), pituitary adenoma (s/p transphenoidal treatment in 1990's), prostate cancer (s/p chemotherapy), chronic venous insufficiency, Impression of acute on chronic CHF, R>L pleural effusions, compressive atelectases, concern for amyloidosis  Now Right pneumothorax s/p post thoracentesis    1. O2 NRB until R CTube placed, transition to NC afterwards if ptstable  stable   2. Bronchodilators: off, continue  and encourage incentive spirometry   3. Corticosteroids: off  4. ID/Antibiotics: off  5. Cardiac/HTN: continue diuresis , optimize CHF mngmnt   6. GI: Rx/ prophylaxis c PPI/H2B  7. Heme: Rx/VT prophylaxis c SQH/SCD/AC resume  Eliquis when not IR contraindicated  8. Aspiration precautions  9 Right CTube now, attempt to waterseal  Discussed with managing team Cardiology , IR

## 2022-02-11 NOTE — PROGRESS NOTE ADULT - SUBJECTIVE AND OBJECTIVE BOX
Interventional, Pulmonary, Critical, Chest Special Procedures.    Pt was seen and fully examined by myself.     Time spent with patient in minutes:    Patient is a 76y old  Male who presents with a chief complaint of worsening Dyspnea and recent weight gain 4-5 days (11 Feb 2022 10:36)  Chart entry the pt in IR   HPI:  A&O X3 Full Code      In terms of COVID-19 pt denies hx of COVID-19 and has received full Pfizer COVID-19 vaccination.  Patient plans on receiving Pfizer booster in May 2022.    75 y/o male, prior history of medication non-compliance, w/ PMHx type II DM (not currently on medications), A-Flutter/A-Fib (s/p prior ablation in 06/2016, most recently found to be in A-fib on Eliquis 2.5mg PO bid), thrombocytopenia (baseline Plt 60-70's on prior admission), stage III CKD (baseline Cr 1.0-1.2 ), non-obstructive CAD, Chronic Systolic CHF(EF 35% by  Echocardiogram 11/2022), hx of NSVT (EP evaluated pt. 11/2021 recommended repeat TTE in three months), BPH (s/p TURP procedure), pituitary adenoma (s/p transphenoidal treatment in 1990's), prostate cancer (s/p chemotherapy), chronic venous insufficiency, and multiple admissions for CHF exacerbation most recent 11/19/2021,  who presents to [facility] ER this morning, 2/7/22, with worsening dyspnea (with minimal exertion), increase in abdominal girth, b/l LE swelling, facial swelling and dry cough for 4-5 days.  Pt. reports compliance with medication and medical follow up.   In light of patient's risk factors, and acute on chronic systolic CHF exacerbation pt. is now admitted to [facility]  for further medical management.    In ER ECG reveals Atrial Fibrillation HR@65bpm with non specific ST-T wave changes, Troponin 0.11 (patient's baseline), BNP 11,635, WBC 3.6, H/H 13.3/40.2, Platelets 107 (base line 60-70's), Blood Glucose 116.  CXR AP reveals R>L pleural effusion with Cardiomegaly, CT of Abdomen reveals R>L pleural effusion, ascites noted throughout abdomen and plevis, mesenteric infiltrate extensive anasarca.  US Abdomen reveals cholelithiasis, moderate ascites, right pleural effusion (moderate) and dilatation of hepatic vein.        (07 Feb 2022 05:13)      REVIEW OF SYSTEMS:  Constitutional: No fever, weight loss, chills or fatigue  Eyes: No eye pain, visual disturbances, or discharge  ENMT:  No difficulty hearing, tinnitus, vertigo; No sinus or throat pain. No epistaxis, dysphagia, dysphonia, hoarseness or odynophagia  Neck: No pain, stiffness or neck swelling.  No masses or deformities  Respiratory: No cough, wheezing, hemoptysis  - COPD  - ILD   - PE   - ASTHMA     - PNEUMONIA  Cardiovascular: No chest pain, dysrhythmia, palpitations, dizziness or edema - CAD   - CHF   - HTN  Gastrointestinal: No abdominal or epigastric pain. No nausea, vomiting or hematemesis; No diarrhea or constipation. No melena or hematochezia, Icterus.          Genitourinary: No dysuria, frequency, hematuria or incontinence   - CKD/KAYLI      - ESRD  Neurological: No headaches, memory loss, loss of strength, numbness or tremors      -DEMENTIA     - STROKE    - SEIZURE  Skin: No itching, burning, rashes or lesions   Lymph Nodes: No enlarged glands  Endocrine: No heat or cold intolerance; No hair loss       - DM     - THYROID DISORDER  Musculoskeletal: No joint pain or swelling; No muscle, back or extremity pain, No edema  Psychiatric: No depression, anxiety, mood swings or difficulty sleeping  Heme/Lymph: No easy bruising or bleeding gums         - ANEMIA      - CANCER   -COAGULOPATHY  Allergy and Immunologic: No hives or eczema    PAST MEDICAL & SURGICAL HISTORY:  Atrial flutter  s/p Ablation 2016    Chronic venous insufficiency of lower extremity    Prostate CA    Stage 3 chronic kidney disease  1.2-1.2 baseline   On admission 1.2    Type 2 diabetes mellitus  not on medication    Thrombocytopenia  60-70&#x27;s baseline    Acute on chronic systolic congestive heart failure    Chronic atrial fibrillation    Atrial flutter  s/p ablation in 2016    History of surgical removal of pituitary gland  1990s    S/P TURP  for BPH      FAMILY HISTORY:    SOCIAL HISTORY:      - Tobacco     - ETOH    Allergies    No Known Allergies    Intolerances      Vital Signs Last 24 Hrs  T(C): 36.7 (11 Feb 2022 09:28), Max: 37.1 (11 Feb 2022 06:27)  T(F): 98 (11 Feb 2022 09:28), Max: 98.7 (11 Feb 2022 06:27)  HR: 55 (11 Feb 2022 12:30) (54 - 60)  BP: 97/62 (11 Feb 2022 12:30) (91/63 - 109/74)  BP(mean): 74 (11 Feb 2022 05:14) (72 - 84)  RR: 18 (11 Feb 2022 12:30) (16 - 18)  SpO2: 95% (11 Feb 2022 12:30) (93% - 99%)    02-10 @ 07:01 - 02-11 @ 07:00  --------------------------------------------------------  IN: 532 mL / OUT: 2550 mL / NET: -2018 mL    02-11 @ 07:01 - 02-11 @ 15:27  --------------------------------------------------------  IN: 180 mL / OUT: 400 mL / NET: -220 mL          MEDICATIONS:  MEDICATIONS  (STANDING):  dextrose 50% Injectable 25 Gram(s) IV Push once  dextrose 50% Injectable 12.5 Gram(s) IV Push once  dextrose 50% Injectable 25 Gram(s) IV Push once  furosemide   Injectable 40 milliGRAM(s) IV Push two times a day  glucagon  Injectable 1 milliGRAM(s) IntraMuscular once  insulin lispro (ADMELOG) corrective regimen sliding scale   SubCutaneous Before meals and at bedtime  sacubitril 24 mG/valsartan 26 mG 1 Tablet(s) Oral two times a day  tamsulosin 0.4 milliGRAM(s) Oral at bedtime    MEDICATIONS  (PRN):      PHYSICAL EXAM:  Comfortable, no distress  Eyes: PERRL, EOM intact; conjunctiva and sclera clear  Head: Normocephalic;  No Trauma  ENMT: No nasal discharge, hoarseness, cough or hemoptysis  Neck: Supple; non tender; no masses or deformities.    No JVD  Respiratory: CTA,  - WHEEZING   - RHONCHI  - RALES  - CRACKLES.  Diminished breath sounds  BILATERAL  RIGHT  LEFT  Cardiovascular: Regular rate and rhythm. S1 and S2 Normal; No murmurs, gallops or rubs     - PPM/AICD  Gastrointestinal: Soft non-tender, non-distended; Normal bowel sounds; No hepatosplenomegaly.     -PEG    -  GT   - GONZALEZ  Genitourinary: No costovertebral angle tenderness. No dysuria  Extremities: AROM, No clubbing, cyanosis or edema    Vascular: Peripheral pulses palpable 2+ bilaterally  Neurological: Alert and responisve to stimuli   Skin: Warm and dry. No obvious rash  Lymph Nodes: No acute cervical or supraclavicular adenopathy  Psychiatric: Cooperative and appropriate mood    DEVICES:  - DENTURES   +IV R / L     - ETUBE   -TRACH   -CTUBE  R / L    LABS:                          13.8   3.17  )-----------( 99       ( 11 Feb 2022 06:53 )             41.6     02-11    144  |  104  |  25<H>  ----------------------------<  89  3.8   |  30  |  1.18    Ca    8.8      11 Feb 2022 06:53  Mg     2.2     02-11      PT/INR - ( 11 Feb 2022 06:53 )   PT: 15.1 sec;   INR: 1.27          PTT - ( 11 Feb 2022 10:29 )  PTT:54.7 sec  RADIOLOGY & ADDITIONAL STUDIES (The following images were personally reviewed): Interventional, Pulmonary, Critical, Chest Special Procedures.    Pt was seen and fully examined by myself.     Time spent with patient in minutes:97    Patient is a 76y old  Male who presents with a chief complaint of worsening Dyspnea and recent weight gain 4-5 days (11 Feb 2022 10:36)  Chart entry the pt in IR . Post Right thoracentesis PTX, Pt comfortable on NRB, pain free, Sat 100%. Serial CXR confirmatory for R PTX. IR attending Dr. Dickson at bedside. Further mngmnt discussed c him and . The patient now for R CTube   HPI:  A&O X3 Full Code      In terms of COVID-19 pt denies hx of COVID-19 and has received full Pfizer COVID-19 vaccination.  Patient plans on receiving Pfizer booster in May 2022.    75 y/o male, prior history of medication non-compliance, w/ PMHx type II DM (not currently on medications), A-Flutter/A-Fib (s/p prior ablation in 06/2016, most recently found to be in A-fib on Eliquis 2.5mg PO bid), thrombocytopenia (baseline Plt 60-70's on prior admission), stage III CKD (baseline Cr 1.0-1.2 ), non-obstructive CAD, Chronic Systolic CHF(EF 35% by  Echocardiogram 11/2022), hx of NSVT (EP evaluated pt. 11/2021 recommended repeat TTE in three months), BPH (s/p TURP procedure), pituitary adenoma (s/p transphenoidal treatment in 1990's), prostate cancer (s/p chemotherapy), chronic venous insufficiency, and multiple admissions for CHF exacerbation most recent 11/19/2021,  who presents to [facility] ER this morning, 2/7/22, with worsening dyspnea (with minimal exertion), increase in abdominal girth, b/l LE swelling, facial swelling and dry cough for 4-5 days.  Pt. reports compliance with medication and medical follow up.   In light of patient's risk factors, and acute on chronic systolic CHF exacerbation pt. is now admitted to [facility]  for further medical management.    In ER ECG reveals Atrial Fibrillation HR@65bpm with non specific ST-T wave changes, Troponin 0.11 (patient's baseline), BNP 11,635, WBC 3.6, H/H 13.3/40.2, Platelets 107 (base line 60-70's), Blood Glucose 116.  CXR AP reveals R>L pleural effusion with Cardiomegaly, CT of Abdomen reveals R>L pleural effusion, ascites noted throughout abdomen and plevis, mesenteric infiltrate extensive anasarca.  US Abdomen reveals cholelithiasis, moderate ascites, right pleural effusion (moderate) and dilatation of hepatic vein.        (07 Feb 2022 05:13)      REVIEW OF SYSTEMS:  Constitutional: No fever, weight loss, chills or fatigue  Eyes: No eye pain, visual disturbances, or discharge  ENMT:  No difficulty hearing, tinnitus, vertigo; No sinus or throat pain. No epistaxis, dysphagia, dysphonia, hoarseness or odynophagia  Neck: No pain, stiffness or neck swelling.  No masses or deformities  Respiratory: No cough, wheezing, hemoptysis  - COPD  - ILD   - PE   - ASTHMA     - PNEUMONIA  Cardiovascular: No chest pain, dysrhythmia, palpitations, dizziness or edema - CAD   - CHF   - HTN  Gastrointestinal: No abdominal or epigastric pain. No nausea, vomiting or hematemesis; No diarrhea or constipation. No melena or hematochezia, Icterus.          Genitourinary: No dysuria, frequency, hematuria or incontinence   - CKD/KAYLI      - ESRD  Neurological: No headaches, memory loss, loss of strength, numbness or tremors      -DEMENTIA     - STROKE    - SEIZURE  Skin: No itching, burning, rashes or lesions   Lymph Nodes: No enlarged glands  Endocrine: No heat or cold intolerance; No hair loss       - DM     - THYROID DISORDER  Musculoskeletal: No joint pain or swelling; No muscle, back or extremity pain, No edema  Psychiatric: No depression, anxiety, mood swings or difficulty sleeping  Heme/Lymph: No easy bruising or bleeding gums         - ANEMIA      - CANCER   -COAGULOPATHY  Allergy and Immunologic: No hives or eczema    PAST MEDICAL & SURGICAL HISTORY:  Atrial flutter  s/p Ablation 2016    Chronic venous insufficiency of lower extremity    Prostate CA    Stage 3 chronic kidney disease  1.2-1.2 baseline   On admission 1.2    Type 2 diabetes mellitus  not on medication    Thrombocytopenia  60-70&#x27;s baseline    Acute on chronic systolic congestive heart failure    Chronic atrial fibrillation    Atrial flutter  s/p ablation in 2016    History of surgical removal of pituitary gland  1990s    S/P TURP  for BPH      FAMILY HISTORY:    SOCIAL HISTORY:      - Tobacco     - ETOH    Allergies    No Known Allergies    Intolerances      Vital Signs Last 24 Hrs  T(C): 36.7 (11 Feb 2022 09:28), Max: 37.1 (11 Feb 2022 06:27)  T(F): 98 (11 Feb 2022 09:28), Max: 98.7 (11 Feb 2022 06:27)  HR: 55 (11 Feb 2022 12:30) (54 - 60)  BP: 97/62 (11 Feb 2022 12:30) (91/63 - 109/74)  BP(mean): 74 (11 Feb 2022 05:14) (72 - 84)  RR: 18 (11 Feb 2022 12:30) (16 - 18)  SpO2: 95% (11 Feb 2022 12:30) (93% - 99%)    02-10 @ 07:01  - 02-11 @ 07:00  --------------------------------------------------------  IN: 532 mL / OUT: 2550 mL / NET: -2018 mL    02-11 @ 07:01  - 02-11 @ 15:27  --------------------------------------------------------  IN: 180 mL / OUT: 400 mL / NET: -220 mL          MEDICATIONS:  MEDICATIONS  (STANDING):  dextrose 50% Injectable 25 Gram(s) IV Push once  dextrose 50% Injectable 12.5 Gram(s) IV Push once  dextrose 50% Injectable 25 Gram(s) IV Push once  furosemide   Injectable 40 milliGRAM(s) IV Push two times a day  glucagon  Injectable 1 milliGRAM(s) IntraMuscular once  insulin lispro (ADMELOG) corrective regimen sliding scale   SubCutaneous Before meals and at bedtime  sacubitril 24 mG/valsartan 26 mG 1 Tablet(s) Oral two times a day  tamsulosin 0.4 milliGRAM(s) Oral at bedtime    MEDICATIONS  (PRN):      PHYSICAL EXAM:  Un Comfortable, no distress  Eyes: PERRL, EOM intact; conjunctiva and sclera clear  Head: Normocephalic;  No Trauma  ENMT: No nasal discharge, hoarseness, cough or hemoptysis  Neck: Supple; non tender; no masses or deformities.    No JVD  Respiratory: - WHEEZING   - RHONCHI  - RALES  - CRACKLES.  Diminished breath sounds  RIGHT base and apex.   Cardiovascular: Regular rate and rhythm. S1 and S2 Normal; No murmurs, gallops or rubs     - PPM/AICD  Gastrointestinal: Soft non-tender, non-distended; Normal bowel sounds; No hepatosplenomegaly.     -PEG    -  GT   - GONZALEZ  Genitourinary: No costovertebral angle tenderness. No dysuria  Extremities: AROM, No clubbing, cyanosis or edema    Vascular: Peripheral pulses palpable 2+ bilaterally  Neurological: Alert and responisve to stimuli   Skin: Warm and dry. No obvious rash  Lymph Nodes: No acute cervical or supraclavicular adenopathy  Psychiatric: Cooperative and appropriate mood    DEVICES:  - DENTURES   +IV R / L     - ETUBE   -TRACH   -CTUBE  R / L    LABS:                          13.8   3.17  )-----------( 99       ( 11 Feb 2022 06:53 )             41.6     02-11    144  |  104  |  25<H>  ----------------------------<  89  3.8   |  30  |  1.18    Ca    8.8      11 Feb 2022 06:53  Mg     2.2     02-11      PT/INR - ( 11 Feb 2022 06:53 )   PT: 15.1 sec;   INR: 1.27          PTT - ( 11 Feb 2022 10:29 )  PTT:54.7 sec  RADIOLOGY & ADDITIONAL STUDIES (The following images were personally reviewed):< from: Xray Chest 1 View AP/PA (02.11.22 @ 15:04) >  ACC: [id number] EXAM:  XR CHEST AP OR PA 1V                          PROCEDURE DATE:  02/11/2022          INTERPRETATION:  XR CHEST dated 2/11/2022 3:04 PM    CLINICAL INFORMATION: s/p right thora, small PTX    COMPARISON: February 11, 2022    FINDINGS: Persistent small to moderate size right pneumothorax. Small   residual right pleural effusion. No other significant changes.    IMPRESSION: Persistent small to moderate size right pneumothorax.    < end of copied text >  < from: NM Amyloidosis SPECT/CT, Single Area Single Day (02.10.22 @ 15:50) >  ACC: [id number] EXAM:  NM AMYLOIDOS LOC SPEC CT SA SD                          PROCEDURE DATE:  02/10/2022          INTERPRETATION:  MYOCARDIAL IMAGING USING  99m -Tc LABELED PYROPHOSPHATE FOR DETECTION OF TTR-MEDIATED CARDIAC   AMYLOIDOSIS    Indication: Congestive heart failure. This study is being performed to   determine whether TTR-mediated amyloidosis can be detected.    Procedure: The patient was injected with approximately 20 mCi of   technetium 99m labeled pyrophosphate and SPECT-CT imaging of the chest   was performed at approximately two hours.    SPECT images were reconstructed in axial, sagittal and coronal planes   using CT based scatter correction and attenuation correction as well as   geometry based resolution recovery.  Images were displayed as SPECT, CT   and fused data sets as well as maximum intensity pixel projections.    Findings: Planar imaging shows increased activity in the left anterior   chest corresponding to the outline of the heart.    SPECT-CT imaging confirms the presence of intense uptake in the left   ventricular myocardium and also significant uptake in the distal portion   of the right ventricular myocardium.    Impression: Intense uptake in the left ventricular and part of the right   ventricular myocardium in a pattern consistent with TTR-mediated   amyloidosis.    < end of copied text >

## 2022-02-12 LAB
ALBUMIN SERPL ELPH-MCNC: 3.1 G/DL — LOW (ref 3.3–5)
ALP SERPL-CCNC: 101 U/L — SIGNIFICANT CHANGE UP (ref 40–120)
ALT FLD-CCNC: 12 U/L — SIGNIFICANT CHANGE UP (ref 10–45)
ANION GAP SERPL CALC-SCNC: 10 MMOL/L — SIGNIFICANT CHANGE UP (ref 5–17)
APTT BLD: 140 SEC — CRITICAL HIGH (ref 27.5–35.5)
APTT BLD: 150.3 SEC — CRITICAL HIGH (ref 27.5–35.5)
APTT BLD: 161.2 SEC — CRITICAL HIGH (ref 27.5–35.5)
AST SERPL-CCNC: 16 U/L — SIGNIFICANT CHANGE UP (ref 10–40)
BILIRUB SERPL-MCNC: 1 MG/DL — SIGNIFICANT CHANGE UP (ref 0.2–1.2)
BUN SERPL-MCNC: 24 MG/DL — HIGH (ref 7–23)
CALCIUM SERPL-MCNC: 8.8 MG/DL — SIGNIFICANT CHANGE UP (ref 8.4–10.5)
CHLORIDE SERPL-SCNC: 103 MMOL/L — SIGNIFICANT CHANGE UP (ref 96–108)
CO2 SERPL-SCNC: 29 MMOL/L — SIGNIFICANT CHANGE UP (ref 22–31)
CREAT SERPL-MCNC: 1.09 MG/DL — SIGNIFICANT CHANGE UP (ref 0.5–1.3)
GLUCOSE BLDC GLUCOMTR-MCNC: 119 MG/DL — HIGH (ref 70–99)
GLUCOSE BLDC GLUCOMTR-MCNC: 145 MG/DL — HIGH (ref 70–99)
GLUCOSE BLDC GLUCOMTR-MCNC: 192 MG/DL — HIGH (ref 70–99)
GLUCOSE BLDC GLUCOMTR-MCNC: 78 MG/DL — SIGNIFICANT CHANGE UP (ref 70–99)
GLUCOSE SERPL-MCNC: 119 MG/DL — HIGH (ref 70–99)
HCT VFR BLD CALC: 38.1 % — LOW (ref 39–50)
HGB BLD-MCNC: 12.9 G/DL — LOW (ref 13–17)
MCHC RBC-ENTMCNC: 28.4 PG — SIGNIFICANT CHANGE UP (ref 27–34)
MCHC RBC-ENTMCNC: 33.9 GM/DL — SIGNIFICANT CHANGE UP (ref 32–36)
MCV RBC AUTO: 83.7 FL — SIGNIFICANT CHANGE UP (ref 80–100)
NRBC # BLD: 0 /100 WBCS — SIGNIFICANT CHANGE UP (ref 0–0)
PLATELET # BLD AUTO: 94 K/UL — LOW (ref 150–400)
POTASSIUM SERPL-MCNC: 4.2 MMOL/L — SIGNIFICANT CHANGE UP (ref 3.5–5.3)
POTASSIUM SERPL-SCNC: 4.2 MMOL/L — SIGNIFICANT CHANGE UP (ref 3.5–5.3)
PROT SERPL-MCNC: 5.6 G/DL — LOW (ref 6–8.3)
RBC # BLD: 4.55 M/UL — SIGNIFICANT CHANGE UP (ref 4.2–5.8)
RBC # FLD: 16.2 % — HIGH (ref 10.3–14.5)
SODIUM SERPL-SCNC: 142 MMOL/L — SIGNIFICANT CHANGE UP (ref 135–145)
WBC # BLD: 3.91 K/UL — SIGNIFICANT CHANGE UP (ref 3.8–10.5)
WBC # FLD AUTO: 3.91 K/UL — SIGNIFICANT CHANGE UP (ref 3.8–10.5)

## 2022-02-12 PROCEDURE — 71045 X-RAY EXAM CHEST 1 VIEW: CPT | Mod: 26

## 2022-02-12 RX ADMIN — HEPARIN SODIUM 1400 UNIT(S)/HR: 5000 INJECTION INTRAVENOUS; SUBCUTANEOUS at 17:15

## 2022-02-12 RX ADMIN — TAMSULOSIN HYDROCHLORIDE 0.4 MILLIGRAM(S): 0.4 CAPSULE ORAL at 21:47

## 2022-02-12 RX ADMIN — HEPARIN SODIUM 1100 UNIT(S)/HR: 5000 INJECTION INTRAVENOUS; SUBCUTANEOUS at 19:46

## 2022-02-12 RX ADMIN — HEPARIN SODIUM 0 UNIT(S)/HR: 5000 INJECTION INTRAVENOUS; SUBCUTANEOUS at 06:34

## 2022-02-12 RX ADMIN — HEPARIN SODIUM 0 UNIT(S)/HR: 5000 INJECTION INTRAVENOUS; SUBCUTANEOUS at 19:45

## 2022-02-12 RX ADMIN — Medication 40 MILLIGRAM(S): at 05:08

## 2022-02-12 RX ADMIN — SACUBITRIL AND VALSARTAN 1 TABLET(S): 24; 26 TABLET, FILM COATED ORAL at 05:09

## 2022-02-12 RX ADMIN — Medication 2: at 12:20

## 2022-02-12 RX ADMIN — HEPARIN SODIUM 1400 UNIT(S)/HR: 5000 INJECTION INTRAVENOUS; SUBCUTANEOUS at 07:38

## 2022-02-12 NOTE — PROGRESS NOTE ADULT - SUBJECTIVE AND OBJECTIVE BOX
EPS Progress Note    S: Patient seen and evaluated at bedside, is comfortable, complains of minimal discomfort at the site of the chest tube.     T(C): 36.5 (02-12-22 @ 13:24), Max: 36.7 (02-11-22 @ 22:27)  HR: 52 (02-12-22 @ 13:00) (52 - 69)  BP: 83/55 (02-12-22 @ 13:00) (83/55 - 131/87)  RR: 18 (02-12-22 @ 13:00) (17 - 18)  SpO2: 97% (02-12-22 @ 13:00) (95% - 100%)  Wt(kg): --     Telemetry: Atrial fibrillation with rates ranging from 40-60           General:  No acute distress      Chest:  reduced air entry in b/l lower lung fields,   Cardiac:  s1s2 varied, irregular, no mrg   Abdomen:  Soft without rebound or guarding.  Bowel sounds are present in all 4 quadrants.  No hepatosplenomegaly  Extremities:  No lower extremity edema is present.  No cyanosis or clubbing       MEDICATIONS  (STANDING):  dextrose 50% Injectable 25 Gram(s) IV Push once  dextrose 50% Injectable 12.5 Gram(s) IV Push once  dextrose 50% Injectable 25 Gram(s) IV Push once  furosemide   Injectable 40 milliGRAM(s) IV Push two times a day  glucagon  Injectable 1 milliGRAM(s) IntraMuscular once  heparin  Infusion.  Unit(s)/Hr (17 mL/Hr) IV Continuous <Continuous>  insulin lispro (ADMELOG) corrective regimen sliding scale   SubCutaneous Before meals and at bedtime  sacubitril 24 mG/valsartan 26 mG 1 Tablet(s) Oral two times a day  tamsulosin 0.4 milliGRAM(s) Oral at bedtime    MEDICATIONS  (PRN):  heparin   Injectable 7500 Unit(s) IV Push every 6 hours PRN For aPTT less than 40  heparin   Injectable 3500 Unit(s) IV Push every 6 hours PRN For aPTT between 40 - 57                                                         12.9   3.91  )-----------( 94       ( 12 Feb 2022 03:43 )             38.1     02-12    142  |  103  |  24<H>  ----------------------------<  119<H>  4.2   |  29  |  1.09    Ca    8.8      12 Feb 2022 13:31  Mg     2.2     02-11    TPro  5.6<L>  /  Alb  3.1<L>  /  TBili  1.0  /  DBili  x   /  AST  16  /  ALT  12  /  AlkPhos  101  02-12    PT/INR - ( 11 Feb 2022 06:53 )   PT: 15.1 sec;   INR: 1.27          PTT - ( 12 Feb 2022 05:18 )  PTT:150.3 sec

## 2022-02-12 NOTE — PROGRESS NOTE ADULT - PROBLEM SELECTOR PLAN 3
persistent right sided pleural effusion despite IV diuretics, SpO2 98% RA, pulm following  -CT Chest 2/9: Increased mod-large right pleural effusion, unchanged small left pleural effusion, ascites and upper abd w/ anasarca, cardiomegaly, probably pulm HTN  -s/p IR guided pleurocentesis 2/11 w/ 1L drained, c/b small pneumothorax  w/ chest tube in place  -Continue w/ low wall suction and trial clamp today 2/12 @ 130pm. CXR @ 4pm w/ possible removal of tube if stable  -will have to hold heparin gtt 1 hour before, and 1 hour after chest tube removal

## 2022-02-12 NOTE — PROGRESS NOTE ADULT - PROBLEM SELECTOR PLAN 1
euvolemic; SpO2 99% RA and net neg 2L/24H; Advanced HF following  -TTE 2/8: LVEF 35-40%, apical segments are more vigorous than the base (consider infiltrative CMP such as amyloid), severe LVH, mildly reduced RVSF, HYACINTH, tethered MV w/ MR, mild TR, PASP 40mmHg, small pericardial effusion, B/L pleural effusion, borderline dilated ascending aorta  -Continue Lasix 40mg IV BID  -Start Torsemide 20mg PO BID  -Continue Entresto 24/26mg BID  -Discontinue BB 2/2 amyloidosis  -Core measures, daily wt and strict I&Os

## 2022-02-12 NOTE — PROGRESS NOTE ADULT - PROBLEM SELECTOR PLAN 4
rate controlled Afib @ 60bpm  -On heparin gtt, restart Eliquis once heparin gtt no longer needed after chest tube pulled  -Hold AC if PLT <50, per hematology

## 2022-02-12 NOTE — PROGRESS NOTE ADULT - PROBLEM SELECTOR PLAN 2
Advanced HF following  -NM Amyloidosis scan 2/10: Intense uptake in the left ventricular and part of the right ventricular myocardium in a pattern consistent with TTR-mediated amyloidosis.  -s/p BM Bx w/ Dr. Bernal 11/23/21 to r/o infiltrative CMP which was negative for amyloid however was a small sample size  -Hematologist Dr. Escobar consulted and signed off due to amyloid is TTR mediated and not originating in BM.  -Pt is to follow up w/ cardiologist Dr. Finkelstein on d/c for further management of amyloid  -NO BETA BLOCKERS

## 2022-02-12 NOTE — PROGRESS NOTE ADULT - ASSESSMENT
ASSESSMENT/PLAN 77 y/o male,  w/ PMHx type II DM (not currently on medications), A-Flutter/A-Fib (s/p prior ablation in 06/2016, most  A-fib on Eliquis 2.5mg PO bid), thrombocytopenia (baseline Plt 60-70's on prior admission), stage III CKD (baseline Cr 1.0-1.2 ), non-obstructive CAD, Chronic Systolic CHF(EF 35% by  Echocardiogram 11/2022), hx of NSVT (EP evaluated pt. 11/2021 BPH (s/p TURP procedure), pituitary adenoma (s/p transphenoidal treatment in 1990's), prostate cancer (s/p chemotherapy), chronic venous insufficiency, Impression of acute on chronic CHF, R>L pleural effusions, compressive atelectases, concern for amyloidosis  Now Right pneumothorax s/p post thoracentesis    1. O2 attempt now off , incentive spirometry    2. Bronchodilators: off, continue  and encourage incentive spirometry   3. Corticosteroids: off  4. ID/Antibiotics: off  5. Cardiac/HTN: continue diuresis , optimize CHF mngmnt   6. GI: Rx/ prophylaxis c PPI/H2B  7. Heme: Rx/VT prophylaxis c SQH/SCD/AC on Heparin IV,   8. Aspiration precautions  9 Right CTube continue on  waterseal  Discussed with managing team Cardiology

## 2022-02-12 NOTE — PROGRESS NOTE ADULT - ASSESSMENT
Mr. Wise is 77 yo M with HTN, DM, chronic persistent A.fib h/o atrial flutter that was ablated in 2016, nonischemic cardiomyopathy  LVef of 35% and a new diagnosis of ATTR amyloid he is currently being managed for acute decompensated heart failure, with iv diuresis underwent right sided thoracocentesis with chest tube complicated by pneumothorax.   his ekg is afib at a rate of 54 bmp, low voltage, left axis, narrow qrs.   review of telemetry demonstrates skip atrial fibrillation(heart rates ranging from 40-60s)   persistent atrial fibrillation,   skip afib  cardiac amyloid ef 35%  will require betablockade for management of his cardiomyopathy,   will benefit from pacing, request input from heart failure team regarding his overall prognosis, plan is for eventual leadless pacemaker implantation vs Bi-V pacing

## 2022-02-12 NOTE — PROGRESS NOTE ADULT - SUBJECTIVE AND OBJECTIVE BOX
Interventional, Pulmonary, Critical, Chest Special Procedures.    Pt was seen and fully examined by myself.     Time spent with patient in minutes: 67    Patient is a 76y old  Male who presents with a chief complaint of worsening Dyspnea and recent weight gain 4-5 days (12 Feb 2022 10:37) The patient ill appearing, reporting CTube site discomfort, eupneic on GULSHAN  R CTube circuit integrity revised, verified and preserved    HPI:  A&O X3 Full Code      In terms of COVID-19 pt denies hx of COVID-19 and has received full Pfizer COVID-19 vaccination.  Patient plans on receiving Pfizer booster in May 2022.    75 y/o male, prior history of medication non-compliance, w/ PMHx type II DM (not currently on medications), A-Flutter/A-Fib (s/p prior ablation in 06/2016, most recently found to be in A-fib on Eliquis 2.5mg PO bid), thrombocytopenia (baseline Plt 60-70's on prior admission), stage III CKD (baseline Cr 1.0-1.2 ), non-obstructive CAD, Chronic Systolic CHF(EF 35% by  Echocardiogram 11/2022), hx of NSVT (EP evaluated pt. 11/2021 recommended repeat TTE in three months), BPH (s/p TURP procedure), pituitary adenoma (s/p transphenoidal treatment in 1990's), prostate cancer (s/p chemotherapy), chronic venous insufficiency, and multiple admissions for CHF exacerbation most recent 11/19/2021,  who presents to [facility] ER this morning, 2/7/22, with worsening dyspnea (with minimal exertion), increase in abdominal girth, b/l LE swelling, facial swelling and dry cough for 4-5 days.  Pt. reports compliance with medication and medical follow up.   In light of patient's risk factors, and acute on chronic systolic CHF exacerbation pt. is now admitted to [facility]  for further medical management.    In ER ECG reveals Atrial Fibrillation HR@65bpm with non specific ST-T wave changes, Troponin 0.11 (patient's baseline), BNP 11,635, WBC 3.6, H/H 13.3/40.2, Platelets 107 (base line 60-70's), Blood Glucose 116.  CXR AP reveals R>L pleural effusion with Cardiomegaly, CT of Abdomen reveals R>L pleural effusion, ascites noted throughout abdomen and plevis, mesenteric infiltrate extensive anasarca.  US Abdomen reveals cholelithiasis, moderate ascites, right pleural effusion (moderate) and dilatation of hepatic vein.        (07 Feb 2022 05:13)      REVIEW OF SYSTEMS:  Constitutional: No fever, weight loss, chills or fatigue  Eyes: No eye pain, visual disturbances, or discharge  ENMT:  No difficulty hearing, tinnitus, vertigo; No sinus or throat pain. No epistaxis, dysphagia, dysphonia, hoarseness or odynophagia  Neck: No pain, stiffness or neck swelling.  No masses or deformities  Respiratory: No cough, wheezing, hemoptysis  - COPD  - ILD   - PE   - ASTHMA     - PNEUMONIA  Cardiovascular: No chest pain, dysrhythmia, palpitations, dizziness or edema - CAD   - CHF   - HTN  Gastrointestinal: No abdominal or epigastric pain. No nausea, vomiting or hematemesis; No diarrhea or constipation. No melena or hematochezia, Icterus.          Genitourinary: No dysuria, frequency, hematuria or incontinence   - CKD/KAYLI      - ESRD  Neurological: No headaches, memory loss, loss of strength, numbness or tremors      -DEMENTIA     - STROKE    - SEIZURE  Skin: No itching, burning, rashes or lesions   Lymph Nodes: No enlarged glands  Endocrine: No heat or cold intolerance; No hair loss       - DM     - THYROID DISORDER  Musculoskeletal: No joint pain or swelling; No muscle, back or extremity pain, No edema  Psychiatric: No depression, anxiety, mood swings or difficulty sleeping  Heme/Lymph: No easy bruising or bleeding gums         - ANEMIA      - CANCER   -COAGULOPATHY  Allergy and Immunologic: No hives or eczema    PAST MEDICAL & SURGICAL HISTORY:  Atrial flutter  s/p Ablation 2016    Chronic venous insufficiency of lower extremity    Prostate CA    Stage 3 chronic kidney disease  1.2-1.2 baseline   On admission 1.2    Type 2 diabetes mellitus  not on medication    Thrombocytopenia  60-70&#x27;s baseline    Acute on chronic systolic congestive heart failure    Chronic atrial fibrillation    Atrial flutter  s/p ablation in 2016    History of surgical removal of pituitary gland  1990s    S/P TURP  for BPH      FAMILY HISTORY:    SOCIAL HISTORY:      - Tobacco     - ETOH    Allergies    No Known Allergies    Intolerances      Vital Signs Last 24 Hrs  T(C): 36.5 (12 Feb 2022 13:24), Max: 36.7 (11 Feb 2022 22:27)  T(F): 97.7 (12 Feb 2022 13:24), Max: 98.1 (11 Feb 2022 22:27)  HR: 52 (12 Feb 2022 13:00) (52 - 69)  BP: 83/55 (12 Feb 2022 13:00) (83/55 - 131/87)  BP(mean): --  RR: 18 (12 Feb 2022 13:00) (17 - 18)  SpO2: 97% (12 Feb 2022 13:00) (95% - 100%)    02-11 @ 07:01  -  02-12 @ 07:00  --------------------------------------------------------  IN: 1359 mL / OUT: 2980 mL / NET: -1621 mL    02-12 @ 07:01 - 02-12 @ 14:44  --------------------------------------------------------  IN: 554 mL / OUT: 1320 mL / NET: -766 mL          MEDICATIONS:  MEDICATIONS  (STANDING):  dextrose 50% Injectable 25 Gram(s) IV Push once  dextrose 50% Injectable 12.5 Gram(s) IV Push once  dextrose 50% Injectable 25 Gram(s) IV Push once  furosemide   Injectable 40 milliGRAM(s) IV Push two times a day  glucagon  Injectable 1 milliGRAM(s) IntraMuscular once  heparin  Infusion.  Unit(s)/Hr (17 mL/Hr) IV Continuous <Continuous>  insulin lispro (ADMELOG) corrective regimen sliding scale   SubCutaneous Before meals and at bedtime  sacubitril 24 mG/valsartan 26 mG 1 Tablet(s) Oral two times a day  tamsulosin 0.4 milliGRAM(s) Oral at bedtime    MEDICATIONS  (PRN):  heparin   Injectable 7500 Unit(s) IV Push every 6 hours PRN For aPTT less than 40  heparin   Injectable 3500 Unit(s) IV Push every 6 hours PRN For aPTT between 40 - 57      PHYSICAL EXAM:  Un Comfortable, no distress  Eyes: PERRL, EOM intact; conjunctiva and sclera clear  Head: Normocephalic;  No Trauma  ENMT: No nasal discharge, hoarseness, cough or hemoptysis  Neck: Supple; non tender; no masses or deformities.    No JVD  Respiratory: - WHEEZING   - RHONCHI  - RALES  + CRACKLES.  Improved R hemnithorax air entry    Cardiovascular: Regular rate and rhythm. S1 and S2 Normal; No murmurs, gallops or rubs     - PPM/AICD  Gastrointestinal: Soft non-tender, non-distended; Normal bowel sounds; No hepatosplenomegaly.     -PEG    -  GT   - GONZALEZ  Genitourinary: No costovertebral angle tenderness. No dysuria  Extremities: AROM, No clubbing, cyanosis or edema    Vascular: Peripheral pulses palpable 2+ bilaterally  Neurological: Alert and responisve to stimuli   Skin: Warm and dry. No obvious rash  Lymph Nodes: No acute cervical or supraclavicular adenopathy  Psychiatric: Cooperative and appropriate mood    DEVICES:  - DENTURES   +IV R / L     - ETUBE   -TRACH   +CTUBE  R /  O/P 800cc, now daphney     LABS:                          12.9   3.91  )-----------( 94       ( 12 Feb 2022 03:43 )             38.1     02-12    142  |  103  |  24<H>  ----------------------------<  119<H>  4.2   |  29  |  1.09    Ca    8.8      12 Feb 2022 13:31  Mg     2.2     02-11    TPro  5.6<L>  /  Alb  3.1<L>  /  TBili  1.0  /  DBili  x   /  AST  16  /  ALT  12  /  AlkPhos  101  02-12    PT/INR - ( 11 Feb 2022 06:53 )   PT: 15.1 sec;   INR: 1.27          PTT - ( 12 Feb 2022 05:18 )  PTT:150.3 sec  RADIOLOGY & ADDITIONAL STUDIES (The following images were personally reviewed):

## 2022-02-12 NOTE — PROGRESS NOTE ADULT - SUBJECTIVE AND OBJECTIVE BOX
Interventional Cardiology PA Adult Progress Note    C.C.:     Subjective Assessment:      ROS Negative except as per Subjective and HPI  	  MEDICATIONS:  furosemide   Injectable 40 milliGRAM(s) IV Push two times a day  sacubitril 24 mG/valsartan 26 mG 1 Tablet(s) Oral two times a day  tamsulosin 0.4 milliGRAM(s) Oral at bedtime            dextrose 50% Injectable 25 Gram(s) IV Push once  dextrose 50% Injectable 12.5 Gram(s) IV Push once  dextrose 50% Injectable 25 Gram(s) IV Push once  glucagon  Injectable 1 milliGRAM(s) IntraMuscular once  insulin lispro (ADMELOG) corrective regimen sliding scale   SubCutaneous Before meals and at bedtime    heparin   Injectable 7500 Unit(s) IV Push every 6 hours PRN  heparin   Injectable 3500 Unit(s) IV Push every 6 hours PRN  heparin  Infusion.  Unit(s)/Hr IV Continuous <Continuous>      	    [PHYSICAL EXAM:  TELEMETRY:  T(C): 36.4 (02-12-22 @ 05:58), Max: 36.7 (02-11-22 @ 22:27)  HR: 52 (02-12-22 @ 08:19) (52 - 69)  BP: 131/87 (02-12-22 @ 08:19) (97/62 - 131/87)  RR: 18 (02-12-22 @ 08:19) (17 - 18)  SpO2: 97% (02-12-22 @ 08:19) (95% - 100%)  Wt(kg): --  I&O's Summary    11 Feb 2022 07:01  -  12 Feb 2022 07:00  --------------------------------------------------------  IN: 1359 mL / OUT: 2980 mL / NET: -1621 mL    12 Feb 2022 07:01  -  12 Feb 2022 10:38  --------------------------------------------------------  IN: 254 mL / OUT: 1070 mL / NET: -816 mL        Moore:  Central/PICC/Mid Line:                                         Appearance: Normal	  HEENT:   Normal oral mucosa, PERRL, EOMI	  Neck: Supple, + JVD/ - JVD; Carotid Bruit   Cardiovascular: Normal S1 S2, No JVD, No murmurs,   Respiratory: Lungs clear to auscultation/Decreased Breath Sounds/No Rales, Rhonchi, Wheezing	  Gastrointestinal:  Soft, Non-tender, + BS	  Skin: No rashes, No ecchymoses, No cyanosis  Extremities: Normal range of motion, No clubbing, cyanosis or edema  Vascular: Peripheral pulses palpable 2+ bilaterally  Neurologic: Non-focal  Psychiatry: A & O x 3, Mood & affect appropriate      	    ECG:  	  RADIOLOGY:   DIAGNOSTIC TESTING:  [ ] Echocardiogram:  [ ]  Catheterization:  [ ] Stress Test:    [ ] KHUSHI  OTHER: 	    LABS:	 	  CARDIAC MARKERS:                                  12.9   3.91  )-----------( 94       ( 12 Feb 2022 03:43 )             38.1     02-11    144  |  104  |  25<H>  ----------------------------<  89  3.8   |  30  |  1.18    Ca    8.8      11 Feb 2022 06:53  Mg     2.2     02-11      proBNP:   Lipid Profile:   HgA1c:   TSH:   PT/INR - ( 11 Feb 2022 06:53 )   PT: 15.1 sec;   INR: 1.27          PTT - ( 12 Feb 2022 05:18 )  PTT:150.3 sec    ASSESSMENT/PLAN: 	        DVT ppx:  Dispo:     Interventional Cardiology PA Adult Progress Note    Subjective Assessment:  Pt seen and examined at bedside this am and reports feeling well. Denies chills, CP, SOB, palpitations, orthopnea, n/v.    ROS Negative except as per Subjective and HPI  	  MEDICATIONS:  furosemide   Injectable 40 milliGRAM(s) IV Push two times a day  sacubitril 24 mG/valsartan 26 mG 1 Tablet(s) Oral two times a day  tamsulosin 0.4 milliGRAM(s) Oral at bedtime  dextrose 50% Injectable 25 Gram(s) IV Push once  dextrose 50% Injectable 12.5 Gram(s) IV Push once  dextrose 50% Injectable 25 Gram(s) IV Push once  glucagon  Injectable 1 milliGRAM(s) IntraMuscular once  insulin lispro (ADMELOG) corrective regimen sliding scale   SubCutaneous Before meals and at bedtime  heparin   Injectable 7500 Unit(s) IV Push every 6 hours PRN  heparin   Injectable 3500 Unit(s) IV Push every 6 hours PRN  heparin  Infusion.  Unit(s)/Hr IV Continuous <Continuous>  	    [PHYSICAL EXAM:  TELEMETRY:  T(C): 36.4 (02-12-22 @ 05:58), Max: 36.7 (02-11-22 @ 22:27)  HR: 52 (02-12-22 @ 08:19) (52 - 69)  BP: 131/87 (02-12-22 @ 08:19) (97/62 - 131/87)  RR: 18 (02-12-22 @ 08:19) (17 - 18)  SpO2: 97% (02-12-22 @ 08:19) (95% - 100%)  Wt(kg): --  I&O's Summary    11 Feb 2022 07:01  -  12 Feb 2022 07:00  --------------------------------------------------------  IN: 1359 mL / OUT: 2980 mL / NET: -1621 mL    12 Feb 2022 07:01  -  12 Feb 2022 10:38  --------------------------------------------------------  IN: 254 mL / OUT: 1070 mL / NET: -816 mL        Moore:  Central/PICC/Mid Line:                                         Appearance: Normal	  HEENT: Normal oral mucosa, PERRL, EOMI	  Neck: Supple, - JVD; no Carotid Bruit   Cardiovascular: Normal S1 S2, No murmurs  Respiratory: Lungs clear to auscultation, No Rales, Rhonchi, Wheezing. R chest tube in place	  Gastrointestinal:  Soft, Non-tender, + BS	  Skin: No rashes, No ecchymoses, No cyanosis  Extremities: Normal range of motion, No clubbing, cyanosis or edema  Vascular: Peripheral pulses palpable 2+ bilaterally  Neurologic: Non-focal  Psychiatry: A & O x 3, Mood & affect appropriate  	    ECG:  	  RADIOLOGY:   DIAGNOSTIC TESTING:  [ ] Echocardiogram:  [ ]  Catheterization:  [ ] Stress Test:    [ ] KHUSHI  OTHER: 	    LABS:	 	  CARDIAC MARKERS:                                  12.9   3.91  )-----------( 94       ( 12 Feb 2022 03:43 )             38.1     02-11    144  |  104  |  25<H>  ----------------------------<  89  3.8   |  30  |  1.18    Ca    8.8      11 Feb 2022 06:53  Mg     2.2     02-11      proBNP:   Lipid Profile:   HgA1c:   TSH:   PT/INR - ( 11 Feb 2022 06:53 )   PT: 15.1 sec;   INR: 1.27          PTT - ( 12 Feb 2022 05:18 )  PTT:150.3 sec    ASSESSMENT/PLAN: 	        DVT ppx:  Dispo:

## 2022-02-13 LAB
ALBUMIN SERPL ELPH-MCNC: 2.9 G/DL — LOW (ref 3.3–5)
ALP SERPL-CCNC: 91 U/L — SIGNIFICANT CHANGE UP (ref 40–120)
ALT FLD-CCNC: 11 U/L — SIGNIFICANT CHANGE UP (ref 10–45)
ANION GAP SERPL CALC-SCNC: 11 MMOL/L — SIGNIFICANT CHANGE UP (ref 5–17)
APTT BLD: 59.3 SEC — HIGH (ref 27.5–35.5)
APTT BLD: 75.8 SEC — HIGH (ref 27.5–35.5)
AST SERPL-CCNC: 12 U/L — SIGNIFICANT CHANGE UP (ref 10–40)
BILIRUB SERPL-MCNC: 0.8 MG/DL — SIGNIFICANT CHANGE UP (ref 0.2–1.2)
BUN SERPL-MCNC: 22 MG/DL — SIGNIFICANT CHANGE UP (ref 7–23)
CALCIUM SERPL-MCNC: 8.4 MG/DL — SIGNIFICANT CHANGE UP (ref 8.4–10.5)
CHLORIDE SERPL-SCNC: 106 MMOL/L — SIGNIFICANT CHANGE UP (ref 96–108)
CO2 SERPL-SCNC: 26 MMOL/L — SIGNIFICANT CHANGE UP (ref 22–31)
CREAT SERPL-MCNC: 1.09 MG/DL — SIGNIFICANT CHANGE UP (ref 0.5–1.3)
GLUCOSE BLDC GLUCOMTR-MCNC: 110 MG/DL — HIGH (ref 70–99)
GLUCOSE BLDC GLUCOMTR-MCNC: 120 MG/DL — HIGH (ref 70–99)
GLUCOSE BLDC GLUCOMTR-MCNC: 94 MG/DL — SIGNIFICANT CHANGE UP (ref 70–99)
GLUCOSE BLDC GLUCOMTR-MCNC: 97 MG/DL — SIGNIFICANT CHANGE UP (ref 70–99)
GLUCOSE SERPL-MCNC: 95 MG/DL — SIGNIFICANT CHANGE UP (ref 70–99)
HCT VFR BLD CALC: 38.9 % — LOW (ref 39–50)
HGB BLD-MCNC: 13 G/DL — SIGNIFICANT CHANGE UP (ref 13–17)
INR BLD: 1.26 — HIGH (ref 0.88–1.16)
MAGNESIUM SERPL-MCNC: 2.1 MG/DL — SIGNIFICANT CHANGE UP (ref 1.6–2.6)
MCHC RBC-ENTMCNC: 28.5 PG — SIGNIFICANT CHANGE UP (ref 27–34)
MCHC RBC-ENTMCNC: 33.4 GM/DL — SIGNIFICANT CHANGE UP (ref 32–36)
MCV RBC AUTO: 85.3 FL — SIGNIFICANT CHANGE UP (ref 80–100)
NRBC # BLD: 0 /100 WBCS — SIGNIFICANT CHANGE UP (ref 0–0)
PLATELET # BLD AUTO: 105 K/UL — LOW (ref 150–400)
POTASSIUM SERPL-MCNC: 3.8 MMOL/L — SIGNIFICANT CHANGE UP (ref 3.5–5.3)
POTASSIUM SERPL-SCNC: 3.8 MMOL/L — SIGNIFICANT CHANGE UP (ref 3.5–5.3)
PROT SERPL-MCNC: 5.5 G/DL — LOW (ref 6–8.3)
PROTHROM AB SERPL-ACNC: 14.9 SEC — HIGH (ref 10.6–13.6)
RBC # BLD: 4.56 M/UL — SIGNIFICANT CHANGE UP (ref 4.2–5.8)
RBC # FLD: 16.3 % — HIGH (ref 10.3–14.5)
SODIUM SERPL-SCNC: 143 MMOL/L — SIGNIFICANT CHANGE UP (ref 135–145)
WBC # BLD: 3.23 K/UL — LOW (ref 3.8–10.5)
WBC # FLD AUTO: 3.23 K/UL — LOW (ref 3.8–10.5)

## 2022-02-13 RX ORDER — POTASSIUM CHLORIDE 20 MEQ
40 PACKET (EA) ORAL ONCE
Refills: 0 | Status: COMPLETED | OUTPATIENT
Start: 2022-02-13 | End: 2022-02-13

## 2022-02-13 RX ADMIN — Medication 20 MILLIGRAM(S): at 18:26

## 2022-02-13 RX ADMIN — HEPARIN SODIUM 1100 UNIT(S)/HR: 5000 INJECTION INTRAVENOUS; SUBCUTANEOUS at 07:25

## 2022-02-13 RX ADMIN — SACUBITRIL AND VALSARTAN 1 TABLET(S): 24; 26 TABLET, FILM COATED ORAL at 19:16

## 2022-02-13 RX ADMIN — Medication 40 MILLIEQUIVALENT(S): at 10:29

## 2022-02-13 RX ADMIN — SACUBITRIL AND VALSARTAN 1 TABLET(S): 24; 26 TABLET, FILM COATED ORAL at 05:28

## 2022-02-13 RX ADMIN — TAMSULOSIN HYDROCHLORIDE 0.4 MILLIGRAM(S): 0.4 CAPSULE ORAL at 21:50

## 2022-02-13 RX ADMIN — HEPARIN SODIUM 1100 UNIT(S)/HR: 5000 INJECTION INTRAVENOUS; SUBCUTANEOUS at 02:37

## 2022-02-13 RX ADMIN — Medication 20 MILLIGRAM(S): at 05:29

## 2022-02-13 NOTE — PROGRESS NOTE ADULT - ASSESSMENT
76Y M w/ h/o medication non compliance and PMHx Afib/Aflutter s/p ablation 6/2016 (on Eliquis), Thrombocytopenia (baseline PLT 60-70), non obstructive CAD, HFrEF (EF 35%), CKD-III (baseline Cr 1-1.2), pituitary adenoma (s/p transphenoidal treatment in 1990's), Prostate CA (s/p chemo) and chronic venous insufficiency, presented to 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 Pt now s/p IR guided right sided pleurocentesis 2/11, c/b pneumothorax s/p chest tube; pending removal. Pending myocardial bx and leadless PPM vs Bi-V ICD with EP. Hemodynamically stable.

## 2022-02-13 NOTE — PROGRESS NOTE ADULT - PROBLEM SELECTOR PLAN 3
RESOLVING. persistent right sided pleural effusion despite IV diuretics, SpO2 98% RA, pulm following  -CT Chest 2/9: Increased mod-large right pleural effusion, unchanged small left pleural effusion, ascites and upper abd w/ anasarca, cardiomegaly, probably pulm HTN  -s/p IR guided pleurocentesis 2/11 w/ 1L drained, c/b small pneumothorax  w/ chest tube in place  -Chest tube to water seal, no leak, minimal serous drainage  -will have to hold heparin gtt 1 hour before, and 1 hour after chest tube removal. RESOLVING. persistent right sided pleural effusion despite IV diuretics, SpO2 98% RA, pulm following  -CT Chest 2/9: Increased mod-large right pleural effusion, unchanged small left pleural effusion, ascites and upper abd w/ anasarca, cardiomegaly, probably pulm HTN  -s/p IR guided pleurocentesis 2/11 w/ 1L drained, c/b small pneumothorax  w/ chest tube in place  -Chest tube to water seal, no leak, ~400cc serous drainage  -will have to hold heparin gtt 1 hour before, and 1 hour after chest tube removal.

## 2022-02-13 NOTE — PROGRESS NOTE ADULT - ASSESSMENT
ASSESSMENT/PLAN 77 y/o male,  w/ PMHx type II DM (not currently on medications), A-Flutter/A-Fib (s/p prior ablation in 06/2016, most  A-fib on Eliquis 2.5mg PO bid), thrombocytopenia (baseline Plt 60-70's on prior admission), stage III CKD (baseline Cr 1.0-1.2 ), non-obstructive CAD, Chronic Systolic CHF(EF 35% by  Echocardiogram 11/2022), hx of NSVT (EP evaluated pt. 11/2021 BPH (s/p TURP procedure), pituitary adenoma (s/p transphenoidal treatment in 1990's), prostate cancer (s/p chemotherapy), chronic venous insufficiency, Impression of acute on chronic CHF, R>L pleural effusions, compressive atelectases, concern for amyloidosis  Now Right pneumothorax s/p post thoracentesis    1. O2 attempt now off , incentive spirometry    2. Bronchodilators: off, continue  and encourage incentive spirometry   3. Corticosteroids: off  4. ID/Antibiotics: off  5. Cardiac/HTN: continue diuresis , optimize CHF mngmnt   6. GI: Rx/ prophylaxis c PPI/H2B  7. Heme: Rx/VT prophylaxis c SQH/SCD/AC on Heparin IV,   8. Aspiration precautions  9 Right CTube continue on  waterseal, would D/C tomorrow if O/P less than 200  Discussed with managing team Cardiology

## 2022-02-13 NOTE — PROGRESS NOTE ADULT - SUBJECTIVE AND OBJECTIVE BOX
75 y/o male, prior history of medication non-compliance PMHx type II DM, A-Flutter/A-Fib (s/p prior ablation in 06/2016, most recently found to be in A-fib on Eliquis 2.5mg), thrombocytopenia (baseline Plt 60-70's), stage III CKD, CAD, Chronic Systolic CHF, prostate cancer (s/p chemotherapy), chronic venous insufficiency, and multiple admissions for CHF exacerbation. Presented to [facility] 2/7/22 for dyspnea and abdominal swelling. CT chest 2/9 showing increased moderate to large right pleural effusion. Status post thoracentesis by IR on 2/11 c/b PTX and need for R chest tube placement.    R chest tube with 460cc straw-colored output over 24h. Chest tube currently set to WS. Most recent CXR 2/12 PM showing small R effusion without evidence of PTX. O2 sat 99% on RA.    Plan to continue monitoring chest tube output.

## 2022-02-13 NOTE — PROGRESS NOTE ADULT - SUBJECTIVE AND OBJECTIVE BOX
Interventional, Pulmonary, Critical, Chest Special Procedures.    Pt was seen and fully examined by myself.     Time spent with patient in minutes: 67    Patient is a 76y old  Male who presents with a chief complaint of worsening Dyspnea and recent weight gain 4-5 days (13 Feb 2022 14:42) The patient ill appearing, reporting R CTube site discomfort, eupneic on RA when seen    HPI:  A&O X3 Full Code      In terms of COVID-19 pt denies hx of COVID-19 and has received full Pfizer COVID-19 vaccination.  Patient plans on receiving Pfizer booster in May 2022.    75 y/o male, prior history of medication non-compliance, w/ PMHx type II DM (not currently on medications), A-Flutter/A-Fib (s/p prior ablation in 06/2016, most recently found to be in A-fib on Eliquis 2.5mg PO bid), thrombocytopenia (baseline Plt 60-70's on prior admission), stage III CKD (baseline Cr 1.0-1.2 ), non-obstructive CAD, Chronic Systolic CHF(EF 35% by  Echocardiogram 11/2022), hx of NSVT (EP evaluated pt. 11/2021 recommended repeat TTE in three months), BPH (s/p TURP procedure), pituitary adenoma (s/p transphenoidal treatment in 1990's), prostate cancer (s/p chemotherapy), chronic venous insufficiency, and multiple admissions for CHF exacerbation most recent 11/19/2021,  who presents to [facility] ER this morning, 2/7/22, with worsening dyspnea (with minimal exertion), increase in abdominal girth, b/l LE swelling, facial swelling and dry cough for 4-5 days.  Pt. reports compliance with medication and medical follow up.   In light of patient's risk factors, and acute on chronic systolic CHF exacerbation pt. is now admitted to [facility]  for further medical management.    In ER ECG reveals Atrial Fibrillation HR@65bpm with non specific ST-T wave changes, Troponin 0.11 (patient's baseline), BNP 11,635, WBC 3.6, H/H 13.3/40.2, Platelets 107 (base line 60-70's), Blood Glucose 116.  CXR AP reveals R>L pleural effusion with Cardiomegaly, CT of Abdomen reveals R>L pleural effusion, ascites noted throughout abdomen and plevis, mesenteric infiltrate extensive anasarca.  US Abdomen reveals cholelithiasis, moderate ascites, right pleural effusion (moderate) and dilatation of hepatic vein.        (07 Feb 2022 05:13)      REVIEW OF SYSTEMS:  Constitutional: No fever, weight loss, chills +++ fatigue  Eyes: No eye pain, visual disturbances, or discharge  ENMT:  No difficulty hearing, tinnitus, vertigo; No sinus or throat pain. No epistaxis, dysphagia, dysphonia, hoarseness or odynophagia  Neck: No pain, stiffness or neck swelling.  No masses or deformities  Respiratory: No cough, wheezing, hemoptysis  - COPD  - ILD   - PE   - ASTHMA     - PNEUMONIA  Cardiovascular: No chest pain, + AF dysrhythmia, palpitations, dizziness or edema - CAD   -+CHF   + HTN  Gastrointestinal: No abdominal or epigastric pain. No nausea, vomiting or hematemesis; No diarrhea or constipation. No melena or hematochezia, Icterus.          Genitourinary: No dysuria, frequency, hematuria or incontinence   + CKD/KAYLI      - ESRD  Neurological: No headaches, +memory loss, loss of strength, numbness or tremors      -DEMENTIA     - STROKE    - SEIZURE  Skin: No itching, burning, rashes or lesions   Lymph Nodes: No enlarged glands  Endocrine: No heat or cold intolerance; No hair loss       + DM     - THYROID DISORDER  Musculoskeletal: No joint pain or swelling; No muscle, back or extremity pain, No edema  Psychiatric: No depression, anxiety, mood swings or difficulty sleeping  Heme/Lymph: No easy bruising or bleeding gums         -+ANEMIA      + CANCER   -COAGULOPATHY  Allergy and Immunologic: No hives or eczema    PAST MEDICAL & SURGICAL HISTORY:  Atrial flutter  s/p Ablation 2016    Chronic venous insufficiency of lower extremity    Prostate CA    Stage 3 chronic kidney disease  1.2-1.2 baseline   On admission 1.2    Type 2 diabetes mellitus  not on medication    Thrombocytopenia  60-70&#x27;s baseline    Acute on chronic systolic congestive heart failure    Chronic atrial fibrillation    Atrial flutter  s/p ablation in 2016    History of surgical removal of pituitary gland  1990s    S/P TURP  for BPH      FAMILY HISTORY:    SOCIAL HISTORY:      - Tobacco     - ETOH    Allergies    No Known Allergies    Intolerances      Vital Signs Last 24 Hrs  T(C): 36.5 (13 Feb 2022 17:04), Max: 36.6 (12 Feb 2022 18:20)  T(F): 97.7 (13 Feb 2022 17:04), Max: 97.8 (12 Feb 2022 18:20)  HR: 60 (13 Feb 2022 12:36) (54 - 70)  BP: 104/76 (13 Feb 2022 12:36) (97/67 - 116/75)  BP(mean): --  RR: 17 (13 Feb 2022 12:36) (17 - 18)  SpO2: 99% (13 Feb 2022 12:36) (98% - 100%)    02-12 @ 07:01  -  02-13 @ 07:00  --------------------------------------------------------  IN: 1155 mL / OUT: 2960 mL / NET: -1805 mL    02-13 @ 07:01 - 02-13 @ 17:57  --------------------------------------------------------  IN: 251 mL / OUT: 230 mL / NET: 21 mL          MEDICATIONS:  MEDICATIONS  (STANDING):  dextrose 50% Injectable 25 Gram(s) IV Push once  dextrose 50% Injectable 12.5 Gram(s) IV Push once  dextrose 50% Injectable 25 Gram(s) IV Push once  glucagon  Injectable 1 milliGRAM(s) IntraMuscular once  heparin  Infusion.  Unit(s)/Hr (17 mL/Hr) IV Continuous <Continuous>  insulin lispro (ADMELOG) corrective regimen sliding scale   SubCutaneous Before meals and at bedtime  sacubitril 24 mG/valsartan 26 mG 1 Tablet(s) Oral two times a day  tamsulosin 0.4 milliGRAM(s) Oral at bedtime  torsemide 20 milliGRAM(s) Oral two times a day    MEDICATIONS  (PRN):  heparin   Injectable 7500 Unit(s) IV Push every 6 hours PRN For aPTT less than 40  heparin   Injectable 3500 Unit(s) IV Push every 6 hours PRN For aPTT between 40 - 57      PHYSICAL EXAM:  Un Comfortable, no distress  Eyes: PERRL, EOM intact; conjunctiva and sclera clear  Head: Normocephalic;  No Trauma  ENMT: No nasal discharge, hoarseness, cough or hemoptysis  Neck: Supple; non tender; no masses or deformities.    No JVD  Respiratory: - WHEEZING   - RHONCHI  - RALES  + CRACKLES.  Improved R hemnithorax air entry    Cardiovascular: Regular rate and rhythm. S1 and S2 Normal; No murmurs, gallops or rubs     - PPM/AICD  Gastrointestinal: Soft non-tender, non-distended; Normal bowel sounds; No hepatosplenomegaly.     -PEG    -  GT   - GONZALEZ  Genitourinary: No costovertebral angle tenderness. No dysuria  Extremities: AROM, No clubbing, cyanosis or edema    Vascular: Peripheral pulses palpable 2+ bilaterally  Neurological: Alert and responisve to stimuli   Skin: Warm and dry. No obvious rash  Lymph Nodes: No acute cervical or supraclavicular adenopathy  Psychiatric: Cooperative and appropriate mood    DEVICES:  - DENTURES   +IV R / L     - ETUBE   -TRACH   +CTUBE  R /  O/P 400cc, now daphney , no air leak on cough and tidal breathing     LABS:                          13.0   3.23  )-----------( 105      ( 13 Feb 2022 07:05 )             38.9     02-13    143  |  106  |  22  ----------------------------<  95  3.8   |  26  |  1.09    Ca    8.4      13 Feb 2022 07:05  Mg     2.1     02-13    TPro  5.5<L>  /  Alb  2.9<L>  /  TBili  0.8  /  DBili  x   /  AST  12  /  ALT  11  /  AlkPhos  91  02-13    PT/INR - ( 13 Feb 2022 07:05 )   PT: 14.9 sec;   INR: 1.26          PTT - ( 13 Feb 2022 07:05 )  PTT:59.3 sec  RADIOLOGY & ADDITIONAL STUDIES (The following images were personally reviewed):< from: Xray Chest 1 View-PORTABLE IMMEDIATE (Xray Chest 1 View-PORTABLE IMMEDIATE .) (02.12.22 @ 17:20) >    ACC: [id number] EXAM:  XR CHEST PORTABLE IMMED 1V                          PROCEDURE DATE:  02/12/2022          INTERPRETATION:  Clinical History: Chest tube    Frontal examination of the chest demonstrates cardiomegaly. Right chest   tube noted. Small right effusion. No interval change noted in comparison   to prior examination of the chest 2/12/2022.    IMPRESSION: Right chest tube noted. Small right effusion. Cardiomegaly    < end of copied text >

## 2022-02-13 NOTE — PROGRESS NOTE ADULT - PROBLEM SELECTOR PLAN 2
ACTIVE. Advanced HF following  -NM Amyloidosis scan 2/10: Intense uptake in the left ventricular and part of the right ventricular myocardium in a pattern consistent with TTR-mediated amyloidosis.  -s/p BM Bx w/ Dr. Bernal 11/23/21 to r/o infiltrative CMP which was negative for amyloid however was a small sample size.  -Hematologist Dr. Escobar consulted and signed off due to amyloid is TTR mediated and not originating in BM.  -Pt is to follow up w/ cardiologist Dr. Finkelstein on d/c for further management of amyloid  -NO BETA BLOCKERS

## 2022-02-13 NOTE — PHYSICAL THERAPY INITIAL EVALUATION ADULT - PERTINENT HX OF CURRENT PROBLEM, REHAB EVAL
76M  who presents to [facility] ER this morning, 2/7/22, with worsening dyspnea (with minimal exertion), increase in abdominal girth, b/l LE swelling, facial swelling and dry cough for 4-5 days.  Pt. reports compliance with medication and medical follow up.   In light of patient's risk factors, and acute on chronic systolic CHF exacerbation pt. is now admitted to [facility]  for further medical management

## 2022-02-13 NOTE — PROGRESS NOTE ADULT - SUBJECTIVE AND OBJECTIVE BOX
CARDIOLOGY NP PROGRESS NOTE    Subjective: Patient seen and examined by me at bedside. Denies complaints. Remainder ROS otherwise negative.    Overnight Events: No acute events. CT remains in place to water seal. Total ~16L NEG.     VITAL SIGNS:  T(C): 36.3 (02-13-22 @ 14:22), Max: 36.6 (02-12-22 @ 18:20)  HR: 60 (02-13-22 @ 12:36) (54 - 70)  BP: 104/76 (02-13-22 @ 12:36) (88/57 - 116/75)  RR: 17 (02-13-22 @ 12:36) (17 - 18)  SpO2: 99% (02-13-22 @ 12:36) (98% - 100%)  Wt(kg): 80.9<<81.4<<83.9<<90.4kg    I&O's Summary    12 Feb 2022 07:01  -  13 Feb 2022 07:00  --------------------------------------------------------  IN: 1155 mL / OUT: 2960 mL / NET: -1805 mL    13 Feb 2022 07:01  -  13 Feb 2022 14:42  --------------------------------------------------------  IN: 251 mL / OUT: 230 mL / NET: 21 mL    PHYSICAL EXAM:  Awake, talkative, in NAD  AXOX3, follows commands, appropriate  Neck supple, no JVD noted  PEERL, nasal/buccal mucosa moist and well perfused  BS diminished but clear bilaterally, unlabored, symmetrical  Right axillary chest tube dressing C/D/I, serous/straw drainage, ~60cc, no leak.   S1/S2, no S3, RRR, no M/G/R noted  Abdomen soft, non tender, non distended  No edema noted, perfusion brisk  Pulses palpable throughout  Skin warm and dry, no rashes/lesions noted    LABS:             13.0   3.23  )-----------( 105      ( 13 Feb 2022 07:05 )             38.9                              02-13    143  |  106  |  22  ----------------------------<  95  3.8   |  26  |  1.09    Ca    8.4      13 Feb 2022 07:05  Mg     2.1     02-13    TPro  5.5<L>  /  Alb  2.9<L>  /  TBili  0.8  /  DBili  x   /  AST  12  /  ALT  11  /  AlkPhos  91  02-13    LIVER FUNCTIONS - ( 13 Feb 2022 07:05 )  Alb: 2.9 g/dL / Pro: 5.5 g/dL / ALK PHOS: 91 U/L / ALT: 11 U/L / AST: 12 U/L / GGT: x         PT/INR - ( 13 Feb 2022 07:05 )   PT: 14.9 sec;   INR: 1.26          PTT - ( 13 Feb 2022 07:05 )  PTT:59.3 sec  CAPILLARY BLOOD GLUCOSE  POCT Blood Glucose.: 97 mg/dL (13 Feb 2022 11:43)  POCT Blood Glucose.: 94 mg/dL (13 Feb 2022 06:38)  POCT Blood Glucose.: 145 mg/dL (12 Feb 2022 21:56)  POCT Blood Glucose.: 119 mg/dL (12 Feb 2022 16:57)    Allergies:  No Known Allergies    MEDICATIONS  (STANDING):  dextrose 50% Injectable 25 Gram(s) IV Push once  dextrose 50% Injectable 12.5 Gram(s) IV Push once  dextrose 50% Injectable 25 Gram(s) IV Push once  glucagon  Injectable 1 milliGRAM(s) IntraMuscular once  heparin  Infusion.  Unit(s)/Hr (17 mL/Hr) IV Continuous <Continuous>  insulin lispro (ADMELOG) corrective regimen sliding scale   SubCutaneous Before meals and at bedtime  sacubitril 24 mG/valsartan 26 mG 1 Tablet(s) Oral two times a day  tamsulosin 0.4 milliGRAM(s) Oral at bedtime  torsemide 20 milliGRAM(s) Oral two times a day    MEDICATIONS  (PRN):  heparin   Injectable 7500 Unit(s) IV Push every 6 hours PRN For aPTT less than 40  heparin   Injectable 3500 Unit(s) IV Push every 6 hours PRN For aPTT between 40 - 57    DIAGNOSTIC TESTS:   -TTE 2/8: LVEF 35-40%, apical segments are more vigorous than the base (consider infiltrative CMP such as amyloid), severe LVH, mildly reduced RVSF, HYACINTH, tethered MV w/ MR, mild TR, PASP 40mmHg, small pericardial effusion, B/L pleural effusion, borderline dilated ascending aorta     CARDIOLOGY NP PROGRESS NOTE    Subjective: Patient seen and examined by me at bedside. Denies complaints. Remainder ROS otherwise negative.    Overnight Events: No acute events. CT remains in place to water seal ~400cc out. Total ~16L NEG.     VITAL SIGNS:  T(C): 36.3 (02-13-22 @ 14:22), Max: 36.6 (02-12-22 @ 18:20)  HR: 60 (02-13-22 @ 12:36) (54 - 70)  BP: 104/76 (02-13-22 @ 12:36) (88/57 - 116/75)  RR: 17 (02-13-22 @ 12:36) (17 - 18)  SpO2: 99% (02-13-22 @ 12:36) (98% - 100%)  Wt(kg): 80.9<<81.4<<83.9<<90.4kg    I&O's Summary    12 Feb 2022 07:01  -  13 Feb 2022 07:00  --------------------------------------------------------  IN: 1155 mL / OUT: 2960 mL / NET: -1805 mL    13 Feb 2022 07:01  -  13 Feb 2022 14:42  --------------------------------------------------------  IN: 251 mL / OUT: 230 mL / NET: 21 mL    PHYSICAL EXAM:  Awake, talkative, in NAD  AXOX3, follows commands, appropriate  Neck supple, no JVD noted  PEERL, nasal/buccal mucosa moist and well perfused  BS diminished but clear bilaterally, unlabored, symmetrical  Right axillary chest tube dressing C/D/I, serous/straw drainage, ~460cc, no leak.   S1/S2, no S3, RRR, no M/G/R noted  Abdomen soft, non tender, non distended  No edema noted, perfusion brisk  Pulses palpable throughout  Skin warm and dry, no rashes/lesions noted    LABS:             13.0   3.23  )-----------( 105      ( 13 Feb 2022 07:05 )             38.9                              02-13    143  |  106  |  22  ----------------------------<  95  3.8   |  26  |  1.09    Ca    8.4      13 Feb 2022 07:05  Mg     2.1     02-13    TPro  5.5<L>  /  Alb  2.9<L>  /  TBili  0.8  /  DBili  x   /  AST  12  /  ALT  11  /  AlkPhos  91  02-13    LIVER FUNCTIONS - ( 13 Feb 2022 07:05 )  Alb: 2.9 g/dL / Pro: 5.5 g/dL / ALK PHOS: 91 U/L / ALT: 11 U/L / AST: 12 U/L / GGT: x         PT/INR - ( 13 Feb 2022 07:05 )   PT: 14.9 sec;   INR: 1.26          PTT - ( 13 Feb 2022 07:05 )  PTT:59.3 sec  CAPILLARY BLOOD GLUCOSE  POCT Blood Glucose.: 97 mg/dL (13 Feb 2022 11:43)  POCT Blood Glucose.: 94 mg/dL (13 Feb 2022 06:38)  POCT Blood Glucose.: 145 mg/dL (12 Feb 2022 21:56)  POCT Blood Glucose.: 119 mg/dL (12 Feb 2022 16:57)    Allergies:  No Known Allergies    MEDICATIONS  (STANDING):  dextrose 50% Injectable 25 Gram(s) IV Push once  dextrose 50% Injectable 12.5 Gram(s) IV Push once  dextrose 50% Injectable 25 Gram(s) IV Push once  glucagon  Injectable 1 milliGRAM(s) IntraMuscular once  heparin  Infusion.  Unit(s)/Hr (17 mL/Hr) IV Continuous <Continuous>  insulin lispro (ADMELOG) corrective regimen sliding scale   SubCutaneous Before meals and at bedtime  sacubitril 24 mG/valsartan 26 mG 1 Tablet(s) Oral two times a day  tamsulosin 0.4 milliGRAM(s) Oral at bedtime  torsemide 20 milliGRAM(s) Oral two times a day    MEDICATIONS  (PRN):  heparin   Injectable 7500 Unit(s) IV Push every 6 hours PRN For aPTT less than 40  heparin   Injectable 3500 Unit(s) IV Push every 6 hours PRN For aPTT between 40 - 57    DIAGNOSTIC TESTS:   -TTE 2/8: LVEF 35-40%, apical segments are more vigorous than the base (consider infiltrative CMP such as amyloid), severe LVH, mildly reduced RVSF, HYACINTH, tethered MV w/ MR, mild TR, PASP 40mmHg, small pericardial effusion, B/L pleural effusion, borderline dilated ascending aorta

## 2022-02-13 NOTE — PROGRESS NOTE ADULT - PROBLEM SELECTOR PLAN 1
RESOLVED. euvolemic; SpO2 99% RA and net neg 2L/24H; Advanced HF following  -continue Torsemide 20mg PO BID  -continue Entresto 24/26mg BID  -Discontinue BB 2/2 amyloidosis  -Core measures, daily wt and strict I&Os

## 2022-02-14 DIAGNOSIS — E85.4 ORGAN-LIMITED AMYLOIDOSIS: ICD-10-CM

## 2022-02-14 DIAGNOSIS — I48.91 UNSPECIFIED ATRIAL FIBRILLATION: ICD-10-CM

## 2022-02-14 DIAGNOSIS — E11.9 TYPE 2 DIABETES MELLITUS WITHOUT COMPLICATIONS: ICD-10-CM

## 2022-02-14 PROBLEM — D69.6 THROMBOCYTOPENIA, UNSPECIFIED: Chronic | Status: ACTIVE | Noted: 2021-11-19

## 2022-02-14 PROBLEM — I48.20 CHRONIC ATRIAL FIBRILLATION, UNSPECIFIED: Chronic | Status: ACTIVE | Noted: 2022-02-07

## 2022-02-14 PROBLEM — N18.30 CHRONIC KIDNEY DISEASE, STAGE 3 UNSPECIFIED: Chronic | Status: ACTIVE | Noted: 2021-06-28

## 2022-02-14 PROBLEM — I50.23 ACUTE ON CHRONIC SYSTOLIC (CONGESTIVE) HEART FAILURE: Chronic | Status: ACTIVE | Noted: 2022-02-07

## 2022-02-14 LAB
ALBUMIN SERPL ELPH-MCNC: 3.2 G/DL — LOW (ref 3.3–5)
ALP SERPL-CCNC: 115 U/L — SIGNIFICANT CHANGE UP (ref 40–120)
ALT FLD-CCNC: 14 U/L — SIGNIFICANT CHANGE UP (ref 10–45)
ANION GAP SERPL CALC-SCNC: 8 MMOL/L — SIGNIFICANT CHANGE UP (ref 5–17)
APTT BLD: 66.4 SEC — HIGH (ref 27.5–35.5)
AST SERPL-CCNC: 18 U/L — SIGNIFICANT CHANGE UP (ref 10–40)
BILIRUB SERPL-MCNC: 1 MG/DL — SIGNIFICANT CHANGE UP (ref 0.2–1.2)
BUN SERPL-MCNC: 27 MG/DL — HIGH (ref 7–23)
CALCIUM SERPL-MCNC: 9.2 MG/DL — SIGNIFICANT CHANGE UP (ref 8.4–10.5)
CHLORIDE SERPL-SCNC: 103 MMOL/L — SIGNIFICANT CHANGE UP (ref 96–108)
CO2 SERPL-SCNC: 30 MMOL/L — SIGNIFICANT CHANGE UP (ref 22–31)
CREAT SERPL-MCNC: 1.13 MG/DL — SIGNIFICANT CHANGE UP (ref 0.5–1.3)
GLUCOSE BLDC GLUCOMTR-MCNC: 112 MG/DL — HIGH (ref 70–99)
GLUCOSE BLDC GLUCOMTR-MCNC: 128 MG/DL — HIGH (ref 70–99)
GLUCOSE BLDC GLUCOMTR-MCNC: 78 MG/DL — SIGNIFICANT CHANGE UP (ref 70–99)
GLUCOSE BLDC GLUCOMTR-MCNC: 87 MG/DL — SIGNIFICANT CHANGE UP (ref 70–99)
GLUCOSE SERPL-MCNC: 87 MG/DL — SIGNIFICANT CHANGE UP (ref 70–99)
HCT VFR BLD CALC: 44.4 % — SIGNIFICANT CHANGE UP (ref 39–50)
HGB BLD-MCNC: 14.6 G/DL — SIGNIFICANT CHANGE UP (ref 13–17)
MAGNESIUM SERPL-MCNC: 2.3 MG/DL — SIGNIFICANT CHANGE UP (ref 1.6–2.6)
MCHC RBC-ENTMCNC: 27.8 PG — SIGNIFICANT CHANGE UP (ref 27–34)
MCHC RBC-ENTMCNC: 32.9 GM/DL — SIGNIFICANT CHANGE UP (ref 32–36)
MCV RBC AUTO: 84.6 FL — SIGNIFICANT CHANGE UP (ref 80–100)
NRBC # BLD: 0 /100 WBCS — SIGNIFICANT CHANGE UP (ref 0–0)
PLATELET # BLD AUTO: 105 K/UL — LOW (ref 150–400)
POTASSIUM SERPL-MCNC: 4.4 MMOL/L — SIGNIFICANT CHANGE UP (ref 3.5–5.3)
POTASSIUM SERPL-SCNC: 4.4 MMOL/L — SIGNIFICANT CHANGE UP (ref 3.5–5.3)
PROT SERPL-MCNC: 6.6 G/DL — SIGNIFICANT CHANGE UP (ref 6–8.3)
RBC # BLD: 5.25 M/UL — SIGNIFICANT CHANGE UP (ref 4.2–5.8)
RBC # FLD: 16.7 % — HIGH (ref 10.3–14.5)
SODIUM SERPL-SCNC: 141 MMOL/L — SIGNIFICANT CHANGE UP (ref 135–145)
WBC # BLD: 3.14 K/UL — LOW (ref 3.8–10.5)
WBC # FLD AUTO: 3.14 K/UL — LOW (ref 3.8–10.5)

## 2022-02-14 PROCEDURE — 71045 X-RAY EXAM CHEST 1 VIEW: CPT | Mod: 26

## 2022-02-14 PROCEDURE — 71045 X-RAY EXAM CHEST 1 VIEW: CPT | Mod: 26,77

## 2022-02-14 PROCEDURE — 99233 SBSQ HOSP IP/OBS HIGH 50: CPT | Mod: GC

## 2022-02-14 RX ORDER — ACETAMINOPHEN 500 MG
650 TABLET ORAL ONCE
Refills: 0 | Status: COMPLETED | OUTPATIENT
Start: 2022-02-14 | End: 2022-02-14

## 2022-02-14 RX ORDER — LIDOCAINE HCL 20 MG/ML
10 VIAL (ML) INJECTION ONCE
Refills: 0 | Status: DISCONTINUED | OUTPATIENT
Start: 2022-02-14 | End: 2022-02-14

## 2022-02-14 RX ORDER — SENNA PLUS 8.6 MG/1
2 TABLET ORAL AT BEDTIME
Refills: 0 | Status: ACTIVE | OUTPATIENT
Start: 2022-02-14 | End: 2023-01-13

## 2022-02-14 RX ORDER — LIDOCAINE HCL 20 MG/ML
10 VIAL (ML) INJECTION ONCE
Refills: 0 | Status: COMPLETED | OUTPATIENT
Start: 2022-02-14 | End: 2022-02-14

## 2022-02-14 RX ORDER — POLYETHYLENE GLYCOL 3350 17 G/17G
17 POWDER, FOR SOLUTION ORAL DAILY
Refills: 0 | Status: ACTIVE | OUTPATIENT
Start: 2022-02-14 | End: 2023-01-13

## 2022-02-14 RX ADMIN — SACUBITRIL AND VALSARTAN 1 TABLET(S): 24; 26 TABLET, FILM COATED ORAL at 06:03

## 2022-02-14 RX ADMIN — Medication 20 MILLIGRAM(S): at 06:03

## 2022-02-14 RX ADMIN — SACUBITRIL AND VALSARTAN 1 TABLET(S): 24; 26 TABLET, FILM COATED ORAL at 18:39

## 2022-02-14 RX ADMIN — TAMSULOSIN HYDROCHLORIDE 0.4 MILLIGRAM(S): 0.4 CAPSULE ORAL at 21:38

## 2022-02-14 RX ADMIN — Medication 10 MILLILITER(S): at 10:59

## 2022-02-14 RX ADMIN — Medication 650 MILLIGRAM(S): at 06:35

## 2022-02-14 RX ADMIN — SENNA PLUS 2 TABLET(S): 8.6 TABLET ORAL at 21:39

## 2022-02-14 RX ADMIN — HEPARIN SODIUM 1100 UNIT(S)/HR: 5000 INJECTION INTRAVENOUS; SUBCUTANEOUS at 09:09

## 2022-02-14 RX ADMIN — Medication 650 MILLIGRAM(S): at 07:46

## 2022-02-14 NOTE — PROGRESS NOTE ADULT - SUBJECTIVE AND OBJECTIVE BOX
Interval HPI: Abdominal fat pad biopsy cancelled. Pigtail output 330 cc over the last 24 hours. Patient ambulated with nurse in the hallway today.       PAST MEDICAL & SURGICAL HISTORY:  Atrial flutter  s/p Ablation 2016  Chronic venous insufficiency of lower extremity  Prostate CA  Stage 3 chronic kidney disease  1.2-1.2 baseline   On admission 1.2  Type 2 diabetes mellitus  not on medication  Thrombocytopenia  60-70&#x27;s baseline  Acute on chronic systolic congestive heart failure  Chronic atrial fibrillation  Atrial flutter  s/p ablation in 2016  History of surgical removal of pituitary gland  1990s  S/P TURP  for BPH    Home Medications:  potassium chloride 10 mEq oral capsule, extended release: 1 cap(s) orally once a day (24 Nov 2021 13:03)  sacubitril-valsartan 24 mg-26 mg oral tablet: 1 tab(s) orally every 12 hours (24 Nov 2021 13:03)  tamsulosin 0.4 mg oral capsule: 1 cap(s) orally once a day (19 Nov 2021 18:36)      MEDICATIONS  (STANDING):  dextrose 50% Injectable 25 Gram(s) IV Push once  dextrose 50% Injectable 12.5 Gram(s) IV Push once  dextrose 50% Injectable 25 Gram(s) IV Push once  glucagon  Injectable 1 milliGRAM(s) IntraMuscular once  heparin  Infusion.  Unit(s)/Hr (17 mL/Hr) IV Continuous <Continuous>  insulin lispro (ADMELOG) corrective regimen sliding scale   SubCutaneous Before meals and at bedtime  sacubitril 24 mG/valsartan 26 mG 1 Tablet(s) Oral two times a day  tamsulosin 0.4 milliGRAM(s) Oral at bedtime  torsemide 20 milliGRAM(s) Oral two times a day    MEDICATIONS  (PRN):  heparin   Injectable 7500 Unit(s) IV Push every 6 hours PRN For aPTT less than 40  heparin   Injectable 3500 Unit(s) IV Push every 6 hours PRN For aPTT between 40 - 57      .  VITAL SIGNS:  T(C): 36.6 (02-14-22 @ 07:14), Max: 36.6 (02-14-22 @ 06:15)  T(F): 97.9 (02-14-22 @ 07:14), Max: 97.9 (02-14-22 @ 06:15)  HR: 59 (02-14-22 @ 09:00) (58 - 60)  BP: 101/65 (02-14-22 @ 10:00) (89/61 - 116/77)  BP(mean): 79 (02-14-22 @ 10:00) (71 - 79)  RR: 17 (02-14-22 @ 09:00) (17 - 18)  SpO2: 98% (02-14-22 @ 09:00) (98% - 100%)  Wt(kg): --    PHYSICAL EXAM:   Constitutional: WDWN resting comfortably in bed; NAD  Head: NC/AT  ENT:  MMM  Neck: supple; no JVD   Respiratory: CTA B/L;  no retractions; Right sided chest tube in place with surrounding skin intact  Cardiac: +S1/S2; Irregular RR; Richard  Gastrointestinal: soft, NT/ND; no rebound or guarding; +BS  Extremities: WWP, no clubbing or cyanosis; no peripheral edema  Vascular: 2+ radial,  DP/PT pulses B/L      .  LABS:                         14.6   3.14  )-----------( 105      ( 14 Feb 2022 06:17 )             44.4     02-14    141  |  103  |  27<H>  ----------------------------<  87  4.4   |  30  |  1.13    Ca    9.2      14 Feb 2022 06:17  Mg     2.3     02-14    TPro  6.6  /  Alb  3.2<L>  /  TBili  1.0  /  DBili  x   /  AST  18  /  ALT  14  /  AlkPhos  115  02-14    PT/INR - ( 13 Feb 2022 07:05 )   PT: 14.9 sec;   INR: 1.26          PTT - ( 14 Feb 2022 06:17 )  PTT:66.4 sec              RADIOLOGY, EKG & ADDITIONAL TESTS: Reviewed.     < from: TTE Echo Complete w/o Contrast w/ Doppler (02.08.22 @ 11:51) >  ----  CONCLUSIONS:     1. Severe symmetric left ventricular hypertrophy. Moderately reduced   left ventricular systolic function. Apical segments are more vigorous   than the base, consider infiltrative cardiomyopathy (such as amyloid).   2. Mildly reduced right ventricular systolic function.   3. Biatrial enlargement.   4. Tethered mitral valve. Mild mitral regurgitation.   5. Mild tricuspid regurgitation.   6. Pulmonary hypertension present, pulmonary artery systolic pressure is   40 mmHg.   7. Small pericardial effusion.   8. Bilateral pleural effusion.   9. Borderline dilated ascending aorta.  10. Compared to the previous TTE performed on 11/22/2021, given technical   differences, LV function is similar to slightly more vigorous. Comparable   views ofproximal ascending aorta were not obtained.    < end of copied text >  < from: TTE Echo Complete w/o Contrast w/ Doppler (02.08.22 @ 11:51) >  LVIDd (2D):     4.70 cm  (4.2-5.8)     (3.8-5.2)    < end of copied text >  < from: TTE Echo Complete w/o Contrast w/ Doppler (02.08.22 @ 11:51) >  eft Ventricle:  The left ventricle cavity size is normal. There is severe concentric left   ventricular hypertrophy. Left ventricular systolic function is moderately   reduced with a calculated ejection fraction of 35-40% with global   hypokinesis. Apical segments are more vigorous than the base, consider   infiltrative cardiomyopathy (such as amyloid). Analysis of left   ventricular diastolic function and filling pressure is made challenging   by the presence of A-fib.    < end of copied text >      < from: NM Amyloidosis SPECT/CT, Single Area Single Day (02.10.22 @ 15:50) >  Impression: Intense uptake in the left ventricular and part of the right   ventricular myocardium in a pattern consistent with TTR-mediated   amyloidosis.      < end of copied text >    < from: 12 Lead ECG (02.07.22 @ 08:52) >  Diagnosis Line Atrial fibrillation with slow ventricular response  Left Anterior Fasicular Block  RSR' in V1  Low voltage QRS  Poor R Wave Progression  Nonspecific ST - T abnormalities

## 2022-02-14 NOTE — PROGRESS NOTE ADULT - PROBLEM SELECTOR PLAN 2
-Patient with Kappa/Lambda:1.4  -PYP scan confirmed Diagnosis of Cardiac Amyloidosis with Intense uptake in the left ventricular and part of the right   ventricular myocardium in a pattern consistent with TTR-mediated amyloidosis.  -Patient will need genetic testing as an outpatient to assess for Edilia 122 Ile mutation  -Patient Will benefit from Tafamidis therapy as outpatient  -Can cont with Entresto 24-26mg PO BID with no plan to uptitrate further  -Given Cardiac amyloidosis, to improve cardiac Output suggest placement of CRT-P at a pacing rate of 80. Discussed with EP  - Will need to schedule follow up with Dr. El Bradley as an outpatient - TTR amyloid confirmed by Tc-PyP scan. Fat pad biopsy is no longer indicated for work-up. Procedure cancelled.  - Will refer to Dr. Bradley as outpatient for     -Patient will need genetic testing as an outpatient to assess for Edilia 122 Ile mutation  -Patient Will benefit from Tafamidis therapy as outpatient  -Can cont with Entresto 24-26mg PO BID with no plan to uptitrate further  -Given Cardiac amyloidosis, to improve cardiac Output suggest placement of CRT-P at a pacing rate of 80. Discussed with EP  - Will need to schedule follow up with Dr. El Bradley as an outpatient - TTR amyloid confirmed by Tc-PyP scan. Fat pad biopsy is no longer indicated for work-up. Procedure cancelled.  - Will refer to Dr. Bradley as outpatient for amyloid follow-up and genetic testing. He may benefit from Tafnakul, but if he is unable to obtain due to insurance/cost, can also offer clinical trial at [facility].  - recommend CRT-P at HR 80 bpm to improve cardiac output and hopefully reduce readmissions for decompensated HF (CO= HR x SV; SV is fixed in restrictive cardiomyopathy). D/W Dr. Pemberton.

## 2022-02-14 NOTE — PROGRESS NOTE ADULT - ASSESSMENT
77 Yo Male from [facility] with PMHx of NICM (EF 35%), Afib/Aflutter s/p ablation 6/2016 (on Eliquis), Thrombocytopenia s/p BM biopsy R/o Plasma Cell disorrder (baseline PLT 60-70), CKD-III (baseline Cr 1-1.2), pituitary adenoma (s/p transphenoidal treatment in 1990's), Prostate CA (s/p chemo) and chronic venous insufficiency, with 2 recent hospitalizations for Heart failure exacerbations who presented to 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, s/p IR guided right sided pleurocentesis c/b pneumothorax (2/11), with continuous serosanguinous output (330 cc over the last 24 hours), pending BIV for NICM 2/2 Amyloidosis   77 Yo Male from [place] with PMHx of NICM (EF 35%), NOCAD (Last Cath at [facility] in 2014), Afib/Aflutter s/p ablation 6/2016 (on Eliquis), Thrombocytopenia s/p BM biopsy R/o Plasma Cell disorder (baseline PLT 60-70), CKD-III (baseline Cr 1-1.2), pituitary adenoma (s/p transphenoidal treatment in 1990's), Prostate CA (s/p chemo) and chronic venous insufficiency, with 2 recent hospitalizations for Heart failure exacerbations, who presented to [facility] ED on 02/07 with worsening MARTINEZ and lower extremity edema, in acute CHF exacerbation 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   77 Yo Male from [place] with PMHx of NICM (EF 35%), NOCAD (Last Cath at 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   77 yo male from [facility] with PMH of NICM, LVEF 35%, Afib/Aflutter s/p ablation (6/2016) on Eliquis, CKD stage 3 (baseline SCr 1.0-1.2) and recurrent hospitalizations for decompensated HF who was readmitted 2/7/22 for MARTINEZ, abdominal distention, and LE edema. On a prior admission (11/2021), he had a bone marrow biopsy that did not reveal a plasma dyscrasia and was negative for amyloid ([place] Red staining), although the sample was small. The kappa-lambda free light chain ratio was normal, ruling out AL amyloid. His current work-up included a positive Tc-PyP scan confirming TTR cardiac amyloidosis, but he will need outpatient genetic testing to determine between wild type and familial mutations. Other PMH of note includes thrombocytopenia and prostate cancer s/p chemotherapy. He is ACC/AHA Stage C, NYHA Class IIIb HF, currently euvolemic on oral diuretics.    His hospital course is notable for right sided thoracentesis by IR c/b pneumothorax (2/11) with continuous serosanguinous output (330 cc/24 hours). CXR this morning notable for a small pneumothorax so he was placed back onto wall suction today (2/14) with plan to repeat CXR tomorrow.        Pertinent Cardiac Studies:  2/7/22 ECG: AF at 53 bpm, RSR’, low voltage, old inferior infarct, PRWP, nonspecific Tw abnormalities.  2/8/22 TTE: LVIDd 4.7 cm, severe LVH (SW 1.6, PW 1.7), LVEF 35-40% (global), normal RV size with mild RV dysfunction, HYAICNTH, mild MR, mild TR, mild PH (PASP 40 mmHg), dilated IVC with abnormal collapse, small pericardial effusion, b/l pleural effusions, +ascites.  2014 [place] - nonobstructive CAD (by report)

## 2022-02-14 NOTE — PROGRESS NOTE ADULT - PROBLEM SELECTOR PLAN 1
P/W anasarca. BNP [id number]. Trop peaked 0.13 likely 2/2 demand ischemia in setting of fluid overload   - c/w Torsemide 20mg BID and Entresto 24/26mg BID  - no BB 2/2 amyloidosis  - Heart failure following, appreciate recs.   - plan Biv ICD this admission  - net neg 20L since admission and 1.8L/24H  - core measures. Strict I/Os, daily weights.

## 2022-02-14 NOTE — PROGRESS NOTE ADULT - ASSESSMENT
ASSESSMENT/PLAN 77 y/o male,  w/ PMHx type II DM (not currently on medications), A-Flutter/A-Fib (s/p prior ablation in 06/2016, most  A-fib on Eliquis 2.5mg PO bid), thrombocytopenia (baseline Plt 60-70's on prior admission), stage III CKD (baseline Cr 1.0-1.2 ), non-obstructive CAD, Chronic Systolic CHF(EF 35% by  Echocardiogram 11/2022), hx of NSVT (EP evaluated pt. 11/2021 BPH (s/p TURP procedure), pituitary adenoma (s/p transphenoidal treatment in 1990's), prostate cancer (s/p chemotherapy), chronic venous insufficiency, Impression of acute on chronic CHF, R>L pleural effusions, compressive atelectases, concern for amyloidosis  Now Right pneumothorax s/p post thoracentesis    1. O2 attempt now off , incentive spirometry    2. Bronchodilators: off, continue  and encourage incentive spirometry   3. Corticosteroids: off  4. ID/Antibiotics: off  5. Cardiac/HTN: continue diuresis , optimize CHF mngmnt   6. GI: Rx/ prophylaxis c PPI/H2B  7. Heme: Rx/VT prophylaxis c SQH/SCD/AC on Heparin IV,   8. Aspiration precautions  9 Right CTube to suction, clamp in am if no PTX remove CTube    Discussed with managing team Cardiology

## 2022-02-14 NOTE — PROGRESS NOTE ADULT - PROBLEM SELECTOR PLAN 3
Persistent right sided pleural effusion despite IV diuretics  - CT Chest 2/9: Increased mod-large right pleural effusion, unchanged small left pleural effusion, ascites and upper abd w/ anasarca, cardiomegaly, probably pulm HTN  - s/p IR guided pleurocentesis 2/11 w/ 1L drained, c/b small pneumothorax  w/ chest tube in place  - CT water seal w/out leak. Repeat CXR w/small pneumo, CT placed back to wall suction   - CXR AM and clamp test; most likely remove CT 2/15   - will have to hold heparin gtt 1 hour before, and 1 hour after chest tube removal.

## 2022-02-14 NOTE — PROGRESS NOTE ADULT - PROBLEM SELECTOR PLAN 4
- tele- slow AF HR 50s   - c/w IV Hep gtt (CT remains in place and plan ICD this admission)   - Hold AC if PLT <50, per hematology

## 2022-02-14 NOTE — PROGRESS NOTE ADULT - PROBLEM SELECTOR PLAN 3
-Hx of Afib/Flutter s/p ablation in 2016 currenlt in Afib with slow ventricular rate  -Patient with CHADVASC of at least 2 with amyloid cardiomyopathy at high risk for NHUNG clot and thromboembolic event. Continue with Heparin gtt with goal to resumed NOAC (Eliquis) post EP intervention - s/p pigtail placement by IR   - persistent pneumothorax on CXR this morning when on water seal; back on wall suction  - if the effusion recurs after pigtail removal, would consider placement of PleurX catheter.

## 2022-02-14 NOTE — PROGRESS NOTE ADULT - PROBLEM SELECTOR PLAN 6
-Continue Tamsulosin 0.4mg QD    F: None  E: Replete if K<4 or Mag<2  N: DASH Diet  VTEppx: IV hep gtt    Dispo: cardiac telemetry  PT: home w/home PT

## 2022-02-14 NOTE — PROGRESS NOTE ADULT - PROBLEM SELECTOR PLAN 2
- NM Amyloidosis scan 2/10: Intense uptake in the left ventricular and part of the right ventricular myocardium in a pattern consistent with TTR-mediated amyloidosis.  - s/p BM Bx w/ Dr. Bernal 11/23/21 to r/o infiltrative CMP which was negative for amyloid however was a small sample size.  - Hematologist Dr. Escobar consulted and signed off due to amyloid is TTR mediated and not originating in BM.  - Pt is to follow up w/ cardiologist Dr. Finkelstein on d/c for further management of amyloid  - NO BETA BLOCKERS

## 2022-02-14 NOTE — PROGRESS NOTE ADULT - SUBJECTIVE AND OBJECTIVE BOX
77 yo M with PMHx type II DM, A-Flutter/A-Fib (s/p prior ablation in 06/2016, most recently found to be in A-fib on Eliquis 2.5mg), thrombocytopenia (baseline Plt 60-70's), stage III CKD, CAD, Chronic Systolic CHF, prostate cancer (s/p chemotherapy), chronic venous insufficiency, and multiple admissions for CHF exacerbation. Presented to [facility] 2/7/22 for dyspnea and abdominal swelling. CT chest 2/9 showing increased moderate to large right pleural effusion. Status post thoracentesis by IR on 2/11 c/b PTX and need for R chest tube placement.    R chest tube with 330cc serosanguinous output over 24h on WS (460cc output day prior). Most recent CXR 2/14 AM showing no significant interval change still with small b/l effusions and no large PTX. O2 sat >95% on RA.    Will clamp chest tube today. Plan to continue monitoring chest tube output.

## 2022-02-14 NOTE — PROGRESS NOTE ADULT - SUBJECTIVE AND OBJECTIVE BOX
CARDIOLOGY NP PROGRESS NOTE    Subjective:  Received pt awake in bed in NAD. States feeling well. Denies CP/SOB, dizziness/diaphoresis, n/v, palpitations Remainder ROS otherwise negative.    Overnight Events:  CT remained on water seal overnight     TELEMETRY: slow AF (HR 50s)        VITAL SIGNS:  T(C): 36.8 (02-14-22 @ 14:14), Max: 36.8 (02-14-22 @ 14:14)  HR: 62 (02-14-22 @ 13:00) (58 - 62)  BP: 94/62 (02-14-22 @ 13:00) (89/61 - 116/77)  RR: 16 (02-14-22 @ 13:00) (16 - 18)  SpO2: 99% (02-14-22 @ 13:00) (98% - 100%)  Wt(kg): --    I&O's Summary    13 Feb 2022 07:01  -  14 Feb 2022 07:00  --------------------------------------------------------  IN: 492 mL / OUT: 2835 mL / NET: -2343 mL    14 Feb 2022 07:01  -  14 Feb 2022 16:18  --------------------------------------------------------  IN: 268 mL / OUT: 2070 mL / NET: -1802 mL          PHYSICAL EXAM:    General: A/ox 3, No acute Distress  Neck: Supple, NO JVD  Cardiac: Irregular RR, No M/R/G  Pulmonary: Diminished breath sounds RLL. Breathing unlabored on RA.  No Rhonchi/Rales/Wheezing  R CT in place   Abdomen: Soft, Non -tender, +BS x 4 quads  Extremities: No Rashes, No edema  Neuro: A/o x 3, No focal deficits          LABS:                          14.6   3.14  )-----------( 105      ( 14 Feb 2022 06:17 )             44.4                              02-14    141  |  103  |  27<H>  ----------------------------<  87  4.4   |  30  |  1.13    Ca    9.2      14 Feb 2022 06:17  Mg     2.3     02-14    TPro  6.6  /  Alb  3.2<L>  /  TBili  1.0  /  DBili  x   /  AST  18  /  ALT  14  /  AlkPhos  115  02-14    LIVER FUNCTIONS - ( 14 Feb 2022 06:17 )  Alb: 3.2 g/dL / Pro: 6.6 g/dL / ALK PHOS: 115 U/L / ALT: 14 U/L / AST: 18 U/L / GGT: x                                 PT/INR - ( 13 Feb 2022 07:05 )   PT: 14.9 sec;   INR: 1.26          PTT - ( 14 Feb 2022 06:17 )  PTT:66.4 sec  CAPILLARY BLOOD GLUCOSE      POCT Blood Glucose.: 78 mg/dL (14 Feb 2022 11:31)  POCT Blood Glucose.: 87 mg/dL (14 Feb 2022 06:45)  POCT Blood Glucose.: 110 mg/dL (13 Feb 2022 21:24)  POCT Blood Glucose.: 120 mg/dL (13 Feb 2022 16:43)            Allergies:  No Known Allergies    MEDICATIONS  (STANDING):  dextrose 50% Injectable 25 Gram(s) IV Push once  dextrose 50% Injectable 12.5 Gram(s) IV Push once  dextrose 50% Injectable 25 Gram(s) IV Push once  glucagon  Injectable 1 milliGRAM(s) IntraMuscular once  heparin  Infusion.  Unit(s)/Hr (17 mL/Hr) IV Continuous <Continuous>  insulin lispro (ADMELOG) corrective regimen sliding scale   SubCutaneous Before meals and at bedtime  sacubitril 24 mG/valsartan 26 mG 1 Tablet(s) Oral two times a day  tamsulosin 0.4 milliGRAM(s) Oral at bedtime  torsemide 20 milliGRAM(s) Oral two times a day    MEDICATIONS  (PRN):  heparin   Injectable 7500 Unit(s) IV Push every 6 hours PRN For aPTT less than 40  heparin   Injectable 3500 Unit(s) IV Push every 6 hours PRN For aPTT between 40 - 57        DIAGNOSTIC TESTS:

## 2022-02-14 NOTE — PROGRESS NOTE ADULT - PROBLEM SELECTOR PLAN 4
-Patient with Hx of DM  -A1c this hospitalization at goal (6.4) -Hx of Afib/Flutter s/p ablation in 2016 currenlt in Afib with slow ventricular rate  -Patient with CHADVASC of at least 2 with amyloid cardiomyopathy at high risk for NHUNG clot and thromboembolic event. Continue with Heparin gtt with goal to resumed NOAC (Eliquis) post EP intervention - s/p ablation in 2016, but currently in AFib  - currently on heparin, but can switch back to Eliquis for AC after CRT-P

## 2022-02-14 NOTE — PROGRESS NOTE ADULT - SUBJECTIVE AND OBJECTIVE BOX
Interventional, Pulmonary, Critical, Chest Special Procedures.    Pt was seen and fully examined by myself.     Time spent with patient in minutes:    Patient is a 76y old  Male who presents with a chief complaint of worsening Dyspnea and recent weight gain 4-5 days (14 Feb 2022 12:44) The patient c R CTube egress site discomfort, eupneioc on RA  R CTube circuit integrity was verified and preserved     HPI:  A&O X3 Full Code      In terms of COVID-19 pt denies hx of COVID-19 and has received full Pfizer COVID-19 vaccination.  Patient plans on receiving Pfizer booster in May 2022.    75 y/o male, prior history of medication non-compliance, w/ PMHx type II DM (not currently on medications), A-Flutter/A-Fib (s/p prior ablation in 06/2016, most recently found to be in A-fib on Eliquis 2.5mg PO bid), thrombocytopenia (baseline Plt 60-70's on prior admission), stage III CKD (baseline Cr 1.0-1.2 ), non-obstructive CAD, Chronic Systolic CHF(EF 35% by  Echocardiogram 11/2022), hx of NSVT (EP evaluated pt. 11/2021 recommended repeat TTE in three months), BPH (s/p TURP procedure), pituitary adenoma (s/p transphenoidal treatment in 1990's), prostate cancer (s/p chemotherapy), chronic venous insufficiency, and multiple admissions for CHF exacerbation most recent 11/19/2021,  who presents to [facility] ER this morning, 2/7/22, with worsening dyspnea (with minimal exertion), increase in abdominal girth, b/l LE swelling, facial swelling and dry cough for 4-5 days.  Pt. reports compliance with medication and medical follow up.   In light of patient's risk factors, and acute on chronic systolic CHF exacerbation pt. is now admitted to [facility]  for further medical management.    In ER ECG reveals Atrial Fibrillation HR@65bpm with non specific ST-T wave changes, Troponin 0.11 (patient's baseline), BNP 11,635, WBC 3.6, H/H 13.3/40.2, Platelets 107 (base line 60-70's), Blood Glucose 116.  CXR AP reveals R>L pleural effusion with Cardiomegaly, CT of Abdomen reveals R>L pleural effusion, ascites noted throughout abdomen and plevis, mesenteric infiltrate extensive anasarca.  US Abdomen reveals cholelithiasis, moderate ascites, right pleural effusion (moderate) and dilatation of hepatic vein.        (07 Feb 2022 05:13)      REVIEW OF SYSTEMS:  Constitutional: No fever, weight loss, chills +++ fatigue  Eyes: No eye pain, visual disturbances, or discharge  ENMT:  No difficulty hearing, tinnitus, vertigo; No sinus or throat pain. No epistaxis, dysphagia, dysphonia, hoarseness or odynophagia  Neck: No pain, stiffness or neck swelling.  No masses or deformities  Respiratory: No cough, wheezing, hemoptysis  - COPD  - ILD   - PE   - ASTHMA     - PNEUMONIA  Cardiovascular: No chest pain, + AF dysrhythmia, palpitations, dizziness or edema - CAD   -+CHF   + HTN  Gastrointestinal: No abdominal or epigastric pain. No nausea, vomiting or hematemesis; No diarrhea or constipation. No melena or hematochezia, Icterus.          Genitourinary: No dysuria, frequency, hematuria or incontinence   + CKD/KAYLI      - ESRD  Neurological: No headaches, +memory loss, loss of strength, numbness or tremors      -DEMENTIA     - STROKE    - SEIZURE  Skin: No itching, burning, rashes or lesions   Lymph Nodes: No enlarged glands  Endocrine: No heat or cold intolerance; No hair loss       + DM     - THYROID DISORDER  Musculoskeletal: No joint pain or swelling; No muscle, back or extremity pain, No edema  Psychiatric: No depression, anxiety, mood swings or difficulty sleeping  Heme/Lymph: No easy bruising or bleeding gums         -+ANEMIA      + CANCER   -COAGULOPATHY  Allergy and Immunologic: No hives or eczema    PAST MEDICAL & SURGICAL HISTORY:  Atrial flutter  s/p Ablation 2016    Chronic venous insufficiency of lower extremity    Prostate CA    Stage 3 chronic kidney disease  1.2-1.2 baseline   On admission 1.2    Type 2 diabetes mellitus  not on medication    Thrombocytopenia  60-70&#x27;s baseline    Acute on chronic systolic congestive heart failure    Chronic atrial fibrillation    Atrial flutter  s/p ablation in 2016    History of surgical removal of pituitary gland  1990s    S/P TURP  for BPH      FAMILY HISTORY:    SOCIAL HISTORY:      - Tobacco     - ETOH    Allergies    No Known Allergies    Intolerances      Vital Signs Last 24 Hrs  T(C): 36.8 (14 Feb 2022 14:14), Max: 36.8 (14 Feb 2022 14:14)  T(F): 98.2 (14 Feb 2022 14:14), Max: 98.2 (14 Feb 2022 14:14)  HR: 62 (14 Feb 2022 13:00) (58 - 62)  BP: 94/62 (14 Feb 2022 13:00) (89/61 - 116/77)  BP(mean): 73 (14 Feb 2022 13:00) (71 - 79)  RR: 16 (14 Feb 2022 13:00) (16 - 18)  SpO2: 99% (14 Feb 2022 13:00) (98% - 100%)    02-13 @ 07:01  -  02-14 @ 07:00  --------------------------------------------------------  IN: 492 mL / OUT: 2835 mL / NET: -2343 mL    02-14 @ 07:01 - 02-14 @ 16:07  --------------------------------------------------------  IN: 268 mL / OUT: 2070 mL / NET: -1802 mL          MEDICATIONS:  MEDICATIONS  (STANDING):  dextrose 50% Injectable 25 Gram(s) IV Push once  dextrose 50% Injectable 12.5 Gram(s) IV Push once  dextrose 50% Injectable 25 Gram(s) IV Push once  glucagon  Injectable 1 milliGRAM(s) IntraMuscular once  heparin  Infusion.  Unit(s)/Hr (17 mL/Hr) IV Continuous <Continuous>  insulin lispro (ADMELOG) corrective regimen sliding scale   SubCutaneous Before meals and at bedtime  sacubitril 24 mG/valsartan 26 mG 1 Tablet(s) Oral two times a day  tamsulosin 0.4 milliGRAM(s) Oral at bedtime  torsemide 20 milliGRAM(s) Oral two times a day    MEDICATIONS  (PRN):  heparin   Injectable 7500 Unit(s) IV Push every 6 hours PRN For aPTT less than 40  heparin   Injectable 3500 Unit(s) IV Push every 6 hours PRN For aPTT between 40 - 57      PHYSICAL EXAM:  Un Comfortable, no distress  Eyes: PERRL, EOM intact; conjunctiva and sclera clear  Head: Normocephalic;  No Trauma  ENMT: No nasal discharge, hoarseness, cough or hemoptysis  Neck: Supple; non tender; no masses or deformities.    No JVD  Respiratory: - WHEEZING   - RHONCHI  - RALES  + CRACKLES.  Improved R hemnithorax air entry    Cardiovascular: Regular rate and rhythm. S1 and S2 Normal; No murmurs, gallops or rubs     - PPM/AICD  Gastrointestinal: Soft non-tender, non-distended; Normal bowel sounds; No hepatosplenomegaly.     -PEG    -  GT   - GONZALEZ  Genitourinary: No costovertebral angle tenderness. No dysuria  Extremities: AROM, No clubbing, cyanosis or edema    Vascular: Peripheral pulses palpable 2+ bilaterally  Neurological: Alert and responisve to stimuli   Skin: Warm and dry. No obvious rash  Lymph Nodes: No acute cervical or supraclavicular adenopathy  Psychiatric: Cooperative and appropriate mood    DEVICES:  - DENTURES   +IV R / L     - ETUBE   -TRACH   +CTUBE  R /  O/P 200cc, now daphney , no air leak on cough and tidal breathing         LABS:                          14.6   3.14  )-----------( 105      ( 14 Feb 2022 06:17 )             44.4     02-14    141  |  103  |  27<H>  ----------------------------<  87  4.4   |  30  |  1.13    Ca    9.2      14 Feb 2022 06:17  Mg     2.3     02-14    TPro  6.6  /  Alb  3.2<L>  /  TBili  1.0  /  DBili  x   /  AST  18  /  ALT  14  /  AlkPhos  115  02-14    PT/INR - ( 13 Feb 2022 07:05 )   PT: 14.9 sec;   INR: 1.26          PTT - ( 14 Feb 2022 06:17 )  PTT:66.4 sec  RADIOLOGY & ADDITIONAL STUDIES (The following images were personally reviewed):75 yo M with PMHx type II DM, A-Flutter/A-Fib (s/p prior ablation in 06/2016, most recently found to be in A-fib on Eliquis 2.5mg), thrombocytopenia (baseline Plt 60-70's), stage III CKD, CAD, Chronic Systolic CHF, prostate cancer (s/p chemotherapy), chronic venous insufficiency, and multiple admissions for CHF exacerbation. Presented to [facility] 2/7/22 for dyspnea and abdominal swelling. CT chest 2/9 showing increased moderate to large right pleural effusion. Status post thoracentesis by IR on 2/11 c/b PTX and need for R chest tube placement.    R chest tube with 330cc serosanguinous output over 24h on WS (460cc output day prior). Most recent CXR 2/14 AM showing no significant interval change still with small b/l effusions and no large PTX. O2 sat >95% on RA.    Will clamp chest tube today. Plan to continue monitoring chest tube output.

## 2022-02-14 NOTE — PROGRESS NOTE ADULT - ASSESSMENT
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

## 2022-02-14 NOTE — CONSULT NOTE ADULT - REASON FOR ADMISSION
worsening Dyspnea and recent weight gain 4-5 days

## 2022-02-14 NOTE — CONSULT NOTE ADULT - CONSULT REASON
right thoracentesis
?amyloidosis; evaluation for device
HF
r/o amyloidosis
Abdominal wall fat-pad biopsy

## 2022-02-14 NOTE — PROGRESS NOTE ADULT - PROBLEM SELECTOR PLAN 1
-Patient with repeated Heart failure hospitalizations in 2021 for worsening MARTINEZ and lower extremity edema  -He is ACC/AHA Stage C, NYHA Class II  -Echo this hospitalization with EF of 35-40% with Global Hypokinesis and severe LVH and biatrial enlargement  -He has diuresed well this hospitalization and his respiratory status has improved since Right sided pleurocentesis  -He has had >300 cc of serosanguinous output in the last 24 hours. CT remains in place  -Given Pleural effusion at high risk of reaccumulating in patient with Cardiac amyloidosis, would recommend consulting with CT Surgery about Pleur x Catheter placement for long term symptomatic management of pleural effusion  -At this time recommend continuing with Torsemide 20 mg PO BID  -Please obtain daily Standing weight - continue torsemide 20 mg po BID & Entresto 24-26 BID  - defer further med uptitration until after he receives the pacemaker  - strict I/Os with daily standing weight - continue torsemide 20 mg po BID & Entresto 24-26 BID  - defer further med uptitration until after he receives the pacemaker  - strict I/Os with daily standing weight  - Eventual addition of beta blocker for LVEF < 40% once he has CRT-P with stable HR of 80 bpm.

## 2022-02-15 LAB
ANION GAP SERPL CALC-SCNC: 8 MMOL/L — SIGNIFICANT CHANGE UP (ref 5–17)
APTT BLD: 131.3 SEC — CRITICAL HIGH (ref 27.5–35.5)
APTT BLD: 54.3 SEC — HIGH (ref 27.5–35.5)
BUN SERPL-MCNC: 28 MG/DL — HIGH (ref 7–23)
CALCIUM SERPL-MCNC: 8.6 MG/DL — SIGNIFICANT CHANGE UP (ref 8.4–10.5)
CHLORIDE SERPL-SCNC: 104 MMOL/L — SIGNIFICANT CHANGE UP (ref 96–108)
CO2 SERPL-SCNC: 26 MMOL/L — SIGNIFICANT CHANGE UP (ref 22–31)
CREAT SERPL-MCNC: 1.11 MG/DL — SIGNIFICANT CHANGE UP (ref 0.5–1.3)
GLUCOSE BLDC GLUCOMTR-MCNC: 104 MG/DL — HIGH (ref 70–99)
GLUCOSE BLDC GLUCOMTR-MCNC: 127 MG/DL — HIGH (ref 70–99)
GLUCOSE BLDC GLUCOMTR-MCNC: 142 MG/DL — HIGH (ref 70–99)
GLUCOSE BLDC GLUCOMTR-MCNC: 94 MG/DL — SIGNIFICANT CHANGE UP (ref 70–99)
GLUCOSE SERPL-MCNC: 92 MG/DL — SIGNIFICANT CHANGE UP (ref 70–99)
HCT VFR BLD CALC: 38.5 % — LOW (ref 39–50)
HCT VFR BLD CALC: 39.9 % — SIGNIFICANT CHANGE UP (ref 39–50)
HGB BLD-MCNC: 13.2 G/DL — SIGNIFICANT CHANGE UP (ref 13–17)
HGB BLD-MCNC: 13.6 G/DL — SIGNIFICANT CHANGE UP (ref 13–17)
MAGNESIUM SERPL-MCNC: 2.2 MG/DL — SIGNIFICANT CHANGE UP (ref 1.6–2.6)
MCHC RBC-ENTMCNC: 28.9 PG — SIGNIFICANT CHANGE UP (ref 27–34)
MCHC RBC-ENTMCNC: 28.9 PG — SIGNIFICANT CHANGE UP (ref 27–34)
MCHC RBC-ENTMCNC: 34.1 GM/DL — SIGNIFICANT CHANGE UP (ref 32–36)
MCHC RBC-ENTMCNC: 34.3 GM/DL — SIGNIFICANT CHANGE UP (ref 32–36)
MCV RBC AUTO: 84.2 FL — SIGNIFICANT CHANGE UP (ref 80–100)
MCV RBC AUTO: 84.7 FL — SIGNIFICANT CHANGE UP (ref 80–100)
NRBC # BLD: 0 /100 WBCS — SIGNIFICANT CHANGE UP (ref 0–0)
NRBC # BLD: 0 /100 WBCS — SIGNIFICANT CHANGE UP (ref 0–0)
PLATELET # BLD AUTO: 101 K/UL — LOW (ref 150–400)
PLATELET # BLD AUTO: 109 K/UL — LOW (ref 150–400)
POTASSIUM SERPL-MCNC: 4.1 MMOL/L — SIGNIFICANT CHANGE UP (ref 3.5–5.3)
POTASSIUM SERPL-SCNC: 4.1 MMOL/L — SIGNIFICANT CHANGE UP (ref 3.5–5.3)
RBC # BLD: 4.57 M/UL — SIGNIFICANT CHANGE UP (ref 4.2–5.8)
RBC # BLD: 4.71 M/UL — SIGNIFICANT CHANGE UP (ref 4.2–5.8)
RBC # FLD: 16.2 % — HIGH (ref 10.3–14.5)
RBC # FLD: 16.7 % — HIGH (ref 10.3–14.5)
SARS-COV-2 RNA SPEC QL NAA+PROBE: SIGNIFICANT CHANGE UP
SODIUM SERPL-SCNC: 138 MMOL/L — SIGNIFICANT CHANGE UP (ref 135–145)
WBC # BLD: 2.88 K/UL — LOW (ref 3.8–10.5)
WBC # BLD: 2.94 K/UL — LOW (ref 3.8–10.5)
WBC # FLD AUTO: 2.88 K/UL — LOW (ref 3.8–10.5)
WBC # FLD AUTO: 2.94 K/UL — LOW (ref 3.8–10.5)

## 2022-02-15 PROCEDURE — 99233 SBSQ HOSP IP/OBS HIGH 50: CPT | Mod: GC

## 2022-02-15 PROCEDURE — 71045 X-RAY EXAM CHEST 1 VIEW: CPT | Mod: 26

## 2022-02-15 RX ORDER — HEPARIN SODIUM 5000 [USP'U]/ML
3000 INJECTION INTRAVENOUS; SUBCUTANEOUS EVERY 6 HOURS
Refills: 0 | Status: DISCONTINUED | OUTPATIENT
Start: 2022-02-15 | End: 2022-02-16

## 2022-02-15 RX ORDER — SODIUM CHLORIDE 9 MG/ML
250 INJECTION INTRAMUSCULAR; INTRAVENOUS; SUBCUTANEOUS ONCE
Refills: 0 | Status: COMPLETED | OUTPATIENT
Start: 2022-02-15 | End: 2022-02-15

## 2022-02-15 RX ORDER — HEPARIN SODIUM 5000 [USP'U]/ML
6500 INJECTION INTRAVENOUS; SUBCUTANEOUS EVERY 6 HOURS
Refills: 0 | Status: DISCONTINUED | OUTPATIENT
Start: 2022-02-15 | End: 2022-02-16

## 2022-02-15 RX ORDER — SODIUM CHLORIDE 9 MG/ML
100 INJECTION INTRAMUSCULAR; INTRAVENOUS; SUBCUTANEOUS ONCE
Refills: 0 | Status: DISCONTINUED | OUTPATIENT
Start: 2022-02-15 | End: 2022-02-15

## 2022-02-15 RX ORDER — HEPARIN SODIUM 5000 [USP'U]/ML
1300 INJECTION INTRAVENOUS; SUBCUTANEOUS
Qty: 25000 | Refills: 0 | Status: DISCONTINUED | OUTPATIENT
Start: 2022-02-15 | End: 2022-02-16

## 2022-02-15 RX ADMIN — HEPARIN SODIUM 1300 UNIT(S)/HR: 5000 INJECTION INTRAVENOUS; SUBCUTANEOUS at 10:23

## 2022-02-15 RX ADMIN — SENNA PLUS 2 TABLET(S): 8.6 TABLET ORAL at 21:45

## 2022-02-15 RX ADMIN — HEPARIN SODIUM 1000 UNIT(S)/HR: 5000 INJECTION INTRAVENOUS; SUBCUTANEOUS at 18:19

## 2022-02-15 RX ADMIN — TAMSULOSIN HYDROCHLORIDE 0.4 MILLIGRAM(S): 0.4 CAPSULE ORAL at 21:45

## 2022-02-15 RX ADMIN — SODIUM CHLORIDE 250 MILLILITER(S): 9 INJECTION INTRAMUSCULAR; INTRAVENOUS; SUBCUTANEOUS at 10:21

## 2022-02-15 RX ADMIN — Medication 20 MILLIGRAM(S): at 06:33

## 2022-02-15 RX ADMIN — HEPARIN SODIUM 0 UNIT(S)/HR: 5000 INJECTION INTRAVENOUS; SUBCUTANEOUS at 17:17

## 2022-02-15 NOTE — PROGRESS NOTE ADULT - ASSESSMENT
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 Heart failure and EP following

## 2022-02-15 NOTE — PROGRESS NOTE ADULT - SUBJECTIVE AND OBJECTIVE BOX
Interventional, Pulmonary, Critical, Chest Special Procedures.    Pt was seen and fully examined by myself.     Time spent with patient in minutes:67    Patient is a 76y old  Male who presents with a chief complaint of worsening Dyspnea and recent weight gain 4-5 days (15 Feb 2022 15:09) The patient seen on/off, R CTube managed to successful removal. At the time of note the patient paucisymptomatic, eupneic on RA and more engaging     HPI:  A&O X3 Full Code      In terms of COVID-19 pt denies hx of COVID-19 and has received full Pfizer COVID-19 vaccination.  Patient plans on receiving Pfizer booster in May 2022.    77 y/o male, prior history of medication non-compliance, w/ PMHx type II DM (not currently on medications), A-Flutter/A-Fib (s/p prior ablation in 06/2016, most recently found to be in A-fib on Eliquis 2.5mg PO bid), thrombocytopenia (baseline Plt 60-70's on prior admission), stage III CKD (baseline Cr 1.0-1.2 ), non-obstructive CAD, Chronic Systolic CHF(EF 35% by  Echocardiogram 11/2022), hx of NSVT (EP evaluated pt. 11/2021 recommended repeat TTE in three months), BPH (s/p TURP procedure), pituitary adenoma (s/p transphenoidal treatment in 1990's), prostate cancer (s/p chemotherapy), chronic venous insufficiency, and multiple admissions for CHF exacerbation most recent 11/19/2021,  who presents to [facility] ER this morning, 2/7/22, with worsening dyspnea (with minimal exertion), increase in abdominal girth, b/l LE swelling, facial swelling and dry cough for 4-5 days.  Pt. reports compliance with medication and medical follow up.   In light of patient's risk factors, and acute on chronic systolic CHF exacerbation pt. is now admitted to [facility]  for further medical management.    In ER ECG reveals Atrial Fibrillation HR@65bpm with non specific ST-T wave changes, Troponin 0.11 (patient's baseline), BNP 11,635, WBC 3.6, H/H 13.3/40.2, Platelets 107 (base line 60-70's), Blood Glucose 116.  CXR AP reveals R>L pleural effusion with Cardiomegaly, CT of Abdomen reveals R>L pleural effusion, ascites noted throughout abdomen and plevis, mesenteric infiltrate extensive anasarca.  US Abdomen reveals cholelithiasis, moderate ascites, right pleural effusion (moderate) and dilatation of hepatic vein.        (07 Feb 2022 05:13)      REVIEW OF SYSTEMS:  Constitutional: No fever, weight loss, chills +++ fatigue  Eyes: No eye pain, visual disturbances, or discharge  ENMT:  No difficulty hearing, tinnitus, vertigo; No sinus or throat pain. No epistaxis, dysphagia, dysphonia, hoarseness or odynophagia  Neck: No pain, stiffness or neck swelling.  No masses or deformities  Respiratory: No cough, wheezing, hemoptysis  - COPD  - ILD   - PE   - ASTHMA     - PNEUMONIA  Cardiovascular: No chest pain, + AF dysrhythmia, palpitations, dizziness or edema - CAD   -+CHF   + HTN  Gastrointestinal: No abdominal or epigastric pain. No nausea, vomiting or hematemesis; No diarrhea or constipation. No melena or hematochezia, Icterus.          Genitourinary: No dysuria, frequency, hematuria or incontinence   + CKD/KAYLI      - ESRD  Neurological: No headaches, +memory loss, loss of strength, numbness or tremors      -DEMENTIA     - STROKE    - SEIZURE  Skin: No itching, burning, rashes or lesions   Lymph Nodes: No enlarged glands  Endocrine: No heat or cold intolerance; No hair loss       + DM     - THYROID DISORDER  Musculoskeletal: No joint pain or swelling; No muscle, back or extremity pain, No edema  Psychiatric: No depression, anxiety, mood swings or difficulty sleeping  Heme/Lymph: No easy bruising or bleeding gums         -+ANEMIA      + CANCER   -COAGULOPATHY  Allergy and Immunologic: No hives or eczema    PAST MEDICAL & SURGICAL HISTORY:  Atrial flutter  s/p Ablation 2016    Chronic venous insufficiency of lower extremity    Prostate CA    Stage 3 chronic kidney disease  1.2-1.2 baseline   On admission 1.2    Type 2 diabetes mellitus  not on medication    Thrombocytopenia  60-70&#x27;s baseline    Acute on chronic systolic congestive heart failure    Chronic atrial fibrillation    Atrial flutter  s/p ablation in 2016    History of surgical removal of pituitary gland  1990s    S/P TURP  for BPH      FAMILY HISTORY:    SOCIAL HISTORY:      - Tobacco     - ETOH    Allergies    No Known Allergies    Intolerances      Vital Signs Last 24 Hrs  T(C): 36.6 (15 Feb 2022 14:01), Max: 37.1 (14 Feb 2022 22:01)  T(F): 97.9 (15 Feb 2022 14:01), Max: 98.8 (14 Feb 2022 22:01)  HR: 58 (15 Feb 2022 08:27) (51 - 62)  BP: 117/74 (15 Feb 2022 08:27) (81/55 - 117/74)  BP(mean): 90 (15 Feb 2022 08:27) (64 - 90)  RR: 17 (15 Feb 2022 08:27) (13 - 17)  SpO2: 100% (15 Feb 2022 08:27) (97% - 100%)    02-14 @ 07:01  -  02-15 @ 07:00  --------------------------------------------------------  IN: 444 mL / OUT: 3285 mL / NET: -2841 mL    02-15 @ 07:01  -  02-15 @ 16:02  --------------------------------------------------------  IN: 0 mL / OUT: 600 mL / NET: -600 mL          MEDICATIONS:  MEDICATIONS  (STANDING):  dextrose 50% Injectable 25 Gram(s) IV Push once  dextrose 50% Injectable 12.5 Gram(s) IV Push once  dextrose 50% Injectable 25 Gram(s) IV Push once  glucagon  Injectable 1 milliGRAM(s) IntraMuscular once  heparin  Infusion. 1300 Unit(s)/Hr (13 mL/Hr) IV Continuous <Continuous>  insulin lispro (ADMELOG) corrective regimen sliding scale   SubCutaneous Before meals and at bedtime  polyethylene glycol 3350 17 Gram(s) Oral daily  senna 2 Tablet(s) Oral at bedtime  tamsulosin 0.4 milliGRAM(s) Oral at bedtime  torsemide 20 milliGRAM(s) Oral two times a day    MEDICATIONS  (PRN):  heparin   Injectable 3000 Unit(s) IV Push every 6 hours PRN For aPTT between 40 - 57  heparin   Injectable 6500 Unit(s) IV Push every 6 hours PRN For aPTT less than 40      PHYSICAL EXAM:  Un Comfortable, no distress  Eyes: PERRL, EOM intact; conjunctiva and sclera clear  Head: Normocephalic;  No Trauma  ENMT: No nasal discharge, hoarseness, cough or hemoptysis  Neck: Supple; non tender; no masses or deformities.    No JVD  Respiratory: - WHEEZING   - RHONCHI  - RALES  + CRACKLES.  Good R hemithorax air entry    Cardiovascular: Regular rate and rhythm. S1 and S2 Normal; No murmurs, gallops or rubs     - PPM/AICD  Gastrointestinal: Soft non-tender, non-distended; Normal bowel sounds; No hepatosplenomegaly.     -PEG    -  GT   - GONZALEZ  Genitourinary: No costovertebral angle tenderness. No dysuria  Extremities: AROM, No clubbing, cyanosis or edema    Vascular: Peripheral pulses palpable 2+ bilaterally  Neurological: Alert and responisve to stimuli   Skin: Warm and dry. No obvious rash  Lymph Nodes: No acute cervical or supraclavicular adenopathy  Psychiatric: Cooperative and appropriate mood    DEVICES:  - DENTURES   +IV R / L     - ETUBE   -TRACH   -CTUBE  R / L    LABS:                          13.6   2.94  )-----------( 101      ( 15 Feb 2022 06:48 )             39.9     02-15    138  |  104  |  28<H>  ----------------------------<  92  4.1   |  26  |  1.11    Ca    8.6      15 Feb 2022 06:48  Mg     2.2     02-15    TPro  6.6  /  Alb  3.2<L>  /  TBili  1.0  /  DBili  x   /  AST  18  /  ALT  14  /  AlkPhos  115  02-14    PTT - ( 15 Feb 2022 06:48 )  PTT:54.3 sec  RADIOLOGY & ADDITIONAL STUDIES (The following images were personally reviewed):< from: Xray Chest 1 View- PORTABLE-Routine (Xray Chest 1 View- PORTABLE-Routine .) (02.15.22 @ 12:13) >    ACC: [id number] EXAM:  XR CHEST PORTABLE ROUTINE 1V                          PROCEDURE DATE:  02/15/2022          INTERPRETATION:  XR CHEST dated 2/15/2022 12:13 PM    CLINICAL INFORMATION: s/p right chest tube now clamped    COMPARISON: February 15,2022 at 1008 hours    FINDINGS: No pneumothorax. Right chest pigtail catheter in place.    IMPRESSION: No pneumothorax.    < end of copied text >

## 2022-02-15 NOTE — PROGRESS NOTE ADULT - PROBLEM SELECTOR PLAN 3
Persistent right sided pleural effusion despite IV diuretics  - CT Chest 2/9: Increased mod-large right pleural effusion, unchanged small left pleural effusion, ascites and upper abd w/ anasarca, cardiomegaly, probably pulm HTN  - s/p IR guided pleurocentesis 2/11 w/ 1L drained, c/b small pneumothorax  w/ chest tube in place  - CT removed 2/15/22; will continue to monitor and assess CXR AM

## 2022-02-15 NOTE — PROVIDER CONTACT NOTE (CHANGE IN STATUS NOTIFICATION) - ASSESSMENT
VS 9:40am (left arm) BP 74/48. HR 64 RR 18 O2 98%  9:43am: (right arm) BP 68/48 HR 66 RR 18 O2 98%  Pt A&OX4,remains asymptomatic.

## 2022-02-15 NOTE — PROVIDER CONTACT NOTE (CRITICAL VALUE NOTIFICATION) - ASSESSMENT
Pt felt a little off but denied any weakness, fatigue, SOB, CP, or palpitations. HR in 50s controlled afib.
No s/s of bleeding
No s/s of bleeding.
No signs of bleeding or issues with infusion.

## 2022-02-15 NOTE — PROGRESS NOTE ADULT - PROBLEM SELECTOR PLAN 1
P/W anasarca. BNP 13505. Trop peaked 0.13 likely 2/2 demand ischemia in setting of fluid overload   - Entresto 24/26mg BID DC'D 2/2 hypotension (SBP 60s-80s)  - no BB 2/2 amyloidosis  - c/w Torsemide 20mg BID   - Heart failure and EP following, appreciate recs.   - plan possible Biv ICD tomorrow 2/16   - net neg 21L since admission and 2.8L/24H  - core measures. Strict I/Os, daily weights.

## 2022-02-15 NOTE — PROGRESS NOTE ADULT - ASSESSMENT
77 yo male with NICM, LVEF 35%, Afib/Aflutter s/p ablation (6/2016) on Eliquis, CKD stage 3 (baseline SCr 1.0-1.2) and recurrent hospitalizations for decompensated HF who was readmitted 2/7/22 for MARTINEZ, abdominal distention, and LE edema. 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  HF team  recommends CRT-P at HR 80 bpm to improve cardiac output.  He would benefit from bb use once he has biv pacing device (he was unable to tolerate it while here due to bradycardic on low dose bb).   - Dr. Pemberton discussed rationale of biv-pacing device for patient, his son and daughter over the phone today.  Pt and his son want more time to think over this. His daughter appears to be on board with the plan.  We explain that we have a spot for his case tomorrow should he agree for procedure.  Please make him NPO after midnight in case he wants it for tomorrow.  Please Covid swab him today.  If going tomorrow, we will hold heparin gtt on call to EP lab.   - He had right sided thoracentesis by IR c/b pneumothorax (2/11) with need for chest tube placement.  Latest CXR (noon) showed no ptx.  Pending IR team to determine if chest tube to come out today.

## 2022-02-15 NOTE — PROVIDER CONTACT NOTE (CRITICAL VALUE NOTIFICATION) - RECOMMENDATIONS
PA told just to follow nomogram.
Follow nomogram
Notify NP
PABLO Card notified. Continue monitor pt's HR. Inquired whether to give 6AM metoprolol.
Per nomogram, pause heparin for 60 min and resume at 14cc/hr

## 2022-02-15 NOTE — PROGRESS NOTE ADULT - PROBLEM SELECTOR PLAN 2
- TTR amyloid confirmed by Tc-PyP scan. Fat pad biopsy is no longer indicated for work-up. Procedure cancelled.  - Will refer to Dr. Bradley as outpatient for amyloid follow-up and genetic testing. He may benefit from Taf[place]ul, but if he is unable to obtain due to insurance/cost, can also offer clinical trial at [facility].  - recommend CRT-P at HR 80 bpm to improve cardiac output and hopefully reduce readmissions for decompensated HF (CO= HR x SV; SV is fixed in restrictive cardiomyopathy). D/W Dr. Pemberton. - TTR amyloid confirmed by Tc-PyP scan. Fat pad biopsy is no longer indicated for work-up. Procedure cancelled.  - Will refer to Dr. Bradley as outpatient for amyloid follow-up and genetic testing. He may benefit from Tafnakul, but if he is unable to obtain due to insurance/cost, can also offer clinical trial at [facility].  - recommend CRT-P at HR 80 bpm to improve cardiac output and hopefully reduce readmissions for decompensated HF (CO= HR x SV; SV is fixed in restrictive cardiomyopathy). D/W Dr. Pemberton.  -Possible CRT-P with EP tomorrow pending further family discussion - TTR amyloid confirmed by Tc-PyP scan. Fat pad biopsy is no longer indicated for work-up. Procedure cancelled.  - Will refer to Dr. Bradley as outpatient for amyloid follow-up and genetic testing. He may benefit from Tafnakul, but if he is unable to obtain due to insurance/cost, can also offer clinical trial at [facility].  - recommend CRT-P at HR 80 bpm to improve cardiac output and hopefully reduce readmissions for decompensated HF (CO= HR x SV; SV is fixed in restrictive cardiomyopathy). D/W Dr. Pemberton.  - Plan for CRT-P with EP tomorrow pending further family discussion

## 2022-02-15 NOTE — PROGRESS NOTE ADULT - ATTENDING COMMENTS
Patient is clinically stable, but more hypotensive and not tolerating low dose Entresto. He seems agreeable to the CRT-P, but wants to take things slow and first have the chest tube removed as planned for today. No change in symptoms. He appears euvolemic on oral torsemide.    Team had held diuretics and gave IVF for hypotension, but we have now stopped the fluids and resumed the diuretics.  He has restrictive cardiomyopathy and a fixed SV and will not tolerate fluid boluses.  Will reassess meds after placement of CRT-P and HR is set at 80 bpm.
I have updated the assessment and plan above to reflect my input.  I was at the bedside when the surgeon was preparing for the fat pad biopsy and I requested they abort the procedure as it is not useful in the work-up of cardiac amyloid any longer. With serum free light chain testing and Tc-PyP scanning, we can more definitively diagnose amyloid without a fat pad (or endomyocardial) biopsy in most cases. This patient also had a BM biopsy negative for amyloid in November (although small sample).  Recommendations as noted above.

## 2022-02-15 NOTE — PROGRESS NOTE ADULT - PROBLEM SELECTOR PLAN 3
- s/p pigtail placement by IR   -   - if the effusion recurs after pigtail removal, would consider placement of PleurX catheter. - s/p pigtail placement by IR with plan to removed today by IR  - if the effusion recurs after pigtail removal, would consider placement of PleurX catheter.

## 2022-02-15 NOTE — PROGRESS NOTE ADULT - SUBJECTIVE AND OBJECTIVE BOX
Interval HPI: Pigtail output of160 over the last 24 hours      PAST MEDICAL & SURGICAL HISTORY:  Atrial flutter  s/p Ablation 2016  Chronic venous insufficiency of lower extremity  Prostate CA  Stage 3 chronic kidney disease  1.2-1.2 baseline   On admission 1.2  Type 2 diabetes mellitus  not on medication  Thrombocytopenia  60-70&#x27;s baseline  Acute on chronic systolic congestive heart failure  Chronic atrial fibrillation  Atrial flutter  s/p ablation in 2016  History of surgical removal of pituitary gland  1990s  S/P TURP  for BPH    Home Medications:  potassium chloride 10 mEq oral capsule, extended release: 1 cap(s) orally once a day (24 Nov 2021 13:03)  sacubitril-valsartan 24 mg-26 mg oral tablet: 1 tab(s) orally every 12 hours (24 Nov 2021 13:03)  tamsulosin 0.4 mg oral capsule: 1 cap(s) orally once a day (19 Nov 2021 18:36)      MEDICATIONS  (STANDING):  dextrose 50% Injectable 25 Gram(s) IV Push once  dextrose 50% Injectable 12.5 Gram(s) IV Push once  dextrose 50% Injectable 25 Gram(s) IV Push once  glucagon  Injectable 1 milliGRAM(s) IntraMuscular once  heparin  Infusion.  Unit(s)/Hr (17 mL/Hr) IV Continuous <Continuous>  insulin lispro (ADMELOG) corrective regimen sliding scale   SubCutaneous Before meals and at bedtime  polyethylene glycol 3350 17 Gram(s) Oral daily  sacubitril 24 mG/valsartan 26 mG 1 Tablet(s) Oral two times a day  senna 2 Tablet(s) Oral at bedtime  tamsulosin 0.4 milliGRAM(s) Oral at bedtime  torsemide 20 milliGRAM(s) Oral two times a day    MEDICATIONS  (PRN):  heparin   Injectable 7500 Unit(s) IV Push every 6 hours PRN For aPTT less than 40  heparin   Injectable 3500 Unit(s) IV Push every 6 hours PRN For aPTT between 40 - 57      .  VITAL SIGNS:  T(C): 36.6 (02-14-22 @ 07:14), Max: 36.6 (02-14-22 @ 06:15)  T(F): 97.9 (02-14-22 @ 07:14), Max: 97.9 (02-14-22 @ 06:15)  HR: 59 (02-14-22 @ 09:00) (58 - 60)  BP: 101/65 (02-14-22 @ 10:00) (89/61 - 116/77)  BP(mean): 79 (02-14-22 @ 10:00) (71 - 79)  RR: 17 (02-14-22 @ 09:00) (17 - 18)  SpO2: 98% (02-14-22 @ 09:00) (98% - 100%)  Wt(kg): --    PHYSICAL EXAM:   Constitutional: WDWN resting comfortably in bed; NAD  Head: NC/AT  ENT:  MMM  Neck: supple; no JVD   Respiratory: CTA B/L;  no retractions; Right sided chest tube in place with surrounding skin intact  Cardiac: +S1/S2; Irregular RR; Richard  Gastrointestinal: soft, NT/ND; no rebound or guarding; +BS  Extremities: WWP, no clubbing or cyanosis; no peripheral edema  Vascular: 2+ radial,  DP/PT pulses B/L    .  LABS:                         13.6   2.94  )-----------( 101      ( 15 Feb 2022 06:48 )             39.9     02-15    138  |  104  |  28<H>  ----------------------------<  92  4.1   |  26  |  1.11    Ca    8.6      15 Feb 2022 06:48  Mg     2.2     02-15    TPro  6.6  /  Alb  3.2<L>  /  TBili  1.0  /  DBili  x   /  AST  18  /  ALT  14  /  AlkPhos  115  02-14    PTT - ( 15 Feb 2022 06:48 )  PTT:54.3 sec              RADIOLOGY, EKG & ADDITIONAL TESTS: Reviewed.         RADIOLOGY, EKG & ADDITIONAL TESTS: Reviewed.     < from: TTE Echo Complete w/o Contrast w/ Doppler (02.08.22 @ 11:51) >  ----  CONCLUSIONS:     1. Severe symmetric left ventricular hypertrophy. Moderately reduced   left ventricular systolic function. Apical segments are more vigorous   than the base, consider infiltrative cardiomyopathy (such as amyloid).   2. Mildly reduced right ventricular systolic function.   3. Biatrial enlargement.   4. Tethered mitral valve. Mild mitral regurgitation.   5. Mild tricuspid regurgitation.   6. Pulmonary hypertension present, pulmonary artery systolic pressure is   40 mmHg.   7. Small pericardial effusion.   8. Bilateral pleural effusion.   9. Borderline dilated ascending aorta.  10. Compared to the previous TTE performed on 11/22/2021, given technical   differences, LV function is similar to slightly more vigorous. Comparable   views ofproximal ascending aorta were not obtained.    < end of copied text >  < from: TTE Echo Complete w/o Contrast w/ Doppler (02.08.22 @ 11:51) >  LVIDd (2D):     4.70 cm  (4.2-5.8)     (3.8-5.2)    < end of copied text >  < from: TTE Echo Complete w/o Contrast w/ Doppler (02.08.22 @ 11:51) >  eft Ventricle:  The left ventricle cavity size is normal. There is severe concentric left   ventricular hypertrophy. Left ventricular systolic function is moderately   reduced with a calculated ejection fraction of 35-40% with global   hypokinesis. Apical segments are more vigorous than the base, consider   infiltrative cardiomyopathy (such as amyloid). Analysis of left   ventricular diastolic function and filling pressure is made challenging   by the presence of A-fib.    < end of copied text >      < from: NM Amyloidosis SPECT/CT, Single Area Single Day (02.10.22 @ 15:50) >  Impression: Intense uptake in the left ventricular and part of the right   ventricular myocardium in a pattern consistent with TTR-mediated   amyloidosis.      < end of copied text >    < from: 12 Lead ECG (02.07.22 @ 08:52) >  Diagnosis Line Atrial fibrillation with slow ventricular response  Left Anterior Fasicular Block  RSR' in V1  Low voltage QRS  Poor R Wave Progression  Nonspecific ST - T abnormalities       Interval HPI: Pigtail output of160 over the last 24 hours. IR with plan to removed CT today per primary team. Patient with episode of Hypotension to the 60's this morning. Recieved 250 Bolus.       PAST MEDICAL & SURGICAL HISTORY:  Atrial flutter  s/p Ablation 2016  Chronic venous insufficiency of lower extremity  Prostate CA  Stage 3 chronic kidney disease  1.2-1.2 baseline   On admission 1.2  Type 2 diabetes mellitus  not on medication  Thrombocytopenia  60-70&#x27;s baseline  Acute on chronic systolic congestive heart failure  Chronic atrial fibrillation  Atrial flutter  s/p ablation in 2016  History of surgical removal of pituitary gland  1990s  S/P TURP  for BPH    Home Medications:  potassium chloride 10 mEq oral capsule, extended release: 1 cap(s) orally once a day (24 Nov 2021 13:03)  sacubitril-valsartan 24 mg-26 mg oral tablet: 1 tab(s) orally every 12 hours (24 Nov 2021 13:03)  tamsulosin 0.4 mg oral capsule: 1 cap(s) orally once a day (19 Nov 2021 18:36)      MEDICATIONS  (STANDING):  dextrose 50% Injectable 25 Gram(s) IV Push once  dextrose 50% Injectable 12.5 Gram(s) IV Push once  dextrose 50% Injectable 25 Gram(s) IV Push once  glucagon  Injectable 1 milliGRAM(s) IntraMuscular once  heparin  Infusion.  Unit(s)/Hr (17 mL/Hr) IV Continuous <Continuous>  insulin lispro (ADMELOG) corrective regimen sliding scale   SubCutaneous Before meals and at bedtime  polyethylene glycol 3350 17 Gram(s) Oral daily  sacubitril 24 mG/valsartan 26 mG 1 Tablet(s) Oral two times a day  senna 2 Tablet(s) Oral at bedtime  tamsulosin 0.4 milliGRAM(s) Oral at bedtime  torsemide 20 milliGRAM(s) Oral two times a day    MEDICATIONS  (PRN):  heparin   Injectable 7500 Unit(s) IV Push every 6 hours PRN For aPTT less than 40  heparin   Injectable 3500 Unit(s) IV Push every 6 hours PRN For aPTT between 40 - 57      .  VITAL SIGNS:  T(C): 36.6 (02-14-22 @ 07:14), Max: 36.6 (02-14-22 @ 06:15)  T(F): 97.9 (02-14-22 @ 07:14), Max: 97.9 (02-14-22 @ 06:15)  HR: 59 (02-14-22 @ 09:00) (58 - 60)  BP: 101/65 (02-14-22 @ 10:00) (89/61 - 116/77)  BP(mean): 79 (02-14-22 @ 10:00) (71 - 79)  RR: 17 (02-14-22 @ 09:00) (17 - 18)  SpO2: 98% (02-14-22 @ 09:00) (98% - 100%)  Wt(kg): --    PHYSICAL EXAM:   Constitutional: WDWN resting comfortably in bed; NAD  Head: NC/AT  ENT:  MMM  Neck: supple; no JVD   Respiratory: CTA B/L;  no retractions; Right sided chest tube in place with surrounding skin intact  Cardiac: +S1/S2; Irregular RR; Richard  Gastrointestinal: soft, NT/ND; no rebound or guarding; +BS  Extremities: WWP, no clubbing or cyanosis; no peripheral edema  Vascular: 2+ radial,  DP/PT pulses B/L    .  LABS:                         13.6   2.94  )-----------( 101      ( 15 Feb 2022 06:48 )             39.9     02-15    138  |  104  |  28<H>  ----------------------------<  92  4.1   |  26  |  1.11    Ca    8.6      15 Feb 2022 06:48  Mg     2.2     02-15    TPro  6.6  /  Alb  3.2<L>  /  TBili  1.0  /  DBili  x   /  AST  18  /  ALT  14  /  AlkPhos  115  02-14    PTT - ( 15 Feb 2022 06:48 )  PTT:54.3 sec              RADIOLOGY, EKG & ADDITIONAL TESTS: Reviewed.         RADIOLOGY, EKG & ADDITIONAL TESTS: Reviewed.     < from: TTE Echo Complete w/o Contrast w/ Doppler (02.08.22 @ 11:51) >  ----  CONCLUSIONS:     1. Severe symmetric left ventricular hypertrophy. Moderately reduced   left ventricular systolic function. Apical segments are more vigorous   than the base, consider infiltrative cardiomyopathy (such as amyloid).   2. Mildly reduced right ventricular systolic function.   3. Biatrial enlargement.   4. Tethered mitral valve. Mild mitral regurgitation.   5. Mild tricuspid regurgitation.   6. Pulmonary hypertension present, pulmonary artery systolic pressure is   40 mmHg.   7. Small pericardial effusion.   8. Bilateral pleural effusion.   9. Borderline dilated ascending aorta.  10. Compared to the previous TTE performed on 11/22/2021, given technical   differences, LV function is similar to slightly more vigorous. Comparable   views ofproximal ascending aorta were not obtained.    < end of copied text >  < from: TTE Echo Complete w/o Contrast w/ Doppler (02.08.22 @ 11:51) >  LVIDd (2D):     4.70 cm  (4.2-5.8)     (3.8-5.2)    < end of copied text >  < from: TTE Echo Complete w/o Contrast w/ Doppler (02.08.22 @ 11:51) >  eft Ventricle:  The left ventricle cavity size is normal. There is severe concentric left   ventricular hypertrophy. Left ventricular systolic function is moderately   reduced with a calculated ejection fraction of 35-40% with global   hypokinesis. Apical segments are more vigorous than the base, consider   infiltrative cardiomyopathy (such as amyloid). Analysis of left   ventricular diastolic function and filling pressure is made challenging   by the presence of A-fib.    < end of copied text >      < from: NM Amyloidosis SPECT/CT, Single Area Single Day (02.10.22 @ 15:50) >  Impression: Intense uptake in the left ventricular and part of the right   ventricular myocardium in a pattern consistent with TTR-mediated   amyloidosis.      < end of copied text >    < from: 12 Lead ECG (02.07.22 @ 08:52) >  Diagnosis Line Atrial fibrillation with slow ventricular response  Left Anterior Fasicular Block  RSR' in V1  Low voltage QRS  Poor R Wave Progression  Nonspecific ST - T abnormalities

## 2022-02-15 NOTE — PROVIDER CONTACT NOTE (CRITICAL VALUE NOTIFICATION) - ACTION/TREATMENT ORDERED:
Stop heparin drip for 15 mins. Redraw aptt. Await for results.
Follow nomogram.
BASILIA Doa made aware. Will follow Heparin Nomogram protocol. Per protocol hold for 60 minutes and restart at 10cc/hr
PABLO Card said to hold 12.5mg dose of metoprolol d/t pt's current HR of 53.
Follow nomogram
Followed nomogram instructions

## 2022-02-15 NOTE — PROGRESS NOTE ADULT - PROBLEM SELECTOR PLAN 4
- tele- slow AF HR 50s   - c/w IV Hep gtt (plan ICD this admission); - plan possible Biv ICD tomorrow 2/16    - Hold AC if PLT <50, per hematology

## 2022-02-15 NOTE — PROVIDER CONTACT NOTE (CHANGE IN STATUS NOTIFICATION) - ACTION/TREATMENT ORDERED:
Placed Pt in Trendelenburg. BASILIA Dao notified and at bedside assessing PT. Initially started NS IVF but d/c as SBP improved to 80's-90's. VS closely monitored, Pt remained asymptomatic. Will notify NP if any changes.

## 2022-02-15 NOTE — PROVIDER CONTACT NOTE (CRITICAL VALUE NOTIFICATION) - BACKGROUND
[facility] [place] Admitted with CHF exacerbation, right pneumothorax with chest tube that was removed 02/15/22 14:00
76 y.o. male admitted for CHF exacerbation. PMHx of afib.
Pt admitted for CHF exacerbation, hx of afib, on heparin at 17cc/hr
Pt on heparin drip following pleurocentesis.
Pt on heparin drip to prepare for procedure.
Pt on heparin drip following pleurocentesis.

## 2022-02-15 NOTE — PROGRESS NOTE ADULT - ASSESSMENT
75 yo male from [facility] with PMH of NICM, LVEF 35%, Afib/Aflutter s/p ablation (6/2016) on Eliquis, CKD stage 3 (baseline SCr 1.0-1.2) and recurrent hospitalizations for decompensated HF who was readmitted 2/7/22 for MARTINEZ, abdominal distention, and LE edema. On a prior admission (11/2021), he had a bone marrow biopsy that did not reveal a plasma dyscrasia and was negative for amyloid (C[place] Red staining), although the sample was small. The kappa-lambda free light chain ratio was normal, ruling out AL amyloid. His current work-up included a positive Tc-PyP scan confirming TTR cardiac amyloidosis, but he will need outpatient genetic testing to determine between wild type and familial mutations. Other PMH of note includes thrombocytopenia and prostate cancer s/p chemotherapy. He is ACC/AHA Stage C, NYHA Class IIIb HF, currently euvolemic on oral diuretics.    His hospital course is notable for right sided thoracentesis by IR c/b pneumothorax (2/11) with continuous serosanguinous output (330 cc/24 hours). CXR this morning notable for a small pneumothorax so he was placed back onto wall suction today (2/14) with plan to repeat CXR tomorrow.      Pertinent Cardiac Studies:  2/7/22 ECG: AF at 53 bpm, RSR’, low voltage, old inferior infarct, PRWP, nonspecific Tw abnormalities.  2/8/22 TTE: LVIDd 4.7 cm, severe LVH (SW 1.6, PW 1.7), LVEF 35-40% (global), normal RV size with mild RV dysfunction, HYACINTH, mild MR, mild TR, mild PH (PASP 40 mmHg), dilated IVC with abnormal collapse, small pericardial effusion, b/l pleural effusions, +ascites.  2014 [place] - nonobstructive CAD (by report)    INCOMPLETE  -----------------   77 yo male from [facility] with PMH of NICM, LVEF 35%, Afib/Aflutter s/p ablation (6/2016) on Eliquis, CKD stage 3 (baseline SCr 1.0-1.2) and recurrent hospitalizations for decompensated HF who was readmitted 2/7/22 for MARTINEZ, abdominal distention, and LE edema. On a prior admission (11/2021), he had a bone marrow biopsy that did not reveal a plasma dyscrasia and was negative for amyloid ([place] Red staining), although the sample was small. The kappa-lambda free light chain ratio was normal, ruling out AL amyloid. His current work-up included a positive Tc-PyP scan confirming TTR cardiac amyloidosis, but he will need outpatient genetic testing to determine between wild type and familial mutations. Other PMH of note includes thrombocytopenia and prostate cancer s/p chemotherapy. He is ACC/AHA Stage C, NYHA Class IIIb HF, currently euvolemic on oral diuretics.    His hospital course is notable for right sided thoracentesis by IR c/b pneumothorax (2/11) with continuous serosanguinous output (1600 cc/24 hours). Plan to removed chest tube today by IR. Episode of Hypotension with SBP in the 60's (2/15) today. Entresto discontinued    Pertinent Cardiac Studies:  2/7/22 ECG: AF at 53 bpm, RSR’, low voltage, old inferior infarct, PRWP, nonspecific Tw abnormalities.  2/8/22 TTE: LVIDd 4.7 cm, severe LVH (SW 1.6, PW 1.7), LVEF 35-40% (global), normal RV size with mild RV dysfunction, HYACINTH, mild MR, mild TR, mild PH (PASP 40 mmHg), dilated IVC with abnormal collapse, small pericardial effusion, b/l pleural effusions, +ascites.  2014 [place] - nonobstructive CAD (by report)    INCOMPLETE  -----------------   77 yo male from [facility] with PMH of NICM, LVEF 35%, Afib/Aflutter s/p ablation (6/2016) on Eliquis, CKD stage 3 (baseline SCr 1.0-1.2) and recurrent hospitalizations for decompensated HF who was readmitted 2/7/22 for MARTINEZ, abdominal distention, and LE edema. On a prior admission (11/2021), he had a bone marrow biopsy that did not reveal a plasma dyscrasia and was negative for amyloid ([place] Red staining), although the sample was small. The kappa-lambda free light chain ratio was normal, ruling out AL amyloid. His current work-up included a positive Tc-PyP scan confirming TTR cardiac amyloidosis, but he will need outpatient genetic testing to determine between wild type and familial mutations. Other PMH of note includes thrombocytopenia and prostate cancer s/p chemotherapy. He is ACC/AHA Stage C, NYHA Class IIIb HF, currently euvolemic on oral diuretics.    His hospital course is notable for right sided thoracentesis by IR c/b pneumothorax (2/11) with continuous serosanguinous output (1600 cc/24 hours). Plan to remove chest tube 2/15 by IR. Had SBP in the 60's (2/15) today so Entresto discontinued until after CRT-P placement.    Pertinent Cardiac Studies:  2/7/22 ECG: AF at 53 bpm, RSR’, low voltage, old inferior infarct, PRWP, nonspecific Tw abnormalities.  2/8/22 TTE: LVIDd 4.7 cm, severe LVH (SW 1.6, PW 1.7), LVEF 35-40% (global), normal RV size with mild RV dysfunction, HYACINTH, mild MR, mild TR, mild PH (PASP 40 mmHg), dilated IVC with abnormal collapse, small pericardial effusion, b/l pleural effusions, +ascites.  2014 [place] - nonobstructive CAD (by report)    INCOMPLETE  -----------------

## 2022-02-15 NOTE — PROGRESS NOTE ADULT - SUBJECTIVE AND OBJECTIVE BOX
EPS Progress Note  CC:     S:     O: T(C): 36.4 (02-15-22 @ 10:00), Max: 37.1 (02-14-22 @ 22:01)  HR: 58 (02-15-22 @ 08:27) (51 - 62)  BP: 117/74 (02-15-22 @ 08:27) (81/55 - 117/74)  RR: 17 (02-15-22 @ 08:27) (13 - 17)  SpO2: 100% (02-15-22 @ 08:27) (97% - 100%)  Wt(kg): --    TELE:    PHYSICAL  Constitutional:  NAD        Neck: No JVD  Pulm:  CTA B/L, no wheeze or rale   Cardiac:   + s1/s2, RRR  GI:  +BS , soft ND/NT  Vascular: No LE edema, pulse 2+  Neuro: AAO x 3. no focal deficit  Skin: Warm. No rash or lesion     LABS:                        13.6   2.94  )-----------( 101      ( 15 Feb 2022 06:48 )             39.9     02-15    138  |  104  |  28<H>  ----------------------------<  92  4.1   |  26  |  1.11    Ca    8.6      15 Feb 2022 06:48  Mg     2.2     02-15    TPro  6.6  /  Alb  3.2<L>  /  TBili  1.0  /  DBili  x   /  AST  18  /  ALT  14  /  AlkPhos  115  02-14    PTT - ( 15 Feb 2022 06:48 )  PTT:54.3 sec    MEDICATIONS:  dextrose 50% Injectable 25 Gram(s) IV Push once  dextrose 50% Injectable 12.5 Gram(s) IV Push once  dextrose 50% Injectable 25 Gram(s) IV Push once  glucagon  Injectable 1 milliGRAM(s) IntraMuscular once  heparin   Injectable 3000 Unit(s) IV Push every 6 hours PRN  heparin   Injectable 6500 Unit(s) IV Push every 6 hours PRN  heparin  Infusion. 1300 Unit(s)/Hr IV Continuous <Continuous>  insulin lispro (ADMELOG) corrective regimen sliding scale   SubCutaneous Before meals and at bedtime  polyethylene glycol 3350 17 Gram(s) Oral daily  senna 2 Tablet(s) Oral at bedtime  tamsulosin 0.4 milliGRAM(s) Oral at bedtime  torsemide 20 milliGRAM(s) Oral two times a day             EPS Progress Note  CC: LE swelling and SOB     S: No acute complaints.   Was hypotensive this morning; treated with IV fluid bolus. Pt wasn't having dizziness; was in bed the whole time.     O: T(C): 36.4 (02-15-22 @ 10:00), Max: 37.1 (02-14-22 @ 22:01)  HR: 58 (02-15-22 @ 08:27) (51 - 62)  BP: 117/74 (02-15-22 @ 08:27) (81/55 - 117/74)  RR: 17 (02-15-22 @ 08:27) (13 - 17)  SpO2: 100% (02-15-22 @ 08:27) (97% - 100%)    TELE: AFIB VR 60s     PHYSICAL  Constitutional:  NAD        Pulm:  CTA.  +chest tube right side.   Cardiac:   + s1/s2, irregular.   GI:  +BS , soft ND/NT  Vascular: +LE edema. +ace wrapping   Neuro: AAO x 3.      LABS:                        13.6   2.94  )-----------( 101      ( 15 Feb 2022 06:48 )             39.9     02-15    138  |  104  |  28<H>  ----------------------------<  92  4.1   |  26  |  1.11    Ca    8.6      15 Feb 2022 06:48  Mg     2.2     02-15    TPro  6.6  /  Alb  3.2<L>  /  TBili  1.0  /  DBili  x   /  AST  18  /  ALT  14  /  AlkPhos  115  02-14    PTT - ( 15 Feb 2022 06:48 )  PTT:54.3 sec    MEDICATIONS:  dextrose 50% Injectable 25 Gram(s) IV Push once  dextrose 50% Injectable 12.5 Gram(s) IV Push once  dextrose 50% Injectable 25 Gram(s) IV Push once  glucagon  Injectable 1 milliGRAM(s) IntraMuscular once  heparin   Injectable 3000 Unit(s) IV Push every 6 hours PRN  heparin   Injectable 6500 Unit(s) IV Push every 6 hours PRN  heparin  Infusion. 1300 Unit(s)/Hr IV Continuous <Continuous>  insulin lispro (ADMELOG) corrective regimen sliding scale   SubCutaneous Before meals and at bedtime  polyethylene glycol 3350 17 Gram(s) Oral daily  senna 2 Tablet(s) Oral at bedtime  tamsulosin 0.4 milliGRAM(s) Oral at bedtime  torsemide 20 milliGRAM(s) Oral two times a day

## 2022-02-15 NOTE — PROGRESS NOTE ADULT - PROBLEM SELECTOR PLAN 1
- continue torsemide 20 mg po BID & Entresto 24-26 BID  - defer further med uptitration until after he receives the pacemaker  - strict I/Os with daily standing weight  - Eventual addition of beta blocker for LVEF < 40% once he has CRT-P with stable HR of 80 bpm. - continue torsemide 20 mg po BID   - Discontinue Entresto. No plan to resume  - defer further med uptitration until after he receives the pacemaker  - strict I/Os with daily standing weight  - Eventual addition of beta blocker for LVEF < 40% once he has CRT-P with stable HR of 80 bpm. - continue torsemide 20 mg po BID   - Discontinue Entresto. Will resume after CRT-P once BP improves.  - defer further med uptitration until after he receives the pacemaker  - strict I/Os with daily standing weight  - Eventual addition of beta blocker for LVEF < 40% once he has CRT-P with stable HR of 80 bpm.

## 2022-02-15 NOTE — PROGRESS NOTE ADULT - SUBJECTIVE AND OBJECTIVE BOX
CARDIOLOGY NP PROGRESS NOTE    Subjective:  Received pt awake in bed in NAD. States feeling well; "just wants the CT out". Denies CP/SOB, dizziness/diaphoresis, n/v, palpitations.  Remainder ROS otherwise negative.    Overnight Events: SBP 60s, patient mentating. Entresto DC'D, placed in Trendelenburg and given 250cc bolus (dc'd midway as per HF recs)     TELEMETRY: Afib (HR 50s-60s)          VITAL SIGNS:  T(C): 36.6 (02-15-22 @ 14:01), Max: 37.1 (02-14-22 @ 22:01)  HR: 58 (02-15-22 @ 08:27) (51 - 62)  BP: 117/74 (02-15-22 @ 08:27) (81/55 - 117/74)  RR: 17 (02-15-22 @ 08:27) (13 - 17)  SpO2: 100% (02-15-22 @ 08:27) (97% - 100%)  Wt(kg): --    I&O's Summary    14 Feb 2022 07:01  -  15 Feb 2022 07:00  --------------------------------------------------------  IN: 444 mL / OUT: 3285 mL / NET: -2841 mL    15 Feb 2022 07:01  -  15 Feb 2022 15:09  --------------------------------------------------------  IN: 0 mL / OUT: 600 mL / NET: -600 mL          PHYSICAL EXAM:  General: A/ox 3, No acute Distress  Neck: Supple, NO JVD  Cardiac: Irregular RR, No M/R/G  Pulmonary: Diminished breath sounds RLL. Breathing unlabored on RA.  No Rhonchi/Rales/Wheezing  ***R CT removed and occlusive dsg in place***  Abdomen: Soft, Non -tender, +BS x 4 quads  Extremities: No Rashes, No edema  Neuro: A/o x 3, No focal deficits        LABS:                          13.6   2.94  )-----------( 101      ( 15 Feb 2022 06:48 )             39.9                              02-15    138  |  104  |  28<H>  ----------------------------<  92  4.1   |  26  |  1.11    Ca    8.6      15 Feb 2022 06:48  Mg     2.2     02-15    TPro  6.6  /  Alb  3.2<L>  /  TBili  1.0  /  DBili  x   /  AST  18  /  ALT  14  /  AlkPhos  115  02-14    LIVER FUNCTIONS - ( 14 Feb 2022 06:17 )  Alb: 3.2 g/dL / Pro: 6.6 g/dL / ALK PHOS: 115 U/L / ALT: 14 U/L / AST: 18 U/L / GGT: x                                 PTT - ( 15 Feb 2022 06:48 )  PTT:54.3 sec  CAPILLARY BLOOD GLUCOSE      POCT Blood Glucose.: 104 mg/dL (15 Feb 2022 11:53)  POCT Blood Glucose.: 94 mg/dL (15 Feb 2022 06:57)  POCT Blood Glucose.: 112 mg/dL (14 Feb 2022 21:44)  POCT Blood Glucose.: 128 mg/dL (14 Feb 2022 17:03)            Allergies:  No Known Allergies    MEDICATIONS  (STANDING):  dextrose 50% Injectable 25 Gram(s) IV Push once  dextrose 50% Injectable 12.5 Gram(s) IV Push once  dextrose 50% Injectable 25 Gram(s) IV Push once  glucagon  Injectable 1 milliGRAM(s) IntraMuscular once  heparin  Infusion. 1300 Unit(s)/Hr (13 mL/Hr) IV Continuous <Continuous>  insulin lispro (ADMELOG) corrective regimen sliding scale   SubCutaneous Before meals and at bedtime  polyethylene glycol 3350 17 Gram(s) Oral daily  senna 2 Tablet(s) Oral at bedtime  tamsulosin 0.4 milliGRAM(s) Oral at bedtime  torsemide 20 milliGRAM(s) Oral two times a day    MEDICATIONS  (PRN):  heparin   Injectable 3000 Unit(s) IV Push every 6 hours PRN For aPTT between 40 - 57  heparin   Injectable 6500 Unit(s) IV Push every 6 hours PRN For aPTT less than 40        DIAGNOSTIC TESTS:

## 2022-02-15 NOTE — PROGRESS NOTE ADULT - PROBLEM SELECTOR PLAN 4
- s/p ablation in 2016, but currently in AFib  - currently on heparin, but can switch back to Eliquis for AC after CRT-P

## 2022-02-16 ENCOUNTER — TRANSCRIPTION ENCOUNTER (OUTPATIENT)
Age: 77
End: 2022-02-16

## 2022-02-16 LAB
ANION GAP SERPL CALC-SCNC: 6 MMOL/L — SIGNIFICANT CHANGE UP (ref 5–17)
APTT BLD: 52.1 SEC — HIGH (ref 27.5–35.5)
APTT BLD: 58.7 SEC — HIGH (ref 27.5–35.5)
BUN SERPL-MCNC: 34 MG/DL — HIGH (ref 7–23)
CALCIUM SERPL-MCNC: 9 MG/DL — SIGNIFICANT CHANGE UP (ref 8.4–10.5)
CHLORIDE SERPL-SCNC: 106 MMOL/L — SIGNIFICANT CHANGE UP (ref 96–108)
CO2 SERPL-SCNC: 27 MMOL/L — SIGNIFICANT CHANGE UP (ref 22–31)
CREAT SERPL-MCNC: 1.13 MG/DL — SIGNIFICANT CHANGE UP (ref 0.5–1.3)
GLUCOSE BLDC GLUCOMTR-MCNC: 121 MG/DL — HIGH (ref 70–99)
GLUCOSE BLDC GLUCOMTR-MCNC: 80 MG/DL — SIGNIFICANT CHANGE UP (ref 70–99)
GLUCOSE BLDC GLUCOMTR-MCNC: 83 MG/DL — SIGNIFICANT CHANGE UP (ref 70–99)
GLUCOSE BLDC GLUCOMTR-MCNC: 92 MG/DL — SIGNIFICANT CHANGE UP (ref 70–99)
GLUCOSE SERPL-MCNC: 90 MG/DL — SIGNIFICANT CHANGE UP (ref 70–99)
HCT VFR BLD CALC: 39.4 % — SIGNIFICANT CHANGE UP (ref 39–50)
HGB BLD-MCNC: 13.2 G/DL — SIGNIFICANT CHANGE UP (ref 13–17)
INR BLD: 1.08 — SIGNIFICANT CHANGE UP (ref 0.88–1.16)
MAGNESIUM SERPL-MCNC: 2.2 MG/DL — SIGNIFICANT CHANGE UP (ref 1.6–2.6)
MCHC RBC-ENTMCNC: 28.6 PG — SIGNIFICANT CHANGE UP (ref 27–34)
MCHC RBC-ENTMCNC: 33.5 GM/DL — SIGNIFICANT CHANGE UP (ref 32–36)
MCV RBC AUTO: 85.3 FL — SIGNIFICANT CHANGE UP (ref 80–100)
NRBC # BLD: 0 /100 WBCS — SIGNIFICANT CHANGE UP (ref 0–0)
PLATELET # BLD AUTO: 111 K/UL — LOW (ref 150–400)
POTASSIUM SERPL-MCNC: 3.9 MMOL/L — SIGNIFICANT CHANGE UP (ref 3.5–5.3)
POTASSIUM SERPL-SCNC: 3.9 MMOL/L — SIGNIFICANT CHANGE UP (ref 3.5–5.3)
PROTHROM AB SERPL-ACNC: 12.9 SEC — SIGNIFICANT CHANGE UP (ref 10.6–13.6)
RBC # BLD: 4.62 M/UL — SIGNIFICANT CHANGE UP (ref 4.2–5.8)
RBC # FLD: 16.9 % — HIGH (ref 10.3–14.5)
SODIUM SERPL-SCNC: 139 MMOL/L — SIGNIFICANT CHANGE UP (ref 135–145)
WBC # BLD: 2.45 K/UL — LOW (ref 3.8–10.5)
WBC # FLD AUTO: 2.45 K/UL — LOW (ref 3.8–10.5)

## 2022-02-16 PROCEDURE — 71045 X-RAY EXAM CHEST 1 VIEW: CPT | Mod: 26

## 2022-02-16 RX ORDER — POTASSIUM CHLORIDE 20 MEQ
20 PACKET (EA) ORAL ONCE
Refills: 0 | Status: COMPLETED | OUTPATIENT
Start: 2022-02-16 | End: 2022-02-16

## 2022-02-16 RX ORDER — ACETAMINOPHEN 500 MG
1000 TABLET ORAL ONCE
Refills: 0 | Status: COMPLETED | OUTPATIENT
Start: 2022-02-16 | End: 2022-02-16

## 2022-02-16 RX ADMIN — Medication 400 MILLIGRAM(S): at 18:30

## 2022-02-16 RX ADMIN — HEPARIN SODIUM 1000 UNIT(S)/HR: 5000 INJECTION INTRAVENOUS; SUBCUTANEOUS at 00:37

## 2022-02-16 RX ADMIN — SENNA PLUS 2 TABLET(S): 8.6 TABLET ORAL at 21:18

## 2022-02-16 RX ADMIN — Medication 20 MILLIGRAM(S): at 06:06

## 2022-02-16 RX ADMIN — HEPARIN SODIUM 1200 UNIT(S)/HR: 5000 INJECTION INTRAVENOUS; SUBCUTANEOUS at 08:39

## 2022-02-16 RX ADMIN — Medication 1000 MILLIGRAM(S): at 18:45

## 2022-02-16 RX ADMIN — Medication 20 MILLIEQUIVALENT(S): at 10:57

## 2022-02-16 RX ADMIN — TAMSULOSIN HYDROCHLORIDE 0.4 MILLIGRAM(S): 0.4 CAPSULE ORAL at 21:18

## 2022-02-16 NOTE — DISCHARGE NOTE PROVIDER - CARE PROVIDER_API CALL
Dominic Bradley)  Cardiology  [address], 3rd floor (between [address]  [place], [place] [id number]  Phone: [phone]  Fax: [phone]  Follow Up Time:     Yara Anton (MD)  Cardiovascular Disease; Interventional Cardiology  [address], [address]  [place] [address]  Phone: [phone]  Fax: [phone]  Follow Up Time:    Dominic Bradley)  Cardiology  [address], 3rd floor (between [address]  [place], [place] [id number]  Phone: [phone]  Fax: [phone]  Follow Up Time:     Yara Anton (MD)  Cardiovascular Disease; Interventional Cardiology  [address] [facility], [address]  [place] [address]  Phone: [phone]  Fax: [phone]  Scheduled Appointment: 02/25/2022 12:00 PM   Dominic Bradley)  Cardiology  [address], 3rd floor ([address]  [place], [place] [id number]  Phone: [phone]  Fax: [phone]  Scheduled Appointment: 03/15/2022 09:00 AM    Yara Anton)  Cardiovascular Disease; Interventional Cardiology  [address] [facility], [address]  [place] [address]  Phone: [phone]  Fax: [phone]  Scheduled Appointment: 02/25/2022 12:00 PM   Dominic Bradley)  Cardiology  [address], 3rd floor (between [address]  [place], [place] [id number]  Phone: [phone]  Fax: [phone]  Scheduled Appointment: 03/15/2022 09:00 AM    Yara Anton)  Cardiovascular Disease; Interventional Cardiology  [address] [facility], [address]  [place] [address]  Phone: [phone]  Fax: [phone]  Scheduled Appointment: 02/25/2022 12:00 PM    Brenden Smith)  Critical Care Medicine; Pulmonary Disease  [address] [place] [address], [address]  [place] [address]  Phone: [phone]  Fax: [phone]  Follow Up Time: 2 weeks   Dominic Bradley)  Cardiology  [address], 3rd floor (between [address]  [place], [place] [id number]  Phone: [phone]  Fax: [phone]  Scheduled Appointment: 03/15/2022 09:00 AM    Yara Anton)  Cardiovascular Disease; Interventional Cardiology  [address] [facility], [address]  [place] [address]  Phone: [phone]  Fax: [phone]  Scheduled Appointment: 02/25/2022 12:00 PM    Brenden Smith)  Critical Care Medicine; Pulmonary Disease  [address] [place] [address], [address]  [place] [address]  Phone: [phone]  Fax: [phone]  Follow Up Time: 2 weeks    Tanja Friedman; PhD)  Adv Heart Fail Trnsplnt Cardio; Cardiovascular Disease; Internal Medicine  [address] [place] [address]  Phone: [phone]  Fax: [phone]  Scheduled Appointment: 03/11/2022 10:00 AM

## 2022-02-16 NOTE — PROGRESS NOTE ADULT - PROBLEM SELECTOR PLAN 4
- tele- slow AF HR 50s- 60s   - c/w IV Hep gtt - plan BIV today (2/17) and will resume home Eliquis 2.5mg BID post procedure (currently on IV Hep gtt)   - Hold AC if PLT <50, per hematology

## 2022-02-16 NOTE — PROGRESS NOTE ADULT - REASON FOR ADMISSION
CHF
CHF
worsening Dyspnea and recent weight gain 4-5 days

## 2022-02-16 NOTE — DISCHARGE NOTE PROVIDER - NSDCCPCAREPLAN_GEN_ALL_CORE_FT
PRINCIPAL DISCHARGE DIAGNOSIS  Diagnosis: Acute on chronic systolic congestive heart failure  Assessment and Plan of Treatment:       SECONDARY DISCHARGE DIAGNOSES  Diagnosis: Pleural effusion  Assessment and Plan of Treatment:     Diagnosis: Amyloidosis  Assessment and Plan of Treatment:      PRINCIPAL DISCHARGE DIAGNOSIS  Diagnosis: Acute on chronic systolic congestive heart failure  Assessment and Plan of Treatment: - You were admitted to the hospital for shortness of breath. An echocardiogram or ultrasound of your heart was performed which showed an ejection fraction or pumping function of your heart to be 37%, which is LOW with systolic dysfunction, which you have a known history of. This is a type of heart failure. Heart failure is a condition in which the heart does not pump or fill with blood well. As a result, the heart lags behind in its job of moving blood throughout the body. This can lead to symptoms such as swelling, trouble breathing, and feeling tired. Your heart is VERY WEAK. When it pumps it does not squeeze properly.         SECONDARY DISCHARGE DIAGNOSES  Diagnosis: Pleural effusion  Assessment and Plan of Treatment:     Diagnosis: Amyloidosis  Assessment and Plan of Treatment:      PRINCIPAL DISCHARGE DIAGNOSIS  Diagnosis: Acute on chronic systolic congestive heart failure  Assessment and Plan of Treatment: - You were admitted to the hospital for shortness of breath. An echocardiogram or ultrasound of your heart was performed which showed an ejection fraction or pumping function of your heart to be 37%, which is LOW with systolic dysfunction, which you have a known history of. This is a type of heart failure. Heart failure is a condition in which the heart does not pump or fill with blood well. As a result, the heart lags behind in its job of moving blood throughout the body. This can lead to symptoms such as swelling, trouble breathing, and feeling tired. Your heart is VERY WEAK. When it pumps it does not squeeze properly.         SECONDARY DISCHARGE DIAGNOSES  Diagnosis: Pleural effusion  Assessment and Plan of Treatment:     Diagnosis: Amyloidosis  Assessment and Plan of Treatment: - While you were in the hospital you wrere found to have amyloidosis. Cardiac amyloidosis is a disorder caused by deposits of an abnormal protein (amyloid) in the heart tissue. These deposits make it hard for the heart to work properly.     PRINCIPAL DISCHARGE DIAGNOSIS  Diagnosis: Acute on chronic systolic congestive heart failure  Assessment and Plan of Treatment: - You were admitted to the hospital for shortness of breath. An echocardiogram or ultrasound of your heart was performed which showed an ejection fraction or pumping function of your heart to be 37%, which is LOW with systolic dysfunction, which you have a known history of. This is a type of heart failure. Heart failure is a condition in which the heart does not pump or fill with blood well. As a result, the heart lags behind in its job of moving blood throughout the body. This can lead to symptoms such as swelling, trouble breathing, and feeling tired. Your heart is VERY WEAK. When it pumps it does not squeeze properly. You were started on Torsemide 20mg orally daily and should weigh yourself daily and if you notice a weight gain of more than 2-3 pounds in 2 days, shortness of breath, or lower extremity swelling you should contact your doctor immediately. Please restrict your fluid intake to 1-2L daily. You have an appointment with Dr. Anton on 02/25 at 12pm. It is very important to maintain close follow up with your providers on discharge.   You had a pacemaker placed during your admisssion. Do not lift left arm above shoulder or lift heavy items with left arm for 1 month. You can shower 2 days later. Keep incision site clean and dry. Do not remove the glue on the incision. Let it fall off on its own.   Call our doctor  if any questions about the wound -- such as bleeding, swelling, increasing pain or redness.  [phone]. Please call to make a follow up appointment with the Electrophysiology team in 1-2 weeks to check the pacemaker.         SECONDARY DISCHARGE DIAGNOSES  Diagnosis: Pleural effusion  Assessment and Plan of Treatment: During your admission you had fluid in your lungs which was drained. Please continue your torsemide 20mg orally daily to prevent further accumalation of fluid in your lungs. You should follow up with your cardiologist and pulmonolgist within one week of your discharge,    Diagnosis: Amyloidosis  Assessment and Plan of Treatment: - While you were in the hospital you were found to have amyloidosis. Cardiac amyloidosis is a disorder caused by deposits of an abnormal protein (amyloid) in the heart tissue. These deposits make it hard for the heart to work properly. You have an appointment with Dr. Finkelstein on 03/15 at 9AM for continued workup and management.     PRINCIPAL DISCHARGE DIAGNOSIS  Diagnosis: Acute on chronic systolic congestive heart failure  Assessment and Plan of Treatment: - You were admitted to the hospital for shortness of breath. An echocardiogram or ultrasound of your heart was performed which showed an ejection fraction or pumping function of your heart to be 37%, which is LOW with systolic dysfunction, which you have a known history of. This is a type of heart failure. Heart failure is a condition in which the heart does not pump or fill with blood well. As a result, the heart lags behind in its job of moving blood throughout the body. This can lead to symptoms such as swelling, trouble breathing, and feeling tired. Your heart is VERY WEAK. When it pumps it does not squeeze properly. You were started on Torsemide 20mg orally daily and should weigh yourself daily and if you notice a weight gain of more than 2-3 pounds in 2 days, shortness of breath, or lower extremity swelling you should contact your doctor immediately. Please restrict your fluid intake to 1-2L daily. You have an appointment with Dr. Anton on 02/25 at 12pm. It is very important to maintain close follow up with your providers on discharge.         SECONDARY DISCHARGE DIAGNOSES  Diagnosis: Atrial fibrillation and flutter  Assessment and Plan of Treatment: You have a history of Atrial Fibrillation. This means your atrium do not contract properly and blood does not efficiently get pumped into the ventricles. This may lead to blood abnormally pooling in the ventricles and causing it to clot. This puts you at an increased risk for a stroke. Therefore, continue your blood-thinning medication called Eliquis 5mg orally twice daily.   You had a pacemaker placed during your admisssion. Do not lift left arm above shoulder or lift heavy items with left arm for 1 month. You can shower 2 days later. Keep incision site clean and dry. Do not remove the glue on the incision. Let it fall off on its own.   Call our doctor  if any questions about the wound -- such as bleeding, swelling, increasing pain or redness.  [phone]. Please call to make a follow up appointment with the Electrophysiology team in 1-2 weeks to check the pacemaker.    Diagnosis: Pleural effusion  Assessment and Plan of Treatment: During your admission you had fluid in your lungs which was drained. Please continue your torsemide 20mg orally daily to prevent further accumalation of fluid in your lungs. You should follow up with your cardiologist and pulmonolgist within one week of your discharge,    Diagnosis: Amyloidosis  Assessment and Plan of Treatment: - While you were in the hospital you were found to have amyloidosis. Cardiac amyloidosis is a disorder caused by deposits of an abnormal protein (amyloid) in the heart tissue. These deposits make it hard for the heart to work properly. You have an appointment with Dr. Finkelstein on 03/15 at 9AM for continued workup and management.     PRINCIPAL DISCHARGE DIAGNOSIS  Diagnosis: Acute on chronic systolic congestive heart failure  Assessment and Plan of Treatment: - You were admitted to the hospital for shortness of breath. An echocardiogram or ultrasound of your heart was performed which showed an ejection fraction or pumping function of your heart to be 37%, which is LOW with systolic dysfunction, which you have a known history of. This is a type of heart failure. Heart failure is a condition in which the heart does not pump or fill with blood well. As a result, the heart lags behind in its job of moving blood throughout the body. This can lead to symptoms such as swelling, trouble breathing, and feeling tired. Your heart is VERY WEAK. When it pumps it does not squeeze properly. You were started on Torsemide 20mg orally daily and should weigh yourself daily and if you notice a weight gain of more than 2-3 pounds in 2 days, shortness of breath, or lower extremity swelling you should contact your doctor immediately. Please restrict your fluid intake to 1-2L daily. You have an appointment with Dr. Anton on 02/25 at 12pm and heart failure specialist on 03/11 at 10AM. It is very important to maintain close follow up with your providers on discharge.   Our heart failure specialist is recommending to restart Entresto 24/26mg orally twice daily. Please continue this medication as prescribed.         SECONDARY DISCHARGE DIAGNOSES  Diagnosis: Atrial fibrillation and flutter  Assessment and Plan of Treatment: You have a history of Atrial Fibrillation. This means your atrium do not contract properly and blood does not efficiently get pumped into the ventricles. This may lead to blood abnormally pooling in the ventricles and causing it to clot. This puts you at an increased risk for a stroke. Therefore, continue your blood-thinning medication called Eliquis 5mg orally twice daily which should be restarted on 02/19 which is recommended by our electrophysiology team. You were also started on a rate controlling medication called metoprolol succinate 25mg orally daily.  You had a pacemaker placed during your admisssion. Do not lift left arm above shoulder or lift heavy items with left arm for 1 month. You can shower 2 days later. Keep incision site clean and dry. Do not remove the glue on the incision. Let it fall off on its own.   Call our doctor  if any questions about the wound -- such as bleeding, swelling, increasing pain or redness.  [phone]. Please call to make a follow up appointment with the Electrophysiology team in 1-2 weeks to check the pacemaker.    Diagnosis: Pleural effusion  Assessment and Plan of Treatment: During your admission you had fluid in your lungs which was drained. Please continue your torsemide 20mg orally daily to prevent further accumalation of fluid in your lungs. You should follow up with your cardiologist and pulmonolgist within one week of your discharge,    Diagnosis: Amyloidosis  Assessment and Plan of Treatment: - While you were in the hospital you were found to have amyloidosis. Cardiac amyloidosis is a disorder caused by deposits of an abnormal protein (amyloid) in the heart tissue. These deposits make it hard for the heart to work properly. You have an appointment with Dr. Finkelstein on 03/15 at 9AM for continued workup and management.

## 2022-02-16 NOTE — DISCHARGE NOTE PROVIDER - HOSPITAL COURSE
*INCOMPLETE*        76 y/oM, NON-COMPLIANCE MEDICATIONS, PMHx type II DM (not currently on medications), A-Flutter/A-Fib (s/p prior ablation in 06/2016, thrombocytopenia (baseline Plt 60-70's on prior admission), stage III CKD (baseline Cr 1.0-1.2 ), non-obstructive CAD, Chronic Systolic CHF(EF 35% ECHO 11/2022), hx of NSVT (EP evaluated pt. 11/2021 recommended repeat TTE in three months), BPH (s/p TURP), pituitary adenoma (s/p transphenoidal treatment in 1990's), prostate cancer (s/p chemotherapy), chronic venous insufficiency, and multiple admissions for CHF exacerbation most recent 11/19/2021 presented to ED 2/7/22 w/worsening MARTINEZ a/w increased abdominal girth and BL LE edema/         who presents to [facility] ER this morning, 2/7/22, with worsening dyspnea (with minimal exertion), increase in abdominal girth, b/l LE swelling, facial swelling and dry cough for 4-5 days.  Pt. reports compliance with medication and medical follow up.   In light of patient's risk factors, and acute on chronic systolic CHF exacerbation pt. is now admitted to [facility]  for further medical management.    In ER ECG reveals Atrial Fibrillation HR@65bpm with non specific ST-T wave changes, Troponin 0.11 (patient's baseline), BNP 11,635, WBC 3.6, H/H 13.3/40.2, Platelets 107 (base line 60-70's), Blood Glucose 116.  CXR AP reveals R>L pleural effusion with Cardiomegaly, CT of Abdomen reveals R>L pleural effusion, ascites noted throughout abdomen and plevis, mesenteric infiltrate extensive anasarca.  US Abdomen reveals cholelithiasis, moderate ascites, right pleural effusion (moderate) and dilatation of hepatic vein.      *INCOMPLETE*        76 y/oM, NON-COMPLIANCE MEDICATIONS, PMHx type II DM (not currently on medications), A-Flutter/A-Fib (s/p prior ablation in 06/2016, thrombocytopenia (baseline Plt 60-70's on prior admission), stage III CKD (baseline Cr 1.0-1.2 ), non-obstructive CAD, Chronic Systolic CHF(EF 35% ECHO 11/2022), hx of NSVT (EP evaluated pt. 11/2021 recommended repeat TTE in three months), BPH (s/p TURP), pituitary adenoma (s/p transphenoidal treatment in 1990's), prostate cancer (s/p chemotherapy), chronic venous insufficiency, and multiple admissions for CHF exacerbation most recent 11/19/2021 presented to ED 2/7/22 w/worsening MARTINEZ a/w increased abdominal girth and BL LE edema. In ED: VSS. EKG: Afib HR @ 65 BPM non specific ST-T wave changes. Labs s/f trop 0.11 (baseline), BNP 11K, WBC 3/6,  (baseline 60s-70s). CXR: R>L pleural effusion with Cardiomegaly. CT of Abdomen reveals R>L pleural effusion, ascites noted throughout abdomen and plevis, mesenteric infiltrate extensive anasarca.  US Abdomen reveals cholelithiasis, moderate ascites, right pleural effusion (moderate) and dilatation of hepatic vein. Patient admitted to [facility] for acute on chronic systolic HF exacerbation.       During hospitalization, ECHO performed s/f Severe symmetric LVH. Moderately reduced LVSF. Apical segments are more vigorous than the base, consider infiltrative cardiomyopathy, such as amyloid. Mildly reduced RVSF. Biatrial enlargement. Tethered MV. Mild MR/TR. Pulmonary HTN present. PASP: 40 mmHg. Small pericardial effusion. Bilateral pleural effusion. Compared to 11/22 - LV function is similar, but more vigorous.  He was adequately diuresed with IV Lasix and transitioned to PO ___ on the day of discharge as he reached euvolemia and was net neg ___. As per pulm recs, he underwent a pleuracentesis 2/2  persistent right sided pleural effusion, despite IV diuretics on 2/11/22 which was c/b pneumothorax, s/p CT placement and removal on 2/15/22. Of note, patient had  BM Bx w/ Dr. Bernal 11/23/21 to r/o infiltrative CMP which was negative for amyloid however was a small sample size. NM Amyloidosis scan performed and s/f intense uptake in the left ventricular and part of the right ventricular myocardium in a pattern consistent with TTR-mediated amyloidosis. Dr. Escobar consulted and signed off 2/2 amyloid is TTR mediated and not originating in BM. Heart failure following patient and as per recs he received BIV on 2/16/22 w/EP team.                *INCOMPLETE*      76 y/oM, NON-COMPLIANCE MEDICATIONS, PMHx type II DM (not currently on medications), A-Flutter/A-Fib (s/p prior ablation in 06/2016, thrombocytopenia (baseline Plt 60-70's on prior admission), stage III CKD (baseline Cr 1.0-1.2 ), non-obstructive CAD, Chronic Systolic CHF(EF 35% ECHO 11/2022), hx of NSVT (EP evaluated pt. 11/2021 recommended repeat TTE in three months), BPH (s/p TURP), pituitary adenoma (s/p transphenoidal treatment in 1990's), prostate cancer (s/p chemotherapy), chronic venous insufficiency, and multiple admissions for CHF exacerbation most recent 11/19/2021 presented to ED 2/7/22 w/worsening MARTINEZ a/w increased abdominal girth and BL LE edema. In ED: VSS. EKG: Afib HR @ 65 BPM non specific ST-T wave changes. Labs s/f trop 0.11 (baseline), BNP 11K, WBC 3/6,  (baseline 60s-70s). CXR: R>L pleural effusion with Cardiomegaly. CT of Abdomen reveals R>L pleural effusion, ascites noted throughout abdomen and plevis, mesenteric infiltrate extensive anasarca.  US Abdomen reveals cholelithiasis, moderate ascites, right pleural effusion (moderate) and dilatation of hepatic vein. Patient admitted to [facility] for acute on chronic systolic HF exacerbation.       During hospitalization, ECHO performed s/f Severe symmetric LVH. Moderately reduced LVSF. Apical segments are more vigorous than the base, consider infiltrative cardiomyopathy, such as amyloid. Mildly reduced RVSF. Biatrial enlargement. Tethered MV. Mild MR/TR. Pulmonary HTN present. PASP: 40 mmHg. Small pericardial effusion. Bilateral pleural effusion. Compared to 11/22 - LV function is similar, but more vigorous.  He was adequately diuresed with IV Lasix and transitioned to PO ___ on the day of discharge as he reached euvolemia and was net neg ___. As per pulm recs, he underwent a pleuracentesis 2/2  persistent right sided pleural effusion, despite IV diuretics on 2/11/22 which was c/b pneumothorax, s/p CT placement and removal on 2/15/22. Of note, patient had  BM Bx w/ Dr. Bernal 11/23/21 to r/o infiltrative CMP which was negative for amyloid however was a small sample size. NM Amyloidosis scan performed and s/f intense uptake in the left ventricular and part of the right ventricular myocardium in a pattern consistent with TTR-mediated amyloidosis. Dr. Escobar consulted and signed off 2/2 amyloid is TTR mediated and not originating in BM. Heart failure following patient and as per recs he received BIV on 2/16/22 w/EP team.                                76 y/oM, NON-COMPLIANCE MEDICATIONS, PMHx type II DM (not currently on medications), A-Flutter/A-Fib (s/p prior ablation in 06/2016, thrombocytopenia (baseline Plt 60-70's on prior admission), stage III CKD (baseline Cr 1.0-1.2 ), non-obstructive CAD, Chronic Systolic CHF(EF 35% ECHO 11/2022), hx of NSVT (EP evaluated pt. 11/2021 recommended repeat TTE in three months), BPH (s/p TURP), pituitary adenoma (s/p transphenoidal treatment in 1990's), prostate cancer (s/p chemotherapy), chronic venous insufficiency, and multiple admissions for CHF exacerbation most recent 11/19/2021 presented to ED 2/7/22 w/worsening MARTINEZ a/w increased abdominal girth and BL LE edema. In ED: VSS. EKG: Afib HR @ 65 BPM non specific ST-T wave changes. Labs s/f trop 0.11 (baseline), BNP 11K, WBC 3/6,  (baseline 60s-70s). CXR: R>L pleural effusion with Cardiomegaly. CT of Abdomen reveals R>L pleural effusion, ascites noted throughout abdomen and plevis, mesenteric infiltrate extensive anasarca.  US Abdomen reveals cholelithiasis, moderate ascites, right pleural effusion (moderate) and dilatation of hepatic vein. Patient admitted to [facility] for acute on chronic systolic HF exacerbation.       During hospitalization, ECHO performed s/f Severe symmetric LVH. Moderately reduced LVSF. Apical segments are more vigorous than the base, consider infiltrative cardiomyopathy, such as amyloid. Mildly reduced RVSF. Biatrial enlargement. Tethered MV. Mild MR/TR. Pulmonary HTN present. PASP: 40 mmHg. Small pericardial effusion. Bilateral pleural effusion. Compared to 11/22 - LV function is similar, but more vigorous.  He was adequately diuresed with IV Lasix and transitioned to PO torsemide 20mg daily on the day of discharge as he reached euvolemia and was net neg 24L. As per pulm recs, he underwent a pleuracentesis 2/2  persistent right sided pleural effusion, despite IV diuretics on 2/11/22 which was c/b pneumothorax, s/p CT placement and removal on 2/15/22. Of note, patient had BM Bx w/ Dr. Bernal 11/23/21 to r/o infiltrative CMP which was negative for amyloid however was a small sample size. NM Amyloidosis scan performed and s/f intense uptake in the left ventricular and part of the right ventricular myocardium in a pattern consistent with TTR-mediated amyloidosis. Dr. Escobar consulted and signed off 2/2 amyloid is TTR mediated and not originating in BM. Heart failure following patient and as per recs he received BIV PPM on 2/16/22 w/EP team. Patient follow up made with Dr. Anton on 02/25 at 12pm. Overnight, no events. This AM, feels well, denies CP, SOB, n/v/d, LE edema. VSS, no events on tele, labs reviewed. PE significant left chest pocket incision site negative for hematoma, swelling, or bleeding. Patient was seen and examined by attending who agrees with above d/c plan. All meds rx to pharmacy and explained to patient. Patient instructed to return if sx worsen including not limited to chest pain, shortness of breath.               76 y/oM, NON-COMPLIANCE MEDICATIONS, PMHx type II DM (not currently on medications), A-Flutter/A-Fib (s/p prior ablation in 06/2016, thrombocytopenia (baseline Plt 60-70's on prior admission), stage III CKD (baseline Cr 1.0-1.2 ), non-obstructive CAD, Chronic Systolic CHF(EF 35% ECHO 11/2022), hx of NSVT (EP evaluated pt. 11/2021 recommended repeat TTE in three months), BPH (s/p TURP), pituitary adenoma (s/p transphenoidal treatment in 1990's), prostate cancer (s/p chemotherapy), chronic venous insufficiency, and multiple admissions for CHF exacerbation most recent 11/19/2021 presented to ED 2/7/22 w/worsening MARTINEZ a/w increased abdominal girth and BL LE edema. In ED: VSS. EKG: Afib HR @ 65 BPM non specific ST-T wave changes. Labs s/f trop 0.11 (baseline), BNP 11K, WBC 3/6,  (baseline 60s-70s). CXR: R>L pleural effusion with Cardiomegaly. CT of Abdomen reveals R>L pleural effusion, ascites noted throughout abdomen and plevis, mesenteric infiltrate extensive anasarca.  US Abdomen reveals cholelithiasis, moderate ascites, right pleural effusion (moderate) and dilatation of hepatic vein. Patient admitted to [facility] for acute on chronic systolic HF exacerbation.       During hospitalization, ECHO performed s/f Severe symmetric LVH. Moderately reduced LVSF. Apical segments are more vigorous than the base, consider infiltrative cardiomyopathy, such as amyloid. Mildly reduced RVSF. Biatrial enlargement. Tethered MV. Mild MR/TR. Pulmonary HTN present. PASP: 40 mmHg. Small pericardial effusion. Bilateral pleural effusion. Compared to 11/22 - LV function is similar, but more vigorous.  He was adequately diuresed with IV Lasix and transitioned to PO torsemide 20mg daily on the day of discharge as he reached euvolemia and was net neg 24L. As per pulm recs, he underwent a pleuracentesis 2/2  persistent right sided pleural effusion, despite IV diuretics on 2/11/22 which was c/b pneumothorax, s/p CT placement and removal on 2/15/22. Of note, patient had BM Bx w/ Dr. Bernal 11/23/21 to r/o infiltrative CMP which was negative for amyloid however was a small sample size. NM Amyloidosis scan performed and s/f intense uptake in the left ventricular and part of the right ventricular myocardium in a pattern consistent with TTR-mediated amyloidosis. Dr. Escobar consulted and signed off 2/2 amyloid is TTR mediated and not originating in BM. Heart failure following patient and as per recs he received BIV PPM on 2/16/22 w/EP team. Patient follow up made with Dr. Anton on 02/25 at 12pm and an appointment with a heart failure specialist was made for 03/11 at 10AM. Overnight, no events. This AM, feels well, denies CP, SOB, n/v/d, LE edema. VSS, no events on tele, labs reviewed. PE significant left chest pocket incision site negative for hematoma, swelling, or bleeding. Patient was seen and examined by attending who agrees with above d/c plan. All meds rx to pharmacy and explained to patient. Patient instructed to return if sx worsen including not limited to chest pain, shortness of breath.               76 y/oM, NON-COMPLIANCE MEDICATIONS, PMHx type II DM (not currently on medications), A-Flutter/A-Fib (s/p prior ablation in 06/2016, thrombocytopenia (baseline Plt 60-70's on prior admission), stage III CKD (baseline Cr 1.0-1.2 ), non-obstructive CAD, Chronic Systolic CHF(EF 35% ECHO 11/2022), hx of NSVT (EP evaluated pt. 11/2021 recommended repeat TTE in three months), BPH (s/p TURP), pituitary adenoma (s/p transphenoidal treatment in 1990's), prostate cancer (s/p chemotherapy), chronic venous insufficiency, and multiple admissions for CHF exacerbation most recent 11/19/2021 presented to ED 2/7/22 w/worsening MARTINEZ a/w increased abdominal girth and BL LE edema. In ED: VSS. EKG: Afib HR @ 65 BPM non specific ST-T wave changes. Labs s/f trop 0.11 (baseline), BNP 11K, WBC 3/6,  (baseline 60s-70s). CXR: R>L pleural effusion with Cardiomegaly. CT of Abdomen reveals R>L pleural effusion, ascites noted throughout abdomen and plevis, mesenteric infiltrate extensive anasarca.  US Abdomen reveals cholelithiasis, moderate ascites, right pleural effusion (moderate) and dilatation of hepatic vein. Patient admitted to [facility] for acute on chronic systolic HF exacerbation.       During hospitalization, ECHO performed s/f Severe symmetric LVH. Moderately reduced LVSF. Apical segments are more vigorous than the base, consider infiltrative cardiomyopathy, such as amyloid. Mildly reduced RVSF. Biatrial enlargement. Tethered MV. Mild MR/TR. Pulmonary HTN present. PASP: 40 mmHg. Small pericardial effusion. Bilateral pleural effusion. Compared to 11/22 - LV function is similar, but more vigorous.  He was adequately diuresed with IV Lasix and transitioned to PO torsemide 20mg daily on the day of discharge as he reached euvolemia and was net neg 24L. As per pulm recs, he underwent a pleuracentesis 2/2  persistent right sided pleural effusion, despite IV diuretics on 2/11/22 which was c/b pneumothorax, s/p CT placement and removal on 2/15/22. Of note, patient had BM Bx w/ Dr. Bernal 11/23/21 to r/o infiltrative CMP which was negative for amyloid however was a small sample size. NM Amyloidosis scan performed and s/f intense uptake in the left ventricular and part of the right ventricular myocardium in a pattern consistent with TTR-mediated amyloidosis. Dr. Escobar consulted and signed off 2/2 amyloid is TTR mediated and not originating in BM. Heart failure following patient and as per recs he received BIV PPM on 2/16/22 w/EP team. Patient follow up made with Dr. Anton on 02/25 at 12pm and an appointment with a heart failure specialist was made for 03/11 at 10AM. Overnight, no events. This AM, feels well, denies CP, SOB, n/v/d, LE edema. VSS, no events on tele, labs reviewed. PE significant left chest pocket incision site negative for hematoma, swelling, or bleeding. Patient was seen and examined by attending who agrees with above d/c plan. All meds rx to pharmacy and explained to patient. Patient instructed to return if sx worsen including not limited to chest pain, shortness of breath. PT recs home with home PT.

## 2022-02-16 NOTE — PROGRESS NOTE ADULT - SUBJECTIVE AND OBJECTIVE BOX
CARDIOLOGY NP PROGRESS NOTE    Subjective:  Received pt awake in bed in NAD. States feeling well. Denies CP/SOB, dizziness/diaphoresis, n/v, palpitations.  Remainder ROS otherwise negative.    Overnight Events:  Chest Tube Removed 2/15/22      TELEMETRY: A-fib (HR 50s-60s)           VITAL SIGNS:  T(C): 36.7 (02-16-22 @ 10:19), Max: 36.7 (02-16-22 @ 10:19)  HR: 64 (02-16-22 @ 10:30) (55 - 64)  BP: 98/60 (02-16-22 @ 10:30) (81/51 - 119/72)  RR: 18 (02-16-22 @ 10:30) (16 - 18)  SpO2: 98% (02-16-22 @ 10:30) (98% - 100%)  Wt(kg): --    I&O's Summary    15 Feb 2022 07:01  -  16 Feb 2022 07:00  --------------------------------------------------------  IN: 751 mL / OUT: 2050 mL / NET: -1299 mL    16 Feb 2022 07:01  -  16 Feb 2022 11:42  --------------------------------------------------------  IN: 0 mL / OUT: 0 mL / NET: 0 mL          PHYSICAL EXAM:    PHYSICAL EXAM:  General: A/ox 3, No acute Distress  Neck: Supple, NO JVD  Cardiac: Irregular RR, No M/R/G  Pulmonary: Diminished breath sounds RLL. Breathing unlabored on RA.  No Rhonchi/Rales/Wheezing  ***R CT removed and occlusive dsg in place***  Abdomen: Soft, Non -tender, +BS x 4 quads  Extremities: No Rashes, No edema  Neuro: A/o x 3, No focal deficits        LABS:                          13.2   2.45  )-----------( 111      ( 16 Feb 2022 07:16 )             39.4                              02-16    139  |  106  |  34<H>  ----------------------------<  90  3.9   |  27  |  1.13    Ca    9.0      16 Feb 2022 07:16  Mg     2.2     02-16                              PT/INR - ( 16 Feb 2022 07:16 )   PT: 12.9 sec;   INR: 1.08          PTT - ( 16 Feb 2022 07:16 )  PTT:52.1 sec  CAPILLARY BLOOD GLUCOSE      POCT Blood Glucose.: 83 mg/dL (16 Feb 2022 11:31)  POCT Blood Glucose.: 92 mg/dL (16 Feb 2022 06:42)  POCT Blood Glucose.: 142 mg/dL (15 Feb 2022 21:43)  POCT Blood Glucose.: 127 mg/dL (15 Feb 2022 17:08)  POCT Blood Glucose.: 104 mg/dL (15 Feb 2022 11:53)            Allergies:  No Known Allergies    MEDICATIONS  (STANDING):  dextrose 50% Injectable 25 Gram(s) IV Push once  dextrose 50% Injectable 12.5 Gram(s) IV Push once  dextrose 50% Injectable 25 Gram(s) IV Push once  glucagon  Injectable 1 milliGRAM(s) IntraMuscular once  heparin  Infusion. 1300 Unit(s)/Hr (13 mL/Hr) IV Continuous <Continuous>  insulin lispro (ADMELOG) corrective regimen sliding scale   SubCutaneous Before meals and at bedtime  polyethylene glycol 3350 17 Gram(s) Oral daily  senna 2 Tablet(s) Oral at bedtime  tamsulosin 0.4 milliGRAM(s) Oral at bedtime  torsemide 20 milliGRAM(s) Oral two times a day    MEDICATIONS  (PRN):  heparin   Injectable 6500 Unit(s) IV Push every 6 hours PRN For aPTT less than 40  heparin   Injectable 3000 Unit(s) IV Push every 6 hours PRN For aPTT between 40 - 57        DIAGNOSTIC TESTS:

## 2022-02-16 NOTE — DISCHARGE NOTE PROVIDER - CARE PROVIDERS DIRECT ADDRESSES
,[email or web address],DirectAddress_Unknown ,[email or web address],DirectAddress_Unknown,DirectAddress_Unknown ,[email or web address],DirectAddress_Unknown,DirectAddress_Unknown,[email or web address]

## 2022-02-16 NOTE — PROGRESS NOTE ADULT - PROBLEM SELECTOR PROBLEM 6
BPH (benign prostatic hyperplasia)
DVT prophylaxis
BPH (benign prostatic hyperplasia)
DVT prophylaxis
BPH (benign prostatic hyperplasia)
BPH (benign prostatic hyperplasia)
DVT prophylaxis
BPH (benign prostatic hyperplasia)
BPH (benign prostatic hyperplasia)

## 2022-02-16 NOTE — PROGRESS NOTE ADULT - SUBJECTIVE AND OBJECTIVE BOX
CC/ HPI Patient is a 76 year old male, HFrEF, , non-obstructive CAD, A-Flutter/A-Fib, prior ablation 2016, NSVT,  admitted with acute on chronic CHF, pleural effusions, compressive atelectasis, amyloidosis, s/p post thoracentesis, right pneumothorax treated with chest tube, seen this morning the patient is without respiratory complaint.      PAST MEDICAL & SURGICAL HISTORY:  Atrial flutter, s/p Ablation 2016  Chronic venous insufficiency of lower extremity  Prostate CA  Stage 3 chronic kidney disease  Type 2 diabetes mellitus  Thrombocytopenia  History of surgical removal of pituitary gland, 1990s  S/P TURP for BPH    SOCHX:  + tobacco,  -  alcohol      FAMILY HISTORY: FA/MO  - contributory     ROS reviewed below with positive findings marked (+) :  GEN:  fever, chills ENT: tracheostomy,   epistaxis,  sinusitis COR: CAD, +CHF,  HTN, +dysrhythmia PUL: COPD, ILD, asthma, pneumonia GI: PEG, dysphagia, hemorrhage, other KRISTIN: +kidney disease, electrolyte disorder HEM:  anemia, +thrombus, coagulopathy, +cancer ENDO:  thyroid disease, diabetes mellitus CNS:  dementia, stroke, seizure, PSY:  depression, anxiety, other      MEDICATIONS  (STANDING):  dextrose 50% Injectable 25 Gram(s) IV Push once  dextrose 50% Injectable 12.5 Gram(s) IV Push once  dextrose 50% Injectable 25 Gram(s) IV Push once  glucagon  Injectable 1 milliGRAM(s) IntraMuscular once  insulin lispro (ADMELOG) corrective regimen sliding scale   SubCutaneous Before meals and at bedtime  polyethylene glycol 3350 17 Gram(s) Oral daily  senna 2 Tablet(s) Oral at bedtime  tamsulosin 0.4 milliGRAM(s) Oral at bedtime  torsemide 20 milliGRAM(s) Oral two times a day    MEDICATIONS  (PRN):      Vital Signs Last 24 Hrs  T(C): 36.6 (16 Feb 2022 18:28), Max: 36.7 (16 Feb 2022 10:19)  T(F): 97.9 (16 Feb 2022 18:28), Max: 98.1 (16 Feb 2022 10:19)  HR: 80 (16 Feb 2022 18:19) (55 - 80)  BP: 96/63 (16 Feb 2022 18:19) (78/53 - 119/72)  BP(mean): 83 (16 Feb 2022 06:00) (79 - 83)  RR: 18 (16 Feb 2022 18:19) (16 - 18)  SpO2: 98% (16 Feb 2022 18:19) (98% - 100%)    GENERAL:         comfortable,  - distress.  HEENT:            - trauma,  - icterus,  - injection,  - nasal discharge.  NECK:              - jugular venous distention, - thyromegaly.  LYMPH:           - lymphadenopathy, - masses.  RESP:              + clear,   - rales,   - rhonchi,   - wheezes.   COR:                S1S2   - gallops,  - rubs.  ABD:                bowel sounds,   soft, - tender, - distended.  EXT/MSC:         - cyanosis,  - clubbing, - edema.    NEURO:            + alert and oriented                          13.2   2.45  )-----------( 111      ( 16 Feb 2022 07:16 )             39.4     02-16    139  |  106  |  34<H>  ----------------------------<  90  3.9   |  27  |  1.13    Ca    9.0      16 Feb 2022 07:16  Mg     2.2     02-16      X-ray Chest (02.15.22) Interval removal of right chest pigtail catheter. No definite pneumothorax.      ASSESSMENT/PLAN    1)  HFrEF  2)  Afib/flutter  3)  Pleural effusion    Oxygen as needed for a satisfactory SpO2  Bronchodilators:  Atrovent/ albuterol q 4 – 6 hours as needed  Cardiac/HTN: intervention planned  GI: Rx/ prophylaxis c PPI/H2B  Heme: Rx/VT on AC  Discussed with Dr. Guido

## 2022-02-16 NOTE — DISCHARGE NOTE PROVIDER - NPI NUMBER (FOR [employer]YS[employer] USE ONLY) :
[[id number] [[id number]],[[id number]] [[id number]],[[id number]],[[id number]]

## 2022-02-16 NOTE — DISCHARGE NOTE PROVIDER - PROVIDER TOKENS
PROVIDER:[TOKEN:[[id number]]],PROVIDER:[TOKEN:[[id number]]] PROVIDER:[TOKEN:[[id number]]],PROVIDER:[TOKEN:[[id number]],SCHEDULEDAPPT:[02/25/2022],SCHEDULEDAPPTTIME:[12:00 PM]] PROVIDER:[TOKEN:[[id number]],SCHEDULEDAPPT:[03/15/2022],SCHEDULEDAPPTTIME:[09:00 AM]],PROVIDER:[TOKEN:[[id number]],SCHEDULEDAPPT:[02/25/2022],SCHEDULEDAPPTTIME:[12:00 PM]] PROVIDER:[TOKEN:[[id number]],SCHEDULEDAPPT:[03/15/2022],SCHEDULEDAPPTTIME:[09:00 AM]],PROVIDER:[TOKEN:[[id number]],SCHEDULEDAPPT:[02/25/2022],SCHEDULEDAPPTTIME:[12:00 PM]],PROVIDER:[TOKEN:[[id number]],FOLLOWUP:[2 weeks]] PROVIDER:[TOKEN:[[id number]],SCHEDULEDAPPT:[03/15/2022],SCHEDULEDAPPTTIME:[09:00 AM]],PROVIDER:[TOKEN:[[id number]],SCHEDULEDAPPT:[02/25/2022],SCHEDULEDAPPTTIME:[12:00 PM]],PROVIDER:[TOKEN:[[id number]],FOLLOWUP:[2 weeks]],PROVIDER:[TOKEN:[[id number]],SCHEDULEDAPPT:[03/11/2022],SCHEDULEDAPPTTIME:[10:00 AM]]

## 2022-02-16 NOTE — PROGRESS NOTE ADULT - PROBLEM SELECTOR PROBLEM 1
Acute on chronic systolic congestive heart failure

## 2022-02-16 NOTE — PROGRESS NOTE ADULT - PROBLEM SELECTOR PROBLEM 2
Cardiac amyloidosis
Amyloidosis
Cardiac amyloidosis
Amyloidosis
Paroxysmal atrial fibrillation
Amyloidosis
Amyloidosis
Paroxysmal atrial fibrillation
Pleural effusion
Paroxysmal atrial fibrillation

## 2022-02-16 NOTE — PROGRESS NOTE ADULT - PROBLEM SELECTOR PLAN 1
P/W anasarca. BNP 26573. Trop peaked 0.13 likely 2/2 demand ischemia in setting of fluid overload   - Entresto 24/26mg BID DC'D 2/2 hypotension (SBP 60s-80s) on 2/15   - no BB 2/2 amyloidosis  - c/w Torsemide 20mg BID   - Heart failure and EP following, appreciate recs.   - plan BiV today (2/17)  - net neg 22L since admission and 1.2L/24H  - core measures. Strict I/Os, daily weights.

## 2022-02-16 NOTE — DISCHARGE NOTE PROVIDER - NSDCFUADDAPPT_GEN_ALL_CORE_FT
Please bring your Insurance card, Photo ID and Discharge paperwork to the following appointment:    (1) Please follow up with your Cardiology Provider, Dr. Yara Anton at [address] [place] [address], [address], [place] [address] on 02/25/2022 at 12:00pm.    Appointment was scheduled by Ms. MIKKI England, Referral Coordinator.   Please bring your Insurance card, Photo ID and Discharge paperwork to the following appointments:    (1) Please follow up with your Cardiology Provider, Dr. Yara Anton at [address] [facility], [address], [place] [address] on 02/25/2022 at 12:00pm.    Appointment was scheduled by Ms. MIKKI England, Referral Coordinator.    (2) Please follow up with your Cardiology Provider, Dr. Dominic Bradley at [address] [facility], 3[address] Floor, [place] [address] on 03/15/2022 at 9:00am.    Appointment was scheduled by Ms. MIKKI England, Referral Coordinator.

## 2022-02-16 NOTE — PROGRESS NOTE ADULT - PROBLEM SELECTOR PLAN 5
-Continue Tamsulosin 0.4mg PO daily at night
h/o thrombocytopenia w/ baseline PLT 60-70s  -PLT remains stable  -Transfuse for PLT <50 and hold AC
h/o thrombocytopenia w/ baseline PLT 60-70s  -PLT remains stable, today 100  -Transfuse for PLT <50 and hold AC
h/o thrombocytopenia w/ baseline PLT 60-70s  - PLT remains stable  - Transfuse for PLT <50 and hold AC
h/o thrombocytopenia w/ baseline PLT 60-70s  - PLT remains stable  - Transfuse for PLT <50 and hold AC
-Continue Tamsulosin 0.4mg PO daily at night
-Continue Tamsulosin 0.4mg PO daily at night
-Continue Tamsulosin 0.4mg QD    F: None  E: Replete if K<4 or Mag<2  N: DASH Diet  VTEppx: Heparin SQ   Dispo: cardiac telemetry  PT: pending eval
h/o thrombocytopenia w/ baseline PLT 60-70s  - PLT remains stable  - Transfuse for PLT <50 and hold AC
h/o thrombocytopenia w/ baseline PLT 60-70s  -PLT remains stable, today 94  -Transfuse for PLT <50 and hold AC
-Patient with Hx of DM  -A1c this hospitalization at goal (6.4)

## 2022-02-16 NOTE — PROGRESS NOTE ADULT - PROBLEM SELECTOR PLAN 3
Persistent right sided pleural effusion despite IV diuretics  - CT Chest 2/9: Increased mod-large right pleural effusion, unchanged small left pleural effusion, ascites and upper abd w/ anasarca, cardiomegaly, probably pulm HTN  - s/p IR guided pleurocentesis 2/11 w/ 1L drained, c/b small pneumothorax  w/ chest tube in place  - CT removed 2/15/22; will continue to monitor - occlusive dsg intact

## 2022-02-16 NOTE — PROGRESS NOTE ADULT - ASSESSMENT
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 Heart failure and EP following

## 2022-02-16 NOTE — PROGRESS NOTE ADULT - PROBLEM SELECTOR PROBLEM 5
BPH (benign prostatic hyperplasia)
Thrombocytopenic
BPH (benign prostatic hyperplasia)
Thrombocytopenic
BPH (benign prostatic hyperplasia)
Thrombocytopenic
Thrombocytopenic
BPH (benign prostatic hyperplasia)
DM (diabetes mellitus)

## 2022-02-16 NOTE — DISCHARGE NOTE PROVIDER - NSDCMRMEDTOKEN_GEN_ALL_CORE_FT
apixaban 2.5 mg oral tablet: 1 tab(s) orally every 12 hours  Lasix 40 mg oral tablet: 1 tab(s) orally once a day  metoprolol succinate 25 mg oral tablet, extended release: 0.5 tab(s) orally once a day   potassium chloride 10 mEq oral capsule, extended release: 1 cap(s) orally once a day  sacubitril-valsartan 24 mg-26 mg oral tablet: 1 tab(s) orally every 12 hours  sacubitril-valsartan 24 mg-26 mg oral tablet: 1 tab(s) orally every 12 hours  tamsulosin 0.4 mg oral capsule: 1 cap(s) orally once a day   apixaban 5 mg oral tablet: 1 tab(s) orally 2 times a day. PLEASE RESTART 02/19  metoprolol succinate 25 mg oral tablet, extended release: 1 tab(s) orally once a day  potassium chloride 10 mEq oral capsule, extended release: 1 cap(s) orally once a day  sacubitril-valsartan 24 mg-26 mg oral tablet: 1 tab(s) orally every 12 hours  tamsulosin 0.4 mg oral capsule: 1 cap(s) orally once a day  torsemide 20 mg oral tablet: 1 tab(s) orally once a day

## 2022-02-16 NOTE — PROGRESS NOTE ADULT - PROBLEM SELECTOR PLAN 6
-Continue Tamsulosin 0.4mg QD    F: None  E: Replete if K<4 or Mag<2  N: NPO pending BIV   VTEppx: IV hep gtt    Dispo: cardiac telemetry  PT: home w/home PT

## 2022-02-16 NOTE — PROGRESS NOTE ADULT - PROVIDER SPECIALTY LIST ADULT
Heart Failure
Pulmonology
Cardiology
Electrophysiology
Intervent Cardiology
Pulmonology
Heart Failure
Intervent Radiology
Intervent Radiology
Pulmonology
Electrophysiology
Pulmonology
Heart Failure
Cardiology
Cardiology
Intervent Cardiology
Cardiology
Cardiology

## 2022-02-17 ENCOUNTER — TRANSCRIPTION ENCOUNTER (OUTPATIENT)
Age: 77
End: 2022-02-17

## 2022-02-17 VITALS
HEART RATE: 82 BPM | DIASTOLIC BLOOD PRESSURE: 72 MMHG | SYSTOLIC BLOOD PRESSURE: 115 MMHG | OXYGEN SATURATION: 99 % | RESPIRATION RATE: 18 BRPM

## 2022-02-17 LAB
ANION GAP SERPL CALC-SCNC: 11 MMOL/L — SIGNIFICANT CHANGE UP (ref 5–17)
BUN SERPL-MCNC: 32 MG/DL — HIGH (ref 7–23)
CALCIUM SERPL-MCNC: 8.7 MG/DL — SIGNIFICANT CHANGE UP (ref 8.4–10.5)
CHLORIDE SERPL-SCNC: 107 MMOL/L — SIGNIFICANT CHANGE UP (ref 96–108)
CO2 SERPL-SCNC: 24 MMOL/L — SIGNIFICANT CHANGE UP (ref 22–31)
CREAT SERPL-MCNC: 1.13 MG/DL — SIGNIFICANT CHANGE UP (ref 0.5–1.3)
GLUCOSE BLDC GLUCOMTR-MCNC: 105 MG/DL — HIGH (ref 70–99)
GLUCOSE BLDC GLUCOMTR-MCNC: 153 MG/DL — HIGH (ref 70–99)
GLUCOSE SERPL-MCNC: 92 MG/DL — SIGNIFICANT CHANGE UP (ref 70–99)
HCT VFR BLD CALC: 40.2 % — SIGNIFICANT CHANGE UP (ref 39–50)
HGB BLD-MCNC: 13.5 G/DL — SIGNIFICANT CHANGE UP (ref 13–17)
MAGNESIUM SERPL-MCNC: 2.3 MG/DL — SIGNIFICANT CHANGE UP (ref 1.6–2.6)
MCHC RBC-ENTMCNC: 28.2 PG — SIGNIFICANT CHANGE UP (ref 27–34)
MCHC RBC-ENTMCNC: 33.6 GM/DL — SIGNIFICANT CHANGE UP (ref 32–36)
MCV RBC AUTO: 84.1 FL — SIGNIFICANT CHANGE UP (ref 80–100)
NON-GYNECOLOGICAL CYTOLOGY STUDY: SIGNIFICANT CHANGE UP
NRBC # BLD: 0 /100 WBCS — SIGNIFICANT CHANGE UP (ref 0–0)
PLATELET # BLD AUTO: 108 K/UL — LOW (ref 150–400)
POTASSIUM SERPL-MCNC: 4.4 MMOL/L — SIGNIFICANT CHANGE UP (ref 3.5–5.3)
POTASSIUM SERPL-SCNC: 4.4 MMOL/L — SIGNIFICANT CHANGE UP (ref 3.5–5.3)
RBC # BLD: 4.78 M/UL — SIGNIFICANT CHANGE UP (ref 4.2–5.8)
RBC # FLD: 16.7 % — HIGH (ref 10.3–14.5)
SODIUM SERPL-SCNC: 142 MMOL/L — SIGNIFICANT CHANGE UP (ref 135–145)
WBC # BLD: 4.26 K/UL — SIGNIFICANT CHANGE UP (ref 3.8–10.5)
WBC # FLD AUTO: 4.26 K/UL — SIGNIFICANT CHANGE UP (ref 3.8–10.5)

## 2022-02-17 PROCEDURE — U0003: CPT

## 2022-02-17 PROCEDURE — 83521 IG LIGHT CHAINS FREE EACH: CPT

## 2022-02-17 PROCEDURE — 80061 LIPID PANEL: CPT

## 2022-02-17 PROCEDURE — 93005 ELECTROCARDIOGRAM TRACING: CPT

## 2022-02-17 PROCEDURE — 82248 BILIRUBIN DIRECT: CPT

## 2022-02-17 PROCEDURE — 89051 BODY FLUID CELL COUNT: CPT

## 2022-02-17 PROCEDURE — 78830 RP LOCLZJ TUM SPECT W/CT 1: CPT

## 2022-02-17 PROCEDURE — 97161 PT EVAL LOW COMPLEX 20 MIN: CPT

## 2022-02-17 PROCEDURE — 36415 COLL VENOUS BLD VENIPUNCTURE: CPT

## 2022-02-17 PROCEDURE — 84484 ASSAY OF TROPONIN QUANT: CPT

## 2022-02-17 PROCEDURE — 97116 GAIT TRAINING THERAPY: CPT

## 2022-02-17 PROCEDURE — 93306 TTE W/DOPPLER COMPLETE: CPT

## 2022-02-17 PROCEDURE — 83605 ASSAY OF LACTIC ACID: CPT

## 2022-02-17 PROCEDURE — 82803 BLOOD GASES ANY COMBINATION: CPT

## 2022-02-17 PROCEDURE — 80048 BASIC METABOLIC PNL TOTAL CA: CPT

## 2022-02-17 PROCEDURE — 74176 CT ABD & PELVIS W/O CONTRAST: CPT | Mod: MA

## 2022-02-17 PROCEDURE — C1729: CPT

## 2022-02-17 PROCEDURE — 85027 COMPLETE CBC AUTOMATED: CPT

## 2022-02-17 PROCEDURE — C1769: CPT

## 2022-02-17 PROCEDURE — 83735 ASSAY OF MAGNESIUM: CPT

## 2022-02-17 PROCEDURE — 82330 ASSAY OF CALCIUM: CPT

## 2022-02-17 PROCEDURE — 76705 ECHO EXAM OF ABDOMEN: CPT

## 2022-02-17 PROCEDURE — 85025 COMPLETE CBC W/AUTO DIFF WBC: CPT

## 2022-02-17 PROCEDURE — 82962 GLUCOSE BLOOD TEST: CPT

## 2022-02-17 PROCEDURE — 99285 EMERGENCY DEPT VISIT HI MDM: CPT | Mod: 25

## 2022-02-17 PROCEDURE — 84132 ASSAY OF SERUM POTASSIUM: CPT

## 2022-02-17 PROCEDURE — 84100 ASSAY OF PHOSPHORUS: CPT

## 2022-02-17 PROCEDURE — 84295 ASSAY OF SERUM SODIUM: CPT

## 2022-02-17 PROCEDURE — 82042 OTHER SOURCE ALBUMIN QUAN EA: CPT

## 2022-02-17 PROCEDURE — A9538: CPT

## 2022-02-17 PROCEDURE — 88112 CYTOPATH CELL ENHANCE TECH: CPT

## 2022-02-17 PROCEDURE — C1898: CPT

## 2022-02-17 PROCEDURE — U0005: CPT

## 2022-02-17 PROCEDURE — 71250 CT THORAX DX C-: CPT

## 2022-02-17 PROCEDURE — C1887: CPT

## 2022-02-17 PROCEDURE — C1730: CPT

## 2022-02-17 PROCEDURE — 83036 HEMOGLOBIN GLYCOSYLATED A1C: CPT

## 2022-02-17 PROCEDURE — 88341 IMHCHEM/IMCYTCHM EA ADD ANTB: CPT

## 2022-02-17 PROCEDURE — 82553 CREATINE MB FRACTION: CPT

## 2022-02-17 PROCEDURE — 94640 AIRWAY INHALATION TREATMENT: CPT

## 2022-02-17 PROCEDURE — 81003 URINALYSIS AUTO W/O SCOPE: CPT

## 2022-02-17 PROCEDURE — 87635 SARS-COV-2 COVID-19 AMP PRB: CPT

## 2022-02-17 PROCEDURE — 71046 X-RAY EXAM CHEST 2 VIEWS: CPT | Mod: 26

## 2022-02-17 PROCEDURE — C1889: CPT

## 2022-02-17 PROCEDURE — 82550 ASSAY OF CK (CPK): CPT

## 2022-02-17 PROCEDURE — 71045 X-RAY EXAM CHEST 1 VIEW: CPT

## 2022-02-17 PROCEDURE — 32557 INSERT CATH PLEURA W/ IMAGE: CPT

## 2022-02-17 PROCEDURE — C2621: CPT

## 2022-02-17 PROCEDURE — 82945 GLUCOSE OTHER FLUID: CPT

## 2022-02-17 PROCEDURE — 85610 PROTHROMBIN TIME: CPT

## 2022-02-17 PROCEDURE — C1892: CPT

## 2022-02-17 PROCEDURE — 80053 COMPREHEN METABOLIC PANEL: CPT

## 2022-02-17 PROCEDURE — 84157 ASSAY OF PROTEIN OTHER: CPT

## 2022-02-17 PROCEDURE — 85730 THROMBOPLASTIN TIME PARTIAL: CPT

## 2022-02-17 PROCEDURE — 88305 TISSUE EXAM BY PATHOLOGIST: CPT

## 2022-02-17 PROCEDURE — 71046 X-RAY EXAM CHEST 2 VIEWS: CPT

## 2022-02-17 PROCEDURE — 83880 ASSAY OF NATRIURETIC PEPTIDE: CPT

## 2022-02-17 PROCEDURE — 32555 ASPIRATE PLEURA W/ IMAGING: CPT

## 2022-02-17 PROCEDURE — 83615 LACTATE (LD) (LDH) ENZYME: CPT

## 2022-02-17 PROCEDURE — C1900: CPT

## 2022-02-17 RX ORDER — APIXABAN 2.5 MG/1
1 TABLET, FILM COATED ORAL
Qty: 60 | Refills: 6
Start: 2022-02-17 | End: 2022-09-14

## 2022-02-17 RX ORDER — APIXABAN 2.5 MG/1
5 TABLET, FILM COATED ORAL EVERY 12 HOURS
Refills: 0 | Status: DISCONTINUED | OUTPATIENT
Start: 2022-02-17 | End: 2022-02-17

## 2022-02-17 RX ORDER — METOPROLOL TARTRATE 50 MG
25 TABLET ORAL DAILY
Refills: 0 | Status: ACTIVE | OUTPATIENT
Start: 2022-02-17 | End: 2023-01-16

## 2022-02-17 RX ORDER — METOPROLOL TARTRATE 50 MG
1 TABLET ORAL
Qty: 30 | Refills: 3
Start: 2022-02-17 | End: 2022-06-16

## 2022-02-17 RX ORDER — SACUBITRIL AND VALSARTAN 24; 26 MG/1; MG/1
1 TABLET, FILM COATED ORAL
Qty: 60 | Refills: 3
Start: 2022-02-17 | End: 2022-06-16

## 2022-02-17 RX ORDER — ACETAMINOPHEN 500 MG
650 TABLET ORAL EVERY 6 HOURS
Refills: 0 | Status: ACTIVE | OUTPATIENT
Start: 2022-02-17 | End: 2023-01-16

## 2022-02-17 RX ADMIN — Medication 650 MILLIGRAM(S): at 06:35

## 2022-02-17 RX ADMIN — Medication 25 MILLIGRAM(S): at 14:19

## 2022-02-17 RX ADMIN — Medication 20 MILLIGRAM(S): at 06:06

## 2022-02-17 RX ADMIN — Medication 2: at 11:37

## 2022-02-17 RX ADMIN — Medication 650 MILLIGRAM(S): at 06:05

## 2022-02-17 NOTE — DISCHARGE NOTE NURSING/CASE MANAGEMENT/SOCIAL WORK - NSDCFUADDAPPT_GEN_ALL_CORE_FT
Please bring your Insurance card, Photo ID and Discharge paperwork to the following appointments:    (1) Please follow up with your Cardiology Provider, Dr. Yara Anton at [address] [facility], [address], [place] [address] on 02/25/2022 at 12:00pm.    Appointment was scheduled by Ms. MIKKI England, Referral Coordinator.    (2) Please follow up with your Cardiology Provider, Dr. Dominic Bradley at [address] [facility], 3[address], [place] [address] on 03/15/2022 at 9:00am.    Appointment was scheduled by Ms. MIKKI England, Referral Coordinator.

## 2022-02-17 NOTE — DISCHARGE NOTE NURSING/CASE MANAGEMENT/SOCIAL WORK - PATIENT PORTAL LINK FT
You can access the FollowMyHealth Patient Portal offered by [facility] by registering at the following website: [email or web address]. By joining [facility]’s FollowMyHealth portal, you will also be able to view your health information using other applications (apps) compatible with our system.

## 2022-02-17 NOTE — CHART NOTE - NSCHARTNOTESELECT_GEN_ALL_CORE
IR chest tube/Event Note
Nutrition Services
Brief Op Note/Event Note
Event Note
Heart Failure/Off Service Note
IR chest tube/Event Note

## 2022-02-17 NOTE — CHART NOTE - NSCHARTNOTEFT_GEN_A_CORE
75 y/o male, prior history of medication non-compliance PMHx type II DM, A-Flutter/A-Fib (s/p prior ablation in 06/2016, most recently found to be in A-fib on Eliquis 2.5mg), thrombocytopenia (baseline Plt 60-70's), stage III CKD, CAD, Chronic Systolic CHF, prostate cancer (s/p chemotherapy), chronic venous insufficiency, and multiple admissions for CHF exacerbation. Presented to [facility] 2/7/22 for dyspnea and abdominal swelling. CT chest 2/9 showing increased moderate to large right pleural effusion. Status post thoracentesis by IR on 2/11 c/b PTX and need for R chest tube placement.    2/14-Chest tube placed on water seal overnight. AM CXR improved, chest tube was clamped at 10:30. Follow up CXR showing small PTX, placed back on wall suction. Plan to keep on suction overnight. Morning CXR ordered for 2/15.
77 yo male from [facility] with PMH of NICM, LVEF 35%, Afib/Aflutter s/p ablation (6/2016) on Eliquis, CKD stage 3 (baseline SCr 1.0-1.2) and recurrent hospitalizations for decompensated HF who was readmitted 2/7/22 for MARTINEZ, abdominal distention, and LE edema. On a prior admission (11/2021), he had a bone marrow biopsy that did not reveal a plasma dyscrasia and was negative for amyloid (Congo Red staining), although the sample was small. The kappa-lambda free light chain ratio was normal, ruling out AL amyloid. His current work-up included a positive Tc-PyP scan confirming TTR cardiac amyloidosis, but he will need outpatient genetic testing to determine between wild type and familial mutations. Other PMH of note includes thrombocytopenia and prostate cancer s/p chemotherapy. He is ACC/AHA Stage C, NYHA Class IIIb HF, currently euvolemic with SBP in the low 100's.    His hospital course was notable for right sided thoracentesis by IR c/b pneumothorax (2/11) with chest tube removed on 2/15. Pt underwent successful CRT-P at a rate of 80 with Dr Pemberton on 2/16. Clinically he is doing well and he is scheduled for discharge today.     Final recommendations relayed to the team and the patient are as followed:    1) DC on Entresto 24-26 mg PO BID (Home medication)  2) Start on Metoprolol Succinate 25 mg PO qd  3) Decrease Torsemide to 20 mg PO qd  4) Outpatient follow up with Dr Friedman on March 11th at 10 am at [facility]. Pt already has scheduled outpatient visits with  and .    Discussed with Dr Friedman
Spoke with patient, with Dr. Anton on the phone about plan for pleurocentesis for right pleural effusion that is expanding via IR guided pleurocentesis tomorrow. Patient hesistant and nervous to hold his Eliquis. Assured patient that we would stop his Eliquis 2.5 mg tonight and that he would be placed on a heparin gtt to prevent stroke. Patient educated on benefits of the procedure and that it would be both diagnostic and therapeutic for his condition. Patient verbalized understanding, but still would like time to discuss with his daughter. Will give patient time to discuss with his daughter and have night PA place heparin gtt vs. Eliquis tonight.
TINA ELISE  [id number]    PROCEDURE:  Bi-V pacemaker implant      INDICATION:  CHF (LVEF 35-40%)  Cardiac Amyloidosis  Bradycardia  Permanent AFib      ELECTROPHYSIOLOGIST(S):  Dr. Pemberton  Fellow Dr. English      ANESTHESIOLOGY:  MAC, local      FINDINGS:  - uncomplicated implant of CRT-P with RV and LV lead with excellent parameters (left sided implant, [employer])      COMPLICATIONS:  none      RECOMMENDATIONS:  - CXR PA/Lat., 12 lead ECG and device interrogation in AM
77 yo M with PMHx type II DM, A-Flutter/A-Fib (s/p prior ablation in 06/2016, most recently found to be in A-fib on Eliquis 2.5mg), thrombocytopenia (baseline Plt 60-70's), stage III CKD, CAD, Chronic Systolic CHF, prostate cancer (s/p chemotherapy), chronic venous insufficiency, and multiple admissions for CHF exacerbation. Presented to [facility] 2/7/22 for dyspnea and abdominal swelling. CT chest 2/9 showing increased moderate to large right pleural effusion. Status post thoracentesis by IR on 2/11 c/b PTX and need for R chest tube placement for pneumothorax.     2/14-Chest tube placed on water seal overnight. AM CXR improved, chest tube was clamped at 10:30. Follow up CXR showing small PTX, placed back on wall suction. Plan to keep on suction overnight. Morning CXR ordered for 2/15.    2/15-Morning CXR clear. Passed clamp trial. Chest tube removed easily bedside with light traction, occlusive dressing applied. Pt tolerated procedure well. Follow up CXR ordered for 2:30p.
Admitting Diagnosis:   Patient is a 76y old  Male who presents with a chief complaint of worsening Dyspnea and recent weight gain 4-5 days (2022 12:44)      PAST MEDICAL & SURGICAL HISTORY:  Atrial flutter  s/p Ablation 2016    Chronic venous insufficiency of lower extremity    Prostate CA    Stage 3 chronic kidney disease  1.2-1.2 baseline   On admission 1.2    Type 2 diabetes mellitus  not on medication    Thrombocytopenia  60-70&#x27;s baseline    Acute on chronic systolic congestive heart failure    Chronic atrial fibrillation    Atrial flutter  s/p ablation in 2016    History of surgical removal of pituitary gland  1990s    S/P TURP  for BPH        Current Nutrition Order:  DASH TLC CONSCHO 1000mlFR     PO Intake: Good (%) [ X  ]  Fair (50-75%) [   ] Poor (<25%) [   ]    GI Issues:  Denies N/V, reports lack of BM x 3 days however does not feel uncomfortable  Per flow sheets last BM 3/10     Pain:  none per flow sheets     Skin Integrity:  R CTube continue on  waterseal, D/C if O/P less than 200  >>R chest tube with 330cc serosanguinous output over 24h on WS (460cc output day prior)  Toby 20   2+Edema (left leg; right leg), Higher o/a     Labs:       141  |  103  |  27<H>  ----------------------------<  87  4.4   |  30  |  1.13    Ca    9.2      2022 06:17  Mg     2.3     -    TPro  6.6  /  Alb  3.2<L>  /  TBili  1.0  /  DBili  x   /  AST  18  /  ALT  14  /  AlkPhos  115  -    CAPILLARY BLOOD GLUCOSE      POCT Blood Glucose.: 78 mg/dL (2022 11:31)  POCT Blood Glucose.: 87 mg/dL (2022 06:45)  POCT Blood Glucose.: 110 mg/dL (2022 21:24)  POCT Blood Glucose.: 120 mg/dL (2022 16:43)      Medications:  MEDICATIONS  (STANDING):  dextrose 50% Injectable 25 Gram(s) IV Push once  dextrose 50% Injectable 12.5 Gram(s) IV Push once  dextrose 50% Injectable 25 Gram(s) IV Push once  glucagon  Injectable 1 milliGRAM(s) IntraMuscular once  heparin  Infusion.  Unit(s)/Hr (17 mL/Hr) IV Continuous <Continuous>  insulin lispro (ADMELOG) corrective regimen sliding scale   SubCutaneous Before meals and at bedtime  sacubitril 24 mG/valsartan 26 mG 1 Tablet(s) Oral two times a day  tamsulosin 0.4 milliGRAM(s) Oral at bedtime  torsemide 20 milliGRAM(s) Oral two times a day    MEDICATIONS  (PRN):  heparin   Injectable 7500 Unit(s) IV Push every 6 hours PRN For aPTT less than 40  heparin   Injectable 3500 Unit(s) IV Push every 6 hours PRN For aPTT between 40 - 57      Weight Assessment:  · Height for BMI (FEET)	5 Feet  · Height for BMI (INCHES)	9 Inch(s)  · Height for BMI (CENTIMETERS)	175.26 Centimeter(s)  · Weight for BMI (lbs)	210 lb  · Weight for BMI (kg)	95.3 kg  · Body Mass Index	31  · Ideal Body Weight (lbs)	160  · Ideal Body Weight (kg)	72.5     pounds, reports wt gain. Admit wt 210 pounds. Wt up 20 pounds, assume d/t fluid shifts. Since admit wt on down trend assume d/t s/p post thoracentesis, active diuresis   178.3 pounds    178.3 pounds    184.9 pounds     Estimated energy needs:   IBW For energy needs due to pt's current body weight exceeding 120% of IBW ([id number]%)  Adjusted for age and CHF; Fluids per team  25-30kcal/k-2190kcal/day   1.0-1.2gm/k-88gm/day     Subjective: 77 y/o male, PMHx type II DM, AFlutter/A-Fib, thrombocytopenia, stage III CKD (baseline Cr 1.0-1.2 ), non-obstructive CAD, Chronic Systolic CHF(EF 35% Echocardiogram 2022), NSVT, BPH (s/p TURP procedure), pituitary adenoma (s/p transphenoidal treatment in ), prostate cancer (s/p chemotherapy), chronic venous insufficiency, and multiple admissions for CHF exacerbation most recent 2021, who presents to [facility] ER 22, with worsening dyspnea (with minimal exertion), increase in abdominal girth, b/l LE swelling, facial swelling and dry cough for 4-5 days. In light of risk factors, acute on chronic systolic CHF exacerbation pt now admitted to [facility]  for further medical management. Pt found to have TTR mediated Amyloidosis via PYP scan. CT chest  showing increased moderate to large right pleural effusion. Status post thoracentesis by IR on  c/b PTX and need for R chest tube placement. Morning CXR ordered for 2/15.    Pt visited on 5. Reports good PO intake at this time. Consumed 100% at breakfast and lunch today. Denies N/V, reports lack of BM x 3 days however does not feel uncomfortable. Reviewed tips for GI distress.    Please see below for nutritions recommendations. Recs made with team.     PRIOR PES: Overweight/Obesity Etiology RT presumed intake>EER, Edema Signs/Symptoms AEB BMI 31.   >> on going   Goal/Expected Outcome pt will have safe gradual wt loss upon d/c of 0.5-1.0 pounds/week to reach IBW.    Recommendations:  1. Cont with Diet.  2. Monitor %PO intake, diet tolerance.  3. Monitor daily wts, Skin, Pain.  4. GI per team. Monitor need for bowel reg.  5. Labs: monitor BMP, CBC, glucose, lytes, trend renal indices.  6. RD to remain available for additional nutrition interventions as needed.     Education:   Reviewed tips for GI distress.    Discussed currently ordered fluid restrciton.  Understanding stated, provide additional motivation as needed.     Risk Level: High [   ] Moderate [ X  ] Low [   ]

## 2022-02-22 ENCOUNTER — APPOINTMENT (OUTPATIENT)
Dept: MRI IMAGING | Facility: HOSPITAL | Age: 77
End: 2022-02-22

## 2022-02-25 DIAGNOSIS — I25.10 ATHEROSCLEROTIC HEART DISEASE OF NATIVE CORONARY ARTERY WITHOUT ANGINA PECTORIS: ICD-10-CM

## 2022-02-25 DIAGNOSIS — Z86.018 PERSONAL HISTORY OF OTHER BENIGN NEOPLASM: ICD-10-CM

## 2022-02-25 DIAGNOSIS — R18.8 OTHER ASCITES: ICD-10-CM

## 2022-02-25 DIAGNOSIS — I95.9 HYPOTENSION, UNSPECIFIED: ICD-10-CM

## 2022-02-25 DIAGNOSIS — I24.8 OTHER FORMS OF ACUTE ISCHEMIC HEART DISEASE: ICD-10-CM

## 2022-02-25 DIAGNOSIS — I43 CARDIOMYOPATHY IN DISEASES CLASSIFIED ELSEWHERE: ICD-10-CM

## 2022-02-25 DIAGNOSIS — N18.30 CHRONIC KIDNEY DISEASE, STAGE 3 UNSPECIFIED: ICD-10-CM

## 2022-02-25 DIAGNOSIS — I13.0 HYPERTENSIVE HEART AND CHRONIC KIDNEY DISEASE WITH HEART FAILURE AND STAGE 1 THROUGH STAGE 4 CHRONIC KIDNEY DISEASE, OR UNSPECIFIED CHRONIC KIDNEY DISEASE: ICD-10-CM

## 2022-02-25 DIAGNOSIS — E11.22 TYPE 2 DIABETES MELLITUS WITH DIABETIC CHRONIC KIDNEY DISEASE: ICD-10-CM

## 2022-02-25 DIAGNOSIS — I27.20 PULMONARY HYPERTENSION, UNSPECIFIED: ICD-10-CM

## 2022-02-25 DIAGNOSIS — F17.200 NICOTINE DEPENDENCE, UNSPECIFIED, UNCOMPLICATED: ICD-10-CM
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
## 2022-02-25 DIAGNOSIS — J95.811 POSTPROCEDURAL PNEUMOTHORAX: ICD-10-CM

## 2022-02-25 DIAGNOSIS — D69.6 THROMBOCYTOPENIA, UNSPECIFIED: ICD-10-CM

## 2022-02-25 DIAGNOSIS — I50.23 ACUTE ON CHRONIC SYSTOLIC (CONGESTIVE) HEART FAILURE: ICD-10-CM

## 2022-02-25 DIAGNOSIS — Z92.21 PERSONAL HISTORY OF ANTINEOPLASTIC CHEMOTHERAPY: ICD-10-CM
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
## 2022-02-25 DIAGNOSIS — I48.92 UNSPECIFIED ATRIAL FLUTTER: ICD-10-CM

## 2022-02-25 DIAGNOSIS — I48.21 PERMANENT ATRIAL FIBRILLATION: ICD-10-CM

## 2022-02-25 DIAGNOSIS — Z85.46 PERSONAL HISTORY OF MALIGNANT NEOPLASM OF PROSTATE: ICD-10-CM

## 2022-02-25 DIAGNOSIS — N40.0 BENIGN PROSTATIC HYPERPLASIA WITHOUT LOWER URINARY TRACT SYMPTOMS: ICD-10-CM

## 2022-02-25 DIAGNOSIS — Z79.01 LONG TERM (CURRENT) USE OF ANTICOAGULANTS: ICD-10-CM

## 2022-02-25 DIAGNOSIS — I25.2 OLD MYOCARDIAL INFARCTION: ICD-10-CM

## 2022-02-25 DIAGNOSIS — J91.8 PLEURAL EFFUSION IN OTHER CONDITIONS CLASSIFIED ELSEWHERE: ICD-10-CM

## 2022-02-25 DIAGNOSIS — I87.2 VENOUS INSUFFICIENCY (CHRONIC) (PERIPHERAL): ICD-10-CM

## 2022-02-25 DIAGNOSIS — J98.11 ATELECTASIS: ICD-10-CM

## 2022-02-25 DIAGNOSIS — E85.4 ORGAN-LIMITED AMYLOIDOSIS: ICD-10-CM

## 2022-02-28 ENCOUNTER — APPOINTMENT (OUTPATIENT)
Dept: HEART AND VASCULAR | Facility: CLINIC | Age: 77
End: 2022-02-28

## 2022-03-11 ENCOUNTER — APPOINTMENT (OUTPATIENT)
Dept: HEART AND VASCULAR | Facility: CLINIC | Age: 77
End: 2022-03-11

## 2022-03-11 NOTE — CARDIOLOGY SUMMARY
[de-identified] : \par 02/08/22 TTE: LVIDd 4.7 cm, LVEF 37%, LV hypertrophy (septum 1.6, PW 1.7), analysis of DD challenging due to AF, normal RV size with mildly reduced function, HYACINTH, mild MR, mild TR, est PASP 40 mmHg, small pericardial effusion, ascites present\par \par 06/29/21 TTE: LVIDd 4.4 cm, LVEF 50%, severe concentric LVH (septum 1.7, PW 1.8), normal RV size and function, HYACINTH, trace MR, mild-mod TR, est PASP 36 mmHg, small pericardial effusion\par  [de-identified] : \par 02/10/22 TcPYP: Intense uptake in LV and part of the right ventricular myocardium in a pattern consistent with TTR-mediated amyloidosis. \par

## 2022-03-11 NOTE — HISTORY OF PRESENT ILLNESS
[FreeTextEntry1] : Ms Wise is a 76 year old man with ATTR cardiac amyloid, NICM (LVIDd 4.7 cm, LVEF 37%), AF/Aflu s/p ablation 6/2016 (on Eliquis), CKD Stage 3 (b/l Cr 1-1.2), DMT2 (Ha1c 6.4% 2/2022), thrombocytopenia (bone marrow biopsy 11/2021 unrevealing) and prostate Ca s/p chemotherapy who presents today for follow-up after recent discharge. \par \par He has had multiple hospitalizations for AHDF in the past year. One in November 2021 where he was diagnosed with ATTR cardiac amyloid by TcPYP scan. His most recent admission was at [facility] 2/7 - 2/17/22 where he required a R thoracentesis that was complicated by pneumothorax requiring pigtail. Entresto was briefly held for hypotension. Given fixed SV in setting of restrictive physiology, underwent CRT-P placement 2/16 with lower HR limit set at 80 bpm. He was discharged on low dose GDMT at a weight of 173.7 lbs (admission weight 199 lbs). \par \par \par

## 2022-03-13 PROBLEM — N18.9 CHRONIC KIDNEY DISEASE (CKD): Noted: 2022-03-11

## 2022-03-14 ENCOUNTER — APPOINTMENT (OUTPATIENT)
Dept: HEART AND VASCULAR | Facility: CLINIC | Age: 77
End: 2022-03-14
Payer: MEDICARE

## 2022-03-14 VITALS
TEMPERATURE: 97.3 F | WEIGHT: 170 LBS | HEART RATE: 82 BPM | DIASTOLIC BLOOD PRESSURE: 54 MMHG | SYSTOLIC BLOOD PRESSURE: 92 MMHG | BODY MASS INDEX: 25.18 KG/M2 | HEIGHT: 69 IN

## 2022-03-14 PROCEDURE — 93281 PM DEVICE PROGR EVAL MULTI: CPT

## 2022-03-15 ENCOUNTER — APPOINTMENT (OUTPATIENT)
Dept: HEART AND VASCULAR | Facility: CLINIC | Age: 77
End: 2022-03-15
Payer: MEDICARE

## 2022-03-15 VITALS
HEIGHT: 69 IN | HEART RATE: 83 BPM | SYSTOLIC BLOOD PRESSURE: 95 MMHG | BODY MASS INDEX: 25.56 KG/M2 | WEIGHT: 172.56 LBS | OXYGEN SATURATION: 97 % | DIASTOLIC BLOOD PRESSURE: 63 MMHG | TEMPERATURE: 98.6 F

## 2022-03-15 DIAGNOSIS — N18.9 CHRONIC KIDNEY DISEASE, UNSPECIFIED: ICD-10-CM

## 2022-03-15 DIAGNOSIS — N40.0 BENIGN PROSTATIC HYPERPLASIA WITHOUT LOWER URINARY TRACT SYMPMS: ICD-10-CM

## 2022-03-15 PROCEDURE — 99205 OFFICE O/P NEW HI 60 MIN: CPT | Mod: 25

## 2022-03-15 PROCEDURE — 93000 ELECTROCARDIOGRAM COMPLETE: CPT

## 2022-03-15 NOTE — REASON FOR VISIT
[Other: ____] : [unfilled] [FreeTextEntry1] : Diagnostic Tests:\par ----------------------------------------\par ECG:\par 03/14/22: electronic pacemaker, atrial fibrillation, Bi-V paced. \par 12/20/21: atrial fibrillation, SVR, low voltage QRS limb leads, possible old septal and inferior MI.  \par ----------------------------------------\par EP procedures:\par 02/24/22: insertion of CRT-P\par ----------------------------------------\par Echo:\par 02/28/22: EF 37%, severely increased LV wall thickness, mildly decreased RV function, dilated LA, dilated RA, mild MR, mild TR, PASP 40 mmHg, small pericardial effusion.\par 11/22/21: EF 35%, severely increased LV wall thickness, mildly decreased RV function, dilated LA, dilated RA, mild MR, mild TR, PASP 38 mmHg, small pericardial effusion.\par 06/29/21: EF 50%, severely increased LV wall thickness, normal RV function, dilated LA, dilated RA, mild MR, mild-to-moderate TR, PASP 36 mmHg, small pericardial effusion.

## 2022-03-15 NOTE — PHYSICAL EXAM
[Well Developed] : well developed [Well Nourished] : well nourished [No Acute Distress] : no acute distress [Normal Conjunctiva] : normal conjunctiva [Normal Venous Pressure] : normal venous pressure [No Carotid Bruit] : no carotid bruit [Normal S1, S2] : normal S1, S2 [No Murmur] : no murmur [No Rub] : no rub [No Gallop] : no gallop [Clear Lung Fields] : clear lung fields [Good Air Entry] : good air entry [No Respiratory Distress] : no respiratory distress  [Soft] : abdomen soft [Non Tender] : non-tender [No Masses/organomegaly] : no masses/organomegaly [Normal Bowel Sounds] : normal bowel sounds [Normal Gait] : normal gait [No Edema] : no edema [No Cyanosis] : no cyanosis [No Clubbing] : no clubbing [No Varicosities] : no varicosities [No Rash] : no rash [No Skin Lesions] : no skin lesions [Moves all extremities] : moves all extremities [No Focal Deficits] : no focal deficits [Normal Speech] : normal speech [Alert and Oriented] : alert and oriented [Normal memory] : normal memory [de-identified] : + device generator anterior left chest

## 2022-03-15 NOTE — HISTORY OF PRESENT ILLNESS
[FreeTextEntry1] : Mr. Wise presents for initial evaluation and management of cardiac amyloidosis (ATTR-CM, diagnosed 11/2021), chronic systolic heart failure secondary to nonischemic cardiomyopathy, persistent atrial fibrillation s/p ablation 06/2016 with Dr. Neeraj MD, at [facility] with recurrence, s/p CRT-P (02/16/22) CKD III, DM2, idiopathic thrombocytopenia, chronic venous insufficiency, and prostate cancer s/p chemotherapy.  He has had multiple admissions for heart decompensated heart failure.  On a prior admission he underwent a bone marrow biopsy to work up thrombocytopenia which was negative for a plasma cell dyscrasia or AL-amyloidosis.  He was admitted again to [facility] from 02/07/22 to 02/17/22 for acute on chronic systolic heart failure.  He underwent right thoracentesis complicated by pneumothorax.  On 02/08/22, he had an echocardiogram which revealed an EF of 37%, severe symmetrically increased wall thickness, mildly reduced RV function, mild MR, PASP 40 mmHg, small pericardial effusion, and bilateral pleural effusions. On 02/10/22 he had a TC-99 PYP scan which revealed intene LV and mild RV uptake consistent with ATTR-cm.  On 02/16/22, he underwent implantation of a CRT-P ([employer]) secondary to bradycardia, the need for chronic ventricular pacing, and severely reduced LV systolic function. His lower rate limit was set to 80 bpm to improve hemodynamics given his restrictive cardiomyopathy.  His primary cardiologist is Yara Anton MD.  At present, he has marked MARTINEZ to 1 block ambulation.  His edema is mild.  In addition, he has complaints of peripheral neuropathy.  We had an extensive discussion (with his daughter who is an RN in the room) about the pathophysiology, natural history, and treatment of ATTR-CM.  He is very reluctant to pursue the appropriate work up despite being informed of the importance.  \par

## 2022-03-15 NOTE — ASSESSMENT
[FreeTextEntry1] : 1.  Atrial fibrillation, s/p AF ablation at [facility] 06/2016, with recurrence, now persistent: \par       - continue systemic anticoagulation with Eliquis 5mg po bid\par      - discussed with patient avoidance of ASA and NSAIDS as well as need for bleeding surveillance \par      - follow up with heart rhythm specialist, Jimi Pemberton MD\par \par 2. Chronic systolic heart failure, NICM, secondary to amyloidosis: \par      - continue Entresto 24/26mg po bid \par      - continue metoprolol succinate ER 25mg po qd\par      - continue torsemide 20mg po qd prn \par      - will not up titrate HF regimen given symptomatic hypotension \par \par 3. CKD II: Cr 1.13, GFR 64 (02/17/22)\par      - avoid nephrotoxic agents\par      - will consider sending to a nephrologist next visit (he is reluctant at this time) \par \par 4. DM2:\par      - discussed with patient therapeutic lifestyle changes to improve glucose metabolism \par \par 5. Cardiac amyloidosis: ATTR-CM: \par \par Symptoms:\par 1. Low back pain: no\par 2. Wrist Pain: no\par 3. MARTINEZ: yes, marked\par 4. Ocular manifestations: none\par 5. Peripheral neuropathy: yes\par \par Clinical Signs:\par 1. Carpal tunnel syndrome: no\par 2. Spinal stenosis: no \par 3. Aortic sclerosis or stenosis: ye, sclerosis\par \par Diagnostic Tests:\par 1. ECG QRS voltage low or normal: yes, low voltage QRS limb leads\par 2. Conduction disease: yes, sinus node dysfunction\par 3. Echo:\par -------a. Wall thickness >1.4cm: yes 1.7cm\par -------b. Diastolic dysfunction: yes\par -------c. Dilated atria: yes bilateral\par -------d. LV GLS with relative apical sparring: pending \par 4. TC99 PYP scan: markedly positive \par 5. Serum free light chain kappa/lamda ratio <0.26 or >1.65: no 5.66/3.83 = 1.48 (02/09/22)\par 6. Serum and urine SAUMYA monoclonal protein detected: none\par 7. Cardiac MR with increased wall thickness and nonischemic LGE: n/a\par 8. Genetic testing: pending \par 9. Nephropathy: CKD or proteinuria: yes\par \par Monitoring Disease Progression: \par Date……….Prealbumin……….Troponin-I……….NT-proBNP……….LV GLS TTE……….\par 02/06/22...................................0.11.....................11,635..................................................\par 11/19/21...................................0.12.....................12,051..................................................\par \par \par Amyloidosis Treatment:\par      - will initiate Vyndamax 61mg po qd (potential conflict of interest discussed) (possible adverse effects of new medications discussed) \par      - will send for a cardiogenomics evaluation with Arvind Do MD to screen for ATTR-CM-variant\par      - will send to neurologist, Estrada Valdes MD to 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 \par \par The patient was seen and examined with a cardiology fellow as part of their longitudinal ambulatory cardiology training experience.  Permission was obtained at the time of the visit from the patient for the fellow's participation. \par \par \par

## 2022-03-15 NOTE — REVIEW OF SYSTEMS
[Negative] : Heme/Lymph [SOB] : shortness of breath [Dyspnea on exertion] : dyspnea during exertion [Lower Ext Edema] : lower extremity edema [Tingling (Paresthesia)] : tingling

## 2022-03-17 ENCOUNTER — APPOINTMENT (OUTPATIENT)
Dept: CARDIOLOGY | Facility: CLINIC | Age: 77
End: 2022-03-17
Payer: MEDICARE

## 2022-03-17 ENCOUNTER — NON-APPOINTMENT (OUTPATIENT)
Age: 77
End: 2022-03-17

## 2022-03-17 VITALS
TEMPERATURE: 97.3 F | WEIGHT: 172 LBS | OXYGEN SATURATION: 96 % | HEART RATE: 89 BPM | DIASTOLIC BLOOD PRESSURE: 73 MMHG | HEIGHT: 69 IN | SYSTOLIC BLOOD PRESSURE: 108 MMHG | BODY MASS INDEX: 25.48 KG/M2

## 2022-03-17 PROCEDURE — 93000 ELECTROCARDIOGRAM COMPLETE: CPT

## 2022-03-17 PROCEDURE — 99214 OFFICE O/P EST MOD 30 MIN: CPT

## 2022-03-17 NOTE — PROCEDURE
[No] : not [Sinus Bradycardia] : sinus bradycardia [CRT-P] : Cardiac resynchronization therapy pacemaker [VVIR] : VVIR [Lead Imp:  ___ohms] : lead impedance was [unfilled] ohms [Sensing Amplitude ___mv] : sensing amplitude was [unfilled] mv [___V @] : [unfilled] V [___ ms] : [unfilled] ms [de-identified] : [facility] [de-identified] : [employer] [de-identified] : 2/16/22 [de-identified] : 82 [de-identified] : 8.5 years to [facility]  [de-identified] : AP 0 \par RVP 99\par

## 2022-03-17 NOTE — REVIEW OF SYSTEMS
[Dyspnea on exertion] : dyspnea during exertion [Negative] : Heme/Lymph [Palpitations] : no palpitations

## 2022-03-17 NOTE — ADDENDUM
[FreeTextEntry1] : I, Jimi Pemberton, hereby attest that the medical record entry for this patient accurately reflects signatures/notations that I made on the Date of Service in my capacity as an Attending Physician when I treated/diagnosed the above patient. I do hereby attest that this information is true, accurate and complete to the best of my knowledge.  I was present for the entire visit and supervised the entire visit and made/agree with the plan as outlined.\par \par

## 2022-03-17 NOTE — DISCUSSION/SUMMARY
[FreeTextEntry1] : Mr. Wise is a pleasant 76 year-old gentleman with a past medical history significant for DM II, Thrombocytopenia, CKD St III,  non-obstructive CAD, HFmrEF (EF 35%), BPH s/p TURP, pituitary adenoma (s/p transphenoidal treatment in 1990's), Prostate CA s/p chemo, chronic venous insufficiency, and persistent atrial flutter/fibrillation.  He had a prior ablation in 2016 at [facility].  He was noted to be in atrial fibrillation during an admission to [facility] 5/2021.  He has had recurrent hospitalizations for decompensated HF, most recently 2/7/22.  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  Beta-blocker therapy limited by resting bradycardia.  He is s/p CRT-P placement 2/16/22.  His incision is well healed.  His exertional tolerance has improved since hospitalization.  He is effective biventricular pacing as programmed.  No medication changes were recommended today.  Advised to follow-up with Dr. Bradley as scheduled tomorrow.  He is enrolled in remote monitoring and was asked to return for follow-up in six months.

## 2022-03-17 NOTE — CARDIOLOGY SUMMARY
[___] : [unfilled] [de-identified] : 12/20/21 AF @ 63 bpm  [de-identified] : 11/22/21\par  1. Biatrial enlargement.\par  2. Mild pulmonic regurgitation.\par  3. Pulmonary artery systolic pressure is 38 mmHg.\par  4. No other significant valvular disease.\par  5. Normal right ventricular size.\par  6. Reduced right ventricular systolic function.\par  7. There is severe concentric left ventricular hypertrophy. Left ventricular systolic function is moderately reduced with a calculated ejection fraction of 35% with global hypokinesis.\par  8. Small-to-moderate pericardial effusion without echocardiographic evidence of cardiac tamponade physiology.\par  9. The proximal ascending aorta is dilated measuring 4.10 cm.\par 10. Findings suggestive of infiltrative cardiomyopathy.\par 11. Compared to the previous TTE performed on 6/29/2021, LV systolic function appears reduced.\par \par

## 2022-03-17 NOTE — HISTORY OF PRESENT ILLNESS
[FreeTextEntry1] : Mr. Wise is a pleasant 76 year-old gentleman with a past medical history significant for DM II, Thrombocytopenia, CKD St III,  non-obstructive CAD, HFmrEF (EF 35%), BPH s/p TURP, pituitary adenoma (s/p transphenoidal treatment in 1990's), Prostate CA s/p chemo, chronic venous insufficiency, and persistent atrial flutter/fibrillation.  He had a prior ablation in 2016 at [facility].  He was noted to be in atrial fibrillation during an admission to [facility] 5/2021.  He has had recurrent hospitalizations for decompensated HF, most recently 2/7/22.  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  Beta-blocker therapy limited by resting bradycardia.  He is s/p CRT-P placement 2/16/22.  He notes his exertional tolerance has improved from 1 block to 6 blocks.  He offers no device related complaints.\par \par \par

## 2022-03-18 NOTE — HISTORY OF PRESENT ILLNESS
[FreeTextEntry1] : TINA ELISE is a 77 yo M PMH cardiac amyloidosis TTR, persistent Afib s/p ablation, chf s/p CRT, \par \par He was diagnosed in 11/2021 during an admission for decompensated HF  TTE demonstrated EF 37% with severe LVH (wall thickness 1.7cm mildy reduced RV fxn  with small pericardial effusion, and apical sparing of LV fxn. \par fu eval with TC PYR scan was positive\par \par he was first told her had CHF "years ago" 6-8 yrs ago\par \par + neuropathy  for years\par chronic nec pain \par admits to 3-5min chest pain last night\par \par today he is referred for a cardiogenomic evaluation

## 2022-03-18 NOTE — REASON FOR VISIT
[FreeTextEntry3] : Dear Dr. Bradley      . I saw your patient TINA ELISE on 03/17/2022 . Please see the note below for the assessment and plan. \par \par TINA ELISE  was seen  for an initial consultation at the Cardiogenomics Program at [facility] on 03/17/2022.   Mr. ELISE was referred by  Dr. Bradley for hereditary cardiac predisposition risk assessment and counseling, due to TTR CA\par \par

## 2022-03-18 NOTE — FAMILY HISTORY
[FreeTextEntry1] : FamilyHistory_20_twCiteListControlStart FamilyHistory_20_twCiteListControlEnd [id number] \par A four-generation family history was constructed and scanned into [employer]. \par Family history is significant for: \par siblings\par sister 69 yo med hx unk: son, daughter no known med probs\par sister dec 50s DM: 3 children , sons, no known med probs\par brother dec details unk\par brother dec details unk\par \par Mother dec in childbirth at 27 yo\par Maternal aunts multiple unk\par Maternal uncles multiple unk\par Maternal Grandmother dec unk\par Maternal Grandfather dec unk\par \par Father dec\par Paternal aunts\par Paternal uncles\par Paternal Grandfather\par Paternal Grandmother\par \par children:\par all healthy\par daughter 42 yo\par daughter 45 yo\par son 45 yo\par son 42 yo \par states all have children, all are healthy \par \par his maternal families originate from [place]  and paternal families originate from [facility]  \par No Ashkenazi Pentecostal ancestry. \par Family history was negative for consanguinity  \par No family history of SIDS\par  \par \par  [FreeTextEntry2] : [place]  [FreeTextEntry3] : [place]

## 2022-03-18 NOTE — REVIEW OF SYSTEMS
[Chest Pain] : chest pain [Exercise Intolerance] : exercise intolerance [Negative] : Respiratory [Cyanosis] : no cyanosis [Palpitations] : no palpitations [de-identified] : + neuropathy

## 2022-03-18 NOTE — PHYSICAL EXAM
[Extraocular Movements Intact] : extraocular movements were intact [Normal] : no mass, no hepatosplenomegaly [Tremor] : tremor [de-identified] : no abd distention [de-identified] : trace LE edema

## 2022-03-25 ENCOUNTER — APPOINTMENT (OUTPATIENT)
Dept: HEART AND VASCULAR | Facility: CLINIC | Age: 77
End: 2022-03-25

## 2022-03-30 ENCOUNTER — APPOINTMENT (OUTPATIENT)
Dept: HEART FAILURE | Facility: CLINIC | Age: 77
End: 2022-03-30

## 2022-03-30 ENCOUNTER — APPOINTMENT (OUTPATIENT)
Dept: HEART FAILURE | Facility: CLINIC | Age: 77
End: 2022-03-30
Payer: MEDICARE

## 2022-03-30 PROCEDURE — 99443: CPT | Mod: 95

## 2022-03-31 ENCOUNTER — LABORATORY RESULT (OUTPATIENT)
Age: 77
End: 2022-03-31

## 2022-04-07 ENCOUNTER — APPOINTMENT (OUTPATIENT)
Dept: CARDIOLOGY | Facility: CLINIC | Age: 77
End: 2022-04-07

## 2022-04-11 ENCOUNTER — NON-APPOINTMENT (OUTPATIENT)
Age: 77
End: 2022-04-11

## 2022-04-11 ENCOUNTER — APPOINTMENT (OUTPATIENT)
Dept: HEART AND VASCULAR | Facility: CLINIC | Age: 77
End: 2022-04-11

## 2022-05-11 NOTE — REVIEW OF SYSTEMS
[SOB] : shortness of breath [Dyspnea on exertion] : dyspnea during exertion [Lower Ext Edema] : lower extremity edema [Tingling (Paresthesia)] : tingling [Negative] : Heme/Lymph

## 2022-05-17 ENCOUNTER — APPOINTMENT (OUTPATIENT)
Dept: HEART AND VASCULAR | Facility: CLINIC | Age: 77
End: 2022-05-17
Payer: MEDICARE

## 2022-05-17 VITALS
DIASTOLIC BLOOD PRESSURE: 76 MMHG | TEMPERATURE: 97.7 F | HEIGHT: 69 IN | SYSTOLIC BLOOD PRESSURE: 106 MMHG | BODY MASS INDEX: 27.55 KG/M2 | HEART RATE: 80 BPM | OXYGEN SATURATION: 97 % | WEIGHT: 186 LBS

## 2022-05-17 PROCEDURE — 99215 OFFICE O/P EST HI 40 MIN: CPT

## 2022-05-17 NOTE — HISTORY OF PRESENT ILLNESS
[FreeTextEntry1] : Mr. Wise presents for follow up and management of cardiac amyloidosis (ATTR-CM, diagnosed 11/2021), chronic systolic heart failure secondary to nonischemic cardiomyopathy, persistent atrial fibrillation s/p ablation 06/2016 with Dr. Neeraj MD, at [facility] with recurrence, s/p CRT-P (02/16/22), CKD III, DM2, idiopathic thrombocytopenia, chronic venous insufficiency, and prostate cancer s/p chemotherapy.  He has had multiple admissions for decompensated heart failure.  On a prior admission he underwent a bone marrow biopsy to work up thrombocytopenia which was negative for a plasma cell dyscrasia or AL-amyloidosis.  He was admitted again to [facility] from 02/07/22 to 02/17/22 for acute on chronic systolic heart failure.  He underwent right thoracentesis complicated by pneumothorax.  On 02/08/22, he had an echocardiogram which revealed an EF of 37%, severe symmetrically increased wall thickness, mildly reduced RV function, mild MR, PASP 40 mmHg, small pericardial effusion, and bilateral pleural effusions. On 02/10/22 he had a TC-99 PYP scan which revealed intense LV and mild RV uptake consistent with ATTR-cm.  On 02/16/22, he underwent implantation of a CRT-P ([employer]) secondary to bradycardia, the need for chronic ventricular pacing, and severely reduced LV systolic function. His lower rate limit was set to 80 bpm to improve hemodynamics given his restrictive cardiomyopathy.  His primary cardiologist is Yara Anton MD.  At present, he has marked MARTINEZ to 1 block ambulation.  His edema is moderate.  In addition, he has complaints of peripheral neuropathy.  We again had an extensive discussion about the pathophysiology, natural history, and treatment of ATTR-CM.  He initiated Vyndamax about 2 weeks prior.

## 2022-05-17 NOTE — ASSESSMENT
[FreeTextEntry1] : 1.  Atrial fibrillation, s/p AF ablation at [facility] 06/2016, with recurrence, now persistent: \par      - continue systemic anticoagulation with Eliquis 5mg po bid\par      - discussed with patient avoidance of ASA and NSAIDS as well as need for bleeding surveillance \par      - follow up with heart rhythm specialist, Jimi Pemberton MD\par \par 2. Chronic systolic heart failure, NICM, secondary to amyloidosis: \par      - continue Entresto 24/26mg po bid\par      - continue metoprolol succinate ER 25mg po qd \par      - increase torsemide from 20mg po qd prn to 2 tabs po qd prn \par      - will not up titrate HF regimen given symptomatic hypotension \par \par 3. CKD II: Cr 1.13, GFR 64 (02/17/22)\par      - avoid nephrotoxic agents\par      - will consider sending to a nephrologist next visit (he is reluctant at this time) \par      - check lab work today \par \par 4. DM2:\par      - discussed with patient therapeutic lifestyle changes to improve glucose metabolism \par      - check lab work today \par \par 5. Cardiac amyloidosis: ATTR-CM: \par \par Symptoms:\par 1. Low back pain: no\par 2. Wrist Pain: no\par 3. MARTINEZ: yes, marked\par 4. Ocular manifestations: none\par 5. Peripheral neuropathy: yes\par \par Clinical Signs:\par 1. Carpal tunnel syndrome: no\par 2. Spinal stenosis: no \par 3. Aortic sclerosis or stenosis: ye, sclerosis\par \par Diagnostic Tests:\par 1. ECG QRS voltage low or normal: yes, low voltage QRS limb leads\par 2. Conduction disease: yes, sinus node dysfunction\par 3. Echo:\par -------a. Wall thickness >1.4cm: yes 1.7cm\par -------b. Diastolic dysfunction: yes\par -------c. Dilated atria: yes bilateral\par -------d. LV GLS with relative apical sparring: pending \par 4. TC99 PYP scan: markedly positive \par 5. Serum free light chain kappa/lamda ratio <0.26 or >1.65: no 5.66/3.83 = 1.48 (02/09/22)\par 6. Serum and urine SAUMYA monoclonal protein detected: none\par 7. Cardiac MR with increased wall thickness and nonischemic LGE: n/a\par 8. Genetic testing: pending \par 9. Nephropathy: CKD or proteinuria: yes\par \par Monitoring Disease Progression: \par Date……….Prealbumin……….Troponin-I……….NT-proBNP……….LV GLS TTE……….\par 03/31/22................................................................6,276..................................................\par 02/06/22...................................0.11.....................11,635..................................................\par 11/19/21...................................0.12.....................12,051..................................................\par \par \par Amyloidosis Treatment:\par      - continue Vyndamax 61mg po qd \par      - follow up with cardiogenomics evaluation with Arvind Do MD to screen for ATTR-CM-variant\par      - will send to neurologist, Estrada Valdes MD to 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

## 2022-05-17 NOTE — PHYSICAL EXAM
[Well Developed] : well developed [Well Nourished] : well nourished [No Acute Distress] : no acute distress [Normal Conjunctiva] : normal conjunctiva [Normal Venous Pressure] : normal venous pressure [No Carotid Bruit] : no carotid bruit [Normal S1, S2] : normal S1, S2 [No Murmur] : no murmur [No Rub] : no rub [No Gallop] : no gallop [Good Air Entry] : good air entry [No Respiratory Distress] : no respiratory distress  [Soft] : abdomen soft [Non Tender] : non-tender [No Masses/organomegaly] : no masses/organomegaly [Normal Bowel Sounds] : normal bowel sounds [Normal Gait] : normal gait [No Cyanosis] : no cyanosis [No Clubbing] : no clubbing [No Varicosities] : no varicosities [No Rash] : no rash [No Skin Lesions] : no skin lesions [Moves all extremities] : moves all extremities [No Focal Deficits] : no focal deficits [Normal Speech] : normal speech [Alert and Oriented] : alert and oriented [Normal memory] : normal memory [Edema ___] : edema [unfilled] [de-identified] : + device generator anterior left chest  [de-identified] : LCTA  but mildly  RLL

## 2022-05-19 ENCOUNTER — APPOINTMENT (OUTPATIENT)
Dept: CARDIOLOGY | Facility: CLINIC | Age: 77
End: 2022-05-19

## 2022-05-19 ENCOUNTER — NON-APPOINTMENT (OUTPATIENT)
Age: 77
End: 2022-05-19

## 2022-05-19 ENCOUNTER — APPOINTMENT (OUTPATIENT)
Dept: CARDIOLOGY | Facility: CLINIC | Age: 77
End: 2022-05-19
Payer: MEDICARE

## 2022-05-19 VITALS
TEMPERATURE: 96.4 F | HEIGHT: 69 IN | WEIGHT: 186 LBS | SYSTOLIC BLOOD PRESSURE: 100 MMHG | HEART RATE: 91 BPM | OXYGEN SATURATION: 100 % | BODY MASS INDEX: 27.55 KG/M2 | DIASTOLIC BLOOD PRESSURE: 69 MMHG

## 2022-05-19 LAB
ALBUMIN SERPL ELPH-MCNC: 4 G/DL
ALP BLD-CCNC: 146 U/L
ALT SERPL-CCNC: 17 U/L
ANION GAP SERPL CALC-SCNC: 12 MMOL/L
AST SERPL-CCNC: 12 U/L
BASOPHILS # BLD AUTO: 0.02 K/UL
BASOPHILS NFR BLD AUTO: 0.8 %
BILIRUB SERPL-MCNC: 1 MG/DL
BUN SERPL-MCNC: 23 MG/DL
CALCIUM SERPL-MCNC: 8.7 MG/DL
CHLORIDE SERPL-SCNC: 108 MMOL/L
CHOLEST SERPL-MCNC: 131 MG/DL
CO2 SERPL-SCNC: 25 MMOL/L
CREAT SERPL-MCNC: 1.47 MG/DL
EGFR: 50 ML/MIN/1.73M2
EOSINOPHIL # BLD AUTO: 0.02 K/UL
EOSINOPHIL NFR BLD AUTO: 0.8 %
ESTIMATED AVERAGE GLUCOSE: 131 MG/DL
GLUCOSE SERPL-MCNC: 81 MG/DL
HBA1C MFR BLD HPLC: 6.2 %
HCT VFR BLD CALC: 42.1 %
HDLC SERPL-MCNC: 39 MG/DL
HGB BLD-MCNC: 13.4 G/DL
IMM GRANULOCYTES NFR BLD AUTO: 0.4 %
LDLC SERPL CALC-MCNC: 80 MG/DL
LDLC SERPL DIRECT ASSAY-MCNC: 78 MG/DL
LYMPHOCYTES # BLD AUTO: 0.78 K/UL
LYMPHOCYTES NFR BLD AUTO: 30.7 %
MAN DIFF?: NORMAL
MCHC RBC-ENTMCNC: 28.9 PG
MCHC RBC-ENTMCNC: 31.8 GM/DL
MCV RBC AUTO: 90.9 FL
MONOCYTES # BLD AUTO: 0.29 K/UL
MONOCYTES NFR BLD AUTO: 11.4 %
NEUTROPHILS # BLD AUTO: 1.42 K/UL
NEUTROPHILS NFR BLD AUTO: 55.9 %
NONHDLC SERPL-MCNC: 92 MG/DL
NT-PROBNP SERPL-MCNC: 8010 PG/ML
PLATELET # BLD AUTO: 86 K/UL
POTASSIUM SERPL-SCNC: 4.3 MMOL/L
PREALB SERPL NEPH-MCNC: 16 MG/DL
PROT SERPL-MCNC: 6.3 G/DL
RBC # BLD: 4.63 M/UL
RBC # FLD: 15.5 %
SODIUM SERPL-SCNC: 145 MMOL/L
TRIGL SERPL-MCNC: 61 MG/DL
TROPONIN I SERPL-MCNC: 0.1 NG/ML
TSH SERPL-ACNC: 2.49 UIU/ML
WBC # FLD AUTO: 2.54 K/UL

## 2022-05-19 PROCEDURE — 99215 OFFICE O/P EST HI 40 MIN: CPT

## 2022-05-19 NOTE — HISTORY OF PRESENT ILLNESS
[FreeTextEntry1] : TINA ELISE is a 75 yo M PMH cardiac amyloidosis TTR, persistent Afib s/p ablation, chf s/p CRT, \par \par He was diagnosed in 11/2021 during an admission for decompensated HF  TTE demonstrated EF 37% with severe LVH (wall thickness 1.7cm mildly reduced RV fxn  with small pericardial effusion, and apical sparing of LV fxn. \par fu eval with TC PYR scan was positive\par \par he was first told her had CHF "years ago" 6-8 yrs ago\par \par + neuropathy  for years\par chronic nec pain \par admits to 3-5min chest pain last night\par he underwent genetic testing and today presents for results

## 2022-05-19 NOTE — REVIEW OF SYSTEMS
[Chest Pain] : chest pain [Exercise Intolerance] : exercise intolerance [Negative] : Respiratory [Cyanosis] : no cyanosis [Palpitations] : no palpitations [de-identified] : + neuropathy

## 2022-05-19 NOTE — FAMILY HISTORY
[FreeTextEntry1] : FamilyHistory_20_twCiteListControlStart FamilyHistory_20_twCiteListControlEnd [id number] \par A four-generation family history was constructed and scanned into [employer]. \par Family history is significant for: \par siblings\par sister 67 yo med hx unk: son, daughter no known med probs\par sister dec 50s DM: 3 children , sons, no known med probs\par brother dec details unk\par brother dec details unk\par \par Mother dec in childbirth at 27 yo\par Maternal aunts multiple unk\par Maternal uncles multiple unk\par Maternal Grandmother dec unk\par Maternal Grandfather dec unk\par \par Father dec\par Paternal aunts\par Paternal uncles\par Paternal Grandfather\par Paternal Grandmother\par \par children:\par all healthy\par daughter 42 yo\par daughter 47 yo\par son 45 yo\par son 42 yo \par states all have children, all are healthy \par \par his maternal families originate from [place]  and paternal families originate from [facility]  \par No Ashkenazi Yazidi ancestry. \par Family history was negative for consanguinity  \par No family history of SIDS\par  \par \par  [FreeTextEntry2] : [place]  [FreeTextEntry3] : [place]

## 2022-05-19 NOTE — PHYSICAL EXAM
[Extraocular Movements Intact] : extraocular movements were intact [Normal] : no mass, no hepatosplenomegaly [Tremor] : tremor [de-identified] : no abd distention [de-identified] : trace LE edema

## 2022-05-19 NOTE — REASON FOR VISIT
[FreeTextEntry3] : Dear Dr. Bradley      . I saw your patient TINA ELISE on 03/17/2022 . Please see the note below for the assessment and plan. \par \par TINA ELISE  was seen  for an initial consultation at the Cardiogenomics Program at [facility] on 03/17/2022.   Mr. ELISE was referred by  Dr. Bradley for hereditary cardiac predisposition risk assessment and counseling, due to TTR CA\par \par

## 2022-05-20 ENCOUNTER — APPOINTMENT (OUTPATIENT)
Dept: HEART AND VASCULAR | Facility: CLINIC | Age: 77
End: 2022-05-20

## 2022-05-23 ENCOUNTER — NON-APPOINTMENT (OUTPATIENT)
Age: 77
End: 2022-05-23

## 2022-05-23 ENCOUNTER — APPOINTMENT (OUTPATIENT)
Dept: HEART AND VASCULAR | Facility: CLINIC | Age: 77
End: 2022-05-23
Payer: MEDICARE

## 2022-05-23 PROCEDURE — 93296 REM INTERROG EVL PM/IDS: CPT

## 2022-05-23 PROCEDURE — 93294 REM INTERROG EVL PM/LDLS PM: CPT

## 2022-06-30 NOTE — ED ADULT NURSE NOTE - BRAND OF COVID-19 VACCINATION
2021/Pfizer dose 1 and 2 Consent 3/Introductory Paragraph: I gave the patient a chance to ask questions they had about the procedure.  Following this I explained the Mohs procedure and consent was obtained. The risks, benefits and alternatives to therapy were discussed in detail. Specifically, the risks of infection, scarring, bleeding, prolonged wound healing, incomplete removal, allergy to anesthesia, nerve injury and recurrence were addressed. Prior to the procedure, the treatment site was clearly identified and confirmed by the patient. All components of Universal Protocol/PAUSE Rule completed.

## 2022-07-18 ENCOUNTER — APPOINTMENT (OUTPATIENT)
Dept: HEART AND VASCULAR | Facility: CLINIC | Age: 77
End: 2022-07-18

## 2022-07-18 VITALS
HEIGHT: 69 IN | WEIGHT: 184 LBS | HEART RATE: 82 BPM | TEMPERATURE: 97.7 F | BODY MASS INDEX: 27.25 KG/M2 | SYSTOLIC BLOOD PRESSURE: 95 MMHG | DIASTOLIC BLOOD PRESSURE: 71 MMHG

## 2022-07-18 PROCEDURE — 93281 PM DEVICE PROGR EVAL MULTI: CPT

## 2022-07-18 RX ORDER — POTASSIUM CHLORIDE 750 MG/1
10 CAPSULE, EXTENDED RELEASE ORAL
Refills: 0 | Status: DISCONTINUED | COMMUNITY
End: 2022-07-18

## 2022-07-18 NOTE — DISCUSSION/SUMMARY
[FreeTextEntry1] : Mr. 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  He had a prior ablation in 2016 at [facility].  He was noted to be in atrial fibrillation during an admission to [facility] 5/2021.  He has had recurrent hospitalizations for decompensated HF, most recently 2/7/22.  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  Beta-blocker therapy limited by resting bradycardia.  He is s/p CRT-P placement 2/16/22 with LRL at 80 bpm.  He is effective biventricular pacing as programmed with narrow QRS on ECG.  He is enrolled in remote monitoring and was asked to return for follow-up in six months.

## 2022-07-18 NOTE — PROCEDURE
[No] : not [Sinus Bradycardia] : sinus bradycardia [CRT-P] : Cardiac resynchronization therapy pacemaker [VVIR] : VVIR [Lead Imp:  ___ohms] : lead impedance was [unfilled] ohms [Sensing Amplitude ___mv] : sensing amplitude was [unfilled] mv [___V @] : [unfilled] V [___ ms] : [unfilled] ms [de-identified] : [facility] [de-identified] : 2/16/22 [de-identified] : [employer] [de-identified] : 80 [de-identified] : 8 years to [facility]  [de-identified] : AP 0 \par RVP 99\par

## 2022-07-18 NOTE — HISTORY OF PRESENT ILLNESS
[FreeTextEntry1] : Mr. 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  He had a prior ablation in 2016 at [facility].  He was noted to be in atrial fibrillation during an admission to [facility] 5/2021.  He has had recurrent hospitalizations for decompensated HF, most recently 2/7/22.  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  Beta-blocker therapy limited by resting bradycardia.  He is s/p CRT-P placement 2/16/22.  He offers no device related complaints.  He complains of hearing loss. Ambulating with cane, continues to note MARTINEZ that is unchanged recently. \par \par \par

## 2022-07-18 NOTE — CARDIOLOGY SUMMARY
[___] : [unfilled] [de-identified] : 12/20/21 AF @ 63 bpm  [de-identified] : 11/22/21\par  1. Biatrial enlargement.\par  2. Mild pulmonic regurgitation.\par  3. Pulmonary artery systolic pressure is 38 mmHg.\par  4. No other significant valvular disease.\par  5. Normal right ventricular size.\par  6. Reduced right ventricular systolic function.\par  7. There is severe concentric left ventricular hypertrophy. Left ventricular systolic function is moderately reduced with a calculated ejection fraction of 35% with global hypokinesis.\par  8. Small-to-moderate pericardial effusion without echocardiographic evidence of cardiac tamponade physiology.\par  9. The proximal ascending aorta is dilated measuring 4.10 cm.\par 10. Findings suggestive of infiltrative cardiomyopathy.\par 11. Compared to the previous TTE performed on 6/29/2021, LV systolic function appears reduced.\par \par

## 2022-07-19 ENCOUNTER — APPOINTMENT (OUTPATIENT)
Dept: HEART AND VASCULAR | Facility: CLINIC | Age: 77
End: 2022-07-19

## 2022-07-19 VITALS
HEART RATE: 82 BPM | TEMPERATURE: 98.1 F | SYSTOLIC BLOOD PRESSURE: 107 MMHG | OXYGEN SATURATION: 97 % | DIASTOLIC BLOOD PRESSURE: 76 MMHG | BODY MASS INDEX: 28.49 KG/M2 | WEIGHT: 192.38 LBS | HEIGHT: 69 IN

## 2022-07-19 DIAGNOSIS — R07.9 CHEST PAIN, UNSPECIFIED: ICD-10-CM

## 2022-07-19 PROCEDURE — 99215 OFFICE O/P EST HI 40 MIN: CPT

## 2022-07-19 NOTE — HISTORY OF PRESENT ILLNESS
[FreeTextEntry1] : Mr. Wise presents for follow up and management of cardiac amyloidosis (ATTR-CM, diagnosed 11/2021), chronic systolic heart failure secondary to nonischemic cardiomyopathy, persistent atrial fibrillation s/p ablation 06/2016 with Dr. Neeraj MD, at [facility] with recurrence, s/p CRT-P (02/16/22), CKD III, DM2, idiopathic thrombocytopenia, chronic venous insufficiency, and prostate cancer s/p chemotherapy.  He has had multiple admissions for decompensated heart failure.  On a prior admission he underwent a bone marrow biopsy to work up thrombocytopenia which was negative for a plasma cell dyscrasia or AL-amyloidosis.  He was admitted again to [facility] from 02/07/22 to 02/17/22 for acute on chronic systolic heart failure.  He underwent right thoracentesis complicated by pneumothorax.  On 02/08/22, he had an echocardiogram which revealed an EF of 37%, severe symmetrically increased wall thickness, mildly reduced RV function, mild MR, PASP 40 mmHg, small pericardial effusion, and bilateral pleural effusions. On 02/10/22 he had a TC-99 PYP scan which revealed intense LV and mild RV uptake consistent with ATTR-cm.  On 02/16/22, he underwent implantation of a CRT-P ([employer]) secondary to bradycardia, the need for chronic ventricular pacing, and severely reduced LV systolic function. His lower rate limit was set to 80 bpm to improve hemodynamics given his restrictive cardiomyopathy.  His primary cardiologist is Yara Anton MD.  We again had an extensive discussion about the pathophysiology, natural history, and treatment of ATTR-CM.  At present, he has marked MARTINEZ to 1 block ambulation.  His edema is moderate. In addition, he has complaints of peripheral neuropathy.  He did not increase torsemide to 40mg as suggested last visit.

## 2022-07-19 NOTE — PHYSICAL EXAM
[de-identified] : + device generator anterior left chest  [de-identified] : LCTA  but mildly  RLL

## 2022-07-19 NOTE — ASSESSMENT
[FreeTextEntry1] : 1.  Atrial fibrillation, s/p AF ablation at [facility] 06/2016, with recurrence, now persistent: \par      - continue systemic anticoagulation with Eliquis 5mg po bid\par      - discussed with patient avoidance of ASA and NSAIDS as well as need for bleeding surveillance \par      - follow up with heart rhythm specialist, Jimi Pemberton MD\par \par 2. Chronic systolic heart failure, NICM, secondary to amyloidosis: \par      - continue Entresto 24/26mg po bid\par      - continue metoprolol succinate ER 25mg po qd \par      - increase torsemide from 20mg po qd prn to 2 tabs po qd prn (still taking 20mg) \par      - will not up titrate HF regimen given symptomatic hypotension \par      - will send to a heart failure specialist, Víctor Taylor MD\par \par 3. CKD III: Cr 1.47, eGFR 50 (05/19/22)\par      - avoid nephrotoxic agents\par      - will consider sending to a nephrologist next visit (he is reluctant at this time) \par \par 4. DM2: A1c 6.2% (05/19/22)\par      - discussed with patient therapeutic lifestyle changes to improve glucose metabolism \par \par 5. 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............................................\par \par \par Amyloidosis Treatment:\par      - continue Vyndamax 61mg po qd \par      - will send to neurologist, Estrada Valdes MD to 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

## 2022-07-19 NOTE — REASON FOR VISIT
[FreeTextEntry1] : Diagnostic Tests:\par ----------------------------------------\par ECG:\par 03/14/22: electronic pacemaker, atrial fibrillation, Bi-V paced. \par 12/20/21: atrial fibrillation, SVR, low voltage QRS limb leads, possible old septal and inferior MI.  \par ----------------------------------------\par EP procedures:\par 02/24/22: insertion of CRT-P\par ----------------------------------------\par Echo:\par 02/28/22: EF 37%, severely increased LV wall thickness, mildly decreased RV function, dilated LA, dilated RA, mild MR, mild TR, PASP 40 mmHg, small pericardial effusion.\par 11/22/21: EF 35%, severely increased LV wall thickness, mildly decreased RV function, dilated LA, dilated RA, mild MR, mild TR, PASP 38 mmHg, small pericardial effusion.\par 06/29/21: EF 50%, severely increased LV wall thickness, normal RV function, dilated LA, dilated RA, mild MR, mild-to-moderate TR, PASP 36 mmHg, small pericardial effusion.

## 2022-09-09 PROBLEM — I42.9 CARDIOMYOPATHY: Status: ACTIVE | Noted: 2021-12-23

## 2022-09-09 PROBLEM — Z95.0 CARDIAC RESYNCHRONIZATION THERAPY PACEMAKER (CRT-P) IN PLACE: Status: ACTIVE | Noted: 2022-03-13

## 2022-09-09 NOTE — REVIEW OF SYSTEMS
[Hearing Loss] : hearing loss [SOB] : shortness of breath [Dyspnea on exertion] : dyspnea during exertion [Lower Ext Edema] : lower extremity edema [Tingling (Paresthesia)] : tingling [Negative] : Heme/Lymph

## 2022-09-12 ENCOUNTER — APPOINTMENT (OUTPATIENT)
Dept: HEART AND VASCULAR | Facility: CLINIC | Age: 77
End: 2022-09-12

## 2022-09-12 VITALS — WEIGHT: 167 LBS | BODY MASS INDEX: 24.73 KG/M2 | HEIGHT: 69 IN

## 2022-09-12 DIAGNOSIS — I42.9 CARDIOMYOPATHY, UNSPECIFIED: ICD-10-CM

## 2022-09-12 DIAGNOSIS — Z95.0 PRESENCE OF CARDIAC PACEMAKER: ICD-10-CM

## 2022-09-12 PROCEDURE — 99443: CPT

## 2022-09-12 NOTE — HISTORY OF PRESENT ILLNESS
[FreeTextEntry1] : Mr. Wise presents for follow up and management of cardiac amyloidosis (ATTR-CM, diagnosed 11/2021), chronic systolic heart failure secondary to nonischemic cardiomyopathy, persistent atrial fibrillation s/p ablation 06/2016 with Dr. Neeraj MD, at [facility] with recurrence, s/p CRT-P (02/16/22), CKD III, DM2, idiopathic thrombocytopenia, chronic venous insufficiency, and prostate cancer s/p chemotherapy.  He has had multiple admissions for decompensated heart failure.  On a prior admission he underwent a bone marrow biopsy to work up thrombocytopenia which was negative for a plasma cell dyscrasia or AL-amyloidosis.  He was admitted again to [facility] from 02/07/22 to 02/17/22 for acute on chronic systolic heart failure.  He underwent right thoracentesis complicated by pneumothorax.  On 02/08/22, he had an echocardiogram which revealed an EF of 37%, severe symmetrically increased wall thickness, mildly reduced RV function, mild MR, PASP 40 mmHg, small pericardial effusion, and bilateral pleural effusions. On 02/10/22 he had a TC-99 PYP scan which revealed intense LV and mild RV uptake consistent with ATTR-cm.  On 02/16/22, he underwent implantation of a CRT-P ([employer]) secondary to bradycardia, the need for chronic ventricular pacing, and severely reduced LV systolic function. His lower rate limit was set to 80 bpm to improve hemodynamics given his restrictive cardiomyopathy.  His primary cardiologist is Yara Anton MD.  We again had an extensive discussion about the pathophysiology, natural history, and treatment of ATTR-CM.  At present, he has marked MARTINEZ to 1 block ambulation.  His edema is moderate. In addition, he has complaints of peripheral neuropathy.  He again, did not increase torsemide to 40mg as suggested last visit.  His current weight is 167 pounds (? inaccurate).

## 2022-09-12 NOTE — PHYSICAL EXAM
[Well Developed] : well developed [Well Nourished] : well nourished [No Acute Distress] : no acute distress [Normal Conjunctiva] : normal conjunctiva [Normal Venous Pressure] : normal venous pressure [No Carotid Bruit] : no carotid bruit [Normal S1, S2] : normal S1, S2 [No Murmur] : no murmur [No Rub] : no rub [No Gallop] : no gallop [Good Air Entry] : good air entry [No Respiratory Distress] : no respiratory distress  [Soft] : abdomen soft [Non Tender] : non-tender [No Masses/organomegaly] : no masses/organomegaly [Normal Bowel Sounds] : normal bowel sounds [Normal Gait] : normal gait [No Cyanosis] : no cyanosis [No Clubbing] : no clubbing [No Varicosities] : no varicosities [Edema ___] : edema [unfilled] [No Rash] : no rash [No Skin Lesions] : no skin lesions [Moves all extremities] : moves all extremities [No Focal Deficits] : no focal deficits [Normal Speech] : normal speech [Alert and Oriented] : alert and oriented [Normal memory] : normal memory [de-identified] : + device generator anterior left chest  [de-identified] : LCTA  but mildly  RLL

## 2022-09-12 NOTE — ASSESSMENT
[FreeTextEntry1] : 1.  Atrial fibrillation, s/p AF ablation at [facility] 06/2016, with recurrence, now persistent: \par      - continue systemic anticoagulation with Eliquis 5mg po bid\par      - discussed with patient avoidance of ASA and NSAIDS as well as need for bleeding surveillance \par      - follow up with heart rhythm specialist, Jimi Pemberton MD\par \par 2. Chronic systolic heart failure, NICM, secondary to amyloidosis: \par      - continue Entresto 24/26mg po bid\par      - continue metoprolol succinate ER 25mg po qd \par      - increase torsemide from 20mg po qd prn to 2 tabs po qd prn (still taking 20mg) \par      - will not up titrate HF regimen given symptomatic hypotension \par      - will send to a heart failure specialist, Víctor Taylor MD (has appointment in October)\par \par 3. CKD III: Cr 1.47, eGFR 50 (05/19/22)\par      - avoid nephrotoxic agents\par      - will consider sending to a nephrologist next visit (he is reluctant at this time) \par \par 4. DM2: A1c 6.2% (05/19/22)\par      - discussed with patient therapeutic lifestyle changes to improve glucose metabolism \par \par 5. 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............................................\par \par \par Amyloidosis Treatment:\par      - continue Vyndamax 61mg po qd \par      - will send to neurologist, Estrada Valdes MD to consider treatment of amyloid polyneuropathy (has appointment on 12/19/22)\par      - consider TTR silencer therapy with patirisan, vutrisiran, or inotersen\par \par \par This encounter was performed as a telehealth encounter employing the [employer], Blottr, or other approved audio/video platform.  All components of the evaluation and management were performed per clinical routine with the exception of the physical exam.  The physical exam references my most recent physical exam plus any additional information provided by the patient (i.e. ambulatory vitals/weight) or inspection from the video portion of the encounter. \par \par \par Verbal consent was given on 09/12/22 by Jake Wise. \par \par Patient location: The patient was located at the primary address listed in the medical record.\par Physician location: I was located in my office in [facility]. \par \par \par

## 2022-10-03 ENCOUNTER — APPOINTMENT (OUTPATIENT)
Dept: HEART AND VASCULAR | Facility: CLINIC | Age: 77
End: 2022-10-03

## 2022-10-17 ENCOUNTER — APPOINTMENT (OUTPATIENT)
Dept: HEART AND VASCULAR | Facility: CLINIC | Age: 77
End: 2022-10-17

## 2022-10-19 ENCOUNTER — FORM ENCOUNTER (OUTPATIENT)
Age: 77
End: 2022-10-19

## 2022-11-03 ENCOUNTER — NON-APPOINTMENT (OUTPATIENT)
Age: 77
End: 2022-11-03

## 2022-11-03 ENCOUNTER — APPOINTMENT (OUTPATIENT)
Dept: HEART AND VASCULAR | Facility: CLINIC | Age: 77
End: 2022-11-03
Payer: MEDICARE

## 2022-11-03 PROCEDURE — 93294 REM INTERROG EVL PM/LDLS PM: CPT

## 2022-11-03 PROCEDURE — 93296 REM INTERROG EVL PM/IDS: CPT

## 2022-11-07 NOTE — PROGRESS NOTE ADULT - PROBLEM/PLAN-4
DISPLAY PLAN FREE TEXT
Stable

## 2022-11-08 NOTE — PHYSICAL THERAPY INITIAL EVALUATION ADULT - RANGE OF MOTION EXAMINATION, REHAB EVAL
[facility], [place], operated by [facility] Follow up    CHIEF COMPLAINT    Dyspnea with exertion    HISTORY OF PRESENT ILLNESS    Anu Jones is a 71 y.o.female here today for follow-up.  She was last seen in the office by me in September.  She states that she has slowly been recovering back to her baseline.    She has been able to wean off of her oxygen with exertion and will occasionally use her oxygen if she is short of breath after exertion.  She continues to wear her CPAP every night for ARMAAN.  She also uses 2 L bled into the machine.  She denies any sleeping difficulties.    She continues to use Symbicort 2 puffs twice a day.  She is also using Qvar twice a day.  She is using Spiriva daily.  She does do her nebulizers as needed.  She also receives Fasenra injections every 8 weeks.    She continues to follow closely with cardiology for her history of palpitations.    She denies chest pain or lower leg edema.  She denies any calf tenderness.  She denies any reflux symptoms.  She occasionally will cough up some light yellow secretions.  She denies any hemoptysis.    She is up-to-date on her current vaccinations.    Patient Active Problem List   Diagnosis   • Rheumatoid arthritis (HCC)   • Restless legs syndrome   • Generalized osteoarthritis   • Obstructive sleep apnea syndrome   • Essential hypertension   • Large hiatal hernia   • Gluten intolerance   • Fibromyalgia   • Degeneration of intervertebral disc of lumbar region   • Shortness of breath   • History of Núñez's esophagus   • Displacement of intervertebral disc of lumbosacral region   • Spondylosis of lumbar region without myelopathy or radiculopathy   • GERD   • Nocturnal hypoxemia   • Frequent UTI   • Allergic rhinitis   • Hearing loss   • Chronic cystitis   • Numbness and tingling   • Acquired hypothyroidism   • Bilateral carpal tunnel syndrome   • Cubital tunnel syndrome on right   • Asthma   • Dysphagia   • Pure hypercholesterolemia   • 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    • Levaquin [Levofloxacin] Other (See Comments)     Tendonitis, unable to use arms   • Penicillins Hives     SWELLING AND SOA  Pt states she can take ancef and keflex without problems; tolerates zosyn 5/17/21   • Phenazopyridine Hcl Shortness Of Breath     SWELLING    • Bactrim [Sulfamethoxazole-Trimethoprim] Hives and Itching       Current Outpatient Medications:   •  albuterol (ACCUNEB) 1.25 MG/3ML nebulizer solution, Take 3 mL by nebulization Every 6 (Six) Hours As Needed for Wheezing., Disp: 240 mL, Rfl: 6  •  albuterol sulfate HFA (Ventolin HFA) 108 (90 Base) MCG/ACT inhaler, Inhale 2 puffs Every 4-6 Hours As Needed. (Patient taking differently: Inhale 2 puffs Every 4 (Four) Hours As Needed for Wheezing.), Disp: 18 g, Rfl: 5  •  atenolol (TENORMIN) 100 MG tablet, Take 1 tablet by mouth Daily., Disp: 90 tablet, Rfl: 1  •  atorvastatin (LIPITOR) 10 MG tablet, Take 1 tablet by mouth Every Night., Disp: 90 tablet, Rfl: 3  •  azaTHIOprine (IMURAN) 50 MG tablet, Take 2 tablets by mouth 2 times every day, Disp: 120 tablet, Rfl: 1  •  Beclomethasone Diprop HFA (Qvar RediHaler) 40 MCG/ACT inhaler, Inhale 2 puffs 2 (Two) Times a Day., Disp: 10.6 g, Rfl: 4  •  Benralizumab (Fasenra Pen) 30 MG/ML solution auto-injector, Inject 1ml (30mg) under the skin into the appropriate area as directed Every 2 (Two) Months., Disp: 1 mL, Rfl: 6  •  budesonide-formoterol (Symbicort) 160-4.5 MCG/ACT inhaler, Inhale 2 puffs by mouth 2 (Two) Times a Day. Rinse mouth after use, Disp: 10.2 g, Rfl: 3  •  cetirizine (zyrTEC) 10 MG tablet, Take 10 mg by mouth Daily.,  Disp: , Rfl:   •  diclofenac (VOLTAREN) 1 % gel gel, 4 g As Needed (MILD PAIN)., Disp: , Rfl: 0  •  DULoxetine (CYMBALTA) 60 MG capsule, Take 1 capsule by mouth every day, Disp: 90 capsule, Rfl: 1  •  estradiol (ESTRACE) 0.1 MG/GM vaginal cream, Insert 2 g into the vagina 3 (Three) Times a Week., Disp: 42.5 g, Rfl: 12  •  fluticasone (FLONASE) 50 MCG/ACT nasal spray, 2 sprays into the nostril(s) as directed by provider Daily., Disp: , Rfl:   •  galcanezumab-gnlm (EMGALITY) 120 MG/ML auto-injector pen, Inject 1 mL under the skin into the appropriate area as directed Every 28 (Twenty-Eight) Days., Disp: 1 mL, Rfl: 11  •  levothyroxine (SYNTHROID, LEVOTHROID) 25 MCG tablet, Take 1 tablet by mouth Daily., Disp: 90 tablet, Rfl: 3  •  Magnesium Oxide 400 (240 Mg) MG tablet, Take 400 mg by mouth Daily., Disp: , Rfl: 0  •  metFORMIN ER (GLUCOPHAGE-XR) 500 MG 24 hr tablet, Take 1 tablet by mouth Daily With Breakfast., Disp: 90 tablet, Rfl: 1  •  methenamine (HIPREX) 1 g tablet, Take 1 tablet by mouth 2 (Two) Times a Day With Meals., Disp: 90 tablet, Rfl: 3  •  methocarbamol (ROBAXIN) 500 MG tablet, Take 1 tablet by mouth 2 (Two) Times a Day As Needed for Muscle Spasms., Disp: 30 tablet, Rfl: 2  •  montelukast (Singulair) 10 MG tablet, Take 1 tablet by mouth Every Night., Disp: 90 tablet, Rfl: 3  •  multivitamin (THERAGRAN) tablet tablet, Take 1 tablet by mouth Daily., Disp: , Rfl:   •  nystatin (MYCOSTATIN) 446959 UNIT/ML suspension, Swish and swallow 5 mL 4 (Four) Times a Day. (Patient taking differently: Swish and swallow 5 mL As Needed (THRUSH).), Disp: 473 mL, Rfl: 0  •  omeprazole (priLOSEC) 40 MG capsule, Take 1 capsule by mouth 2 (Two) Times a Day., Disp: 180 capsule, Rfl: 3  •  predniSONE (DELTASONE) 10 MG tablet, Take 4 tabs daily x 3 days, then take 3 tabs daily x 3 days, then take 2 tabs daily x 3 days, then take 1 tab daily x 3 days, Disp: 31 tablet, Rfl: 0  •  predniSONE (DELTASONE) 5 MG tablet, take 1 tablet by  oral route  every day, Disp: 30 tablet, Rfl: 3  •  predniSONE (DELTASONE) 5 MG tablet, Take 1 tablet by mouth every day, Disp: 90 tablet, Rfl: 1  •  pregabalin (LYRICA) 150 MG capsule, Take 1 capsule by mouth every night at bedtime., Disp: 30 capsule, Rfl: 3  •  promethazine-dextromethorphan (PROMETHAZINE-DM) 6.25-15 MG/5ML syrup, Take 5 mL by mouth At Night As Needed for Cough., Disp: 118 mL, Rfl: 0  •  rOPINIRole (Requip) 1 MG tablet, Take 1 tablet by mouth every night 1 hour before bedtime., Disp: 180 tablet, Rfl: 3  •  Spiriva Respimat 2.5 MCG/ACT aerosol solution inhaler, Inhale 1 puff Daily., Disp: 4 g, Rfl: 6  •  traMADol (ULTRAM) 50 MG tablet, Take 2 tablets by mouth 3 (Three) Times a Day., Disp: 180 tablet, Rfl: 2  •  traZODone (DESYREL) 50 MG tablet, Take 0.5-1 tablet by mouth every night at bedtime, Disp: 90 tablet, Rfl: 1  •  guaiFENesin (Mucinex) 600 MG 12 hr tablet, Take 1 tablet by mouth 2 (Two) Times a Day., Disp: 180 tablet, Rfl: 3  MEDICATION LIST AND ALLERGIES REVIEWED.    Social History     Tobacco Use   • Smoking status: Never   • Smokeless tobacco: Never   • Tobacco comments:     secondhand exposure to smoke from her father   Vaping Use   • Vaping Use: Never used   Substance Use Topics   • Alcohol use: No   • Drug use: No       FAMILY AND SOCIAL HISTORY REVIEWED.    Review of Systems   Constitutional: Positive for fatigue. Negative for activity change, appetite change, fever and unexpected weight change.   HENT: Negative for congestion, postnasal drip, rhinorrhea, sinus pressure, sore throat and voice change.    Eyes: Negative for visual disturbance.   Respiratory: Positive for shortness of breath. Negative for cough, chest tightness and wheezing.    Cardiovascular: Negative for chest pain, palpitations and leg swelling.   Gastrointestinal: Negative for abdominal distention, abdominal pain, nausea and vomiting.   Endocrine: Negative for cold intolerance and heat intolerance.   Genitourinary:  "Negative for difficulty urinating and urgency.   Musculoskeletal: Negative for arthralgias, back pain and neck pain.   Skin: Negative for color change and pallor.   Allergic/Immunologic: Negative for environmental allergies and food allergies.   Neurological: Negative for dizziness, syncope, weakness and light-headedness.   Hematological: Negative for adenopathy. Does not bruise/bleed easily.   Psychiatric/Behavioral: Negative for agitation and behavioral problems.   .    /86   Pulse 60   Temp 97.3 °F (36.3 °C) (Infrared)   Resp 16   Ht 157.5 cm (62\")   Wt 107 kg (235 lb)   LMP  (LMP Unknown)   SpO2 95%   BMI 42.98 kg/m²     Immunization History   Administered Date(s) Administered   • COVID-19 (PFIZER) PURPLE CAP 01/05/2021, 01/26/2021, 12/14/2021   • Flu Vaccine Quad PF >36MO 09/23/2016   • Fluzone High Dose =>65 Years ([employer] ONLY) 10/24/2019   • Fluzone High-Dose 65+yrs 11/02/2021   • INFLUENZA SPLIT TRI 10/04/2017, 11/01/2019   • Influenza TIV (IM) 10/01/2018   • Influenza, Unspecified 10/15/2018, 11/07/2019   • Pneumococcal Conjugate 20-Valent (PCV20) 09/08/2022   • Pneumococcal Polysaccharide (PPSV23) 02/23/2021   • Tdap 03/08/2022   • flucelvax quad pfs =>4 YRS 10/17/2020       Physical Exam  Vitals and nursing note reviewed.   Constitutional:       Appearance: She is well-developed. She is not diaphoretic.   HENT:      Head: Normocephalic and atraumatic.   Eyes:      Pupils: Pupils are equal, round, and reactive to light.   Neck:      Thyroid: No thyromegaly.   Cardiovascular:      Rate and Rhythm: Normal rate and regular rhythm.      Heart sounds: Normal heart sounds. No murmur heard.    No friction rub. No gallop.   Pulmonary:      Effort: Pulmonary effort is normal. No respiratory distress.      Breath sounds: Normal breath sounds. No wheezing or rales.   Chest:      Chest wall: No tenderness.   Abdominal:      General: Bowel sounds are normal.      Palpations: Abdomen is soft.      " Tenderness: There is no abdominal tenderness.   Musculoskeletal:         General: No swelling. Normal range of motion.      Cervical back: Normal range of motion and neck supple.   Lymphadenopathy:      Cervical: No cervical adenopathy.   Skin:     General: Skin is warm and dry.      Capillary Refill: Capillary refill takes less than 2 seconds.   Neurological:      Mental Status: She is alert and oriented to person, place, and time.   Psychiatric:         Mood and Affect: Mood normal.         Behavior: Behavior normal.           RESULTS    PFTS in the office today, read by me, FVC 2.28 75% predicted, FEV1 1.65 72% predicted, FEV1/FVC 72% predicted, TLC 4.10 85% predicted, DLCO 60% predicted, no obstruction, no restriction and reduced DLCO.    6-minute walk test: Patient ambulated 950 feet and never desaturated below 89%, she does not meet qualifications for oxygen with activity, her percent of predicted was 79%.    XR Chest PA & Lateral    Result Date: 11/8/2022  No change from the previous study with no evidence for acute cardiopulmonary process.  This report was finalized on 11/8/2022 1:57 PM by Pj Humphrey MD.      PROBLEM LIST    Problem List Items Addressed This Visit        Allergies and Adverse Reactions    Allergic rhinitis       Pulmonary and Pneumonias    Shortness of breath - Primary    Asthma    Relevant Medications    guaiFENesin (Mucinex) 600 MG 12 hr tablet       Sleep    Obstructive sleep apnea syndrome (Chronic)    Overview     Description: A.  On home CPAP with oxygen each bedtime.              DISCUSSION    Ms. Jones was here for follow-up of her asthma.  She seems to be slowly recovering back to her baseline since she has been ill over the last couple of months.  She has been able to wean herself off of her oxygen during the day and continue to wear her oxygen at night with her CPAP.    We did review her full PFTs in the office today and she has no obstruction or restriction.  She will  remain on Symbicort and Qvar/Spiriva for her asthma.  I did encourage her to use the albuterol as needed for shortness of breath.  She will remain on Fasenra injections every 8 weeks.    She will remain on Singulair nightly for her allergies.    She will continue her CPAP every night for ARMAAN.  I will get her download in place in our records for review.    I have ordered some Mucinex for her to take twice a day to help with secretion clearance.    She will follow-up in 3 months or sooner if her symptoms worsen.  Advised her to call with any additional concerns or questions.    I personally spent a total of 33 minutes on patient visit today including chart review, face to face with the patient obtaining the history and physical exam, review of pertinent images and tests, counseling and discussion and/or coordination of care as described above, and documentation.  Total time excludes time spent on other separate services such as performing procedures or test interpretation, if applicable.        GINA Holman  11/08/202214:14 EST  Electronically signed     Please note that portions of this note were completed with a voice recognition program.        CC: Sofia Alejo MD   no ROM deficits were identified

## 2022-11-18 ENCOUNTER — APPOINTMENT (OUTPATIENT)
Dept: HEART AND VASCULAR | Facility: CLINIC | Age: 77
End: 2022-11-18
Payer: MEDICARE

## 2022-11-18 ENCOUNTER — NON-APPOINTMENT (OUTPATIENT)
Age: 77
End: 2022-11-18

## 2022-11-18 ENCOUNTER — INPATIENT (INPATIENT)
Facility: HOSPITAL | Age: 77
LOS: 4 days | Discharge: HOME HEALTH | DRG: 291 | End: 2022-11-23
Attending: HOSPITALIST | Admitting: INTERNAL MEDICINE
Payer: MEDICARE

## 2022-11-18 VITALS
HEIGHT: 69 IN | DIASTOLIC BLOOD PRESSURE: 64 MMHG | SYSTOLIC BLOOD PRESSURE: 92 MMHG | WEIGHT: 184 LBS | HEART RATE: 83 BPM | BODY MASS INDEX: 27.25 KG/M2 | OXYGEN SATURATION: 99 %

## 2022-11-18 VITALS
TEMPERATURE: 98 F | RESPIRATION RATE: 16 BRPM | HEIGHT: 68 IN | DIASTOLIC BLOOD PRESSURE: 70 MMHG | WEIGHT: 184.09 LBS | OXYGEN SATURATION: 98 % | SYSTOLIC BLOOD PRESSURE: 100 MMHG | HEART RATE: 84 BPM

## 2022-11-18 DIAGNOSIS — I50.21 ACUTE SYSTOLIC (CONGESTIVE) HEART FAILURE: ICD-10-CM

## 2022-11-18 DIAGNOSIS — D47.3 ESSENTIAL (HEMORRHAGIC) THROMBOCYTHEMIA: ICD-10-CM

## 2022-11-18 DIAGNOSIS — I50.9 HEART FAILURE, UNSPECIFIED: ICD-10-CM

## 2022-11-18 DIAGNOSIS — I48.92 UNSPECIFIED ATRIAL FLUTTER: ICD-10-CM

## 2022-11-18 DIAGNOSIS — R79.89 OTHER SPECIFIED ABNORMAL FINDINGS OF BLOOD CHEMISTRY: ICD-10-CM

## 2022-11-18 DIAGNOSIS — I48.92 UNSPECIFIED ATRIAL FLUTTER: Chronic | ICD-10-CM

## 2022-11-18 DIAGNOSIS — E85.4 ORGAN-LIMITED AMYLOIDOSIS: ICD-10-CM

## 2022-11-18 DIAGNOSIS — Z98.890 OTHER SPECIFIED POSTPROCEDURAL STATES: Chronic | ICD-10-CM

## 2022-11-18 DIAGNOSIS — Z90.79 ACQUIRED ABSENCE OF OTHER GENITAL ORGAN(S): Chronic | ICD-10-CM

## 2022-11-18 LAB
ALBUMIN SERPL ELPH-MCNC: 3.8 G/DL — SIGNIFICANT CHANGE UP (ref 3.3–5)
ALP SERPL-CCNC: 157 U/L — HIGH (ref 40–120)
ALT FLD-CCNC: 15 U/L — SIGNIFICANT CHANGE UP (ref 10–45)
ANION GAP SERPL CALC-SCNC: 10 MMOL/L — SIGNIFICANT CHANGE UP (ref 5–17)
ANISOCYTOSIS BLD QL: SLIGHT — SIGNIFICANT CHANGE UP
AST SERPL-CCNC: 16 U/L — SIGNIFICANT CHANGE UP (ref 10–40)
BASOPHILS # BLD AUTO: 0.02 K/UL — SIGNIFICANT CHANGE UP (ref 0–0.2)
BASOPHILS NFR BLD AUTO: 0.9 % — SIGNIFICANT CHANGE UP (ref 0–2)
BILIRUB SERPL-MCNC: 1.2 MG/DL — SIGNIFICANT CHANGE UP (ref 0.2–1.2)
BUN SERPL-MCNC: 25 MG/DL — HIGH (ref 7–23)
BURR CELLS BLD QL SMEAR: SLIGHT — SIGNIFICANT CHANGE UP
CALCIUM SERPL-MCNC: 8.9 MG/DL — SIGNIFICANT CHANGE UP (ref 8.4–10.5)
CHLORIDE SERPL-SCNC: 106 MMOL/L — SIGNIFICANT CHANGE UP (ref 96–108)
CO2 SERPL-SCNC: 27 MMOL/L — SIGNIFICANT CHANGE UP (ref 22–31)
CREAT SERPL-MCNC: 1.39 MG/DL — HIGH (ref 0.5–1.3)
EGFR: 53 ML/MIN/1.73M2 — LOW
EOSINOPHIL # BLD AUTO: 0.02 K/UL — SIGNIFICANT CHANGE UP (ref 0–0.5)
EOSINOPHIL NFR BLD AUTO: 0.9 % — SIGNIFICANT CHANGE UP (ref 0–6)
GIANT PLATELETS BLD QL SMEAR: PRESENT — SIGNIFICANT CHANGE UP
GLUCOSE SERPL-MCNC: 119 MG/DL — HIGH (ref 70–99)
HCT VFR BLD CALC: 41.4 % — SIGNIFICANT CHANGE UP (ref 39–50)
HGB BLD-MCNC: 14.1 G/DL — SIGNIFICANT CHANGE UP (ref 13–17)
LYMPHOCYTES # BLD AUTO: 0.74 K/UL — LOW (ref 1–3.3)
LYMPHOCYTES # BLD AUTO: 28.7 % — SIGNIFICANT CHANGE UP (ref 13–44)
MACROCYTES BLD QL: SLIGHT — SIGNIFICANT CHANGE UP
MAGNESIUM SERPL-MCNC: 2.2 MG/DL — SIGNIFICANT CHANGE UP (ref 1.6–2.6)
MANUAL SMEAR VERIFICATION: SIGNIFICANT CHANGE UP
MCHC RBC-ENTMCNC: 30.5 PG — SIGNIFICANT CHANGE UP (ref 27–34)
MCHC RBC-ENTMCNC: 34.1 GM/DL — SIGNIFICANT CHANGE UP (ref 32–36)
MCV RBC AUTO: 89.6 FL — SIGNIFICANT CHANGE UP (ref 80–100)
MONOCYTES # BLD AUTO: 0.16 K/UL — SIGNIFICANT CHANGE UP (ref 0–0.9)
MONOCYTES NFR BLD AUTO: 6.1 % — SIGNIFICANT CHANGE UP (ref 2–14)
NEUTROPHILS # BLD AUTO: 1.6 K/UL — LOW (ref 1.8–7.4)
NEUTROPHILS NFR BLD AUTO: 61.7 % — SIGNIFICANT CHANGE UP (ref 43–77)
NT-PROBNP SERPL-SCNC: HIGH PG/ML (ref 0–300)
OVALOCYTES BLD QL SMEAR: SLIGHT — SIGNIFICANT CHANGE UP
PHOSPHATE SERPL-MCNC: 3.8 MG/DL — SIGNIFICANT CHANGE UP (ref 2.5–4.5)
PLAT MORPH BLD: ABNORMAL
PLATELET # BLD AUTO: 58 K/UL — LOW (ref 150–400)
POIKILOCYTOSIS BLD QL AUTO: SLIGHT — SIGNIFICANT CHANGE UP
POLYCHROMASIA BLD QL SMEAR: SLIGHT — SIGNIFICANT CHANGE UP
POTASSIUM SERPL-MCNC: 4.4 MMOL/L — SIGNIFICANT CHANGE UP (ref 3.5–5.3)
POTASSIUM SERPL-SCNC: 4.4 MMOL/L — SIGNIFICANT CHANGE UP (ref 3.5–5.3)
PROT SERPL-MCNC: 6.4 G/DL — SIGNIFICANT CHANGE UP (ref 6–8.3)
RBC # BLD: 4.62 M/UL — SIGNIFICANT CHANGE UP (ref 4.2–5.8)
RBC # FLD: 15.9 % — HIGH (ref 10.3–14.5)
RBC BLD AUTO: ABNORMAL
SARS-COV-2 RNA SPEC QL NAA+PROBE: NEGATIVE — SIGNIFICANT CHANGE UP
SODIUM SERPL-SCNC: 143 MMOL/L — SIGNIFICANT CHANGE UP (ref 135–145)
TARGETS BLD QL SMEAR: SLIGHT — SIGNIFICANT CHANGE UP
TROPONIN T SERPL-MCNC: 0.06 NG/ML — CRITICAL HIGH (ref 0–0.01)
TROPONIN T SERPL-MCNC: 0.06 NG/ML — CRITICAL HIGH (ref 0–0.01)
VARIANT LYMPHS # BLD: 1.7 % — SIGNIFICANT CHANGE UP (ref 0–6)
WBC # BLD: 2.59 K/UL — LOW (ref 3.8–10.5)
WBC # FLD AUTO: 2.59 K/UL — LOW (ref 3.8–10.5)

## 2022-11-18 PROCEDURE — 99215 OFFICE O/P EST HI 40 MIN: CPT | Mod: 25

## 2022-11-18 PROCEDURE — 93010 ELECTROCARDIOGRAM REPORT: CPT

## 2022-11-18 PROCEDURE — 99285 EMERGENCY DEPT VISIT HI MDM: CPT

## 2022-11-18 PROCEDURE — 93000 ELECTROCARDIOGRAM COMPLETE: CPT

## 2022-11-18 PROCEDURE — 71045 X-RAY EXAM CHEST 1 VIEW: CPT | Mod: 26

## 2022-11-18 RX ORDER — ZOLPIDEM TARTRATE 10 MG/1
10 TABLET ORAL
Qty: 30 | Refills: 0 | Status: DISCONTINUED | COMMUNITY
Start: 2022-07-06 | End: 2022-11-18

## 2022-11-18 RX ORDER — BRINZOLAMIDE 10 MG/ML
1 SUSPENSION/ DROPS OPHTHALMIC
Qty: 10 | Refills: 0 | Status: DISCONTINUED | COMMUNITY
Start: 2022-11-11

## 2022-11-18 RX ORDER — ESCITALOPRAM OXALATE 10 MG/1
1 TABLET, FILM COATED ORAL
Qty: 0 | Refills: 0 | DISCHARGE

## 2022-11-18 RX ORDER — APIXABAN 2.5 MG/1
2.5 TABLET, FILM COATED ORAL
Qty: 60 | Refills: 0 | Status: DISCONTINUED | COMMUNITY
Start: 2021-11-24

## 2022-11-18 RX ORDER — TAMSULOSIN HYDROCHLORIDE 0.4 MG/1
0.4 CAPSULE ORAL
Refills: 0 | Status: DISCONTINUED | COMMUNITY
End: 2022-11-18

## 2022-11-18 RX ORDER — POTASSIUM CHLORIDE 20 MEQ
1 PACKET (EA) ORAL
Qty: 0 | Refills: 0 | DISCHARGE

## 2022-11-18 RX ORDER — TAMSULOSIN HYDROCHLORIDE 0.4 MG/1
1 CAPSULE ORAL
Qty: 0 | Refills: 0 | DISCHARGE

## 2022-11-18 RX ORDER — TAFAMIDIS 61 MG/1
1 CAPSULE, LIQUID FILLED ORAL
Qty: 0 | Refills: 0 | DISCHARGE

## 2022-11-18 RX ORDER — SPIRONOLACTONE 25 MG/1
1 TABLET, FILM COATED ORAL
Qty: 30 | Refills: 0
Start: 2022-11-18 | End: 2022-12-17

## 2022-11-18 RX ORDER — BUMETANIDE 0.25 MG/ML
1 INJECTION INTRAMUSCULAR; INTRAVENOUS ONCE
Refills: 0 | Status: COMPLETED | OUTPATIENT
Start: 2022-11-18 | End: 2022-11-18

## 2022-11-18 RX ADMIN — BUMETANIDE 1 MILLIGRAM(S): 0.25 INJECTION INTRAMUSCULAR; INTRAVENOUS at 19:17

## 2022-11-18 NOTE — ED ADULT TRIAGE NOTE - CHIEF COMPLAINT QUOTE
Pt c/o SOB and b/l lower extremity swelling for several days. Hx of CHF. Sent in by Dr. Tanja Friedman. Denies chest pain

## 2022-11-18 NOTE — ED PROVIDER NOTE - NSICDXPASTMEDICALHX_GEN_ALL_CORE_FT
PAST MEDICAL HISTORY:  Acute on chronic systolic congestive heart failure     Atrial flutter s/p Ablation 2016    Chronic atrial fibrillation     Chronic venous insufficiency of lower extremity     Prostate CA     Stage 3 chronic kidney disease 1.2-1.2 baseline   On admission 1.2    Thrombocytopenia 60-70's baseline    Type 2 diabetes mellitus not on medication

## 2022-11-18 NOTE — H&P ADULT - PROBLEM SELECTOR PLAN 3
TTR amyloid confirmed by Tc-PYP scan.  - Following with Dr. Bradley and started on Vyndemax in 5/2022

## 2022-11-18 NOTE — H&P ADULT - PROBLEM SELECTOR PLAN 1
- On ADMIT, pt w/ JVD, +1 B/L LE Edema, satting 98% on RA   - BNP: 86343, CXR: Cardiomegaly, b/l pleural effusions   - EKG: V-paced at 80 bpm, no acute ST-T wave changes   - ECHO 02/08/22 TTE: LVIDd 4.7 cm, LVEF 37%, LV hypertrophy (septum 1.6, PW 1.7), analysis of DD challenging due to AF, normal RV size with mildly reduced function, HYACINTH, mild MR, mild TR, est PASP 40 mmHg, small pericardial effusion, ascites present  - Repeat echo ordered   - HOME DIURETIC: Torsemide 20 mg daily (patient reports noncompliance due to incontinence)  - DIURESIS: s/p Bumex 1 mg IVP x 1 in ED, continue Bumex 2 mg IV BID   - GDMT: Continue Entresto 24/26 mg BID, Metoprolol Succinate 25 mg PO daily   - Core measures, strict I's and O's daily weight, fluid restriction

## 2022-11-18 NOTE — H&P ADULT - PROBLEM SELECTOR PLAN 2
S/p ablation in 2016. Following with Dr. Pemberton EP. Continue BB as above and AC with Eliquis.   - Continue home  Toprol XL 25 mg daily  - Holding Eliquis due to low platelets, confirm in AM with repeat platelets

## 2022-11-18 NOTE — ED PROVIDER NOTE - CROS ED SKIN ALL NEG
[facility] Call Center    Phone Message    May a detailed message be left on voicemail: yes     Reason for Call: Medication Question or concern regarding medication   Prescription Clarification  Name of Medication: docusate sodium (ENEMEEZ) 283 MG enema  Prescribing Provider: Dr Baird   Pharmacy:    [facility] - [place], [place] - [address]   What on the order needs clarification? Patient is still waiting after contacting the pharmacy. Please refill his Rx as he is almost out.          Action Taken: Message routed to:  [facility] PCC    Travel Screening: Not Applicable                                                                         negative...

## 2022-11-18 NOTE — H&P ADULT - HISTORY OF PRESENT ILLNESS
Patient is a 78 y/o M, with PMHx of DM II (currently not on medications), Aflutter/Afib (s/p prior ablation 06/2016), ATTR cardiac amyloid, NICM (LVIDd 4.7 cm, LVEF 37%), AF/Aflu s/p ablation 6/2016 (on Eliquis) s/p CRT-P (2/16/2022), CKD Stage 3 (b/l Cr 1-1.2), DMT2 (Ha1c 6.4% 2/2022), thrombocytopenia (bone marrow biopsy 11/2021 unrevealing) w/ idiopathic thrombocytopenia, chronic venous insufficiency, and prostate Ca s/p chemotherapy, w/ multiple hospitalizations for ADHF within the past year,  who presented to his heart failure specialist, Dr. Friedman for follow-up earlier today, with complaints of shortness of breath with a few steps or with simple ADLS such as dressing. Patient's son states that patient's health has declined within the past week. Patient reports diminished appetite with an increase in abdominal girth. Patient states he has gained nearly 17 pounds since September. He has also established with Dr. Finkelstein and was started on Vyndamax in 5/2022 but notably had increasing diuretic demands and worsening renal function. More recently requiring torsemide 40 mg daily. However, due to urinary incontinence remains on torsemide 20 mg once daily and low dose Entresto once daily. Patient denies CP, SOB at rest, overt orthopnea, PND, dizziness, abdominal discomfort, palpitations, and syncope.  Patient sent from outpatient office to [facility] ED for management of acute decompensated heart failure.     In the ED, VS: T: 97.7 F, HR: 84 bpm, RR: 16 breaths/min, BP: 100/70 mmHg, spO2: 98% on RA  Labs significant for: WBC: 2.59, BUN/Cr: 25/1.39, Glucose: 119, Alk Phos: 157, Troponin: 0.06 x 2, BNP: 19594, COVID-19: Negative, EKG: V-paced at 80 bpm, no acute ST-T wave changes  CXR: B/L pleural effusions, cardiomegaly  Patient received Bumex 1 mg IV x 1  Patient admitted to cardiology/telemetry for acute on chronic systolic heart failure.

## 2022-11-18 NOTE — ED ADULT NURSE NOTE - OBJECTIVE STATEMENT
.  77years male alert mental state (AOX3) received on foot.  -Allergy: N/A.  -complain of SOB.  Hx of A.f, DM, prostate Ca, CHF, HTN. His PCP, Tanja Friedman sent pt to the ED for further evaluation. pt presents mild SOB, both legs swelling.  -denied chest pain, dizziness, headache, fever, n/v/d, abdomen pain.  Pt is in the bed comfortably at this time. Will continue to monitor and document any changes.

## 2022-11-18 NOTE — H&P ADULT - PROBLEM SELECTOR PLAN 6
controlled w/ diet and exercise  - f/u a1c    F: none   E: K >4, Mg >2  N: DASH/TLC, fluid restriction  Dvt: holding eliquis due to low platelets  Dispo: pending clinical progression

## 2022-11-18 NOTE — H&P ADULT - PROBLEM SELECTOR PLAN 4
h/o thrombocytopenia w/ baseline PLT 60-70s  - Platelts on admission: 58   - Holding eliquis for now; see repeat in platelets in AM   - Heme/onc consult

## 2022-11-18 NOTE — ED ADULT NURSE NOTE - NSIMPLEMENTINTERV_GEN_ALL_ED
Implemented All Fall with Harm Risk Interventions:  [place] to call system. Call bell, personal items and telephone within reach. Instruct patient to call for assistance. Room bathroom lighting operational. Non-slip footwear when patient is off stretcher. Physically safe environment: no spills, clutter or unnecessary equipment. Stretcher in lowest position, wheels locked, appropriate side rails in place. Provide visual cue, wrist band, yellow gown, etc. Monitor gait and stability. Monitor for mental status changes and reorient to person, place, and time. Review medications for side effects contributing to fall risk. Reinforce activity limits and safety measures with patient and family. Provide visual clues: red socks.

## 2022-11-18 NOTE — H&P ADULT - NSHPADDITIONALINFOADULT_GEN_ALL_CORE
Attending Attestation:  I was physically present for the key portions of the evaluation and management (E/M) service provided.  I agree with the above history, physical, and plan which I have reviewed with the following edits/addendum:    77M w/ DMT2, ATTR cardiac amyloid, NICM (LVIDd 4.7 cm, LVEF 37%), AF/AFL s/p ablation 6/2016 (on Eliquis) s/p CRT-P (2/16/2022), CKD Stage 3 (Cr 1-1.2), chr idiopathic thrombocytopenia (bone marrow biopsy 11/2021 unrevealing), chr santy insuff, and prostate Ca s/p chemothx, recurrent hosp for ADHF hx, who was seen by his HF Dr. Friedman to whom he reported dyspnea on minimal exertion, found to be vol overloaded, up 17 lbs. Dry wt per OP notes ~174 lbs. Reviewed data:   ECG: V paced at 80bpm, underyling rhythm seems AF  Labs: Plt ct 58, cr 1.39  CXR: R sided pleural effusion  - vol up on exam, continue IV bumex 2 mg bid, noted good output but poor I/Os. weigh pt.   - cr 1.4 (from bl 1-1.2)  - hold eliquis (plt ct <100)  - consult IR for possible R pleural eff drainage for thx/dx purposes (d/w pt r/b in setting of low plt ct)    Manny Barros MD  Cardiology    50 minutes spent on total encounter; more than 50% of the visit was spent counseling and/or coordinating care by the attending physician.

## 2022-11-18 NOTE — ED PROVIDER NOTE - CLINICAL SUMMARY MEDICAL DECISION MAKING FREE TEXT BOX
75 y/o M with above medical history presenting with increasing dyspnea & fluid overload concerning for CHF exacerbation  Discussed case with HF team-recommend challenging with bumex 1 mg IVP  Check EKG, screening labs, CXR, cardiology evaluation, Check BNP & cardiac enzymes  Likely will require inpatient IV diuresis.

## 2022-11-18 NOTE — ED PROVIDER NOTE - OBJECTIVE STATEMENT
77 y/o male, prior history of medication non-compliance, w/ PMHx type II DM (not currently on medications), A-Flutter/A-Fib (s/p prior ablation in 06/2016, most recently found to be in A-fib on Eliquis 2.5mg PO bid), thrombocytopenia (baseline Plt 60-70's on prior admission), stage III CKD (baseline Cr 1.0-1.2 ), non-obstructive CAD, Chronic Systolic CHF(EF 35% by  Echocardiogram 11/2022), hx of NSVT (EP evaluated pt. 11/2021 recommended repeat TTE in three months), BPH (s/p TURP procedure), pituitary adenoma (s/p transphenoidal treatment in 1990's), prostate cancer (s/p chemotherapy), chronic venous insufficiency presenting with shortness of breath.     Patient was sent to ED by his heart failure specialist, Dr. Friedman for increasing dyspnea and worsening exercise tolerance in the last few weeks..   He takes torsemide 20 mg QD (BID PRN) Has noted 18 lb increase in his dry weight. States he is dyspneic after 3 steps; no active chest pain, cough, fevers or chills. Sleeps with 2 pillows at night. Noted increasing swelling of lower extremities.     Heart Failure Specialist: Dr. Friedman. 75 y/o male, prior history of medication non-compliance, w/ PMHx type II DM (not currently on medications), A-Flutter/A-Fib (s/p prior ablation in 06/2016, most recently found to be in A-fib on Eliquis 2.5mg PO bid), thrombocytopenia (baseline Plt 60-70's on prior admission), stage III CKD (baseline Cr 1.0-1.2 ), non-obstructive CAD, Chronic Systolic CHF(EF 35% by  Echocardiogram 11/2022), hx of NSVT (EP evaluated pt. 11/2021 recommended repeat TTE in three months), BPH (s/p TURP procedure), pituitary adenoma (s/p transphenoidal treatment in 1990's), prostate cancer (s/p chemotherapy), chronic venous insufficiency presenting with shortness of breath.     Patient was sent to ED by his heart failure specialist, Dr. Friedman for increasing dyspnea and worsening exercise tolerance in the last few weeks.  He takes torsemide 20 mg QD (BID PRN) Has noted 18 lb increase in his dry weight since September. States he is dyspneic after 3 steps or walking one block; no active chest pain, cough, fevers or chills. Sleeps with 2 pillows at night. Noted increasing swelling of lower extremities.     Heart Failure Specialist: Dr. Friedman. 77 y/o male, prior history of medication non-compliance, w/ PMHx type II DM (not currently on medications), A-Flutter/A-Fib (s/p prior ablation in 06/2016, most recently found to be in A-fib on Eliquis 2.5mg PO bid), thrombocytopenia (baseline Plt 60-70's on prior admission), stage III CKD (baseline Cr 1.0-1.2 ), non-obstructive CAD, Chronic Systolic CHF(EF 35% by  Echocardiogram 11/2022), hx of NSVT,  BPH (s/p TURP procedure), pituitary adenoma (s/p transphenoidal treatment in 1990's), prostate cancer (s/p chemotherapy), chronic venous insufficiency presenting with shortness of breath.     Patient was sent to ED by his heart failure specialist, Dr. Friedman for increasing dyspnea and worsening exercise tolerance in the last few weeks.  He takes torsemide 20 mg QD (BID PRN) Has noted 18 lb increase in his dry weight since September. States he is dyspneic after 3 steps or walking one block; no active chest pain, cough, fevers or chills. Sleeps with 2 pillows at night. Noted increasing swelling of lower extremities.     Heart Failure Specialist: Dr. Friedman.

## 2022-11-18 NOTE — ED PROVIDER NOTE - PHYSICAL EXAMINATION
GENERAL: mildly dyspneic.   HEAD:  Atraumatic, Normocephalic  EYES: EOMI, PERRLA, conjunctiva and sclera clear  ENT: MMM; oropharynx clear  NECK: Supple, +JVD with hepatojugular reflex  CHEST/LUNG: Decreased breath sounds right lung base.   HEART: Regular rate and rhythm; No murmurs, rubs, or gallops  ABDOMEN: Soft, Nontender, Nondistended; Bowel sounds present  EXTREMITIES:  2+ Peripheral Pulses, 1+ pitting LE edema.   PSYCH: AAOx3  NEUROLOGY: no focal motor or sensory deficits. 5/5 muscle strength in all extremities.   SKIN: No rashes or lesions

## 2022-11-18 NOTE — ED PROVIDER NOTE - PROGRESS NOTE DETAILS
Discussed with cardiology fellow- Admitted to cardiology service under Dr. Barros Discussed with cardiology fellow- Admitted to cardiology service under Dr. Barros. Signed out to Cardiology PA. Cardiology fellow recommending Bumex 2 mg BID

## 2022-11-18 NOTE — H&P ADULT - ASSESSMENT
Patient is a 76 y/o M, with PMHx of DM II (currently not on medications), ATTR cardiac amyloid, NICM (LVIDd 4.7 cm, LVEF 37%), AF/Aflu s/p ablation 6/2016 (on Eliquis) s/p CRT-P (2/16/2022), CKD Stage 3 (b/l Cr 1-1.2), DMT2 (Ha1c 6.4% 2/2022), thrombocytopenia (bone marrow biopsy 11/2021 unrevealing) w/ idiopathic thrombocytopenia, chronic venous insufficiency, and prostate Ca s/p chemotherapy, w/ multiple hospitalizations for ADHF within the past year,  who presented to his heart failure specialist, Dr. Friedman for follow-up earlier today, with complaints of shortness of breath with a few steps or with simple ADLS such as dressing. Patient's son states that patient's health has declined within the past week. Patient reports diminished appetite with an increase in abdominal girth. Patient states he has gained nearly 17 pounds since September. He has also established with Dr. Finkelstein and was started on Vyndamax in 5/2022 but notably had increasing diuretic demands and worsening renal function. More recently requiring torsemide 40 mg daily. However, due to urinary incontinence remains on torsemide 20 mg once daily and low dose Entresto once daily. Patient denies CP, SOB at rest, overt orthopnea, PND, dizziness, abdominal discomfort, palpitations, and syncope.  Patient sent from outpatient office to [facility] ED for management of acute decompensated heart failure.     In the ED, VS: T: 97.7 F, HR: 84 bpm, RR: 16 breaths/min, BP: 100/70 mmHg, spO2: 98% on RA  Labs significant for: WBC: 2.59, BUN/Cr: 25/1.39, Glucose: 119, Alk Phos: 157, Troponin: 0.06 x 2, BNP: 07284, COVID-19: Negative, EKG: V-paced at 80 bpm, no acute ST-T wave changes  CXR: B/L pleural effusions, cardiomegaly  Patient received Bumex 1 mg IV x 1  Patient admitted to cardiology/telemetry for acute on chronic systolic heart failure.      Patient is a 76 y/o M, with PMHx of DM II (currently not on medications), ATTR cardiac amyloid, NICM (LVIDd 4.7 cm, LVEF 37%), AF/Aflu s/p ablation 6/2016 (on Eliquis) s/p CRT-P (2/16/2022), CKD Stage 3 (b/l Cr 1-1.2), DMT2 (Ha1c 6.4% 2/2022), thrombocytopenia (bone marrow biopsy 11/2021 unrevealing) w/ idiopathic thrombocytopenia, chronic venous insufficiency, and prostate Ca s/p chemotherapy, w/ multiple hospitalizations for ADHF within the past year,  who presented to his heart failure specialist, Dr. Friedman for follow-up earlier today, with complaints of shortness of breath with a few steps or with simple ADLS such as dressing. Patient's son states that patient's health has declined within the past week. Patient reports diminished appetite with an increase in abdominal girth. Patient states he has gained nearly 17 pounds since September. He has also established with Dr. Finkelstein and was started on Vyndamax in 5/2022 but notably had increasing diuretic demands and worsening renal function. More recently requiring torsemide 40 mg daily. However, due to urinary incontinence remains on torsemide 20 mg once daily and low dose Entresto once daily.  Patient with 1+ B/L pitting edema, SOB, + JVD, BNP: 36033 and admitted to cardiology for acute on chronic systolic heart failure exacerbation.

## 2022-11-18 NOTE — H&P ADULT - PROBLEM/PLAN-1
CC:  Bill Talbotdruzaira is here today for Physical   none    Medications: medications verified, no change    Refills needed today?  YES    Latex allergy or sensitivity: No known latex allergy     Patient would like communication of their results via:      Cell Phone:   Telephone Information:   Mobile [phone]     Okay to leave a message containing results? Yes    Patient's current [facility] status: Active.    Advanced directives: discussed    Care Teams Verified: Updated    Depression Screen: no    Tobacco history: verified    Health Maintenance Due   Topic Date Due   • Shingles Vaccine (1 of 2) Never done   • Influenza Vaccine (1) 09/01/2021   • Colorectal Cancer Screen-  12/19/2021   • Pneumococcal Vaccine 0-64 (3 of 4 - PPSV23) 12/27/2021       Patient is due for the topics as listed above and wishes to proceed with them. Orders placed for Colorectal Cancer Screening: Colonoscopy.  Medications: medications verified, no change    Refills needed today?  YES    Latex allergy or sensitivity: No known latex allergy     Patient would like communication of their results via:      Cell Phone:   Telephone Information:   Mobile [phone]     Okay to leave a message containing results? Yes    Patient's current [facility] status: Active.    Advanced directives: discussed    Care Teams Verified: Updated    Depression Screen: no    Tobacco history: verified    Health Maintenance Due   Topic Date Due   • Shingles Vaccine (1 of 2) Never done   • Influenza Vaccine (1) 09/01/2021   • Colorectal Cancer Screen-  12/19/2021   • Pneumococcal Vaccine 0-64 (3 of 4 - PPSV23) 12/27/2021       Patient is due for the topics as listed above and wishes to proceed with them. Orders placed for Colorectal Cancer Screening: Colonoscopy.   DISPLAY PLAN FREE TEXT

## 2022-11-19 DIAGNOSIS — N18.30 CHRONIC KIDNEY DISEASE, STAGE 3 UNSPECIFIED: ICD-10-CM

## 2022-11-19 DIAGNOSIS — E11.9 TYPE 2 DIABETES MELLITUS WITHOUT COMPLICATIONS: ICD-10-CM

## 2022-11-19 LAB
ANION GAP SERPL CALC-SCNC: 8 MMOL/L — SIGNIFICANT CHANGE UP (ref 5–17)
BUN SERPL-MCNC: 32 MG/DL — HIGH (ref 7–23)
CALCIUM SERPL-MCNC: 8.6 MG/DL — SIGNIFICANT CHANGE UP (ref 8.4–10.5)
CHLORIDE SERPL-SCNC: 108 MMOL/L — SIGNIFICANT CHANGE UP (ref 96–108)
CO2 SERPL-SCNC: 28 MMOL/L — SIGNIFICANT CHANGE UP (ref 22–31)
CREAT SERPL-MCNC: 1.26 MG/DL — SIGNIFICANT CHANGE UP (ref 0.5–1.3)
EGFR: 59 ML/MIN/1.73M2 — LOW
GLUCOSE SERPL-MCNC: 130 MG/DL — HIGH (ref 70–99)
HCT VFR BLD CALC: 37.3 % — LOW (ref 39–50)
HGB BLD-MCNC: 12.2 G/DL — LOW (ref 13–17)
MAGNESIUM SERPL-MCNC: 2.1 MG/DL — SIGNIFICANT CHANGE UP (ref 1.6–2.6)
MCHC RBC-ENTMCNC: 30.1 PG — SIGNIFICANT CHANGE UP (ref 27–34)
MCHC RBC-ENTMCNC: 32.7 GM/DL — SIGNIFICANT CHANGE UP (ref 32–36)
MCV RBC AUTO: 92.1 FL — SIGNIFICANT CHANGE UP (ref 80–100)
NRBC # BLD: 0 /100 WBCS — SIGNIFICANT CHANGE UP (ref 0–0)
PLATELET # BLD AUTO: 58 K/UL — LOW (ref 150–400)
POTASSIUM SERPL-MCNC: 3.7 MMOL/L — SIGNIFICANT CHANGE UP (ref 3.5–5.3)
POTASSIUM SERPL-SCNC: 3.7 MMOL/L — SIGNIFICANT CHANGE UP (ref 3.5–5.3)
RBC # BLD: 4.05 M/UL — LOW (ref 4.2–5.8)
RBC # FLD: 15.8 % — HIGH (ref 10.3–14.5)
SODIUM SERPL-SCNC: 144 MMOL/L — SIGNIFICANT CHANGE UP (ref 135–145)
WBC # BLD: 2.46 K/UL — LOW (ref 3.8–10.5)
WBC # FLD AUTO: 2.46 K/UL — LOW (ref 3.8–10.5)

## 2022-11-19 PROCEDURE — 99222 1ST HOSP IP/OBS MODERATE 55: CPT

## 2022-11-19 RX ORDER — SACUBITRIL AND VALSARTAN 24; 26 MG/1; MG/1
1 TABLET, FILM COATED ORAL
Refills: 0 | Status: DISCONTINUED | OUTPATIENT
Start: 2022-11-19 | End: 2022-11-22

## 2022-11-19 RX ORDER — TAMSULOSIN HYDROCHLORIDE 0.4 MG/1
0.4 CAPSULE ORAL AT BEDTIME
Refills: 0 | Status: DISCONTINUED | OUTPATIENT
Start: 2022-11-19 | End: 2022-11-23

## 2022-11-19 RX ORDER — ESCITALOPRAM OXALATE 10 MG/1
5 TABLET, FILM COATED ORAL DAILY
Refills: 0 | Status: DISCONTINUED | OUTPATIENT
Start: 2022-11-19 | End: 2022-11-23

## 2022-11-19 RX ORDER — METOPROLOL TARTRATE 50 MG
25 TABLET ORAL DAILY
Refills: 0 | Status: DISCONTINUED | OUTPATIENT
Start: 2022-11-19 | End: 2022-11-23

## 2022-11-19 RX ORDER — BUMETANIDE 0.25 MG/ML
2 INJECTION INTRAMUSCULAR; INTRAVENOUS EVERY 12 HOURS
Refills: 0 | Status: DISCONTINUED | OUTPATIENT
Start: 2022-11-19 | End: 2022-11-21

## 2022-11-19 RX ADMIN — SACUBITRIL AND VALSARTAN 1 TABLET(S): 24; 26 TABLET, FILM COATED ORAL at 11:53

## 2022-11-19 RX ADMIN — TAMSULOSIN HYDROCHLORIDE 0.4 MILLIGRAM(S): 0.4 CAPSULE ORAL at 22:09

## 2022-11-19 RX ADMIN — ESCITALOPRAM OXALATE 5 MILLIGRAM(S): 10 TABLET, FILM COATED ORAL at 11:53

## 2022-11-19 RX ADMIN — BUMETANIDE 2 MILLIGRAM(S): 0.25 INJECTION INTRAMUSCULAR; INTRAVENOUS at 08:46

## 2022-11-19 RX ADMIN — Medication 25 MILLIGRAM(S): at 06:03

## 2022-11-19 NOTE — PROGRESS NOTE ADULT - NSPROGADDITIONALINFOA_GEN_ALL_CORE
Attending Attestation:  I was physically present for the key portions of the evaluation and management (E/M) service provided.  I agree with the above history, physical, and plan which I have reviewed with the following edits/addendum:    77M w/ DMT2, ATTR cardiac amyloid, NICM (LVIDd 4.7 cm, LVEF 37%), AF/AFL s/p ablation 6/2016 (on Eliquis) s/p CRT-P (2/16/2022), CKD Stage 3 (Cr 1-1.2), chr idiopathic thrombocytopenia (bone marrow biopsy 11/2021 unrevealing), chr santy insuff, and prostate Ca s/p chemothx, recurrent hosp for ADHF hx, who was seen by HF (Dr. Friedman) to whom he reported dyspnea on minimal exertion, found to be vol overloaded, up 17 lbs. Dry wt per OP notes ~174 lbs.? Pt Reviewed data:   ECG: V paced at 80bpm, underyling rhythm seems AF  Labs: Plt ct 58, cr 1.39  CXR: R sided pleural effusion  - vol up on exam, continue IV bumex 2 mg bid, noted good output but poor I/Os. weigh pt.   - cr 1.4 (from bl 1-1.2)  - hold eliquis (plt ct <100)  - consult IR for possible R pleural eff drainage for thx/dx purposes (d/w pt r/b in setting of low plt ct)    Manny Barros MD  Cardiology    OP cardiologists: Dr Bradley, Dr Anton, Dr Pickard ([facility])    50 minutes spent on total encounter; more than 50% of the visit was spent counseling and/or coordinating care by the attending physician.   Initial encounter 11/19/22

## 2022-11-19 NOTE — PROGRESS NOTE ADULT - SUBJECTIVE AND OBJECTIVE BOX
OVERNIGHT EVENTS: started on bumex 2mg q12h    SUBJECTIVE / INTERVAL HPI: Patient seen and examined at bedside. States that his shortness of breath is improved compared to yesterday, although he still feels slightly out of breath at rest. denies chest pain, fevers, cough. Endorses LE edema worse over the past few days.    VITAL SIGNS:  Vital Signs Last 24 Hrs  T(C): 36.8 (19 Nov 2022 09:00), Max: 36.8 (19 Nov 2022 09:00)  T(F): 98.2 (19 Nov 2022 09:00), Max: 98.2 (19 Nov 2022 09:00)  HR: 81 (19 Nov 2022 08:30) (72 - 84)  BP: 109/74 (19 Nov 2022 08:30) (100/70 - 118/84)  BP(mean): 87 (19 Nov 2022 08:30) (80 - 88)  RR: 20 (19 Nov 2022 08:30) (16 - 20)  SpO2: 100% (19 Nov 2022 08:30) (98% - 100%)    Parameters below as of 19 Nov 2022 08:30  Patient On (Oxygen Delivery Method): room air      I&O's Summary    18 Nov 2022 07:01  -  19 Nov 2022 07:00  --------------------------------------------------------  IN: 0 mL / OUT: 300 mL / NET: -300 mL    19 Nov 2022 07:01  -  19 Nov 2022 09:28  --------------------------------------------------------  IN: 380 mL / OUT: 350 mL / NET: 30 mL        PHYSICAL EXAM:    General: WDWN  HEENT: PERRL, anicteric sclera; MMM  Neck: no JVD  Cardiovascular: +S1/S2; RRR  Respiratory: crackles b/l lower lung fields  Gastrointestinal: soft, NT/ND; +BSx4  Extremities: WWP; 2+ pitting edema extending up to b/l shins  Vascular: 2+ radial, DP/PT pulses B/L  Neurological: AAOx3; no focal deficits    MEDICATIONS:  MEDICATIONS  (STANDING):  buMETAnide Injectable 2 milliGRAM(s) IV Push every 12 hours  escitalopram 5 milliGRAM(s) Oral daily  metoprolol succinate ER 25 milliGRAM(s) Oral daily  sacubitril 24 mG/valsartan 26 mG 1 Tablet(s) Oral two times a day  tamsulosin 0.4 milliGRAM(s) Oral at bedtime    MEDICATIONS  (PRN):      ALLERGIES:  Allergies    No Known Allergies    Intolerances        LABS:                        14.1   2.59  )-----------( 58       ( 18 Nov 2022 18:30 )             41.4     11-18    143  |  106  |  25<H>  ----------------------------<  119<H>  4.4   |  27  |  1.39<H>    Ca    8.9      18 Nov 2022 18:30  Phos  3.8     11-18  Mg     2.2     11-18    TPro  6.4  /  Alb  3.8  /  TBili  1.2  /  DBili  x   /  AST  16  /  ALT  15  /  AlkPhos  157<H>  11-18        CAPILLARY BLOOD GLUCOSE          RADIOLOGY & ADDITIONAL TESTS: Reviewed.

## 2022-11-19 NOTE — CONSULT NOTE ADULT - SUBJECTIVE AND OBJECTIVE BOX
77M PMH DM II (currently not on medications), Aflutter/Afib (s/p prior ablation 06/2016), ATTR cardiac amyloid, NICM (LVIDd 4.7 cm, LVEF 37%), AF/Aflu s/p ablation 6/2016 (on Eliquis) s/p CRT-P (2/16/2022), CKD Stage 3 (b/l Cr 1-1.2), DMT2 (Ha1c 6.4% 2/2022), thrombocytopenia (bone marrow biopsy 11/2021 unrevealing) w/ idiopathic thrombocytopenia, chronic venous insufficiency, and prostate Ca s/p chemotherapy, w/ multiple hospitalizations for ADHF within the past year,  who presented to his heart failure specialist, Dr. Friedman for follow-up earlier today, with complaints of shortness of breath with a few steps or with simple ADLS such as dressing. Patient's son states that patient's health has declined within the past week. Patient reports diminished appetite with an increase in abdominal girth. Patient states he has gained nearly 17 pounds since September. He has also established with Dr. Finkelstein and was started on Vyndamax in 5/2022 but notably had increasing diuretic demands and worsening renal function. More recently requiring torsemide 40 mg daily. However, due to urinary incontinence remains on torsemide 20 mg once daily and low dose Entresto once daily. Patient denied CP, SOB at rest, overt orthopnea, PND, dizziness, abdominal discomfort, palpitations, and syncope.  Patient sent from outpatient office to [facility] ED for management of acute decompensated heart failure.     In the ED, VS: T: 97.7 F, HR: 84 bpm, RR: 16 breaths/min, BP: 100/70 mmHg, spO2: 98% on RA  Labs significant for: WBC: 2.59, BUN/Cr: 25/1.39, Glucose: 119, Alk Phos: 157, Troponin: 0.06 x 2, BNP: 38556, COVID-19: Negative, EKG: V-paced at 80 bpm, no acute ST-T wave changes  CXR: B/L pleural effusions, cardiomegaly  Patient received Bumex 1 mg IV x 1  Patient admitted to cardiology/telemetry for acute on chronic systolic heart failure.       PAST MEDICAL & SURGICAL HISTORY:  Atrial flutter  s/p Ablation 2016    Chronic venous insufficiency of lower extremity    Prostate CA    Stage 3 chronic kidney disease  1.2-1.2 baseline   On admission 1.2    Type 2 diabetes mellitus  not on medication    Thrombocytopenia  60-70&#x27;s baseline    Acute on chronic systolic congestive heart failure    Chronic atrial fibrillation    Atrial flutter  s/p ablation in 2016    History of surgical removal of pituitary gland  1990s    S/P TURP  for BPH    PREVIOUS DIAGNOSTIC TESTING:      ECHO  FINDINGS:    STRESS  FINDINGS:    CATHETERIZATION  FINDINGS:    MEDICATIONS  (STANDING):  buMETAnide Injectable 2 milliGRAM(s) IV Push every 12 hours  escitalopram 5 milliGRAM(s) Oral daily  metoprolol succinate ER 25 milliGRAM(s) Oral daily  sacubitril 24 mG/valsartan 26 mG 1 Tablet(s) Oral two times a day  tamsulosin 0.4 milliGRAM(s) Oral at bedtime  	  Vital Signs Last 24 Hrs  T(C): 36.6 (19 Nov 2022 22:35), Max: 36.9 (19 Nov 2022 13:37)  T(F): 97.9 (19 Nov 2022 22:35), Max: 98.4 (19 Nov 2022 13:37)  HR: 88 (19 Nov 2022 23:57) (80 - 88)  BP: 110/74 (19 Nov 2022 23:57) (95/69 - 112/71)  BP(mean): 87 (19 Nov 2022 23:57) (77 - 87)  RR: 19 (19 Nov 2022 23:57) (19 - 20)  SpO2: 100% (19 Nov 2022 23:57) (96% - 100%)    Parameters below as of 19 Nov 2022 23:57  Patient On (Oxygen Delivery Method): room air        PHYSICAL EXAM:    General: WDWN  HEENT: PERRL, anicteric sclera; MMM  Neck: no JVD  Cardiovascular: +S1/S2; RRR  Respiratory: crackles b/l lower lung fields  Gastrointestinal: soft, NT/ND; +BSx4  Extremities: WWP; 2+ pitting edema extending up to b/l shins  Vascular: 2+ radial, DP/PT pulses B/L  Neurological: AAOx3; no focal deficits    INTERPRETATION OF TELEMETRY: Paced rhythm    ECG:  Paced rhythm    I&O's Detail    18 Nov 2022 07:01  -  19 Nov 2022 07:00  --------------------------------------------------------  IN:  Total IN: 0 mL    OUT:    Voided (mL): 300 mL  Total OUT: 300 mL    Total NET: -300 mL      19 Nov 2022 07:01  -  20 Nov 2022 03:13  --------------------------------------------------------  IN:    Oral Fluid: 610 mL  Total IN: 610 mL    OUT:    Voided (mL): 1975 mL  Total OUT: 1975 mL    Total NET: -1365 mL          LABS:                        12.2   2.46  )-----------( 58       ( 19 Nov 2022 17:17 )             37.3     11-19    144  |  108  |  32<H>  ----------------------------<  130<H>  3.7   |  28  |  1.26    Ca    8.6      19 Nov 2022 17:17  Phos  3.8     11-18  Mg     2.1     11-19    TPro  6.4  /  Alb  3.8  /  TBili  1.2  /  DBili  x   /  AST  16  /  ALT  15  /  AlkPhos  157<H>  11-18    CARDIAC MARKERS ( 18 Nov 2022 21:37 )  x     / 0.06 ng/mL / x     / x     / x      CARDIAC MARKERS ( 18 Nov 2022 18:30 )  x     / 0.06 ng/mL / x     / x     / x          I&O's Summary    18 Nov 2022 07:01  -  19 Nov 2022 07:00  --------------------------------------------------------  IN: 0 mL / OUT: 300 mL / NET: -300 mL    19 Nov 2022 07:01  -  20 Nov 2022 03:13  --------------------------------------------------------  IN: 610 mL / OUT: 1975 mL / NET: -1365 mL      BNP  RADIOLOGY & ADDITIONAL STUDIES: 77M PMH DM II (currently not on medications), Aflutter/Afib (s/p prior ablation 06/2016), ATTR cardiac amyloid, NICM (LVIDd 4.7 cm, LVEF 37%), AF/Aflu s/p ablation 6/2016 (on Eliquis) s/p CRT-P (2/16/2022), CKD Stage 3 (b/l Cr 1-1.2), DMT2 (Ha1c 6.4% 2/2022), thrombocytopenia (bone marrow biopsy 11/2021 unrevealing) w/ idiopathic thrombocytopenia, chronic venous insufficiency, and prostate Ca s/p chemotherapy, w/ multiple hospitalizations for ADHF within the past year,  who presented to his heart failure specialist, Dr. Friedman for follow-up earlier today, with complaints of shortness of breath with a few steps or with simple ADLS such as dressing. Patient's son states that patient's health has declined within the past week. Patient reports diminished appetite with an increase in abdominal girth. Patient states he has gained nearly 17 pounds since September. He has also established with Dr. Finkelstein and was started on Vyndamax in 5/2022 but notably had increasing diuretic demands and worsening renal function. More recently requiring torsemide 40 mg daily. However, due to urinary incontinence remains on torsemide 20 mg once daily and low dose Entresto once daily. Patient denied CP, SOB at rest, overt orthopnea, PND, dizziness, abdominal discomfort, palpitations, and syncope.  Patient sent from outpatient office to [facility] ED for management of acute decompensated heart failure.     In the ED, VS: T: 97.7 F, HR: 84 bpm, RR: 16 breaths/min, BP: 100/70 mmHg, spO2: 98% on RA  Labs significant for: WBC: 2.59, BUN/Cr: 25/1.39, Glucose: 119, Alk Phos: 157, Troponin: 0.06 x 2, BNP: 42136, COVID-19: Negative, EKG: V-paced at 80 bpm, no acute ST-T wave changes  CXR: B/L pleural effusions, cardiomegaly  Patient received Bumex 1 mg IV x 1  Patient admitted to cardiology/telemetry for acute on chronic systolic heart failure.       PAST MEDICAL & SURGICAL HISTORY:  Atrial flutter  s/p Ablation 2016    Chronic venous insufficiency of lower extremity    Prostate CA    Stage 3 chronic kidney disease  1.2-1.2 baseline   On admission 1.2    Type 2 diabetes mellitus  not on medication    Thrombocytopenia  60-70&#x27;s baseline    Acute on chronic systolic congestive heart failure    Chronic atrial fibrillation    Atrial flutter  s/p ablation in 2016    History of surgical removal of pituitary gland  1990s    S/P TURP  for BPH    PREVIOUS DIAGNOSTIC TESTING:      ECHO  FINDINGS: 2/8/22    1. Severe symmetric LVH. Moderately reduced left ventricular systolic function. Apical segments are more vigorous than the base, consider infiltrative cardiomyopathy (such as amyloid).   2. Mildly reduced right ventricular systolic function.   3. Biatrial enlargement.   4. Tethered mitral valve. Mild mitral regurgitation.   5. Mild tricuspid regurgitation.   6. Pulmonary hypertension present, pulmonary artery systolic pressure is 40 mmHg.   7. Small pericardial effusion.   8. Bilateral pleural effusion.   9. Borderline dilated ascending aorta.  10. Compared to the previous TTE performed on 11/22/2021, given technical differences, LV function is similar to slightly more vigorous. Comparable views of proximal ascending aorta were not obtained.      MEDICATIONS  (STANDING):  buMETAnide Injectable 2 milliGRAM(s) IV Push every 12 hours  escitalopram 5 milliGRAM(s) Oral daily  metoprolol succinate ER 25 milliGRAM(s) Oral daily  sacubitril 24 mG/valsartan 26 mG 1 Tablet(s) Oral two times a day  tamsulosin 0.4 milliGRAM(s) Oral at bedtime  	  Vital Signs Last 24 Hrs  T(C): 36.6 (19 Nov 2022 22:35), Max: 36.9 (19 Nov 2022 13:37)  T(F): 97.9 (19 Nov 2022 22:35), Max: 98.4 (19 Nov 2022 13:37)  HR: 88 (19 Nov 2022 23:57) (80 - 88)  BP: 110/74 (19 Nov 2022 23:57) (95/69 - 112/71)  BP(mean): 87 (19 Nov 2022 23:57) (77 - 87)  RR: 19 (19 Nov 2022 23:57) (19 - 20)  SpO2: 100% (19 Nov 2022 23:57) (96% - 100%)    Parameters below as of 19 Nov 2022 23:57  Patient On (Oxygen Delivery Method): room air        PHYSICAL EXAM:    General: WDWN  HEENT: PERRL, anicteric sclera; MMM  Neck: no JVD  Cardiovascular: +S1/S2; RRR  Respiratory: crackles b/l lower lung fields  Gastrointestinal: soft, NT/ND; +BSx4  Extremities: WWP; 2+ pitting edema extending up to b/l shins  Vascular: 2+ radial, DP/PT pulses B/L  Neurological: AAOx3; no focal deficits    INTERPRETATION OF TELEMETRY: Paced rhythm    ECG:  Paced rhythm    I&O's Detail    18 Nov 2022 07:01  -  19 Nov 2022 07:00  --------------------------------------------------------  IN:  Total IN: 0 mL    OUT:    Voided (mL): 300 mL  Total OUT: 300 mL    Total NET: -300 mL      19 Nov 2022 07:01  -  20 Nov 2022 03:13  --------------------------------------------------------  IN:    Oral Fluid: 610 mL  Total IN: 610 mL    OUT:    Voided (mL): 1975 mL  Total OUT: 1975 mL    Total NET: -1365 mL          LABS:                        12.2   2.46  )-----------( 58       ( 19 Nov 2022 17:17 )             37.3     11-19    144  |  108  |  32<H>  ----------------------------<  130<H>  3.7   |  28  |  1.26    Ca    8.6      19 Nov 2022 17:17  Phos  3.8     11-18  Mg     2.1     11-19    TPro  6.4  /  Alb  3.8  /  TBili  1.2  /  DBili  x   /  AST  16  /  ALT  15  /  AlkPhos  157<H>  11-18    CARDIAC MARKERS ( 18 Nov 2022 21:37 )  x     / 0.06 ng/mL / x     / x     / x      CARDIAC MARKERS ( 18 Nov 2022 18:30 )  x     / 0.06 ng/mL / x     / x     / x          I&O's Summary    18 Nov 2022 07:01  -  19 Nov 2022 07:00  --------------------------------------------------------  IN: 0 mL / OUT: 300 mL / NET: -300 mL    19 Nov 2022 07:01  -  20 Nov 2022 03:13  --------------------------------------------------------  IN: 610 mL / OUT: 1975 mL / NET: -1365 mL      BNP  RADIOLOGY & ADDITIONAL STUDIES:

## 2022-11-19 NOTE — PATIENT PROFILE ADULT - FALL HARM RISK - HARM RISK INTERVENTIONS

## 2022-11-19 NOTE — CONSULT NOTE ADULT - ASSESSMENT
77M PMH DM II (currently not on medications), Aflutter/Afib (s/p prior ablation 06/2016), ATTR cardiac amyloid, NICM (LVIDd 4.7 cm, LVEF 37%), AF/Aflu s/p ablation 6/2016 (on Eliquis) s/p CRT-P (2/16/2022), CKD Stage 3 (b/l Cr 1-1.2), DMT2 (Ha1c 6.4% 2/2022), thrombocytopenia (bone marrow biopsy 11/2021 unrevealing) w/ idiopathic thrombocytopenia, chronic venous insufficiency, and prostate Ca s/p chemotherapy, w/ multiple hospitalizations for ADHF within the past year,  who was sent into the ED after presenting to his heart failure specialist, (Dr. Friedman) for follow-up on 1/18, with complaints of shortness of breath subsequently admitted for IV diuresis for acute on chronic CHF exacerbation.    #Acute on chronic CHF exacerbation  NICM ACC/AHA Stage C HF: ATTR cardiac amyloid, NICM (LVIDd 4.7 cm, LVEF 37%)  - c/w Bumex 2mg IVP BID (Goal net negative 1-2L)  - c/w Entresto 24/26mg BID  - c/w Toprol 25 mg QD  - Strict Ins and Outs   - Daily weights (78kg on 11/19)    Plan discussed with Advanced Heart Failure attending.

## 2022-11-19 NOTE — PROGRESS NOTE ADULT - PROBLEM SELECTOR PLAN 1
- On ADMIT, pt w/ JVD, +1 B/L LE Edema, satting 98% on RA   - BNP: 31118, CXR: Cardiomegaly, b/l pleural effusions   - EKG: V-paced at 80 bpm, no acute ST-T wave changes   - ECHO 02/08/22 TTE: LVIDd 4.7 cm, LVEF 37%, LV hypertrophy (septum 1.6, PW 1.7), analysis of DD challenging due to AF, normal RV size with mildly reduced function, HYACINTH, mild MR, mild TR, est PASP 40 mmHg, small pericardial effusion, ascites present  - f/u repeat TTE  - HOME DIURETIC: Torsemide 20 mg daily (patient reports noncompliance to recent increase to 40mg due to incontinence)  - DIURESIS: s/p Bumex 1 mg IVP x 1 in ED, continue Bumex 2 mg IV BID   - GDMT: Continue Entresto 24/26 mg BID, Metoprolol Succinate 25 mg PO daily   - Core measures, strict I's and O's daily weight, fluid restriction

## 2022-11-20 DIAGNOSIS — Z29.9 ENCOUNTER FOR PROPHYLACTIC MEASURES, UNSPECIFIED: ICD-10-CM

## 2022-11-20 LAB
ANION GAP SERPL CALC-SCNC: 10 MMOL/L — SIGNIFICANT CHANGE UP (ref 5–17)
BUN SERPL-MCNC: 31 MG/DL — HIGH (ref 7–23)
CALCIUM SERPL-MCNC: 8.9 MG/DL — SIGNIFICANT CHANGE UP (ref 8.4–10.5)
CHLORIDE SERPL-SCNC: 105 MMOL/L — SIGNIFICANT CHANGE UP (ref 96–108)
CO2 SERPL-SCNC: 27 MMOL/L — SIGNIFICANT CHANGE UP (ref 22–31)
CREAT SERPL-MCNC: 1.22 MG/DL — SIGNIFICANT CHANGE UP (ref 0.5–1.3)
EGFR: 62 ML/MIN/1.73M2 — SIGNIFICANT CHANGE UP
GLUCOSE SERPL-MCNC: 84 MG/DL — SIGNIFICANT CHANGE UP (ref 70–99)
HCT VFR BLD CALC: 40.7 % — SIGNIFICANT CHANGE UP (ref 39–50)
HGB BLD-MCNC: 13.4 G/DL — SIGNIFICANT CHANGE UP (ref 13–17)
MAGNESIUM SERPL-MCNC: 2.2 MG/DL — SIGNIFICANT CHANGE UP (ref 1.6–2.6)
MCHC RBC-ENTMCNC: 30.2 PG — SIGNIFICANT CHANGE UP (ref 27–34)
MCHC RBC-ENTMCNC: 32.9 GM/DL — SIGNIFICANT CHANGE UP (ref 32–36)
MCV RBC AUTO: 91.9 FL — SIGNIFICANT CHANGE UP (ref 80–100)
NRBC # BLD: 0 /100 WBCS — SIGNIFICANT CHANGE UP (ref 0–0)
PLATELET # BLD AUTO: 58 K/UL — LOW (ref 150–400)
POTASSIUM SERPL-MCNC: 3.9 MMOL/L — SIGNIFICANT CHANGE UP (ref 3.5–5.3)
POTASSIUM SERPL-SCNC: 3.9 MMOL/L — SIGNIFICANT CHANGE UP (ref 3.5–5.3)
RBC # BLD: 4.43 M/UL — SIGNIFICANT CHANGE UP (ref 4.2–5.8)
RBC # FLD: 15.8 % — HIGH (ref 10.3–14.5)
SODIUM SERPL-SCNC: 142 MMOL/L — SIGNIFICANT CHANGE UP (ref 135–145)
WBC # BLD: 2.9 K/UL — LOW (ref 3.8–10.5)
WBC # FLD AUTO: 2.9 K/UL — LOW (ref 3.8–10.5)

## 2022-11-20 PROCEDURE — 71045 X-RAY EXAM CHEST 1 VIEW: CPT | Mod: 26

## 2022-11-20 PROCEDURE — 99231 SBSQ HOSP IP/OBS SF/LOW 25: CPT

## 2022-11-20 RX ADMIN — TAMSULOSIN HYDROCHLORIDE 0.4 MILLIGRAM(S): 0.4 CAPSULE ORAL at 22:31

## 2022-11-20 RX ADMIN — BUMETANIDE 2 MILLIGRAM(S): 0.25 INJECTION INTRAMUSCULAR; INTRAVENOUS at 11:33

## 2022-11-20 RX ADMIN — SACUBITRIL AND VALSARTAN 1 TABLET(S): 24; 26 TABLET, FILM COATED ORAL at 11:32

## 2022-11-20 RX ADMIN — BUMETANIDE 2 MILLIGRAM(S): 0.25 INJECTION INTRAMUSCULAR; INTRAVENOUS at 22:30

## 2022-11-20 RX ADMIN — ESCITALOPRAM OXALATE 5 MILLIGRAM(S): 10 TABLET, FILM COATED ORAL at 11:32

## 2022-11-20 NOTE — PROGRESS NOTE ADULT - NSPROGADDITIONALINFOA_GEN_ALL_CORE
Attending Attestation:  I was physically present for the key portions of the evaluation and management (E/M) service provided.  I agree with the above history, physical, and plan which I have reviewed with the following edits/addendum:    77M w/ DMT2, ATTR cardiac amyloid, NICM (LVIDd 4.7 cm, LVEF 37%), AF/AFL s/p ablation 6/2016 (on Eliquis) s/p CRT-P (2/16/2022), CKD Stage 3 (Cr 1-1.2), chr idiopathic thrombocytopenia (bone marrow biopsy 11/2021 unrevealing), chr santy insuff, and prostate Ca s/p chemothx, recent recurrent hosp for ADHF hx, who was sent from HF (Dr. Friedman) office to ER for ADHF. He reported dyspnea on minimal exertion, found to be vol overloaded, up 17 lbs. Dry wt per OP notes ~174 lbs? Pt Reviewed data: ECG: V paced at 80bpm, underyling rhythm seems AF  Lab; Plt ct 58, cr 1.39; CXR: R sided pleural effusion  - still vol up on exam, continue IV bumex 2 mg bid. wt 176 lbs. likely transition to PO tmw  - KAYLI now resolved  - hold eliquis (plt ct <100)  - defer IR drainage of R pleural effusion. clinically improved after diuresis    Manny Barros MD  Cardiology    15 minutes spent on total encounter; more than 50% of the visit was spent counseling and/or coordinating care by the attending physician. Attending Attestation:  I was physically present for the key portions of the evaluation and management (E/M) service provided.  I agree with the above history, physical, and plan which I have reviewed with the following edits/addendum:    77M w/ DMT2, ATTR cardiac amyloid, NICM (LVIDd 4.7 cm, LVEF 37%), AF/AFL s/p ablation 6/2016 (on Eliquis) s/p CRT-P (2/16/2022), CKD Stage 3 (Cr ~1.2), chr idiopathic thrombocytopenia (bone marrow biopsy 11/2021 unrevealing), chr santy insuff, and prostate Ca s/p chemothx, recent recurrent hosp for ADHF hx and increasing OP diuretic need, who was sent from HF (Dr. Friedman) office to ER for ADHF. He reported dyspnea on minimal exertion, found to be vol overloaded, up 17 lbs. Dry wt per OP notes ~174 lbs? Pt Reviewed data: ECG: V paced at 80bpm, underlying rhythm seems AF. Lab; Plt ct 58, cr 1.39; CXR: R sided pleural effusion  - still vol up on exam, continue IV bumex 2 mg bid. wt 176 lbs. likely transition to PO tmw  - KAYLI now resolved, at baseline crea 1.22  - hold eliquis (plt ct <100)  - defer IR drainage of R pleural effusion. clinically improved after diuresis    Manny Barros MD  Cardiology    15 minutes spent on total encounter; more than 50% of the visit was spent counseling and/or coordinating care by the attending physician.

## 2022-11-20 NOTE — PROGRESS NOTE ADULT - PROBLEM SELECTOR PLAN 6
controlled w/ diet and exercise  - f/u a1c    F: none   E: K >4, Mg >2  N: DASH/TLC, fluid restriction  Dvt: holding eliquis due to low platelets  Dispo: pending clinical progression controlled w/ diet and exercise  - f/u A1c controlled w/ diet and exercise  - f/u A1c with AM labs

## 2022-11-20 NOTE — PROGRESS NOTE ADULT - SUBJECTIVE AND OBJECTIVE BOX
O/N Events:    Subjective/ROS: Patient seen and examined at bedside.     Denies Fever/Chills, HA, CP, SOB, n/v, changes in bowel/urinary habits.  12pt ROS otherwise negative.    VITALS  Vital Signs Last 24 Hrs  T(C): 36.6 (20 Nov 2022 05:15), Max: 36.9 (19 Nov 2022 13:37)  T(F): 97.8 (20 Nov 2022 05:15), Max: 98.4 (19 Nov 2022 13:37)  HR: 85 (20 Nov 2022 05:21) (80 - 88)  BP: 100/72 (20 Nov 2022 05:21) (95/69 - 112/71)  BP(mean): 82 (20 Nov 2022 05:21) (77 - 87)  RR: 18 (20 Nov 2022 05:21) (17 - 20)  SpO2: 96% (20 Nov 2022 05:21) (95% - 100%)    Parameters below as of 20 Nov 2022 05:21  Patient On (Oxygen Delivery Method): room air    CAPILLARY BLOOD GLUCOSE    PHYSICAL EXAM-INCOMPLETE  General: NAD  Head: NC/AT; MMM; PERRL; EOMI;  Neck: Supple; no JVD  Respiratory: CTAB; no wheezes/rales/rhonchi  Cardiovascular: Regular rhythm/rate; S1/S2+, no murmurs, rubs gallops   Gastrointestinal: Soft; NTND; bowel sounds normal and present  Extremities: WWP; no edema/cyanosis  Neurological: A&Ox3, CNII-XII grossly intact; no obvious focal deficits    MEDICATIONS  (STANDING):  buMETAnide Injectable 2 milliGRAM(s) IV Push every 12 hours  escitalopram 5 milliGRAM(s) Oral daily  metoprolol succinate ER 25 milliGRAM(s) Oral daily  sacubitril 24 mG/valsartan 26 mG 1 Tablet(s) Oral two times a day  tamsulosin 0.4 milliGRAM(s) Oral at bedtime    MEDICATIONS  (PRN):      No Known Allergies      LABS                        12.2   2.46  )-----------( 58       ( 19 Nov 2022 17:17 )             37.3     11-19    144  |  108  |  32<H>  ----------------------------<  130<H>  3.7   |  28  |  1.26    Ca    8.6      19 Nov 2022 17:17  Phos  3.8     11-18  Mg     2.1     11-19    TPro  6.4  /  Alb  3.8  /  TBili  1.2  /  DBili  x   /  AST  16  /  ALT  15  /  AlkPhos  157<H>  11-18        CARDIAC MARKERS ( 18 Nov 2022 21:37 )  x     / 0.06 ng/mL / x     / x     / x      CARDIAC MARKERS ( 18 Nov 2022 18:30 )  x     / 0.06 ng/mL / x     / x     / x              IMAGING/EKG/ETC   O/N Events: no acute events overnight.     Subjective/ROS: Patient seen and examined at bedside.     Denies Fever/Chills, HA, CP, SOB, n/v, changes in bowel/urinary habits.  12pt ROS otherwise negative.    VITALS  Vital Signs Last 24 Hrs  T(C): 36.6 (20 Nov 2022 05:15), Max: 36.9 (19 Nov 2022 13:37)  T(F): 97.8 (20 Nov 2022 05:15), Max: 98.4 (19 Nov 2022 13:37)  HR: 85 (20 Nov 2022 05:21) (80 - 88)  BP: 100/72 (20 Nov 2022 05:21) (95/69 - 112/71)  BP(mean): 82 (20 Nov 2022 05:21) (77 - 87)  RR: 18 (20 Nov 2022 05:21) (17 - 20)  SpO2: 96% (20 Nov 2022 05:21) (95% - 100%)    Parameters below as of 20 Nov 2022 05:21  Patient On (Oxygen Delivery Method): room air    CAPILLARY BLOOD GLUCOSE    PHYSICAL EXAM  General: NAD  Head: NC/AT; MMM  Neck: Supple; no JVD  Respiratory: CTAB; minimal crackles auscultated on RLL field   Cardiovascular: Regular rhythm/rate; S1/S2+, no murmurs, rubs gallops   Gastrointestinal: Soft; NTND; bowel sounds normal and present  Extremities: WWP; 1+ pitting edema of b/l LE  Neurological: A&Ox3    MEDICATIONS  (STANDING):  buMETAnide Injectable 2 milliGRAM(s) IV Push every 12 hours  escitalopram 5 milliGRAM(s) Oral daily  metoprolol succinate ER 25 milliGRAM(s) Oral daily  sacubitril 24 mG/valsartan 26 mG 1 Tablet(s) Oral two times a day  tamsulosin 0.4 milliGRAM(s) Oral at bedtime    MEDICATIONS  (PRN):      No Known Allergies      LABS                        12.2   2.46  )-----------( 58       ( 19 Nov 2022 17:17 )             37.3     11-19    144  |  108  |  32<H>  ----------------------------<  130<H>  3.7   |  28  |  1.26    Ca    8.6      19 Nov 2022 17:17  Phos  3.8     11-18  Mg     2.1     11-19    TPro  6.4  /  Alb  3.8  /  TBili  1.2  /  DBili  x   /  AST  16  /  ALT  15  /  AlkPhos  157<H>  11-18        CARDIAC MARKERS ( 18 Nov 2022 21:37 )  x     / 0.06 ng/mL / x     / x     / x      CARDIAC MARKERS ( 18 Nov 2022 18:30 )  x     / 0.06 ng/mL / x     / x     / x        IMAGING/EKG/ETC

## 2022-11-20 NOTE — PROGRESS NOTE ADULT - PROBLEM SELECTOR PLAN 2
S/p ablation in 2016. Following with Dr. Pemberton EP. Continue BB as above and AC with Eliquis.   - Continue home  Toprol XL 25 mg daily  - Holding Eliquis due to low platelets, will f/u on AM repeat platelet count S/p ablation in 2016. Following with Dr. Pemberton EP. Continue BB as above and AC with Eliquis.   - Continue home Toprol XL 25 mg daily  - Holding Eliquis due to low platelets, will f/u on AM repeat platelet count S/p ablation in 2016. Following with Dr. Pemberton, EP. Continue BB as above and AC with Eliquis.   - Continue home Toprol XL 25 mg daily  - Holding Eliquis due to low platelets, will f/u on AM repeat platelet count  - SCD's ordered for DVT ppx

## 2022-11-20 NOTE — PROGRESS NOTE ADULT - PROBLEM SELECTOR PLAN 1
- On ADMIT, pt w/ JVD, +1 B/L LE Edema, satting 98% on RA   - BNP: 27257, CXR: Cardiomegaly, b/l pleural effusions   - EKG: V-paced at 80 bpm, no acute ST-T wave changes   - ECHO 02/08/22 TTE: LVIDd 4.7 cm, LVEF 37%, LV hypertrophy (septum 1.6, PW 1.7), analysis of DD challenging due to AF, normal RV size with mildly reduced function, HYACINTH, mild MR, mild TR, est PASP 40 mmHg, small pericardial effusion, ascites present  - f/u repeat TTE  - HOME DIURETIC: Torsemide 20 mg daily (patient reports noncompliance to recent increase to 40mg due to incontinence)  - DIURESIS: s/p Bumex 1 mg IVP x 1 in ED, continue Bumex 2 mg IV BID   - GDMT: Continue Entresto 24/26 mg BID, Metoprolol Succinate 25 mg PO daily   - Core measures, strict I's and O's daily weight, fluid restriction

## 2022-11-20 NOTE — PROGRESS NOTE ADULT - PROBLEM SELECTOR PLAN 4
h/o thrombocytopenia w/ baseline PLT 60-70s  - Platelts on admission: 58   - Holding eliquis for now; see repeat in platelets in AM   - f/u Heme/onc consult h/o thrombocytopenia w/ baseline PLT 60-70s  - Platelts on admission: 58   - Holding eliquis for now; see repeat in platelets in AM  - f/u Heme/onc consult

## 2022-11-20 NOTE — PROGRESS NOTE ADULT - PROBLEM SELECTOR PLAN 7
F: none   E: K >4, Mg >2  N: DASH/TLC, fluid restriction  Dvt: holding eliquis due to low platelets  Dispo: pending clinical progression F: none   E: K >4, Mg >2  N: DASH/TLC, fluid restriction  Dvt: holding eliquis due to low platelets, SCD's for now  Dispo: pending clinical progression

## 2022-11-21 ENCOUNTER — TRANSCRIPTION ENCOUNTER (OUTPATIENT)
Age: 77
End: 2022-11-21

## 2022-11-21 LAB
A1C WITH ESTIMATED AVERAGE GLUCOSE RESULT: 6.1 % — HIGH (ref 4–5.6)
ANION GAP SERPL CALC-SCNC: 9 MMOL/L — SIGNIFICANT CHANGE UP (ref 5–17)
APTT BLD: 29.8 SEC — SIGNIFICANT CHANGE UP (ref 27.5–35.5)
APTT BLD: 30.8 SEC — SIGNIFICANT CHANGE UP (ref 27.5–35.5)
BASOPHILS # BLD AUTO: 0.01 K/UL — SIGNIFICANT CHANGE UP (ref 0–0.2)
BASOPHILS NFR BLD AUTO: 0.4 % — SIGNIFICANT CHANGE UP (ref 0–2)
BUN SERPL-MCNC: 27 MG/DL — HIGH (ref 7–23)
CALCIUM SERPL-MCNC: 8.6 MG/DL — SIGNIFICANT CHANGE UP (ref 8.4–10.5)
CHLORIDE SERPL-SCNC: 106 MMOL/L — SIGNIFICANT CHANGE UP (ref 96–108)
CLOSURE TME COLL+EPINEP BLD: 58 K/UL — LOW (ref 150–400)
CO2 SERPL-SCNC: 28 MMOL/L — SIGNIFICANT CHANGE UP (ref 22–31)
CREAT SERPL-MCNC: 1.08 MG/DL — SIGNIFICANT CHANGE UP (ref 0.5–1.3)
EGFR: 72 ML/MIN/1.73M2 — SIGNIFICANT CHANGE UP
EOSINOPHIL # BLD AUTO: 0.03 K/UL — SIGNIFICANT CHANGE UP (ref 0–0.5)
EOSINOPHIL NFR BLD AUTO: 1.2 % — SIGNIFICANT CHANGE UP (ref 0–6)
ESTIMATED AVERAGE GLUCOSE: 128 MG/DL — HIGH (ref 68–114)
FERRITIN SERPL-MCNC: 91 NG/ML — SIGNIFICANT CHANGE UP (ref 30–400)
GLUCOSE SERPL-MCNC: 80 MG/DL — SIGNIFICANT CHANGE UP (ref 70–99)
HCT VFR BLD CALC: 39.8 % — SIGNIFICANT CHANGE UP (ref 39–50)
HCV AB S/CO SERPL IA: 0.04 S/CO — SIGNIFICANT CHANGE UP
HCV AB SERPL-IMP: SIGNIFICANT CHANGE UP
HGB BLD-MCNC: 13.5 G/DL — SIGNIFICANT CHANGE UP (ref 13–17)
HIV 1+2 AB+HIV1 P24 AG SERPL QL IA: SIGNIFICANT CHANGE UP
IMM GRANULOCYTES NFR BLD AUTO: 0.4 % — SIGNIFICANT CHANGE UP (ref 0–0.9)
INR BLD: 1.41 — HIGH (ref 0.88–1.16)
INR BLD: 1.44 — HIGH (ref 0.88–1.16)
IRON SATN MFR SERPL: 26 % — SIGNIFICANT CHANGE UP (ref 16–55)
IRON SATN MFR SERPL: 52 UG/DL — SIGNIFICANT CHANGE UP (ref 45–165)
LDH SERPL L TO P-CCNC: 201 U/L — SIGNIFICANT CHANGE UP (ref 50–242)
LYMPHOCYTES # BLD AUTO: 0.88 K/UL — LOW (ref 1–3.3)
LYMPHOCYTES # BLD AUTO: 34 % — SIGNIFICANT CHANGE UP (ref 13–44)
MAGNESIUM SERPL-MCNC: 2.1 MG/DL — SIGNIFICANT CHANGE UP (ref 1.6–2.6)
MCHC RBC-ENTMCNC: 30.3 PG — SIGNIFICANT CHANGE UP (ref 27–34)
MCHC RBC-ENTMCNC: 33.9 GM/DL — SIGNIFICANT CHANGE UP (ref 32–36)
MCV RBC AUTO: 89.2 FL — SIGNIFICANT CHANGE UP (ref 80–100)
MONOCYTES # BLD AUTO: 0.33 K/UL — SIGNIFICANT CHANGE UP (ref 0–0.9)
MONOCYTES NFR BLD AUTO: 12.7 % — SIGNIFICANT CHANGE UP (ref 2–14)
NEUTROPHILS # BLD AUTO: 1.33 K/UL — LOW (ref 1.8–7.4)
NEUTROPHILS NFR BLD AUTO: 51.3 % — SIGNIFICANT CHANGE UP (ref 43–77)
NRBC # BLD: 0 /100 WBCS — SIGNIFICANT CHANGE UP (ref 0–0)
PHOSPHATE SERPL-MCNC: 2.7 MG/DL — SIGNIFICANT CHANGE UP (ref 2.5–4.5)
PLATELET # BLD AUTO: 63 K/UL — LOW (ref 150–400)
POTASSIUM SERPL-MCNC: 3.8 MMOL/L — SIGNIFICANT CHANGE UP (ref 3.5–5.3)
POTASSIUM SERPL-SCNC: 3.8 MMOL/L — SIGNIFICANT CHANGE UP (ref 3.5–5.3)
PROT SERPL-MCNC: 6.1 G/DL — SIGNIFICANT CHANGE UP (ref 6–8.3)
PROT SERPL-MCNC: 6.1 G/DL — SIGNIFICANT CHANGE UP (ref 6–8.3)
PROTHROM AB SERPL-ACNC: 16.8 SEC — HIGH (ref 10.5–13.4)
PROTHROM AB SERPL-ACNC: 17.2 SEC — HIGH (ref 10.5–13.4)
RBC # BLD: 4.37 M/UL — SIGNIFICANT CHANGE UP (ref 4.2–5.8)
RBC # BLD: 4.46 M/UL — SIGNIFICANT CHANGE UP (ref 4.2–5.8)
RBC # FLD: 15.7 % — HIGH (ref 10.3–14.5)
RETICS #: 37.6 K/UL — SIGNIFICANT CHANGE UP (ref 25–125)
RETICS/RBC NFR: 0.9 % — SIGNIFICANT CHANGE UP (ref 0.5–2.5)
SODIUM SERPL-SCNC: 143 MMOL/L — SIGNIFICANT CHANGE UP (ref 135–145)
TIBC SERPL-MCNC: 200 UG/DL — LOW (ref 220–430)
TRANSFERRIN SERPL-MCNC: 183 MG/DL — LOW (ref 200–360)
UIBC SERPL-MCNC: 148 UG/DL — SIGNIFICANT CHANGE UP (ref 110–370)
WBC # BLD: 2.59 K/UL — LOW (ref 3.8–10.5)
WBC # FLD AUTO: 2.59 K/UL — LOW (ref 3.8–10.5)

## 2022-11-21 PROCEDURE — 99233 SBSQ HOSP IP/OBS HIGH 50: CPT

## 2022-11-21 PROCEDURE — 93306 TTE W/DOPPLER COMPLETE: CPT | Mod: 26

## 2022-11-21 PROCEDURE — 99233 SBSQ HOSP IP/OBS HIGH 50: CPT | Mod: 25

## 2022-11-21 PROCEDURE — 71045 X-RAY EXAM CHEST 1 VIEW: CPT | Mod: 26

## 2022-11-21 RX ORDER — BUMETANIDE 0.25 MG/ML
2 INJECTION INTRAMUSCULAR; INTRAVENOUS EVERY 12 HOURS
Refills: 0 | Status: DISCONTINUED | OUTPATIENT
Start: 2022-11-21 | End: 2022-11-22

## 2022-11-21 RX ORDER — BUMETANIDE 0.25 MG/ML
2 INJECTION INTRAMUSCULAR; INTRAVENOUS EVERY 12 HOURS
Refills: 0 | Status: DISCONTINUED | OUTPATIENT
Start: 2022-11-21 | End: 2022-11-21

## 2022-11-21 RX ADMIN — SACUBITRIL AND VALSARTAN 1 TABLET(S): 24; 26 TABLET, FILM COATED ORAL at 10:49

## 2022-11-21 RX ADMIN — TAMSULOSIN HYDROCHLORIDE 0.4 MILLIGRAM(S): 0.4 CAPSULE ORAL at 23:38

## 2022-11-21 RX ADMIN — SACUBITRIL AND VALSARTAN 1 TABLET(S): 24; 26 TABLET, FILM COATED ORAL at 23:38

## 2022-11-21 RX ADMIN — BUMETANIDE 2 MILLIGRAM(S): 0.25 INJECTION INTRAMUSCULAR; INTRAVENOUS at 10:52

## 2022-11-21 RX ADMIN — ESCITALOPRAM OXALATE 5 MILLIGRAM(S): 10 TABLET, FILM COATED ORAL at 10:49

## 2022-11-21 RX ADMIN — BUMETANIDE 2 MILLIGRAM(S): 0.25 INJECTION INTRAMUSCULAR; INTRAVENOUS at 22:08

## 2022-11-21 NOTE — ASSESSMENT
[FreeTextEntry1] : 1.  Atrial fibrillation, s/p AF ablation at [facility] 06/2016, with recurrence, now persistent: \par      - continue systemic anticoagulation with Eliquis 5mg po bid\par      - discussed with patient avoidance of ASA and NSAIDS as well as need for bleeding surveillance \par      - follow up with heart rhythm specialist, Jimi Pemberton MD\par \par 2. Chronic systolic heart failure, NICM, secondary to amyloidosis: \par      - continue Entresto 24/26mg po bid\par      - continue metoprolol succinate ER 25mg po qd \par      - increase torsemide from 20mg po qd prn to 2 tabs po qd prn (still taking 20mg) \par      - will not up titrate HF regimen given symptomatic hypotension \par      - will send to a heart failure specialist, Víctor Taylor MD (has appointment in October)\par \par 3. CKD III: Cr 1.47, eGFR 50 (05/19/22)\par      - avoid nephrotoxic agents\par      - will consider sending to a nephrologist next visit (he is reluctant at this time) \par \par 4. DM2: A1c 6.2% (05/19/22)\par      - discussed with patient therapeutic lifestyle changes to improve glucose metabolism \par \par 5. Thrombocytopenia:plt 63 (11/21/22):\par \par 6. 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............................................\par 11/19/21...................................0.12.....................12,051..................................................\par \par \par Amyloidosis Treatment:\par      - continue Vyndamax 61mg po qd \par      - will send to neurologist, Estrada Valdes MD to 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

## 2022-11-21 NOTE — PROGRESS NOTE ADULT - PROBLEM SELECTOR PLAN 2
S/p ablation in 2016. Following with Dr. Pemberton, EP. Continue BB as above and AC with Eliquis.   - Continue home Toprol XL 25 mg daily  - Holding Eliquis due to low platelets, will f/u on AM repeat platelet count  - SCD's ordered for DVT ppx S/p ablation in 2016. Following with Dr. Pemberton EP. Continue BB as above and AC with Eliquis.   - Continue home Toprol XL 25 mg daily  - Holding Eliquis due to low platelets, will f/u on AM repeat platelet count  - SCD's ordered for DVT ppx  - Spoke to Dr. Bradley and notified him of patient's status in hospital. Okay with holding eliquis until heme/onc deems appropriate to restart.

## 2022-11-21 NOTE — DISCHARGE NOTE PROVIDER - NSDCMRMEDTOKEN_GEN_ALL_CORE_FT
apixaban 5 mg oral tablet: 1 tab(s) orally 2 times a day. PLEASE RESTART 02/19  Lexapro 5 mg oral tablet: 1 tab(s) orally once a day  metoprolol succinate 25 mg oral tablet, extended release: 1 tab(s) orally once a day  potassium chloride 20 mEq oral tablet, extended release: 1 tab(s) orally once a day  sacubitril-valsartan 24 mg-26 mg oral tablet: 1 tab(s) orally every 12 hours  tamsulosin 0.4 mg oral capsule: 1 cap(s) orally once a day  torsemide 20 mg oral tablet: 1 tab(s) orally once a day   Vyndamax 61 mg oral capsule: 1 cap(s) orally once a day   apixaban 5 mg oral tablet: 1 tab(s) orally 2 times a day.  Lexapro 5 mg oral tablet: 1 tab(s) orally once a day  metoprolol succinate 25 mg oral tablet, extended release: 1 tab(s) orally once a day  potassium chloride 20 mEq oral tablet, extended release: 1 tab(s) orally once a day  sacubitril-valsartan 24 mg-26 mg oral tablet: 1 tab(s) orally every 12 hours  tamsulosin 0.4 mg oral capsule: 1 cap(s) orally once a day  torsemide 20 mg oral tablet: 1 tab(s) orally once a day   Vyndamax 61 mg oral capsule: 1 cap(s) orally once a day   apixaban 5 mg oral tablet: 1 tab(s) orally 2 times a day.  dapagliflozin 10 mg oral tablet: 1 tab(s) orally every 24 hours  Lexapro 5 mg oral tablet: 1 tab(s) orally once a day  metoprolol succinate 25 mg oral tablet, extended release: 0.5 tab(s) orally once a day   potassium chloride 20 mEq oral tablet, extended release: 1 tab(s) orally once a day  sacubitril-valsartan 24 mg-26 mg oral tablet: 1 tab(s) orally every 12 hours  tamsulosin 0.4 mg oral capsule: 1 cap(s) orally once a day  torsemide 10 mg oral tablet: 1 tab(s) orally once a day   Vyndamax 61 mg oral capsule: 1 cap(s) orally once a day

## 2022-11-21 NOTE — DISCHARGE NOTE PROVIDER - NSDCFUADDAPPT_GEN_ALL_CORE_FT
Please bring your Insurance card, Photo ID and Discharge paperwork to the following appointment:    (1) Please follow up with your Hematology/Oncology Provider, Dr. Carley Escobar at [address] [place] [address] on 11/29/2022 at 1:45pm.    Appointment was scheduled by Ms. MIKKI England, Referral Coordinator.

## 2022-11-21 NOTE — PROGRESS NOTE ADULT - PROBLEM SELECTOR PLAN 7
F: none   E: K >4, Mg >2  N: DASH/TLC, fluid restriction  Dvt: holding eliquis due to low platelets, SCD's for now  Dispo: pending clinical progression F: none   E: K >4, Mg >2  N: DASH/TLC, fluid restriction  Dvt: holding eliquis due to low platelets, SCD's for now  Dispo: [address] [facility]

## 2022-11-21 NOTE — DISCHARGE NOTE PROVIDER - CARE PROVIDER_API CALL
Raissa Daily)  Hematology; Internal Medicine; Medical Oncology  [address] [address]  Follow Up Time: 2 weeks   Carley Escobar)  HematologyOncology; Internal Medicine; Medical Oncology  [address] [place] [address]  Phone: [phone]  Fax: [phone]  Follow Up Time: 2 weeks   Carley Escobar)  HematologyOncology; Internal Medicine; Medical Oncology  [address] [place] [address]  Phone: [phone]  Fax: [phone]  Follow Up Time: 2 weeks    Jimi Pemberton)  Cardiac Electrophysiology; Cardiovascular Disease; Internal Medicine  [address] [address]  Phone: [phone]  Fax: [phone]  Scheduled Appointment: 12/12/2022    Estrada Valdes)  Neurology; Neuromuscular Medicine  [address] [place] [address]  Phone: [phone]  Fax: [phone]  Scheduled Appointment: 12/19/2022    Dominic Bradley)  Cardiology  [address] [place] [address], 3rd floor (between [address] and [address]  [place], [place] [id number]  Phone: [phone]  Fax: [phone]  Follow Up Time: 1 month   Jimi Pemberton)  Cardiac Electrophysiology; Cardiovascular Disease; Internal Medicine  [address] [address]  Phone: [phone]  Fax: [phone]  Scheduled Appointment: 12/12/2022    Estrada Valdes)  Neurology; Neuromuscular Medicine  [address]th [place] [address]  Phone: [phone]  Fax: [phone]  Scheduled Appointment: 12/19/2022    Dominic Bradley)  Cardiology  [address] [place] [address], 3rd floor (between [address] and [address]  [place], [place] [id number]  Phone: [phone]  Fax: [phone]  Follow Up Time: 1 month    Carley Escobar  Hematology/Oncology  [address] [address]  Phone: [phone]  Fax: (   )    -  Scheduled Appointment: 11/29/2022 01:45 PM   Jimi Pemberton)  Cardiac Electrophysiology; Cardiovascular Disease; Internal Medicine  [address] [address]  Phone: [phone]  Fax: [phone]  Scheduled Appointment: 12/12/2022    Estrada Valdes)  Neurology; Neuromuscular Medicine  [address] [place] [address]  Phone: [phone]  Fax: [phone]  Scheduled Appointment: 12/19/2022    Dominic Bradley)  Cardiology  [address] [place] [address], 3rd floor (between [address] and [address]  [place], [place] [id number]  Phone: [phone]  Fax: [phone]  Follow Up Time: 1 month    Tanja Friedman; PhD)  Adv Heart Fail Trnsplnt Cardio; Cardiovascular Disease; Internal Medicine  [address] [place] [address]  Phone: [phone]  Fax: [phone]  Scheduled Appointment: 11/25/2022 11:00 AM    Carley Escobar  Hematology/Oncology  [address] [address]  Phone: [phone]  Fax: (   )    -  Scheduled Appointment: 11/29/2022 01:45 PM   Jimi Pemberton)  Cardiac Electrophysiology; Cardiovascular Disease; Internal Medicine  [address] [address]  Phone: [phone]  Fax: [phone]  Scheduled Appointment: 12/12/2022    Estrada Valdes)  Neurology; Neuromuscular Medicine  [address]th [place] [address]  Phone: [phone]  Fax: [phone]  Scheduled Appointment: 12/19/2022    Dominic Bradley)  Cardiology  [address] [place] [address], 3rd floor (between [address] and [address]  [place], [place] [id number]  Phone: [phone]  Fax: [phone]  Follow Up Time: 1 month    Tanja Friedman; PhD)  Adv Heart Fail Trnsplnt Cardio; Cardiovascular Disease; Internal Medicine  [address] [place] [address]  Phone: [phone]  Fax: [phone]  Scheduled Appointment: 12/02/2022 02:30 PM    Carley Escobar  Hematology/Oncology  [address] [address]  Phone: [phone]  Fax: (   )    -  Scheduled Appointment: 11/29/2022 01:45 PM

## 2022-11-21 NOTE — PROGRESS NOTE ADULT - SUBJECTIVE AND OBJECTIVE BOX
Cardiology Consult      Interval History: No events overnight, patient reports that he is feeling better now: improved LE edema and breathing.       OBJECTIVE  Vitals:  T(C): 37.2 (11-21-22 @ 13:30), Max: 37.2 (11-21-22 @ 13:30)  HR: 85 (11-21-22 @ 13:25) (80 - 85)  BP: 79/52 (11-21-22 @ 13:25) (79/52 - 108/73)  RR: 18 (11-21-22 @ 13:25) (15 - 19)  SpO2: 96% (11-21-22 @ 13:25) (96% - 98%)  Wt(kg): --    I/O:  I&O's Summary    20 Nov 2022 07:01  -  21 Nov 2022 07:00  --------------------------------------------------------  IN: 630 mL / OUT: 1875 mL / NET: -1245 mL    21 Nov 2022 07:01  -  21 Nov 2022 14:43  --------------------------------------------------------  IN: 560 mL / OUT: 1150 mL / NET: -590 mL        PHYSICAL EXAM:  HEENT: PERRL, anicteric sclera; MMM  Neck: no JVD  Cardiovascular: +S1/S2; RRR  Respiratory: crackles b/l lower lung fields  Gastrointestinal: soft, NT/ND; +BSx4  Extremities: WWP; 1+ pitting edema   Vascular: 2+ radial, DP/PT pulses B/L  Neurological: AAOx3;  	  LABS:                        13.5   2.59  )-----------( 63       ( 21 Nov 2022 05:30 )             39.8     11-21    143  |  106  |  27<H>  ----------------------------<  80  3.8   |  28  |  1.08    Ca    8.6      21 Nov 2022 05:30  Phos  2.7     11-21  Mg     2.1     11-21      PT/INR - ( 21 Nov 2022 11:13 )   PT: 17.2 sec;   INR: 1.44          PTT - ( 21 Nov 2022 11:13 )  PTT:30.8 sec      RADIOLOGY & ADDITIONAL TESTS:  Reviewed .    MEDICATIONS  (STANDING):  buMETAnide Injectable 2 milliGRAM(s) IV Push every 12 hours  escitalopram 5 milliGRAM(s) Oral daily  metoprolol succinate ER 25 milliGRAM(s) Oral daily  sacubitril 24 mG/valsartan 26 mG 1 Tablet(s) Oral two times a day  tamsulosin 0.4 milliGRAM(s) Oral at bedtime    MEDICATIONS  (PRN):

## 2022-11-21 NOTE — DISCHARGE NOTE PROVIDER - NSDCFUSCHEDAPPT_GEN_ALL_CORE_FT
Tanja Friedman  [facility] Physician [facility]  HEARTVASC [address]  Scheduled Appointment: 11/25/2022    Jimi Pemberton  [facility]  HEARTVASC [address]  Scheduled Appointment: 12/12/2022    Estrada Valdes  [facility]  NEUROLOGY [address]  Scheduled Appointment: 12/19/2022     Jimi Pemberton  [facility]  HEARTVASC [address]  Scheduled Appointment: 12/12/2022    Estrada Valdes  [facility] Physician [facility]  NEUROLOGY [address]  Scheduled Appointment: 12/19/2022

## 2022-11-21 NOTE — PROGRESS NOTE ADULT - PROBLEM SELECTOR PLAN 4
h/o thrombocytopenia w/ baseline PLT 60-70s  - Platelts on admission: 58   - Holding eliquis for now; see repeat in platelets in AM  - f/u Heme/onc consult

## 2022-11-21 NOTE — DISCHARGE NOTE PROVIDER - PROVIDER TOKENS
PROVIDER:[TOKEN:[[id number]],FOLLOWUP:[2 weeks]] PROVIDER:[TOKEN:[[id number]],FOLLOWUP:[2 weeks]] PROVIDER:[TOKEN:[[id number]],FOLLOWUP:[2 weeks]],PROVIDER:[TOKEN:[[id number]],SCHEDULEDAPPT:[12/12/2022]],PROVIDER:[TOKEN:[[id number]:[id number]],SCHEDULEDAPPT:[12/19/2022]],PROVIDER:[TOKEN:[[id number]:[id number]],FOLLOWUP:[1 month]] PROVIDER:[TOKEN:[[id number]:[id number]],SCHEDULEDAPPT:[12/12/2022]],PROVIDER:[TOKEN:[[id number]:[id number]],SCHEDULEDAPPT:[12/19/2022]],PROVIDER:[TOKEN:[[id number]:[id number]],FOLLOWUP:[1 month]],FREE:[LAST:[Sokoloff],FIRST:[Carley],PHONE:[phone]],FAX:[(   )    -],ADDRESS:[Hematology/Oncology  [address] [place] [address]],SCHEDULEDAPPT:[11/29/2022],SCHEDULEDAPPTTIME:[01:45 PM]] PROVIDER:[TOKEN:[[id number]],SCHEDULEDAPPT:[12/12/2022]],PROVIDER:[TOKEN:[[id number]],SCHEDULEDAPPT:[12/19/2022]],PROVIDER:[TOKEN:[[id number]:MIIS:[id number]],FOLLOWUP:[1 month]],PROVIDER:[TOKEN:[[id number]:MIIS:[id number]],SCHEDULEDAPPT:[11/25/2022],SCHEDULEDAPPTTIME:[11:00 AM]],FREE:[LAST:[Luz],FIRST:[Carley],PHONE:[phone]],FAX:[(   )    -],ADDRESS:[Hematology/Oncology  [address] [place] [address]],SCHEDULEDAPPT:[11/29/2022],SCHEDULEDAPPTTIME:[01:45 PM]] PROVIDER:[TOKEN:[[id number]:MIIS:[id number]],SCHEDULEDAPPT:[12/12/2022]],PROVIDER:[TOKEN:[[id number]:[id number]],SCHEDULEDAPPT:[12/19/2022]],PROVIDER:[TOKEN:[[id number]:[id number]],FOLLOWUP:[1 month]],PROVIDER:[TOKEN:[[id number]:[id number]],SCHEDULEDAPPT:[12/02/2022],SCHEDULEDAPPTTIME:[02:30 PM]],FREE:[LAST:[Luz],FIRST:[Carley],PHONE:[phone]],FAX:[(   )    -],ADDRESS:[Hematology/Oncology  [address] [place] [address]],SCHEDULEDAPPT:[11/29/2022],SCHEDULEDAPPTTIME:[01:45 PM]]

## 2022-11-21 NOTE — PROGRESS NOTE ADULT - PROBLEM SELECTOR PLAN 1
- On ADMIT, pt w/ JVD, +1 B/L LE Edema, satting 98% on RA   - BNP: 77154, CXR: Cardiomegaly, b/l pleural effusions   - EKG: V-paced at 80 bpm, no acute ST-T wave changes   - ECHO 02/08/22 TTE: LVIDd 4.7 cm, LVEF 37%, LV hypertrophy (septum 1.6, PW 1.7), analysis of DD challenging due to AF, normal RV size with mildly reduced function, HYACINTH, mild MR, mild TR, est PASP 40 mmHg, small pericardial effusion, ascites present  - f/u repeat TTE  - HOME DIURETIC: Torsemide 20 mg daily (patient reports noncompliance to recent increase to 40mg due to incontinence)  - DIURESIS: s/p Bumex 1 mg IVP x 1 in ED, continue Bumex 2 mg IV BID   - GDMT: Continue Entresto 24/26 mg BID, Metoprolol Succinate 25 mg PO daily   - Core measures, strict I's and O's daily weight, fluid restriction

## 2022-11-21 NOTE — DISCHARGE NOTE PROVIDER - CARE PROVIDERS DIRECT ADDRESSES
,DirectAddress_Unknown ,[email or web address] ,[email or web address] ,[email or web address],[email or web address] ,[email or web address],DirectAddress_Unknown

## 2022-11-21 NOTE — CONSULT NOTE ADULT - SUBJECTIVE AND OBJECTIVE BOX
LENGTH OF HOSPITAL STAY: 3d    CHIEF COMPLAINT:   Patient is a 77y old  Male who presents with a chief complaint of SOB (21 Nov 2022 11:11)    HISTORY OF PRESENTING ILLNESS:   Patient is a 76 y/o M, with PMHx of DM II (currently not on medications), Aflutter/Afib (s/p prior ablation 06/2016), ATTR cardiac amyloid, NICM (LVIDd 4.7 cm, LVEF 37%), AF/Aflu s/p ablation 6/2016 (on Eliquis) s/p CRT-P (2/16/2022), CKD Stage 3 (b/l Cr 1-1.2), DMT2 (Ha1c 6.4% 2/2022), thrombocytopenia (bone marrow biopsy 11/2021 unrevealing) w/ idiopathic thrombocytopenia, chronic venous insufficiency, and prostate Ca s/p chemotherapy, w/ multiple hospitalizations for ADHF within the past year,  who presented to his heart failure specialist, Dr. Friedman for follow-up earlier today, with complaints of shortness of breath with a few steps or with simple ADLS such as dressing. Patient's son states that patient's health has declined within the past week. Patient reports diminished appetite with an increase in abdominal girth. Patient states he has gained nearly 17 pounds since September. He has also established with Dr. Finkelstein and was started on Vyndamax in 5/2022 but notably had increasing diuretic demands and worsening renal function. More recently requiring torsemide 40 mg daily. However, due to urinary incontinence remains on torsemide 20 mg once daily and low dose Entresto once daily. Patient denies CP, SOB at rest, overt orthopnea, PND, dizziness, abdominal discomfort, palpitations, and syncope.  Patient sent from outpatient office to [facility] ED for management of acute decompensated heart failure.     In the ED, VS: T: 97.7 F, HR: 84 bpm, RR: 16 breaths/min, BP: 100/70 mmHg, spO2: 98% on RA  Labs significant for: WBC: 2.59, BUN/Cr: 25/1.39, Glucose: 119, Alk Phos: 157, Troponin: 0.06 x 2, BNP: 44397, COVID-19: Negative, EKG: V-paced at 80 bpm, no acute ST-T wave changes  CXR: B/L pleural effusions, cardiomegaly  Patient received Bumex 1 mg IV x 1  Patient admitted to cardiology/telemetry for acute on chronic systolic heart failure.    (18 Nov 2022 23:31)    PAST MEDICAL & SURGICAL HISTORY:  Atrial flutter  s/p Ablation 2016  Chronic venous insufficiency of lower extremity  Prostate CA  Stage 3 chronic kidney disease  1.2-1.2 baseline   On admission 1.2  Type 2 diabetes mellitus  not on medication  Thrombocytopenia  60-70&#x27;s baseline  Acute on chronic systolic congestive heart failure  Chronic atrial fibrillation  Atrial flutter  s/p ablation in 2016  History of surgical removal of pituitary gland  1990s  S/P TURP  for BPH    SOCIAL HISTORY:  Never a smoker  No family history of cancer  Family history of DM2    ALLERGIES:  No Known Allergies    MEDICATIONS:  STANDING MEDICATIONS  buMETAnide Injectable 2 milliGRAM(s) IV Push every 12 hours  escitalopram 5 milliGRAM(s) Oral daily  metoprolol succinate ER 25 milliGRAM(s) Oral daily  sacubitril 24 mG/valsartan 26 mG 1 Tablet(s) Oral two times a day  tamsulosin 0.4 milliGRAM(s) Oral at bedtime    PRN MEDICATIONS    VITALS:   T(F): 98.7  HR: 80  BP: 104/72  RR: 18  SpO2: 98%    LABS:                        13.5   2.59  )-----------( 63       ( 21 Nov 2022 05:30 )             39.8     11-21    143  |  106  |  27<H>  ----------------------------<  80  3.8   |  28  |  1.08    Ca    8.6      21 Nov 2022 05:30  Phos  2.7     11-21  Mg     2.1     11-21    PT/INR - ( 21 Nov 2022 11:13 )   PT: 17.2 sec;   INR: 1.44       PTT - ( 21 Nov 2022 11:13 )  PTT:30.8 sec    RADIOLOGY:  < from: US Abdomen Limited (02.07.22 @ 03:25) >  Impression:  1.  Cholelithiasis.  2.  Moderate ascites  3.  Right pleural effusion.  4. Dilatation of the hepatic veins.    < end of copied text >    < from: CT Abdomen and Pelvis No Cont (02.07.22 @ 00:56) >   Attending over read. Agree with the below report with   following modifications. Severe anasarca. Severe cardiomegaly. Small   right pleural effusion. Trace left pleural effusion. Small amount of   ascites. Dilated IVC. Findings likely due to cardiac failure/fluid   overload. Fatty infiltration of liver. Liver appears enlarged. Radiopaque   cholelithiasis. Thick-walled urinary bladder could be due to bladder   outlet obstruction. Prostate measures 4.6 x 3.8 x 4.0 cm. normal   appendix. Rectum underdistended versus wall thickening. Collapse   consolidation right lower lobe containing radiopaque   material/calcification. Enlarged left groin node measuring 1.6 x 3.5 x   5.4 cm..    < end of copied text >    < from: CT Chest No Cont (02.09.22 @ 11:59) >  1.  Increased moderate to large right pleural effusion. Might consider   fluid sampling with cytologic analysis if clinically warranted.  2.  Unchanged small left pleural effusion.  3.  Ascites and upper abdomen with anasarca suggesting fluid overload.  4.  Cardiomegaly.  5.  Probable pulmonary hypertension.    < end of copied text >      Cytopathology - Non Gyn Report:   ACCESSION No:  [id number]  Patient:   TINA ELISE      Accession:                             [id number]    Collected Date/Time:                   2/11/2022 14:13 EST  Received Date/Time:                    2/11/2022 14:13 EST    Non-Gynecologic Report - Auth (Verified)    Specimen(s) Submitted  RIGHT PLEURAL FLUID:    Final Diagnosis  RIGHT PLEURAL FLUID:    BENIGN FINDINGS.  Mesothelial cells and macrophages.      Cell block shows similar findings.  Immunostains performed shows that the  mesothelial cells are positive for Calretinin, WT1 while negative for  Shaheed Ep4, MOC31, TTF1.  This staining profile supports the diagnosis.    These immunohistochemical tests have been developed and their performance  characteristics determined by [facility], Department of  Pathology, Division of Immunopathology, [address] [place] [address]. Â  It has not been cleared or approved by the U.S. Food and  Drug Administration.  Â  The FDA has determined that such clearanceor  approval is not necessary.  Â  This test is used for clinical purposes.  Â   The laboratory certified under the CLIA-88 as qualified to perform high  complexity clinical testing.  Screened by: Marcy Leong MD  Verified by: Marcy Leong MD  (Electronic Signature)  Reported on: 02/17/22 14:35 EST, [facility], [address] [place] [address]  Phone: [phone]   Fax: [phone]  _________________________________________________________________      Clinical Information  History of CHF, prostate cancer, now with pleural effusion, R/O malignant  vs transudative effusion  _    Gross Description  Received: 50 ml of unfixed yellow fluid. CytoLyt added in the Lab.  Prepared: 1 ThinPrep slide, 1 cell block (02.11.22 @ 14:13)      Hematopathology Report:   ACCESSION No:  [id number]  Patient:   TINA ELISE      Accession:                             [id number]    Collected Date/Time:                   11/23/2021 08:30 EST  Received Date/Time:                    11/23/2021 08:42 EST    Hematopathology Addendum Report - Auth (Verified)    Hematopathology Addendum  CYTOGENETICS    Addendum issued to report results of Cytogenetics.  This test was performed by an outside laboratory. For all patient care and  clinical decision making purposes refer solely to original laboratory  report. The information transcribed here is not the entire report. This  shorten version has been placed here for the purpose of the electronic  record.    Performing Laboratory: [facility]  Surgical ID #: [id number]  Specimen ID: [id number]  Date reported by Outside Laboratory: 11/30/2021  Submitting Physician: Dr. Anjelica Bernal  Outside report electronically signed by: Dr. Keo Woodall    Karyotype  46,XY23    Cytogenetics Analysis  Metaphases Counted:    23  Metaphases Analyzed:    23    Metaphases Karyptyped:    5  GTG Band level:   400  Culture Type(s):   24hrU    Interpretation  A normal male karyotype was observed in twenty-three metaphase cells  analyzed.    Within the limits of the technology utilized in this study, no consistent  numerical or structural abnormality was identified.    Keo Woodall M.D., [facility]  Director, Cytogenetics, ex. [phone]  This report was electronically signed.    This addendum reports the results of Cytogenetic Karyotype, reported  on 11/30/2021 by [facility], a specialized [facility].  Technical and professional components were performed at [facility], [employer] [address] [place] [address], [place] [address],  phone:  [phone], [facility] specimen [id number] with  [facility] ID [id number].  Please refer to the official [facility] report with  complete information including assay design and methodology, on file in  the Pathology Department.    Verified by: Faye Brady M.D.  (Electronic Signature)  Reported on: 12/03/21 16:50 [facility], [address] [place] [id number]  Phone: [phone]   Fax: [phone]  _________________________________________________________________  Hematopathology Addendum Report - Auth (Verified)    Hematopathology Addendum  Flow Cytometric Analysis      Interpretation:  The B cells comprise 1% of the total cells analyzed and express polytypic  surface immunoglobulins. The T cells show no loss of pan T-cell antigens.  The CD4:CD8 ratio is normal. No abnormal myeloid maturation is seen.  Cells expressing blast antigens are not increased in number.  There is no evidence of acute leukemia or lymphoma by cell marker  analysis.    B-Cell Associated:          1%  CD19         1%  CD20         1%  CD10         <1%  CD11c       5%  CD23         <1%  KAPPA      <1%  LAMBDA  <1%    Myeloid Associated:  CD11b 51%  CD13   85%  CD14   6%  CD16   50%  CD33   52%  CD64   59%    T- Cell Associated:    CD2  9%  CD3    6%  CD4    5%  CD5    7%  CD7    10%  CD8    5%  CD56  5%    Activation:  CD34       1%  CD38       <1%       <1%  HLA-DR  4%    NOTE:  Flow cytometry is performed at [facility], a specialized [facility], [place], [place].  Verified by: Faye Brady M.D.  (Electronic Signature)  Reported on: 11/30/21 11:01 [facility], [address] [place] [address]  Phone: [phone]   Fax: [phone]    _________________________________________________________________  Hematopathology Report - Auth (Verified)    Specimen(s) Submitted  1  Bone marrow biopsy  2  Bone marrow clot  3  Bone marrow aspirate slides  4  Bone marrow aspirate for Flow    Final Diagnosis  1, 2: Bone marrow biopsy and blood clot:  - Morphological evaluation is limited as the biopsy is very small.  - There is a slight myeloid hypoplasia and relative erythroid  hyperplasia.  - Full maturation is seen in both myeloid and erythroid cell lineages.  - Megakaryocytes appear normal in number and are not particularly  atypical.  - The Congo red stain is negative for amyloid, however, the biopsy is  very small and may not be representative of the underlying pathology.  - An iron stain is positive for storage iron.    3. Bonemarrow aspirate smear:  - Mature and maturing myeloid and erythroid elements present.  - Megakaryocytes are normal in number and morphology.  - Plasma cells are slightly increased in number.  - An iron stain is positive for storage iron.      Note: Immunohistochemical stain shows that  positive plasma cells  (on 2B) are not increased in number and appear polytypic by kappa/lambda  KOREY.  Immunostains for CD3, CD20 and PAX5 (2B) demonstrate very rare B  and T-cells.  CD34 positive blasts are not increased in number.  There is  no evidence of lymphoma or carcinoma. There is no sufficient evidence of  plasma cell neoplasm in this limited material, however, correlation with  clinical and laboratory studies is recommended.    PHYSICAL EXAM:  Constitutional: Resting comfortably in bed; NAD  Head: NC/AT  Eyes: Anicteric sclera  Neck: No cervical LAD  Respiratory: Mildly decreased right breath sounds   Cardiac: Irregular  Gastrointestinal: soft, NT/ND  Extremities: 1+ pitting edema bilaterally   Neurologic: AAOx3  Psychiatric: affect and characteristics of appearance, verbalizations, behaviors are appropriate

## 2022-11-21 NOTE — DISCHARGE NOTE PROVIDER - HOSPITAL COURSE
#Discharge:    Patient is __ yo M/F with past medical history of _____ presented with _____, found to have _____ (one liner)    Hospital course:     Patient was discharged to: (home/COMFORT/acute rehab/hospice, etc, and with what services – home health PT/RN? Home O2?)    New medications:   Changes to old medications:  Medications that were stopped:    Items to follow up as outpatient:    Physical exam at the time of discharge:       #Discharge:  76 y/o M, with PMHx of DM II (currently not on medications), ATTR cardiac amyloid, NICM (LVIDd 4.7 cm, LVEF 37%), AF/Aflu s/p ablation 6/2016 (on Eliquis) s/p CRT-P (2/16/2022), CKD Stage 3 (b/l Cr 1-1.2), DMT2 (Ha1c 6.4% 2/2022), thrombocytopenia (bone marrow biopsy 11/2021 unrevealing) w/ idiopathic thrombocytopenia, chronic venous insufficiency, and prostate Ca s/p chemotherapy, w/ multiple hospitalizations for ADHF within the past year admitted to cardiology for acute on chronic systolic heart failure exacerbation.     Hospital course:   #Acute systolic congestive heart failure  On admission, pt presented w/ JVD, +1 B/L LE Edema, satting 98% on RA   - BNP: 90262, CXR: Cardiomegaly, b/l pleural effusions   - EKG: V-paced at 80 bpm, no acute ST-T wave changes   - ECHO 02/08/22 TTE: LVIDd 4.7 cm, LVEF 37%, LV hypertrophy (septum 1.6, PW 1.7), analysis of DD challenging due to AF, normal RV size with mildly reduced function, HYACINTH, mild MR, mild TR, est PASP 40 mmHg, small pericardial effusion, ascites present  - HOME DIURETIC: Torsemide 20 mg daily (patient reports noncompliance to recent increase to 40mg due to incontinence)  - DIURESIS: s/p Bumex 1 mg IVP x 1 in ED, patient received Bumex 2 mg IV BID, discontinued and started with torsemide 20mg 11/23  - Metoprolol Succinate 25 mg PO daily     #Atrial fibrillation and flutter  S/p ablation in 2016. Following with Dr. Pemberton, EP. Continue BB as above and AC with Eliquis.   - Continue home Toprol XL 25 mg daily  - Restarted eliquis 5mg BID and will continue as long as platelets >50k   - SCD's ordered for DVT ppx    #Cardiac amyloidosis.   TTR amyloid confirmed by Tc-PYP scan.  - Following with Dr. Bradley and started on Vyndemax in 5/2022, Dr. Bradley aware that patient was admitted.    #Idiopathic thrombocythemia  h/o thrombocytopenia w/ baseline PLT 60-70s  - Platelts on admission: 58   - Restarted eliquis 5mg BID and will continue as long as platelets >50k  - Heme/onc consulted, appreciate recs. Patient would not like to continue following thrombocytopenia during this admission and prefers to just monitor HF.    #Stage 3 chronic kidney disease.   BUN/Cr on admission: 25/1.39.    #Diabetes  controlled w/ diet and exercise  - A1c on admission 6.1    Patient was discharged to home.     New medications:   Changes to old medications:  Medications that were stopped:    Items to follow up as outpatient: Please follow up with Dr. Escobar in 2 weeks after discharge. We are restarting Eliquis since your platelets are above 50k. Please continue to monitor your platelet count with Dr. Escobar in order to make sure your platelet count is appropriate for continued Eliquis therapy.  Dr. Bradley is aware of your admission to the hospital, his office will call to schedule a follow up appointment. Please follow up with Dr. Pemberton on 12/12 and Dr. Valdes on 12/19.       Dx: TREATMENT FOR STEMI or HEART FAILURE THIS ADMISSION: YES/NO            If Yes to STEMI or Heart Failure: 7 day Follow-Up Appointment Made: Yes/No, Yes: Date/Time; IF No, why not?           Cardiac Rehab Indications (STEMI/NSTEMI/ACS/Unstable Angina/CHF/Chronic Stable Angina/Heart Surgery (CABG,Valve)/Post PCI):            *Education on benefits of Cardiac Rehab provided to patient: Yes         *Referral and Prescription Given for Cardiac Rehab: Yes/No.  If No, Why Not?  (see reasons below)         *Pt given list of locations & instructed to contact their insurance company to review list of participating providers. Yes          *Pt instructed to bring Cardiac Rehab prescription with them to Cardiology Follow up appointment for assistance with enrollment: Yes    (Reasons for No Cardiac Rehab Referral Rx - must document 1 or more options):                Patient Refused            Medical Reason: ex: needs Home Care, Home PT, severe or symptomatic AS            Patient lacks medical coverage for Cardiac Rehab            Pt discharged to Nursing Care/COMFORT/Long term Care Facility            Patient Lacks Transportation or no cardiac rehab within 60 minutes driving range            Patient already participates in Cardiac Rehab            Other: (provide details) ex: Pt discharged to Hospice; prescription printer not working & pt was instructed to obtain Rx from outpt Cardiologist.    AMI: Beta Blocker Prescribed: Yes/No. If no, Why not?            ACE-I/ARB Prescribed: Yes/No. If no, Why not? ex: Entresto prescribed, Not indicated at this time, worsening renal function  CHF: Beta Blocker Prescribed: Yes/No.  If No, Why Not?           ACE-I/ARB/Entresto Prescribed: Yes/No.  If No, Why Not? ex: hypotension, worsening renal function  Statin Prescribed (STEMI/NSTEMI/ACS/UA &/OR Post PCI this admission):  Yes/No; If No, No Statin Prescribed due to______  DAPT Post PCI: Prescriptions for Aspirin/Plavix/Brilinta/Effient e-prescribed to patient's pharmacy: Yes/No__.                *No Aspirin/Plavix/Brilinta/Effient prescribed due to ___.             #Discharge:  76 y/o M, with PMHx of DM II (currently not on medications), ATTR cardiac amyloid, NICM (LVIDd 4.7 cm, LVEF 37%), AF/Aflu s/p ablation 6/2016 (on Eliquis) s/p CRT-P (2/16/2022), CKD Stage 3 (b/l Cr 1-1.2), DMT2 (Ha1c 6.4% 2/2022), thrombocytopenia (bone marrow biopsy 11/2021 unrevealing) w/ idiopathic thrombocytopenia, chronic venous insufficiency, and prostate Ca s/p chemotherapy, w/ multiple hospitalizations for ADHF within the past year admitted to cardiology for acute on chronic systolic heart failure exacerbation.     Hospital course:   #Acute systolic congestive heart failure  On admission, pt presented w/ JVD, +1 B/L LE Edema, satting 98% on RA   - BNP: 70854, CXR: Cardiomegaly, b/l pleural effusions   - EKG: V-paced at 80 bpm, no acute ST-T wave changes   - ECHO 02/08/22 TTE: LVIDd 4.7 cm, LVEF 37%, LV hypertrophy (septum 1.6, PW 1.7), analysis of DD challenging due to AF, normal RV size with mildly reduced function, HYACINTH, mild MR, mild TR, est PASP 40 mmHg, small pericardial effusion, ascites present  - HOME DIURETIC: Torsemide 20 mg daily (patient reports noncompliance to recent increase to 40mg due to incontinence)  - DIURESIS: s/p Bumex 1 mg IVP x 1 in ED, patient received Bumex 2 mg IV BID, discontinued and started with torsemide 20mg 11/23  - Metoprolol Succinate 25 mg PO daily while admitted  - Patient is being discharged on metoprolol 12.5mg po qdaily, entresto 24/26mg BID, torsemide 10mg qdaily, and farxiga 10mg qdaily.     #Atrial fibrillation and flutter  S/p ablation in 2016. Following with SANTIAGO Alan. Continue BB as above and AC with Eliquis.   - Continue home Toprol XL 25 mg daily  - Restarted eliquis 5mg BID and will continue as long as platelets >50k   - SCD's ordered for DVT ppx    #Cardiac amyloidosis.   TTR amyloid confirmed by Tc-PYP scan.  - Following with Dr. Bradley and started on Vyndemax in 5/2022, Dr. Bradley aware that patient was admitted.    #Idiopathic thrombocythemia  h/o thrombocytopenia w/ baseline PLT 60-70s  - Platelts on admission: 58   - Restarted eliquis 5mg BID and will continue as long as platelets >50k  - Heme/onc consulted, appreciate recs. Patient would not like to continue following thrombocytopenia during this admission and prefers to just monitor HF.    #Stage 3 chronic kidney disease.   BUN/Cr on admission: 25/1.39.    #Diabetes  controlled w/ diet and exercise  - A1c on admission 6.1    Patient was discharged to home.     New medications: Eliquis 5mg BID, Farxiga 5mg once daily  Changes to old medications: Torsemide was decreased to 10mg qdaily, Metoprolol 12.5mg qdaily     Items to follow up as outpatient: Please follow up with Dr. Escobar in 2 weeks after discharge. We are restarting Eliquis since your platelets are above 50k. Please continue to monitor your platelet count with Dr. Escobar in order to make sure your platelet count is appropriate for continued Eliquis therapy.  Your appointment with Dr. Escobar was scheduled for 11/29 at 1:45PM. Dr. Bradley is aware of your admission to the hospital, his office will call to schedule a follow up appointment. Please follow up with Dr. Pemberton on 12/12 and Dr. Valdes on 12/19.       Dx: TREATMENT FOR STEMI or HEART FAILURE THIS ADMISSION: YES          If Yes to STEMI or Heart Failure: 7 day Follow-Up Appointment Made: Yes with Dr. Friedman on 11/25 at 11AM.           Cardiac Rehab Indications (STEMI/NSTEMI/ACS/Unstable Angina/CHF/Chronic Stable Angina/Heart Surgery (CABG,Valve)/Post PCI):            *Education on benefits of Cardiac Rehab provided to patient: Yes         *Referral and Prescription Given for Cardiac Rehab: Yes         *Pt given list of locations & instructed to contact their insurance company to review list of participating providers. Yes          *Pt instructed to bring Cardiac Rehab prescription with them to Cardiology Follow up appointment for assistance with enrollment: Yes    CHF: Beta Blocker Prescribed: Yes           ACE-I/ARB/Entresto Prescribed: Yes             #Discharge:  78 y/o M, with PMHx of DM II (currently not on medications), ATTR cardiac amyloid, NICM (LVIDd 4.7 cm, LVEF 37%), AF/Aflu s/p ablation 6/2016 (on Eliquis) s/p CRT-P (2/16/2022), CKD Stage 3 (b/l Cr 1-1.2), DMT2 (Ha1c 6.4% 2/2022), thrombocytopenia (bone marrow biopsy 11/2021 unrevealing) w/ idiopathic thrombocytopenia, chronic venous insufficiency, and prostate Ca s/p chemotherapy, w/ multiple hospitalizations for ADHF within the past year admitted to cardiology for acute on chronic systolic heart failure exacerbation.     Hospital course:   #Acute systolic congestive heart failure  On admission, pt presented w/ JVD, +1 B/L LE Edema, satting 98% on RA   - BNP: 93189, CXR: Cardiomegaly, b/l pleural effusions   - EKG: V-paced at 80 bpm, no acute ST-T wave changes   - ECHO 02/08/22 TTE: LVIDd 4.7 cm, LVEF 37%, LV hypertrophy (septum 1.6, PW 1.7), analysis of DD challenging due to AF, normal RV size with mildly reduced function, HYACINTH, mild MR, mild TR, est PASP 40 mmHg, small pericardial effusion, ascites present  - HOME DIURETIC: Torsemide 20 mg daily (patient reports noncompliance to recent increase to 40mg due to incontinence)  - DIURESIS: s/p Bumex 1 mg IVP x 1 in ED, patient received Bumex 2 mg IV BID, discontinued and started with torsemide 20mg 11/23  - Metoprolol Succinate 25 mg PO daily while admitted  - Patient is being discharged on metoprolol 12.5mg po qdaily, entresto 24/26mg BID, torsemide 10mg qdaily, and farxiga 10mg qdaily.     #Atrial fibrillation and flutter  S/p ablation in 2016. Following with SANTIAGO Alan. Continue BB as above and AC with Eliquis.   - Continue home Toprol XL 25 mg daily  - Restarted eliquis 5mg BID and will continue as long as platelets >50k   - SCD's ordered for DVT ppx    #Cardiac amyloidosis.   TTR amyloid confirmed by Tc-PYP scan.  - Following with Dr. Bradley and started on Vyndemax in 5/2022, Dr. Bradley aware that patient was admitted.    #Idiopathic thrombocythemia  h/o thrombocytopenia w/ baseline PLT 60-70s  - Platelts on admission: 58   - Restarted eliquis 5mg BID and will continue as long as platelets >50k  - Heme/onc consulted, appreciate recs. Patient would not like to continue following thrombocytopenia during this admission and prefers to just monitor HF.    #Stage 3 chronic kidney disease.   BUN/Cr on admission: 25/1.39.    #Diabetes  controlled w/ diet and exercise  - A1c on admission 6.1    Patient was discharged to home.     New medications: Eliquis 5mg BID, Farxiga 5mg once daily  Changes to old medications: Torsemide was decreased to 10mg qdaily, Metoprolol 12.5mg qdaily     Items to follow up as outpatient: Please follow up with Dr. Escobar in 2 weeks after discharge. We are restarting Eliquis since your platelets are above 50k. Please continue to monitor your platelet count with Dr. Escobar in order to make sure your platelet count is appropriate for continued Eliquis therapy.  Your appointment with Dr. Escobar was scheduled for 11/29 at 1:45PM. Dr. Bradley is aware of your admission to the hospital, his office will call to schedule a follow up appointment. Please follow up with Dr. Pemberton on 12/12 and Dr. Valdes on 12/19.       Dx: TREATMENT FOR STEMI or HEART FAILURE THIS ADMISSION: YES          If Yes to STEMI or Heart Failure: 7 day Follow-Up Appointment Made: Yes with Dr. Friedman on 12/2 at 2:30PM- patient's family did not want to be scheduled sooner (11/25 at 11AM)           Cardiac Rehab Indications (STEMI/NSTEMI/ACS/Unstable Angina/CHF/Chronic Stable Angina/Heart Surgery (CABG,Valve)/Post PCI):            *Education on benefits of Cardiac Rehab provided to patient: Yes         *Referral and Prescription Given for Cardiac Rehab: Yes         *Pt given list of locations & instructed to contact their insurance company to review list of participating providers. Yes          *Pt instructed to bring Cardiac Rehab prescription with them to Cardiology Follow up appointment for assistance with enrollment: Yes    CHF: Beta Blocker Prescribed: Yes           ACE-I/ARB/Entresto Prescribed: Yes

## 2022-11-21 NOTE — DISCHARGE NOTE PROVIDER - NSDCCPCAREPLAN_GEN_ALL_CORE_FT
PRINCIPAL DISCHARGE DIAGNOSIS  Diagnosis: Acute systolic congestive heart failure  Assessment and Plan of Treatment: Weigh yourself daily.  If you gain 3lbs in 3 days, or 5lbs in a week call your Health Care Provider.  Eat a low sodium diet (less than 2 grams of sodium a day).  Call your Health Care Provider if you have any swelling or increased swelling in your feet, ankles, and/or stomach.  Take all of your medication as directed.  If you become dizzy call your Health Care Provider.  If you experience chest pain or shortness of breath, call an ambulance and come to the emergency department. Please continue to take ___      SECONDARY DISCHARGE DIAGNOSES  Diagnosis: Atrial fibrillation and flutter  Assessment and Plan of Treatment: Atrial fibrillation (A-fib) is an irregular and often very rapid heart rhythm (arrhythmia) that can lead to blood clots in the heart. A-fib increases the risk of stroke, heart failure and other heart-related complications.  During atrial fibrillation, the heart's upper chambers (the atria) beat chaotically and irregularly — out of sync with the lower chambers (the ventricles) of the heart. For many people, A-fib may have no symptoms. However, A-fib may cause a fast, pounding heartbeat (palpitations), shortness of breath or weakness.  Episodes of atrial fibrillation may come and go, or they may be persistent. Although A-fib itself usually isn't life-threatening, it's a serious medical condition that requires proper treatment to prevent stroke.  Treatment for atrial fibrillation may include medications, therapy to reset the heart rhythm and catheter procedures to block faulty heart signals.  You are currently on xx for the AFib, please follow up with cardiology outpatient.     PRINCIPAL DISCHARGE DIAGNOSIS  Diagnosis: Acute systolic congestive heart failure  Assessment and Plan of Treatment: Weigh yourself daily.  If you gain 3lbs in 3 days, or 5lbs in a week call your Health Care Provider.  Eat a low sodium diet (less than 2 grams of sodium a day).  Call your Health Care Provider if you have any swelling or increased swelling in your feet, ankles, and/or stomach.  Take all of your medication as directed.  If you become dizzy call your Health Care Provider.  If you experience chest pain or shortness of breath, call an ambulance and come to the emergency department. Please continue to take ___      SECONDARY DISCHARGE DIAGNOSES  Diagnosis: Atrial fibrillation and flutter  Assessment and Plan of Treatment: Atrial fibrillation (A-fib) is an irregular and often very rapid heart rhythm (arrhythmia) that can lead to blood clots in the heart. A-fib increases the risk of stroke, heart failure and other heart-related complications.  During atrial fibrillation, the heart's upper chambers (the atria) beat chaotically and irregularly — out of sync with the lower chambers (the ventricles) of the heart. For many people, A-fib may have no symptoms. However, A-fib may cause a fast, pounding heartbeat (palpitations), shortness of breath or weakness.  Episodes of atrial fibrillation may come and go, or they may be persistent. Although A-fib itself usually isn't life-threatening, it's a serious medical condition that requires proper treatment to prevent stroke.  Treatment for atrial fibrillation may include medications, therapy to reset the heart rhythm and catheter procedures to block faulty heart signals.  You are currently on Eliquis 5mb every 12 hours for the AFib, please follow up with Dr. Pemberton and Dr. Bradley outpatient.    Diagnosis: Idiopathic thrombocythemia  Assessment and Plan of Treatment: Your platelet counts have been low, but since they are above 50,000 we are restarting Eliqiuis. Please continue to follow up with Dr. Escobar in order to continue monitoring your platelet count while on Eliquis. If your platelet count drops below 50,000, you will need to discontinue Eliquis at the discretion of your hematologist. Please continue to monitor your platelet count and follow up with Dr. Escobar.     PRINCIPAL DISCHARGE DIAGNOSIS  Diagnosis: Acute systolic congestive heart failure  Assessment and Plan of Treatment: Weigh yourself daily.  If you gain 3lbs in 3 days, or 5lbs in a week call your Health Care Provider.  Eat a low sodium diet (less than 2 grams of sodium a day).  Call your Health Care Provider if you have any swelling or increased swelling in your feet, ankles, and/or stomach.  Take all of your medication as directed.  If you become dizzy call your Health Care Provider.  If you experience chest pain or shortness of breath, call an ambulance and come to the emergency department.   You are being discharged with the following medications for your heart failure: metoprolol 12.5 mg once a day, entresto 24/26mg every 12 hours, torsemide 10mg once a day, and farxiga 10mg once a day. Please try to make sure you take the medications at different times. Do not take all of the medications at once. We want to avoid having your blood pressure drop suddenly, so it is important to schedule your medications for different times. Please make sure to follow up with Dr. Friedman, your heart failure cardiologist on Friday 11/25 at 11AM. Be well and thank  you for trusting us with your care.      SECONDARY DISCHARGE DIAGNOSES  Diagnosis: Atrial fibrillation and flutter  Assessment and Plan of Treatment: Atrial fibrillation (A-fib) is an irregular and often very rapid heart rhythm (arrhythmia) that can lead to blood clots in the heart. A-fib increases the risk of stroke, heart failure and other heart-related complications.  During atrial fibrillation, the heart's upper chambers (the atria) beat chaotically and irregularly — out of sync with the lower chambers (the ventricles) of the heart. For many people, A-fib may have no symptoms. However, A-fib may cause a fast, pounding heartbeat (palpitations), shortness of breath or weakness.  Episodes of atrial fibrillation may come and go, or they may be persistent. Although A-fib itself usually isn't life-threatening, it's a serious medical condition that requires proper treatment to prevent stroke.  Treatment for atrial fibrillation may include medications, therapy to reset the heart rhythm and catheter procedures to block faulty heart signals.  You are currently on Eliquis 5mb every 12 hours for the AFib, please follow up with Dr. Pemberton and Dr. Bradley outpatient.    Diagnosis: Idiopathic thrombocythemia  Assessment and Plan of Treatment: Your platelet counts have been low, but since they are above 50,000 we are restarting Eliqiuis. Please continue to follow up with Dr. Escobar in order to continue monitoring your platelet count while on Eliquis. If your platelet count drops below 50,000, you will need to discontinue Eliquis at the discretion of your hematologist. Please continue to monitor your platelet count and follow up with Dr. Escobar on November 29 at 1:45PM.     PRINCIPAL DISCHARGE DIAGNOSIS  Diagnosis: Acute systolic congestive heart failure  Assessment and Plan of Treatment: Weigh yourself daily.  If you gain 3lbs in 3 days, or 5lbs in a week call your Health Care Provider.  Eat a low sodium diet (less than 2 grams of sodium a day).  Call your Health Care Provider if you have any swelling or increased swelling in your feet, ankles, and/or stomach.  Take all of your medication as directed.  If you become dizzy call your Health Care Provider.  If you experience chest pain or shortness of breath, call an ambulance and come to the emergency department.   You are being discharged with the following medications for your heart failure: metoprolol 12.5 mg once a day, entresto 24/26mg every 12 hours, torsemide 10mg once a day, and farxiga 10mg once a day. Please try to make sure you take the medications at different times. Do not take all of the medications at once. We want to avoid having your blood pressure drop suddenly, so it is important to schedule your medications for different times. Please make sure to follow up with Dr. Friedman, your heart failure cardiologist on Friday 12/2 at 2:30PM. Be well and thank  you for trusting us with your care.      SECONDARY DISCHARGE DIAGNOSES  Diagnosis: Atrial fibrillation and flutter  Assessment and Plan of Treatment: Atrial fibrillation (A-fib) is an irregular and often very rapid heart rhythm (arrhythmia) that can lead to blood clots in the heart. A-fib increases the risk of stroke, heart failure and other heart-related complications.  During atrial fibrillation, the heart's upper chambers (the atria) beat chaotically and irregularly — out of sync with the lower chambers (the ventricles) of the heart. For many people, A-fib may have no symptoms. However, A-fib may cause a fast, pounding heartbeat (palpitations), shortness of breath or weakness.  Episodes of atrial fibrillation may come and go, or they may be persistent. Although A-fib itself usually isn't life-threatening, it's a serious medical condition that requires proper treatment to prevent stroke.  Treatment for atrial fibrillation may include medications, therapy to reset the heart rhythm and catheter procedures to block faulty heart signals.  You are currently on Eliquis 5mb every 12 hours for the AFib, please follow up with Dr. Pemberton and Dr. Bradley outpatient.    Diagnosis: Idiopathic thrombocythemia  Assessment and Plan of Treatment: Your platelet counts have been low, but since they are above 50,000 we are restarting Eliqiuis. Please continue to follow up with Dr. Escobar in order to continue monitoring your platelet count while on Eliquis. If your platelet count drops below 50,000, you will need to discontinue Eliquis at the discretion of your hematologist. Please continue to monitor your platelet count and follow up with Dr. Escobar on November 29 at 1:45PM.

## 2022-11-21 NOTE — CONSULT NOTE ADULT - ASSESSMENT
Assessment: 76 y/o M, with PMHx of DM II (currently not on medications), ATTR cardiac amyloid, NICM (LVIDd 4.7 cm, LVEF 37%), AF/Aflu s/p ablation 6/2016 (on Eliquis) s/p CRT-P (2/16/2022), CKD Stage 3 (b/l Cr 1-1.2), DMT2 (Ha1c 6.4% 2/2022), thrombocytopenia (bone marrow biopsy 11/2021 unrevealing) w/ idiopathic thrombocytopenia, chronic venous insufficiency, and prostate Ca s/p chemotherapy, w/ multiple hospitalizations for ADHF within the past year admitted to cardiology for acute on chronic systolic heart failure exacerbation. CXR showing improved (on diuretics) but residual right pleural effusion. IR consulted for right thoracentesis. Case reviewed with Dr. Dickson, plan for procedure with local anesthesia-if pt agreeable     Recommendations: Please ensure Covid swab is up to date (within last 72 hours).    Communicated with: Dr. Mullins primary team

## 2022-11-21 NOTE — CONSULT NOTE ADULT - SUBJECTIVE AND OBJECTIVE BOX
76 y/o M, with PMHx of DM II (currently not on medications), ATTR cardiac amyloid, NICM (LVIDd 4.7 cm, LVEF 37%), AF/Aflu s/p ablation 6/2016 (on Eliquis) s/p CRT-P (2/16/2022), CKD Stage 3 (b/l Cr 1-1.2), DMT2 (Ha1c 6.4% 2/2022), thrombocytopenia (bone marrow biopsy 11/2021 unrevealing) w/ idiopathic thrombocytopenia, chronic venous insufficiency, and prostate Ca s/p chemotherapy, w/ multiple hospitalizations for ADHF within the past year admitted to cardiology for acute on chronic systolic heart failure exacerbation. CXR showing improved (on diuretics) but residual right pleural effusion. IR consulted for right thoracentesis.     Clinical History: ACUTE EXACERBATION OF CO    No pertinent family history in first degree relatives    Handoff    MEWS Score    Congestive heart failure (CHF)    Diabetes    Atrial flutter    Chronic venous insufficiency of lower extremity    Prostate CA    Stage 3 chronic kidney disease    Heart failure with mid-range ejection fraction    Type 2 diabetes mellitus    Thrombocytopenia    Acute on chronic systolic congestive heart failure    Chronic atrial fibrillation    Acute exacerbation of congestive heart failure    Acute systolic congestive heart failure    Atrial fibrillation and flutter    High serum amyloid A    Cardiac amyloidosis    Idiopathic thrombocythemia    Stage 3 chronic kidney disease    Diabetes    Prophylactic measure    Atrial flutter    History of surgical removal of pituitary gland    S/P TURP    SHORTNESS OF BREATH    90+    Room Service Assist    [email or web address]        Meds:buMETAnide Injectable 2 milliGRAM(s) IV Push every 12 hours  escitalopram 5 milliGRAM(s) Oral daily  metoprolol succinate ER 25 milliGRAM(s) Oral daily  sacubitril 24 mG/valsartan 26 mG 1 Tablet(s) Oral two times a day  tamsulosin 0.4 milliGRAM(s) Oral at bedtime      Allergies:No Known Allergies        Labs:                           13.5   2.59  )-----------( 63       ( 21 Nov 2022 05:30 )             39.8       11-21    143  |  106  |  27<H>  ----------------------------<  80  3.8   |  28  |  1.08    Ca    8.6      21 Nov 2022 05:30  Phos  2.7     11-21  Mg     2.1     11-21            Imaging Findings: 11/20 CXR improved right pleural effusion

## 2022-11-21 NOTE — PROGRESS NOTE ADULT - SUBJECTIVE AND OBJECTIVE BOX
O/N Events: no acute events overnight.     Subjective/ROS: Patient seen and examined at bedside.     Denies Fever/Chills, HA, CP, SOB, n/v, changes in bowel/urinary habits.  12pt ROS otherwise negative.    VITALS  Vital Signs Last 24 Hrs  T(C): 36.9 (21 Nov 2022 04:30), Max: 37.1 (20 Nov 2022 10:04)  T(F): 98.4 (21 Nov 2022 04:30), Max: 98.8 (20 Nov 2022 10:04)  HR: 80 (21 Nov 2022 05:27) (80 - 83)  BP: 108/73 (21 Nov 2022 05:27) (89/59 - 129/88)  BP(mean): 87 (21 Nov 2022 05:27) (70 - 101)  RR: 15 (21 Nov 2022 05:27) (13 - 19)  SpO2: 98% (21 Nov 2022 05:27) (95% - 98%)    Parameters below as of 21 Nov 2022 05:27  Patient On (Oxygen Delivery Method): room air        CAPILLARY BLOOD GLUCOSE    PHYSICAL EXAM-INCOMPLETE   General: NAD  Head: NC/AT; MMM; PERRL; EOMI;  Neck: Supple; no JVD  Respiratory: CTAB; no wheezes/rales/rhonchi  Cardiovascular: Regular rhythm/rate; S1/S2+, no murmurs, rubs gallops   Gastrointestinal: Soft; NTND; bowel sounds normal and present  Extremities: WWP; no edema/cyanosis  Neurological: A&Ox3, CNII-XII grossly intact; no obvious focal deficits    MEDICATIONS  (STANDING):  buMETAnide Injectable 2 milliGRAM(s) IV Push every 12 hours  escitalopram 5 milliGRAM(s) Oral daily  metoprolol succinate ER 25 milliGRAM(s) Oral daily  sacubitril 24 mG/valsartan 26 mG 1 Tablet(s) Oral two times a day  tamsulosin 0.4 milliGRAM(s) Oral at bedtime    MEDICATIONS  (PRN):      No Known Allergies      LABS                        13.5   2.59  )-----------( x        ( 21 Nov 2022 05:30 )             39.8     11-21    143  |  106  |  27<H>  ----------------------------<  80  3.8   |  28  |  1.08    Ca    8.6      21 Nov 2022 05:30  Phos  2.7     11-21  Mg     2.1     11-21    IMAGING/EKG/ETC   O/N Events: no acute events overnight.     Subjective/ROS: Patient seen and examined at bedside.     Denies Fever/Chills, HA, CP, SOB, n/v, changes in bowel/urinary habits.  12pt ROS otherwise negative.    VITALS  Vital Signs Last 24 Hrs  T(C): 36.9 (21 Nov 2022 04:30), Max: 37.1 (20 Nov 2022 10:04)  T(F): 98.4 (21 Nov 2022 04:30), Max: 98.8 (20 Nov 2022 10:04)  HR: 80 (21 Nov 2022 05:27) (80 - 83)  BP: 108/73 (21 Nov 2022 05:27) (89/59 - 129/88)  BP(mean): 87 (21 Nov 2022 05:27) (70 - 101)  RR: 15 (21 Nov 2022 05:27) (13 - 19)  SpO2: 98% (21 Nov 2022 05:27) (95% - 98%)    Parameters below as of 21 Nov 2022 05:27  Patient On (Oxygen Delivery Method): room air    CAPILLARY BLOOD GLUCOSE    PHYSICAL EXAM  General: NAD  Head: NC/AT; MMM  Neck: Supple; no JVD  Respiratory: CTAB; minimal crackles auscultated on RLL field   Cardiovascular: Regular rhythm/rate; S1/S2+, no murmurs, rubs gallops   Gastrointestinal: Soft; NTND; bowel sounds normal and present  Extremities: WWP; 1+ pitting edema of b/l LE  Neurological: A&Ox3    MEDICATIONS  (STANDING):  buMETAnide Injectable 2 milliGRAM(s) IV Push every 12 hours  escitalopram 5 milliGRAM(s) Oral daily  metoprolol succinate ER 25 milliGRAM(s) Oral daily  sacubitril 24 mG/valsartan 26 mG 1 Tablet(s) Oral two times a day  tamsulosin 0.4 milliGRAM(s) Oral at bedtime    MEDICATIONS  (PRN):      No Known Allergies      LABS                        13.5   2.59  )-----------( x        ( 21 Nov 2022 05:30 )             39.8     11-21    143  |  106  |  27<H>  ----------------------------<  80  3.8   |  28  |  1.08    Ca    8.6      21 Nov 2022 05:30  Phos  2.7     11-21  Mg     2.1     11-21    IMAGING/EKG/ETC

## 2022-11-21 NOTE — CONSULT NOTE ADULT - ASSESSMENT
78 yo M, [facility], never a smoker, with PMHx of AFlutter/AFib s/p ablation (06/2016) on Eliquis, s/p PPM, NICM, ATTR-associated cardiac amyloidosis (diagnosed 02/2022) on Vyndamax since 05/2022, CKD3 (Baseline Cr 1-1.2), DM2, prostate cancer s/p chemotherapy, idiopathic thrombocytopenia s/p bone marrow biopsy (11/23/2021) with limited morphologic evaluation due to insufficient sample size but showed Congo red negativity with full E/M maturation and normal-appearing megakaryocytes, normal cytogenetics, presents with increased work of breathing, diagnosed with acute decompensated systolic heart failure on diuresis. Peripheral blood smear (11/23/21) showed no evidence of acute leukemia or lymphoma. Prior Serum Kappa 5.66 and Lambda 3.83 in 02/09/22. Urine Kappa/Lambda 1.48 (02/09/22). Hematology consulted for thrombocytopenia. Mild-moderate leukopenia was also noted (, ANC 1330). Patient denies night sweats or fevers/chills, endorses 20-25 lb weight loss over the past year.    Platelet Trend: 108 (02/17/22) --> 86 (05/17/22) --> 58 (11/18) --> 58 (11/19) --> 58 (11/20) --> 63 (11/21)     #) DIAGNOSIS  - Grade 2 thrombocytopenia  - Mild prolonged INR, possibly related to Eliquis  - ATTR-associated cardiac amyloidosis on Vyndamax (Tafamidis)  - History of prostate cancer     #) PLAN  - Patient has gradually declining platelet count since 02/2022. Of note, thrombocytopenia has been noted since patient was started on tafamidis meglumine which is a transthyretin stabilizer. It is approved by the FDA in 05/2019. Thrombocytopenia has not yet been noted in this particular drug as a side effect.   - Peripheral blood smear (11/21/22) showed some Josh cells with rare schistocytes (0-1/hpf). Large and giant platelets present; no platelet clumping. Rare tear drop cells.   - Send Iron studies, B12/Folate to assess for micronutrient deficiency. Send HCV and HIV. Blue top platelets.   - Prior radiographic imaging did not show hepatosplenomegaly. Lymphadenopathy could not be properly assessed on CT (02/2022) given extensive fluid overload. Can consider repeating US Abdomen to reassess given declining platelets  - Send myeloma panel: SPEP, serum immunofixation, FLC, quantitative immunoglobulins, UPEP, urine immunofixation   - Send peripheral flow cytometry given persistent lymphopenia/neutropenia   - Send serum PSA and LDH   - Upon discharge, follow-up with Dr. Carley Escobar in 2 weeks at [facility]    To discuss with Dr. Carley Escobar 76 yo M, [facility], never a smoker, with PMHx of AFlutter/AFib s/p ablation (06/2016) on Eliquis, s/p PPM, NICM, ATTR-associated cardiac amyloidosis (diagnosed 02/2022) on Vyndamax since 05/2022, CKD3 (Baseline Cr 1-1.2), DM2, prostate cancer s/p chemotherapy, idiopathic thrombocytopenia s/p bone marrow biopsy (11/23/2021) with limited morphologic evaluation due to insufficient sample size but showed Congo red negativity with full E/M maturation and normal-appearing megakaryocytes, normal cytogenetics, presents with increased work of breathing, diagnosed with acute decompensated systolic heart failure on diuresis. Peripheral blood smear (11/23/21) showed no evidence of acute leukemia or lymphoma. Prior Serum Kappa 5.66 and Lambda 3.83 in 02/09/22. Urine Kappa/Lambda 1.48 (02/09/22). Hematology consulted for thrombocytopenia. Mild-moderate leukopenia was also noted (, ANC 1330). Patient denies night sweats or fevers/chills, endorses 20-25 lb weight loss over the past year.    Platelet Trend: 108 (02/17/22) --> 86 (05/17/22) --> 58 (11/18) --> 58 (11/19) --> 58 (11/20) --> 63 (11/21)     #) DIAGNOSIS  - Grade 2 thrombocytopenia  - Mild prolonged INR  - ATTR-associated cardiac amyloidosis on Vyndamax (Tafamidis)  - History of prostate cancer     #) PLAN  - Patient has gradually declining platelet count since 02/2022. Of note, thrombocytopenia has been noted since patient was started on tafamidis meglumine which is a transthyretin stabilizer. It is approved by the FDA in 05/2019. Thrombocytopenia has not yet been noted in this particular drug as a side effect.   - Peripheral blood smear (11/21/22) showed some Josh cells with rare schistocytes (0-1/hpf). Large and giant platelets present; no platelet clumping. Rare tear drop cells.   - Send Iron studies, B12/Folate to assess for micronutrient deficiency. Send HCV and HIV. Blue top platelets.   - Prior radiographic imaging did not show hepatosplenomegaly. Lymphadenopathy could not be properly assessed on CT (02/2022) given extensive fluid overload. Can consider repeating US Abdomen to reassess given declining platelets  - Send myeloma panel: SPEP, serum immunofixation, FLC, quantitative immunoglobulins, UPEP, urine immunofixation   - Send peripheral flow cytometry given persistent lymphopenia/neutropenia   - Send serum PSA and LDH   - Send PT/PTT mixing study   - Upon discharge, follow-up with Dr. Carley Escobar in 2 weeks at [facility]    To discuss with Dr. Carley Escobar 78 yo M, [facility], never a smoker, with PMHx of AFlutter/AFib s/p ablation (06/2016) on Eliquis, s/p PPM, NICM, ATTR-associated cardiac amyloidosis (diagnosed 02/2022) on Vyndamax since 05/2022, CKD3 (Baseline Cr 1-1.2), DM2, prostate cancer s/p chemotherapy, idiopathic thrombocytopenia s/p bone marrow biopsy (11/23/2021) with limited morphologic evaluation due to insufficient sample size but showed Congo red negativity with full E/M maturation and normal-appearing megakaryocytes, normal cytogenetics, presents with increased work of breathing, diagnosed with acute decompensated systolic heart failure on diuresis. Peripheral blood smear (11/23/21) showed no evidence of acute leukemia or lymphoma. Prior Serum Kappa 5.66 and Lambda 3.83 in 02/09/22. Urine Kappa/Lambda 1.48 (02/09/22). Hematology consulted for thrombocytopenia. Mild-moderate leukopenia was also noted (, ANC 1330). Patient denies night sweats or fevers/chills, endorses 20-25 lb weight loss over the past year.    Platelet Trend: 108 (02/17/22) --> 86 (05/17/22) --> 58 (11/18) --> 58 (11/19) --> 58 (11/20) --> 63 (11/21)     #) DIAGNOSIS  - Grade 2 thrombocytopenia  - Mild prolonged INR  - ATTR-associated cardiac amyloidosis on Vyndamax (Tafamidis)  - History of prostate cancer     #) PLAN  - Patient has gradually declining platelet count since 02/2022. Of note, thrombocytopenia has been noted since patient was started on tafamidis meglumine which is a transthyretin stabilizer. It is approved by the FDA in 05/2019. Thrombocytopenia has not yet been noted in this particular drug as a side effect.   - Peripheral blood smear (11/21/22) showed some Josh cells with rare schistocytes (0-1/hpf). Large and giant platelets present; no platelet clumping. Rare tear drop cells.   - Send Iron studies, B12/Folate to assess for micronutrient deficiency. Send HCV and HIV. Blue top platelets.   - Prior radiographic imaging did not show hepatosplenomegaly. Lymphadenopathy could not be properly assessed on CT (02/2022) given extensive fluid overload. Can consider repeating US Abdomen to reassess given declining platelets  - Send myeloma panel: SPEP, serum immunofixation, FLC, quantitative immunoglobulins, UPEP, urine immunofixation   - Send peripheral flow cytometry given persistent lymphopenia/neutropenia   - Send serum PSA and LDH   - Send PT/PTT mixing study   - Upon discharge, follow-up with Dr. Carley Escobar in 2 weeks at [facility]    Discussed with Dr. Carley Escobar

## 2022-11-21 NOTE — DISCHARGE NOTE PROVIDER - NPI NUMBER (FOR SYSADMIN USE ONLY) :
[id number]] [[id number] [[id number] [[id number]],[[id number]] [[id number]],[UNKNOWN]

## 2022-11-21 NOTE — PROGRESS NOTE ADULT - PROBLEM SELECTOR PLAN 3
TTR amyloid confirmed by Tc-PYP scan.  - Following with Dr. Bradley and started on Vyndemax in 5/2022 TTR amyloid confirmed by Tc-PYP scan.  - Following with Dr. Bradley and started on Vyndemax in 5/2022, Dr. Bradley aware that patient was admitted.

## 2022-11-22 LAB
ALBUMIN SERPL ELPH-MCNC: 3.6 G/DL — SIGNIFICANT CHANGE UP (ref 3.3–5)
ALP SERPL-CCNC: 125 U/L — HIGH (ref 40–120)
ALT FLD-CCNC: 14 U/L — SIGNIFICANT CHANGE UP (ref 10–45)
ANION GAP SERPL CALC-SCNC: 6 MMOL/L — SIGNIFICANT CHANGE UP (ref 5–17)
ANION GAP SERPL CALC-SCNC: 7 MMOL/L — SIGNIFICANT CHANGE UP (ref 5–17)
AST SERPL-CCNC: 13 U/L — SIGNIFICANT CHANGE UP (ref 10–40)
BASOPHILS # BLD AUTO: 0.01 K/UL — SIGNIFICANT CHANGE UP (ref 0–0.2)
BASOPHILS NFR BLD AUTO: 0.3 % — SIGNIFICANT CHANGE UP (ref 0–2)
BILIRUB SERPL-MCNC: 0.8 MG/DL — SIGNIFICANT CHANGE UP (ref 0.2–1.2)
BUN SERPL-MCNC: 33 MG/DL — HIGH (ref 7–23)
BUN SERPL-MCNC: 33 MG/DL — HIGH (ref 7–23)
CALCIUM SERPL-MCNC: 8.9 MG/DL — SIGNIFICANT CHANGE UP (ref 8.4–10.5)
CALCIUM SERPL-MCNC: 8.9 MG/DL — SIGNIFICANT CHANGE UP (ref 8.4–10.5)
CHLORIDE SERPL-SCNC: 105 MMOL/L — SIGNIFICANT CHANGE UP (ref 96–108)
CHLORIDE SERPL-SCNC: 106 MMOL/L — SIGNIFICANT CHANGE UP (ref 96–108)
CO2 SERPL-SCNC: 30 MMOL/L — SIGNIFICANT CHANGE UP (ref 22–31)
CO2 SERPL-SCNC: 30 MMOL/L — SIGNIFICANT CHANGE UP (ref 22–31)
CREAT SERPL-MCNC: 1.12 MG/DL — SIGNIFICANT CHANGE UP (ref 0.5–1.3)
CREAT SERPL-MCNC: 1.2 MG/DL — SIGNIFICANT CHANGE UP (ref 0.5–1.3)
CREATININE, URINE RESULT: 16 MG/DL — SIGNIFICANT CHANGE UP
EGFR: 63 ML/MIN/1.73M2 — SIGNIFICANT CHANGE UP
EGFR: 69 ML/MIN/1.73M2 — SIGNIFICANT CHANGE UP
EOSINOPHIL # BLD AUTO: 0.04 K/UL — SIGNIFICANT CHANGE UP (ref 0–0.5)
EOSINOPHIL NFR BLD AUTO: 1.4 % — SIGNIFICANT CHANGE UP (ref 0–6)
FIBRINOGEN PPP-MCNC: 223 MG/DL — LOW (ref 258–438)
GLUCOSE SERPL-MCNC: 105 MG/DL — HIGH (ref 70–99)
GLUCOSE SERPL-MCNC: 124 MG/DL — HIGH (ref 70–99)
HCT VFR BLD CALC: 38.1 % — LOW (ref 39–50)
HGB BLD-MCNC: 13.2 G/DL — SIGNIFICANT CHANGE UP (ref 13–17)
IGA FLD-MCNC: 462 MG/DL — SIGNIFICANT CHANGE UP (ref 84–499)
IGG FLD-MCNC: 1186 MG/DL — SIGNIFICANT CHANGE UP (ref 610–1660)
IGM SERPL-MCNC: 116 MG/DL — SIGNIFICANT CHANGE UP (ref 35–242)
IMM GRANULOCYTES NFR BLD AUTO: 0.3 % — SIGNIFICANT CHANGE UP (ref 0–0.9)
INR BLD: 1.34 — HIGH (ref 0.88–1.16)
KAPPA LC SER QL IFE: 5.4 MG/DL — HIGH (ref 0.33–1.94)
KAPPA LC SER QL IFE: 5.4 MG/DL — HIGH (ref 0.33–1.94)
KAPPA LC SER QL IFE: 5.56 MG/DL — HIGH (ref 0.33–1.94)
KAPPA/LAMBDA FREE LIGHT CHAIN RATIO, SERUM: 1.79 RATIO — HIGH (ref 0.26–1.65)
KAPPA/LAMBDA FREE LIGHT CHAIN RATIO, SERUM: 1.81 RATIO — HIGH (ref 0.26–1.65)
KAPPA/LAMBDA FREE LIGHT CHAIN RATIO, SERUM: 1.81 RATIO — HIGH (ref 0.26–1.65)
LAMBDA LC SER QL IFE: 2.99 MG/DL — HIGH (ref 0.57–2.63)
LAMBDA LC SER QL IFE: 2.99 MG/DL — HIGH (ref 0.57–2.63)
LAMBDA LC SER QL IFE: 3.11 MG/DL — HIGH (ref 0.57–2.63)
LDH SERPL L TO P-CCNC: 176 U/L — SIGNIFICANT CHANGE UP (ref 50–242)
LDH SERPL L TO P-CCNC: 177 U/L — SIGNIFICANT CHANGE UP (ref 50–242)
LYMPHOCYTES # BLD AUTO: 0.81 K/UL — LOW (ref 1–3.3)
LYMPHOCYTES # BLD AUTO: 27.7 % — SIGNIFICANT CHANGE UP (ref 13–44)
MAGNESIUM SERPL-MCNC: 2.1 MG/DL — SIGNIFICANT CHANGE UP (ref 1.6–2.6)
MCHC RBC-ENTMCNC: 30.5 PG — SIGNIFICANT CHANGE UP (ref 27–34)
MCHC RBC-ENTMCNC: 34.6 GM/DL — SIGNIFICANT CHANGE UP (ref 32–36)
MCV RBC AUTO: 88 FL — SIGNIFICANT CHANGE UP (ref 80–100)
MONOCYTES # BLD AUTO: 0.31 K/UL — SIGNIFICANT CHANGE UP (ref 0–0.9)
MONOCYTES NFR BLD AUTO: 10.6 % — SIGNIFICANT CHANGE UP (ref 2–14)
NEUTROPHILS # BLD AUTO: 1.74 K/UL — LOW (ref 1.8–7.4)
NEUTROPHILS NFR BLD AUTO: 59.7 % — SIGNIFICANT CHANGE UP (ref 43–77)
NRBC # BLD: 0 /100 WBCS — SIGNIFICANT CHANGE UP (ref 0–0)
PHOSPHATE SERPL-MCNC: 3.1 MG/DL — SIGNIFICANT CHANGE UP (ref 2.5–4.5)
PLATELET # BLD AUTO: 69 K/UL — LOW (ref 150–400)
POTASSIUM SERPL-MCNC: 3.6 MMOL/L — SIGNIFICANT CHANGE UP (ref 3.5–5.3)
POTASSIUM SERPL-MCNC: 3.7 MMOL/L — SIGNIFICANT CHANGE UP (ref 3.5–5.3)
POTASSIUM SERPL-SCNC: 3.6 MMOL/L — SIGNIFICANT CHANGE UP (ref 3.5–5.3)
POTASSIUM SERPL-SCNC: 3.7 MMOL/L — SIGNIFICANT CHANGE UP (ref 3.5–5.3)
PROT ?TM UR-MCNC: 5 MG/DL — SIGNIFICANT CHANGE UP (ref 0–12)
PROT ?TM UR-MCNC: 5 MG/DL — SIGNIFICANT CHANGE UP (ref 0–12)
PROT SERPL-MCNC: 5.8 G/DL — LOW (ref 6–8.3)
PROT SERPL-MCNC: 5.8 G/DL — LOW (ref 6–8.3)
PROT SERPL-MCNC: 6.4 G/DL — SIGNIFICANT CHANGE UP (ref 6–8.3)
PROTHROM AB SERPL-ACNC: 16 SEC — HIGH (ref 10.5–13.4)
PSA FLD-MCNC: 0.09 NG/ML — SIGNIFICANT CHANGE UP (ref 0–4)
PT 50/50: 13.1 SECS — SIGNIFICANT CHANGE UP (ref 10.5–14.5)
RBC # BLD: 4.33 M/UL — SIGNIFICANT CHANGE UP (ref 4.2–5.8)
RBC # FLD: 15.4 % — HIGH (ref 10.3–14.5)
SODIUM SERPL-SCNC: 142 MMOL/L — SIGNIFICANT CHANGE UP (ref 135–145)
SODIUM SERPL-SCNC: 142 MMOL/L — SIGNIFICANT CHANGE UP (ref 135–145)
THROMBIN TIME: 25.8 SEC — HIGH (ref 17.6–24)
WBC # BLD: 2.92 K/UL — LOW (ref 3.8–10.5)
WBC # FLD AUTO: 2.92 K/UL — LOW (ref 3.8–10.5)

## 2022-11-22 PROCEDURE — 99233 SBSQ HOSP IP/OBS HIGH 50: CPT

## 2022-11-22 RX ORDER — APIXABAN 2.5 MG/1
5 TABLET, FILM COATED ORAL EVERY 12 HOURS
Refills: 0 | Status: DISCONTINUED | OUTPATIENT
Start: 2022-11-22 | End: 2022-11-23

## 2022-11-22 RX ORDER — SODIUM CHLORIDE 9 MG/ML
250 INJECTION INTRAMUSCULAR; INTRAVENOUS; SUBCUTANEOUS
Refills: 0 | Status: DISCONTINUED | OUTPATIENT
Start: 2022-11-22 | End: 2022-11-23

## 2022-11-22 RX ORDER — DAPAGLIFLOZIN 10 MG/1
10 TABLET, FILM COATED ORAL EVERY 24 HOURS
Refills: 0 | Status: DISCONTINUED | OUTPATIENT
Start: 2022-11-22 | End: 2022-11-23

## 2022-11-22 RX ORDER — SACUBITRIL AND VALSARTAN 24; 26 MG/1; MG/1
1 TABLET, FILM COATED ORAL EVERY 12 HOURS
Refills: 0 | Status: DISCONTINUED | OUTPATIENT
Start: 2022-11-23 | End: 2022-11-23

## 2022-11-22 RX ORDER — SACUBITRIL AND VALSARTAN 24; 26 MG/1; MG/1
1 TABLET, FILM COATED ORAL EVERY 12 HOURS
Refills: 0 | Status: DISCONTINUED | OUTPATIENT
Start: 2022-11-22 | End: 2022-11-22

## 2022-11-22 RX ADMIN — SODIUM CHLORIDE 125 MILLILITER(S): 9 INJECTION INTRAMUSCULAR; INTRAVENOUS; SUBCUTANEOUS at 17:07

## 2022-11-22 RX ADMIN — BUMETANIDE 2 MILLIGRAM(S): 0.25 INJECTION INTRAMUSCULAR; INTRAVENOUS at 10:58

## 2022-11-22 RX ADMIN — ESCITALOPRAM OXALATE 5 MILLIGRAM(S): 10 TABLET, FILM COATED ORAL at 11:00

## 2022-11-22 RX ADMIN — TAMSULOSIN HYDROCHLORIDE 0.4 MILLIGRAM(S): 0.4 CAPSULE ORAL at 22:31

## 2022-11-22 RX ADMIN — APIXABAN 5 MILLIGRAM(S): 2.5 TABLET, FILM COATED ORAL at 17:15

## 2022-11-22 RX ADMIN — DAPAGLIFLOZIN 10 MILLIGRAM(S): 10 TABLET, FILM COATED ORAL at 14:12

## 2022-11-22 RX ADMIN — Medication 25 MILLIGRAM(S): at 11:00

## 2022-11-22 NOTE — CARDIOLOGY SUMMARY
[de-identified] : 11/18/22 ECG: v-paved at 81 bpm\par 03/14/22 ECG : electronic pacemaker, atrial fibrillation, Bi-V paced.  [de-identified] : \par 02/08/22 TTE: LVIDd 4.7 cm, LVEF 37%, LV hypertrophy (septum 1.6, PW 1.7), analysis of DD challenging due to AF, normal RV size with mildly reduced function, HYACINTH, mild MR, mild TR, est PASP 40 mmHg, small pericardial effusion, ascites present\par \par 06/29/21 TTE: LVIDd 4.4 cm, LVEF 50%, severe concentric LVH (septum 1.7, PW 1.8), normal RV size and function, HYACINTH, trace MR, mild-mod TR, est PASP 36 mmHg, small pericardial effusion\par  [de-identified] : 02/10/22 TcPYP: Intense uptake in LV and part of the right ventricular myocardium in a pattern consistent with TTR-mediated amyloidosis.  [de-identified] : 02/24/22: insertion of CRT-P

## 2022-11-22 NOTE — DISCUSSION/SUMMARY
[FreeTextEntry1] : This is a 77 year old man with ATTR cardiac amyloid, NICM (LVIDd 4.7 cm, LVEF 37%), AF/Aflu s/p ablation 6/2016 (on Eliquis) s/p CRT-P (2/16/2022), CKD Stage 3 (b/l Cr 1-1.2), DMT2 (Ha1c 6.4% 2/2022), thrombocytopenia (bone marrow biopsy 11/2021 unrevealing) w/ idiopathic thrombocytopenia, chronic venous insufficiency, and prostate Ca s/p chemotherapy. He is ACC/AHA Stage C and NYHA Class IV. On exam is markedly volume overloaded with marginal blood pressures today. I have recommended the following: \par \par \par 1.Acute on Chronic Systolic Heart Failure- currently in decompensated state. He was presumably taking entresto 24/26 mg BID , metoprolol succinate 25 mg daily, and torsemide 40 mg daily, but unfortunately had self de-escalated therapies due to urinary incontinence and is now on reduced doses of GDMT and markedly volume overloaded and has been advised to present to the ED for acute decompensated heart failure.  Will convey our recommendations to the ED physician to administer Bumex 1 mg IV push. Monitor I/Os. Along with a full set off labs including CMP, Mag, and pro-BNP. Furthermore, future plans for admission pending response to diuresis. \par \par \par 2. Cardiac amyloidosis - TTR amyloid confirmed by Tc-PYP scan.Following with Dr. Bradley and started on Vyndemax in 5/2022. He was also evaluated by Dr. 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 Of note patients with this variant could develop both cardiac and neurologic symptoms and further recommended to be evaluated by neurology. \par \par 3. Atrial fibrillation/flutter - S/p ablation in 2016. Following with [facility] Alan. Continue BB as above and AC with Eliquis. \par \par 4. CKD Stage IIIa- Cr b/l 1.3-1.4. Avoid Nephrotoxic agents. Continue to monitor routinely every 3 months.\par \par 5. T2DM- well controlled with most recent A1C of 6.2%. Controlled by diet and lifestyle measures.\par \par 6. Follow-up with Dr. Friedman one week after hospital discharge.  [Time Spent: ___ minutes] : I have spent [unfilled] minutes with the patient on the telephone

## 2022-11-22 NOTE — PROGRESS NOTE ADULT - SUBJECTIVE AND OBJECTIVE BOX
O/N Events:    Subjective/ROS: Patient seen and examined at bedside.     Denies Fever/Chills, HA, CP, SOB, n/v, changes in bowel/urinary habits.  12pt ROS otherwise negative.    VITALS  Vital Signs Last 24 Hrs  T(C): 37.1 (22 Nov 2022 05:27), Max: 37.2 (21 Nov 2022 13:30)  T(F): 98.7 (22 Nov 2022 05:27), Max: 98.9 (21 Nov 2022 13:30)  HR: 80 (22 Nov 2022 05:07) (80 - 86)  BP: 92/60 (22 Nov 2022 05:07) (79/52 - 104/72)  BP(mean): 70 (22 Nov 2022 05:07) (61 - 84)  RR: 18 (22 Nov 2022 05:07) (18 - 18)  SpO2: 96% (22 Nov 2022 05:07) (94% - 98%)    Parameters below as of 22 Nov 2022 05:07  Patient On (Oxygen Delivery Method): room air    CAPILLARY BLOOD GLUCOSE    PHYSICAL EXAM-INCOMPLETE   General: NAD  Head: NC/AT; MMM; PERRL; EOMI;  Neck: Supple; no JVD  Respiratory: CTAB; no wheezes/rales/rhonchi  Cardiovascular: Regular rhythm/rate; S1/S2+, no murmurs, rubs gallops   Gastrointestinal: Soft; NTND; bowel sounds normal and present  Extremities: WWP; no edema/cyanosis  Neurological: A&Ox3, CNII-XII grossly intact; no obvious focal deficits    MEDICATIONS  (STANDING):  buMETAnide Injectable 2 milliGRAM(s) IV Push every 12 hours  escitalopram 5 milliGRAM(s) Oral daily  metoprolol succinate ER 25 milliGRAM(s) Oral daily  sacubitril 24 mG/valsartan 26 mG 1 Tablet(s) Oral two times a day  tamsulosin 0.4 milliGRAM(s) Oral at bedtime    MEDICATIONS  (PRN):      No Known Allergies      LABS                        13.2   2.92  )-----------( 69       ( 22 Nov 2022 06:05 )             38.1     11-22    142  |  105  |  33<H>  ----------------------------<  105<H>  3.7   |  30  |  1.12    Ca    8.9      22 Nov 2022 06:05  Phos  3.1     11-22  Mg     2.1     11-22    TPro  6.1  /  Alb  x   /  TBili  x   /  DBili  x   /  AST  x   /  ALT  x   /  AlkPhos  x   11-21    PT/INR - ( 22 Nov 2022 06:05 )   PT: 16.0 sec;   INR: 1.34          PTT - ( 21 Nov 2022 14:43 )  PTT:29.8 sec    IMAGING/EKG/ETC   O/N Events: no acute events overnight.     Subjective/ROS: Patient seen and examined at bedside.     Denies Fever/Chills, HA, CP, SOB, n/v, changes in bowel/urinary habits.  12pt ROS otherwise negative.    VITALS  Vital Signs Last 24 Hrs  T(C): 37.1 (22 Nov 2022 05:27), Max: 37.2 (21 Nov 2022 13:30)  T(F): 98.7 (22 Nov 2022 05:27), Max: 98.9 (21 Nov 2022 13:30)  HR: 80 (22 Nov 2022 05:07) (80 - 86)  BP: 92/60 (22 Nov 2022 05:07) (79/52 - 104/72)  BP(mean): 70 (22 Nov 2022 05:07) (61 - 84)  RR: 18 (22 Nov 2022 05:07) (18 - 18)  SpO2: 96% (22 Nov 2022 05:07) (94% - 98%)    Parameters below as of 22 Nov 2022 05:07  Patient On (Oxygen Delivery Method): room air    CAPILLARY BLOOD GLUCOSE    PHYSICAL EXAM  General: NAD  Head: NC/AT; MMM  Neck: Supple; no JVD  Respiratory: CTAB; minimal crackles auscultated on RLL field   Cardiovascular: Regular rhythm/rate; S1/S2+, no murmurs, rubs gallops   Gastrointestinal: Soft; NTND; bowel sounds normal and present  Extremities: WWP, no edema   Neurological: A&Ox3    MEDICATIONS  (STANDING):  buMETAnide Injectable 2 milliGRAM(s) IV Push every 12 hours  escitalopram 5 milliGRAM(s) Oral daily  metoprolol succinate ER 25 milliGRAM(s) Oral daily  sacubitril 24 mG/valsartan 26 mG 1 Tablet(s) Oral two times a day  tamsulosin 0.4 milliGRAM(s) Oral at bedtime    MEDICATIONS  (PRN):      No Known Allergies      LABS                        13.2   2.92  )-----------( 69       ( 22 Nov 2022 06:05 )             38.1     11-22    142  |  105  |  33<H>  ----------------------------<  105<H>  3.7   |  30  |  1.12    Ca    8.9      22 Nov 2022 06:05  Phos  3.1     11-22  Mg     2.1     11-22    TPro  6.1  /  Alb  x   /  TBili  x   /  DBili  x   /  AST  x   /  ALT  x   /  AlkPhos  x   11-21    PT/INR - ( 22 Nov 2022 06:05 )   PT: 16.0 sec;   INR: 1.34          PTT - ( 21 Nov 2022 14:43 )  PTT:29.8 sec    IMAGING/EKG/ETC

## 2022-11-22 NOTE — HISTORY OF PRESENT ILLNESS
[FreeTextEntry1] : This is a 77 year old man with ATTR cardiac amyloid, NICM (LVIDd 4.7 cm, LVEF 37%), AF/Aflu s/p ablation 6/2016 (on Eliquis) s/p CRT-P (2/16/2022), CKD Stage 3 (b/l Cr 1-1.2), DMT2 (Ha1c 6.4% 2/2022), thrombocytopenia (bone marrow biopsy 11/2021 unrevealing) w/ idiopathic thrombocytopenia, chronic venous insufficiency, and prostate Ca s/p chemotherapy. Here today to follow up on his cardiomyopathy. \par \par He has had multiple hospitalizations for AHDF in the past year. One in November 2021 where he was diagnosed with ATTR cardiac amyloid by TcPYP scan. His most recent admission was at [facility] 2/7 - 2/17/22 where he required a R thoracentesis that was complicated by pneumothorax requiring pigtail. Entresto was briefly held for hypotension. Given fixed SV in setting of restrictive physiology, underwent CRT-P placement 2/16/2022 with lower HR limit set at 80 bpm. He was discharged on low dose GDMT at a weight of 173.7 lbs (admission weight 199 lbs).\par \par Since his initial appt with the HF [facility] team on 4/11/2022 he has completed genetic testing, that was positive for pathonomic gene for TTR amyloidosis, more specifically a variant that could potentially have cardiac and neurologic findings. It was further recommended he establish with neurologist Dr. Estrada Valdes. \par \par He has also established with Dr. Finkelstein and was started on Vyndamax in 5/2022 but notably had increasing diuretic demands and worsening renal function. More recently requiring torsemide 40 mg daily. However, due to urinary incontinence remains on torsemide 20 mg once daily and low dose entresto once daily. \par \par He is here today with his son, who is equally concerned about his fathers decline in health this week. Of note he has not been taking his weights or blood pressures at home. He is now short of breath with minimal exertion. He will become SOB with a few steps or with simple ADLS such as dressing. He has noted a diminished appetite with an increase in abdominal girth and depressed mood and been prescribed lexapro. Of note he is up nearly 17 pounds since his last OV in September with Dr. Finkelstein. \par \par He denies CP, SOB at rest, overt orthopnea, PND, LH/dizziness, abdominal discomfort, palpitations, and syncope. His appetite is diminished and his bowel and bladder habits are unchanged and normal for him. He has a CRT-P. He has been limiting fluid to 1 L and sodium in his diet and taking his medications as directed. He does not use NSAIDs. He has not been admitted to the hospital or seen in the ER for HF in the interim.\par \par \par \par \par \par

## 2022-11-22 NOTE — PROGRESS NOTE ADULT - PROBLEM SELECTOR PLAN 4
h/o thrombocytopenia w/ baseline PLT 60-70s  - Platelts on admission: 58   - Holding eliquis for now; see repeat in platelets in AM  - f/u Heme/onc consult h/o thrombocytopenia w/ baseline PLT 60-70s  - Platelts on admission: 58   - Restarted eliquis 5mg BID and will continue as long as platelets >50k   - f/u Heme/onc consult

## 2022-11-22 NOTE — PROGRESS NOTE ADULT - PROBLEM SELECTOR PLAN 1
- On ADMIT, pt w/ JVD, +1 B/L LE Edema, satting 98% on RA   - BNP: 97115, CXR: Cardiomegaly, b/l pleural effusions   - EKG: V-paced at 80 bpm, no acute ST-T wave changes   - ECHO 02/08/22 TTE: LVIDd 4.7 cm, LVEF 37%, LV hypertrophy (septum 1.6, PW 1.7), analysis of DD challenging due to AF, normal RV size with mildly reduced function, HYACINTH, mild MR, mild TR, est PASP 40 mmHg, small pericardial effusion, ascites present  - f/u repeat TTE  - HOME DIURETIC: Torsemide 20 mg daily (patient reports noncompliance to recent increase to 40mg due to incontinence)  - DIURESIS: s/p Bumex 1 mg IVP x 1 in ED, continue Bumex 2 mg IV BID   - GDMT: Continue Entresto 24/26 mg BID, Metoprolol Succinate 25 mg PO daily   - Core measures, strict I's and O's daily weight, fluid restriction - On ADMIT, pt w/ JVD, +1 B/L LE Edema, satting 98% on RA   - BNP: 23682, CXR: Cardiomegaly, b/l pleural effusions   - EKG: V-paced at 80 bpm, no acute ST-T wave changes   - ECHO 02/08/22 TTE: LVIDd 4.7 cm, LVEF 37%, LV hypertrophy (septum 1.6, PW 1.7), analysis of DD challenging due to AF, normal RV size with mildly reduced function, HYACINTH, mild MR, mild TR, est PASP 40 mmHg, small pericardial effusion, ascites present  - f/u repeat TTE  - HOME DIURETIC: Torsemide 20 mg daily (patient reports noncompliance to recent increase to 40mg due to incontinence)  - DIURESIS: s/p Bumex 1 mg IVP x 1 in ED, patient received Bumex 2 mg IV BID, now discontinued and will start with torsemide 20mg tomorrow 11/22.  - GDMT: Continue Entresto 24/26 mg BID, Metoprolol Succinate 25 mg PO daily   - Core measures, strict I's and O's daily weight, fluid restriction

## 2022-11-22 NOTE — REASON FOR VISIT
[Cardiac Failure] : cardiac failure [Other: _____] : [unfilled] [FreeTextEntry1] : Patient Instructions:\par \par 1. Present to the emergency room for acute decompensated heart failure\par \par 2. We will guide the emergency room doctors on medications and pending lab results make future decision for admission \par \par 3. It's important that you follow up with Dr. Friedman one week after you leave the hospital\par \par 4. We recommend that family members re-visit and consider genetic screening for Amyloid

## 2022-11-22 NOTE — PROGRESS NOTE ADULT - PROBLEM SELECTOR PLAN 3
TTR amyloid confirmed by Tc-PYP scan.  - Following with Dr. Bradley and started on Vyndemax in 5/2022, Dr. Bradley aware that patient was admitted.

## 2022-11-22 NOTE — PROGRESS NOTE ADULT - PROBLEM SELECTOR PLAN 7
F: none   E: K >4, Mg >2  N: DASH/TLC, fluid restriction  Dvt: holding eliquis due to low platelets, SCD's for now  Dispo: 5 [facility] F: none   E: K >4, Mg >2  N: DASH/TLC, fluid restriction  Dvt: Restarted eliquis 5mg BID and will continue as long as platelets >50k  Dispo: 5 [facility]

## 2022-11-22 NOTE — PROGRESS NOTE ADULT - SUBJECTIVE AND OBJECTIVE BOX
Cardiology Consult    Interval History: no acute events overnight. BPs were a little soft, so Entresto was held this am       OBJECTIVE  Vitals:  T(C): 36.7 (11-22-22 @ 09:12), Max: 37.1 (11-22-22 @ 05:27)  HR: 80 (11-22-22 @ 12:08) (80 - 86)  BP: 83/54 (11-22-22 @ 12:08) (78/50 - 100/68)  RR: 18 (11-22-22 @ 12:08) (18 - 18)  SpO2: 96% (11-22-22 @ 12:08) (94% - 99%)  Wt(kg): --    I/O:  I&O's Summary    21 Nov 2022 07:01  -  22 Nov 2022 07:00  --------------------------------------------------------  IN: 560 mL / OUT: 2575 mL / NET: -2015 mL    22 Nov 2022 07:01  -  22 Nov 2022 13:33  --------------------------------------------------------  IN: 0 mL / OUT: 250 mL / NET: -250 mL        PHYSICAL EXAM:  Appearance: NAD. Speaking in full sentences.   HEENT: No pallor noted.   Cardiovascular: RRR with no murmurs.  Respiratory: Lungs CTAB.   Gastrointestinal:  Soft, nontender.   Extremities: 1+ LE edema   Neurologic:  Alert and awake.   	  LABS:                        13.2   2.92  )-----------( 69       ( 22 Nov 2022 06:05 )             38.1     11-22    142  |  105  |  33<H>  ----------------------------<  105<H>  3.7   |  30  |  1.12    Ca    8.9      22 Nov 2022 06:05  Phos  3.1     11-22  Mg     2.1     11-22    TPro  5.8<L>  /  Alb  x   /  TBili  x   /  DBili  x   /  AST  x   /  ALT  x   /  AlkPhos  x   11-22    PT/INR - ( 22 Nov 2022 06:05 )   PT: 16.0 sec;   INR: 1.34          PTT - ( 21 Nov 2022 14:43 )  PTT:29.8 sec      RADIOLOGY & ADDITIONAL TESTS:  Reviewed .    MEDICATIONS  (STANDING):  apixaban 5 milliGRAM(s) Oral every 12 hours  dapagliflozin 10 milliGRAM(s) Oral every 24 hours  escitalopram 5 milliGRAM(s) Oral daily  metoprolol succinate ER 25 milliGRAM(s) Oral daily  sacubitril 24 mG/valsartan 26 mG 1 Tablet(s) Oral every 12 hours  tamsulosin 0.4 milliGRAM(s) Oral at bedtime    MEDICATIONS  (PRN):

## 2022-11-22 NOTE — PHYSICAL EXAM
[No Acute Distress] : no acute distress [Frail] : frail [No Xanthelasma] : no xanthelasma [Normal S1, S2] : normal S1, S2 [No Murmur] : no murmur [No Rub] : no rub [Soft] : abdomen soft [Non Tender] : non-tender [Normal Gait] : normal gait [No Cyanosis] : no cyanosis [No Clubbing] : no clubbing [Edema ___] : edema [unfilled] [Normal] : moves all extremities, no focal deficits, normal speech [Alert and Oriented] : alert and oriented [de-identified] : JVP 14-16 cm of H2O + HJR [de-identified] : abdomen mildly distended  [de-identified] : cool peripherally

## 2022-11-22 NOTE — PROGRESS NOTE ADULT - SUBJECTIVE AND OBJECTIVE BOX
LENGTH OF HOSPITAL STAY: 4d    CHIEF COMPLAINT:   Patient is a 77y old  Male who presents with a chief complaint of SOB (22 Nov 2022 07:23)      HISTORY OF PRESENTING ILLNESS:    HPI:  Patient is a 76 y/o M, with PMHx of DM II (currently not on medications), Aflutter/Afib (s/p prior ablation 06/2016), ATTR cardiac amyloid, NICM (LVIDd 4.7 cm, LVEF 37%), AF/Aflu s/p ablation 6/2016 (on Eliquis) s/p CRT-P (2/16/2022), CKD Stage 3 (b/l Cr 1-1.2), DMT2 (Ha1c 6.4% 2/2022), thrombocytopenia (bone marrow biopsy 11/2021 unrevealing) w/ idiopathic thrombocytopenia, chronic venous insufficiency, and prostate Ca s/p chemotherapy, w/ multiple hospitalizations for ADHF within the past year,  who presented to his heart failure specialist, Dr. Friedman for follow-up earlier today, with complaints of shortness of breath with a few steps or with simple ADLS such as dressing. Patient's son states that patient's health has declined within the past week. Patient reports diminished appetite with an increase in abdominal girth. Patient states he has gained nearly 17 pounds since September. He has also established with Dr. Finkelstein and was started on Vyndamax in 5/2022 but notably had increasing diuretic demands and worsening renal function. More recently requiring torsemide 40 mg daily. However, due to urinary incontinence remains on torsemide 20 mg once daily and low dose Entresto once daily. Patient denies CP, SOB at rest, overt orthopnea, PND, dizziness, abdominal discomfort, palpitations, and syncope.  Patient sent from outpatient office to [facility] ED for management of acute decompensated heart failure.     In the ED, VS: T: 97.7 F, HR: 84 bpm, RR: 16 breaths/min, BP: 100/70 mmHg, spO2: 98% on RA  Labs significant for: WBC: 2.59, BUN/Cr: 25/1.39, Glucose: 119, Alk Phos: 157, Troponin: 0.06 x 2, BNP: 59031, COVID-19: Negative, EKG: V-paced at 80 bpm, no acute ST-T wave changes  CXR: B/L pleural effusions, cardiomegaly  Patient received Bumex 1 mg IV x 1  Patient admitted to cardiology/telemetry for acute on chronic systolic heart failure.    (18 Nov 2022 23:31)    PAST MEDICAL & SURGICAL HISTORY:  PAST MEDICAL & SURGICAL HISTORY:  Atrial flutter  s/p Ablation 2016      Chronic venous insufficiency of lower extremity      Prostate CA      Stage 3 chronic kidney disease  1.2-1.2 baseline   On admission 1.2      Type 2 diabetes mellitus  not on medication      Thrombocytopenia  60-70&#x27;s baseline      Acute on chronic systolic congestive heart failure      Chronic atrial fibrillation      Atrial flutter  s/p ablation in 2016      History of surgical removal of pituitary gland  1990s      S/P TURP  for BPH        SOCIAL HISTORY:    ALLERGIES:  No Known Allergies    MEDICATIONS:  STANDING MEDICATIONS  buMETAnide Injectable 2 milliGRAM(s) IV Push every 12 hours  escitalopram 5 milliGRAM(s) Oral daily  metoprolol succinate ER 25 milliGRAM(s) Oral daily  sacubitril 24 mG/valsartan 26 mG 1 Tablet(s) Oral two times a day  tamsulosin 0.4 milliGRAM(s) Oral at bedtime    PRN MEDICATIONS    VITALS:   T(F): 98.1  HR: 80  BP: 78/50  RR: 18  SpO2: 97%    LABS:                        13.2   2.92  )-----------( 69       ( 22 Nov 2022 06:05 )             38.1     11-22    142  |  105  |  33<H>  ----------------------------<  105<H>  3.7   |  30  |  1.12    Ca    8.9      22 Nov 2022 06:05  Phos  3.1     11-22  Mg     2.1     11-22    TPro  6.1  /  Alb  x   /  TBili  x   /  DBili  x   /  AST  x   /  ALT  x   /  AlkPhos  x   11-21    PT/INR - ( 22 Nov 2022 06:05 )   PT: 16.0 sec;   INR: 1.34       PTT - ( 21 Nov 2022 14:43 )  PTT:29.8 sec    RADIOLOGY:  < from: US Abdomen Limited (02.07.22 @ 03:25) >  Impression:  1.  Cholelithiasis.  2.  Moderate ascites  3.  Right pleural effusion.  4. Dilatation of the hepatic veins.    < end of copied text >    < from: CT Abdomen and Pelvis No Cont (02.07.22 @ 00:56) >   Attending over read. Agree with the below report with   following modifications. Severe anasarca. Severe cardiomegaly. Small   right pleural effusion. Trace left pleural effusion. Small amount of   ascites. Dilated IVC. Findings likely due to cardiac failure/fluid   overload. Fatty infiltration of liver. Liver appears enlarged. Radiopaque   cholelithiasis. Thick-walled urinary bladder could be due to bladder   outlet obstruction. Prostate measures 4.6 x 3.8 x 4.0 cm. normal   appendix. Rectum underdistended versus wall thickening. Collapse   consolidation right lower lobe containing radiopaque   material/calcification. Enlarged left groin node measuring 1.6 x 3.5 x   5.4 cm..    < end of copied text >    < from: CT Chest No Cont (02.09.22 @ 11:59) >  1.  Increased moderate to large right pleural effusion. Might consider   fluid sampling with cytologic analysis if clinically warranted.  2.  Unchanged small left pleural effusion.  3.  Ascites and upper abdomen with anasarca suggesting fluid overload.  4.  Cardiomegaly.  5.  Probable pulmonary hypertension.    < end of copied text >      Cytopathology - Non Gyn Report:   ACCESSION No:  [id number]  Patient:   TINA ELISE      Accession:                             [id number]    Collected Date/Time:                   2/11/2022 14:13 EST  Received Date/Time:                    2/11/2022 14:13 EST    Non-Gynecologic Report - Auth (Verified)    Specimen(s) Submitted  RIGHT PLEURAL FLUID:    Final Diagnosis  RIGHT PLEURAL FLUID:    BENIGN FINDINGS.  Mesothelial cells and macrophages.      Cell block shows similar findings.  Immunostains performed shows that the  mesothelial cells are positive for Calretinin, WT1 while negative for  Shaheed Ep4, MOC31, TTF1.  This staining profile supports the diagnosis.    These immunohistochemical tests have been developed and their performance  characteristics determined by [facility], Department of  Pathology, Division of Immunopathology, [address] [place], [place] [address]. Â  It has not been cleared or approved by the U.S. Food and  Drug Administration.  Â  The FDA has determined that such clearanceor  approval is not necessary.  Â  This test is used for clinical purposes.  Â   The laboratory certified under the CLIA-88 as qualified to perform high  complexity clinical testing.  Screened by: Marcy Leong MD  Verified by: Marcy Leong MD  (Electronic Signature)  Reported on: 02/17/22 14:35 EST, [facility], [address] [place] [address]  Phone: [phone]   Fax: [phone]  _________________________________________________________________      Clinical Information  History of CHF, prostate cancer, now with pleural effusion, R/O malignant  vs transudative effusion  _    Gross Description  Received: 50 ml of unfixed yellow fluid. CytoLyt added in the Lab.  Prepared: 1 ThinPrep slide, 1 cell block (02.11.22 @ 14:13)      Hematopathology Report:   ACCESSION No:  [id number]  Patient:   TINA ELISE      Accession:                             [id number]    Collected Date/Time:                   11/23/2021 08:30 EST  Received Date/Time:                    11/23/2021 08:42 EST    Hematopathology Addendum Report - Auth (Verified)    Hematopathology Addendum  CYTOGENETICS    Addendum issued to report results of Cytogenetics.  This test was performed by an outside laboratory. For all patient care and  clinical decision making purposes refer solely to original laboratory  report. The information transcribed here is not the entire report. This  shorten version has been placed here for the purpose of the electronic  record.    Performing Laboratory: [facility]  Surgical ID #: [id number]  Specimen ID: [id number]  Date reported by Outside Laboratory: 11/30/2021  Submitting Physician: Dr. Anjelica Bernal  Outside report electronically signed by: Dr. Keo Woodall    Karyotype  46,XY23    Cytogenetics Analysis  Metaphases Counted:    23  Metaphases Analyzed:    23    Metaphases Karyptyped:    5  GTG Band level:   400  Culture Type(s):   24hrU    Interpretation  A normal male karyotype was observed in twenty-three metaphase cells  analyzed.    Within the limits of the technology utilized in this study, no consistent  numerical or structural abnormality was identified.    Keo Woodall M.D., [facility]  Director, Cytogenetics, ex. [phone]  This report was electronically signed.    This addendum reports the results of Cytogenetic Karyotype, reported  on 11/30/2021 by [facility], a specialized [facility].  Technical and professional components were performed at [facility] [employer], "[employer]. [address] [place] [address], [place] [address],  phone:  [phone], [facility] specimen [id number] with  [facility] ID [id number].  Please refer to the official [facility] report with  complete information including assay design and methodology, on file in  the Pathology Department.    Verified by: Faye Brady M.D.  (Electronic Signature)  Reported on: 12/03/21 16:50 [facility], [facility], [address] [place] [address]  Phone: [phone]   Fax: [phone]  _________________________________________________________________  Hematopathology Addendum Report - Auth (Verified)    Hematopathology Addendum  Flow Cytometric Analysis      Interpretation:  The B cells comprise 1% of the total cells analyzed and express polytypic  surface immunoglobulins. The T cells show no loss of pan T-cell antigens.  The CD4:CD8 ratio is normal. No abnormal myeloid maturation is seen.  Cells expressing blast antigens are not increased in number.  There is no evidence of acute leukemia or lymphoma by cell marker  analysis.    B-Cell Associated:          1%  CD19         1%  CD20         1%  CD10         <1%  CD11c       5%  CD23         <1%  KAPPA      <1%  LAMBDA  <1%    Myeloid Associated:  CD11b 51%  CD13   85%  CD14   6%  CD16   50%  CD33   52%  CD64   59%    T- Cell Associated:    CD2  9%  CD3    6%  CD4    5%  CD5    7%  CD7    10%  CD8    5%  CD56  5%    Activation:  CD34       1%  CD38       <1%       <1%  HLA-DR  4%    NOTE:  Flow cytometry is performed at [facility], a specialized [facility], [place], [place].  Verified by: Faye Brady M.D.  (Electronic Signature)  Reported on: 11/30/21 11:01 [facility], [facility], [address] [place] [address],  [place] [address]  Phone: [phone]   Fax: [phone]    _________________________________________________________________  Hematopathology Report - Auth (Verified)    Specimen(s) Submitted  1  Bone marrow biopsy  2  Bone marrow clot  3  Bone marrow aspirate slides  4  Bone marrow aspirate for Flow    Final Diagnosis  1, 2: Bone marrow biopsy and blood clot:  - Morphological evaluation is limited as the biopsy is very small.  - There is a slight myeloid hypoplasia and relative erythroid  hyperplasia.  - Full maturation is seen in both myeloid and erythroid cell lineages.  - Megakaryocytes appear normal in number and are not particularly  atypical.  - The Congo red stain is negative for amyloid, however, the biopsy is  very small and may not be representative of the underlying pathology.  - An iron stain is positive for storage iron.    3. Bonemarrow aspirate smear:  - Mature and maturing myeloid and erythroid elements present.  - Megakaryocytes are normal in number and morphology.  - Plasma cells are slightly increased in number.  - An iron stain is positive for storage iron.    Note: Immunohistochemical stain shows that  positive plasma cells  (on 2B) are not increased in number and appear polytypic by kappa/lambda  KOREY.  Immunostains for CD3, CD20 and PAX5 (2B) demonstrate very rare B  and T-cells.  CD34 positive blasts are not increased in number.  There is  no evidence of lymphoma or carcinoma. There is no sufficient evidence of  plasma cell neoplasm in this limited material, however, correlation with  clinical and laboratory studies is recommended.    PHYSICAL EXAM:  Constitutional: Resting comfortably in bed; NAD  Head: NC/AT  Eyes: Anicteric sclera  Neck: No cervical LAD  Respiratory: Mildly decreased right breath sounds   Cardiac: Irregular  Gastrointestinal: soft, NT/ND  Extremities: 1+ pitting edema bilaterally   Neurologic: AAOx3  Psychiatric: affect and characteristics of appearance, verbalizations, behaviors are appropriate

## 2022-11-23 ENCOUNTER — TRANSCRIPTION ENCOUNTER (OUTPATIENT)
Age: 77
End: 2022-11-23

## 2022-11-23 VITALS — TEMPERATURE: 98 F

## 2022-11-23 LAB
ANION GAP SERPL CALC-SCNC: 7 MMOL/L — SIGNIFICANT CHANGE UP (ref 5–17)
BUN SERPL-MCNC: 31 MG/DL — HIGH (ref 7–23)
CALCIUM SERPL-MCNC: 8.5 MG/DL — SIGNIFICANT CHANGE UP (ref 8.4–10.5)
CHLORIDE SERPL-SCNC: 106 MMOL/L — SIGNIFICANT CHANGE UP (ref 96–108)
CO2 SERPL-SCNC: 27 MMOL/L — SIGNIFICANT CHANGE UP (ref 22–31)
CREAT SERPL-MCNC: 1.06 MG/DL — SIGNIFICANT CHANGE UP (ref 0.5–1.3)
EGFR: 73 ML/MIN/1.73M2 — SIGNIFICANT CHANGE UP
GLUCOSE SERPL-MCNC: 88 MG/DL — SIGNIFICANT CHANGE UP (ref 70–99)
HCT VFR BLD CALC: 38.3 % — LOW (ref 39–50)
HGB BLD-MCNC: 12.8 G/DL — LOW (ref 13–17)
MAGNESIUM SERPL-MCNC: 2 MG/DL — SIGNIFICANT CHANGE UP (ref 1.6–2.6)
MCHC RBC-ENTMCNC: 30.3 PG — SIGNIFICANT CHANGE UP (ref 27–34)
MCHC RBC-ENTMCNC: 33.4 GM/DL — SIGNIFICANT CHANGE UP (ref 32–36)
MCV RBC AUTO: 90.8 FL — SIGNIFICANT CHANGE UP (ref 80–100)
NRBC # BLD: 0 /100 WBCS — SIGNIFICANT CHANGE UP (ref 0–0)
PHOSPHATE SERPL-MCNC: 2.8 MG/DL — SIGNIFICANT CHANGE UP (ref 2.5–4.5)
PLATELET # BLD AUTO: 71 K/UL — LOW (ref 150–400)
POTASSIUM SERPL-MCNC: 3.8 MMOL/L — SIGNIFICANT CHANGE UP (ref 3.5–5.3)
POTASSIUM SERPL-SCNC: 3.8 MMOL/L — SIGNIFICANT CHANGE UP (ref 3.5–5.3)
RBC # BLD: 4.22 M/UL — SIGNIFICANT CHANGE UP (ref 4.2–5.8)
RBC # FLD: 15.4 % — HIGH (ref 10.3–14.5)
SODIUM SERPL-SCNC: 140 MMOL/L — SIGNIFICANT CHANGE UP (ref 135–145)
WBC # BLD: 2.73 K/UL — LOW (ref 3.8–10.5)
WBC # FLD AUTO: 2.73 K/UL — LOW (ref 3.8–10.5)

## 2022-11-23 PROCEDURE — 84155 ASSAY OF PROTEIN SERUM: CPT

## 2022-11-23 PROCEDURE — 84100 ASSAY OF PHOSPHORUS: CPT

## 2022-11-23 PROCEDURE — 84165 PROTEIN E-PHORESIS SERUM: CPT

## 2022-11-23 PROCEDURE — 84484 ASSAY OF TROPONIN QUANT: CPT

## 2022-11-23 PROCEDURE — 80048 BASIC METABOLIC PNL TOTAL CA: CPT

## 2022-11-23 PROCEDURE — 85610 PROTHROMBIN TIME: CPT

## 2022-11-23 PROCEDURE — 83036 HEMOGLOBIN GLYCOSYLATED A1C: CPT

## 2022-11-23 PROCEDURE — 93306 TTE W/DOPPLER COMPLETE: CPT

## 2022-11-23 PROCEDURE — 83550 IRON BINDING TEST: CPT

## 2022-11-23 PROCEDURE — 86803 HEPATITIS C AB TEST: CPT

## 2022-11-23 PROCEDURE — 83540 ASSAY OF IRON: CPT

## 2022-11-23 PROCEDURE — 84166 PROTEIN E-PHORESIS/URINE/CSF: CPT

## 2022-11-23 PROCEDURE — 85611 PROTHROMBIN TEST: CPT

## 2022-11-23 PROCEDURE — 80053 COMPREHEN METABOLIC PANEL: CPT

## 2022-11-23 PROCEDURE — 99291 CRITICAL CARE FIRST HOUR: CPT

## 2022-11-23 PROCEDURE — 87389 HIV-1 AG W/HIV-1&-2 AB AG IA: CPT

## 2022-11-23 PROCEDURE — 86334 IMMUNOFIX E-PHORESIS SERUM: CPT

## 2022-11-23 PROCEDURE — 96374 THER/PROPH/DIAG INJ IV PUSH: CPT

## 2022-11-23 PROCEDURE — 85730 THROMBOPLASTIN TIME PARTIAL: CPT

## 2022-11-23 PROCEDURE — 83880 ASSAY OF NATRIURETIC PEPTIDE: CPT

## 2022-11-23 PROCEDURE — G0103: CPT

## 2022-11-23 PROCEDURE — 84466 ASSAY OF TRANSFERRIN: CPT

## 2022-11-23 PROCEDURE — 36415 COLL VENOUS BLD VENIPUNCTURE: CPT

## 2022-11-23 PROCEDURE — 99285 EMERGENCY DEPT VISIT HI MDM: CPT | Mod: 25

## 2022-11-23 PROCEDURE — 87635 SARS-COV-2 COVID-19 AMP PRB: CPT

## 2022-11-23 PROCEDURE — 85027 COMPLETE CBC AUTOMATED: CPT

## 2022-11-23 PROCEDURE — 85732 THROMBOPLASTIN TIME PARTIAL: CPT

## 2022-11-23 PROCEDURE — 99239 HOSP IP/OBS DSCHRG MGMT >30: CPT

## 2022-11-23 PROCEDURE — 85045 AUTOMATED RETICULOCYTE COUNT: CPT

## 2022-11-23 PROCEDURE — 83521 IG LIGHT CHAINS FREE EACH: CPT

## 2022-11-23 PROCEDURE — 71045 X-RAY EXAM CHEST 1 VIEW: CPT

## 2022-11-23 PROCEDURE — 83735 ASSAY OF MAGNESIUM: CPT

## 2022-11-23 PROCEDURE — 82728 ASSAY OF FERRITIN: CPT

## 2022-11-23 PROCEDURE — 85049 AUTOMATED PLATELET COUNT: CPT

## 2022-11-23 PROCEDURE — 83615 LACTATE (LD) (LDH) ENZYME: CPT

## 2022-11-23 PROCEDURE — 82784 ASSAY IGA/IGD/IGG/IGM EACH: CPT

## 2022-11-23 PROCEDURE — 85670 THROMBIN TIME PLASMA: CPT

## 2022-11-23 PROCEDURE — 85025 COMPLETE CBC W/AUTO DIFF WBC: CPT

## 2022-11-23 RX ORDER — METOPROLOL TARTRATE 50 MG
0.5 TABLET ORAL
Qty: 15 | Refills: 3
Start: 2022-11-23 | End: 2023-03-22

## 2022-11-23 RX ORDER — SACUBITRIL AND VALSARTAN 24; 26 MG/1; MG/1
1 TABLET, FILM COATED ORAL
Qty: 60 | Refills: 3
Start: 2022-11-23 | End: 2023-03-22

## 2022-11-23 RX ORDER — APIXABAN 2.5 MG/1
1 TABLET, FILM COATED ORAL
Qty: 60 | Refills: 6
Start: 2022-11-23 | End: 2023-06-20

## 2022-11-23 RX ORDER — DAPAGLIFLOZIN 10 MG/1
1 TABLET, FILM COATED ORAL
Qty: 30 | Refills: 3
Start: 2022-11-23 | End: 2023-03-22

## 2022-11-23 RX ORDER — APIXABAN 5 MG/1
1 TABLET, FILM COATED ORAL
Qty: 60 | Refills: 2
Start: 2022-11-23 | End: 2025-01-18

## 2022-11-23 RX ADMIN — ESCITALOPRAM OXALATE 5 MILLIGRAM(S): 10 TABLET, FILM COATED ORAL at 11:33

## 2022-11-23 RX ADMIN — Medication 25 MILLIGRAM(S): at 08:10

## 2022-11-23 RX ADMIN — DAPAGLIFLOZIN 10 MILLIGRAM(S): 10 TABLET, FILM COATED ORAL at 16:01

## 2022-11-23 RX ADMIN — SACUBITRIL AND VALSARTAN 1 TABLET(S): 24; 26 TABLET, FILM COATED ORAL at 11:33

## 2022-11-23 RX ADMIN — APIXABAN 5 MILLIGRAM(S): 2.5 TABLET, FILM COATED ORAL at 05:15

## 2022-11-23 NOTE — PROGRESS NOTE ADULT - ASSESSMENT
76 y/o M, with PMHx of DM II (currently not on medications), ATTR cardiac amyloid, NICM (LVIDd 4.7 cm, LVEF 37%), AF/Aflu s/p ablation 6/2016 (on Eliquis) s/p CRT-P (2/16/2022), CKD Stage 3 (b/l Cr 1-1.2), DMT2 (Ha1c 6.4% 2/2022), thrombocytopenia (bone marrow biopsy 11/2021 unrevealing) w/ idiopathic thrombocytopenia, chronic venous insufficiency, and prostate Ca s/p chemotherapy, w/ multiple hospitalizations for ADHF within the past year admitted to cardiology for acute on chronic systolic heart failure exacerbation. 
76 y/o M, with PMHx of DM II (currently not on medications), ATTR cardiac amyloid, NICM (LVIDd 4.7 cm, LVEF 37%), AF/Aflu s/p ablation 6/2016 (on Eliquis) s/p CRT-P (2/16/2022), CKD Stage 3 (b/l Cr 1-1.2), DMT2 (Ha1c 6.4% 2/2022), thrombocytopenia (bone marrow biopsy 11/2021 unrevealing) w/ idiopathic thrombocytopenia, chronic venous insufficiency, and prostate Ca s/p chemotherapy, w/ multiple hospitalizations for ADHF within the past year admitted to cardiology for acute on chronic systolic heart failure exacerbation.         
78 yo M, [place], never a smoker, with PMHx of AFlutter/AFib s/p ablation (06/2016) on Eliquis, s/p PPM, NICM, ATTR-associated cardiac amyloidosis (diagnosed 02/2022) on Vyndamax since 05/2022, CKD3 (Baseline Cr 1-1.2), DM2, prostate cancer s/p chemotherapy, idiopathic thrombocytopenia s/p bone marrow biopsy (11/23/2021) with limited morphologic evaluation due to insufficient sample size but showed Congo red negativity with full E/M maturation and normal-appearing megakaryocytes, normal cytogenetics, presents with increased work of breathing, diagnosed with acute decompensated systolic heart failure on diuresis. Peripheral blood smear (11/23/21) showed no evidence of acute leukemia or lymphoma. Prior Serum Kappa 5.66 and Lambda 3.83 in 02/09/22. Urine Kappa/Lambda 1.48 (02/09/22). Hematology consulted for thrombocytopenia. Mild-moderate leukopenia was also noted (, ANC 1330). Patient denies night sweats or fevers/chills, endorses 20-25 lb weight loss over the past year.    Platelet Trend: 108 (02/17/22) --> 86 (05/17/22) --> 58 (11/18) --> 58 (11/19) --> 58 (11/20) --> 63 (11/21)     #) DIAGNOSIS  - Grade 2 thrombocytopenia  - Mild prolonged INR  - ATTR-associated cardiac amyloidosis on Vyndamax (Tafamidis)  - History of prostate cancer     #) PLAN  - Patient has gradually declining platelet count since 02/2022. Of note, thrombocytopenia has been noted since patient was started on tafamidis meglumine which is a transthyretin stabilizer. It is approved by the FDA in 05/2019. Thrombocytopenia has not yet been noted in this particular drug as a side effect. Patient had a bone marrow biopsy (11/23/21) for evaluation of thrombocytopenia; however, bone marrow specimen was limited. Patient is known to be non-compliant with hematologic follow-up in the outpatient setting. In light of unexplained bicytopenia, may benefit from eventual bone marrow biopsy. However, the patient declines further hematologic evaluation at this time.    - Peripheral blood smear (11/21/22) showed some Pierson cells with rare schistocytes (0-1/hpf). Large and giant platelets present; no platelet clumping. Rare tear drop cells. Blue top platelets affirms thrombocytopenia.   - Iron studies are low-normal, approaching borderline for iron deficiency (Ferritin 91, Tsat 26%, Serum Iron 52, TIBC 200). Pending B12/Folate. HCV and HIV negative.    - Prior radiographic imaging did not show hepatosplenomegaly. Lymphadenopathy could not be properly assessed on CT (02/2022) given extensive fluid overload. Pending US Abdomen to reassess given declining platelets  - Follow-up myeloma panel: SPEP, serum immunofixation, FLC, quantitative immunoglobulins, UPEP, urine immunofixation   - Follow-up peripheral flow cytometry given persistent lymphopenia/neutropenia   - Follow-up serum PSA   - Upon discharge, follow-up with Dr. Carley Escobar in 2 weeks at [facility]    Discussed with Dr. Carley Escobar  
76 y/o M, with PMHx of DM II (currently not on medications), ATTR cardiac amyloid, NICM (LVIDd 4.7 cm, LVEF 37%), AF/Aflu s/p ablation 6/2016 (on Eliquis) s/p CRT-P (2/16/2022), CKD Stage 3 (b/l Cr 1-1.2), DMT2 (Ha1c 6.4% 2/2022), thrombocytopenia (bone marrow biopsy 11/2021 unrevealing) w/ idiopathic thrombocytopenia, chronic venous insufficiency, and prostate Ca s/p chemotherapy, w/ multiple hospitalizations for ADHF within the past year admitted to cardiology for acute on chronic systolic heart failure exacerbation. 
77M PMH DM II (currently not on medications), Aflutter/Afib (s/p prior ablation 06/2016), ATTR cardiac amyloid, NICM (LVIDd 4.7 cm, LVEF 37%), AF/Aflu s/p ablation 6/2016 (on Eliquis) s/p CRT-P (2/16/2022), CKD Stage 3 (b/l Cr 1-1.2), DMT2 (Ha1c 6.4% 2/2022), thrombocytopenia (bone marrow biopsy 11/2021 unrevealing) w/ idiopathic thrombocytopenia, chronic venous insufficiency, and prostate Ca s/p chemotherapy, w/ multiple hospitalizations for ADHF within the past year,  who was sent into the ED after presenting to his heart failure specialist, (Dr. Friedman) for follow-up on 1/18, with complaints of shortness of breath subsequently admitted for IV diuresis for acute on chronic CHF exacerbation.    #Acute on chronic CHF exacerbation  NICM ACC/AHA Stage C HF: ATTR cardiac amyloid, NICM (LVIDd 4.7 cm, LVEF 37%)  Euvolemic on physical exam   - d/c Bumex 2mg IVP BID today and transition to Torsemide 20 mg daily starting tomorrow   - c/w Entresto 24/26mg BID  - c/w Toprol 25 mg QD  - start Farxiga 10 mg daily today   - Strict Ins and Outs   - Daily weights     #Thrombocytopenia   Being followed by hematology, more labs are being ordered     #pafib/flutter  - c/w Eliquis 5 mg bid  - BB as above   
77M PMH DM II (currently not on medications), Aflutter/Afib (s/p prior ablation 06/2016), ATTR cardiac amyloid, NICM (LVIDd 4.7 cm, LVEF 37%), AF/Aflu s/p ablation 6/2016 (on Eliquis) s/p CRT-P (2/16/2022), CKD Stage 3 (b/l Cr 1-1.2), DMT2 (Ha1c 6.4% 2/2022), thrombocytopenia (bone marrow biopsy 11/2021 unrevealing) w/ idiopathic thrombocytopenia, chronic venous insufficiency, and prostate Ca s/p chemotherapy, w/ multiple hospitalizations for ADHF within the past year,  who was sent into the ED after presenting to his heart failure specialist, (Dr. Friedman) for follow-up on 1/18, with complaints of shortness of breath subsequently admitted for IV diuresis for acute on chronic CHF exacerbation.    #Acute on chronic CHF exacerbation  NICM ACC/AHA Stage C HF: ATTR cardiac amyloid, NICM (LVIDd 4.7 cm, LVEF 37%)  Still mildly overloaded on physical exam  - c/w Bumex 2mg IVP BID for likely for 2 more days   - c/w Entresto 24/26mg BID  - c/w Toprol 25 mg QD  - Strict Ins and Outs   - Daily weights     #Thrombocytopenia   Being followed by hematology, more labs are being ordered   pending decision to re-start Eliquis    
77M PMH DM II (currently not on medications), Aflutter/Afib (s/p prior ablation 06/2016), ATTR cardiac amyloid, NICM (LVIDd 4.7 cm, LVEF 37%), AF/Aflu s/p ablation 6/2016 (on Eliquis) s/p CRT-P (2/16/2022), CKD Stage 3 (b/l Cr 1-1.2), DMT2 (Ha1c 6.4% 2/2022), thrombocytopenia (bone marrow biopsy 11/2021 unrevealing) w/ idiopathic thrombocytopenia, chronic venous insufficiency, and prostate Ca s/p chemotherapy, w/ multiple hospitalizations for ADHF within the past year,  who was sent into the ED after presenting to his heart failure specialist, (Dr. Friedman) for follow-up on 1/18, with complaints of shortness of breath subsequently admitted for IV diuresis for acute on chronic CHF exacerbation.    #Acute on chronic CHF exacerbation  NICM ACC/AHA Stage C HF: ATTR cardiac amyloid, NICM (LVIDd 4.7 cm, LVEF 37%)  Euvolemic on physical exam   - c/w Torsemide 10 mg daily  - c/w Entresto 24/26mg BID  - c/w Toprol 25 mg QD  - c/w Farxiga 10 mg daily   - Strict Ins and Outs   - Daily weights   - please arrange for home PT/home services     #Thrombocytopenia   Being followed by hematology    #pafib/flutter  - c/w Eliquis 5 mg bid  - BB as above 
78 y/o M, with PMHx of DM II (currently not on medications), ATTR cardiac amyloid, NICM (LVIDd 4.7 cm, LVEF 37%), AF/Aflu s/p ablation 6/2016 (on Eliquis) s/p CRT-P (2/16/2022), CKD Stage 3 (b/l Cr 1-1.2), DMT2 (Ha1c 6.4% 2/2022), thrombocytopenia (bone marrow biopsy 11/2021 unrevealing) w/ idiopathic thrombocytopenia, chronic venous insufficiency, and prostate Ca s/p chemotherapy, w/ multiple hospitalizations for ADHF within the past year admitted to cardiology for acute on chronic systolic heart failure exacerbation.         
76 y/o M, with PMHx of DM II (currently not on medications), ATTR cardiac amyloid, NICM (LVIDd 4.7 cm, LVEF 37%), AF/Aflu s/p ablation 6/2016 (on Eliquis) s/p CRT-P (2/16/2022), CKD Stage 3 (b/l Cr 1-1.2), DMT2 (Ha1c 6.4% 2/2022), thrombocytopenia (bone marrow biopsy 11/2021 unrevealing) w/ idiopathic thrombocytopenia, chronic venous insufficiency, and prostate Ca s/p chemotherapy, w/ multiple hospitalizations for ADHF within the past year admitted to cardiology for acute on chronic systolic heart failure exacerbation.

## 2022-11-23 NOTE — PROGRESS NOTE ADULT - PROBLEM SELECTOR PROBLEM 1
Acute systolic congestive heart failure

## 2022-11-23 NOTE — PROGRESS NOTE ADULT - PROBLEM SELECTOR PLAN 1
- On ADMIT, pt w/ JVD, +1 B/L LE Edema, satting 98% on RA   - BNP: 42682, CXR: Cardiomegaly, b/l pleural effusions   - EKG: V-paced at 80 bpm, no acute ST-T wave changes   - ECHO 02/08/22 TTE: LVIDd 4.7 cm, LVEF 37%, LV hypertrophy (septum 1.6, PW 1.7), analysis of DD challenging due to AF, normal RV size with mildly reduced function, HYACINTH, mild MR, mild TR, est PASP 40 mmHg, small pericardial effusion, ascites present  - f/u repeat TTE  - HOME DIURETIC: Torsemide 20 mg daily (patient reports noncompliance to recent increase to 40mg due to incontinence)  - DIURESIS: s/p Bumex 1 mg IVP x 1 in ED, patient received Bumex 2 mg IV BID, now discontinued and will start with torsemide 20mg tomorrow 11/22.  - GDMT: Continue Entresto 24/26 mg BID, Metoprolol Succinate 25 mg PO daily   - Core measures, strict I's and O's daily weight, fluid restriction - On ADMIT, pt w/ JVD, +1 B/L LE Edema, satting 98% on RA   - BNP: 37376, CXR: Cardiomegaly, b/l pleural effusions   - EKG: V-paced at 80 bpm, no acute ST-T wave changes   - ECHO 02/08/22 TTE: LVIDd 4.7 cm, LVEF 37%, LV hypertrophy (septum 1.6, PW 1.7), analysis of DD challenging due to AF, normal RV size with mildly reduced function, HYACINTH, mild MR, mild TR, est PASP 40 mmHg, small pericardial effusion, ascites present  - f/u repeat TTE  - HOME DIURETIC: Torsemide 20 mg daily (patient reports noncompliance to recent increase to 40mg due to incontinence)  - DIURESIS: s/p Bumex 1 mg IVP x 1 in ED, patient received Bumex 2 mg IV BID, discontinued and will start with torsemide 20mg 11/23.   - GDMT: Held 11/22 PM Entresto 24/26 mg BID, will restart when appropriate  - Metoprolol Succinate 25 mg PO daily   - Core measures, strict I's and O's daily weight, fluid restriction

## 2022-11-23 NOTE — PROGRESS NOTE ADULT - PROBLEM SELECTOR PLAN 4
h/o thrombocytopenia w/ baseline PLT 60-70s  - Platelts on admission: 58   - Restarted eliquis 5mg BID and will continue as long as platelets >50k   - f/u Heme/onc consult h/o thrombocytopenia w/ baseline PLT 60-70s  - Platelts on admission: 58   - Restarted eliquis 5mg BID and will continue as long as platelets >50k   - Heme/onc consulted, appreciate recs. Patient would not like to continue following thrombocytopenia during this admission and prefers to just monitor HF.

## 2022-11-23 NOTE — DISCHARGE NOTE NURSING/CASE MANAGEMENT/SOCIAL WORK - NSDCFUADDAPPT_GEN_ALL_CORE_FT
Please bring your Insurance card, Photo ID and Discharge paperwork to the following appointment:    (1) Please follow up with your Hematology/Oncology Provider, Dr. Carley Escobar at [address] [place] [address] on 11/29/2022 at 1:45pm.    Appointment was scheduled by Ms. MIKKI England, Referral Coordinator.

## 2022-11-23 NOTE — PROGRESS NOTE ADULT - PROBLEM SELECTOR PLAN 7
F: none   E: K >4, Mg >2  N: DASH/TLC, fluid restriction  Dvt: Restarted eliquis 5mg BID and will continue as long as platelets >50k  Dispo: [facility]

## 2022-11-23 NOTE — PROGRESS NOTE ADULT - ATTENDING COMMENTS
76 yo man with aTTRCM on Vyndamax and HFrEF GDMT as outpatient (LVEF~37%). Multiple hospital admission for fluid overload. Now sent from HF clinic for ADHF. Improved with IV diuretics over the weekend.    1+ LE edema pitting b/l   Warm well perfused, NAD  Cr improving   Severe thrombocytopenia (chronic)  CXR with R pleural effusion    - Continue Bumex 2 mg IV Bid for 2 more days   - Continue ARNI and Toprol at home doses   - Will discuss utility of Cardiomems with primary HF cardiologist    Víctor Dumont MD
78 yo man with aTTRCM on Vyndamax and HFrEF GDMT as outpatient (LVEF~37%). Multiple hospital admission for fluid overload. Now sent from HF clinic for ADHF. Improved with IV diuretics over the weekend.    Resolved LE edema  Warm well perfused, NAD  Some more hypotension overnight - probably related to relative hypovolemia  Cr mostly stable   Severe thrombocytopenia (chronic)  CXR with R pleural effusion    - Switch to Torsemide 20 mg daily   - Add Farxiga 10 mg daily   - Hold parameters for ARNI should be SBP <90   - Continue Toprol at home dose   - Apixaban 5 mg bid for AF  - Will discuss utility of Cardiomems with primary HF cardiologist    Víctor Dumont MD
76 yo man with aTTRCM on Vyndamax and HFrEF GDMT as outpatient (LVEF~37%). Multiple hospital admission for fluid overload. Now sent from HF clinic for ADHF. Improved with IV diuretics over the weekend.    Resolved LE edema  Warm well perfused, NAD  Some more hypotension overnight - probably related to relative hypovolemia  Cr mostly stable   Severe thrombocytopenia (chronic)  CXR with R pleural effusion    - Ok with Torsemide 10 mg daily   - Add Farxiga 10 mg daily   - Entresto 24/26 mg bid   - Toprol XL 25 mg daily   - Apixaban 5 mg bid for AF  - Ok to d/c home hopefully he agrees to services. F/u with Dr. Friedman.    Víctor Dumont MD
77M w/ chronic sCHF(EF 35-40%(, NICM 2/2 TTR Amyloid CM s/p CRT-P, h/o AF/Aflut s/p Ablation in 2016 on Eliquis, chronic, idiopathic thrombocytopenia p/w acute on chronic sCHF exacerbation    -sCHF - acute on chronic sCHF exacerbation - s/p IV diuresis w/ Bumex 2mg IV BID; Pt. currently euvolemic on exam, WWP; Plan to continue Toprol 25, Entresto 24/26 BID, Start Farxiga 10 daily and transition diuretic to PO Torsemide 20mg starting tmrw  -AF - s/p ablation in 2016 and s/p CRT-P; c/w Toprol 25 daily, c/w Eliquis 5 BID - has been on this as an outpatient i/s/o chronic thrombocytopenia w/o bleeding complication  -DASH diet  -OOB to chair  -Dispo: Cardiac Tele; Likely d/c home tmrw w/ outpatient HF f/u w/ Dr. Elder Dye MD  Cardiology Attending

## 2022-11-23 NOTE — PROGRESS NOTE ADULT - PROBLEM SELECTOR PROBLEM 4
Idiopathic thrombocythemia

## 2022-11-23 NOTE — PROGRESS NOTE ADULT - PROBLEM SELECTOR PLAN 5
BUN/Cr on admission: 25/1.39

## 2022-11-23 NOTE — PROGRESS NOTE ADULT - SUBJECTIVE AND OBJECTIVE BOX
Cardiology Consult      Interval History: patient was hypotensive yesterday, but the BP improved today.       OBJECTIVE  Vitals:  T(C): 36.2 (11-23-22 @ 09:01), Max: 36.7 (11-22-22 @ 14:15)  HR: 83 (11-23-22 @ 12:10) (80 - 86)  BP: 84/58 (11-23-22 @ 12:10) (62/46 - 106/79)  RR: 18 (11-23-22 @ 12:10) (15 - 18)  SpO2: 97% (11-23-22 @ 12:10) (95% - 99%)  Wt(kg): --    I/O:  I&O's Summary    22 Nov 2022 07:01  -  23 Nov 2022 07:00  --------------------------------------------------------  IN: 100 mL / OUT: 1850 mL / NET: -1750 mL    23 Nov 2022 07:01  -  23 Nov 2022 13:01  --------------------------------------------------------  IN: 320 mL / OUT: 400 mL / NET: -80 mL        PHYSICAL EXAM:  Appearance: NAD. Speaking in full sentences.   HEENT: No pallor noted.   Cardiovascular: RRR with no murmurs.  Respiratory: Lungs CTAB.   Gastrointestinal:  Soft, nontender.   Extremities: trace LE edema   Neurologic:  Alert and awake  	  LABS:                        12.8   2.73  )-----------( 71       ( 23 Nov 2022 05:30 )             38.3     11-23    140  |  106  |  31<H>  ----------------------------<  88  3.8   |  27  |  1.06    Ca    8.5      23 Nov 2022 05:30  Phos  2.8     11-23  Mg     2.0     11-23    TPro  6.4  /  Alb  3.6  /  TBili  0.8  /  DBili  x   /  AST  13  /  ALT  14  /  AlkPhos  125<H>  11-22    PT/INR - ( 22 Nov 2022 06:05 )   PT: 16.0 sec;   INR: 1.34          PTT - ( 21 Nov 2022 14:43 )  PTT:29.8 sec      RADIOLOGY & ADDITIONAL TESTS:  Reviewed .    MEDICATIONS  (STANDING):  apixaban 5 milliGRAM(s) Oral every 12 hours  dapagliflozin 10 milliGRAM(s) Oral every 24 hours  escitalopram 5 milliGRAM(s) Oral daily  metoprolol succinate ER 25 milliGRAM(s) Oral daily  sacubitril 24 mG/valsartan 26 mG 1 Tablet(s) Oral every 12 hours  sodium chloride 0.9%. 250 milliLiter(s) (125 mL/Hr) IV Continuous <Continuous>  tamsulosin 0.4 milliGRAM(s) Oral at bedtime    MEDICATIONS  (PRN):

## 2022-11-23 NOTE — DISCHARGE NOTE NURSING/CASE MANAGEMENT/SOCIAL WORK - PATIENT PORTAL LINK FT
You can access the FollowMyHealth Patient Portal offered by [facility] by registering at the following website: [email or web address]. By joining [facility]’s FollowMyHealth portal, you will also be able to view your health information using other applications (apps) compatible with our system.

## 2022-11-23 NOTE — PROGRESS NOTE ADULT - PROBLEM SELECTOR PLAN 2
S/p ablation in 2016. Following with Dr. Pemberton EP. Continue BB as above and AC with Eliquis.   - Continue home Toprol XL 25 mg daily  - Restarted eliquis 5mg BID and will continue as long as platelets >50k   - SCD's ordered for DVT ppx  - Spoke to Dr. Bradley and notified him of patient's status in hospital.

## 2022-11-23 NOTE — PROGRESS NOTE ADULT - SUBJECTIVE AND OBJECTIVE BOX
O/N Events:    Subjective/ROS: Patient seen and examined at bedside.     Denies Fever/Chills, HA, CP, SOB, n/v, changes in bowel/urinary habits.  12pt ROS otherwise negative.    VITALS  Vital Signs Last 24 Hrs  T(C): 36.3 (23 Nov 2022 04:30), Max: 36.7 (22 Nov 2022 09:12)  T(F): 97.4 (23 Nov 2022 04:30), Max: 98.1 (22 Nov 2022 09:12)  HR: 84 (23 Nov 2022 08:05) (80 - 86)  BP: 93/59 (23 Nov 2022 08:05) (62/46 - 106/79)  BP(mean): 71 (23 Nov 2022 08:05) (51 - 89)  RR: 18 (23 Nov 2022 08:05) (15 - 18)  SpO2: 95% (23 Nov 2022 08:05) (95% - 99%)    Parameters below as of 23 Nov 2022 08:05  Patient On (Oxygen Delivery Method): room air        CAPILLARY BLOOD GLUCOSE          PHYSICAL EXAM  General: NAD  Head: NC/AT; MMM; PERRL; EOMI;  Neck: Supple; no JVD  Respiratory: CTAB; no wheezes/rales/rhonchi  Cardiovascular: Regular rhythm/rate; S1/S2+, no murmurs, rubs gallops   Gastrointestinal: Soft; NTND; bowel sounds normal and present  Extremities: WWP; no edema/cyanosis  Neurological: A&Ox3, CNII-XII grossly intact; no obvious focal deficits    MEDICATIONS  (STANDING):  apixaban 5 milliGRAM(s) Oral every 12 hours  dapagliflozin 10 milliGRAM(s) Oral every 24 hours  escitalopram 5 milliGRAM(s) Oral daily  metoprolol succinate ER 25 milliGRAM(s) Oral daily  sacubitril 24 mG/valsartan 26 mG 1 Tablet(s) Oral every 12 hours  sodium chloride 0.9%. 250 milliLiter(s) (125 mL/Hr) IV Continuous <Continuous>  tamsulosin 0.4 milliGRAM(s) Oral at bedtime  torsemide 20 milliGRAM(s) Oral every 24 hours    MEDICATIONS  (PRN):      No Known Allergies      LABS                        12.8   2.73  )-----------( 71       ( 23 Nov 2022 05:30 )             38.3     11-23    140  |  106  |  31<H>  ----------------------------<  88  3.8   |  27  |  1.06    Ca    8.5      23 Nov 2022 05:30  Phos  2.8     11-23  Mg     2.0     11-23    TPro  6.4  /  Alb  3.6  /  TBili  0.8  /  DBili  x   /  AST  13  /  ALT  14  /  AlkPhos  125<H>  11-22    PT/INR - ( 22 Nov 2022 06:05 )   PT: 16.0 sec;   INR: 1.34          PTT - ( 21 Nov 2022 14:43 )  PTT:29.8 sec            IMAGING/EKG/ETC   O/N Events: no acute events overnight.     Subjective/ROS: Patient seen and examined at bedside.     Denies Fever/Chills, HA, CP, SOB, n/v, changes in bowel/urinary habits.  12pt ROS otherwise negative.    VITALS  Vital Signs Last 24 Hrs  T(C): 36.3 (23 Nov 2022 04:30), Max: 36.7 (22 Nov 2022 09:12)  T(F): 97.4 (23 Nov 2022 04:30), Max: 98.1 (22 Nov 2022 09:12)  HR: 84 (23 Nov 2022 08:05) (80 - 86)  BP: 93/59 (23 Nov 2022 08:05) (62/46 - 106/79)  BP(mean): 71 (23 Nov 2022 08:05) (51 - 89)  RR: 18 (23 Nov 2022 08:05) (15 - 18)  SpO2: 95% (23 Nov 2022 08:05) (95% - 99%)    Parameters below as of 23 Nov 2022 08:05  Patient On (Oxygen Delivery Method): room air    CAPILLARY BLOOD GLUCOSE    PHYSICAL EXAM  General: NAD  Head: NC/AT; MMM  Neck: Supple; no JVD  Respiratory: CTAB; minimal crackles auscultated on RLL field   Cardiovascular: Regular rhythm/rate; S1/S2+, no murmurs, rubs gallops   Gastrointestinal: Soft; NTND; bowel sounds normal and present  Extremities: WWP, no edema   Neurological: A&Ox3    MEDICATIONS  (STANDING):  apixaban 5 milliGRAM(s) Oral every 12 hours  dapagliflozin 10 milliGRAM(s) Oral every 24 hours  escitalopram 5 milliGRAM(s) Oral daily  metoprolol succinate ER 25 milliGRAM(s) Oral daily  sacubitril 24 mG/valsartan 26 mG 1 Tablet(s) Oral every 12 hours  sodium chloride 0.9%. 250 milliLiter(s) (125 mL/Hr) IV Continuous <Continuous>  tamsulosin 0.4 milliGRAM(s) Oral at bedtime  torsemide 20 milliGRAM(s) Oral every 24 hours    MEDICATIONS  (PRN):      No Known Allergies      LABS                        12.8   2.73  )-----------( 71       ( 23 Nov 2022 05:30 )             38.3     11-23    140  |  106  |  31<H>  ----------------------------<  88  3.8   |  27  |  1.06    Ca    8.5      23 Nov 2022 05:30  Phos  2.8     11-23  Mg     2.0     11-23    TPro  6.4  /  Alb  3.6  /  TBili  0.8  /  DBili  x   /  AST  13  /  ALT  14  /  AlkPhos  125<H>  11-22    PT/INR - ( 22 Nov 2022 06:05 )   PT: 16.0 sec;   INR: 1.34          PTT - ( 21 Nov 2022 14:43 )  PTT:29.8 sec    IMAGING/EKG/ETC

## 2022-11-25 ENCOUNTER — APPOINTMENT (OUTPATIENT)
Dept: HEART AND VASCULAR | Facility: CLINIC | Age: 77
End: 2022-11-25

## 2022-11-26 LAB
% ALBUMIN: 56 % — SIGNIFICANT CHANGE UP
% ALBUMIN: 56.9 % — SIGNIFICANT CHANGE UP
% ALPHA 1: 4 % — SIGNIFICANT CHANGE UP
% ALPHA 1: 4.1 % — SIGNIFICANT CHANGE UP
% ALPHA 2: 8.4 % — SIGNIFICANT CHANGE UP
% ALPHA 2: 8.4 % — SIGNIFICANT CHANGE UP
% BETA: 12.4 % — SIGNIFICANT CHANGE UP
% BETA: 12.6 % — SIGNIFICANT CHANGE UP
% GAMMA: 18.3 % — SIGNIFICANT CHANGE UP
% GAMMA: 18.9 % — SIGNIFICANT CHANGE UP
ALBUMIN SERPL ELPH-MCNC: 3.2 G/DL — LOW (ref 3.6–5.5)
ALBUMIN SERPL ELPH-MCNC: 3.5 G/DL — LOW (ref 3.6–5.5)
ALBUMIN/GLOB SERPL ELPH: 1.2 RATIO — SIGNIFICANT CHANGE UP
ALBUMIN/GLOB SERPL ELPH: 1.3 RATIO — SIGNIFICANT CHANGE UP
ALPHA1 GLOB SERPL ELPH-MCNC: 0.2 G/DL — SIGNIFICANT CHANGE UP (ref 0.1–0.4)
ALPHA1 GLOB SERPL ELPH-MCNC: 0.2 G/DL — SIGNIFICANT CHANGE UP (ref 0.1–0.4)
ALPHA2 GLOB SERPL ELPH-MCNC: 0.5 G/DL — SIGNIFICANT CHANGE UP (ref 0.5–1)
ALPHA2 GLOB SERPL ELPH-MCNC: 0.5 G/DL — SIGNIFICANT CHANGE UP (ref 0.5–1)
B-GLOBULIN SERPL ELPH-MCNC: 0.7 G/DL — SIGNIFICANT CHANGE UP (ref 0.5–1)
B-GLOBULIN SERPL ELPH-MCNC: 0.8 G/DL — SIGNIFICANT CHANGE UP (ref 0.5–1)
GAMMA GLOBULIN: 1.1 G/DL — SIGNIFICANT CHANGE UP (ref 0.6–1.6)
GAMMA GLOBULIN: 1.1 G/DL — SIGNIFICANT CHANGE UP (ref 0.6–1.6)
INTERPRETATION SERPL IFE-IMP: SIGNIFICANT CHANGE UP
INTERPRETATION SERPL IFE-IMP: SIGNIFICANT CHANGE UP
PROT PATTERN SERPL ELPH-IMP: SIGNIFICANT CHANGE UP
PROT PATTERN SERPL ELPH-IMP: SIGNIFICANT CHANGE UP

## 2022-11-29 DIAGNOSIS — N18.30 CHRONIC KIDNEY DISEASE, STAGE 3 UNSPECIFIED: ICD-10-CM

## 2022-11-29 DIAGNOSIS — I13.0 HYPERTENSIVE HEART AND CHRONIC KIDNEY DISEASE WITH HEART FAILURE AND STAGE 1 THROUGH STAGE 4 CHRONIC KIDNEY DISEASE, OR UNSPECIFIED CHRONIC KIDNEY DISEASE: ICD-10-CM

## 2022-11-29 DIAGNOSIS — Z79.01 LONG TERM (CURRENT) USE OF ANTICOAGULANTS: ICD-10-CM

## 2022-11-29 DIAGNOSIS — I25.10 ATHEROSCLEROTIC HEART DISEASE OF NATIVE CORONARY ARTERY WITHOUT ANGINA PECTORIS: ICD-10-CM

## 2022-11-29 DIAGNOSIS — I48.92 UNSPECIFIED ATRIAL FLUTTER: ICD-10-CM

## 2022-11-29 DIAGNOSIS — I87.2 VENOUS INSUFFICIENCY (CHRONIC) (PERIPHERAL): ICD-10-CM

## 2022-11-29 DIAGNOSIS — I50.23 ACUTE ON CHRONIC SYSTOLIC (CONGESTIVE) HEART FAILURE: ICD-10-CM

## 2022-11-29 DIAGNOSIS — N17.9 ACUTE KIDNEY FAILURE, UNSPECIFIED: ICD-10-CM

## 2022-11-29 DIAGNOSIS — E11.22 TYPE 2 DIABETES MELLITUS WITH DIABETIC CHRONIC KIDNEY DISEASE: ICD-10-CM

## 2022-11-29 DIAGNOSIS — Z85.46 PERSONAL HISTORY OF MALIGNANT NEOPLASM OF PROSTATE: ICD-10-CM

## 2022-11-29 DIAGNOSIS — E85.4 ORGAN-LIMITED AMYLOIDOSIS: ICD-10-CM

## 2022-11-29 DIAGNOSIS — N40.0 BENIGN PROSTATIC HYPERPLASIA WITHOUT LOWER URINARY TRACT SYMPTOMS: ICD-10-CM

## 2022-11-29 DIAGNOSIS — D69.3 IMMUNE THROMBOCYTOPENIC PURPURA: ICD-10-CM

## 2022-11-29 DIAGNOSIS — Z92.21 PERSONAL HISTORY OF ANTINEOPLASTIC CHEMOTHERAPY: ICD-10-CM

## 2022-11-29 DIAGNOSIS — I42.8 OTHER CARDIOMYOPATHIES: ICD-10-CM

## 2022-11-29 DIAGNOSIS — I48.20 CHRONIC ATRIAL FIBRILLATION, UNSPECIFIED: ICD-10-CM

## 2022-11-29 LAB
% GAMMA, URINE: 19.4 % — SIGNIFICANT CHANGE UP
ALBUMIN 24H MFR UR ELPH: 15.9 % — SIGNIFICANT CHANGE UP
ALPHA1 GLOB 24H MFR UR ELPH: 29.3 % — SIGNIFICANT CHANGE UP
ALPHA2 GLOB 24H MFR UR ELPH: 24.1 % — SIGNIFICANT CHANGE UP
B-GLOBULIN 24H MFR UR ELPH: 11.3 % — SIGNIFICANT CHANGE UP
INTERPRETATION 24H UR IFE-IMP: SIGNIFICANT CHANGE UP
INTERPRETATION 24H UR IFE-IMP: SIGNIFICANT CHANGE UP
M PROTEIN 24H UR ELPH-MRATE: SIGNIFICANT CHANGE UP
PROT PATTERN 24H UR ELPH-IMP: SIGNIFICANT CHANGE UP
TOTAL VOLUME - 24 HOUR: SIGNIFICANT CHANGE UP ML
URINE CREATININE CALCULATION: SIGNIFICANT CHANGE UP G/24 H (ref 1–2)

## 2022-12-02 ENCOUNTER — APPOINTMENT (OUTPATIENT)
Dept: HEART AND VASCULAR | Facility: CLINIC | Age: 77
End: 2022-12-02

## 2022-12-06 ENCOUNTER — NON-APPOINTMENT (OUTPATIENT)
Age: 77
End: 2022-12-06

## 2022-12-09 ENCOUNTER — APPOINTMENT (OUTPATIENT)
Dept: HEART AND VASCULAR | Facility: CLINIC | Age: 77
End: 2022-12-09
Payer: MEDICARE

## 2022-12-12 ENCOUNTER — APPOINTMENT (OUTPATIENT)
Dept: HEART AND VASCULAR | Facility: CLINIC | Age: 77
End: 2022-12-12

## 2022-12-12 VITALS
WEIGHT: 184 LBS | HEIGHT: 69 IN | BODY MASS INDEX: 27.25 KG/M2 | HEART RATE: 85 BPM | DIASTOLIC BLOOD PRESSURE: 67 MMHG | SYSTOLIC BLOOD PRESSURE: 98 MMHG

## 2022-12-12 PROCEDURE — 93281 PM DEVICE PROGR EVAL MULTI: CPT

## 2022-12-12 NOTE — CARDIOLOGY SUMMARY
[___] : [unfilled] [de-identified] : 12/20/21 AF @ 63 bpm  [de-identified] : 11/22/21\par  1. Biatrial enlargement.\par  2. Mild pulmonic regurgitation.\par  3. Pulmonary artery systolic pressure is 38 mmHg.\par  4. No other significant valvular disease.\par  5. Normal right ventricular size.\par  6. Reduced right ventricular systolic function.\par  7. There is severe concentric left ventricular hypertrophy. Left ventricular systolic function is moderately reduced with a calculated ejection fraction of 35% with global hypokinesis.\par  8. Small-to-moderate pericardial effusion without echocardiographic evidence of cardiac tamponade physiology.\par  9. The proximal ascending aorta is dilated measuring 4.10 cm.\par 10. Findings suggestive of infiltrative cardiomyopathy.\par 11. Compared to the previous TTE performed on 6/29/2021, LV systolic function appears reduced.\par \par

## 2022-12-12 NOTE — PROCEDURE
[No] : not [CRT-P] : Cardiac resynchronization therapy pacemaker [VVIR] : VVIR [Lead Imp:  ___ohms] : lead impedance was [unfilled] ohms [Sensing Amplitude ___mv] : sensing amplitude was [unfilled] mv [___V @] : [unfilled] V [___ ms] : [unfilled] ms [de-identified] : [facility] [de-identified] : [employer] [de-identified] : 2/16/22 [de-identified] : 82 [de-identified] : 8 years to [facility]  [de-identified] : AP 0 \par RVP 98\par

## 2022-12-12 NOTE — DISCUSSION/SUMMARY
[FreeTextEntry1] : Mr. 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  He had a prior ablation in 2016 at [facility].  He was noted to be in atrial fibrillation during an admission to [facility] 5/2021.  He has had recurrent hospitalizations for decompensated HF, most recently 11/2022.  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  Beta-blocker therapy limited by resting bradycardia.  He is s/p CRT-P placement 2/16/22 with LRL at 80 bpm.  [employer] at baseline on interrogation.  Advised to follow-up with Dr. Friedman for HF management; he missed his post discharge follow-up last week.  He is effectively biventricular pacing as programmed.  He is enrolled in remote monitoring and was asked to return for follow-up in six months.

## 2022-12-12 NOTE — ADDENDUM
[FreeTextEntry1] : I, Jimi Pemberton, hereby attest that the medical record entry for this patient accurately reflects signatures/notations that I made on the Date of Service in my capacity as an Attending Physician when I treated/diagnosed the above patient. I do hereby attest that this information is true, accurate and complete to the best of my knowledge.  I was present for the entire visit and supervised the entire visit and made/agree with the plan as outlined.\par

## 2022-12-12 NOTE — HISTORY OF PRESENT ILLNESS
[FreeTextEntry1] : Mr. 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  He had a prior ablation in 2016 at [facility].  He was noted to be in atrial fibrillation during an admission to [facility] 5/2021.  He has had recurrent hospitalizations for decompensated HF.  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  Beta-blocker therapy limited by resting bradycardia.  He is s/p CRT-P placement 2/16/22.   He complains of hearing loss. Ambulating with cane.  He offers no device related complaints.  Hospitalized at [facility] 11/2022 with decompensated HF in the setting of medication non compliance.  Reports he has been taking his medications as prescribed since hospital discharge.  \par

## 2022-12-16 NOTE — DISCUSSION/SUMMARY
[Time Spent: ___ minutes] : I have spent [unfilled] minutes with the patient on the telephone [EKG obtained to assist in diagnosis and management of assessed problem(s)] : EKG obtained to assist in diagnosis and management of assessed problem(s) [Patient] : the patient [FreeTextEntry2] : and son [FreeTextEntry1] : 77 year old man with ATTR cardiac amyloid, NICM (LVIDd 4.7 cm, LVEF 37%), AF/AFlu s/p ablation 6/2016 (on Eliquis) s/p CRT-P (2/16/2022), CKD Stage 3 (b/l Cr 1-1.2), DMT2 (Ha1c 6.4% 2/2022), thrombocytopenia (bone marrow biopsy 11/2021 unrevealing), chronic venous insufficiency, and prostate Ca s/p chemotherapy. He is ACC/AHA Stage C, NYHA Class IIIb HF, and markedly volume overloaded with marginal blood pressures today. I have recommended the following: \par \par 1. Acute on chronic systolic & diastolic HF - decompensated and fluid overloaded. Has not been taking his medications correctly, unfortunately, which may have contributed. Advised to go to ER now for admission. Would give Bumex 1 mg IV push now and consult inpatient HF service. \par \par 2. Cardiac amyloidosis - TTR amyloid confirmed by Tc-PYP scan. Following with Dr. Bradley and started on Vyndemax in 5/2022. He was also evaluated by Dr. 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 Of note patients with this variant could develop both cardiac and neurologic symptoms and further recommended to be evaluated by neurology. \par \par 3. Atrial fibrillation/flutter - S/p ablation in 2016. Following with Dr. Pemberton, EP. Continue BB for rate control and Eliquis for AC.\par \par 4. CKD Stage IIIa- Cr b/l 1.3-1.4. Avoid Nephrotoxic agents. Consider referral to nephrology.\par \par 5. T2DM- well controlled with diet (A1C 6.2%). \par \par 6. Follow-up with Dr. Friedman one week after hospital discharge.

## 2022-12-16 NOTE — CARDIOLOGY SUMMARY
[de-identified] : \par 11/18/22 ECG: V-paced at 81 bpm, no sig change from prior\par 03/17/22 ECG : electronic pacemaker, atrial fibrillation, Bi-V paced.  [de-identified] : \par 02/08/22 TTE: LVIDd 4.7 cm, LVEF 37%, LV hypertrophy (septum 1.6, PW 1.7), analysis of DD challenging due to AF, normal RV size with mildly reduced function, HYACINTH, mild MR, mild TR, est PASP 40 mmHg, small pericardial effusion, ascites present\par \par 06/29/21 TTE: LVIDd 4.4 cm, LVEF 50%, severe concentric LVH (septum 1.7, PW 1.8), normal RV size and function, HYACINTH, trace MR, mild-mod TR, est PASP 36 mmHg, small pericardial effusion\par  [de-identified] : 02/10/22 TcPYP: Intense uptake in LV and part of the right ventricular myocardium in a pattern consistent with TTR-mediated amyloidosis.  [de-identified] : 02/24/22: insertion of CRT-P

## 2022-12-16 NOTE — HISTORY OF PRESENT ILLNESS
[FreeTextEntry1] : This is a 77 year old man with ATTR cardiac amyloid, NICM (LVIDd 4.7 cm, LVEF 37%), AF/Aflu s/p ablation 6/2016 (on Eliquis) s/p CRT-P (2/16/2022), CKD Stage 3 (b/l Cr 1-1.2), DMT2 (Ha1c 6.4% 2/2022), thrombocytopenia (bone marrow biopsy 11/2021 unrevealing) w/ idiopathic thrombocytopenia, chronic venous insufficiency, and prostate Ca s/p chemotherapy. Here today to follow up on his cardiomyopathy. \par \par He has had multiple hospitalizations for AHDF in the past year. One in November 2021 where he was diagnosed with ATTR cardiac amyloid by TcPYP scan. His most recent admission was at [facility] 2/7 - 2/17/22 where he required a R thoracentesis that was complicated by pneumothorax requiring pigtail. Entresto was briefly held for hypotension. Given fixed SV in setting of restrictive physiology, underwent CRT-P placement 2/16/2022 with lower HR limit set at 80 bpm. He was discharged on low dose GDMT at a weight of 173.7 lbs (admission weight 199 lbs).\par \par Since his initial appt with the HF CLEO team on 4/11/2022 he has completed genetic testing, that was positive for pathonomic gene for TTR amyloidosis, more specifically a variant that could potentially have cardiac and neurologic findings. It was further recommended he establish with neurologist Dr. Estrada Valdes. \par \par He has also established with Dr. Finkelstein and was started on Vyndamax in 5/2022 but notably had increasing diuretic demands and worsening renal function. More recently requiring torsemide 40 mg daily. However, due to urinary incontinence remains on torsemide 20 mg once daily and low dose Entresto once daily. \par \par He had canceled multiple office visits with us due to "feeling poorly" so has not been seen since his hospitalization. He is here today with his son, who is equally concerned about his fathers decline in health this week. Of note he has not been taking his weights or blood pressures at home. He is now short of breath with minimal exertion. He will become SOB with a few steps or with simple ADLS such as dressing. He has noted a diminished appetite with an increase in abdominal girth and depressed mood and been prescribed lexapro. Of note he is up nearly 17 pounds since his last OV in September with Dr. Finkelstein. \par \par He denies CP, SOB at rest, overt orthopnea, PND, LH/dizziness, abdominal discomfort, palpitations, and syncope. His appetite is diminished and his bowel and bladder habits are unchanged and normal for him. He has a CRT-P. He has been limiting fluid to 1 L and sodium in his diet and taking his medications as directed. He does not use NSAIDs. He has not been admitted to the hospital or seen in the ER for HF in the interim.

## 2022-12-16 NOTE — END OF VISIT
[FreeTextEntry3] : I, Dr. Friedman, personally performed the evaluation and management (E/M) services for this established patient who presents today with (a) new problem(s)/exacerbation of (an) existing condition(s).  That E/M includes conducting the examination, assessing all new/exacerbated conditions, and establishing a new plan of care.  Today, my CLEO, Faith Natalie, was here to observe my evaluation and management services for this new problem/exacerbated condition to be followed going forward.  [Time Spent: ___ minutes] : I have spent [unfilled] minutes of time on the encounter.

## 2022-12-16 NOTE — REASON FOR VISIT
[Cardiac Failure] : cardiac failure [Other: _____] : [unfilled] [Other: ____] : [unfilled] [FreeTextEntry1] : Patient Instructions:\par \par 1. Present to the emergency room for acute decompensated heart failure\par \par 2. We will guide the emergency room doctors on medications and pending lab results make future decision for admission \par \par 3. It's important that you follow up with Dr. Friedman one week after you leave the hospital\par \par 4. We recommend that family members consider genetic screening for transthyretin amyloid

## 2022-12-16 NOTE — PHYSICAL EXAM
[No Xanthelasma] : no xanthelasma [Frail] : frail [Normal] : moves all extremities, no focal deficits, normal speech [Well Developed] : well developed [Well Nourished] : well nourished [No Acute Distress] : no acute distress [Normal Conjunctiva] : normal conjunctiva [Normal Venous Pressure] : normal venous pressure [No Carotid Bruit] : no carotid bruit [Normal S1, S2] : normal S1, S2 [No Murmur] : no murmur [No Rub] : no rub [No Gallop] : no gallop [Good Air Entry] : good air entry [No Respiratory Distress] : no respiratory distress  [Soft] : abdomen soft [Non Tender] : non-tender [No Masses/organomegaly] : no masses/organomegaly [Normal Gait] : normal gait [No Cyanosis] : no cyanosis [No Clubbing] : no clubbing [No Varicosities] : no varicosities [Edema ___] : edema [unfilled] [No Rash] : no rash [No Skin Lesions] : no skin lesions [Moves all extremities] : moves all extremities [No Focal Deficits] : no focal deficits [Normal Speech] : normal speech [Alert and Oriented] : alert and oriented [Normal memory] : normal memory [de-identified] : + device generator anterior left chest  [de-identified] : LCTA  but mildly  RLL [Normal Bowel Sounds] : normal bowel sounds [Normal Radial B/L] : normal radial B/L [de-identified] : not assessed - wearing a mask [de-identified] : JVP 14-16 cm of H2O + HJR [de-identified] : abdomen mildly distended  [de-identified] : cool peripherally

## 2022-12-19 ENCOUNTER — APPOINTMENT (OUTPATIENT)
Dept: NEUROLOGY | Facility: CLINIC | Age: 77
End: 2022-12-19

## 2023-01-06 ENCOUNTER — APPOINTMENT (OUTPATIENT)
Dept: HEART AND VASCULAR | Facility: CLINIC | Age: 78
End: 2023-01-06
Payer: MEDICARE

## 2023-01-06 VITALS
DIASTOLIC BLOOD PRESSURE: 86 MMHG | OXYGEN SATURATION: 97 % | HEIGHT: 67 IN | SYSTOLIC BLOOD PRESSURE: 122 MMHG | TEMPERATURE: 99 F | HEART RATE: 88 BPM | BODY MASS INDEX: 34.06 KG/M2 | WEIGHT: 217 LBS

## 2023-01-06 PROCEDURE — 99215 OFFICE O/P EST HI 40 MIN: CPT

## 2023-01-06 RX ORDER — POTASSIUM CHLORIDE 1500 MG/1
20 TABLET, EXTENDED RELEASE ORAL DAILY
Refills: 0 | Status: DISCONTINUED | COMMUNITY
Start: 2022-07-06 | End: 2023-01-06

## 2023-01-12 ENCOUNTER — RX RENEWAL (OUTPATIENT)
Age: 78
End: 2023-01-12

## 2023-01-18 ENCOUNTER — NON-APPOINTMENT (OUTPATIENT)
Age: 78
End: 2023-01-18

## 2023-01-18 ENCOUNTER — APPOINTMENT (OUTPATIENT)
Dept: HEART AND VASCULAR | Facility: CLINIC | Age: 78
End: 2023-01-18

## 2023-01-24 ENCOUNTER — NON-APPOINTMENT (OUTPATIENT)
Age: 78
End: 2023-01-24

## 2023-01-25 ENCOUNTER — APPOINTMENT (OUTPATIENT)
Dept: HEART AND VASCULAR | Facility: CLINIC | Age: 78
End: 2023-01-25

## 2023-02-03 NOTE — DISCUSSION/SUMMARY
[Patient] : the patient [FreeTextEntry2] : and his son [FreeTextEntry1] : 77 year old man with transthyretin cardiac amyloid, NICM (LVEF 20-25%, declined from 37%), AFib/Fl s/p ablation 6/2016 (on Eliquis) s/p CRT-P (2/16/2022), CKD Stage 3 (b/l Cr 1-1.2), DMT2 (Ha1c 6.4% 2/2022), thrombocytopenia (bone marrow biopsy 11/2021 unrevealing) w/ idiopathic thrombocytopenia, chronic venous insufficiency, and prostate Ca s/p chemotherapy. He is ACC/AHA Stage C with NYHA Class III HF, nearly euvolemic on exam. He is doing better since his hospital discharge but not thriving. I have recommended the following: \par \par 1. Chronic Systolic Heart Failure - stable and reasonably compensated. Have instructed him to take Entresto 24-26 BID rather than daily. Continue Farxiga 10 mg daily, Torsemide 20 mg daily, and increase Toprol XL to 25 mg daily.  Start spironolactone 25 mg daily & stop KCl 20 mEQ. Goal to escalate ARNI if BP and labs allow and escalate BB given his HR is protected by CRT-P. Check labs in 1 week via home-draw. \par \par 2. Cardiac amyloidosis - TTR amyloid confirmed by Tc-PYP scan. Following with Dr. Bradley and started on Vyndemax in 5/2022. He was also evaluated by Dr. 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 Of note patients with this variant could develop both cardiac and neurologic symptoms and further recommended to be evaluated by neurology, but he has not yet done so.\par \par 3. Atrial fibrillation/flutter - S/p ablation in 2016. Following with SANTIAGO Alan. Continue BB as above and reinforced AC with Eliquis BID.  \par \par 4. CKD Stage IIIa- Cr b/l 1.3-1.4. Labs at hosp d/c on 11/23 BUN/Cr 31/1.06.  Avoid Nephrotoxic agents. Continue to monitor with routine labs.\par \par 5. T2DM- well controlled with most recent A1C of 6.2%. Controlled by diet and lifestyle measures.\par \par 6. Follow up with PABLO Cuevas in 2 weeks via telehealth and Dr. Friedman in 4/2023 with tentative plans for echocardiogram if he has been maximized on GDMT for HFrEF.

## 2023-02-03 NOTE — REASON FOR VISIT
[Other: _____] : [unfilled] [FreeTextEntry1] : Patient Instructions:\par \par 1. Take your medications as prescribed\par \par 2. Increase the frequency of Entresto to twice daily\par \par 3. STOP Potassium Chloride\par \par 4. Start Spironolactone 25 mg once daily\par \par 5. INCREASE Metoprolol Succinate to 25 mg once daily (one tablet daily)\par \par 6. We will arrange a blood draw via a homedraw 1/16/2023\par \par 7. Continue to monitor your blood pressures at home, recommend [employer] Blood Pressure Cuff\par \par 8. Follow up with PABLO Cuevas in 2 weeks via telehealth and Dr. Friedman in 3 months for appointment and tentative plan for echocardiogram.

## 2023-02-03 NOTE — HISTORY OF PRESENT ILLNESS
[FreeTextEntry1] : This is a 77 year old man with ATTR cardiac amyloid, NICM (LVEF 20-25%, declined from 37%)  AF/Aflu s/p ablation 6/2016 (on Eliquis) s/p CRT-P (2/16/2022), CKD Stage 3 (b/l Cr 1-1.2), DMT2 (Ha1c 6.4% 2/2022), thrombocytopenia (bone marrow biopsy 11/2021 unrevealing) w/ idiopathic thrombocytopenia, chronic venous insufficiency, and prostate Ca s/p chemotherapy. Here today to follow up on his cardiomyopathy. \par \par He has had multiple hospitalizations for AHDF in the past year. One in November 2021 where he was diagnosed with ATTR cardiac amyloid by TcPYP scan. His most recent admission was at [facility] 2/7 - 2/17/22 where he required a R thoracentesis that was complicated by pneumothorax requiring pigtail. Entresto was briefly held for hypotension. Given fixed SV in setting of restrictive physiology, underwent CRT-P placement 2/16/2022 with lower HR limit set at 80 bpm. He was discharged on low dose GDMT at a weight of 173.7 lbs (admission weight 199 lbs).\par \par Since his initial appt with the HF CLEO team on 4/11/2022 he has completed genetic testing, that was positive for pathonomic gene for TTR amyloidosis, more specifically a variant that could potentially have cardiac and neurologic findings. It was further recommended he establish with neurologist Dr. Estrada Valdes. \par \par He has also established with Dr. Finkelstein and was started on Vyndamax in 5/2022 but notably had increasing diuretic demands and worsening renal function. He has been hesitant to increase diuretic doses in the past due to urinary and bowel incontinence. \par \par Since he was last seen on 11/18/2022 he was admitted to [facility] 11/18-11/23/2022 for ADHF requiring IV diuresis, and discharged home on low doses of GDMT at a weight of 171 pounds. \par \par In the interval his visiting nurse had called on 12/6 with concern of a 9 pound weight gain over the course of the week, reporting a weight of 184 pounds. Further instructed to increase torsemide to 20 mg from 10 mg daily, which he remains on at present. \par \par He lives with his wife and manages his own medications, more often then not has been taking his entresto and eliquis once daily as he frequently falls asleep prior to evening administration and then forget.  He took his last dose of farxiga 10 mg today but remains consistently on torsemide 20 mg daily. \par \par Overall he is feeling better since his hospital discharge, he walks with a cane/walker and now can walk ~1 block before becoming short of breath. He reports improved stamina as several weeks ago, he could not manage a few steps before feeling SOB,  He habitually sleeps on three pillows as previously he would become short of breath when lying flat and has not tried to lay flat. On rare occasion will feel brief LH not quantifiable at present. He continues to take daily weights and has not noted any substantial weight gain > = 3 pounds in 24 hours, attributes his weight gain to caloric intake as he has been eating more and feeling better. \par \par He denies CP, SOB at rest, overt orthopnea, PND, abdominal discomfort, palpitations, and syncope. His appetite has improved and his bowel and bladder habits are unchanged and normal for him although continues to report incontience. He has a CRT-P. He has been limiting fluid and sodium in his diet and taking his medications as directed. He does not use NSAIDs. .

## 2023-02-03 NOTE — HISTORY OF PRESENT ILLNESS
[FreeTextEntry1] : Mr. Wise presents for follow up and management of cardiac amyloidosis (ATTR-CM, diagnosed 11/2021), chronic systolic heart failure secondary to nonischemic cardiomyopathy, persistent atrial fibrillation s/p ablation 06/2016 with Dr. Neeraj MD, at [facility] with recurrence, s/p CRT-P (02/16/22), CKD III, DM2, idiopathic thrombocytopenia, chronic venous insufficiency, and prostate cancer s/p chemotherapy.  He has had multiple admissions for decompensated heart failure.  On a prior admission he underwent a bone marrow biopsy to work up thrombocytopenia which was negative for a plasma cell dyscrasia or AL-amyloidosis.  He was admitted again to [facility] from 02/07/22 to 02/17/22 for acute on chronic systolic heart failure.  He underwent right thoracentesis complicated by pneumothorax.  On 02/08/22, he had an echocardiogram which revealed an EF of 37%, severe symmetrically increased wall thickness, mildly reduced RV function, mild MR, PASP 40 mmHg, small pericardial effusion, and bilateral pleural effusions. On 02/10/22 he had a TC-99 PYP scan which revealed intense LV and mild RV uptake consistent with ATTR-cm.  On 02/16/22, he underwent implantation of a CRT-P ([employer]) secondary to bradycardia, the need for chronic ventricular pacing, and severely reduced LV systolic function. His lower rate limit was set to 80 bpm to improve hemodynamics given his restrictive cardiomyopathy.  His primary cardiologist is Yara Anton MD.  We again had an extensive discussion about the pathophysiology, natural history, and treatment of ATTR-CM.  At present, he has marked MARTINEZ to 1 block ambulation.  His edema is moderate. In addition, he has complaints of peripheral neuropathy.  He again, did not increase torsemide to 40mg as suggested last visit.  His current weight is 167 pounds (? inaccurate).  He was admitted on 11/19/22, with acute on chronic heart failure and was diuresed and sent home on 11/21/22.

## 2023-02-03 NOTE — ASSESSMENT
[FreeTextEntry1] : 1.  Atrial fibrillation, s/p AF ablation at [facility] 06/2016, with recurrence, now persistent: \par      - continue systemic anticoagulation with Eliquis 5mg po bid\par      - discussed with patient avoidance of ASA and NSAIDS as well as need for bleeding surveillance \par      - follow up with heart rhythm specialist, Jimi Pemberton MD\par \par 2. Chronic systolic heart failure, NICM, secondary to amyloidosis: \par      - continue Entresto 24/26mg po bid\par      - continue metoprolol succinate ER 25mg po qd \par      - increase torsemide from 20mg po qd prn to 2 tabs po qd prn (still taking 20mg) \par      - will not up titrate HF regimen given symptomatic hypotension \par      - will send to a heart failure specialist, Víctor Taylor MD (has appointment in October)\par \par 3. CKD III: Cr 1.47, eGFR 50 (05/19/22)\par      - avoid nephrotoxic agents\par      - will consider sending to a nephrologist next visit (he is reluctant at this time) \par \par 4. DM2: A1c 6.2% (05/19/22)\par      - discussed with patient therapeutic lifestyle changes to improve glucose metabolism \par \par 5. Thrombocytopenia:plt 63 (11/21/22):\par \par 6. 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..............................................\par 11/19/21...................................0.12.....................12,051..................................................\par \par \par Amyloidosis Treatment:\par      - continue Vyndamax 61mg po qd \par      - will send to neurologist, Estrada Valdes MD to 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

## 2023-02-03 NOTE — END OF VISIT
[FreeTextEntry3] : I, Dr. Friedman, personally performed the evaluation and management (E/M) services for this established patient who presents today with (a) new problem(s)/exacerbation of (an) existing condition(s).  That E/M includes conducting the examination, assessing all new/exacerbated conditions, and establishing a new plan of care.  Today, my CLEO, Pull, was here to observe my evaluation and management services for this new problem/exacerbated condition to be followed going forward.\par  [Time Spent: ___ minutes] : I have spent [unfilled] minutes of time on the encounter.

## 2023-02-03 NOTE — PHYSICAL EXAM
[No Clubbing] : no clubbing [Well Developed] : well developed [Soft] : abdomen soft [Non Tender] : non-tender [Normal Bowel Sounds] : normal bowel sounds [de-identified] : JVP 10 cm of H2O with HJR [de-identified] : ambulates with cane for distance [de-identified] : warm peripherally

## 2023-02-03 NOTE — PHYSICAL EXAM
[de-identified] : + device generator anterior left chest  [de-identified] : LCTA  but mildly  RLL

## 2023-03-03 ENCOUNTER — APPOINTMENT (OUTPATIENT)
Dept: HEART AND VASCULAR | Facility: CLINIC | Age: 78
End: 2023-03-03
Payer: MEDICARE

## 2023-03-03 VITALS
BODY MASS INDEX: 28.88 KG/M2 | OXYGEN SATURATION: 99 % | SYSTOLIC BLOOD PRESSURE: 110 MMHG | HEIGHT: 67 IN | DIASTOLIC BLOOD PRESSURE: 67 MMHG | WEIGHT: 184 LBS | HEART RATE: 80 BPM

## 2023-03-03 PROCEDURE — 99215 OFFICE O/P EST HI 40 MIN: CPT

## 2023-03-03 RX ORDER — TORSEMIDE 20 MG/1
20 TABLET ORAL DAILY
Qty: 90 | Refills: 3 | Status: DISCONTINUED | COMMUNITY
End: 2023-03-03

## 2023-03-06 ENCOUNTER — RX RENEWAL (OUTPATIENT)
Age: 78
End: 2023-03-06

## 2023-03-06 LAB
ANION GAP SERPL CALC-SCNC: 13 MMOL/L
BUN SERPL-MCNC: 27 MG/DL
CALCIUM SERPL-MCNC: 9.1 MG/DL
CHLORIDE SERPL-SCNC: 112 MMOL/L
CO2 SERPL-SCNC: 22 MMOL/L
CREAT SERPL-MCNC: 1.28 MG/DL
EGFR: 58 ML/MIN/1.73M2
GLUCOSE SERPL-MCNC: 80 MG/DL
MAGNESIUM SERPL-MCNC: 2.7 MG/DL
NT-PROBNP SERPL-MCNC: ABNORMAL PG/ML
POTASSIUM SERPL-SCNC: 4.6 MMOL/L
SODIUM SERPL-SCNC: 147 MMOL/L

## 2023-03-07 RX ORDER — TORSEMIDE 10 MG/1
10 TABLET ORAL
Qty: 30 | Refills: 0 | Status: DISCONTINUED | COMMUNITY
Start: 2022-11-23

## 2023-03-07 RX ORDER — ESCITALOPRAM OXALATE 5 MG/1
5 TABLET ORAL
Qty: 30 | Refills: 0 | Status: DISCONTINUED | COMMUNITY
Start: 2022-11-07 | End: 2023-03-07

## 2023-03-13 ENCOUNTER — APPOINTMENT (OUTPATIENT)
Dept: HEART AND VASCULAR | Facility: CLINIC | Age: 78
End: 2023-03-13
Payer: MEDICARE

## 2023-03-13 ENCOUNTER — NON-APPOINTMENT (OUTPATIENT)
Age: 78
End: 2023-03-13

## 2023-03-13 PROCEDURE — 93294 REM INTERROG EVL PM/LDLS PM: CPT

## 2023-03-13 PROCEDURE — 93296 REM INTERROG EVL PM/IDS: CPT

## 2023-03-20 ENCOUNTER — APPOINTMENT (OUTPATIENT)
Dept: HEART AND VASCULAR | Facility: CLINIC | Age: 78
End: 2023-03-20

## 2023-03-20 NOTE — PHYSICAL EXAM
[Well Developed] : well developed [No Acute Distress] : no acute distress [Frail] : frail [No Xanthelasma] : no xanthelasma [Soft] : abdomen soft [Non Tender] : non-tender [Normal Bowel Sounds] : normal bowel sounds [No Cyanosis] : no cyanosis [No Clubbing] : no clubbing [Edema ___] : edema [unfilled] [Normal] : moves all extremities, no focal deficits, normal speech [Alert and Oriented] : alert and oriented [de-identified] : JVP 14-16 cm of H2O with HJR [de-identified] : ambulates with cane for distance [de-identified] : cool peripherally

## 2023-03-20 NOTE — DISCUSSION/SUMMARY
[Patient] : the patient [FreeTextEntry2] : and his son [FreeTextEntry1] : 77 year old man with transthyretin cardiac amyloid, NICM (LVEF 20-25%, declined from 37%), AFib/Fl s/p ablation 6/2016 (on Eliquis) s/p CRT-P (2/16/2022). He is ACC/AHA Stage C with NYHA Class III HF, moderately volume overloaded on exam. He has excellent social support but remains persistent about maintaining his independence and frequently cannot adhere to his prescribed medication regimen. As a byproduct is not doing well. I have recommended the following: \par \par 1. Acute on Chronic Systolic Heart Failure - moderately volume overloaded in the setting of medication non-adherence. I have instructed him to increase the frequency of entresto 24/26 to BID, continue farxiga 10 mg daily, spironolactone 25 mg daily and resume torsemide 20 mg daily.  Furthermore, his family are amenable to options like pill pack moving forward. Goal to escalate ARNI if BP and labs allow and escalate BB given his HR is protected by CRT-P. Labs 1/16/23: Na 146, K 4.6 Cl 107, CO2 28, BUN 30, Cr 1.46, proBNP 13K, check labs today. \par \par 2. Cardiac amyloidosis - TTR amyloid confirmed by Tc-PYP scan. Following with Dr. Bradley and started on Vyndemax in 5/2022. He was also evaluated by Dr. 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 Of note patients with this variant could develop both cardiac and neurologic symptoms and further recommended to be evaluated by neurology, but he has not yet done so.\par \par 3. Atrial fibrillation/flutter - S/p ablation in 2016. Following with Dr. Pemberton, EP. Continue BB as above and reinforced AC with Eliquis BID.  \par \par 4. CKD Stage IIIa- Cr b/l 1.3-1.4. Labs in 1/2023: BUN/Cr  30 /1.46.   Avoid Nephrotoxic agents. Continue to monitor with routine labs.\par \par 5. T2DM- well controlled with most recent A1C of 6.2%. Controlled by diet and lifestyle measures.\par \par 6. Follow up with PABLO Cuevas in 2 weeks (in person) and Dr. Friedman in 3 months

## 2023-03-20 NOTE — REASON FOR VISIT
[Cardiac Failure] : cardiac failure [Other: _____] : [unfilled] [FreeTextEntry1] : Patient Instructions:\par \par 1. Take your medications as prescribed\par \par 2. Increase the frequency of Entresto to twice daily\par \par 3. Resume Torsemide 20 mg daily\par \par 4. Check labs today\par \par 5. Follow up with HF [facility] Cuevas in 2 weeks and Dr. Friedman in 3 months.

## 2023-03-20 NOTE — CARDIOLOGY SUMMARY
[de-identified] : \par 11/18/22 ECG: V-paced at 81 bpm, no sig change from prior\par 03/17/22 ECG : electronic pacemaker, atrial fibrillation, Bi-V paced.  [de-identified] : 11/21/22 TTE: LVIDd 4.53 cm, LVEF 20-25% (SW 1.65 cm, PW 1.57 cm) w/ global HK, RV normal size w. reduced RVSF, TAPSE 14.20 mm, RVH present, HYACINTH, mild to mod MR, mild to mod TR, mild PH , PASP 42 mmHg, IVC dilated, small pericardial effusion\par \par \par 02/08/22 TTE: LVIDd 4.7 cm, LVEF 37%, LV hypertrophy (septum 1.6, PW 1.7), analysis of DD challenging due to AF, normal RV size with mildly reduced function, HYACINTH, mild MR, mild TR, est PASP 40 mmHg, small pericardial effusion, ascites present\par \par 06/29/21 TTE: LVIDd 4.4 cm, LVEF 50%, severe concentric LVH (septum 1.7, PW 1.8), normal RV size and function, HYACINTH, trace MR, mild-mod TR, est PASP 36 mmHg, small pericardial effusion [de-identified] : \par 02/10/22 TcPYP: Intense uptake in LV and part of the right ventricular myocardium in a pattern consistent with TTR-mediated amyloidosis.  [de-identified] : \par 02/24/22: insertion of CRT-P

## 2023-04-06 ENCOUNTER — RX RENEWAL (OUTPATIENT)
Age: 78
End: 2023-04-06

## 2023-04-06 ENCOUNTER — APPOINTMENT (OUTPATIENT)
Dept: HEART AND VASCULAR | Facility: CLINIC | Age: 78
End: 2023-04-06
Payer: MEDICARE

## 2023-04-06 VITALS
DIASTOLIC BLOOD PRESSURE: 72 MMHG | BODY MASS INDEX: 22.29 KG/M2 | OXYGEN SATURATION: 98 % | TEMPERATURE: 96.5 F | SYSTOLIC BLOOD PRESSURE: 104 MMHG | WEIGHT: 142 LBS | HEIGHT: 67 IN | HEART RATE: 80 BPM

## 2023-04-06 PROCEDURE — 99214 OFFICE O/P EST MOD 30 MIN: CPT

## 2023-04-06 RX ORDER — METOPROLOL SUCCINATE 25 MG/1
25 TABLET, EXTENDED RELEASE ORAL
Qty: 90 | Refills: 0 | Status: DISCONTINUED | COMMUNITY
Start: 2023-01-12 | End: 2023-04-06

## 2023-04-06 RX ORDER — SPIRONOLACTONE 25 MG/1
25 TABLET ORAL
Qty: 30 | Refills: 5 | Status: DISCONTINUED | COMMUNITY
Start: 2023-01-06 | End: 2023-04-06

## 2023-04-07 LAB
ANION GAP SERPL CALC-SCNC: 13 MMOL/L
BUN SERPL-MCNC: 32 MG/DL
CALCIUM SERPL-MCNC: 9.2 MG/DL
CHLORIDE SERPL-SCNC: 108 MMOL/L
CO2 SERPL-SCNC: 26 MMOL/L
CREAT SERPL-MCNC: 1.43 MG/DL
EGFR: 51 ML/MIN/1.73M2
GLUCOSE SERPL-MCNC: 90 MG/DL
MAGNESIUM SERPL-MCNC: 2.5 MG/DL
NT-PROBNP SERPL-MCNC: 8399 PG/ML
POTASSIUM SERPL-SCNC: 4.3 MMOL/L
SODIUM SERPL-SCNC: 147 MMOL/L

## 2023-04-07 NOTE — DISCUSSION/SUMMARY
[FreeTextEntry1] : 77 year old man with transthyretin cardiac amyloid, NICM (LVEF 20-25%, declined from 37%), AFib/Fl s/p ablation 6/2016 (on Eliquis) s/p CRT-P (2/16/2022). He is ACC/AHA Stage C with NYHA Class III HF, euvolemic on exam.  He has excellent social support and more recently is adhering his prescribed regimen and as a result doing better.  I have recommended the following: \par \par 1.  Chronic Systolic Heart Failure -stable and well compensated. Continue entresto 24/26 to BID, continue farxiga 10 mg daily. Today will resume spironolactone 25 mg daily and metoprolol 25 mg daily. Maintaining euvolemia on torsemide 20 mg daily. Future plans to escalate BB every 2 weeks as HR is protected by CRT-P and increase ARNI as labs/ BP allow.  Furthermore, his family are amenable to options like pill pack the first delivery is set up from [facility] on 5/15/2023.Labs 1/16/23: Na 146, K 4.6 Cl 107, CO2 28, BUN 30, Cr 1.46, proBNP 13K, check labs today. \par \par 2. Cardiac amyloidosis - TTR amyloid confirmed by Tc-PYP scan. Following with Dr. Bradley and started on Vyndemax in 5/2022. He was also evaluated by Dr. 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 Of note patients with this variant could develop both cardiac and neurologic symptoms and further recommended to be evaluated by neurology, but he has not yet done so.\par \par 3. Atrial fibrillation/flutter - S/p ablation in 2016. Following with Dr. Pemberton EP. Continue BB as above and reinforced AC with Eliquis BID. \par \par 4. CKD Stage IIIa- Cr b/l 1.3-1.4. Labs in 1/2023: BUN/Cr 30 /1.46. Avoid Nephrotoxic agents. Continue to monitor with routine labs.\par \par 5. T2DM- well controlled with most recent A1C of 6.2%. Controlled by diet and lifestyle measures.\par \par 6. Follow up with [facility] Cuevas in 2 weeks (by telehealth date and time provided to patient) and Dr. Friedman on 6/16/2023

## 2023-04-07 NOTE — PHYSICAL EXAM
[Well Developed] : well developed [No Acute Distress] : no acute distress [Frail] : frail [No Xanthelasma] : no xanthelasma [Soft] : abdomen soft [Non Tender] : non-tender [Normal Bowel Sounds] : normal bowel sounds [No Cyanosis] : no cyanosis [No Clubbing] : no clubbing [Normal] : moves all extremities, no focal deficits, normal speech [Alert and Oriented] : alert and oriented [Edema ___] : edema [unfilled] [de-identified] : JVP 8 cm of H2O, no HJR [de-identified] : ambulates with cane for distance [de-identified] : luke- warm peripherally

## 2023-04-07 NOTE — PHYSICAL EXAM
[Well Developed] : well developed [No Acute Distress] : no acute distress [Frail] : frail [No Xanthelasma] : no xanthelasma [Soft] : abdomen soft [Non Tender] : non-tender [Normal Bowel Sounds] : normal bowel sounds [No Cyanosis] : no cyanosis [No Clubbing] : no clubbing [Normal] : moves all extremities, no focal deficits, normal speech [Alert and Oriented] : alert and oriented [Edema ___] : edema [unfilled] [de-identified] : not assessed - wearing mask [de-identified] : JVP 14-16 cm of H2O with HJR [de-identified] : ambulates with cane for distance [de-identified] : cool peripherally

## 2023-04-07 NOTE — HISTORY OF PRESENT ILLNESS
[FreeTextEntry1] : This is a 77 year old man with ATTR cardiac amyloid, NICM (LVEF 20-25%, declined from 37%)  AF/Aflu s/p ablation 6/2016 (on Eliquis) s/p CRT-P (2/16/2022), CKD Stage 3 (b/l Cr 1-1.2), DMT2 (Ha1c 6.4% 2/2022), thrombocytopenia (bone marrow biopsy 11/2021 unrevealing) w/ idiopathic thrombocytopenia, chronic venous insufficiency, and prostate Ca s/p chemotherapy. Here today to follow up on his cardiomyopathy. \par \par He has had multiple hospitalizations for AHDF in the past year. One in November 2021 where he was diagnosed with ATTR cardiac amyloid by TcPYP scan. His most recent admission was at [facility] 2/7 - 2/17/22 where he required a R thoracentesis that was complicated by pneumothorax requiring pigtail. Entresto was briefly held for hypotension. Given fixed SV in setting of restrictive physiology, underwent CRT-P placement 2/16/2022 with lower HR limit set at 80 bpm. He was discharged on low dose GDMT at a weight of 173.7 lbs (admission weight 199 lbs).\par \par Since his initial appt with the HF [facility] team on 4/11/2022 he has completed genetic testing, that was positive for pathonomic gene for TTR amyloidosis, more specifically a variant that could potentially have cardiac and neurologic findings. It was further recommended he establish with neurologist Dr. Estrada Valdes. \par \par He has also established with Dr. Finkelstein and was started on Vyndamax in 5/2022 but notably had increasing diuretic demands and worsening renal function. He has been hesitant to increase diuretic doses in the past due to urinary and bowel incontinence. \par \par He was admitted to [facility] 11/18-11/23/2022 for ADHF requiring IV diuresis, and discharged home on low doses of GDMT at a weight of 171 pounds. \par \par Since he was last seen on 3/3/2023, connecting with Mr. Wise has been historical unreliable, as the HF [facility] nor lab services have been able to reach patient for follow up. He reports attending appointments here at the [facility] [place] is also difficult.  He lives with his wife but despite his family's best efforts is adamant about preparing his own medications. \par \par During a visit with his PCP Dr. Gallegos in [place] last week, he was asked to stop his BB and spironolactone. Optimistically he has been taking his entresto and eliquis twice daily along with daily torsemide. He has diuresed over 30 pounds on his home scale. And reports home weight of 152#.\par \par Overall from a cardiac perspective, his activity tolerance remains stable, estimates he can walk one block before needing to rest due to shortness of breath and fatigue.  He ambulates with a cane for balance. He habitually sleeps on three pillows as previously he would become short of breath when lying flat and has not tried to lay flat. On rare occasion will feel brief LH not quantifiable at present.  \par \par He denies CP, SOB at rest, overt orthopnea, PND, abdominal discomfort, palpitations, and syncope. His appetite is excellent and his bowel and bladder habits are unchanged and normal for him although continues to report incontinence. He has a CRT-P. He has been limiting fluid and sodium in his diet but has not taken medications as directed. He does not use NSAIDs. .

## 2023-04-07 NOTE — HISTORY OF PRESENT ILLNESS
[FreeTextEntry1] : This is a 77 year old man with ATTR cardiac amyloid, NICM (LVEF 20-25%, declined from 37%)  AF/Aflu s/p ablation 6/2016 (on Eliquis) s/p CRT-P (2/16/2022), CKD Stage 3 (b/l Cr 1-1.2), DMT2 (Ha1c 6.4% 2/2022), thrombocytopenia (bone marrow biopsy 11/2021 unrevealing) w/ idiopathic thrombocytopenia, chronic venous insufficiency, and prostate Ca s/p chemotherapy. Here today to follow up on his cardiomyopathy. \par \par He has had multiple hospitalizations for AHDF in the past year. One in November 2021 where he was diagnosed with ATTR cardiac amyloid by TcPYP scan. His most recent admission was at [facility] 2/7 - 2/17/22 where he required a R thoracentesis that was complicated by pneumothorax requiring pigtail. Entresto was briefly held for hypotension. Given fixed SV in setting of restrictive physiology, underwent CRT-P placement 2/16/2022 with lower HR limit set at 80 bpm. He was discharged on low dose GDMT at a weight of 173.7 lbs (admission weight 199 lbs).\par \par Since his initial appt with the HF [facility] team on 4/11/2022 he has completed genetic testing, that was positive for pathonomic gene for TTR amyloidosis, more specifically a variant that could potentially have cardiac and neurologic findings. It was further recommended he establish with neurologist Dr. Estrada Valdes. \par \par He has also established with Dr. Finkelstein and was started on Vyndamax in 5/2022 but notably had increasing diuretic demands and worsening renal function. He has been hesitant to increase diuretic doses in the past due to urinary and bowel incontinence. \par \par He was admitted to [facility] 11/18-11/23/2022 for ADHF requiring IV diuresis, and discharged home on low doses of GDMT at a weight of 171 pounds. \par \par Since he was last seen on 1/6/2023, connecting with Mr. Wise has been unreliable, on 1/18 a call with progressive dyspnea and weight gain prompted a brief diuretic escalation, but the HF [facility] nor lab services were able to reach patient for follow up.\par \par He lives with his wife but despite his family's best efforts is adamant about preparing his own medications. He continues to take entresto and eliquis once daily, and he ran out of torsemide 20 mg approximately one week ago.\par \par Overall he reports he has had some acute decline, and reports he feels short of breath with minimal exertion, and at best can walk <1 block. He ambulates with a cane for balance. He habitually sleeps on three pillows as previously he would become short of breath when lying flat and has not tried to lay flat. On rare occasion will feel brief LH not quantifiable at present. He attributes his weight gain to caloric intake. \par \par He denies CP, SOB at rest, overt orthopnea, PND, abdominal discomfort, palpitations, and syncope. His appetite has improved and his bowel and bladder habits are unchanged and normal for him although continues to report incontinence. He has a CRT-P. He has been limiting fluid and sodium in his diet but has not taken medications as directed. He does not use NSAIDs. .

## 2023-04-07 NOTE — CARDIOLOGY SUMMARY
[de-identified] : \par 11/18/22 ECG: V-paced at 81 bpm, no sig change from prior\par 03/17/22 ECG : electronic pacemaker, atrial fibrillation, Bi-V paced.  [de-identified] : 11/21/22 TTE: LVIDd 4.53 cm, LVEF 20-25% (SW 1.65 cm, PW 1.57 cm) w/ global HK, RV normal size w. reduced RVSF, TAPSE 14.20 mm, RVH present, HYACINTH, mild to mod MR, mild to mod TR, mild PH , PASP 42 mmHg, IVC dilated, small pericardial effusion\par \par \par 02/08/22 TTE: LVIDd 4.7 cm, LVEF 37%, LV hypertrophy (septum 1.6, PW 1.7), analysis of DD challenging due to AF, normal RV size with mildly reduced function, HYACINTH, mild MR, mild TR, est PASP 40 mmHg, small pericardial effusion, ascites present\par \par 06/29/21 TTE: LVIDd 4.4 cm, LVEF 50%, severe concentric LVH (septum 1.7, PW 1.8), normal RV size and function, HYACINTH, trace MR, mild-mod TR, est PASP 36 mmHg, small pericardial effusion [de-identified] : \par 02/10/22 TcPYP: Intense uptake in LV and part of the right ventricular myocardium in a pattern consistent with TTR-mediated amyloidosis.  [de-identified] : \par 02/24/22: insertion of CRT-P

## 2023-04-07 NOTE — CARDIOLOGY SUMMARY
[de-identified] : \par 11/18/22 ECG: V-paced at 81 bpm, no sig change from prior\par 03/17/22 ECG : electronic pacemaker, atrial fibrillation, Bi-V paced.  [de-identified] : 11/21/22 TTE: LVIDd 4.53 cm, LVEF 20-25% (SW 1.65 cm, PW 1.57 cm) w/ global HK, RV normal size w. reduced RVSF, TAPSE 14.20 mm, RVH present, HYACINTH, mild to mod MR, mild to mod TR, mild PH , PASP 42 mmHg, IVC dilated, small pericardial effusion\par \par \par 02/08/22 TTE: LVIDd 4.7 cm, LVEF 37%, LV hypertrophy (septum 1.6, PW 1.7), analysis of DD challenging due to AF, normal RV size with mildly reduced function, HYACINTH, mild MR, mild TR, est PASP 40 mmHg, small pericardial effusion, ascites present\par \par 06/29/21 TTE: LVIDd 4.4 cm, LVEF 50%, severe concentric LVH (septum 1.7, PW 1.8), normal RV size and function, HYACINTH, trace MR, mild-mod TR, est PASP 36 mmHg, small pericardial effusion [de-identified] : \par 02/10/22 TcPYP: Intense uptake in LV and part of the right ventricular myocardium in a pattern consistent with TTR-mediated amyloidosis.  [de-identified] : \par 02/24/22: insertion of CRT-P

## 2023-04-24 NOTE — PROGRESS NOTE ADULT - ASSESSMENT
ASSESSMENT/PLAN 77 y/o male,  w/ PMHx type II DM (not currently on medications), A-Flutter/A-Fib (s/p prior ablation in 06/2016, most  A-fib on Eliquis 2.5mg PO bid), thrombocytopenia (baseline Plt 60-70's on prior admission), stage III CKD (baseline Cr 1.0-1.2 ), non-obstructive CAD, Chronic Systolic CHF(EF 35% by  Echocardiogram 11/2022), hx of NSVT (EP evaluated pt. 11/2021 BPH (s/p TURP procedure), pituitary adenoma (s/p transphenoidal treatment in 1990's), prostate cancer (s/p chemotherapy), chronic venous insufficiency, Impression of acute on chronic CHF, R>L pleural effusions, compressive atelectases, concern for amyloidosis  Now Right pneumothorax s/p post thoracentesis    1. O2 attempt now off , incentive spirometry    2. Bronchodilators: off, continue  and encourage incentive spirometry   3. Corticosteroids: off  4. ID/Antibiotics: off  5. Cardiac/HTN: continue diuresis , optimize CHF mngmnt   6. GI: Rx/ prophylaxis c PPI/H2B  7. Heme: Rx/VT prophylaxis c SQH/SCD/AC on Heparin IV,   8. Aspiration precautions  9. Mobilize, OOB, PT  Discussed with managing team Cardiology  no known allergies

## 2023-04-25 ENCOUNTER — APPOINTMENT (OUTPATIENT)
Dept: HEART AND VASCULAR | Facility: CLINIC | Age: 78
End: 2023-04-25
Payer: MEDICARE

## 2023-04-25 PROCEDURE — 99443: CPT

## 2023-04-25 NOTE — CARDIOLOGY SUMMARY
[de-identified] : \par 11/18/22 ECG: V-paced at 81 bpm, no sig change from prior\par 03/17/22 ECG : electronic pacemaker, atrial fibrillation, Bi-V paced.  [de-identified] : 11/21/22 TTE: LVIDd 4.53 cm, LVEF 20-25% (SW 1.65 cm, PW 1.57 cm) w/ global HK, RV normal size w. reduced RVSF, TAPSE 14.20 mm, RVH present, HYACINTH, mild to mod MR, mild to mod TR, mild PH , PASP 42 mmHg, IVC dilated, small pericardial effusion\par \par \par 02/08/22 TTE: LVIDd 4.7 cm, LVEF 37%, LV hypertrophy (septum 1.6, PW 1.7), analysis of DD challenging due to AF, normal RV size with mildly reduced function, HYACINTH, mild MR, mild TR, est PASP 40 mmHg, small pericardial effusion, ascites present\par \par 06/29/21 TTE: LVIDd 4.4 cm, LVEF 50%, severe concentric LVH (septum 1.7, PW 1.8), normal RV size and function, HYACINTH, trace MR, mild-mod TR, est PASP 36 mmHg, small pericardial effusion [de-identified] : \par 02/10/22 TcPYP: Intense uptake in LV and part of the right ventricular myocardium in a pattern consistent with TTR-mediated amyloidosis.  [de-identified] : \par 02/24/22: insertion of CRT-P

## 2023-04-25 NOTE — HISTORY OF PRESENT ILLNESS
[Home] : at home, [unfilled] , at the time of the visit. [Medical Office: ([facility])___] : at the medical office located in  [Other:____] : [unfilled] [Verbal consent obtained from patient] : the patient, [unfilled] [FreeTextEntry1] : This is a 77 year old man with ATTR cardiac amyloid, NICM (LVEF 20-25%, declined from 37%)  AF/Aflu s/p ablation 6/2016 (on Eliquis) s/p CRT-P (2/16/2022), CKD Stage 3 (b/l Cr 1-1.2), DMT2 (Ha1c 6.4% 2/2022), thrombocytopenia (bone marrow biopsy 11/2021 unrevealing) w/ idiopathic thrombocytopenia, chronic venous insufficiency, and prostate Ca s/p chemotherapy. Here today to follow up on his cardiomyopathy. \par \par He has had multiple hospitalizations for AHDF in the past year. One in November 2021 where he was diagnosed with ATTR cardiac amyloid by TcPYP scan. His most recent admission was at [facility] 2/7 - 2/17/22 where he required a R thoracentesis that was complicated by pneumothorax requiring pigtail. Entresto was briefly held for hypotension. Given fixed SV in setting of restrictive physiology, underwent CRT-P placement 2/16/2022 with lower HR limit set at 80 bpm. He was discharged on low dose GDMT at a weight of 173.7 lbs (admission weight 199 lbs).\par \par Since his initial appt with the HF [facility] team on 4/11/2022 he has completed genetic testing, that was positive for pathonomic gene for TTR amyloidosis, more specifically a variant that could potentially have cardiac and neurologic findings. It was further recommended he establish with neurologist Dr. Estrada Valdes. \par \par He has also established with Dr. Finkelstein and was started on Vyndamax in 5/2022 but notably had increasing diuretic demands and worsening renal function. He has been hesitant to increase diuretic doses in the past due to urinary and bowel incontinence. \par \par He was admitted to [facility] 11/18-11/23/2022 for ADHF requiring IV diuresis, and discharged home on low doses of GDMT at a weight of 171 pounds. \par \par Connecting with Mr. Wise has been historical unreliable, as the HF [facility] nor lab services have been able to reach patient for follow up. He reports attending appointments here at the [facility] is also difficult.  He lives with his wife but despite his family's best efforts is adamant about preparing his own medications. \par \par Today, his wife, his son and his daughter are all present on this call. He continues to mismanage his medications, he has been taking torsemide 40 mg daily. He has never started the spironolactone 25 mg daily. He is not taking the metoprolol 25 mg daily as he persistently feels dizzy that is worse with positional changes.\par \par Optimistically, he has been consistently taking his entresto and eliquis twice daily along with daily torsemide 40 mg. He has diuresed over 35 pounds on his home scale. And reports home weight of 151-152# \par \par Overall from a cardiac perspective, his activity tolerance has improved, estimates he can walk 2 blocks before needing to rest due to shortness of breath and fatigue.  He ambulates with a cane for balance. He habitually sleeps on 2-3 pillows as previously he would become short of breath when lying flat and has not tried to lay flat. \par \par He denies CP, SOB at rest, overt orthopnea, PND, abdominal discomfort, palpitations, and syncope. His appetite is excellent and his bowel and bladder habits are unchanged and normal for him although continues to report incontinence. He has a CRT-P. He has been limiting fluid and sodium in his diet but has not taken medications as directed. He does not use NSAIDs. \par \par His daughter Daya is a nurse and has not had the opportunity to take blood pressures, but can provide collaterals prior to our next meeting.

## 2023-04-25 NOTE — DISCUSSION/SUMMARY
[FreeTextEntry1] : 77 year old man with transthyretin cardiac amyloid, NICM (LVEF 20-25%, declined from 37%), AFib/Fl s/p ablation 6/2016 (on Eliquis) s/p CRT-P (2/16/2022). \par \par He is ACC/AHA Stage C with NYHA Class III HF, euvolemic/dry by subjective assessment.  He has excellent social support and continues to mismanage medications but with combined family efforts is doing better. He has diuresed 35 pounds by his home scale and is now 151 pounds. I have recommended the following: \par \par \par \par 1.  Chronic Systolic Heart Failure -stable and well compensated. Continue entresto 24/26 to BID, continue farxiga 10 mg daily. Today will resume spironolactone 25 mg daily and resume metoprolol 25 mg daily. With plans to increase BB by 25 mg every 2 weeks, as HR is protected by CRT-P and increase ARNI as labs/ BP allow. Collateral information prevent further escalation today. Reduce torsemide to 20 mg daily. Furthermore, his family are amenable to options like pill pack the first delivery is set up from [facility] on 5/15/2023.Labs 4/6: Na 147, K 4.3, Cl 108, Co2 26, BUN 32, Cr 1.43, pro-BNP 8399 down-trending from 13K. Check labs 5/3/2023 via home-draw. \par \par 2. Cardiac amyloidosis - TTR amyloid confirmed by Tc-PYP scan. Following with Dr. Bradley and started on Vyndemax in 5/2022. He was also evaluated by Dr. 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 Of note patients with this variant could develop both cardiac and neurologic symptoms and further recommended to be evaluated by neurology, but he has not yet done so.\par \par 3. Atrial fibrillation/flutter - S/p ablation in 2016. Following with Dr. Pemberton EP. Continue BB as above and reinforced AC with Eliquis BID. \par \par 4. CKD Stage IIIa- Cr b/l 1.3-1.4. Labs in 1/2023: BUN/Cr 30 /1.46. Avoid Nephrotoxic agents. Continue to monitor with routine labs.\par \par 5. T2DM- well controlled with most recent A1C of 6.2%. Controlled by diet and lifestyle measures.\par \par 6. Follow up with [facility] Cuevas in 2 weeks (by telehealth date and time provided to patient) and Dr. Friedman on 6/16/2023

## 2023-05-08 ENCOUNTER — RX RENEWAL (OUTPATIENT)
Age: 78
End: 2023-05-08

## 2023-05-09 ENCOUNTER — RX RENEWAL (OUTPATIENT)
Age: 78
End: 2023-05-09

## 2023-05-09 ENCOUNTER — NON-APPOINTMENT (OUTPATIENT)
Age: 78
End: 2023-05-09

## 2023-05-09 RX ORDER — SPIRONOLACTONE 25 MG/1
25 TABLET ORAL DAILY
Qty: 30 | Refills: 2 | Status: DISCONTINUED | COMMUNITY
Start: 2023-04-25 | End: 2023-05-09

## 2023-05-18 ENCOUNTER — APPOINTMENT (OUTPATIENT)
Dept: HEART AND VASCULAR | Facility: CLINIC | Age: 78
End: 2023-05-18
Payer: MEDICARE

## 2023-05-18 PROCEDURE — 99443: CPT

## 2023-05-18 NOTE — HISTORY OF PRESENT ILLNESS
[Home] : at home, [unfilled] , at the time of the visit. [Medical Office: ([facility])___] : at the medical office located in  [Verbal consent obtained from patient] : the patient, [unfilled] [Family Member] : family member [Other:____] : [unfilled] [FreeTextEntry1] : This is a 77 year old man with ATTR cardiac amyloid, NICM (LVEF 20-25%, declined from 37%)  AF/Aflu s/p ablation 6/2016 (on Eliquis) s/p CRT-P (2/16/2022), CKD Stage 3 (b/l Cr 1-1.2), DMT2 (Ha1c 6.4% 2/2022), thrombocytopenia (bone marrow biopsy 11/2021 unrevealing) w/ idiopathic thrombocytopenia, chronic venous insufficiency, and prostate Ca s/p chemotherapy. Here today to follow up on his cardiomyopathy. \par \par He has had multiple hospitalizations for AHDF in the past year. One in November 2021 where he was diagnosed with ATTR cardiac amyloid by TcPYP scan. His most recent admission was at [facility] 2/7 - 2/17/22 where he required a R thoracentesis that was complicated by pneumothorax requiring pigtail. Entresto was briefly held for hypotension. Given fixed SV in setting of restrictive physiology, underwent CRT-P placement 2/16/2022 with lower HR limit set at 80 bpm. He was discharged on low dose GDMT at a weight of 173.7 lbs (admission weight 199 lbs).\par \par Since his initial appt with the HF [facility] team on 4/11/2022 he has completed genetic testing, that was positive for pathonomic gene for TTR amyloidosis, more specifically a variant that could potentially have cardiac and neurologic findings. It was further recommended he establish with neurologist Dr. Estrada Valdes. \par \par Established with Dr. Finkelstein and was started on Vyndamax in 5/2022 but notably had increasing diuretic demands and worsening renal function. He has been hesitant to increase diuretic doses in the past due to urinary and bowel incontinence. \par \par He was admitted to [facility] 11/18-11/23/2022 for ADHF requiring IV diuresis, and discharged home on low doses of GDMT at a weight of 171 pounds. \par \par Connecting with Mr. Wise has been historical unreliable, as the HF [facility] nor lab services have been able to reach patient for follow up. He reports attending appointments here at the [facility] [place] is also difficult.  He lives with his wife but despite his family's best efforts is adamant about preparing his own medications. He has declined pill pack arrangements. \par \par 5/18\par Since our last appointment, he has reduced torsemide to 20 mg daily, as he inadvertently was taking 40 mg daily. He has been taking spironolactone on most days, as he continues to be reluctant about medication changes. He has diuresed over 35 pounds on his home scale and his home weights have been stable at 155 pounds.SBP 90s w/ isolated SBP 80s this week. \par \par Overall from a cardiac perspective, his activity tolerance has improved, estimates he can walk 2 blocks w/his cane for balance, before needing to rest due to shortness of breath and fatigue. In the morning, he intentionally walks the halls in his building for 5-10 minutes and his family report this is the best he has been in sometime in regards to stamina.He habitually sleeps on 2-3 pillows as previously he would become short of breath when lying flat but reports he has not had any episodes of overt orthopnea.\par \par He denies CP, SOB at rest, overt orthopnea, PND, abdominal discomfort, LE edema, palpitations, and syncope. His appetite is excellent and his bowel and bladder habits are unchanged and normal for him although continues to report incontinence. He has a CRT-P. He has been limiting fluid and sodium in his diet but has not taken medications as directed. He does not use NSAIDs. \par \par

## 2023-05-18 NOTE — DISCUSSION/SUMMARY
[FreeTextEntry1] : 77 year old man with transthyretin cardiac amyloid, NICM (LVEF 20-25%, declined from 37%), AFib/Fl s/p ablation 6/2016 (on Eliquis) s/p CRT-P (2/16/2022). \par \par He is ACC/AHA Stage C with NYHA Class III HF, euvolemic/dry by subjective assessment.  He has excellent social support and continues to mismanage medications but with combined family efforts is doing better. He has diuresed nearly 35 pounds by his home scale with stable weight of 155 pounds. I have recommended the following: \par \par \par 1.  Chronic Systolic Heart Failure -stable and well compensated. Continue entresto 24/26 to BID, farxiga 10 mg daily, spironolactone 25 mg daily, and today will increase metoprolol succinate to 50 mg daily. With plans to increase BB by 25 mg every 2 weeks, as HR is protected by CRT-P and increase ARNI as labs/ BP allow.  Reduce torsemide to 10 mg daily, with an additional dose as needed for weight gain greater than 3 pounds. 5/3/23 Na 148, K 4.2, Cl 109, CO2 24, BUN 26, Cr 1.50, proBNP down-trending from 13 K > 8399> now 7200.\par \par 2. Cardiac amyloidosis - TTR amyloid confirmed by Tc-PYP scan. Following with Dr. Bradley and started on Vyndemax in 5/2022. He was also evaluated by Dr. 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 Of note patients with this variant could develop both cardiac and neurologic symptoms and further recommended to be evaluated by neurology, but he has not yet done so.\par \par 3. Atrial fibrillation/flutter - S/p ablation in 2016. Following with Dr. Pemberton EP. Continue BB as above and reinforced AC with Eliquis BID. \par \par 4. CKD Stage IIIa- Cr b/l 1.3-1.4. L. Continue SGLT2i for dual cardiac and renal protection.  Avoid Nephrotoxic agents. Continue to monitor with routine labs.\par \par 5. T2DM- well controlled with most recent A1C of 6.2%. Controlled by diet and lifestyle measures.\par \par 6. Follow up with PABLO Cuevas in 2 weeks (by telehealth date and time provided to patient) and Dr. Friedman on 6/16/2023

## 2023-05-18 NOTE — CARDIOLOGY SUMMARY
[de-identified] : \par 11/18/22 ECG: V-paced at 81 bpm, no sig change from prior\par 03/17/22 ECG : electronic pacemaker, atrial fibrillation, Bi-V paced.  [de-identified] : 11/21/22 TTE: LVIDd 4.53 cm, LVEF 20-25% (SW 1.65 cm, PW 1.57 cm) w/ global HK, RV normal size w. reduced RVSF, TAPSE 14.20 mm, RVH present, HYACINTH, mild to mod MR, mild to mod TR, mild PH , PASP 42 mmHg, IVC dilated, small pericardial effusion\par \par \par 02/08/22 TTE: LVIDd 4.7 cm, LVEF 37%, LV hypertrophy (septum 1.6, PW 1.7), analysis of DD challenging due to AF, normal RV size with mildly reduced function, HYACINTH, mild MR, mild TR, est PASP 40 mmHg, small pericardial effusion, ascites present\par \par 06/29/21 TTE: LVIDd 4.4 cm, LVEF 50%, severe concentric LVH (septum 1.7, PW 1.8), normal RV size and function, HYACINTH, trace MR, mild-mod TR, est PASP 36 mmHg, small pericardial effusion [de-identified] : \par 02/10/22 TcPYP: Intense uptake in LV and part of the right ventricular myocardium in a pattern consistent with TTR-mediated amyloidosis.  [de-identified] : \par 02/24/22: insertion of CRT-P

## 2023-06-09 NOTE — DISCUSSION/SUMMARY
[FreeTextEntry1] : 77 year old man with transthyretin cardiac amyloid, NICM (LVEF 20-25%, declined from 37%), AFib/Fl s/p ablation 6/2016 (on Eliquis) s/p CRT-P (2/16/2022). \par \par He is ACC/AHA Stage C with NYHA Class III HF, euvolemic/dry by subjective assessment.  He has excellent social support and continues to mismanage medications but with combined family efforts is doing better. He has diuresed nearly 35 pounds by his home scale with stable weight of 155 pounds. I have recommended the following: \par \par \par 1.  Chronic Systolic Heart Failure -stable and well compensated. Continue entresto 24/26 to BID, farxiga 10 mg daily, spironolactone 25 mg daily, and today will increase metoprolol succinate to 50 mg daily. With plans to increase BB by 25 mg every 2 weeks, as HR is protected by CRT-P and increase ARNI as labs/ BP allow.  Reduce torsemide to 10 mg daily, with an additional dose as needed for weight gain greater than 3 pounds. \par \par ___5/3/23 Na 148, K 4.2, Cl 109, CO2 24, BUN 26, Cr 1.50, proBNP down-trending from 13 K > 8399> now 7200.\par \par 2. Cardiac amyloidosis - TTR amyloid confirmed by Tc-PYP scan. Following with Dr. Bradley and started on Vyndemax in 5/2022. He was also evaluated by Dr. 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 Of note patients with this variant could develop both cardiac and neurologic symptoms and further recommended to be evaluated by neurology, but he has not yet done so.\par \par 3. Atrial fibrillation/flutter - S/p ablation in 2016. Following with Dr. Pemberton EP. Continue BB as above and reinforced AC with Eliquis BID. \par \par 4. CKD Stage IIIa- Cr b/l 1.3-1.4. L. Continue SGLT2i for dual cardiac and renal protection.  Avoid Nephrotoxic agents. Continue to monitor with routine labs.\par \par 5. T2DM- well controlled with most recent A1C of 6.2%. Controlled by diet and lifestyle measures.\par \par 6. Follow up with PABLO Cuevas in 2 weeks (by telehealth date and time provided to patient) and Dr. Friedman on 6/16/2023

## 2023-06-09 NOTE — CARDIOLOGY SUMMARY
[de-identified] : \par 11/18/22 ECG: V-paced at 81 bpm, no sig change from prior\par 03/17/22 ECG : electronic pacemaker, atrial fibrillation, Bi-V paced.  [de-identified] : 11/21/22 TTE: LVIDd 4.53 cm, LVEF 20-25% (SW 1.65 cm, PW 1.57 cm) w/ global HK, RV normal size w. reduced RVSF, TAPSE 14.20 mm, RVH present, HYACINTH, mild to mod MR, mild to mod TR, mild PH , PASP 42 mmHg, IVC dilated, small pericardial effusion\par \par \par 02/08/22 TTE: LVIDd 4.7 cm, LVEF 37%, LV hypertrophy (septum 1.6, PW 1.7), analysis of DD challenging due to AF, normal RV size with mildly reduced function, HYACINTH, mild MR, mild TR, est PASP 40 mmHg, small pericardial effusion, ascites present\par \par 06/29/21 TTE: LVIDd 4.4 cm, LVEF 50%, severe concentric LVH (septum 1.7, PW 1.8), normal RV size and function, HYACINTH, trace MR, mild-mod TR, est PASP 36 mmHg, small pericardial effusion [de-identified] : \par 02/10/22 TcPYP: Intense uptake in LV and part of the right ventricular myocardium in a pattern consistent with TTR-mediated amyloidosis.  [de-identified] : \par 02/24/22: insertion of CRT-P

## 2023-06-09 NOTE — HISTORY OF PRESENT ILLNESS
[FreeTextEntry1] : This is a 77 year old man with ATTR cardiac amyloid, NICM (LVEF 20-25%, declined from 37%)  AF/Aflu s/p ablation 6/2016 (on Eliquis) s/p CRT-P (2/16/2022), CKD Stage 3 (b/l Cr 1-1.2), DMT2 (Ha1c 6.4% 2/2022), thrombocytopenia (bone marrow biopsy 11/2021 unrevealing) w/ idiopathic thrombocytopenia, chronic venous insufficiency, and prostate Ca s/p chemotherapy. Here today to follow up on his cardiomyopathy. \par \par He has had multiple hospitalizations for AHDF in the past year. One in November 2021 where he was diagnosed with ATTR cardiac amyloid by TcPYP scan. His most recent admission was at [facility] 2/7 - 2/17/22 where he required a R thoracentesis that was complicated by pneumothorax requiring pigtail. Entresto was briefly held for hypotension. Given fixed SV in setting of restrictive physiology, underwent CRT-P placement 2/16/2022 with lower HR limit set at 80 bpm. He was discharged on low dose GDMT at a weight of 173.7 lbs (admission weight 199 lbs).\par \par Since his initial appt with the [facility] team on 4/11/2022 he has completed genetic testing, that was positive for pathonomic gene for TTR amyloidosis, more specifically a variant that could potentially have cardiac and neurologic findings. It was further recommended he establish with neurologist Dr. Estarda Valdes. \par \par Established with Dr. Finkelstein and was started on Vyndamax in 5/2022 but notably had increasing diuretic demands and worsening renal function. He has been hesitant to increase diuretic doses in the past due to urinary and bowel incontinence. \par \par He was admitted to [facility] 11/18-11/23/2022 for ADHF requiring IV diuresis, and discharged home on low doses of GDMT at a weight of 171 pounds. \par \par Connecting with Mr. Wise has been historical unreliable, as the [facility] nor lab services have been able to reach patient for follow up. He reports attending appointments here at the [facility] [place] is also difficult.  He lives with his wife but despite his family's best efforts is adamant about preparing his own medications. He has declined pill pack arrangements. \par \par 5/18\par Since our last appointment, he has reduced torsemide to 20 mg daily, as he inadvertently was taking 40 mg daily. He has been taking spironolactone on most days, as he continues to be reluctant about medication changes. He has diuresed over 35 pounds on his home scale and his home weights have been stable at 155 pounds.SBP 90s w/ isolated SBP 80s this week. \par \par Overall from a cardiac perspective, his activity tolerance has improved, estimates he can walk 2 blocks w/his cane for balance, before needing to rest due to shortness of breath and fatigue. In the morning, he intentionally walks the halls in his building for 5-10 minutes and his family report this is the best he has been in sometime in regards to stamina.He habitually sleeps on 2-3 pillows as previously he would become short of breath when lying flat but reports he has not had any episodes of overt orthopnea.\par \par He denies CP, SOB at rest, overt orthopnea, PND, abdominal discomfort, LE edema, palpitations, and syncope. His appetite is excellent and his bowel and bladder habits are unchanged and normal for him although continues to report incontinence. He has a CRT-P. He has been limiting fluid and sodium in his diet but has not taken medications as directed. He does not use NSAIDs. \par \par

## 2023-06-09 NOTE — PHYSICAL EXAM
[Well Developed] : well developed [No Acute Distress] : no acute distress [Frail] : frail [No Xanthelasma] : no xanthelasma [Soft] : abdomen soft [Non Tender] : non-tender [Normal Bowel Sounds] : normal bowel sounds [No Cyanosis] : no cyanosis [No Clubbing] : no clubbing [Edema ___] : edema [unfilled] [Normal] : moves all extremities, no focal deficits, normal speech [Alert and Oriented] : alert and oriented [de-identified] : JVP 8 cm of H2O, no HJR [de-identified] : ambulates with cane for distance [de-identified] : luke- warm peripherally

## 2023-06-12 ENCOUNTER — APPOINTMENT (OUTPATIENT)
Dept: HEART AND VASCULAR | Facility: CLINIC | Age: 78
End: 2023-06-12

## 2023-06-15 RX ORDER — METOPROLOL SUCCINATE 50 MG/1
50 TABLET, EXTENDED RELEASE ORAL
Qty: 30 | Refills: 1 | Status: DISCONTINUED | COMMUNITY
Start: 2023-04-06 | End: 2023-06-15

## 2023-06-15 NOTE — HISTORY OF PRESENT ILLNESS
[FreeTextEntry1] : This is a 77 year old man with ATTR cardiac amyloid, NICM (LVEF 20-25%, declined from 37%)  AF/Aflu s/p ablation 6/2016 (on Eliquis) s/p CRT-P (2/16/2022), CKD Stage 3 (b/l Cr 1-1.2), DMT2 (Ha1c 6.4% 2/2022), thrombocytopenia (bone marrow biopsy 11/2021 unrevealing) w/ idiopathic thrombocytopenia, chronic venous insufficiency, and prostate Ca s/p chemotherapy. Here today to follow up on his cardiomyopathy. \par \par He has had multiple hospitalizations for AHDF in the past year. One in November 2021 where he was diagnosed with ATTR cardiac amyloid by TcPYP scan. His most recent admission was at [facility] 2/7 - 2/17/22 where he required a R thoracentesis that was complicated by pneumothorax requiring pigtail. Entresto was briefly held for hypotension. Given fixed SV in setting of restrictive physiology, underwent CRT-P placement 2/16/2022 with lower HR limit set at 80 bpm. He was discharged on low dose GDMT at a weight of 173.7 lbs (admission weight 199 lbs).\par \par Since his initial appt with the [facility] team on 4/11/2022 he has completed genetic testing, that was positive for pathonomic gene for TTR amyloidosis, more specifically a variant that could potentially have cardiac and neurologic findings. It was further recommended he establish with neurologist Dr. Estrada Valdes. \par \par Established with Dr. Finkelstein and was started on Vyndamax in 5/2022 but notably had increasing diuretic demands and worsening renal function. He has been hesitant to increase diuretic doses in the past due to urinary and bowel incontinence. \par \par He was admitted to [facility] 11/18-11/23/2022 for ADHF requiring IV diuresis, and discharged home on low doses of GDMT at a weight of 171 pounds. \par \par Connecting with Mr. Wise has been historical unreliable, as the [facility] nor lab services have been able to reach patient for follow up. He reports attending appointments here at the [facility] [place] is also difficult.  He lives with his wife but despite his family's best efforts is adamant about preparing his own medications. He has declined pill pack arrangements. \par \par 5/18\par Since our last appointment, he has reduced torsemide to 20 mg daily, as he inadvertently was taking 40 mg daily. He has been taking spironolactone on most days, as he continues to be reluctant about medication changes. He has diuresed over 35 pounds on his home scale and his home weights have been stable at 155 pounds.SBP 90s w/ isolated SBP 80s this week. \par \par Overall from a cardiac perspective, his activity tolerance has improved, estimates he can walk 2 blocks w/his cane for balance, before needing to rest due to shortness of breath and fatigue. In the morning, he intentionally walks the halls in his building for 5-10 minutes and his family report this is the best he has been in sometime in regards to stamina.He habitually sleeps on 2-3 pillows as previously he would become short of breath when lying flat but reports he has not had any episodes of overt orthopnea.\par \par He denies CP, SOB at rest, overt orthopnea, PND, abdominal discomfort, LE edema, palpitations, and syncope. His appetite is excellent and his bowel and bladder habits are unchanged and normal for him although continues to report incontinence. He has a CRT-P. He has been limiting fluid and sodium in his diet but has not taken medications as directed. He does not use NSAIDs. \par \par  spouse

## 2023-06-15 NOTE — CARDIOLOGY SUMMARY
[de-identified] : 11/21/22 TTE: LVIDd 4.53 cm, LVEF 20-25% (SW 1.65 cm, PW 1.57 cm) w/ global HK, RV normal size w. reduced RVSF, TAPSE 14.20 mm, RVH present, HYACINTH, mild to mod MR, mild to mod TR, mild PH , PASP 42 mmHg, IVC dilated, small pericardial effusion\par \par \par 02/08/22 TTE: LVIDd 4.7 cm, LVEF 37%, LV hypertrophy (septum 1.6, PW 1.7), analysis of DD challenging due to AF, normal RV size with mildly reduced function, HYACINTH, mild MR, mild TR, est PASP 40 mmHg, small pericardial effusion, ascites present\par \par 06/29/21 TTE: LVIDd 4.4 cm, LVEF 50%, severe concentric LVH (septum 1.7, PW 1.8), normal RV size and function, HYACINTH, trace MR, mild-mod TR, est PASP 36 mmHg, small pericardial effusion [de-identified] : \par 11/18/22 ECG: V-paced at 81 bpm, no sig change from prior\par 03/17/22 ECG : electronic pacemaker, atrial fibrillation, Bi-V paced.  [de-identified] : \par 02/10/22 TcPYP: Intense uptake in LV and part of the right ventricular myocardium in a pattern consistent with TTR-mediated amyloidosis.  [de-identified] : \par 02/24/22: insertion of CRT-P

## 2023-06-15 NOTE — PHYSICAL EXAM
[Well Developed] : well developed [No Acute Distress] : no acute distress [Frail] : frail [No Xanthelasma] : no xanthelasma [Soft] : abdomen soft [Non Tender] : non-tender [Normal Bowel Sounds] : normal bowel sounds [No Cyanosis] : no cyanosis [No Clubbing] : no clubbing [Edema ___] : edema [unfilled] [Normal] : moves all extremities, no focal deficits, normal speech [Alert and Oriented] : alert and oriented [de-identified] : JVP 8 cm of H2O, no HJR [de-identified] : ambulates with cane for distance [de-identified] : luke- warm peripherally

## 2023-06-16 ENCOUNTER — APPOINTMENT (OUTPATIENT)
Dept: HEART AND VASCULAR | Facility: CLINIC | Age: 78
End: 2023-06-16
Payer: MEDICARE

## 2023-07-07 ENCOUNTER — APPOINTMENT (OUTPATIENT)
Dept: HEART AND VASCULAR | Facility: CLINIC | Age: 78
End: 2023-07-07
Payer: MEDICARE

## 2023-07-07 VITALS
BODY MASS INDEX: 25.87 KG/M2 | OXYGEN SATURATION: 99 % | HEIGHT: 67 IN | DIASTOLIC BLOOD PRESSURE: 58 MMHG | HEART RATE: 80 BPM | WEIGHT: 164.8 LBS | SYSTOLIC BLOOD PRESSURE: 86 MMHG | TEMPERATURE: 97.7 F

## 2023-07-07 PROCEDURE — 99215 OFFICE O/P EST HI 40 MIN: CPT

## 2023-07-10 ENCOUNTER — NON-APPOINTMENT (OUTPATIENT)
Age: 78
End: 2023-07-10

## 2023-07-10 LAB
ANION GAP SERPL CALC-SCNC: 13 MMOL/L
BUN SERPL-MCNC: 23 MG/DL
CALCIUM SERPL-MCNC: 9.2 MG/DL
CHLORIDE SERPL-SCNC: 109 MMOL/L
CO2 SERPL-SCNC: 25 MMOL/L
CREAT SERPL-MCNC: 1.56 MG/DL
EGFR: 46 ML/MIN/1.73M2
GLUCOSE SERPL-MCNC: 80 MG/DL
MAGNESIUM SERPL-MCNC: 2.3 MG/DL
NT-PROBNP SERPL-MCNC: 6429 PG/ML
POTASSIUM SERPL-SCNC: 4.4 MMOL/L
SODIUM SERPL-SCNC: 147 MMOL/L

## 2023-07-12 NOTE — PHYSICAL EXAM
[Well Developed] : well developed [No Acute Distress] : no acute distress [Frail] : frail [No Xanthelasma] : no xanthelasma [Soft] : abdomen soft [Non Tender] : non-tender [Normal Bowel Sounds] : normal bowel sounds [No Cyanosis] : no cyanosis [No Clubbing] : no clubbing [Normal] : moves all extremities, no focal deficits, normal speech [Alert and Oriented] : alert and oriented [No Edema] : no edema [Normal Radial B/L] : normal radial B/L [No Rash] : no rash [de-identified] : no perioral cyanosis, MMM [de-identified] : JVP 6 cm of H2O, no HJR [de-identified] : ambulates with cane for distance [de-identified] : warm peripherally

## 2023-07-12 NOTE — DISCUSSION/SUMMARY
[Patient] : the patient [FreeTextEntry2] : and his wife [FreeTextEntry1] : 77 year old man with transthyretin cardiac amyloid, NICM (LVEF 20-25%, progressively down from 50% in 2021), AFib/Fl s/p ablation 6/2016 (on Eliquis) s/p CRT-P (2/16/2022) who is overall doing well today. He is ACC/AHA Stage C with NYHA Class III HF, euvolemic on exam. He has had better medication adherence over the last few weeks/months. I have recommended the following: \par \par 1. Chronic systolic & diastolic HF - stable and well compensated. Continue Entresto 24/26 BID, Farxiga 10 mg daily, spironolactone 25 mg daily, and Toprol XL 50 mg daily. His HR is protected by CRT-P but he declines further BB escalation and further ARNI escalation is limited by marginal blood pressures. Maintaining euvolemia on torsemide 10 mg daily, with an additional dose as needed for weight gain greater than 3 pounds. 5/3/23 Na 148, K 4.2, Cl 109, CO2 24, BUN 26, Cr 1.50, proBNP down-trending from 13 K > 8399> now 7200. Check labs today. Patient declined referral to cardiac rehab but will speak with his PCP Dr. Green to see if at home PT is an available resource. \par \par 2. Cardiac amyloidosis - TTR amyloid confirmed by Tc-PYP scan. Following with Dr. Bradley and started on Vyndemax in 5/2022. He was also evaluated by Dr. 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 Of note patients with this variant could develop both cardiac and neurologic symptoms and further recommended to be evaluated by neurology, but he has not yet done so.\par \par 3. Atrial fibrillation/flutter - S/p ablation in 2016. Following with Dr. Pemberton EP. Continue BB as above and reinforced AC with Eliquis BID. \par \par 4. CKD Stage IIIa - Cr b/l 1.3-1.5. Continue with SGLT2i for dual cardiac and renal protection. Avoid Nephrotoxic agents. Continue to monitor with routine labs. Recommended referral to Nephrology, but he remains hesitant.\par \par 5. T2DM- well controlled with most recent A1C of 6.2%. Controlled by diet and lifestyle measures.\par \par 6. Follow up with Dr. Friedman in 3 months.

## 2023-07-12 NOTE — CARDIOLOGY SUMMARY
[de-identified] : \par 11/18/22 ECG: V-paced at 81 bpm, no sig change from prior\par 03/17/22 ECG : electronic pacemaker, atrial fibrillation, Bi-V paced.  [de-identified] : \par 11/21/22 TTE: LVIDd 4.53 cm, LVEF 20-25% (SW 1.65 cm, PW 1.57 cm) w/ global HK, RV normal size w. reduced RVSF, TAPSE 14.20 mm, RVH present, HYACINTH, mild to mod MR, mild to mod TR, mild PH , PASP 42 mmHg, IVC dilated, small pericardial effusion\par \par 02/08/22 TTE: LVIDd 4.7 cm, LVEF 37%, LV hypertrophy (septum 1.6, PW 1.7), analysis of DD challenging due to AF, normal RV size with mildly reduced function, HYACINTH, mild MR, mild TR, est PASP 40 mmHg, small pericardial effusion, ascites present\par \par 06/29/21 TTE: LVIDd 4.4 cm, LVEF 50%, severe concentric LVH (septum 1.7, PW 1.8), normal RV size and function, HYACINTH, trace MR, mild-mod TR, est PASP 36 mmHg, small pericardial effusion [de-identified] : \par 02/10/22 TcPYP: Intense uptake in LV and part of the right ventricular myocardium in a pattern consistent with TTR-mediated amyloidosis.  [de-identified] : \par 02/24/22: insertion of CRT-P

## 2023-07-12 NOTE — HISTORY OF PRESENT ILLNESS
[FreeTextEntry1] : This is a 77 year old man with ATTR cardiac amyloid, NICM (LVEF 20-25%, declined from 37%)  AF/Aflu s/p ablation 6/2016 (on Eliquis) s/p CRT-P (2/16/2022), CKD Stage 3 (b/l Cr 1-1.2), DMT2 (Ha1c 6.4% 2/2022), thrombocytopenia (bone marrow biopsy 11/2021 unrevealing) w/ idiopathic thrombocytopenia, chronic venous insufficiency, and prostate Ca s/p chemotherapy. Here today to follow up on his cardiomyopathy. \par \par He has had multiple hospitalizations for AHDF in the past year. One in November 2021 where he was diagnosed with ATTR cardiac amyloid by TcPYP scan. His most recent admission was at [facility] 2/7 - 2/17/22 where he required a R thoracentesis that was complicated by pneumothorax requiring pigtail. Entresto was briefly held for hypotension. Given fixed SV in setting of restrictive physiology, underwent CRT-P placement 2/16/2022 with lower HR limit set at 80 bpm. He was discharged on low dose GDMT at a weight of 173.7 lbs (admission weight 199 lbs).\par \par Since his initial appt with the [facility] team on 4/11/2022 he has completed genetic testing, that was positive for pathonomic gene for TTR amyloidosis, more specifically a variant that could potentially have cardiac and neurologic findings. It was further recommended he establish with neurologist Dr. Estrada Valdes. \par \par He has also established with Dr. Finkelstein and was started on Vyndamax in 5/2022 but notably had increasing diuretic demands and worsening renal function. He has been hesitant to increase diuretic doses in the past due to urinary and bowel incontinence. \par \par He was admitted to [facility] 11/18-11/23/2022 for ADHF requiring IV diuresis, and discharged home on low doses of GDMT at a weight of 171 pounds. \par \par Connecting with Mr. Wise has been historically difficult as the HF [facility] nor lab services have been able to reach patient for follow up. He reports attending appointments here at the [facility] [place] is also difficult.  He lives with his wife but despite his family's best efforts is adamant about preparing his own medications and has declined pill pack/blister pack at each follow up.\par \par Since he was last seen in March of 2023, he has continued to mismanage medications, briefly taking torsemide 40 mg daily instead of torsemide 20 mg daily and diuresed nearly 35 pounds as an outpatient. Since that time diuretics have been appropriately reduced and although reluctant has established a good medication regimen. \par \par More recently, he has been consistently taking his entresto and eliquis twice daily along with daily torsemide 10mg Several attempts have been made to escalate metoprolol but he continues to self down-titrate his medications, as he feels he "is taking enough." He brings his home BP/weight log. His weight has over the last 60 days has ranged from 156- 164 #. SBP  w/ isolated in SBP 80s. \par \par Overall from a cardiac perspective, his activity tolerance has improved, estimates he can walk 2 blocks before needing to rest due to shortness of breath and fatigue and endorses a benefit from taking medication consistently.  He ambulates with a cane for balance. In the last 30 days, he does feel more deconditioned and fatigued but reports his dizziness and lightheadedness have resolved on lower diuretic doses.He habitually sleeps on 2-3 pillows as previously he would become short of breath when lying flat and has not tried to lay flat. H\par \par He denies CP, SOB at rest, overt orthopnea, PND, abdominal discomfort, palpitations, and syncope. His appetite is diminished accompanied by early satiety and his bowel and bladder habits are unchanged and normal for him although continues to report incontinence. He has a CRT-P. He has been limiting fluid and sodium in his diet but has not taken medications as directed. He does not use NSAIDs. \par \par His daughter Daya is a nurse and has collaborated and confirmed his medications today.

## 2023-07-12 NOTE — REASON FOR VISIT
[Cardiac Failure] : cardiac failure [Other: _____] : [unfilled] [FreeTextEntry1] : Patient Instructions:\par \par 1. Continue your current medications \par \par 2. Check labs today \par \par 3. Encourage daily weights, please call office if weight change greater than 3 pounds in < 24 hours, or >5 pounds < 7 days.\par \par 4. Follow up with Dr. Friedman in 3 months.

## 2023-07-24 ENCOUNTER — APPOINTMENT (OUTPATIENT)
Dept: HEART AND VASCULAR | Facility: CLINIC | Age: 78
End: 2023-07-24

## 2023-08-01 ENCOUNTER — APPOINTMENT (OUTPATIENT)
Dept: HEART AND VASCULAR | Facility: CLINIC | Age: 78
End: 2023-08-01
Payer: MEDICARE

## 2023-08-01 VITALS
SYSTOLIC BLOOD PRESSURE: 89 MMHG | WEIGHT: 157 LBS | BODY MASS INDEX: 24.64 KG/M2 | DIASTOLIC BLOOD PRESSURE: 57 MMHG | HEIGHT: 67 IN | HEART RATE: 85 BPM

## 2023-08-01 PROCEDURE — 93284 PRGRMG EVAL IMPLANTABLE DFB: CPT

## 2023-08-01 NOTE — DISCUSSION/SUMMARY
[FreeTextEntry1] : Mr. 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  He had a prior ablation in 2016 at [facility].  He was noted to be in atrial fibrillation during an admission to [facility] 5/2021.  He has had recurrent hospitalizations for decompensated HF, most recently 11/2022.  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  Beta-blocker therapy limited by resting bradycardia.  He is s/p CRT-P placement 2/16/22 with LRL at 80 bpm.  [employer] at baseline on interrogation. He is effectively biventricular pacing as programmed.  He is enrolled in remote monitoring and was asked to return for follow-up in six months.

## 2023-08-01 NOTE — CARDIOLOGY SUMMARY
[___] : [unfilled] [de-identified] : 12/20/21 AF @ 63 bpm  [de-identified] : 11/22/21\par   1. Biatrial enlargement.\par   2. Mild pulmonic regurgitation.\par   3. Pulmonary artery systolic pressure is 38 mmHg.\par   4. No other significant valvular disease.\par   5. Normal right ventricular size.\par   6. Reduced right ventricular systolic function.\par   7. There is severe concentric left ventricular hypertrophy. Left ventricular systolic function is moderately reduced with a calculated ejection fraction of 35% with global hypokinesis.\par   8. Small-to-moderate pericardial effusion without echocardiographic evidence of cardiac tamponade physiology.\par   9. The proximal ascending aorta is dilated measuring 4.10 cm.\par  10. Findings suggestive of infiltrative cardiomyopathy.\par  11. Compared to the previous TTE performed on 6/29/2021, LV systolic function appears reduced.\par  \par

## 2023-08-01 NOTE — HISTORY OF PRESENT ILLNESS
[FreeTextEntry1] : Mr. 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  He had a prior ablation in 2016 at [facility].  He was noted to be in atrial fibrillation during an admission to [facility] 5/2021.  He has had recurrent hospitalizations for decompensated HF.  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  Beta-blocker therapy limited by resting bradycardia.  He is s/p CRT-P placement 2/16/22.   He complains of hearing loss. Ambulating with cane.  He offers no device related complaints.  Hospitalized at [facility] 11/2022 with decompensated HF in the setting of medication non compliance.  He denies any further hospitalizations.  Reports he has been taking his medications as prescribed since hospital discharge.    SEE PACEART

## 2023-08-04 NOTE — DIETITIAN INITIAL EVALUATION ADULT. - PROBLEM SELECTOR PLAN 6
Home medication: Tamsulosin 0.4 mg daily   - continue home medication       VTE PPX: f/u in AM about AC initiation   Dispo: pending diuresis and clinical progression     PT consulted Nasalis-Muscle-Based Myocutaneous Island Pedicle Flap Text: Using a #15 blade, an incision was made around the donor flap to the level of the nasalis muscle. Wide lateral undermining was then performed in both the subcutaneous plane above the nasalis muscle, and in a submuscular plane just above periosteum. This allowed the formation of a free nasalis muscle axial pedicle (based on the angular artery) which was still attached to the actual cutaneous flap, increasing its mobility and vascular viability. Hemostasis was obtained with pinpoint electrocoagulation. The flap was mobilized into position and the pivotal anchor points positioned and stabilized with buried interrupted sutures. Subcutaneous and dermal tissues were closed in a multilayered fashion with sutures. Tissue redundancies were excised, and the epidermal edges were apposed without significant tension and sutured with sutures.

## 2023-08-15 ENCOUNTER — RX RENEWAL (OUTPATIENT)
Age: 78
End: 2023-08-15

## 2023-08-25 ENCOUNTER — APPOINTMENT (OUTPATIENT)
Dept: HEART AND VASCULAR | Facility: CLINIC | Age: 78
End: 2023-08-25

## 2023-09-01 ENCOUNTER — APPOINTMENT (OUTPATIENT)
Dept: HEART AND VASCULAR | Facility: CLINIC | Age: 78
End: 2023-09-01
Payer: MEDICARE

## 2023-09-01 ENCOUNTER — LABORATORY RESULT (OUTPATIENT)
Age: 78
End: 2023-09-01

## 2023-09-01 VITALS
HEART RATE: 81 BPM | WEIGHT: 156 LBS | OXYGEN SATURATION: 95 % | SYSTOLIC BLOOD PRESSURE: 97 MMHG | BODY MASS INDEX: 24.48 KG/M2 | DIASTOLIC BLOOD PRESSURE: 70 MMHG | HEIGHT: 67 IN

## 2023-09-01 PROCEDURE — 99214 OFFICE O/P EST MOD 30 MIN: CPT

## 2023-09-05 ENCOUNTER — NON-APPOINTMENT (OUTPATIENT)
Age: 78
End: 2023-09-05

## 2023-09-05 LAB
ANION GAP SERPL CALC-SCNC: 12 MMOL/L
BUN SERPL-MCNC: 20 MG/DL
CALCIUM SERPL-MCNC: 9.2 MG/DL
CHLORIDE SERPL-SCNC: 112 MMOL/L
CO2 SERPL-SCNC: 24 MMOL/L
CREAT SERPL-MCNC: 1.43 MG/DL
EGFR: 51 ML/MIN/1.73M2
GLUCOSE SERPL-MCNC: 114 MG/DL
MAGNESIUM SERPL-MCNC: 2.5 MG/DL
NT-PROBNP SERPL-MCNC: ABNORMAL PG/ML
POTASSIUM SERPL-SCNC: 4.7 MMOL/L
SODIUM SERPL-SCNC: 147 MMOL/L

## 2023-09-06 NOTE — PHYSICAL EXAM
[de-identified] : no perioral cyanosis, MMM [de-identified] : JVP 8-10 cm of H2O w/ large V waves, no HJR [de-identified] : ambulates with cane for distance [de-identified] : warm peripherally

## 2023-09-06 NOTE — PHYSICAL EXAM
[de-identified] : no perioral cyanosis, MMM [de-identified] : JVP 8-10 cm of H2O w/ large V waves, no HJR [de-identified] : ambulates with cane for distance [de-identified] : warm peripherally

## 2023-09-06 NOTE — REVIEW OF SYSTEMS
[FreeTextEntry1] :  The remaining 14-point ROS is otherwise negative or non contributory except as noted in the HPI.

## 2023-09-06 NOTE — HISTORY OF PRESENT ILLNESS
1st attempt left message regarding smoking cessation quit 1 episode.     [FreeTextEntry1] : This is a 78 year old man with ATTR cardiac amyloid, NICM (LVEF 20-25%, declined from 37%)  AF/Aflu s/p ablation 6/2016 (on Eliquis) s/p CRT-P (2/16/2022), CKD Stage 3 (b/l Cr 1-1.2), DMT2 (Ha1c 6.4% 2/2022), thrombocytopenia (bone marrow biopsy 11/2021 unrevealing) w/ idiopathic thrombocytopenia, chronic venous insufficiency, and prostate Ca s/p chemotherapy. Here today to follow up on his cardiomyopathy.   He has had multiple hospitalizations for AHDF in the past year. One in November 2021 where he was diagnosed with ATTR cardiac amyloid by TcPYP scan. His most recent admission was at [facility] 2/7 - 2/17/22 where he required a R thoracentesis that was complicated by pneumothorax requiring pigtail. Entresto was briefly held for hypotension. Given fixed SV in setting of restrictive physiology, underwent CRT-P placement 2/16/2022 with lower HR limit set at 80 bpm. He was discharged on low dose GDMT at a weight of 173.7 lbs (admission weight 199 lbs).  Since his initial appt with the HF [facility] team on 4/11/2022 he has completed genetic testing, that was positive for pathonomic gene for TTR amyloidosis, more specifically a variant that could potentially have cardiac and neurologic findings. It was further recommended he establish with neurologist Dr. Estrada Valdes.   He has also established with Dr. Finkelstein and was started on Vyndamax in 5/2022 but notably had increasing diuretic demands and worsening renal function. He has been hesitant to increase diuretic doses in the past due to urinary and bowel incontinence.   He was admitted to [facility] 11/18-11/23/2022 for ADHF requiring IV diuresis, and discharged home on low doses of GDMT at a weight of 171 pounds.   Connecting with Mr. Wise has been historically difficult as the HF CLEO nor lab services have been able to reach patient for follow up. He reports attending appointments here at the [facility] [place] is also difficult.  He lives with his wife but despite his family's best efforts is adamant about preparing his own medications and has declined pill pack/blister pack at each follow up.  Since he was last seen in March of 2023, he has continued to mismanage medications, briefly taking torsemide 40 mg daily instead of torsemide 20 mg daily and diuresed nearly 35 pounds as an outpatient. Since that time diuretics have been appropriately reduced and although reluctant has established a good medication regimen.   More recently, he has been consistently taking his entresto and eliquis twice daily. He reports he ran out of torsemide a few days ago? Several attempts have been made to escalate metoprolol but he continues to self down-titrate his medications, as he feels he "is taking enough." He forgot his BP/Weight log today. His weight has been stable 156- 164 #. . He has not had a reoccurrence of low BP since reducing his diuretics. He continues to endorse constipation and inability to fully evaculate stool, he does not want to see GI but reports he wants to follow with his PCP, Dr. Green.   Overall from a cardiac perspective, his activity tolerance has improved, estimates he can walk 2 blocks before needing to rest due to shortness of breath and fatigue and endorses a benefit from taking medication consistently.  He ambulates with a cane for balance. In the last 30 days, he does feel more deconditioned and fatigued but reports his dizziness and lightheadedness have resolved on lower diuretic doses. He habitually sleeps on 2 pillows as previously he would become short of breath when lying flat and has not tried to lay flat.  He denies CP, SOB at rest, overt orthopnea, PND, abdominal discomfort, palpitations, and syncope. His appetite is diminished accompanied by early satiety and his bowel and bladder habits are unchanged and normal for him although continues to report incontinence. He has a CRT-P. He has been limiting fluid and sodium in his diet but has not taken medications as directed. He does not use NSAIDs.   I called his daughter and son today for collaterals they will try to help with PAP forms.

## 2023-09-06 NOTE — CARDIOLOGY SUMMARY
[de-identified] : \par  11/18/22 ECG: V-paced at 81 bpm, no sig change from prior\par  03/17/22 ECG : electronic pacemaker, atrial fibrillation, Bi-V paced.  [de-identified] : \par  11/21/22 TTE: LVIDd 4.53 cm, LVEF 20-25% (SW 1.65 cm, PW 1.57 cm) w/ global HK, RV normal size w. reduced RVSF, TAPSE 14.20 mm, RVH present, HYACINTH, mild to mod MR, mild to mod TR, mild PH , PASP 42 mmHg, IVC dilated, small pericardial effusion\par  \par  02/08/22 TTE: LVIDd 4.7 cm, LVEF 37%, LV hypertrophy (septum 1.6, PW 1.7), analysis of DD challenging due to AF, normal RV size with mildly reduced function, HYACINTH, mild MR, mild TR, est PASP 40 mmHg, small pericardial effusion, ascites present\par  \par  06/29/21 TTE: LVIDd 4.4 cm, LVEF 50%, severe concentric LVH (septum 1.7, PW 1.8), normal RV size and function, HYACINTH, trace MR, mild-mod TR, est PASP 36 mmHg, small pericardial effusion [de-identified] : \par  02/10/22 TcPYP: Intense uptake in LV and part of the right ventricular myocardium in a pattern consistent with TTR-mediated amyloidosis.  [de-identified] : \par  02/24/22: insertion of CRT-P

## 2023-09-06 NOTE — REASON FOR VISIT
[Cardiac Failure] : cardiac failure [FreeTextEntry1] : Patient Instructions:  1. Continue your current medications   2. Check labs today   3. Encourage daily weights, please call office if weight change greater than 3 pounds in < 24 hours, or >5 pounds < 7 days.  4. Follow up with Dr. Friedman in 10/2023.

## 2023-09-06 NOTE — HISTORY OF PRESENT ILLNESS
[FreeTextEntry1] : This is a 78 year old man with ATTR cardiac amyloid, NICM (LVEF 20-25%, declined from 37%)  AF/Aflu s/p ablation 6/2016 (on Eliquis) s/p CRT-P (2/16/2022), CKD Stage 3 (b/l Cr 1-1.2), DMT2 (Ha1c 6.4% 2/2022), thrombocytopenia (bone marrow biopsy 11/2021 unrevealing) w/ idiopathic thrombocytopenia, chronic venous insufficiency, and prostate Ca s/p chemotherapy. Here today to follow up on his cardiomyopathy.   He has had multiple hospitalizations for AHDF in the past year. One in November 2021 where he was diagnosed with ATTR cardiac amyloid by TcPYP scan. His most recent admission was at [facility] 2/7 - 2/17/22 where he required a R thoracentesis that was complicated by pneumothorax requiring pigtail. Entresto was briefly held for hypotension. Given fixed SV in setting of restrictive physiology, underwent CRT-P placement 2/16/2022 with lower HR limit set at 80 bpm. He was discharged on low dose GDMT at a weight of 173.7 lbs (admission weight 199 lbs).  Since his initial appt with the HF CLEO team on 4/11/2022 he has completed genetic testing, that was positive for pathonomic gene for TTR amyloidosis, more specifically a variant that could potentially have cardiac and neurologic findings. It was further recommended he establish with neurologist Dr. Estrada Valdes.   He has also established with Dr. Finkelstein and was started on Vyndamax in 5/2022 but notably had increasing diuretic demands and worsening renal function. He has been hesitant to increase diuretic doses in the past due to urinary and bowel incontinence.   He was admitted to [facility] 11/18-11/23/2022 for ADHF requiring IV diuresis, and discharged home on low doses of GDMT at a weight of 171 pounds.   Connecting with Mr. Wise has been historically difficult as the HF CLEO nor lab services have been able to reach patient for follow up. He reports attending appointments here at the [facility] [place] is also difficult.  He lives with his wife but despite his family's best efforts is adamant about preparing his own medications and has declined pill pack/blister pack at each follow up.  Since he was last seen in March of 2023, he has continued to mismanage medications, briefly taking torsemide 40 mg daily instead of torsemide 20 mg daily and diuresed nearly 35 pounds as an outpatient. Since that time diuretics have been appropriately reduced and although reluctant has established a good medication regimen.   More recently, he has been consistently taking his entresto and eliquis twice daily. He reports he ran out of torsemide a few days ago? Several attempts have been made to escalate metoprolol but he continues to self down-titrate his medications, as he feels he "is taking enough." He forgot his BP/Weight log today. His weight has been stable 156- 164 #. . He has not had a reoccurrence of low BP since reducing his diuretics. He continues to endorse constipation and inability to fully evaculate stool, he does not want to see GI but reports he wants to follow with his PCP, Dr. Green.   Overall from a cardiac perspective, his activity tolerance has improved, estimates he can walk 2 blocks before needing to rest due to shortness of breath and fatigue and endorses a benefit from taking medication consistently.  He ambulates with a cane for balance. In the last 30 days, he does feel more deconditioned and fatigued but reports his dizziness and lightheadedness have resolved on lower diuretic doses. He habitually sleeps on 2 pillows as previously he would become short of breath when lying flat and has not tried to lay flat.  He denies CP, SOB at rest, overt orthopnea, PND, abdominal discomfort, palpitations, and syncope. His appetite is diminished accompanied by early satiety and his bowel and bladder habits are unchanged and normal for him although continues to report incontinence. He has a CRT-P. He has been limiting fluid and sodium in his diet but has not taken medications as directed. He does not use NSAIDs.   I called his daughter and son today for collaterals they will try to help with PAP forms.

## 2023-09-06 NOTE — DISCUSSION/SUMMARY
[FreeTextEntry2] : and his wife [FreeTextEntry1] : 77 year old man with transthyretin cardiac amyloid, NICM (LVEF 20-25%, progressively down from 50% in ), AFib/Fl s/p ablation 2016 (on Eliquis) s/p CRT-P (2022) who is overall doing well today. He is ACC/AHA Stage C with NYHA Class III HF, nearly euvolemic on exam. He has had better medication adherence over the last few weeks/months. I have recommended the followin. Chronic systolic & diastolic HF - stable and well compensated. Continue Entresto  BID, Farxiga 10 mg daily, spironolactone 25 mg daily, and Toprol XL 50 mg daily. His HR is protected by CRT-P but he declines further BB escalation and further ARNI escalation is limited by marginal blood pressures. To resume torsemide 10 mg daily, with an additional dose as needed for weight gain greater than 3 pounds. Labs 7/10/23 Na 147, K 4.4, Cl 109, CO2 25, Cr 1.56 proBNP down-trending from 13 K > 8399> 7200> 6429. Check labs today. Patient declined referral to cardiac rehab , Rita spoke to Dr. Green about resources for PT but patient discontinue PT due to cost.    2. Cardiac amyloidosis - TTR amyloid confirmed by Tc-PYP scan. Following with Dr. Bradley and started on Vyndemax in 2022. He was also evaluated by Dr. 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 Of note patients with this variant could develop both cardiac and neurologic symptoms and further recommended to be evaluated by neurology, but he has not yet done so.  3. Atrial fibrillation/flutter - S/p ablation in . Following with Dr. Pemberton, EP. Continue BB as above and reinforced AC with Eliquis BID.   4. CKD Stage IIIa - Cr b/l 1.3-1.5. Continue with SGLT2i for dual cardiac and renal protection. Avoid Nephrotoxic agents. Continue to monitor with routine labs. Recommended referral to Nephrology, but he remains hesitant.  5. T2DM- well controlled with most recent A1C of 6.2%. Controlled by diet and lifestyle measures.  6. Changes in bowel habits- c/w constipation. Discussed today that cardiac amyloid can manifest in other organs, and offered to arrange GI consult. He decline but proposed he follow up with Dr. Green his PCP.  7. Follow up with Dr. Friedman in 10/

## 2023-09-06 NOTE — CARDIOLOGY SUMMARY
[de-identified] : \par  11/18/22 ECG: V-paced at 81 bpm, no sig change from prior\par  03/17/22 ECG : electronic pacemaker, atrial fibrillation, Bi-V paced.  [de-identified] : \par  11/21/22 TTE: LVIDd 4.53 cm, LVEF 20-25% (SW 1.65 cm, PW 1.57 cm) w/ global HK, RV normal size w. reduced RVSF, TAPSE 14.20 mm, RVH present, HYACINTH, mild to mod MR, mild to mod TR, mild PH , PASP 42 mmHg, IVC dilated, small pericardial effusion\par  \par  02/08/22 TTE: LVIDd 4.7 cm, LVEF 37%, LV hypertrophy (septum 1.6, PW 1.7), analysis of DD challenging due to AF, normal RV size with mildly reduced function, HYACINTH, mild MR, mild TR, est PASP 40 mmHg, small pericardial effusion, ascites present\par  \par  06/29/21 TTE: LVIDd 4.4 cm, LVEF 50%, severe concentric LVH (septum 1.7, PW 1.8), normal RV size and function, HYACINTH, trace MR, mild-mod TR, est PASP 36 mmHg, small pericardial effusion [de-identified] : \par  02/10/22 TcPYP: Intense uptake in LV and part of the right ventricular myocardium in a pattern consistent with TTR-mediated amyloidosis.  [de-identified] : \par  02/24/22: insertion of CRT-P

## 2023-09-26 NOTE — PATIENT PROFILE ADULT - ..
07-Feb-2022 06:53:49 Tissue Cultured Epidermal Autograft Text: The defect edges were debeveled with a #15 scalpel blade. Given the location of the defect, shape of the defect and the proximity to free margins a tissue cultured epidermal autograft was deemed most appropriate.  The graft was then trimmed to fit the size of the defect.  The graft was then placed in the primary defect and oriented appropriately.

## 2023-10-20 ENCOUNTER — APPOINTMENT (OUTPATIENT)
Dept: HEART AND VASCULAR | Facility: CLINIC | Age: 78
End: 2023-10-20
Payer: MEDICARE

## 2023-10-20 VITALS
WEIGHT: 174 LBS | OXYGEN SATURATION: 97 % | HEART RATE: 87 BPM | HEIGHT: 67 IN | BODY MASS INDEX: 27.31 KG/M2 | SYSTOLIC BLOOD PRESSURE: 90 MMHG | DIASTOLIC BLOOD PRESSURE: 64 MMHG

## 2023-10-20 PROCEDURE — 99215 OFFICE O/P EST HI 40 MIN: CPT

## 2023-10-25 ENCOUNTER — NON-APPOINTMENT (OUTPATIENT)
Age: 78
End: 2023-10-25

## 2023-11-01 ENCOUNTER — APPOINTMENT (OUTPATIENT)
Dept: HEART AND VASCULAR | Facility: CLINIC | Age: 78
End: 2023-11-01
Payer: MEDICARE

## 2023-11-01 VITALS
SYSTOLIC BLOOD PRESSURE: 85 MMHG | OXYGEN SATURATION: 99 % | BODY MASS INDEX: 24.96 KG/M2 | TEMPERATURE: 97.7 F | WEIGHT: 159 LBS | HEIGHT: 67 IN | DIASTOLIC BLOOD PRESSURE: 62 MMHG | HEART RATE: 79 BPM

## 2023-11-01 PROCEDURE — 99214 OFFICE O/P EST MOD 30 MIN: CPT

## 2023-11-02 ENCOUNTER — NON-APPOINTMENT (OUTPATIENT)
Age: 78
End: 2023-11-02

## 2023-11-02 ENCOUNTER — APPOINTMENT (OUTPATIENT)
Dept: HEART AND VASCULAR | Facility: CLINIC | Age: 78
End: 2023-11-02
Payer: MEDICARE

## 2023-11-02 LAB
ANION GAP SERPL CALC-SCNC: 10 MMOL/L
BUN SERPL-MCNC: 24 MG/DL
CALCIUM SERPL-MCNC: 8.8 MG/DL
CHLORIDE SERPL-SCNC: 106 MMOL/L
CO2 SERPL-SCNC: 28 MMOL/L
CREAT SERPL-MCNC: 1.52 MG/DL
EGFR: 47 ML/MIN/1.73M2
GLUCOSE SERPL-MCNC: 84 MG/DL
NT-PROBNP SERPL-MCNC: 7343 PG/ML
POTASSIUM SERPL-SCNC: 4.2 MMOL/L
SODIUM SERPL-SCNC: 143 MMOL/L

## 2023-11-02 PROCEDURE — 93294 REM INTERROG EVL PM/LDLS PM: CPT

## 2023-11-02 PROCEDURE — 93296 REM INTERROG EVL PM/IDS: CPT

## 2023-12-31 NOTE — ED ADULT NURSE NOTE - CAS EDP DISCH DISPOSITION ADMI
Telemetry Hugh RN. Patient A&Ox4 ambulatory c/o vaginal bleeding with clots and pelvic pain since yesterday. Patient states changed pad twice today. Hx. Irregular periods. Kidney stones. LMP December 2023. On assessment patient well appearing, respirations even and unlabored. Vitals stable. Abdomen soft, round and slightly tender throughout. Patient denies birth control use, chest pain, sob, n/v, dizziness, urinary symptoms, fevers or chills. IV line established and labs collected and sent to lab. Stretcher in lowest position, wheels locked, appropriate side rails in place, call bell in reach. Report given to primary RN.

## 2024-01-02 NOTE — PATIENT PROFILE ADULT - NSPRESCRUSEDDRG_GEN_A_NUR
Behavioral Health  Serenity Uriostegui PsyD.  Psychologist    Start time: *** {abfampm:42644}  Stop time: *** {abfampm:79550}  Time spent directly with patient/family/caregiver: *** minutes        Reason for Appointment:   Adjustment concerns  Pediatrician/PCP/referring provider: Jackie Castañeda MD   Accompanied by: Nkechi (mom)  Preferred name: Phillip DAS:  Pt reports that she has been okay. She discussed frustration when visiting dad. She discussed how she feels that she is treated differently then Ninoska by Judith. She states that she would like to say something to dad about this but doesn't know how to approach this. She does feel that she has adjusted to the new schedule. Pt reports that when she with mom they are staying with her MGP's (also in the home are her aunt + uncle who are teenagers) and she, mom, step-dad, and half brothers are sharing a converted garage until mom finishes nursing school (~6/24)    O:  MSE:  Appearance: well groomed  Behavior: fidgety  Speech: regular rate and rhythm  Mood: neutral   Affect: normal   Thought Content: no delusions, no homicidal ideations, no hallucinations, and no suicidal ideations  Thought Process: goal directed, associations intact, sequential  Insight: age appropriate  Judgment: age appropriate  Orientation: oriented to person, place, time, and general circumstances  Memory: intact  Attention/Concentration: age appropriate  Morbid ideation: None  Suicide Assessment:  none     History:    Medications:   Current Outpatient Medications   Medication Sig Dispense Refill    albuterol 2.5 mg /3 mL (0.083 %) nebulizer solution Inhale.      albuterol 90 mcg/actuation inhaler Inhale 2 puffs every 4 hours if needed for wheezing or shortness of breath. 18 g 0    amoxicillin (Amoxil) 400 mg/5 mL suspension Take 12.5 ml by mouth BID for 10 days. (Patient not taking: Reported on 11/27/2023) 250 mL 0    fluticasone (Flovent) 110 mcg/actuation inhaler Inhale 1 puff once daily.  Rinse mouth with water after use to reduce aftertaste and incidence of candidiasis. Do not swallow. 12 g 11    inhalational spacing device (PrimeAire) inhaler Use as instructed      inhaler,assist device,lg mask (OPTICHAMBER JACQUELINE LG MASK MISC) Use with inhaler as directed      sodium chloride-Aloe vera gel ([place] Saline) gel topical gel Apply to affected nostril(s).       No current facility-administered medications for this visit.   :    Family history: Patient reports that at her mothers house is; her stepfather (calls him David and sometimes dad, younger half brothers (Valentin and Andrzej), and younger half-sister (Cecilia) as well as their kitten and dog. Patient reports that at her father's house is; father's fiancee (Judith), younger stepsister (Ninoska, 2 years younger), and their 4 cats. She and mom report that the visitation schedule is 2-2-3.      Educational history: Patient reports that she is in 3rd grade at [employer].     A:  Pt and her mother report that there are continued problems with adjustment and family conflict. Patient would likely benefit from resuming  services to learn new coping skills and to help with symptom management/reduction.     Diagnosis:    Adjustment Disorder , unspecified  Family conflict    Plan:  Pt interventions:  [place] setting to identify the pt's primary goals for visit, Provided emotion identification/expression/validation re: relational issues, and modeled/role-played talking to dad    Treatment Goals:    1. Talk through stressors and adjustment difficulties related to family relationships.  2. Increase confidence and improve body image.    In these goals, Ceraphina demonstrated progress.    Pt Behavioral Change Plan:  F/U in 2-3 weeks     In the next treatment session, we will focus on thematic material raised in this session as appropriate.    Please note this report has been partially produced using speech recognition software  And may cause  contain errors related to that system including grammar, punctuation and spelling as well as words and phrases that may seem inappropriate. If there are questions or concerns please feel free to contact me to clarify.    No

## 2024-02-16 ENCOUNTER — APPOINTMENT (OUTPATIENT)
Dept: HEART AND VASCULAR | Facility: CLINIC | Age: 79
End: 2024-02-16

## 2024-02-21 ENCOUNTER — APPOINTMENT (OUTPATIENT)
Dept: HEART AND VASCULAR | Facility: CLINIC | Age: 79
End: 2024-02-21

## 2024-03-28 ENCOUNTER — NON-APPOINTMENT (OUTPATIENT)
Age: 79
End: 2024-03-28

## 2024-03-28 ENCOUNTER — APPOINTMENT (OUTPATIENT)
Dept: HEART AND VASCULAR | Facility: CLINIC | Age: 79
End: 2024-03-28
Payer: MEDICARE

## 2024-03-28 VITALS
OXYGEN SATURATION: 98 % | BODY MASS INDEX: 24.8 KG/M2 | DIASTOLIC BLOOD PRESSURE: 67 MMHG | HEART RATE: 77 BPM | SYSTOLIC BLOOD PRESSURE: 94 MMHG | WEIGHT: 158 LBS | HEIGHT: 67 IN

## 2024-03-28 PROCEDURE — 99215 OFFICE O/P EST HI 40 MIN: CPT | Mod: 25

## 2024-03-28 PROCEDURE — G2211 COMPLEX E/M VISIT ADD ON: CPT

## 2024-03-28 PROCEDURE — 93000 ELECTROCARDIOGRAM COMPLETE: CPT

## 2024-03-28 NOTE — REASON FOR VISIT
[FreeTextEntry1] : Patient Instructions:  1. Labs today. 2. Dr. Friedman will refer you to [facility] Rehab program. 3. We will schedule your echocardiogram at the [facility] practice in [place]. 4. Follow-up with Fiath in 3 months and with Dr. Friedman in 6 months. 5. Dr. Friedman will refer you to a neurologist to assess if you have amyloid-related neuropathy.

## 2024-03-28 NOTE — PHYSICAL EXAM
[Cachectic] : cachexia was observed [Normal] : moves all extremities, no focal deficits, normal speech [de-identified] : JVP 8 cm of H2O, no HJR [de-identified] : no perioral cyanosis, MMM [de-identified] : ambulates with cane for distance [de-identified] : warm and dry

## 2024-03-28 NOTE — HISTORY OF PRESENT ILLNESS
[FreeTextEntry1] : This is a 78-year-old man with ATTR cardiac amyloid, (LVEF 20-25% from 37%), AF/AFl s/p ablation 6/2016 (on Eliquis) s/p CRT-P (2/16/2022), CKD Stage 3 (b/l Cr 1.3-1.5), DMT2 (Ha1c 6.4% 2/2022), idiopathic thrombocytopenia (unrevealing bone marrow biopsy 11/2021), and prostate Ca s/p chemotherapy. He presents today for routine follow up for his cardiomyopathy.   Since last seen, he has done well. He is taking his medications regularly, although did not take them this morning prior to the long car ride from [place]. He notes an improved appetite and no interim hospitalizations. He spent about 4 months in [place] without issue. He now reports tingling in both feet that is increasingly bothersome. He never established care with Neurology but is followed by Dr. Bradley for his cardiac amyloid and remains on Vyndamax.  He is not physically active and uses a cane to ambulate. He had declined PT and cardiac rehab in the past, but now is willing to participate with his son's support. He manages his own ADLs and gets around at home without issue. He sleeps on 3-pillows without true symptoms of orthopnea (more for body comfort). He denies CP, SOB at rest, PND, abdominal discomfort, palpitations, and syncope. His appetite is much better now, and his bowel and bladder habits are his usual, including some urinary incontinence. He has a CRT-P. He has been limiting fluid and sodium in his diet and is now taking his medications as directed. He does not use NSAIDs.

## 2024-03-28 NOTE — DISCUSSION/SUMMARY
[EKG obtained to assist in diagnosis and management of assessed problem(s)] : EKG obtained to assist in diagnosis and management of assessed problem(s) [FreeTextEntry2] : and his son [FreeTextEntry1] : 78 year old man with transthyretin cardiac amyloidosis, chronic systolic & diastolic HF, LVEF 20-25%, s/p CRT-P (2022) and AFib/Fl s/p ablation 2016 (on Eliquis).  He is ACC/AHA Stage C with NYHA Class III HF, and euvolemic on exam. I have recommended the followin. Chronic systolic & diastolic HF - euvolemic and well compensated. Will continue current therapies: Entresto 24/26 mg BID, Farxiga 10 mg daily, spironolactone 25 mg daily, Toprol XL 50 mg daily, and torsemide 10 mg daily. His HR is protected by CRT-P but he declines further BB escalation (c/o fatigue) and BP cannot tolerate more ARNI. Will check labs today but cannot get hs-trop in the office (needs to be done in lab). He has been referred to cardiac rehab in the past and had participated in rehab with his PCP Dr. Green but logistically transportation and cost have been barriers for the family. He is now willing to reconsider with his son's support so will refer to [facility].  2. Cardiac amyloidosis - TTR amyloid confirmed by Tc-PYP scan and genetics. Encouraged patient to follow routinely with Dr. Bradley as he was started on Vyndemax in 2022. 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 Blevins to arrange for Neurology referral. Potentially can be considered for Onpattro if amyloid neuropathy (rather than DM neuropathy)?  3. Atrial fibrillation/flutter - S/p ablation in . Following with [facility]. Continue BB as above and AC with Eliquis BID.   4. CKD Stage IIIa - Cr b/l 1.3-1.5. Continue with SGLT2i for cardiac and renal protection. Avoid NSAIDs. Recommended referral to Nephrology, but he remains hesitant. Labs today.  5. Follow-up with URI Cuevas, in 3 months and with Dr. Friedman in 6 months.

## 2024-03-28 NOTE — CARDIOLOGY SUMMARY
[de-identified] : 03/28/24 ECG: V-paced at 81 bpm. No sig change from 11/18/22. 11/18/22 ECG: V-paced at 81 bpm, no sig change from prior 03/17/22 ECG : electronic pacemaker, atrial fibrillation, Bi-V paced.  [de-identified] : \par  11/21/22 TTE: LVIDd 4.53 cm, LVEF 20-25% (SW 1.65 cm, PW 1.57 cm) w/ global HK, RV normal size w. reduced RVSF, TAPSE 14.20 mm, RVH present, HYACINTH, mild to mod MR, mild to mod TR, mild PH , PASP 42 mmHg, IVC dilated, small pericardial effusion\par  \par  02/08/22 TTE: LVIDd 4.7 cm, LVEF 37%, LV hypertrophy (septum 1.6, PW 1.7), analysis of DD challenging due to AF, normal RV size with mildly reduced function, HYACINTH, mild MR, mild TR, est PASP 40 mmHg, small pericardial effusion, ascites present\par  \par  06/29/21 TTE: LVIDd 4.4 cm, LVEF 50%, severe concentric LVH (septum 1.7, PW 1.8), normal RV size and function, HYACINTH, trace MR, mild-mod TR, est PASP 36 mmHg, small pericardial effusion [de-identified] : \par  02/10/22 TcPYP: Intense uptake in LV and part of the right ventricular myocardium in a pattern consistent with TTR-mediated amyloidosis.  [de-identified] : \par  02/24/22: insertion of CRT-P

## 2024-03-29 LAB
ALBUMIN SERPL ELPH-MCNC: 3.6 G/DL
ALP BLD-CCNC: 79 U/L
ALT SERPL-CCNC: 16 U/L
ANION GAP SERPL CALC-SCNC: 10 MMOL/L
AST SERPL-CCNC: 12 U/L
BILIRUB SERPL-MCNC: 0.5 MG/DL
BUN SERPL-MCNC: 28 MG/DL
CALCIUM SERPL-MCNC: 8.7 MG/DL
CHLORIDE SERPL-SCNC: 107 MMOL/L
CO2 SERPL-SCNC: 27 MMOL/L
CREAT SERPL-MCNC: 1.24 MG/DL
EGFR: 60 ML/MIN/1.73M2
GLUCOSE SERPL-MCNC: 77 MG/DL
MAGNESIUM SERPL-MCNC: 2.6 MG/DL
NT-PROBNP SERPL-MCNC: 6317 PG/ML
POTASSIUM SERPL-SCNC: 4.7 MMOL/L
PROT SERPL-MCNC: 6.3 G/DL
SODIUM SERPL-SCNC: 143 MMOL/L

## 2024-04-01 ENCOUNTER — NON-APPOINTMENT (OUTPATIENT)
Age: 79
End: 2024-04-01

## 2024-04-08 ENCOUNTER — APPOINTMENT (OUTPATIENT)
Dept: HEART AND VASCULAR | Facility: CLINIC | Age: 79
End: 2024-04-08

## 2024-04-26 DIAGNOSIS — E85.4 ORGAN-LIMITED AMYLOIDOSIS: ICD-10-CM

## 2024-04-26 DIAGNOSIS — I43 ORGAN-LIMITED AMYLOIDOSIS: ICD-10-CM

## 2024-05-02 ENCOUNTER — APPOINTMENT (OUTPATIENT)
Dept: HEART AND VASCULAR | Facility: CLINIC | Age: 79
End: 2024-05-02
Payer: MEDICARE

## 2024-05-02 ENCOUNTER — NON-APPOINTMENT (OUTPATIENT)
Age: 79
End: 2024-05-02

## 2024-05-02 VITALS
DIASTOLIC BLOOD PRESSURE: 65 MMHG | BODY MASS INDEX: 24.01 KG/M2 | HEIGHT: 67 IN | SYSTOLIC BLOOD PRESSURE: 90 MMHG | HEART RATE: 81 BPM | WEIGHT: 153 LBS

## 2024-05-02 DIAGNOSIS — Z00.00 ENCOUNTER FOR GENERAL ADULT MEDICAL EXAMINATION W/OUT ABNORMAL FINDINGS: ICD-10-CM

## 2024-05-02 DIAGNOSIS — I48.91 UNSPECIFIED ATRIAL FIBRILLATION: ICD-10-CM

## 2024-05-02 DIAGNOSIS — E11.9 TYPE 2 DIABETES MELLITUS W/OUT COMPLICATIONS: ICD-10-CM

## 2024-05-02 DIAGNOSIS — E85.1 NEUROPATHIC HEREDOFAMILIAL AMYLOIDOSIS: ICD-10-CM

## 2024-05-02 DIAGNOSIS — I50.22 CHRONIC SYSTOLIC (CONGESTIVE) HEART FAILURE: ICD-10-CM

## 2024-05-02 DIAGNOSIS — N18.30 CHRONIC KIDNEY DISEASE, STAGE 3 UNSPECIFIED: ICD-10-CM

## 2024-05-02 DIAGNOSIS — I10 ESSENTIAL (PRIMARY) HYPERTENSION: ICD-10-CM

## 2024-05-02 DIAGNOSIS — G63 NEUROPATHIC HEREDOFAMILIAL AMYLOIDOSIS: ICD-10-CM

## 2024-05-02 PROCEDURE — 93306 TTE W/DOPPLER COMPLETE: CPT

## 2024-05-02 PROCEDURE — G2211 COMPLEX E/M VISIT ADD ON: CPT | Mod: NC,1L

## 2024-05-02 PROCEDURE — 93000 ELECTROCARDIOGRAM COMPLETE: CPT

## 2024-05-02 PROCEDURE — 99204 OFFICE O/P NEW MOD 45 MIN: CPT | Mod: 25

## 2024-05-02 RX ORDER — DAPAGLIFLOZIN 10 MG/1
10 TABLET, FILM COATED ORAL
Qty: 90 | Refills: 3 | Status: ACTIVE | OUTPATIENT
Start: 2023-01-05

## 2024-05-02 RX ORDER — METOPROLOL SUCCINATE 50 MG/1
50 TABLET, EXTENDED RELEASE ORAL
Qty: 90 | Refills: 1 | Status: ACTIVE | COMMUNITY
Start: 2023-07-06

## 2024-05-02 RX ORDER — TORSEMIDE 20 MG/1
20 TABLET ORAL DAILY
Qty: 90 | Refills: 3 | Status: ACTIVE | COMMUNITY
Start: 2023-03-03 | End: 1900-01-01

## 2024-05-02 RX ORDER — APIXABAN 5 MG/1
5 TABLET, FILM COATED ORAL
Qty: 180 | Refills: 3 | Status: ACTIVE | OUTPATIENT

## 2024-05-02 RX ORDER — TAMSULOSIN HYDROCHLORIDE 0.4 MG/1
0.4 CAPSULE ORAL
Refills: 0 | Status: ACTIVE | COMMUNITY

## 2024-05-02 RX ORDER — SACUBITRIL AND VALSARTAN 24; 26 MG/1; MG/1
24-26 TABLET, FILM COATED ORAL
Qty: 180 | Refills: 3 | Status: ACTIVE | COMMUNITY

## 2024-05-02 RX ORDER — TAFAMIDIS 61 MG/1
61 CAPSULE, LIQUID FILLED ORAL
Qty: 30 | Refills: 11 | Status: ACTIVE | COMMUNITY
Start: 2022-03-15

## 2024-05-02 RX ORDER — SPIRONOLACTONE 25 MG/1
25 TABLET ORAL DAILY
Qty: 90 | Refills: 1 | Status: ACTIVE | COMMUNITY
Start: 2023-04-06

## 2024-05-02 NOTE — PHYSICAL EXAM
[Frail] : frail [Ill-Appearing] : ill-appearing [5th Left ICS - MCL] : palpated at the 5th LICS in the midclavicular line [Rhythm Regular] : regular [Normal S1] : normal S1 [II] : a grade 2 [No Pitting Edema] : no pitting edema present [Normal] : no rash, no skin lesions [Click] : no click [Right Carotid Bruit] : no bruit heard over the right carotid [Left Carotid Bruit] : no bruit heard over the left carotid

## 2024-05-02 NOTE — REVIEW OF SYSTEMS
[Feeling Fatigued] : feeling fatigued [SOB] : shortness of breath [Dyspnea on exertion] : dyspnea during exertion [Negative] : Musculoskeletal [Weight Gain (___ Lbs)] : no recent weight gain [Weight Loss (___ Lbs)] : no recent weight loss [Chest Discomfort] : no chest discomfort

## 2024-05-02 NOTE — DISCUSSION/SUMMARY
[EKG obtained to assist in diagnosis and management of assessed problem(s)] : EKG obtained to assist in diagnosis and management of assessed problem(s) [FreeTextEntry1] : Lvef 35% granular LV appearance liu dysfx mod LVH and dilated LA ' 2+ MR 1-2 AI  CRt device in RV CHF compensated need to ambulate 20-30 min qd  medication list reviwed w spouse

## 2024-05-02 NOTE — CARDIOLOGY SUMMARY
[de-identified] : EC24 ECG: V-paced at 81 bpm. No sig change from 22. 22 ECG: V-paced at 81 bpm, no sig change from prior 22 ECG : electronic pacemaker, atrial fibrillation, Bi-V paced.   Echo: 22 TTE: LVIDd 4.53 cm, LVEF 20-25% (SW 1.65 cm, PW 1.57 cm) w/ global HK, RV normal size w. reduced RVSF, TAPSE 14.20 mm, RVH present, HYACINTH, mild to mod MR, mild to mod TR, mild PH , PASP 42 mmHg, IVC dilated, small pericardial effusion [de-identified] : 02/08/22 TTE: LVIDd 4.7 cm, LVEF 37%, LV hypertrophy (septum 1.6, PW 1.7), analysis of DD challenging due to AF, normal RV size with mildly reduced function, HYACINTH, mild MR, mild TR, est PASP 40 mmHg, small pericardial effusion, ascites present [de-identified] : EP: 02/24/22: insertion of CRT-P

## 2024-05-02 NOTE — HISTORY OF PRESENT ILLNESS
[FreeTextEntry1] : 5/2/24 This is a 78-year-old man with ATTR cardiac amyloid, (LVEF 20-25% from 37%), AF/AFl s/p ablation 6/2016 (on Eliquis) s/p CRT-P (2/16/2022), CKD Stage 3 (b/l Cr 1.3-1.5), DMT2 (Ha1c 6.4% 2/2022), idiopathic thrombocytopenia (unrevealing bone marrow biopsy 11/2021), and prostate Ca s/p chemotherapy. He presents today for routine follow up for his cardiomyopathy.  Since last seen, he has done well. He is taking his medications regularly, although did not take them this morning prior to the long car ride from [place]. He notes an improved appetite and no interim hospitalizations. He spent about 4 months in [place] without issue. He now reports tingling in both feet that is increasingly bothersome. He never established care with Neurology but is followed by Dr. Bradley for his cardiac amyloid and remains on Vyndamax. walking limited by poor energy denies sscp or abbott  no edema sleep pattern stable no PND or cough

## 2024-05-06 ENCOUNTER — APPOINTMENT (OUTPATIENT)
Dept: HEART AND VASCULAR | Facility: CLINIC | Age: 79
End: 2024-05-06
Payer: MEDICARE

## 2024-05-06 VITALS
SYSTOLIC BLOOD PRESSURE: 98 MMHG | HEART RATE: 80 BPM | HEIGHT: 67 IN | WEIGHT: 150 LBS | DIASTOLIC BLOOD PRESSURE: 61 MMHG | RESPIRATION RATE: 15 BRPM | BODY MASS INDEX: 23.54 KG/M2 | OXYGEN SATURATION: 100 %

## 2024-05-06 PROCEDURE — 99213 OFFICE O/P EST LOW 20 MIN: CPT | Mod: 25

## 2024-05-06 PROCEDURE — 93281 PM DEVICE PROGR EVAL MULTI: CPT

## 2024-05-10 NOTE — HISTORY OF PRESENT ILLNESS
[FreeTextEntry1] : Mr. 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  He had a prior ablation in 2016 at [facility].  He was noted to be in atrial fibrillation during an admission to [facility] 5/2021.  He has had recurrent hospitalizations for decompensated HF.  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  Beta-blocker therapy limited by resting bradycardia.  He is s/p CRT-P placement 2/16/22.   He complains of hearing loss. Ambulating with cane.  He offers no device related complaints.  Hospitalized at [facility] 11/2022 with decompensated HF in the setting of medication non compliance.  No interval hospitalizations by his report.   Endorses compliance with medication regimen.      SEE PACEART

## 2024-05-10 NOTE — ADDENDUM
[FreeTextEntry1] : I, Emmy Camacho, am scribing for and the presence of Dr. Pemberton the following sections: HPI, PMH,Family/social history, ROS, Physical Exam, Assessment / Plan.   I, Jimi Pemberton, personally performed the services described in the documentation, reviewed the documentation recorded by the scribe in my presence and it accurately and completely records my words and actions.

## 2024-05-10 NOTE — DISCUSSION/SUMMARY
[FreeTextEntry1] : Mr. Wise is a pleasant 78 year-old gentleman with a past medical history significant for DM II, Thrombocytopenia, CKD St III,  non-obstructive CAD, HFmrEF (EF 35%), BPH s/p TURP, pituitary adenoma (s/p transphenoidal treatment in 1990's), Prostate CA s/p chemo, TTR amyloidosis, chronic venous insufficiency, and persistent atrial flutter/fibrillation.  He had a prior ablation in 2016 at [facility].    1.  AF Continue rate control regimen  2.  Stroke Risk  CHADS VASc at least 4 Continue Eliquis for TE/CVA ppx  3. BiV PPM  He is s/p CRT-P placement 2/16/22 with LRL at 80 bpm.  Heart Logic at baseline on interrogation. He is effectively biventricular pacing as programmed.  He is enrolled in remote monitoring and was asked to return for follow-up in six months.

## 2024-05-10 NOTE — CARDIOLOGY SUMMARY
[___] : [unfilled] [de-identified] : 12/20/21 AF @ 63 bpm  [de-identified] :     TTE 5/2/24 1. Left ventricular systolic function is moderately decreased with an ejection fraction visually estimated at 30 to 35 %. Global left ventricular hypokinesis. 2. There is severe (grade 3) left ventricular diastolic dysfunction. 3. The left atrium is severely dilated. 4. The right atrium is moderately dilated. 5. Device lead is visualized in the right ventricle. 6. Moderate mitral regurgitation. 7. No echocardiographic evidence of pulmonary hypertension. 8. Groundglass tissue texture suggestive of amyloid heart. 9. Mild aortic regurgitation. 10. Mild pulmonic regurgitation. 11. Moderate left ventricular hypertrophy.  11/22/21  1. Biatrial enlargement.  2. Mild pulmonic regurgitation.  3. Pulmonary artery systolic pressure is 38 mmHg.  4. No other significant valvular disease.  5. Normal right ventricular size.  6. Reduced right ventricular systolic function.  7. There is severe concentric left ventricular hypertrophy. Left ventricular systolic function is moderately reduced with a calculated ejection fraction of 35% with global hypokinesis.  8. Small-to-moderate pericardial effusion without echocardiographic evidence of cardiac tamponade physiology.  9. The proximal ascending aorta is dilated measuring 4.10 cm. 10. Findings suggestive of infiltrative cardiomyopathy. 11. Compared to the previous TTE performed on 6/29/2021, LV systolic function appears reduced.

## 2024-06-24 ENCOUNTER — APPOINTMENT (OUTPATIENT)
Dept: HEART AND VASCULAR | Facility: CLINIC | Age: 79
End: 2024-06-24

## 2024-06-24 ENCOUNTER — NON-APPOINTMENT (OUTPATIENT)
Age: 79
End: 2024-06-24

## 2024-07-03 ENCOUNTER — APPOINTMENT (OUTPATIENT)
Dept: HEART AND VASCULAR | Facility: CLINIC | Age: 79
End: 2024-07-03

## 2024-07-08 ENCOUNTER — INPATIENT (INPATIENT)
Facility: HOSPITAL | Age: 79
LOS: 9 days | Discharge: NURSING, REHAB OR LONG-TERM CARE | DRG: 177 | End: 2024-07-18
Attending: INTERNAL MEDICINE | Admitting: INTERNAL MEDICINE
Payer: MEDICARE

## 2024-07-08 ENCOUNTER — NON-APPOINTMENT (OUTPATIENT)
Age: 79
End: 2024-07-08

## 2024-07-08 VITALS
WEIGHT: 154.1 LBS | DIASTOLIC BLOOD PRESSURE: 52 MMHG | RESPIRATION RATE: 20 BRPM | TEMPERATURE: 98 F | HEART RATE: 80 BPM | HEIGHT: 71 IN | SYSTOLIC BLOOD PRESSURE: 74 MMHG | OXYGEN SATURATION: 88 %

## 2024-07-08 DIAGNOSIS — Z90.79 ACQUIRED ABSENCE OF OTHER GENITAL ORGAN(S): Chronic | ICD-10-CM

## 2024-07-08 DIAGNOSIS — E85.4 ORGAN-LIMITED AMYLOIDOSIS: ICD-10-CM

## 2024-07-08 DIAGNOSIS — D69.6 THROMBOCYTOPENIA, UNSPECIFIED: ICD-10-CM

## 2024-07-08 DIAGNOSIS — I48.92 UNSPECIFIED ATRIAL FLUTTER: Chronic | ICD-10-CM

## 2024-07-08 DIAGNOSIS — U07.1 COVID-19: ICD-10-CM

## 2024-07-08 DIAGNOSIS — Z00.00 ENCOUNTER FOR GENERAL ADULT MEDICAL EXAMINATION WITHOUT ABNORMAL FINDINGS: ICD-10-CM

## 2024-07-08 DIAGNOSIS — Z98.890 OTHER SPECIFIED POSTPROCEDURAL STATES: Chronic | ICD-10-CM

## 2024-07-08 DIAGNOSIS — I50.30 UNSPECIFIED DIASTOLIC (CONGESTIVE) HEART FAILURE: ICD-10-CM

## 2024-07-08 DIAGNOSIS — E11.9 TYPE 2 DIABETES MELLITUS WITHOUT COMPLICATIONS: ICD-10-CM

## 2024-07-08 DIAGNOSIS — I48.91 UNSPECIFIED ATRIAL FIBRILLATION: ICD-10-CM

## 2024-07-08 LAB
ADD ON TEST-SPECIMEN IN LAB: SIGNIFICANT CHANGE UP
ALBUMIN SERPL ELPH-MCNC: 3 G/DL — LOW (ref 3.3–5)
ALP SERPL-CCNC: 109 U/L — SIGNIFICANT CHANGE UP (ref 40–120)
ALT FLD-CCNC: 38 U/L — SIGNIFICANT CHANGE UP (ref 10–45)
ANION GAP SERPL CALC-SCNC: 12 MMOL/L — SIGNIFICANT CHANGE UP (ref 5–17)
APPEARANCE UR: CLEAR — SIGNIFICANT CHANGE UP
APTT BLD: 37.9 SEC — HIGH (ref 24.5–35.6)
AST SERPL-CCNC: 44 U/L — HIGH (ref 10–40)
BASE EXCESS BLDV CALC-SCNC: 3.3 MMOL/L — HIGH (ref -2–3)
BASOPHILS # BLD AUTO: 0 K/UL — SIGNIFICANT CHANGE UP (ref 0–0.2)
BASOPHILS NFR BLD AUTO: 0 % — SIGNIFICANT CHANGE UP (ref 0–2)
BILIRUB SERPL-MCNC: 1.4 MG/DL — HIGH (ref 0.2–1.2)
BILIRUB UR-MCNC: ABNORMAL
BLD GP AB SCN SERPL QL: NEGATIVE — SIGNIFICANT CHANGE UP
BUN SERPL-MCNC: 25 MG/DL — HIGH (ref 7–23)
CALCIUM SERPL-MCNC: 8.9 MG/DL — SIGNIFICANT CHANGE UP (ref 8.4–10.5)
CHLORIDE SERPL-SCNC: 108 MMOL/L — SIGNIFICANT CHANGE UP (ref 96–108)
CO2 BLDV-SCNC: 30.6 MMOL/L — HIGH (ref 22–26)
CO2 SERPL-SCNC: 26 MMOL/L — SIGNIFICANT CHANGE UP (ref 22–31)
COLOR SPEC: SIGNIFICANT CHANGE UP
CREAT SERPL-MCNC: 1.18 MG/DL — SIGNIFICANT CHANGE UP (ref 0.5–1.3)
DIFF PNL FLD: NEGATIVE — SIGNIFICANT CHANGE UP
EGFR: 64 ML/MIN/1.73M2 — SIGNIFICANT CHANGE UP
EOSINOPHIL # BLD AUTO: 0 K/UL — SIGNIFICANT CHANGE UP (ref 0–0.5)
EOSINOPHIL NFR BLD AUTO: 0 % — SIGNIFICANT CHANGE UP (ref 0–6)
GAS PNL BLDV: 141 MMOL/L — SIGNIFICANT CHANGE UP (ref 136–145)
GAS PNL BLDV: SIGNIFICANT CHANGE UP
GAS PNL BLDV: SIGNIFICANT CHANGE UP
GLUCOSE SERPL-MCNC: 99 MG/DL — SIGNIFICANT CHANGE UP (ref 70–99)
GLUCOSE UR QL: 500 MG/DL
HCO3 BLDV-SCNC: 29 MMOL/L — SIGNIFICANT CHANGE UP (ref 22–29)
HCT VFR BLD CALC: 46 % — SIGNIFICANT CHANGE UP (ref 39–50)
HGB BLD-MCNC: 15.3 G/DL — SIGNIFICANT CHANGE UP (ref 13–17)
IMM GRANULOCYTES NFR BLD AUTO: 0.2 % — SIGNIFICANT CHANGE UP (ref 0–0.9)
INR BLD: 1.87 — HIGH (ref 0.85–1.18)
KETONES UR-MCNC: ABNORMAL MG/DL
LACTATE SERPL-SCNC: 1.9 MMOL/L — SIGNIFICANT CHANGE UP (ref 0.5–2)
LEUKOCYTE ESTERASE UR-ACNC: NEGATIVE — SIGNIFICANT CHANGE UP
LYMPHOCYTES # BLD AUTO: 0.82 K/UL — LOW (ref 1–3.3)
LYMPHOCYTES # BLD AUTO: 20.2 % — SIGNIFICANT CHANGE UP (ref 13–44)
MAGNESIUM SERPL-MCNC: 2.2 MG/DL — SIGNIFICANT CHANGE UP (ref 1.6–2.6)
MCHC RBC-ENTMCNC: 30.1 PG — SIGNIFICANT CHANGE UP (ref 27–34)
MCHC RBC-ENTMCNC: 33.3 GM/DL — SIGNIFICANT CHANGE UP (ref 32–36)
MCV RBC AUTO: 90.4 FL — SIGNIFICANT CHANGE UP (ref 80–100)
MONOCYTES # BLD AUTO: 0.19 K/UL — SIGNIFICANT CHANGE UP (ref 0–0.9)
MONOCYTES NFR BLD AUTO: 4.7 % — SIGNIFICANT CHANGE UP (ref 2–14)
NEUTROPHILS # BLD AUTO: 3.03 K/UL — SIGNIFICANT CHANGE UP (ref 1.8–7.4)
NEUTROPHILS NFR BLD AUTO: 74.9 % — SIGNIFICANT CHANGE UP (ref 43–77)
NITRITE UR-MCNC: NEGATIVE — SIGNIFICANT CHANGE UP
NRBC # BLD: 0 /100 WBCS — SIGNIFICANT CHANGE UP (ref 0–0)
NT-PROBNP SERPL-SCNC: HIGH PG/ML (ref 0–300)
PCO2 BLDV: 48 MMHG — SIGNIFICANT CHANGE UP (ref 42–55)
PH BLDV: 7.39 — SIGNIFICANT CHANGE UP (ref 7.32–7.43)
PH UR: 6 — SIGNIFICANT CHANGE UP (ref 5–8)
PLATELET # BLD AUTO: 144 K/UL — LOW (ref 150–400)
PO2 BLDV: <33 MMHG — LOW (ref 25–45)
POTASSIUM BLDV-SCNC: 5.8 MMOL/L — HIGH (ref 3.5–5.1)
POTASSIUM SERPL-MCNC: 4.2 MMOL/L — SIGNIFICANT CHANGE UP (ref 3.5–5.3)
POTASSIUM SERPL-SCNC: 4.2 MMOL/L — SIGNIFICANT CHANGE UP (ref 3.5–5.3)
PROT SERPL-MCNC: 7.1 G/DL — SIGNIFICANT CHANGE UP (ref 6–8.3)
PROT UR-MCNC: SIGNIFICANT CHANGE UP MG/DL
PROTHROM AB SERPL-ACNC: 20.9 SEC — HIGH (ref 9.5–13)
RAPID RVP RESULT: DETECTED
RBC # BLD: 5.09 M/UL — SIGNIFICANT CHANGE UP (ref 4.2–5.8)
RBC # FLD: 14.7 % — HIGH (ref 10.3–14.5)
RH IG SCN BLD-IMP: NEGATIVE — SIGNIFICANT CHANGE UP
SAO2 % BLDV: 16.5 % — LOW (ref 67–88)
SARS-COV-2 RNA SPEC QL NAA+PROBE: DETECTED
SODIUM SERPL-SCNC: 146 MMOL/L — HIGH (ref 135–145)
SP GR SPEC: 1.01 — SIGNIFICANT CHANGE UP (ref 1–1.03)
TROPONIN T, HIGH SENSITIVITY RESULT: 144 NG/L — CRITICAL HIGH (ref 0–51)
UROBILINOGEN FLD QL: 4 MG/DL (ref 0.2–1)
WBC # BLD: 4.05 K/UL — SIGNIFICANT CHANGE UP (ref 3.8–10.5)
WBC # FLD AUTO: 4.05 K/UL — SIGNIFICANT CHANGE UP (ref 3.8–10.5)

## 2024-07-08 PROCEDURE — 93970 EXTREMITY STUDY: CPT | Mod: 26

## 2024-07-08 PROCEDURE — 99285 EMERGENCY DEPT VISIT HI MDM: CPT

## 2024-07-08 PROCEDURE — 71045 X-RAY EXAM CHEST 1 VIEW: CPT | Mod: 26

## 2024-07-08 PROCEDURE — 99223 1ST HOSP IP/OBS HIGH 75: CPT

## 2024-07-08 RX ORDER — REMDESIVIR 5 MG/ML
200 INJECTION INTRAVENOUS EVERY 24 HOURS
Refills: 0 | Status: COMPLETED | OUTPATIENT
Start: 2024-07-08 | End: 2024-07-08

## 2024-07-08 RX ORDER — CEFTRIAXONE SODIUM 500 MG
1000 VIAL (EA) INJECTION ONCE
Refills: 0 | Status: COMPLETED | OUTPATIENT
Start: 2024-07-08 | End: 2024-07-08

## 2024-07-08 RX ORDER — ONDANSETRON HYDROCHLORIDE 2 MG/ML
4 INJECTION INTRAMUSCULAR; INTRAVENOUS EVERY 8 HOURS
Refills: 0 | Status: DISCONTINUED | OUTPATIENT
Start: 2024-07-08 | End: 2024-07-18

## 2024-07-08 RX ORDER — REMDESIVIR 5 MG/ML
INJECTION INTRAVENOUS
Refills: 0 | Status: COMPLETED | OUTPATIENT
Start: 2024-07-08 | End: 2024-07-12

## 2024-07-08 RX ORDER — AZITHROMYCIN 250 MG/1
500 TABLET, FILM COATED ORAL ONCE
Refills: 0 | Status: COMPLETED | OUTPATIENT
Start: 2024-07-08 | End: 2024-07-08

## 2024-07-08 RX ORDER — APIXABAN 5 MG/1
5 TABLET, FILM COATED ORAL EVERY 12 HOURS
Refills: 0 | Status: DISCONTINUED | OUTPATIENT
Start: 2024-07-08 | End: 2024-07-18

## 2024-07-08 RX ORDER — DEXAMETHASONE 1 MG/1
6 TABLET ORAL DAILY
Refills: 0 | Status: COMPLETED | OUTPATIENT
Start: 2024-07-08 | End: 2024-07-18

## 2024-07-08 RX ORDER — REMDESIVIR 5 MG/ML
100 INJECTION INTRAVENOUS EVERY 24 HOURS
Refills: 0 | Status: COMPLETED | OUTPATIENT
Start: 2024-07-09 | End: 2024-07-12

## 2024-07-08 RX ORDER — ACETAMINOPHEN 325 MG
650 TABLET ORAL EVERY 6 HOURS
Refills: 0 | Status: DISCONTINUED | OUTPATIENT
Start: 2024-07-08 | End: 2024-07-18

## 2024-07-08 RX ADMIN — AZITHROMYCIN 255 MILLIGRAM(S): 250 TABLET, FILM COATED ORAL at 22:01

## 2024-07-08 RX ADMIN — Medication 100 MILLIGRAM(S): at 21:24

## 2024-07-08 NOTE — H&P ADULT - PROBLEM SELECTOR PLAN 7
F: None  E: replete K < 4, Mg <2  DVT ppx- eliquis 5mg bid   Diet- DASH  Activity- ambulate as tolerated  Code- Full.

## 2024-07-08 NOTE — H&P ADULT - PROBLEM SELECTOR PLAN 4
Hgb A1C 6.1% on 11/22    Plan:   -f/u repeat Hgb A1C TTR amyloid confirmed on Tc-PYP scan   Home med: vyndemax     Plan:   -c/w vynedemax 61 mg qd

## 2024-07-08 NOTE — H&P ADULT - HISTORY OF PRESENT ILLNESS
ED vitals: T97.9, HR 80, BP 74/52, 88% RA   ED labs: WBC 4.05, Hgb 15.3, , Na 146, K 4.2, BUN 25, Cr 1.18, Bilirubin 1.4, Alk Phos 109, AST 44, ALT 38, Troponin 144, BNP 09859, Lactate 1.9, Procal 0.20, UA trace ketones, glucose, spec grav 1.015   ED imaging: CXR w/ b/l pleural effusions, increased opacities on R side pending official read   ED course: CTX 1g x1, azithromycin 500 IV x1    78y M w/ PMHx ATTR cardiac amyloid, diastolic HF (EF 30-35%) s/p CRT-P (2/22), AFib s/p ablation 6/16 on eliquis, DM2, idiopathic thrombycytopenia, prostate ca s/p chemotherapy p/w weakness, lethargy, SOB, decreased PO intake x 2 weeks. Per family, pt has been more lethargic, SOB, eating less over the last 2 weeks. Pt describes decreased PO intake d/t weakness, not for loss of appetite. Does note more difficulty w/ ambulation usually able to ambulate w/ walker several blocks but now severely reduced. Per wife, pt LE edema similar to baseline and RLE usually swells initially during HF exacerbation. Denies fever, chills, N/V/D, CP, abdominal pain.         ED vitals: T97.9, HR 80, BP 74/52, 88% RA   ED labs: WBC 4.05, Hgb 15.3, , Na 146, K 4.2, BUN 25, Cr 1.18, Bilirubin 1.4, Alk Phos 109, AST 44, ALT 38, Troponin 144, BNP 30985, Lactate 1.9, Procal 0.20, UA trace ketones, glucose, spec grav 1.015   ED imaging: CXR w/ b/l pleural effusions, increased opacities on R side pending official read   ED course: CTX 1g x1, azithromycin 500 IV x1

## 2024-07-08 NOTE — ED PROVIDER NOTE - CLINICAL SUMMARY MEDICAL DECISION MAKING FREE TEXT BOX
Triage VS- BP 74/52, SpO2 88% on RA, afebrile, no tachycardia  on exam pt tired appearing but awake and responsive  +JVD, bibasilar crackles, edema noted to feet and ankles, not up to shins  extremities are warm, not cold    Rpt BP improved to  systolic w/o intervention  SpO2 increased to 93-94% on 5L NC    presentation likely mixed picture of decompensated Heart failure and failure to thrive  r/o pneumonia although less likely as no fever, no leukocytosis or lactate here. Blood cultures were sent. Will give one dose empiric abx for CAP. RVP sent and pending.  Unlikely PE- pt is compliant w/ Eliquis, has abnormal findings on CXR (R pleural effusion and patches of pulm edema) to explain hypoxia, per cards team they will do b/l LE dopplers inpatient    Pt was seen by cards fellow, recommend no IV fluids or IV lasix at this time, pt will be monitored on cardiac tele and will be evaluated by advanced heart failure team and palliative while inpatient

## 2024-07-08 NOTE — H&P ADULT - PROBLEM SELECTOR PLAN 5
H/o thrombocytopenia, idiopathic,  this admission, improved from baseline   Prior heme workup on previous admission   Bone marrow biopsy performed on 11/21 but limited specimen    Plan:  -monitor PLT count   -transfuse PLT < 10 spontaneous, < 50 w/ active bleeding Hgb A1C 6.1% on 11/22    Plan:   -f/u repeat Hgb A1C

## 2024-07-08 NOTE — H&P ADULT - PROBLEM SELECTOR PLAN 6
F: None  E: replete K < 4, Mg <2  DVT ppx- eliquis 5mg bid   Diet- DASH  Activity- ambulate as tolerated  Code- Full. H/o thrombocytopenia, idiopathic,  this admission, improved from baseline   Prior heme workup on previous admission   Bone marrow biopsy performed on 11/21 but limited specimen    Plan:  -monitor PLT count   -transfuse PLT < 10 spontaneous, < 50 w/ active bleeding

## 2024-07-08 NOTE — ED ADULT TRIAGE NOTE - CHIEF COMPLAINT QUOTE
Pt presents to ED C/O generalized weakness, poor appetite, cough and bilateral foot swelling with low BP at home for 2 weeks. Pt has hx DM, AFIB, pacemaker, CHF, amyloidosis. Family at bedside.

## 2024-07-08 NOTE — H&P ADULT - NSHPPHYSICALEXAM_GEN_ALL_CORE
GENERAL: NAD, lying in bed comfortably  HEAD:  Atraumatic, normocephalic  EYES: EOMI, PERRLA, conjunctiva and sclera clear  NECK: Supple, trachea midline, no JVD  HEART: Regular rate and rhythm, no murmurs, rubs, or gallops  LUNGS: Unlabored respirations.  Clear to auscultation bilaterally, no crackles, wheezing, or rhonchi  ABDOMEN: Soft, nontender, nondistended, +BS  EXTREMITIES: 2+ peripheral pulses bilaterally. No clubbing, cyanosis, or edema  NERVOUS SYSTEM:  A&Ox3, moving all extremities, no focal deficits   SKIN: No rashes or lesions GENERAL: NAD, frail, lethargic   HEAD:  Atraumatic, normocephalic  EYES: EOMI, PERRLA   MOUTH: slightly dry MM   NECK: Supple, trachea midline, +JVD   HEART: Regular rate and rhythm, no murmurs, rubs, or gallops  LUNGS: bibasilar expiratory crackles   ABDOMEN: Soft, nontender, nondistended, +BS  EXTREMITIES: 2+ peripheral pulses bilaterally. b/l LE +1 pitting edema to mid shin R > L  NERVOUS SYSTEM:  A&O2-3, moving all extremities, no focal deficits   SKIN: No rashes or lesions

## 2024-07-08 NOTE — ED PROVIDER NOTE - OBJECTIVE STATEMENT
78-year-old man with ATTR cardiac amyloid, diastolic CHF (LVEF 20-25% from 37%), AF/AFl s/p ablation 6/2016 (on Eliquis) s/p CRT-P (2/16/2022), CKD Stage 3 (b/l Cr 1.3-1.5), DMT2 (Ha1c 6.4% 2/2022), idiopathic thrombocytopenia (unrevealing bone marrow biopsy 11/2021), and prostate Ca s/p chemotherapy.   brought in for 2 weeks of decreased PO intake, increased lethargy/sleeping more, intermittent hypotension w/ SBP in 70s-80s. Family also noted his feet have been more swollen and he is breathing more shallow over the last 2 days. Also endorses cough, no fevers or chest pain. No vomiting or diarrhea. They called the cardiology heart failure office who told them to present to ED.  Family says held metoprolol last 2 days 2/2 hypotension.

## 2024-07-08 NOTE — H&P ADULT - PROBLEM SELECTOR PLAN 1
p/w lethargy, weakness, decreased PO intake x2 weeks, no recent changes in medications   Etiology: NICM, ATTR cardiac amyloid   TTE on 5/24 w/ ED 30-35%, global LV hypokinesis, mod LVH severe LV DD, LA severely dialted, RA mod dilated, mod MR, no PAH  Home med: entresto 24-26 bid, farxiga 10mg qd, spironolactone 25mg qd, toprol 50mg qd, torsemide 20mg qd 0.5 tab   Dry weight o/p: 153lbs on 5/2 visit, 154lbs this admission   EKG showing V-paced at 80bpm, BNP 72623 this admission, previous BNP 29849 on 11/22 during HF exacerbation   CXR w/ b/l pleural effusions, exam w/ bibasilar crackles, +JVD, b/l LE +1 pitting edema to mid shin R > L, slightly dry MM, reports taking Torsemide 10mg today, held Toprol last 2 days   Hypoxic in ED, placed on 5L NC w/ O2 sat 93%, hypotensive but spontaneous resolved   Consider infection i/s/o borderline temp, new opacities on CXR, elevated procal vs less likely PE w/ Wells Score 1pt, w/o tachycardia, baseline BP, on eliquis but r/o if no improvement    -c/w CTX, azithromycin   -hold GDMT   -HF consult in AM  -f/u b/l LE dopplers   -f/u Bcx, RVP   -strict I&Os, daily standing weights, dash diet   -med collateral in AM p/w lethargy, weakness, decreased PO intake x2 weeks  Hypoxic to 88% on admission, placed on 4-5L NC w/ O2 sat 93%  Hypotensive in ED, improved w/o intervention   Positive for COVID    Plan:   -start remdesivir  -start decadron   -c/w NC, wean O2 as tolerated

## 2024-07-08 NOTE — ED ADULT NURSE NOTE - OBJECTIVE STATEMENT
Presents per pcp for increasing weakness, low po intake, hypotension at home x 2 weeks. PMH amyloidosis, dm, pacemaker.     On assessment- AOx4, breathing even and unlabored on RA, no apparent distress, VS c/o sepsis in triage lor sepsis w/u initaited with pt on cm, able to speak in clear coherent sentences, steady gait unassisted, neuro intact with no apparent facial asymmetry, PERRLA.

## 2024-07-08 NOTE — ED PROVIDER NOTE - CARE PLAN
1 Principal Discharge DX:	Adult failure to thrive  Secondary Diagnosis:	Acute on chronic diastolic congestive heart failure  Secondary Diagnosis:	Hypoxia

## 2024-07-08 NOTE — H&P ADULT - PROBLEM SELECTOR PLAN 2
s/p ablation in 2016, continued on toprol, eliquis, follows w/ Dr. Pemberton   last 2 doses of toprol held d/t hypotension     Plan:   -c/w eliquis 5 mg bid   -hold toprol i/s/o relative hypotension, r/s as appropriate p/w lethargy, weakness, decreased PO intake x2 weeks, no recent changes in medications   Etiology: NICM, ATTR cardiac amyloid   TTE on 5/24 w/ ED 30-35%, global LV hypokinesis, mod LVH severe LV DD, LA severely dialted, RA mod dilated, mod MR, no PAH  Home med: entresto 24-26 bid, farxiga 10mg qd, spironolactone 25mg qd, toprol 50mg qd, torsemide 20mg qd 0.5 tab   Dry weight o/p: 153lbs on 5/2 visit, 154lbs this admission   EKG showing V-paced at 80bpm, BNP 81154 this admission, previous BNP 69847 on 11/22 during HF exacerbation   CXR w/ b/l pleural effusions, exam w/ bibasilar crackles, +JVD, b/l LE +1 pitting edema to mid shin R > L, slightly dry MM, reports taking Torsemide 10mg today, held Toprol last 2 days   Hypoxic in ED, placed on 5L NC w/ O2 sat 93%, hypotensive but spontaneous resolved   likely mild exacerbation i/s/o COVID     Plan:  -c/w COVID tx   -hold GDMT i/s/o hypotension   -consider HF consult in AM  -f/u b/l LE dopplers   -f/u Bcx  -strict I&Os, daily standing weights, dash diet   -med collateral in AM

## 2024-07-08 NOTE — H&P ADULT - NSHPLABSRESULTS_GEN_ALL_CORE
LABS:                         15.3   4.05  )-----------( 144      ( 2024 18:53 )             46.0     07-08    146<H>  |  108  |  25<H>  ----------------------------<  99  4.2   |  26  |  1.18    Ca    8.9      2024 18:53  Mg     2.2     -    TPro  7.1  /  Alb  3.0<L>  /  TBili  1.4<H>  /  DBili  x   /  AST  44<H>  /  ALT  38  /  AlkPhos  109  07-08    PT/INR - ( 2024 18:53 )   PT: 20.9 sec;   INR: 1.87          PTT - ( 2024 18:53 )  PTT:37.9 sec  Urinalysis Basic - ( 2024 21:22 )    Color: Dark Yellow / Appearance: Clear / S.015 / pH: x  Gluc: x / Ketone: Trace mg/dL  / Bili: Small / Urobili: 4.0 mg/dL   Blood: x / Protein: Trace mg/dL / Nitrite: Negative   Leuk Esterase: Negative / RBC: x / WBC x   Sq Epi: x / Non Sq Epi: x / Bacteria: x            Lactate, Blood: 1.9 mmol/L ( @ 18:53)      RADIOLOGY, EKG & ADDITIONAL TESTS:

## 2024-07-08 NOTE — H&P ADULT - ASSESSMENT
78y M w/ PMHx ATTR cardiac amyloid, diastolic HF (EF 30-35%) s/p CRT-P (2/22), AFib s/p ablation 6/16 on eliquis, DM2, idiopathic thrombycytopenia, prostate ca s/p chemotherapy p/w weakness, lethargy, SOB, decreased PO intake x 2 weeks, admitted for further workup.

## 2024-07-08 NOTE — H&P ADULT - NS ATTEND AMEND GEN_ALL_CORE FT
77 yo man PMHx of ATTR cardiac amyloid HFrEF 30-35%, afib/flutter s/p ablation 2016 c/b tachy-skip syndrome s/p CRT-P, and CKD who was admitted for hypoxic respiratory failure 2/2 COVID-19    Assessment  1. ATTR cardiac amyloid   HFrEF 30-35%  Euvolemic  2. Hypoxic respiratory failure 2/2 COVID-19  3. Afib/flutter s/p ablation 2016  Tachy-skip syndrome   CRT-P  4. Hypotension  No concern for cardiogenic shock    Plan  1. Consult ID for management of hypoxia w/ COVID-19  2. Hold GDMT given low BP  3. C/w torsemide 10mg PO QD (home dose)  4. Apixaban 5mg BID for systemic embolization prevention  5. Will speak with the family for GOC discussion    During non face-to-face time, I reviewed relevant portions of the patient’s medical record. During face-to-face time, I took a relevant history and examined the patient. I also explained differential diagnoses, relevant cardiac diagnoses, workup, and management plan, which required a high level of medical decision making. I answered all questions related to the patient's medical conditions.     Jhonatan Jang M.D.  Attending Cardiologist  [facility]

## 2024-07-08 NOTE — H&P ADULT - NSHPSOCIALHISTORY_GEN_ALL_CORE
Denies smoking, alcohol use, other substance abuse   Ambulates w/ walker several blocks, more recently decreasing functional status

## 2024-07-08 NOTE — H&P ADULT - PROBLEM SELECTOR PLAN 3
TTR amyloid confirmed on Tc-PYP scan   Home med: vyndemax     Plan:   -c/w vynedemax 61 mg qd s/p ablation in 2016, continued on toprol, eliquis, follows w/ Dr. Pemberton   last 2 doses of toprol held d/t hypotension     Plan:   -c/w eliquis 5 mg bid   -hold toprol i/s/o relative hypotension, r/s as appropriate

## 2024-07-08 NOTE — ED PROVIDER NOTE - PHYSICAL EXAMINATION
CONST: nontoxic NAD frail appearing  HEAD: atraumatic  EYES: conjunctivae clear, PERRL  NECK: supple, +JVD  ENMT: dry mouth and tongue  CARD: regular rate and rhythm  CHEST: mild tachypnea, bibasilar crackles  ABD: soft nontender nondistended  EXT: 1+edema b/l to feet/ankles, not including shins/rest of legs  SKIN: warm, dry  NEURO: awake alert moving all extremities

## 2024-07-09 ENCOUNTER — RESULT REVIEW (OUTPATIENT)
Age: 79
End: 2024-07-09

## 2024-07-09 DIAGNOSIS — J96.01 ACUTE RESPIRATORY FAILURE WITH HYPOXIA: ICD-10-CM

## 2024-07-09 LAB
A1C WITH ESTIMATED AVERAGE GLUCOSE RESULT: 5.8 % — HIGH (ref 4–5.6)
ADD ON TEST-SPECIMEN IN LAB: SIGNIFICANT CHANGE UP
ALBUMIN SERPL ELPH-MCNC: 2.7 G/DL — LOW (ref 3.3–5)
ALP SERPL-CCNC: 92 U/L — SIGNIFICANT CHANGE UP (ref 40–120)
ALT FLD-CCNC: 26 U/L — SIGNIFICANT CHANGE UP (ref 10–45)
ANION GAP SERPL CALC-SCNC: 10 MMOL/L — SIGNIFICANT CHANGE UP (ref 5–17)
ANISOCYTOSIS BLD QL: SLIGHT — SIGNIFICANT CHANGE UP
AST SERPL-CCNC: 25 U/L — SIGNIFICANT CHANGE UP (ref 10–40)
BASOPHILS # BLD AUTO: 0 K/UL — SIGNIFICANT CHANGE UP (ref 0–0.2)
BASOPHILS NFR BLD AUTO: 0 % — SIGNIFICANT CHANGE UP (ref 0–2)
BILIRUB SERPL-MCNC: 0.9 MG/DL — SIGNIFICANT CHANGE UP (ref 0.2–1.2)
BUN SERPL-MCNC: 26 MG/DL — HIGH (ref 7–23)
BURR CELLS BLD QL SMEAR: PRESENT — SIGNIFICANT CHANGE UP
CA-I SERPL-SCNC: SIGNIFICANT CHANGE UP MMOL/L (ref 1.15–1.33)
CALCIUM SERPL-MCNC: 8.2 MG/DL — LOW (ref 8.4–10.5)
CHLORIDE SERPL-SCNC: 109 MMOL/L — HIGH (ref 96–108)
CK MB CFR SERPL CALC: 1.8 NG/ML — SIGNIFICANT CHANGE UP (ref 0–6.7)
CK SERPL-CCNC: 47 U/L — SIGNIFICANT CHANGE UP (ref 30–200)
CO2 SERPL-SCNC: 27 MMOL/L — SIGNIFICANT CHANGE UP (ref 22–31)
CREAT SERPL-MCNC: 1.14 MG/DL — SIGNIFICANT CHANGE UP (ref 0.5–1.3)
EGFR: 67 ML/MIN/1.73M2 — SIGNIFICANT CHANGE UP
EOSINOPHIL # BLD AUTO: 0 K/UL — SIGNIFICANT CHANGE UP (ref 0–0.5)
EOSINOPHIL NFR BLD AUTO: 0 % — SIGNIFICANT CHANGE UP (ref 0–6)
ESTIMATED AVERAGE GLUCOSE: 120 MG/DL — HIGH (ref 68–114)
GIANT PLATELETS BLD QL SMEAR: PRESENT — SIGNIFICANT CHANGE UP
GLUCOSE BLDC GLUCOMTR-MCNC: 179 MG/DL — HIGH (ref 70–99)
GLUCOSE BLDC GLUCOMTR-MCNC: 183 MG/DL — HIGH (ref 70–99)
GLUCOSE SERPL-MCNC: 97 MG/DL — SIGNIFICANT CHANGE UP (ref 70–99)
HCT VFR BLD CALC: 43.3 % — SIGNIFICANT CHANGE UP (ref 39–50)
HGB BLD-MCNC: 14.1 G/DL — SIGNIFICANT CHANGE UP (ref 13–17)
LYMPHOCYTES # BLD AUTO: 0.44 K/UL — LOW (ref 1–3.3)
LYMPHOCYTES # BLD AUTO: 11.7 % — LOW (ref 13–44)
MACROCYTES BLD QL: SLIGHT — SIGNIFICANT CHANGE UP
MAGNESIUM SERPL-MCNC: 2.4 MG/DL — SIGNIFICANT CHANGE UP (ref 1.6–2.6)
MANUAL SMEAR VERIFICATION: SIGNIFICANT CHANGE UP
MCHC RBC-ENTMCNC: 29.6 PG — SIGNIFICANT CHANGE UP (ref 27–34)
MCHC RBC-ENTMCNC: 32.6 GM/DL — SIGNIFICANT CHANGE UP (ref 32–36)
MCV RBC AUTO: 91 FL — SIGNIFICANT CHANGE UP (ref 80–100)
METAMYELOCYTES # FLD: 0.9 % — HIGH (ref 0–0)
MICROCYTES BLD QL: SLIGHT — SIGNIFICANT CHANGE UP
MONOCYTES # BLD AUTO: 0.03 K/UL — SIGNIFICANT CHANGE UP (ref 0–0.9)
MONOCYTES NFR BLD AUTO: 0.9 % — LOW (ref 2–14)
NEUTROPHILS # BLD AUTO: 3.25 K/UL — SIGNIFICANT CHANGE UP (ref 1.8–7.4)
NEUTROPHILS NFR BLD AUTO: 85.6 % — HIGH (ref 43–77)
NEUTS BAND # BLD: 0.9 % — SIGNIFICANT CHANGE UP (ref 0–8)
OVALOCYTES BLD QL SMEAR: SLIGHT — SIGNIFICANT CHANGE UP
PHOSPHATE SERPL-MCNC: 3.4 MG/DL — SIGNIFICANT CHANGE UP (ref 2.5–4.5)
PLAT MORPH BLD: ABNORMAL
PLATELET # BLD AUTO: 137 K/UL — LOW (ref 150–400)
POIKILOCYTOSIS BLD QL AUTO: SIGNIFICANT CHANGE UP
POLYCHROMASIA BLD QL SMEAR: SLIGHT — SIGNIFICANT CHANGE UP
POTASSIUM SERPL-MCNC: 3.5 MMOL/L — SIGNIFICANT CHANGE UP (ref 3.5–5.3)
POTASSIUM SERPL-SCNC: 3.5 MMOL/L — SIGNIFICANT CHANGE UP (ref 3.5–5.3)
PROT SERPL-MCNC: 6 G/DL — SIGNIFICANT CHANGE UP (ref 6–8.3)
RBC # BLD: 4.76 M/UL — SIGNIFICANT CHANGE UP (ref 4.2–5.8)
RBC # FLD: 14.7 % — HIGH (ref 10.3–14.5)
RBC BLD AUTO: ABNORMAL
SCHISTOCYTES BLD QL AUTO: SLIGHT — SIGNIFICANT CHANGE UP
SMUDGE CELLS # BLD: PRESENT — SIGNIFICANT CHANGE UP
SODIUM SERPL-SCNC: 146 MMOL/L — HIGH (ref 135–145)
SPHEROCYTES BLD QL SMEAR: SLIGHT — SIGNIFICANT CHANGE UP
WBC # BLD: 3.76 K/UL — LOW (ref 3.8–10.5)
WBC # FLD AUTO: 3.76 K/UL — LOW (ref 3.8–10.5)

## 2024-07-09 PROCEDURE — 99223 1ST HOSP IP/OBS HIGH 75: CPT | Mod: GC

## 2024-07-09 PROCEDURE — 99223 1ST HOSP IP/OBS HIGH 75: CPT

## 2024-07-09 PROCEDURE — 99222 1ST HOSP IP/OBS MODERATE 55: CPT

## 2024-07-09 PROCEDURE — 93306 TTE W/DOPPLER COMPLETE: CPT | Mod: 26

## 2024-07-09 RX ORDER — DEXTROSE 30 % IN WATER 30 %
15 VIAL (ML) INTRAVENOUS ONCE
Refills: 0 | Status: DISCONTINUED | OUTPATIENT
Start: 2024-07-09 | End: 2024-07-18

## 2024-07-09 RX ORDER — DEXTROSE 30 % IN WATER 30 %
25 VIAL (ML) INTRAVENOUS ONCE
Refills: 0 | Status: DISCONTINUED | OUTPATIENT
Start: 2024-07-09 | End: 2024-07-18

## 2024-07-09 RX ORDER — DEXTROSE MONOHYDRATE AND SODIUM CHLORIDE 5; .3 G/100ML; G/100ML
1000 INJECTION, SOLUTION INTRAVENOUS
Refills: 0 | Status: DISCONTINUED | OUTPATIENT
Start: 2024-07-09 | End: 2024-07-18

## 2024-07-09 RX ORDER — TORSEMIDE 20 MG/1
10 TABLET ORAL DAILY
Refills: 0 | Status: DISCONTINUED | OUTPATIENT
Start: 2024-07-09 | End: 2024-07-10

## 2024-07-09 RX ORDER — POLYETHYLENE GLYCOL 3350 1 G/G
17 POWDER ORAL DAILY
Refills: 0 | Status: DISCONTINUED | OUTPATIENT
Start: 2024-07-09 | End: 2024-07-09

## 2024-07-09 RX ORDER — DEXTROSE 30 % IN WATER 30 %
12.5 VIAL (ML) INTRAVENOUS ONCE
Refills: 0 | Status: DISCONTINUED | OUTPATIENT
Start: 2024-07-09 | End: 2024-07-18

## 2024-07-09 RX ORDER — SENNOSIDES 8.6 MG
2 TABLET ORAL AT BEDTIME
Refills: 0 | Status: DISCONTINUED | OUTPATIENT
Start: 2024-07-09 | End: 2024-07-18

## 2024-07-09 RX ORDER — LACTULOSE 10 G/15ML
10 SOLUTION ORAL DAILY
Refills: 0 | Status: DISCONTINUED | OUTPATIENT
Start: 2024-07-09 | End: 2024-07-18

## 2024-07-09 RX ORDER — INSULIN LISPRO 100 [IU]/ML
INJECTION, SOLUTION SUBCUTANEOUS
Refills: 0 | Status: DISCONTINUED | OUTPATIENT
Start: 2024-07-09 | End: 2024-07-18

## 2024-07-09 RX ORDER — GLUCAGON HYDROCHLORIDE 1 MG/ML
1 INJECTION, POWDER, FOR SOLUTION INTRAMUSCULAR; INTRAVENOUS; SUBCUTANEOUS ONCE
Refills: 0 | Status: DISCONTINUED | OUTPATIENT
Start: 2024-07-09 | End: 2024-07-18

## 2024-07-09 RX ORDER — POTASSIUM CHLORIDE 600 MG/1
40 TABLET, FILM COATED, EXTENDED RELEASE ORAL ONCE
Refills: 0 | Status: DISCONTINUED | OUTPATIENT
Start: 2024-07-09 | End: 2024-07-09

## 2024-07-09 RX ADMIN — Medication 2 TABLET(S): at 21:50

## 2024-07-09 RX ADMIN — REMDESIVIR 200 MILLIGRAM(S): 5 INJECTION INTRAVENOUS at 04:06

## 2024-07-09 RX ADMIN — DEXAMETHASONE 6 MILLIGRAM(S): 1 TABLET ORAL at 01:05

## 2024-07-09 RX ADMIN — APIXABAN 5 MILLIGRAM(S): 5 TABLET, FILM COATED ORAL at 12:30

## 2024-07-09 RX ADMIN — APIXABAN 5 MILLIGRAM(S): 5 TABLET, FILM COATED ORAL at 01:05

## 2024-07-09 RX ADMIN — REMDESIVIR 200 MILLIGRAM(S): 5 INJECTION INTRAVENOUS at 23:41

## 2024-07-09 NOTE — CONSULT NOTE ADULT - PROBLEM SELECTOR RECOMMENDATION 2
.  -ordered bowel regimen: Senna 2 tabs qhs and Lactulose 10g daily PRN (avoiding Miralax given fluid restriction)

## 2024-07-09 NOTE — CONSULT NOTE ADULT - ASSESSMENT
78y M w/ PMHx ATTR cardiac amyloid, diastolic HF (EF 30-35%) s/p CRT-P (2/22), AFib s/p ablation 6/16 on eliquis, DM2, idiopathic thrombycytopenia, prostate ca s/p chemotherapy with weakness, poor PO intake, SOB and lethargy as well as hypotensive and with acute hypoxemic respiratory failure most likely secondary Covid PNA . HF consulted to guide with HFrEF GDMT management in the setting of hypotension.    Diagnostics: TTE 5/24/24    TTE 11/21/22: LVIDD 4.5, LVEF 20-25%. PH. PASP 42 mmhg. mild-mod MR 78y M w/ PMHx ATTR cardiac amyloid, diastolic HF (EF 30-35%) s/p CRT-P (2/22), AFib s/p ablation 6/16 on eliquis, DM2, idiopathic thrombycytopenia, prostate ca s/p chemotherapy with weakness, poor PO intake, SOB and lethargy as well as hypotensive and with acute hypoxemic respiratory failure most likely secondary Covid PNA . HF consulted to guide with HFrEF GDMT management in the setting of hypotension.    Diagnostics:   EKG 7/8/24: A fib, V paced 80 HR.  TTE 5/24/24: LVIDD 5.0. LVEF 30-35%. Grade 3 diastolic dysfunction. TAPSE 1.0 cm. no PH. Groundglass tissue texture suggestive of amyloid heart.  TTE 11/21/22: LVIDD 4.5, LVEF 20-25%. PH. PASP 42 mmhg. mild-mod MR    #ATTR cardiac amyloid  #HFrEF  #Acute hypoxemic respiratory failure  #Moderate Covid PNA  Elena is a 77 y/o M w/ known ATTR cardiac amyloid cardiomyopathy who initially presented with symptoms suggestive of FTT. This was initially concerning that he could have progression of his cardiac amyloid but he was found to be covid positive with acute hypoxemic respiratory failure and began treatment for covid. On initial assessment, he appeared cold and dry likely from hypovolemia rather than low output state. Overall, his initial presentation as well as his CXR findings can most likely be explained clinically by moderate covid pneumonia rather than cardiogenic pulm edema/progression of amyloid cardiomyopathy.  - Etiology: ATTR Cardiac amyloid  - ACC/AHA: Stage C  - GDMT: Will plan to hold all GDMT at this time due to his hypotension. It is not clear if there is truly benefit in this patient who's HFrEF is secondary to ATTR amyloid and may potentially cause more harm given he appears to always have borderline blood pressure even in the outpatient setting. Will plan to discharge only on SGLT2i.  Diuretics: Would start torsemide 10 mg q24  - Would consider palliative medicine consult. While not necessarily an appropriate hospice candidate at this time as his clinical presentation is potentially reversible if his moderate covid improves, may be of benefit to obtain palliative input into his care earlier rather than later.  - If able, would ask family to bring vyndamax 61 mg q24 from home to continue inpatient  - Continue treatment of moderate covid PNA. Wean oxygen as tolerated

## 2024-07-09 NOTE — CONSULT NOTE ADULT - PROBLEM SELECTOR RECOMMENDATION 4
.  Advanced TTR with LV Dysfunction  -pending repeat echo  -c/w Vyndamax  -will evaluate hospice eligibility pending clinical course

## 2024-07-09 NOTE — PHYSICAL THERAPY INITIAL EVALUATION ADULT - GENERAL OBSERVATIONS, REHAB EVAL
Received supine +EKG, IV hep, O2 NC 5L. Left in chair +chair alarm, CARO Valiente present, call wood

## 2024-07-09 NOTE — PHYSICAL THERAPY INITIAL EVALUATION ADULT - ADL SKILLS, REHAB EVAL
rolling walker, straight cane/independent/needs device rolling walker, straight cane/needed assist/needs device

## 2024-07-09 NOTE — PROGRESS NOTE ADULT - PROBLEM SELECTOR PLAN 4
s/p ablation in 2016, follows w/ Dr. Pemberton   Rate Control: Toprol 50 mg QD held i/s/o hypotension  AC: Eliquis 5 BID

## 2024-07-09 NOTE — CONSULT NOTE ADULT - TIME BILLING
COVID-19
Emotional Support/Supportive Care and Clarification of Potential Disease Trajectory related to Advanced Cardiac Disease  Assessment of Symptom [place] and Palliative regimen  Exploration of GOC/Advanced Directives including HCP designation, code status, and hospice eligibility    Time inclusive of chart review, medication ordering, discussion with primary team, clinical documentation, and communication with family/caregiver

## 2024-07-09 NOTE — CONSULT NOTE ADULT - SUBJECTIVE AND OBJECTIVE BOX
HPI:  78y M w/ PMHx ATTR cardiac amyloid, diastolic HF (EF 30-35%) s/p CRT-P (2/22), AFib s/p ablation 6/16 on eliquis, DM2, idiopathic thrombycytopenia, prostate ca s/p chemotherapy p/w weakness, lethargy, SOB, decreased PO intake x 2 weeks. Per family, pt has been more lethargic, SOB, eating less over the last 2 weeks. Pt describes decreased PO intake d/t weakness, not for loss of appetite. Does note more difficulty w/ ambulation usually able to ambulate w/ walker several blocks but now severely reduced. Per wife, pt LE edema similar to baseline and RLE usually swells initially during HF exacerbation. Denies fever, chills, N/V/D, CP, abdominal pain.         ED vitals: T97.9, HR 80, BP 74/52, 88% RA   ED labs: WBC 4.05, Hgb 15.3, , Na 146, K 4.2, BUN 25, Cr 1.18, Bilirubin 1.4, Alk Phos 109, AST 44, ALT 38, Troponin 144, BNP 82852, Lactate 1.9, Procal 0.20, UA trace ketones, glucose, spec grav 1.015   ED imaging: CXR w/ b/l pleural effusions, increased opacities on R side pending official read   ED course: CTX 1g x1, azithromycin 500 IV x1    (08 Jul 2024 22:49)      Hospital Course: Patient was admitted to cardiac tele and found to be covid positive. He was started on covid treatment with decadron and remdesevir.    HF consulted to assist with reintroduction of GDMT in the setting of hypotension most likely 2/2 to FTT and poor PO intake due to covid.    ROS: A 10-point review of systems was otherwise negative.    PAST MEDICAL & SURGICAL HISTORY:  Atrial flutter  s/p Ablation 2016      Chronic venous insufficiency of lower extremity      Prostate CA      Stage 3 chronic kidney disease  1.2-1.2 baseline   On admission 1.2      Type 2 diabetes mellitus  not on medication      Thrombocytopenia  60-70's baseline      Acute on chronic systolic congestive heart failure      Chronic atrial fibrillation      Atrial flutter  s/p ablation in 2016      History of surgical removal of pituitary gland  1990s      S/P TURP  for BPH          SOCIAL HISTORY:  FAMILY HISTORY:      ALLERGIES: 	  No Known Allergies            MEDICATIONS:  acetaminophen     Tablet .. 650 milliGRAM(s) Oral every 6 hours PRN  apixaban 5 milliGRAM(s) Oral every 12 hours  dexAMETHasone     Tablet 6 milliGRAM(s) Oral daily  melatonin 3 milliGRAM(s) Oral at bedtime PRN  ondansetron Injectable 4 milliGRAM(s) IV Push every 8 hours PRN  remdesivir  IVPB 100 milliGRAM(s) IV Intermittent every 24 hours  remdesivir  IVPB   IV Intermittent       HOME MEDICATIONS:  Lexapro 5 mg oral tablet: 1 tab(s) orally once a day  potassium chloride 20 mEq oral tablet, extended release: 1 tab(s) orally once a day  tamsulosin 0.4 mg oral capsule: 1 cap(s) orally once a day  Vyndamax 61 mg oral capsule: 1 cap(s) orally once a day      PHYSICAL EXAM:  Height (cm): 180.3 (07-08 @ 18:40)  Weight (kg): 69.9 (07-08 @ 18:40)  BMI (kg/m2): 21.5 (07-08 @ 18:40)  BSA (m2): 1.89 (07-08 @ 18:40)    Daily Weight Trend (kg):   58.3 (07-09-24 @ 00:07)      I/O Detail 24H    08 Jul 2024 07:01  -  09 Jul 2024 07:00  --------------------------------------------------------  IN:    IV PiggyBack: 100 mL    Oral Fluid: 50 mL  Total IN: 150 mL    OUT:    Voided (mL): 0 mL  Total OUT: 0 mL    Total NET: 150 mL          T(F): 97 (07-09-24 @ 04:11), Max: 100 (07-08-24 @ 20:55)  HR: 82 (07-09-24 @ 04:11) (79 - 91)  BP: 92/60 (07-09-24 @ 04:11) (74/52 - 100/62)  BP(mean): 64 (07-09-24 @ 00:06) (64 - 64)  ABP: --  ABP(mean): --  RR: 16 (07-09-24 @ 04:11) (16 - 20)  SpO2: 96% (07-09-24 @ 04:11) (88% - 97%)  CVP(mm Hg): --    GEN: Lethargic   HEENT: NCAT, PERRL, EOMI. Mucosa moist. No JVD.   RESP: Coarse crackles with some improvement with coughing  CV: RRR, normal s1/s2. No m/r/g.  ABD: Soft, NTND. BS+  EXT: Cold. No edema, clubbing, or cyanosis.   NEURO: Lethargic  	  LABS:	 	    Cardiac Markers   07-09-24 @ 05:30: HS Trop T x     / CK 47    / CKMB 1.8    07-08-24 @ 18:53: HS Trop T 144<HH> / CK x     / CKMB x          proBNP: Pro-Brain Natriuretic Peptide: 64860 pg/mL (07-08 @ 18:53)      CBC 07-09-24 @ 05:30                        14.1   3.76  )-----------( 137                   43.3     Hgb trend: 14.1 <-- , 15.3 <--   WBC trend: 3.76 <-- , 4.05 <--     CMP 07-09-24 @ 05:30    146<H>  |  109<H>  |  26<H>  ----------------------------<  97  3.5   |  27  |  1.14    Ca    8.2<L>      07-09-24 @ 05:30  Phos  3.4     07-09  Mg     2.4     07-09    TPro  6.0  /  Alb  2.7<L>  /  TBili  0.9  /  DBili  x   /  AST  25  /  ALT  26  /  AlkPhos  92     07-09    Serum Cr (eGFR) trend: 1.14 (67) <-- , 1.18 (64) <--     PT/INR - ( 08 Jul 2024 18:53 )   PT: 20.9 sec;   INR: 1.87     PTT - ( 08 Jul 2024 18:53 ):37.9 sec    Lipid Profile:   HgA1c:   TSH:

## 2024-07-09 NOTE — CONSULT NOTE ADULT - SUBJECTIVE AND OBJECTIVE BOX
INFECTIOUS DISEASES INITIAL CONSULT NOTE    Patient is poor historian. most of history obtained from chart review.     HPI:  78y M w/ PMHx ATTR cardiac amyloid, diastolic HF (EF 30-35%) s/p CRT-P (2/22), AFib s/p ablation 6/16 on eliquis, DM2, idiopathic thrombycytopenia, prostate ca s/p chemotherapy p/w weakness, lethargy, SOB, decreased PO intake x 2 weeks. ID consulted for COVID19 management.  Per family, pt has been more lethargic, SOB, eating less over the last 2 weeks. Pt describes decreased PO intake d/t weakness, not for loss of appetite. Does note more difficulty w/ ambulation usually able to ambulate w/ walker several blocks but now severely reduced. Per wife, pt LE edema similar to baseline and RLE usually swells initially during HF exacerbation. Denies fever, chills, N/V/D, CP, abdominal pain. Upon arrival, afebrile with WBC 4.05. CXR with b/l pleural effusions and increased opacities on R side. Was given CTX 1g x 1 and azithromycin 500 x 1 in ED.  Tested positive for COVID on 7/8. Started on remdesivir and dexamethasone 7/8.  Patient states his SOB has improved since admission. He says he was vaccinated for covid in 2023.       PAST MEDICAL & SURGICAL HISTORY:  Atrial flutter  s/p Ablation 2016      Chronic venous insufficiency of lower extremity      Prostate CA      Stage 3 chronic kidney disease  1.2-1.2 baseline   On admission 1.2      Type 2 diabetes mellitus  not on medication      Thrombocytopenia  60-70's baseline      Acute on chronic systolic congestive heart failure      Chronic atrial fibrillation      Atrial flutter  s/p ablation in 2016      History of surgical removal of pituitary gland  1990s      S/P TURP  for BPH            Review of Systems:   Constitutional, eyes, ENT, cardiovascular, respiratory, gastrointestinal, genitourinary, integumentary, neurological, psychiatric and heme/lymph are otherwise negative other than noted above       ANTIBIOTICS:  MEDICATIONS  (STANDING):  apixaban 5 milliGRAM(s) Oral every 12 hours  dexAMETHasone     Tablet 6 milliGRAM(s) Oral daily  dextrose 5%. 1000 milliLiter(s) (50 mL/Hr) IV Continuous <Continuous>  dextrose 5%. 1000 milliLiter(s) (100 mL/Hr) IV Continuous <Continuous>  dextrose 50% Injectable 25 Gram(s) IV Push once  dextrose 50% Injectable 25 Gram(s) IV Push once  dextrose 50% Injectable 12.5 Gram(s) IV Push once  glucagon  Injectable 1 milliGRAM(s) IntraMuscular once  insulin lispro (ADMELOG) corrective regimen sliding scale   SubCutaneous Before meals and at bedtime  remdesivir  IVPB 100 milliGRAM(s) IV Intermittent every 24 hours  remdesivir  IVPB   IV Intermittent   torsemide 10 milliGRAM(s) Oral daily    MEDICATIONS  (PRN):  acetaminophen     Tablet .. 650 milliGRAM(s) Oral every 6 hours PRN Temp greater or equal to 38C (100.4F), Mild Pain (1 - 3)  dextrose Oral Gel 15 Gram(s) Oral once PRN Blood Glucose LESS THAN 70 milliGRAM(s)/deciliter  melatonin 3 milliGRAM(s) Oral at bedtime PRN Insomnia  ondansetron Injectable 4 milliGRAM(s) IV Push every 8 hours PRN Nausea and/or Vomiting      Allergies    No Known Allergies    Intolerances        SOCIAL HISTORY:    FAMILY HISTORY:   no FH leading to current infection    Vital Signs Last 24 Hrs  T(C): 36.2 (09 Jul 2024 08:40), Max: 37.8 (08 Jul 2024 20:55)  T(F): 97.1 (09 Jul 2024 08:40), Max: 100 (08 Jul 2024 20:55)  HR: 82 (09 Jul 2024 12:29) (79 - 91)  BP: 96/62 (09 Jul 2024 12:29) (74/52 - 108/65)  BP(mean): 64 (09 Jul 2024 00:06) (64 - 64)  RR: 19 (09 Jul 2024 12:29) (16 - 20)  SpO2: 96% (09 Jul 2024 12:29) (88% - 97%)    Parameters below as of 09 Jul 2024 12:29  Patient On (Oxygen Delivery Method): nasal cannula  O2 Flow (L/min): 4      07-08-24 @ 07:01  -  07-09-24 @ 07:00  --------------------------------------------------------  IN: 150 mL / OUT: 0 mL / NET: 150 mL    07-09-24 @ 07:01  -  07-09-24 @ 15:46  --------------------------------------------------------  IN: 300 mL / OUT: 200 mL / NET: 100 mL        PHYSICAL EXAM:  Constitutional: alert, NAD  Eyes: the sclera and conjunctiva were normal.   ENT: the ears and nose were normal in appearance.   Neck: the appearance of the neck was normal and the neck was supple.   Pulmonary: no respiratory distress and lungs were clear to auscultation bilaterally.   Heart: heart rate was normal and rhythm regular, normal S1 and S2  Vascular:. there was no peripheral edema  Abdomen: normal bowel sounds, soft, non-tender  Neurological: no focal deficits.   Psychiatric: the affect was normal      LABS:                        14.1   3.76  )-----------( 137      ( 09 Jul 2024 05:30 )             43.3     07-09    146<H>  |  109<H>  |  26<H>  ----------------------------<  97  3.5   |  27  |  1.14    Ca    8.2<L>      09 Jul 2024 05:30  Phos  3.4     07-09  Mg     2.4     07-09    TPro  6.0  /  Alb  2.7<L>  /  TBili  0.9  /  DBili  x   /  AST  25  /  ALT  26  /  AlkPhos  92  07-09    PT/INR - ( 08 Jul 2024 18:53 )   PT: 20.9 sec;   INR: 1.87          PTT - ( 08 Jul 2024 18:53 )  PTT:37.9 sec  Urinalysis Basic - ( 09 Jul 2024 05:30 )    Color: x / Appearance: x / SG: x / pH: x  Gluc: 97 mg/dL / Ketone: x  / Bili: x / Urobili: x   Blood: x / Protein: x / Nitrite: x   Leuk Esterase: x / RBC: x / WBC x   Sq Epi: x / Non Sq Epi: x / Bacteria: x        MICROBIOLOGY:    RADIOLOGY & ADDITIONAL STUDIES:   INFECTIOUS DISEASES INITIAL CONSULT NOTE    Patient is poor historian. most of history obtained from chart review.     HPI:  78y M w/ PMHx ATTR cardiac amyloid, diastolic HF (EF 30-35%) s/p CRT-P (2/22), AFib s/p ablation 6/16 on eliquis, DM2, idiopathic thrombycytopenia, prostate ca s/p chemotherapy p/w weakness, lethargy, SOB, decreased PO intake x 2 weeks. ID consulted for COVID19 management.  Per family, pt has been more lethargic, SOB, eating less over the last 2 weeks. Pt describes decreased PO intake d/t weakness, not for loss of appetite. Does note more difficulty w/ ambulation usually able to ambulate w/ walker several blocks but now severely reduced. Per wife, pt LE edema similar to baseline and RLE usually swells initially during HF exacerbation. Denies fever, chills, N/V/D, CP, abdominal pain. Upon arrival, afebrile with WBC 4.05. CXR with b/l pleural effusions and increased opacities on R side. Was given CTX 1g x 1 and azithromycin 500 x 1 in ED.  Tested positive for COVID on 7/8. Started on remdesivir and dexamethasone 7/8.  Patient states his SOB has improved since admission. He says he was vaccinated for covid in 2023.       PAST MEDICAL & SURGICAL HISTORY:  Atrial flutter  s/p Ablation 2016      Chronic venous insufficiency of lower extremity      Prostate CA      Stage 3 chronic kidney disease  1.2-1.2 baseline   On admission 1.2      Type 2 diabetes mellitus  not on medication      Thrombocytopenia  60-70's baseline      Acute on chronic systolic congestive heart failure      Chronic atrial fibrillation      Atrial flutter  s/p ablation in 2016      History of surgical removal of pituitary gland  1990s      S/P TURP  for BPH            Review of Systems:   Constitutional, eyes, ENT, cardiovascular, respiratory, gastrointestinal, genitourinary, integumentary, neurological, psychiatric and heme/lymph are otherwise negative other than noted above       ANTIBIOTICS:  MEDICATIONS  (STANDING):  apixaban 5 milliGRAM(s) Oral every 12 hours  dexAMETHasone     Tablet 6 milliGRAM(s) Oral daily  dextrose 5%. 1000 milliLiter(s) (50 mL/Hr) IV Continuous <Continuous>  dextrose 5%. 1000 milliLiter(s) (100 mL/Hr) IV Continuous <Continuous>  dextrose 50% Injectable 25 Gram(s) IV Push once  dextrose 50% Injectable 25 Gram(s) IV Push once  dextrose 50% Injectable 12.5 Gram(s) IV Push once  glucagon  Injectable 1 milliGRAM(s) IntraMuscular once  insulin lispro (ADMELOG) corrective regimen sliding scale   SubCutaneous Before meals and at bedtime  remdesivir  IVPB 100 milliGRAM(s) IV Intermittent every 24 hours  remdesivir  IVPB   IV Intermittent   torsemide 10 milliGRAM(s) Oral daily    MEDICATIONS  (PRN):  acetaminophen     Tablet .. 650 milliGRAM(s) Oral every 6 hours PRN Temp greater or equal to 38C (100.4F), Mild Pain (1 - 3)  dextrose Oral Gel 15 Gram(s) Oral once PRN Blood Glucose LESS THAN 70 milliGRAM(s)/deciliter  melatonin 3 milliGRAM(s) Oral at bedtime PRN Insomnia  ondansetron Injectable 4 milliGRAM(s) IV Push every 8 hours PRN Nausea and/or Vomiting      Allergies    No Known Allergies    Intolerances        SOCIAL HISTORY:  -lives in [place] with his wife   -retired; worked in [employer]   -denies tobacco   -denies etoh  -denies recreational drug use   -denies recent travel     FAMILY HISTORY:   no FH leading to current infection    Vital Signs Last 24 Hrs  T(C): 36.2 (09 Jul 2024 08:40), Max: 37.8 (08 Jul 2024 20:55)  T(F): 97.1 (09 Jul 2024 08:40), Max: 100 (08 Jul 2024 20:55)  HR: 82 (09 Jul 2024 12:29) (79 - 91)  BP: 96/62 (09 Jul 2024 12:29) (74/52 - 108/65)  BP(mean): 64 (09 Jul 2024 00:06) (64 - 64)  RR: 19 (09 Jul 2024 12:29) (16 - 20)  SpO2: 96% (09 Jul 2024 12:29) (88% - 97%)    Parameters below as of 09 Jul 2024 12:29  Patient On (Oxygen Delivery Method): nasal cannula  O2 Flow (L/min): 4      07-08-24 @ 07:01  -  07-09-24 @ 07:00  --------------------------------------------------------  IN: 150 mL / OUT: 0 mL / NET: 150 mL    07-09-24 @ 07:01  -  07-09-24 @ 15:46  --------------------------------------------------------  IN: 300 mL / OUT: 200 mL / NET: 100 mL        PHYSICAL EXAM:  Constitutional: alert, NAD  Eyes: the sclera and conjunctiva were normal.   ENT: the ears and nose were normal in appearance. +NC   Neck: the appearance of the neck was normal and the neck was supple.   Pulmonary: no respiratory distress and +crackles b/l bases   Heart: heart rate was normal and rhythm regular, normal S1 and S2  Vascular:. there was no peripheral edema  Abdomen: normal bowel sounds, soft, non-tender  Neurological: no focal deficits.   Psychiatric: the affect was normal      LABS:                        14.1   3.76  )-----------( 137      ( 09 Jul 2024 05:30 )             43.3     07-09    146<H>  |  109<H>  |  26<H>  ----------------------------<  97  3.5   |  27  |  1.14    Ca    8.2<L>      09 Jul 2024 05:30  Phos  3.4     07-09  Mg     2.4     07-09    TPro  6.0  /  Alb  2.7<L>  /  TBili  0.9  /  DBili  x   /  AST  25  /  ALT  26  /  AlkPhos  92  07-09    PT/INR - ( 08 Jul 2024 18:53 )   PT: 20.9 sec;   INR: 1.87          PTT - ( 08 Jul 2024 18:53 )  PTT:37.9 sec  Urinalysis Basic - ( 09 Jul 2024 05:30 )    Color: x / Appearance: x / SG: x / pH: x  Gluc: 97 mg/dL / Ketone: x  / Bili: x / Urobili: x   Blood: x / Protein: x / Nitrite: x   Leuk Esterase: x / RBC: x / WBC x   Sq Epi: x / Non Sq Epi: x / Bacteria: x        MICROBIOLOGY:    Urinalysis with Rflx Culture (collected 07-08-24 @ 21:22)      RADIOLOGY & ADDITIONAL STUDIES:  reviewed

## 2024-07-09 NOTE — PHYSICAL THERAPY INITIAL EVALUATION ADULT - GAIT DEVIATIONS NOTED, PT EVAL
crouch like gait, dec safety awwareness, sits without warning, +urinary incontinence throughout. SOB, unable to obtain pulse ox/increased time in double stance/decreased step length

## 2024-07-09 NOTE — PHYSICAL THERAPY INITIAL EVALUATION ADULT - PERTINENT HX OF CURRENT PROBLEM, REHAB EVAL
78M weakness, lethargy, SOB, decreased PO intake x 2 weeks. Per family, pt has been more lethargic, SOB, eating less over the last 2 weeks. Pt describes decreased PO intake d/t weakness, not for loss of appetite. Does note more difficulty w/ ambulation usually able to ambulate w/ walker several blocks but now severely reduced.

## 2024-07-09 NOTE — PHYSICAL THERAPY INITIAL EVALUATION ADULT - ADDITIONAL COMMENTS
patient poor historian, unable to obtain information if patient has stairs at home or not, denies falls, reports using straight cane at home

## 2024-07-09 NOTE — PATIENT PROFILE ADULT - FALL HARM RISK - RISK INTERVENTIONS

## 2024-07-09 NOTE — PROGRESS NOTE ADULT - PROBLEM SELECTOR PLAN 1
CXR 7/8/24: Infiltrates/pulmonary vascular congestion and small right pleural effusion.   - IVPB Remdesivir 100 mg Q24 x 4 days (7/8/24-7/11/24) + PO Dex 6 mg Q 24 x 10 days  - ID consulted for assistance, appreciate recs

## 2024-07-09 NOTE — PROGRESS NOTE ADULT - ASSESSMENT
78yM w/ PMHx ATTR Cardiac Amyloid, HFrEF (EF 30-35%) s/p CRT-P (2/2022), multiple HFrEF admissions, AFib s/p ablation (6/2016 on eliquis), hx R sided Pleural Effusion s/p thoracentesis c/b PTX (2022), T2DM, idiopathic thrombocytopenia, prostate ca s/p chemotherapy p/w 2 weeks of weak, lethargy, SOB and decreased PO intake. Pt admitted to [facility] for presumed HFrEF exacerbation. Pt found to be COVID+ and GDMT held initially d/t hypotension. Advanced HF consulted for GDMT re-initation. ID consulted for assistance in COVID management. Pallaitive consulted for aggresive GOC discussion. Sx likely 2/2 COVID PNA.

## 2024-07-09 NOTE — PROGRESS NOTE ADULT - SUBJECTIVE AND OBJECTIVE BOX
Interventional Cardiology PA Adult Progress Note    Subjective Assessment:  Patient seen and examined at bedside. Patient denied chest pain, SOB, abdominal pain. Endorses decrease appetite.     ROS Negative except as per Subjective and HPI  	  MEDICATIONS:  remdesivir  IVPB 100 milliGRAM(s) IV Intermittent every 24 hours  remdesivir  IVPB   IV Intermittent   acetaminophen     Tablet .. 650 milliGRAM(s) Oral every 6 hours PRN  melatonin 3 milliGRAM(s) Oral at bedtime PRN  ondansetron Injectable 4 milliGRAM(s) IV Push every 8 hours PRN  dexAMETHasone     Tablet 6 milliGRAM(s) Oral daily  apixaban 5 milliGRAM(s) Oral every 12 hours      [PHYSICAL EXAM:  TELEMETRY:  T(C): 36.2 (07-09-24 @ 08:40), Max: 37.8 (07-08-24 @ 20:55)  HR: 84 (07-09-24 @ 08:41) (79 - 91)  BP: 108/65 (07-09-24 @ 08:41) (74/52 - 108/65)  RR: 18 (07-09-24 @ 08:40) (16 - 20)  SpO2: 95% (07-09-24 @ 08:40) (88% - 97%)  Wt(kg): --  I&O's Summary    08 Jul 2024 07:01  -  09 Jul 2024 07:00  --------------------------------------------------------  IN: 150 mL / OUT: 0 mL / NET: 150 mL    09 Jul 2024 07:01  -  09 Jul 2024 14:16  --------------------------------------------------------  IN: 300 mL / OUT: 200 mL / NET: 100 mL      Height (cm): 180.3 (07-08 @ 18:40)  Weight (kg): 69.9 (07-08 @ 18:40)  BMI (kg/m2): 21.5 (07-08 @ 18:40)  BSA (m2): 1.89 (07-08 @ 18:40)                                     Appearance: Normal	  HEENT:   Normal oral mucosa, PERRL, EOMI	  Neck: Supple, - JVD   Cardiovascular: No murmurs   Respiratory: b/l lower-mid lung crackles  Gastrointestinal:  Soft, Non-tender, + BS	  Extremities: 2+ pedal edma, warm  Neurologic: Non-focal, UE/LE 4/5 b/l  Psychiatry: A & O x 2 (name and year), Mood & affect appropriate, follows commands  	  CARDIAC MARKERS:                       14.1   3.76  )-----------( 137      ( 09 Jul 2024 05:30 )             43.3     07-09    146<H>  |  109<H>  |  26<H>  ----------------------------<  97  3.5   |  27  |  1.14    Ca    8.2<L>      09 Jul 2024 05:30  Phos  3.4     07-09  Mg     2.4     07-09    TPro  6.0  /  Alb  2.7<L>  /  TBili  0.9  /  DBili  x   /  AST  25  /  ALT  26  /  AlkPhos  92  07-09    PT/INR - ( 08 Jul 2024 18:53 )   PT: 20.9 sec;   INR: 1.87          PTT - ( 08 Jul 2024 18:53 )  PTT:37.9 sec

## 2024-07-09 NOTE — PROGRESS NOTE ADULT - PROBLEM SELECTOR PLAN 2
Etiology: NICM, ATTR cardiac amyloid   TTE (5/24): EF 30-35%, global LV hypokinesis, mod LVH severe LV DD, LA severely dilated, RA mod dilated, mod MR, GDMT: Entresto 24-26 bid, Farxiga 10mg qd, Toprol 50mg qd, Torsemide 10mg qd - ALL HELD i/s/o hypotension in ED  Advanced HF consulted, appreciate recs  EKG showing V-paced at 80bpm  -strict I&Os, daily standing weights, fluid restriction Etiology: NICM, ATTR cardiac amyloid   TTE (5/24): EF 30-35%, global LV hypokinesis, mod LVH severe LV DD, LA severely dilated, RA mod dilated, mod MR, GDMT: Entresto 24-26 bid, Farxiga 10mg qd, Toprol 50mg qd - ALL HELD i/s/o hypotension in ED  Diuresis: Torsemide 10 mg QD (home med)  Advanced HF consulted, appreciate recs  EKG showing V-paced at 80bpm  - strict I&Os, daily standing weights, fluid restriction

## 2024-07-09 NOTE — CONSULT NOTE ADULT - ASSESSMENT
FULL NOTE TO FOLLOW    ·	introduced the role of Palliative Medicine for symptom management, advance care planning, and emotional support  ·	will continue to explore GOC pending clinical course  ·	endorsing constipation, ordered bowel regimen: Senna 2 tabs qhs and Lactulose 10g daily PRN (avoiding Miralax given fluid restriction) 79yo M with PMH of Cardiac Amyloidosis, CHF, AFib, T2DM, and h/o Prostate CA p/w SOB and found to have COVID. Palliative consulted for complex medical decision making in the setting of advanced illness.    ·	introduced the role of Palliative Medicine for symptom management, advance care planning, and emotional support  ·	will continue to explore GOC pending clinical course  ·	endorsing constipation, ordered bowel regimen: Senna 2 tabs qhs and Lactulose 10g daily PRN (avoiding Miralax given fluid restriction)

## 2024-07-09 NOTE — CONSULT NOTE ADULT - PROBLEM SELECTOR RECOMMENDATION 9
.  PPSV = 50%, requires assistance for many ADLs  -PT Eval  -c/w supportive care, OOB, encourage movement

## 2024-07-09 NOTE — CONSULT NOTE ADULT - PROBLEM SELECTOR RECOMMENDATION 5
.  Patient is Full Code  -wife would be surrogate decision maker in the absence of documented HCP  -will continue to explore GOC pending clinical course

## 2024-07-09 NOTE — CONSULT NOTE ADULT - NS ATTEND AMEND GEN_ALL_CORE FT
Agree w above. 78M w/cardiac amyloid, heart failure s/p CRT, AFib s/p ablation, DM, idiopathic thrombocytopenia, prostate ca s/p chemotherapy admitted for heart failure exacerbation and COVID-19. He was given IV Remdesivir 200 mg x 1 and has been placed on IV Remdesivir 100 mg q24h x 4 d. Also stared on PO Dexamethsone 6 mg q24h. ID asked to comment on dosage of steroids.   Pt is on 4 lpm O2 via NC. Leukopenic, lymphopenic today.   7/8 CXR shows Infiltrates/pulmonary vascular congestion and small right pleural effusion alongw/ left-sided implanted cardiac device.   Agree w IV Remdesivir 200 mg x1 f/by 100 mg q24h x 4 days alongwith PO Dexamethasone 6 mg q24h x 10 days or until hospital discharge, whichever comes first.   We will sign off at this time, please reconsult ID Team 2 as needed.

## 2024-07-09 NOTE — CONSULT NOTE ADULT - ASSESSMENT
78y M w/ PMHx ATTR cardiac amyloid, diastolic HF (EF 30-35%) s/p CRT-P (2/22), AFib s/p ablation 6/16 on eliquis, DM2, idiopathic thrombycytopenia, prostate ca s/p chemotherapy p/w weakness, lethargy, SOB, decreased PO intake x 2 weeks admitted for HF exacerbation likely 2/2 COVID 19. Patient afebrile, no leukocytosis, states his SOB has improved since admission. He is currently on remdesivir and dexamethasone.     Suggest:  -continue remdesevir 100mg IV Q24 to complete 4 total doses   -continue dexamethasone 6mg PO daily for up to 10 days or until hospital discharge, whichever comes first   -continue airborne and contact isolation per hospital epi    Team 2 will sign off. Thank you for your consultation   Please reconsult for questions   Case d/w primary team.  Final recommendation pending attending note.    Taisha Eid, Infectious Diseases PA  Please reach out for any questions 9 am-5pm. For evenings and weekends, please call the ID physician on call.  Work cell: [phone]   78y M w/ PMHx ATTR cardiac amyloid, diastolic HF (EF 30-35%) s/p CRT-P (2/22), AFib s/p ablation 6/16 on eliquis, DM2, idiopathic thrombycytopenia, prostate ca s/p chemotherapy p/w weakness, lethargy, SOB, decreased PO intake x 2 weeks admitted for HF exacerbation likely 2/2 COVID 19. Patient afebrile, no leukocytosis, states his SOB has improved since admission. He is currently on remdesivir and dexamethasone.     Suggest:  -continue remdesevir 100mg IV Q24 to complete 4 total doses   -continue dexamethasone 6mg PO daily for up to 10 days or until hospital discharge, whichever comes first   -continue airborne and contact isolation per hospital epi    Team 2 will sign off. Thank you for your consultation   Please reconsult for questions or if patient's clinical status decompensates   Case d/w primary team.  Final recommendation pending attending note.    Taisha Eid, Infectious Diseases PA  Please reach out for any questions 9 am-5pm. For evenings and weekends, please call the ID physician on call.  Work cell: [phone]

## 2024-07-09 NOTE — CONSULT NOTE ADULT - PROBLEM SELECTOR RECOMMENDATION 6
.  Complex medical decision making / symptom management in the setting of advanced illness.    Will continue to follow for ongoing GOC discussion / titration of palliative regimen. Emotional support provided, questions answered.  Active Psychosocial Referrals:  [x]Social Work/Case management [x]PT/OT []Chaplaincy []Hospice  []Patient/Family Support []Holistic RN []Massage Therapy []Music Therapy []Ethics  Coping: [] well [x] with difficulty [] poor coping [] unable to assess  Support system: [] strong [x] adequate [] inadequate    For new or uncontrolled symptoms, please call Palliative Care at [phone]). The service is available 24/7 (including nights & weekends) to provide symptom management recommendations over the phone as appropriate

## 2024-07-09 NOTE — PROGRESS NOTE ADULT - PROBLEM SELECTOR PLAN 7
H/o thrombocytopenia, idiopathic,  this admission, improved from baseline   Prior heme workup on previous admission   -monitor PLT count   -transfuse PLT < 10 spontaneous, < 50 w/ active bleeding

## 2024-07-09 NOTE — CONSULT NOTE ADULT - SUBJECTIVE AND OBJECTIVE BOX
[facility]  Nahum Qureshi, Palliative Care Attending  Contact Info: Call [phone] (HEAL Line) or message on [employer] Teams (Nahum Qureshi)    HPI:  78y M w/ PMHx ATTR cardiac amyloid, diastolic HF (EF 30-35%) s/p CRT-P (2/22), AFib s/p ablation 6/16 on eliquis, DM2, idiopathic thrombycytopenia, prostate ca s/p chemotherapy p/w weakness, lethargy, SOB, decreased PO intake x 2 weeks. Per family, pt has been more lethargic, SOB, eating less over the last 2 weeks. Pt describes decreased PO intake d/t weakness, not for loss of appetite. Does note more difficulty w/ ambulation usually able to ambulate w/ walker several blocks but now severely reduced. Per wife, pt LE edema similar to baseline and RLE usually swells initially during HF exacerbation. Denies fever, chills, N/V/D, CP, abdominal pain.     Patient seen and examined at bedside. Appears overtly comfortable. Denies any significant complaint. Comprehensive symptom assessment and GOC exploration as noted below. Further collateral obtained from primary team, chart, and family.    PERTINENT PM/SXH:   Congestive heart failure (CHF)  Diabetes  Atrial flutter  Chronic venous insufficiency of lower extremity  Prostate CA  Stage 3 chronic kidney disease  Heart failure with mid-range ejection fraction  Type 2 diabetes mellitus  Thrombocytopenia  Acute on chronic systolic congestive heart failure  Chronic atrial fibrillation  Atrial flutter  History of surgical removal of pituitary gland  S/P TURP    FAMILY HISTORY:  No history of  in first degree relatives    ITEMS NOT CHECKED ARE NOT PRESENT  SOCIAL HISTORY:   Significant other/partner:  []  Children:  []  Substance hx:  []   Tobacco hx:  []   Alcohol hx: []   Home Opioid hx:  [] I-Stop Reference No:  - no active Rx's / see chart note  Living Situation: []Home  []Long term care  []Rehab []Other  Hinduism/Spiritual practice: ; Role of organized Denominational [] important [] some [] unable to assess    ADVANCE DIRECTIVES:    []MOLST  []Living Will  DECISION MAKER(s):  [] Health Care Proxy(s)  [] Surrogate(s)  [] Guardian           Name(s)/Phone Number(s):     BASELINE (I)ADLs (prior to admission):  [place]: []Total  [] Moderate []Dependent    ALLERGIES:  No Known Allergies    MEDICATIONS  (STANDING):  apixaban 5 milliGRAM(s) Oral every 12 hours  dexAMETHasone     Tablet 6 milliGRAM(s) Oral daily  dextrose 5%. 1000 milliLiter(s) (50 mL/Hr) IV Continuous <Continuous>  dextrose 5%. 1000 milliLiter(s) (100 mL/Hr) IV Continuous <Continuous>  dextrose 50% Injectable 25 Gram(s) IV Push once  dextrose 50% Injectable 25 Gram(s) IV Push once  dextrose 50% Injectable 12.5 Gram(s) IV Push once  glucagon  Injectable 1 milliGRAM(s) IntraMuscular once  insulin lispro (ADMELOG) corrective regimen sliding scale   SubCutaneous Before meals and at bedtime  remdesivir  IVPB 100 milliGRAM(s) IV Intermittent every 24 hours  remdesivir  IVPB   IV Intermittent   torsemide 10 milliGRAM(s) Oral daily    MEDICATIONS  (PRN):  acetaminophen     Tablet .. 650 milliGRAM(s) Oral every 6 hours PRN Temp greater or equal to 38C (100.4F), Mild Pain (1 - 3)  dextrose Oral Gel 15 Gram(s) Oral once PRN Blood Glucose LESS THAN 70 milliGRAM(s)/deciliter  melatonin 3 milliGRAM(s) Oral at bedtime PRN Insomnia  ondansetron Injectable 4 milliGRAM(s) IV Push every 8 hours PRN Nausea and/or Vomiting    Analgesic Use (Scheduled and PRNs) for past 24 hours:  - none    PRESENT SYMPTOMS: []Unable to obtain due to poor mentation/encephalopathy  Source if other than patient:  []Family   []Team     Pain: [] yes [] no  QOL impact -   Location -                    Aggravating Factors -  Quality -  Radiation -  Timing -  Severity (0-10 scale) -   Minimal Acceptable Level (0-10 scale) -    Other Symptoms: See ESAS Section Below  [x]All other review of systems negative     GENERAL:  [] NAD []Alert []Lethargic  []Cachexia  []Unarousable  []Verbal  []Non-Verbal  BEHAVIORAL:   []Anxiety  []Delirium []Agitation []Cooperative []Oriented x  HEENT:  []Normal  [] Moist Mucous Membranes []Dry mouth   []ET Tube/Trach  []Oral lesions  PULMONARY:   []Clear []Tachypnea  []Audible excessive secretions  []Normal Work of Breathing []Labored Breathing  []Rhonchi []Crackles []Wheezing  CARDIOVASCULAR:    []Regular Rate []Regular Rhythm []Irregular []Tachy  []Richard  GASTROINTESTINAL:  []Soft  []Distended   []+BS  []Non tender []Tender  []PEG []OGT/ NGT  Last BM:  GENITOURINARY:  []Normal [] Incontinent   []Oliguria/Anuria   []Moore  MUSCULOSKELETAL:   []Normal Extremities  []Weakness  []Bed/Wheelchair bound []Edema  NEUROLOGIC:   []No focal deficits  []Cognitive impairment  []Dysphagia []Dysarthria []Paresis []Encephalopathic  SKIN:   []Normal   []Pressure ulcer(s)  []Rash    Vital Signs Last 24 Hrs  T(C): 36.2 (09 Jul 2024 08:40), Max: 37.8 (08 Jul 2024 20:55)  T(F): 97.1 (09 Jul 2024 08:40), Max: 100 (08 Jul 2024 20:55)  HR: 80 (09 Jul 2024 16:12) (79 - 91)  BP: 88/55 (09 Jul 2024 16:12) (74/52 - 108/65)  BP(mean): 67 (09 Jul 2024 16:12) (64 - 67)  RR: 18 (09 Jul 2024 16:12) (16 - 20)  SpO2: 97% (09 Jul 2024 16:12) (88% - 97%)    Parameters below as of 09 Jul 2024 16:12  Patient On (Oxygen Delivery Method): nasal cannula  O2 Flow (L/min): 4     LABS: Personally reviewed and interpreted                      14.1   3.76  )-----------( 137      ( 09 Jul 2024 05:30 )             43.3   07-09    146<H>  |  109<H>  |  26<H>  ----------------------------<  97  3.5   |  27  |  1.14    Ca    8.2<L>      09 Jul 2024 05:30  Phos  3.4     07-09  Mg     2.4     07-09  TPro  6.0  /  Alb  2.7<L>  /  TBili  0.9  /  DBili  x   /  AST  25  /  ALT  26  /  AlkPhos  92  07-09    RADIOLOGY & ADDITIONAL STUDIES: Personally reviewed and interpreted  < from: Xray Chest 1 View- PORTABLE-Urgent (Xray Chest 1 View- PORTABLE-Urgent .) (07.08.24 @ 19:39) >  Infiltrates/pulmonary vascular congestionand small right pleural effusion. Left-sided implanted cardiac device. No pneumothorax. No acute bone abnormality.    REFERRALS:  [x]Social Work/Case management [x]PT/OT []Chaplaincy  []Hospice  []Patient/Family Support []Massage Therapy []Music Therapy []Holistic RN []Ethics    DISCUSSION OF CASE: Primary Team/RN - to discuss plan of care [facility]  Nahum Qureshi, Palliative Care Attending  Contact Info: Call [phone] (HEAL Line) or message on [employer] Teams (Nahum Qureshi)    HPI:  78y M w/ PMHx ATTR cardiac amyloid, diastolic HF (EF 30-35%) s/p CRT-P (2/22), AFib s/p ablation 6/16 on eliquis, DM2, idiopathic thrombycytopenia, prostate ca s/p chemotherapy p/w weakness, lethargy, SOB, decreased PO intake x 2 weeks. Per family, pt has been more lethargic, SOB, eating less over the last 2 weeks. Pt describes decreased PO intake d/t weakness, not for loss of appetite. Does note more difficulty w/ ambulation usually able to ambulate w/ walker several blocks but now severely reduced. Per wife, pt LE edema similar to baseline and RLE usually swells initially during HF exacerbation. Denies fever, chills, N/V/D, CP, abdominal pain.     Patient seen and examined at bedside. Appears overtly comfortable. Denies any significant complaint. Comprehensive symptom assessment and GOC exploration as noted below. Further collateral obtained from primary team, chart, and family.    PERTINENT PM/SXH:   Congestive heart failure (CHF)  Diabetes  Atrial flutter  Chronic venous insufficiency of lower extremity  Prostate CA  Stage 3 chronic kidney disease  Heart failure with mid-range ejection fraction  Type 2 diabetes mellitus  Thrombocytopenia  Acute on chronic systolic congestive heart failure  Chronic atrial fibrillation  Atrial flutter  History of surgical removal of pituitary gland  S/P TURP    FAMILY HISTORY:  No history of  in first degree relatives    ITEMS NOT CHECKED ARE NOT PRESENT  SOCIAL HISTORY:   Significant other/partner:  [x]  Children:  [x]  Substance hx:  []   Tobacco hx:  []   Alcohol hx: []   Home Opioid hx:  [] I-Stop Reference No:  - no active Rx's  Living Situation: [x]Home  []Long term care  []Rehab []Other  Confucianism/Spiritual practice: ; Role of organized Taoism [] important [] some [] unable to assess    ADVANCE DIRECTIVES:    []MOLST  []Living Will  DECISION MAKER(s):  [] Health Care Proxy(s)  [x] Surrogate(s)  [] Guardian           Name(s)/Phone Number(s): Wife    BASELINE (I)ADLs (prior to admission):  [place]: []Total  [x] Moderate []Dependent    ALLERGIES:  No Known Allergies    MEDICATIONS  (STANDING):  apixaban 5 milliGRAM(s) Oral every 12 hours  dexAMETHasone     Tablet 6 milliGRAM(s) Oral daily  dextrose 5%. 1000 milliLiter(s) (50 mL/Hr) IV Continuous <Continuous>  dextrose 5%. 1000 milliLiter(s) (100 mL/Hr) IV Continuous <Continuous>  dextrose 50% Injectable 25 Gram(s) IV Push once  dextrose 50% Injectable 25 Gram(s) IV Push once  dextrose 50% Injectable 12.5 Gram(s) IV Push once  glucagon  Injectable 1 milliGRAM(s) IntraMuscular once  insulin lispro (ADMELOG) corrective regimen sliding scale   SubCutaneous Before meals and at bedtime  remdesivir  IVPB 100 milliGRAM(s) IV Intermittent every 24 hours  remdesivir  IVPB   IV Intermittent   torsemide 10 milliGRAM(s) Oral daily    MEDICATIONS  (PRN):  acetaminophen     Tablet .. 650 milliGRAM(s) Oral every 6 hours PRN Temp greater or equal to 38C (100.4F), Mild Pain (1 - 3)  dextrose Oral Gel 15 Gram(s) Oral once PRN Blood Glucose LESS THAN 70 milliGRAM(s)/deciliter  melatonin 3 milliGRAM(s) Oral at bedtime PRN Insomnia  ondansetron Injectable 4 milliGRAM(s) IV Push every 8 hours PRN Nausea and/or Vomiting    Analgesic Use (Scheduled and PRNs) for past 24 hours:  - none    PRESENT SYMPTOMS: []Unable to obtain due to poor mentation/encephalopathy  Source if other than patient:  []Family   []Team     Pain: [] yes [x] no  QOL impact -   Location -                    Aggravating Factors -  Quality -  Radiation -  Timing -  Severity (0-10 scale) -   Minimal Acceptable Level (0-10 scale) -    Other Symptoms: See ESAS Section Below  [x]All other review of systems negative     GENERAL:  [x] NAD [x]Alert []Lethargic  []Cachexia  []Unarousable  [x]Verbal  []Non-Verbal  BEHAVIORAL:   []Anxiety  []Delirium []Agitation [x]Cooperative [x]Oriented x3  HEENT:  [x]Normal  [x] Moist Mucous Membranes []Dry mouth   []ET Tube/Trach  []Oral lesions  PULMONARY:   []Clear []Tachypnea  []Audible excessive secretions  [x]Normal Work of Breathing []Labored Breathing  [x]Rhonchi []Crackles []Wheezing  CARDIOVASCULAR:    [x]Regular Rate [x]Regular Rhythm []Irregular []Tachy  []Richard  GASTROINTESTINAL:  [x]Soft  []Distended   [x]+BS  [x]Non tender []Tender  []PEG []OGT/ NGT  Last BM:  GENITOURINARY:  [x]Normal [] Incontinent   []Oliguria/Anuria   []Moore  MUSCULOSKELETAL:   []Normal Extremities  [x]Weakness  []Bed/Wheelchair bound []Edema  NEUROLOGIC:   [x]No focal deficits  []Cognitive impairment  []Dysphagia []Dysarthria []Paresis []Encephalopathic  SKIN:   [x]Normal   []Pressure ulcer(s)  []Rash    Vital Signs Last 24 Hrs  T(C): 36.2 (09 Jul 2024 08:40), Max: 37.8 (08 Jul 2024 20:55)  T(F): 97.1 (09 Jul 2024 08:40), Max: 100 (08 Jul 2024 20:55)  HR: 80 (09 Jul 2024 16:12) (79 - 91)  BP: 88/55 (09 Jul 2024 16:12) (74/52 - 108/65)  BP(mean): 67 (09 Jul 2024 16:12) (64 - 67)  RR: 18 (09 Jul 2024 16:12) (16 - 20)  SpO2: 97% (09 Jul 2024 16:12) (88% - 97%)    Parameters below as of 09 Jul 2024 16:12  Patient On (Oxygen Delivery Method): nasal cannula  O2 Flow (L/min): 4     LABS: Personally reviewed and interpreted                      14.1   3.76  )-----------( 137      ( 09 Jul 2024 05:30 )             43.3   07-09    146<H>  |  109<H>  |  26<H>  ----------------------------<  97  3.5   |  27  |  1.14    Ca    8.2<L>      09 Jul 2024 05:30  Phos  3.4     07-09  Mg     2.4     07-09  TPro  6.0  /  Alb  2.7<L>  /  TBili  0.9  /  DBili  x   /  AST  25  /  ALT  26  /  AlkPhos  92  07-09    RADIOLOGY & ADDITIONAL STUDIES: Personally reviewed and interpreted  < from: Xray Chest 1 View- PORTABLE-Urgent (Xray Chest 1 View- PORTABLE-Urgent .) (07.08.24 @ 19:39) >  Infiltrates/pulmonary vascular congestionand small right pleural effusion. Left-sided implanted cardiac device. No pneumothorax. No acute bone abnormality.    REFERRALS:  [x]Social Work/Case management [x]PT/OT []Chaplaincy  []Hospice  []Patient/Family Support []Massage Therapy []Music Therapy []Holistic RN []Ethics    DISCUSSION OF CASE: Primary Team/RN - to discuss plan of care

## 2024-07-10 LAB
ADD ON TEST-SPECIMEN IN LAB: SIGNIFICANT CHANGE UP
ALBUMIN SERPL ELPH-MCNC: 1.8 G/DL — LOW (ref 3.3–5)
ALBUMIN SERPL ELPH-MCNC: 2.4 G/DL — LOW (ref 3.3–5)
ALP SERPL-CCNC: 80 U/L — SIGNIFICANT CHANGE UP (ref 40–120)
ALP SERPL-CCNC: 84 U/L — SIGNIFICANT CHANGE UP (ref 40–120)
ALT FLD-CCNC: 17 U/L — SIGNIFICANT CHANGE UP (ref 10–45)
ALT FLD-CCNC: SIGNIFICANT CHANGE UP (ref 10–45)
ANION GAP SERPL CALC-SCNC: 13 MMOL/L — SIGNIFICANT CHANGE UP (ref 5–17)
ANION GAP SERPL CALC-SCNC: 9 MMOL/L — SIGNIFICANT CHANGE UP (ref 5–17)
APTT BLD: 38 SEC — HIGH (ref 24.5–35.6)
AST SERPL-CCNC: 16 U/L — SIGNIFICANT CHANGE UP (ref 10–40)
AST SERPL-CCNC: SIGNIFICANT CHANGE UP (ref 10–40)
BILIRUB SERPL-MCNC: 0.8 MG/DL — SIGNIFICANT CHANGE UP (ref 0.2–1.2)
BILIRUB SERPL-MCNC: 0.9 MG/DL — SIGNIFICANT CHANGE UP (ref 0.2–1.2)
BUN SERPL-MCNC: 27 MG/DL — HIGH (ref 7–23)
BUN SERPL-MCNC: 29 MG/DL — HIGH (ref 7–23)
CALCIUM SERPL-MCNC: 7.9 MG/DL — LOW (ref 8.4–10.5)
CALCIUM SERPL-MCNC: 8 MG/DL — LOW (ref 8.4–10.5)
CHLORIDE SERPL-SCNC: 107 MMOL/L — SIGNIFICANT CHANGE UP (ref 96–108)
CHLORIDE SERPL-SCNC: 108 MMOL/L — SIGNIFICANT CHANGE UP (ref 96–108)
CO2 SERPL-SCNC: 17 MMOL/L — LOW (ref 22–31)
CO2 SERPL-SCNC: 24 MMOL/L — SIGNIFICANT CHANGE UP (ref 22–31)
CREAT SERPL-MCNC: 0.9 MG/DL — SIGNIFICANT CHANGE UP (ref 0.5–1.3)
CREAT SERPL-MCNC: 0.91 MG/DL — SIGNIFICANT CHANGE UP (ref 0.5–1.3)
EGFR: 87 ML/MIN/1.73M2 — SIGNIFICANT CHANGE UP
EGFR: 89 ML/MIN/1.73M2 — SIGNIFICANT CHANGE UP
GLUCOSE BLDC GLUCOMTR-MCNC: 106 MG/DL — HIGH (ref 70–99)
GLUCOSE BLDC GLUCOMTR-MCNC: 114 MG/DL — HIGH (ref 70–99)
GLUCOSE BLDC GLUCOMTR-MCNC: 127 MG/DL — HIGH (ref 70–99)
GLUCOSE BLDC GLUCOMTR-MCNC: 138 MG/DL — HIGH (ref 70–99)
GLUCOSE SERPL-MCNC: 123 MG/DL — HIGH (ref 70–99)
GLUCOSE SERPL-MCNC: 133 MG/DL — HIGH (ref 70–99)
HCT VFR BLD CALC: 47.5 % — SIGNIFICANT CHANGE UP (ref 39–50)
HCT VFR BLD CALC: 52.5 % — HIGH (ref 39–50)
HGB BLD-MCNC: 16.3 G/DL — SIGNIFICANT CHANGE UP (ref 13–17)
HGB BLD-MCNC: 16.8 G/DL — SIGNIFICANT CHANGE UP (ref 13–17)
INR BLD: 2.16 — HIGH (ref 0.85–1.18)
LACTATE SERPL-SCNC: 1.9 MMOL/L — SIGNIFICANT CHANGE UP (ref 0.5–2)
MAGNESIUM SERPL-MCNC: 2.3 MG/DL — SIGNIFICANT CHANGE UP (ref 1.6–2.6)
MCHC RBC-ENTMCNC: 30.1 PG — SIGNIFICANT CHANGE UP (ref 27–34)
MCHC RBC-ENTMCNC: 30.2 PG — SIGNIFICANT CHANGE UP (ref 27–34)
MCHC RBC-ENTMCNC: 32 GM/DL — SIGNIFICANT CHANGE UP (ref 32–36)
MCHC RBC-ENTMCNC: 34.3 GM/DL — SIGNIFICANT CHANGE UP (ref 32–36)
MCV RBC AUTO: 87.8 FL — SIGNIFICANT CHANGE UP (ref 80–100)
MCV RBC AUTO: 94.3 FL — SIGNIFICANT CHANGE UP (ref 80–100)
NRBC # BLD: 0 /100 WBCS — SIGNIFICANT CHANGE UP (ref 0–0)
NRBC # BLD: 0 /100 WBCS — SIGNIFICANT CHANGE UP (ref 0–0)
PLATELET # BLD AUTO: 135 K/UL — LOW (ref 150–400)
PLATELET # BLD AUTO: 166 K/UL — SIGNIFICANT CHANGE UP (ref 150–400)
POTASSIUM SERPL-MCNC: 3.7 MMOL/L — SIGNIFICANT CHANGE UP (ref 3.5–5.3)
POTASSIUM SERPL-MCNC: SIGNIFICANT CHANGE UP (ref 3.5–5.3)
POTASSIUM SERPL-SCNC: 3.7 MMOL/L — SIGNIFICANT CHANGE UP (ref 3.5–5.3)
POTASSIUM SERPL-SCNC: SIGNIFICANT CHANGE UP (ref 3.5–5.3)
PROCALCITONIN SERPL-MCNC: 0.15 NG/ML — HIGH (ref 0.02–0.1)
PROT SERPL-MCNC: 5.7 G/DL — LOW (ref 6–8.3)
PROT SERPL-MCNC: 6.1 G/DL — SIGNIFICANT CHANGE UP (ref 6–8.3)
PROTHROM AB SERPL-ACNC: 24.1 SEC — HIGH (ref 9.5–13)
RBC # BLD: 5.41 M/UL — SIGNIFICANT CHANGE UP (ref 4.2–5.8)
RBC # BLD: 5.57 M/UL — SIGNIFICANT CHANGE UP (ref 4.2–5.8)
RBC # FLD: 14.6 % — HIGH (ref 10.3–14.5)
RBC # FLD: 15.1 % — HIGH (ref 10.3–14.5)
SODIUM SERPL-SCNC: 137 MMOL/L — SIGNIFICANT CHANGE UP (ref 135–145)
SODIUM SERPL-SCNC: 141 MMOL/L — SIGNIFICANT CHANGE UP (ref 135–145)
WBC # BLD: 4.05 K/UL — SIGNIFICANT CHANGE UP (ref 3.8–10.5)
WBC # BLD: 4.32 K/UL — SIGNIFICANT CHANGE UP (ref 3.8–10.5)
WBC # FLD AUTO: 4.05 K/UL — SIGNIFICANT CHANGE UP (ref 3.8–10.5)
WBC # FLD AUTO: 4.32 K/UL — SIGNIFICANT CHANGE UP (ref 3.8–10.5)

## 2024-07-10 PROCEDURE — 99223 1ST HOSP IP/OBS HIGH 75: CPT

## 2024-07-10 PROCEDURE — 99233 SBSQ HOSP IP/OBS HIGH 50: CPT

## 2024-07-10 PROCEDURE — 99497 ADVNCD CARE PLAN 30 MIN: CPT

## 2024-07-10 PROCEDURE — 99233 SBSQ HOSP IP/OBS HIGH 50: CPT | Mod: 25

## 2024-07-10 RX ORDER — SODIUM CHLORIDE 0.9 % (FLUSH) 0.9 %
250 SYRINGE (ML) INJECTION ONCE
Refills: 0 | Status: COMPLETED | OUTPATIENT
Start: 2024-07-10 | End: 2024-07-10

## 2024-07-10 RX ADMIN — APIXABAN 5 MILLIGRAM(S): 5 TABLET, FILM COATED ORAL at 00:13

## 2024-07-10 RX ADMIN — TORSEMIDE 10 MILLIGRAM(S): 20 TABLET ORAL at 05:44

## 2024-07-10 RX ADMIN — REMDESIVIR 200 MILLIGRAM(S): 5 INJECTION INTRAVENOUS at 23:39

## 2024-07-10 RX ADMIN — Medication 125 MILLILITER(S): at 11:18

## 2024-07-10 RX ADMIN — APIXABAN 5 MILLIGRAM(S): 5 TABLET, FILM COATED ORAL at 13:45

## 2024-07-10 RX ADMIN — DEXAMETHASONE 6 MILLIGRAM(S): 1 TABLET ORAL at 05:44

## 2024-07-10 NOTE — PROGRESS NOTE ADULT - PROBLEM SELECTOR PLAN 2
Etiology: NICM, ATTR cardiac amyloid   TTE (5/24): EF 30-35%, global LV hypokinesis, mod LVH severe LV DD, LA severely dilated, RA mod dilated, mod MR, GDMT: Entresto 24-26 bid, Farxiga 10mg qd, Toprol 50mg qd - ALL HELD i/s/o hypotension in ED  Diuresis: holding torsemide 10mg in setting of hypotension to SBP 80, gave IV NS 250cc bolus over 2 hours  -Advanced HF consulted, appreciate recs  -EKG showing V-paced at 80bpm  - strict I&Os, daily standing weights, fluid restriction

## 2024-07-10 NOTE — CONSULT NOTE ADULT - SUBJECTIVE AND OBJECTIVE BOX
Patient is a 78y old  Male who presents with a chief complaint of SOB (10 Jul 2024 16:34)      Consult reason: Hypotension  HPI:  78y M w/ PMHx ATTR cardiac amyloid, diastolic HF (EF 30-35%) s/p CRT-P (2/22), AFib s/p ablation 6/16 on eliquis, DM2, idiopathic thrombycytopenia, prostate ca s/p chemotherapy p/w weakness, lethargy, SOB, decreased PO intake x 2 weeks. Per family, pt has been more lethargic, SOB, eating less over the last 2 weeks. Pt describes decreased PO intake d/t weakness, not for loss of appetite. Does note more difficulty w/ ambulation usually able to ambulate w/ walker several blocks but now severely reduced. Per wife, pt LE edema similar to baseline and RLE usually swells initially during HF exacerbation. Denies fever, chills, N/V/D, CP, abdominal pain. ICU consulted iso persistent hypotension, primary cardiology team did not feel that this was due to cardiac etiology, and was more likely due to infectious/COVID etiology.     ED vitals: T97.9, HR 80, BP 74/52, 88% RA   ED labs: WBC 4.05, Hgb 15.3, , Na 146, K 4.2, BUN 25, Cr 1.18, Bilirubin 1.4, Alk Phos 109, AST 44, ALT 38, Troponin 144, BNP 76607, Lactate 1.9, Procal 0.20, UA trace ketones, glucose, spec grav 1.015   ED imaging: CXR w/ b/l pleural effusions, increased opacities on R side pending official read   ED course: CTX 1g x1, azithromycin 500 IV x1    (08 Jul 2024 22:49)      Allergies    No Known Allergies    Intolerances      Home Medications:  Lexapro 5 mg oral tablet: 1 tab(s) orally once a day (18 Nov 2022 23:54)  potassium chloride 20 mEq oral tablet, extended release: 1 tab(s) orally once a day (18 Nov 2022 23:54)  tamsulosin 0.4 mg oral capsule: 1 cap(s) orally once a day (18 Nov 2022 23:54)  Vyndamax 61 mg oral capsule: 1 cap(s) orally once a day (18 Nov 2022 23:54)      SOCIAL HX:     Smoking          ETOH/Illicit drugs          Occupation    PAST MEDICAL & SURGICAL HISTORY:  Atrial flutter  s/p Ablation 2016      Chronic venous insufficiency of lower extremity      Prostate CA      Stage 3 chronic kidney disease  1.2-1.2 baseline   On admission 1.2      Type 2 diabetes mellitus  not on medication      Thrombocytopenia  60-70's baseline      Acute on chronic systolic congestive heart failure      Chronic atrial fibrillation      Atrial flutter  s/p ablation in 2016      History of surgical removal of pituitary gland  1990s      S/P TURP  for BPH          FAMILY HISTORY:  :    No known cardiovascular or pulmonary family history     ROS:  See HPI     PHYSICAL EXAM    ICU Vital Signs Last 24 Hrs  T(C): 36.3 (10 Jul 2024 16:21), Max: 36.4 (10 Jul 2024 09:29)  T(F): 97.3 (10 Jul 2024 16:21), Max: 97.6 (10 Jul 2024 09:29)  HR: 80 (10 Jul 2024 16:21) (80 - 81)  BP: 83/55 (10 Jul 2024 16:21) (81/54 - 91/61)  BP(mean): 63 (10 Jul 2024 16:21) (63 - 72)  ABP: --  ABP(mean): --  RR: 18 (10 Jul 2024 16:21) (16 - 18)  SpO2: 91% (10 Jul 2024 16:21) (91% - 95%)    O2 Parameters below as of 10 Jul 2024 16:21  Patient On (Oxygen Delivery Method): nasal cannula  O2 Flow (L/min): 4    General: Not in distress  HEENT:  PAUL              Lymphatic system: No LN  Lungs: Bilateral BS  Cardiovascular: Regular  Gastrointestinal: Soft, Positive BS  Musculoskeletal: No clubbing. Moves all extremities.    Skin: Cool extremities.   Neurological: No motor or sensory deficit, A+Ox2-3    POCUS:  Pulm: Diffuse B lines. L basilar pleural effusion. B/l LL consolidations, small.   Cardiac: PASP around 16, Thick LV walls. Restrictive physiology noted. (VTi 10, E/A 3.0)      07-09-24 @ 07:01  -  07-10-24 @ 07:00  --------------------------------------------------------  IN:    IV PiggyBack: 100 mL    Oral Fluid: 550 mL  Total IN: 650 mL    OUT:    Voided (mL): 1400 mL  Total OUT: 1400 mL    Total NET: -750 mL      07-10-24 @ 07:01  -  07-10-24 @ 20:20  --------------------------------------------------------  IN:    Oral Fluid: 120 mL  Total IN: 120 mL    OUT:  Total OUT: 0 mL    Total NET: 120 mL          LABS:                          16.8   4.05  )-----------( 135      ( 10 Jul 2024 05:30 )             52.5                                               07-10    141  |  108  |  29<H>  ----------------------------<  133<H>  3.7   |  24  |  0.91    Ca    7.9<L>      10 Jul 2024 17:51  Phos  3.4     07-09  Mg     2.3     07-10    TPro  5.7<L>  /  Alb  2.4<L>  /  TBili  0.8  /  DBili  x   /  AST  16  /  ALT  17  /  AlkPhos  80  07-10                                             Urinalysis Basic - ( 10 Jul 2024 17:51 )    Color: x / Appearance: x / SG: x / pH: x  Gluc: 133 mg/dL / Ketone: x  / Bili: x / Urobili: x   Blood: x / Protein: x / Nitrite: x   Leuk Esterase: x / RBC: x / WBC x   Sq Epi: x / Non Sq Epi: x / Bacteria: x        CARDIAC MARKERS ( 09 Jul 2024 05:30 )  x     / x     / 47 U/L / x     / 1.8 ng/mL                                            LIVER FUNCTIONS - ( 10 Jul 2024 17:51 )  Alb: 2.4 g/dL / Pro: 5.7 g/dL / ALK PHOS: 80 U/L / ALT: 17 U/L / AST: 16 U/L / GGT: x                                                  Urinalysis with Rflx Culture (collected 08 Jul 2024 21:22)    Culture - Blood (collected 08 Jul 2024 18:53)  Source: .Blood Blood  Preliminary Report (10 Jul 2024 02:04):    No growth at 24 hours    Culture - Blood (collected 08 Jul 2024 18:53)  Source: .Blood Blood  Preliminary Report (10 Jul 2024 02:03):    No growth at 24 hours                                                                                             CXR:    ECHO:    MEDICATIONS  (STANDING):  apixaban 5 milliGRAM(s) Oral every 12 hours  dexAMETHasone     Tablet 6 milliGRAM(s) Oral daily  dextrose 5%. 1000 milliLiter(s) (50 mL/Hr) IV Continuous <Continuous>  dextrose 5%. 1000 milliLiter(s) (100 mL/Hr) IV Continuous <Continuous>  dextrose 50% Injectable 12.5 Gram(s) IV Push once  dextrose 50% Injectable 25 Gram(s) IV Push once  dextrose 50% Injectable 25 Gram(s) IV Push once  glucagon  Injectable 1 milliGRAM(s) IntraMuscular once  insulin lispro (ADMELOG) corrective regimen sliding scale   SubCutaneous Before meals and at bedtime  remdesivir  IVPB   IV Intermittent   remdesivir  IVPB 100 milliGRAM(s) IV Intermittent every 24 hours  senna 2 Tablet(s) Oral at bedtime  Vyndamax (Tafamidis) 61 milliGRAM(s) 61 milliGRAM(s) Oral daily    MEDICATIONS  (PRN):  acetaminophen     Tablet .. 650 milliGRAM(s) Oral every 6 hours PRN Temp greater or equal to 38C (100.4F), Mild Pain (1 - 3)  dextrose Oral Gel 15 Gram(s) Oral once PRN Blood Glucose LESS THAN 70 milliGRAM(s)/deciliter  lactulose Syrup 10 Gram(s) Oral daily PRN Constipation  melatonin 3 milliGRAM(s) Oral at bedtime PRN Insomnia  ondansetron Injectable 4 milliGRAM(s) IV Push every 8 hours PRN Nausea and/or Vomiting

## 2024-07-10 NOTE — PROGRESS NOTE ADULT - SUBJECTIVE AND OBJECTIVE BOX
[facility] and Palliative [facility]  Nahum Qureshi, Palliative Care Attending  Contact Info: Call [phone] (HEAL Line) or message on Microsoft Teams (Nahum Qureshi)    SUBJECTIVE AND OBJECTIVE:  INTERVAL HPI/OVERNIGHT EVENTS: Interval events noted. See patient's PRN use for the past 24hrs noted below. Comprehensive symptom assessment and GOC exploration as noted below. Extensive time spent discussing plan of care with patient/family.     ALLERGIES:  No Known Allergies    MEDICATIONS  (STANDING):  apixaban 5 milliGRAM(s) Oral every 12 hours  dexAMETHasone     Tablet 6 milliGRAM(s) Oral daily  dextrose 5%. 1000 milliLiter(s) (50 mL/Hr) IV Continuous <Continuous>  dextrose 5%. 1000 milliLiter(s) (100 mL/Hr) IV Continuous <Continuous>  dextrose 50% Injectable 25 Gram(s) IV Push once  dextrose 50% Injectable 12.5 Gram(s) IV Push once  dextrose 50% Injectable 25 Gram(s) IV Push once  glucagon  Injectable 1 milliGRAM(s) IntraMuscular once  insulin lispro (ADMELOG) corrective regimen sliding scale   SubCutaneous Before meals and at bedtime  remdesivir  IVPB 100 milliGRAM(s) IV Intermittent every 24 hours  remdesivir  IVPB   IV Intermittent   senna 2 Tablet(s) Oral at bedtime  Vyndamax (Tafamidis) 61 milliGRAM(s) 61 milliGRAM(s) Oral daily    MEDICATIONS  (PRN):  acetaminophen     Tablet .. 650 milliGRAM(s) Oral every 6 hours PRN Temp greater or equal to 38C (100.4F), Mild Pain (1 - 3)  dextrose Oral Gel 15 Gram(s) Oral once PRN Blood Glucose LESS THAN 70 milliGRAM(s)/deciliter  lactulose Syrup 10 Gram(s) Oral daily PRN Constipation  melatonin 3 milliGRAM(s) Oral at bedtime PRN Insomnia  ondansetron Injectable 4 milliGRAM(s) IV Push every 8 hours PRN Nausea and/or Vomiting      Analgesic Use (Scheduled and PRNs) for past 24 hours:    Vyndamax (Tafamidis) 61 milliGRAM(s)   61 milliGRAM(s) Oral (07-10-24 @ 13:38)   61 milliGRAM(s) Oral (07-09-24 @ 18:32)      ITEMS UNCHECKED ARE NOT PRESENT  PRESENT SYMPTOMS/REVIEW OF SYSTEMS: []Unable to obtain due to poor mentation   Source if other than patient:  []Family   []Team         Vital Signs Last 24 Hrs  T(C): 36.3 (10 Jul 2024 16:21), Max: 36.4 (10 Jul 2024 09:29)  T(F): 97.3 (10 Jul 2024 16:21), Max: 97.6 (10 Jul 2024 09:29)  HR: 80 (10 Jul 2024 16:21) (80 - 81)  BP: 83/55 (10 Jul 2024 16:21) (81/54 - 91/61)  BP(mean): 63 (10 Jul 2024 16:21) (63 - 72)  RR: 18 (10 Jul 2024 16:21) (16 - 18)  SpO2: 91% (10 Jul 2024 16:21) (91% - 95%)    Parameters below as of 10 Jul 2024 16:21  Patient On (Oxygen Delivery Method): nasal cannula  O2 Flow (L/min): 4      LABS: Personally reviewed and interpreted                        16.8   4.05  )-----------( 135      ( 10 Jul 2024 05:30 )             52.5   07-10    137  |  107  |  27<H>  ----------------------------<  123<H>  See Note   |  17<L>  |  0.90    Ca    8.0<L>      10 Jul 2024 05:30  Phos  3.4     07-09  Mg     2.3     07-10    TPro  6.1  /  Alb  1.8<L>  /  TBili  0.9  /  DBili  x   /  AST  See Note  /  ALT  See Note  /  AlkPhos  84  07-10      RADIOLOGY & ADDITIONAL STUDIES: Personally reviewed and interpreted  None new    DISCUSSION OF CASE: Family - to provide updates and emotional support; Primary Team/RN - to discuss plan of care [facility] and Palliative [facility]  Nahum Qureshi, Palliative Care Attending  Contact Info: Call [phone] (HEAL Line) or message on Microsoft Teams (Nahum Qureshi)    SUBJECTIVE AND OBJECTIVE:  INTERVAL HPI/OVERNIGHT EVENTS: Interval events noted. Continues to feel weak and endorses persistent constipation. See patient's PRN use for the past 24hrs noted below. Comprehensive symptom assessment and GOC exploration as noted below. Extensive time spent discussing plan of care with patient/family.     ALLERGIES:  No Known Allergies    MEDICATIONS  (STANDING):  apixaban 5 milliGRAM(s) Oral every 12 hours  dexAMETHasone     Tablet 6 milliGRAM(s) Oral daily  dextrose 5%. 1000 milliLiter(s) (50 mL/Hr) IV Continuous <Continuous>  dextrose 5%. 1000 milliLiter(s) (100 mL/Hr) IV Continuous <Continuous>  dextrose 50% Injectable 25 Gram(s) IV Push once  dextrose 50% Injectable 12.5 Gram(s) IV Push once  dextrose 50% Injectable 25 Gram(s) IV Push once  glucagon  Injectable 1 milliGRAM(s) IntraMuscular once  insulin lispro (ADMELOG) corrective regimen sliding scale   SubCutaneous Before meals and at bedtime  remdesivir  IVPB 100 milliGRAM(s) IV Intermittent every 24 hours  remdesivir  IVPB   IV Intermittent   senna 2 Tablet(s) Oral at bedtime  Vyndamax (Tafamidis) 61 milliGRAM(s) 61 milliGRAM(s) Oral daily    MEDICATIONS  (PRN):  acetaminophen     Tablet .. 650 milliGRAM(s) Oral every 6 hours PRN Temp greater or equal to 38C (100.4F), Mild Pain (1 - 3)  dextrose Oral Gel 15 Gram(s) Oral once PRN Blood Glucose LESS THAN 70 milliGRAM(s)/deciliter  lactulose Syrup 10 Gram(s) Oral daily PRN Constipation  melatonin 3 milliGRAM(s) Oral at bedtime PRN Insomnia  ondansetron Injectable 4 milliGRAM(s) IV Push every 8 hours PRN Nausea and/or Vomiting      Analgesic Use (Scheduled and PRNs) for past 24 hours:    Vyndamax (Tafamidis) 61 milliGRAM(s)   61 milliGRAM(s) Oral (07-10-24 @ 13:38)   61 milliGRAM(s) Oral (07-09-24 @ 18:32)      ITEMS UNCHECKED ARE NOT PRESENT  PRESENT SYMPTOMS/REVIEW OF SYSTEMS: []Unable to obtain due to poor mentation   Source if other than patient:  []Family   []Team     Pain: [] yes [x] no  QOL impact -   Location -                    Aggravating Factors -  Quality -  Radiation -  Timing -  Severity (0-10 scale) -   Minimal Acceptable Level (0-10 scale) -    Other Symptoms: See ESAS Section Below  [x]All other review of systems negative     GENERAL:  [x] NAD [x]Alert []Lethargic  []Cachexia  []Unarousable  [x]Verbal  []Non-Verbal  BEHAVIORAL:   []Anxiety  []Delirium []Agitation [x]Cooperative [x]Oriented x3  HEENT:  [x]Normal  [x] Moist Mucous Membranes []Dry mouth   []ET Tube/Trach  []Oral lesions  PULMONARY:   []Clear []Tachypnea  []Audible excessive secretions  [x]Normal Work of Breathing []Labored Breathing  [x]Rhonchi []Crackles []Wheezing  CARDIOVASCULAR:    [x]Regular Rate [x]Regular Rhythm []Irregular []Tachy  []Richard  GASTROINTESTINAL:  [x]Soft  []Distended   [x]+BS  [x]Non tender []Tender  []PEG []OGT/ NGT  Last BM:  GENITOURINARY:  [x]Normal [] Incontinent   []Oliguria/Anuria   []Moore  MUSCULOSKELETAL:   []Normal Extremities  [x]Weakness  []Bed/Wheelchair bound []Edema  NEUROLOGIC:   [x]No focal deficits  []Cognitive impairment  []Dysphagia []Dysarthria []Paresis []Encephalopathic  SKIN:   [x]Normal   []Pressure ulcer(s)  []Rash    Vital Signs Last 24 Hrs  T(C): 36.3 (10 Jul 2024 16:21), Max: 36.4 (10 Jul 2024 09:29)  T(F): 97.3 (10 Jul 2024 16:21), Max: 97.6 (10 Jul 2024 09:29)  HR: 80 (10 Jul 2024 16:21) (80 - 81)  BP: 83/55 (10 Jul 2024 16:21) (81/54 - 91/61)  BP(mean): 63 (10 Jul 2024 16:21) (63 - 72)  RR: 18 (10 Jul 2024 16:21) (16 - 18)  SpO2: 91% (10 Jul 2024 16:21) (91% - 95%)    Parameters below as of 10 Jul 2024 16:21  Patient On (Oxygen Delivery Method): nasal cannula  O2 Flow (L/min): 4      LABS: Personally reviewed and interpreted                        16.8   4.05  )-----------( 135      ( 10 Jul 2024 05:30 )             52.5   07-10    137  |  107  |  27<H>  ----------------------------<  123<H>  See Note   |  17<L>  |  0.90    Ca    8.0<L>      10 Jul 2024 05:30  Phos  3.4     07-09  Mg     2.3     07-10    TPro  6.1  /  Alb  1.8<L>  /  TBili  0.9  /  DBili  x   /  AST  See Note  /  ALT  See Note  /  AlkPhos  84  07-10      RADIOLOGY & ADDITIONAL STUDIES: Personally reviewed and interpreted  None new    DISCUSSION OF CASE: Son - to provide updates and emotional support; Primary Team/RN - to discuss plan of care

## 2024-07-10 NOTE — CONSULT NOTE ADULT - ASSESSMENT
78y M w/ PMHx ATTR cardiac amyloid, diastolic HF (EF 30-35%) s/p CRT-P (2/22), AFib s/p ablation 6/16 on eliquis, DM2, idiopathic thrombycytopenia, prostate ca s/p chemotherapy p/w weakness, lethargy, SOB, decreased PO intake x 2 weeks. Found to be covid positive. ICU consulted iso persistent hypotension, primary cardiology team did not feel that this was due to cardiac etiology, and was more likely due to infectious/COVID etiology.     Neuro: STEPHEN    Card:  #Cardiac amylodosis  #HFrEF  Pt hypotensive, otherwise vitally stable. Admitted initially for HF exacerbation. Unable to fully diurese/titrate GDMT iso persistent hypotension.   Pulm: Diffuse B lines. L basilar pleural effusion. B/l LL consolidations, small.   Cardiac pocus: Cardiac: PASP around 16, Thick LV walls. Restrictive physiology noted. (VTi 10, E/A 3.0)  - Hypotension seemingly more likely due to cardiac etiology (Restrictive iso cardiac amyloid) v.s. infectious etiology, further discussed below. Rationale discussed with cardiology fellow, who will also evaluate for ?more aggressive diuresis to address hypoxia and ?pressors to tolerate diuresis if indicated  - C/w tafamidis as per primary team and HF team    Pulm:  #AHRF  5L NC, pt on RA at baseline. Breathing comfortably, SPO2 92-97 on this oxygen supplementation   POCUS findings as above, demonstrating evidence of pulmonary vascular congestion 2/2 HF exacerbation.   - Diuresis as per HF team, acknoledge hypotension is a limiting factor  - Covid management as per ID    GI: STEPHNE    ID:  #Covid  Dexamethasone/Remdesivir as per ID.    #R/o sepsis  Pt cool on exam, afebrile, WBC WNL. Pt Bp 100/70s since admission.   No other clear signs of infection on ROS. BCx drawn on admission did not grow bacteria.   Lung consolidations noted on POCUS, can cover for CAP iso respiratory sx and AHRF.   LFT/Cr/Lactate WNL. Pt mentating well, A+Ox3, no evidence of end organ damage.   Infectious etiology unlikely to be the primary  of his hypotension as noted above.   - Consider CTX and Azithromycin for CAP coverage    Renal: STEPHEN           78y M w/ PMHx ATTR cardiac amyloid, diastolic HF (EF 30-35%) s/p CRT-P (2/22), AFib s/p ablation 6/16 on eliquis, DM2, idiopathic thrombycytopenia, prostate ca s/p chemotherapy p/w weakness, lethargy, SOB, decreased PO intake x 2 weeks. Found to be covid positive. ICU consulted iso persistent hypotension, primary cardiology team did not feel that this was due to cardiac etiology, and was more likely due to infectious/COVID etiology.     Neuro: STEPHEN    Card:  #Cardiac amylodosis  #HFrEF  Pt hypotensive, otherwise vitally stable. Admitted initially for HF exacerbation. Unable to fully diurese/titrate GDMT iso persistent hypotension.   Pulm: Diffuse B lines. L basilar pleural effusion. B/l LL consolidations, small.   Cardiac pocus: Cardiac: PASP around 16, Thick LV walls. Restrictive physiology noted. (VTi 10, E/A 3.0)  - Hypotension seemingly more likely due to cardiac etiology (Restrictive iso cardiac amyloid) v.s. infectious etiology, further discussed below. Rationale discussed with cardiology fellow, who will also evaluate for ?more aggressive diuresis to address hypoxia and ?pressors to tolerate diuresis if indicated  - C/w tafamidis as per primary team and HF team    Pulm:  #AHRF  5L NC, pt on RA at baseline. Breathing comfortably, SPO2 92-97 on this oxygen supplementation   POCUS findings as above, demonstrating evidence of pulmonary vascular congestion 2/2 HF exacerbation.   - Diuresis as per HF team, acknoledge hypotension is a limiting factor  - Covid management as per ID    GI: STEPHEN    ID:  #Covid  Dexamethasone/Remdesivir as per ID.    #R/o sepsis  Pt cool on exam, afebrile, WBC WNL. Pt Bp 100/70s since admission.   No other clear signs of infection on ROS. BCx drawn on admission did not grow bacteria.   Lung consolidations noted on POCUS, can cover for CAP iso respiratory sx and AHRF.   LFT/Cr/Lactate WNL. Pt mentating well, A+Ox3, no evidence of end organ damage.   Infectious etiology unlikely to be the primary  of his hypotension as noted above.   - Consider CTX and Azithromycin for CAP coverage    Renal: STEPHEN    Disposition: Pending HF recommendations regarding urgency of diuresis +/- step up to ?CCU.

## 2024-07-10 NOTE — GOALS OF CARE CONVERSATION - ADVANCED CARE PLANNING - CONVERSATION DETAILS
Code status, advanced directives, hospice, living will, complex medical decision making, and/or decision to escalate or not escalate care were discussed with the patient (face to face) as well as the wife (phone). Patient wishes to defer to wife and the children to make necessary decision. Wife expressed her wish to continue full code (intubate if needed if he goes into worsening respiratory failure) and continue all necessary medical care for now. She will separately discuss further with her children. She agreed to discussing further with palliative care team as well.

## 2024-07-10 NOTE — PROGRESS NOTE ADULT - NS ATTEND AMEND GEN_ALL_CORE FT
77 yo man PMHx of ATTR cardiac amyloid HFrEF 30-35%, afib/flutter s/p ablation 2016 c/b tachy-skip syndrome s/p CRT-P, and CKD who was admitted for hypoxic respiratory failure 2/2 COVID-19    Assessment  1. ATTR cardiac amyloid w/ chronic HFrEF  HFrEF 30-35%  Euvolemic  2. Hypoxic respiratory failure 2/2 COVID-19  3. Afib/flutter s/p ablation 2016  Tachy-skip syndrome   CRT-P  4. Hypotension  No concern for cardiogenic shock    Plan  1. Remdesivir and dexamethasone as per ID recs  2. Holding GDMT and torsemide given low BP  3. Apixaban 5mg BID for systemic embolization prevention  4. Palliative consulted    During non face-to-face time, I reviewed relevant portions of the patient’s medical record. During face-to-face time, I took a relevant history and examined the patient. I also explained differential diagnoses, relevant cardiac diagnoses, workup, and management plan, which required a high level of medical decision making. I answered all questions related to the patient's medical conditions.     Jhonatan Jang M.D.  Attending Cardiologist  [facility].

## 2024-07-10 NOTE — PROGRESS NOTE ADULT - ASSESSMENT
79yo M with PMH of Cardiac Amyloidosis, CHF, AFib, T2DM, and h/o Prostate CA p/w SOB and found to have COVID. Palliative consulted for complex medical decision making in the setting of advanced illness.

## 2024-07-10 NOTE — PROGRESS NOTE ADULT - SUBJECTIVE AND OBJECTIVE BOX
Interventional Cardiology PA Adult Progress Note    C.C.:     Subjective Assessment:      ROS Negative except as per Subjective and HPI  	  MEDICATIONS:  torsemide 10 milliGRAM(s) Oral daily    remdesivir  IVPB 100 milliGRAM(s) IV Intermittent every 24 hours  remdesivir  IVPB   IV Intermittent       acetaminophen     Tablet .. 650 milliGRAM(s) Oral every 6 hours PRN  melatonin 3 milliGRAM(s) Oral at bedtime PRN  ondansetron Injectable 4 milliGRAM(s) IV Push every 8 hours PRN    lactulose Syrup 10 Gram(s) Oral daily PRN  senna 2 Tablet(s) Oral at bedtime    dexAMETHasone     Tablet 6 milliGRAM(s) Oral daily  dextrose 50% Injectable 25 Gram(s) IV Push once  dextrose 50% Injectable 12.5 Gram(s) IV Push once  dextrose 50% Injectable 25 Gram(s) IV Push once  dextrose Oral Gel 15 Gram(s) Oral once PRN  glucagon  Injectable 1 milliGRAM(s) IntraMuscular once  insulin lispro (ADMELOG) corrective regimen sliding scale   SubCutaneous Before meals and at bedtime    apixaban 5 milliGRAM(s) Oral every 12 hours  dextrose 5%. 1000 milliLiter(s) IV Continuous <Continuous>  dextrose 5%. 1000 milliLiter(s) IV Continuous <Continuous>      	    [PHYSICAL EXAM:  TELEMETRY:  T(C): 36.3 (07-10-24 @ 05:30), Max: 36.4 (07-09-24 @ 16:12)  HR: 80 (07-10-24 @ 05:30) (80 - 82)  BP: 91/61 (07-10-24 @ 05:30) (88/55 - 96/62)  RR: 16 (07-10-24 @ 05:30) (16 - 19)  SpO2: 94% (07-10-24 @ 05:30) (94% - 97%)  Wt(kg): --  I&O's Summary    09 Jul 2024 07:01  -  10 Jul 2024 07:00  --------------------------------------------------------  IN: 650 mL / OUT: 1400 mL / NET: -750 mL    10 Jul 2024 07:01  -  10 Jul 2024 10:10  --------------------------------------------------------  IN: 120 mL / OUT: 0 mL / NET: 120 mL        Moore:  Central/PICC/Mid Line:                                         Appearance: Normal	  HEENT:   Normal oral mucosa, PERRL, EOMI	  Neck: Supple, + JVD/ - JVD; Carotid Bruit   Cardiovascular: Normal S1 S2, No JVD, No murmurs,   Respiratory: Lungs clear to auscultation/Decreased Breath Sounds/No Rales, Rhonchi, Wheezing	  Gastrointestinal:  Soft, Non-tender, + BS	  Skin: No rashes, No ecchymoses, No cyanosis  Extremities: Normal range of motion, No clubbing, cyanosis or edema  Vascular: Peripheral pulses palpable 2+ bilaterally  Neurologic: Non-focal  Psychiatry: A & O x 3, Mood & affect appropriate      	    ECG:  	  RADIOLOGY:   DIAGNOSTIC TESTING:  [ ] Echocardiogram:  [ ]  Catheterization:  [ ] Stress Test:    [ ] KHUSHI  OTHER: 	    LABS:	 	  CARDIAC MARKERS:                                  16.8   4.05  )-----------( x        ( 10 Jul 2024 05:30 )             52.5     07-10    137  |  107  |  27<H>  ----------------------------<  123<H>  See Note   |  17<L>  |  0.90    Ca    8.0<L>      10 Jul 2024 05:30  Phos  3.4     07-09  Mg     2.3     07-10    TPro  6.1  /  Alb  1.8<L>  /  TBili  0.9  /  DBili  x   /  AST  See Note  /  ALT  See Note  /  AlkPhos  84  07-10    proBNP:   Lipid Profile:   HgA1c:   TSH:   PT/INR - ( 08 Jul 2024 18:53 )   PT: 20.9 sec;   INR: 1.87          PTT - ( 08 Jul 2024 18:53 )  PTT:37.9 sec    ASSESSMENT/PLAN: 	        DVT ppx:  Dispo:     Interventional Cardiology PA Adult Progress Note    C.C.: SOB    Subjective Assessment:  Patient seen and examined at bedside. Pt complained of chest pain overnight but pain has spontaneously resolved. Otherwise, no acute complaint. Pt denies any fever, chills, palpitations, n/v/d, abdominal pain, dysuria, leg pain/edema.    ROS Negative except as per Subjective and HPI  	  MEDICATIONS:  torsemide 10 milliGRAM(s) Oral daily    remdesivir  IVPB 100 milliGRAM(s) IV Intermittent every 24 hours  remdesivir  IVPB   IV Intermittent       acetaminophen     Tablet .. 650 milliGRAM(s) Oral every 6 hours PRN  melatonin 3 milliGRAM(s) Oral at bedtime PRN  ondansetron Injectable 4 milliGRAM(s) IV Push every 8 hours PRN    lactulose Syrup 10 Gram(s) Oral daily PRN  senna 2 Tablet(s) Oral at bedtime    dexAMETHasone     Tablet 6 milliGRAM(s) Oral daily  dextrose 50% Injectable 25 Gram(s) IV Push once  dextrose 50% Injectable 12.5 Gram(s) IV Push once  dextrose 50% Injectable 25 Gram(s) IV Push once  dextrose Oral Gel 15 Gram(s) Oral once PRN  glucagon  Injectable 1 milliGRAM(s) IntraMuscular once  insulin lispro (ADMELOG) corrective regimen sliding scale   SubCutaneous Before meals and at bedtime    apixaban 5 milliGRAM(s) Oral every 12 hours  dextrose 5%. 1000 milliLiter(s) IV Continuous <Continuous>  dextrose 5%. 1000 milliLiter(s) IV Continuous <Continuous>      	    [PHYSICAL EXAM:  TELEMETRY:  T(C): 36.3 (07-10-24 @ 05:30), Max: 36.4 (07-09-24 @ 16:12)  HR: 80 (07-10-24 @ 05:30) (80 - 82)  BP: 91/61 (07-10-24 @ 05:30) (88/55 - 96/62)  RR: 16 (07-10-24 @ 05:30) (16 - 19)  SpO2: 94% (07-10-24 @ 05:30) (94% - 97%)  Wt(kg): --  I&O's Summary    09 Jul 2024 07:01  -  10 Jul 2024 07:00  --------------------------------------------------------  IN: 650 mL / OUT: 1400 mL / NET: -750 mL    10 Jul 2024 07:01  -  10 Jul 2024 10:10  --------------------------------------------------------  IN: 120 mL / OUT: 0 mL / NET: 120 mL        Moore:  Central/PICC/Mid Line:                                         Appearance: Normal	  HEENT:   Normal oral mucosa, PERRL, EOMI	  Neck: Supple, + JVD; no Carotid Bruit   Cardiovascular: Paced rhythm. Normal S1 S2, No JVD, No murmurs,   Respiratory: Breathing comfortably on 4L NC, no acute respiratory distress. Basilar crackles ausculted on left > right.   Gastrointestinal:  Soft, Non-tender, + BS	  Skin: No rashes, No ecchymoses, No cyanosis  Extremities: Normal range of motion, No clubbing or cyanosis. Improved pedal edema.   Vascular: Warm and well perfused bilaterally. Peripheral pulses palpable 2+ bilaterally.   Neurologic: Non-focal  Psychiatry: A & O x 3, Mood & affect appropriate      	    ECG:  	  RADIOLOGY:   DIAGNOSTIC TESTING:  [ ] Echocardiogram:  [ ]  Catheterization:  [ ] Stress Test:    [ ] KHUSHI  OTHER: 	    LABS:	 	  CARDIAC MARKERS:                                  16.8   4.05  )-----------( x        ( 10 Jul 2024 05:30 )             52.5     07-10    137  |  107  |  27<H>  ----------------------------<  123<H>  See Note   |  17<L>  |  0.90    Ca    8.0<L>      10 Jul 2024 05:30  Phos  3.4     07-09  Mg     2.3     07-10    TPro  6.1  /  Alb  1.8<L>  /  TBili  0.9  /  DBili  x   /  AST  See Note  /  ALT  See Note  /  AlkPhos  84  07-10    proBNP:   Lipid Profile:   HgA1c:   TSH:   PT/INR - ( 08 Jul 2024 18:53 )   PT: 20.9 sec;   INR: 1.87          PTT - ( 08 Jul 2024 18:53 )  PTT:37.9 sec    ASSESSMENT/PLAN: 	        DVT ppx:  Dispo:     Interventional Cardiology PA Adult Progress Note    C.C.: SOB    Subjective Assessment:  Patient seen and examined at bedside. Pt complained of chest pain overnight but pain has spontaneously resolved. Otherwise, no acute complaint. Pt denies any fever, chills, palpitations, n/v/d, abdominal pain, dysuria, leg pain/edema.    ROS Negative except as per Subjective and HPI  	  MEDICATIONS:  torsemide 10 milliGRAM(s) Oral daily    remdesivir  IVPB 100 milliGRAM(s) IV Intermittent every 24 hours  remdesivir  IVPB   IV Intermittent       acetaminophen     Tablet .. 650 milliGRAM(s) Oral every 6 hours PRN  melatonin 3 milliGRAM(s) Oral at bedtime PRN  ondansetron Injectable 4 milliGRAM(s) IV Push every 8 hours PRN    lactulose Syrup 10 Gram(s) Oral daily PRN  senna 2 Tablet(s) Oral at bedtime    dexAMETHasone     Tablet 6 milliGRAM(s) Oral daily  dextrose 50% Injectable 25 Gram(s) IV Push once  dextrose 50% Injectable 12.5 Gram(s) IV Push once  dextrose 50% Injectable 25 Gram(s) IV Push once  dextrose Oral Gel 15 Gram(s) Oral once PRN  glucagon  Injectable 1 milliGRAM(s) IntraMuscular once  insulin lispro (ADMELOG) corrective regimen sliding scale   SubCutaneous Before meals and at bedtime    apixaban 5 milliGRAM(s) Oral every 12 hours  dextrose 5%. 1000 milliLiter(s) IV Continuous <Continuous>  dextrose 5%. 1000 milliLiter(s) IV Continuous <Continuous>      	    [PHYSICAL EXAM:  TELEMETRY:  T(C): 36.3 (07-10-24 @ 05:30), Max: 36.4 (07-09-24 @ 16:12)  HR: 80 (07-10-24 @ 05:30) (80 - 82)  BP: 91/61 (07-10-24 @ 05:30) (88/55 - 96/62)  RR: 16 (07-10-24 @ 05:30) (16 - 19)  SpO2: 94% (07-10-24 @ 05:30) (94% - 97%)  Wt(kg): --  I&O's Summary    09 Jul 2024 07:01  -  10 Jul 2024 07:00  --------------------------------------------------------  IN: 650 mL / OUT: 1400 mL / NET: -750 mL    10 Jul 2024 07:01  -  10 Jul 2024 10:10  --------------------------------------------------------  IN: 120 mL / OUT: 0 mL / NET: 120 mL        Moore:  Central/PICC/Mid Line:                                         Appearance: Normal	  HEENT:   Normal oral mucosa, PERRL, EOMI	  Neck: Supple, no Carotid Bruit   Cardiovascular: Paced rhythm. Normal S1 S2, No JVD, No murmurs,   Respiratory: Breathing comfortably on 4L NC, no acute respiratory distress. Basilar crackles ausculted on left > right.   Gastrointestinal:  Soft, Non-tender, + BS	  Skin: No rashes, No ecchymoses, No cyanosis  Extremities: Normal range of motion, No clubbing or cyanosis. Improved pedal edema.   Vascular: Warm and well perfused bilaterally.  Neurologic: Non-focal  Psychiatry: A & O x 3, Mood & affect appropriate      	    ECG:  	  RADIOLOGY:   DIAGNOSTIC TESTING:  [ ] Echocardiogram:  [ ]  Catheterization:  [ ] Stress Test:    [ ] KHUSHI  OTHER: 	    LABS:	 	  CARDIAC MARKERS:                                  16.8   4.05  )-----------( x        ( 10 Jul 2024 05:30 )             52.5     07-10    137  |  107  |  27<H>  ----------------------------<  123<H>  See Note   |  17<L>  |  0.90    Ca    8.0<L>      10 Jul 2024 05:30  Phos  3.4     07-09  Mg     2.3     07-10    TPro  6.1  /  Alb  1.8<L>  /  TBili  0.9  /  DBili  x   /  AST  See Note  /  ALT  See Note  /  AlkPhos  84  07-10    proBNP:   Lipid Profile:   HgA1c:   TSH:   PT/INR - ( 08 Jul 2024 18:53 )   PT: 20.9 sec;   INR: 1.87          PTT - ( 08 Jul 2024 18:53 )  PTT:37.9 sec    ASSESSMENT/PLAN: 	        DVT ppx:  Dispo:

## 2024-07-10 NOTE — PROGRESS NOTE ADULT - PROBLEM SELECTOR PLAN 1
.  PPSV = 50%, requires assistance for some ADLs  -PT Eval  -c/w supportive care, OOB, encourage movement

## 2024-07-10 NOTE — PROGRESS NOTE ADULT - PROBLEM SELECTOR PLAN 4
s/p ablation in 2016, follows w/ Dr. Pemberton   Rate Control: Toprol 50 mg daily held i/s/o hypotension  AC: Eliquis 5mg twice daily

## 2024-07-10 NOTE — PROGRESS NOTE ADULT - PROBLEM SELECTOR PLAN 6
.  Complex medical decision making / symptom management in the setting of advanced illness.    Will continue to follow for ongoing GOC discussion / titration of palliative regimen. Emotional support provided, questions answered.  Active Psychosocial Referrals:  [x]Social Work/Case management [x]PT/OT []Chaplaincy []Hospice  []Patient/Family Support []Holistic RN []Massage Therapy []Music Therapy []Ethics  Coping: [] well [x] with difficulty [] poor coping [] unable to assess  Support system: [] strong [x] adequate [] inadequate    For new or uncontrolled symptoms, please call Palliative Care at [phone]). The service is available 24/7 (including nights & weekends) to provide symptom management recommendations over the phone as appropriate

## 2024-07-10 NOTE — PROGRESS NOTE ADULT - TIME BILLING
Emotional Support/Supportive Care and Clarification of Potential Disease Trajectory related to Advanced Cardiac Disease  Assessment of Symptom [place] and Palliative regimen  Exploration of GOC/Advanced Directives including HCP designation, code status, and hospice eligibility    Time inclusive of chart review, medication ordering, discussion with primary team, clinical documentation, and communication with family/caregiver

## 2024-07-10 NOTE — CONSULT NOTE ADULT - ATTENDING COMMENTS
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   Hypotensive since admission. Patient denies any change in his clinical status since admission.   No fevers nor leukocytosis. Blood cultures since admission NG>48 hours  On my evaluation SBP 90s, MAP 70s. Sent lactate which was negative. extremities cold.  Not in septic shock.   low blood pressures likely related to heart failure, recommend continued palliative involvement. Consider CCU evaluation. Discussed with CCU fellow.    Decision making high complexity
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 He appears intravascularly mildly overloaded on exam.    -Currently receiving decadron/remdesivir for COVID-19 infection  -GDMT: holding for now given soft BPs and will resume as appropriate though may not tolerate. At home was on Entresto 24-26, toprol 50 (has device), lili 25, and Farxiga 10. Will at minimum restart SGL2i before discharge  -Diuretics: restart torsemide 10 mg daily (home dose)  -Would consult palliative care given advanced amyloid to begin long term care planning conversations though suspect this decompensation is related to COVID-19 which may improve with time/treatment    HF team will follow peripherally at this time and provide GDMT recommendations based on vitals.

## 2024-07-10 NOTE — PROGRESS NOTE ADULT - PROBLEM SELECTOR PLAN 4
.  Advanced TTR with LV Dysfunction  -pending repeat echo  -c/w Vyndamax  -will evaluate hospice eligibility as acute illness stabilizes as decompensation may be reversible

## 2024-07-10 NOTE — PROGRESS NOTE ADULT - PROBLEM SELECTOR PLAN 5
.  Patient is Full Code  -wife would be surrogate decision maker in the absence of documented HCP  -patient currently deferring decision making to his family

## 2024-07-10 NOTE — PROGRESS NOTE ADULT - ASSESSMENT
78yM w/ PMHx ATTR Cardiac Amyloid, HFrEF (EF 30-35%) s/p CRT-P (2/2022), multiple HFrEF admissions, AFib s/p ablation (6/2016 on eliquis), hx R sided Pleural Effusion s/p thoracentesis c/b PTX (2022), T2DM, idiopathic thrombocytopenia, prostate ca s/p chemotherapy p/w 2 weeks of weak, lethargy, SOB and decreased PO intake. Pt admitted to [facility] for presumed HFrEF exacerbation. Pt found to be COVID+ and GDMT held initially d/t hypotension. Advanced HF consulted for GDMT re-initation. ID consulted for assistance in COVID management. Palliative consulted for aggressive GOC discussion. Sx likely 2/2 COVID PNA.  78yM w/ PMHx ATTR Cardiac Amyloid, HFrEF (EF 30-35%) s/p CRT-P (2/2022), multiple HFrEF admissions, AFib s/p ablation (6/2016 on eliquis), hx R sided Pleural Effusion s/p thoracentesis c/b PTX (2022), T2DM, idiopathic thrombocytopenia, prostate ca s/p chemotherapy p/w 2 weeks of weak, lethargy, SOB and decreased PO intake. Pt admitted to [facility] Cardiac [facility] for presumed HFrEF exacerbation. Pt found to be COVID+ and GDMT held initially d/t hypotension. Advanced HF consulted for GDMT re-initation. ID consulted for assistance in COVID management. Palliative consulted, pt to remain full code. Sx likely 2/2 COVID PNA.

## 2024-07-10 NOTE — CONSULT NOTE ADULT - CONSULT REASON
COVID 19
Stage C HFrEF 2/2 ATTR amyloid
Hypotension
Symptom Management and Complex Medical Decision Making/GOC in the setting of Advanced Cardiac Disease

## 2024-07-11 LAB
ALBUMIN SERPL ELPH-MCNC: 2.7 G/DL — LOW (ref 3.3–5)
ALP SERPL-CCNC: 86 U/L — SIGNIFICANT CHANGE UP (ref 40–120)
ALT FLD-CCNC: 17 U/L — SIGNIFICANT CHANGE UP (ref 10–45)
ANION GAP SERPL CALC-SCNC: 10 MMOL/L — SIGNIFICANT CHANGE UP (ref 5–17)
ANISOCYTOSIS BLD QL: SLIGHT — SIGNIFICANT CHANGE UP
AST SERPL-CCNC: 16 U/L — SIGNIFICANT CHANGE UP (ref 10–40)
BASOPHILS # BLD AUTO: 0 K/UL — SIGNIFICANT CHANGE UP (ref 0–0.2)
BASOPHILS NFR BLD AUTO: 0 % — SIGNIFICANT CHANGE UP (ref 0–2)
BILIRUB SERPL-MCNC: 0.8 MG/DL — SIGNIFICANT CHANGE UP (ref 0.2–1.2)
BUN SERPL-MCNC: 30 MG/DL — HIGH (ref 7–23)
BURR CELLS BLD QL SMEAR: PRESENT — SIGNIFICANT CHANGE UP
CALCIUM SERPL-MCNC: 8.4 MG/DL — SIGNIFICANT CHANGE UP (ref 8.4–10.5)
CHLORIDE SERPL-SCNC: 109 MMOL/L — HIGH (ref 96–108)
CO2 SERPL-SCNC: 23 MMOL/L — SIGNIFICANT CHANGE UP (ref 22–31)
CREAT SERPL-MCNC: 0.94 MG/DL — SIGNIFICANT CHANGE UP (ref 0.5–1.3)
DACRYOCYTES BLD QL SMEAR: SLIGHT — SIGNIFICANT CHANGE UP
EGFR: 84 ML/MIN/1.73M2 — SIGNIFICANT CHANGE UP
EOSINOPHIL # BLD AUTO: 0 K/UL — SIGNIFICANT CHANGE UP (ref 0–0.5)
EOSINOPHIL NFR BLD AUTO: 0 % — SIGNIFICANT CHANGE UP (ref 0–6)
GIANT PLATELETS BLD QL SMEAR: PRESENT — SIGNIFICANT CHANGE UP
GLUCOSE BLDC GLUCOMTR-MCNC: 102 MG/DL — HIGH (ref 70–99)
GLUCOSE BLDC GLUCOMTR-MCNC: 122 MG/DL — HIGH (ref 70–99)
GLUCOSE BLDC GLUCOMTR-MCNC: 150 MG/DL — HIGH (ref 70–99)
GLUCOSE BLDC GLUCOMTR-MCNC: 163 MG/DL — HIGH (ref 70–99)
GLUCOSE SERPL-MCNC: 113 MG/DL — HIGH (ref 70–99)
HCT VFR BLD CALC: 46.9 % — SIGNIFICANT CHANGE UP (ref 39–50)
HGB BLD-MCNC: 16 G/DL — SIGNIFICANT CHANGE UP (ref 13–17)
HYPOCHROMIA BLD QL: SLIGHT — SIGNIFICANT CHANGE UP
LACTATE SERPL-SCNC: 1.7 MMOL/L — SIGNIFICANT CHANGE UP (ref 0.5–2)
LACTATE SERPL-SCNC: 1.8 MMOL/L — SIGNIFICANT CHANGE UP (ref 0.5–2)
LACTATE SERPL-SCNC: 2.2 MMOL/L — HIGH (ref 0.5–2)
LYMPHOCYTES # BLD AUTO: 0.32 K/UL — LOW (ref 1–3.3)
LYMPHOCYTES # BLD AUTO: 7.1 % — LOW (ref 13–44)
MACROCYTES BLD QL: SLIGHT — SIGNIFICANT CHANGE UP
MAGNESIUM SERPL-MCNC: 2.4 MG/DL — SIGNIFICANT CHANGE UP (ref 1.6–2.6)
MANUAL SMEAR VERIFICATION: SIGNIFICANT CHANGE UP
MCHC RBC-ENTMCNC: 29.6 PG — SIGNIFICANT CHANGE UP (ref 27–34)
MCHC RBC-ENTMCNC: 34.1 GM/DL — SIGNIFICANT CHANGE UP (ref 32–36)
MCV RBC AUTO: 86.9 FL — SIGNIFICANT CHANGE UP (ref 80–100)
METAMYELOCYTES # FLD: 0.9 % — HIGH (ref 0–0)
MONOCYTES # BLD AUTO: 0.2 K/UL — SIGNIFICANT CHANGE UP (ref 0–0.9)
MONOCYTES NFR BLD AUTO: 4.5 % — SIGNIFICANT CHANGE UP (ref 2–14)
NEUTROPHILS # BLD AUTO: 3.9 K/UL — SIGNIFICANT CHANGE UP (ref 1.8–7.4)
NEUTROPHILS NFR BLD AUTO: 87.5 % — HIGH (ref 43–77)
OVALOCYTES BLD QL SMEAR: SLIGHT — SIGNIFICANT CHANGE UP
PLAT MORPH BLD: ABNORMAL
PLATELET # BLD AUTO: 171 K/UL — SIGNIFICANT CHANGE UP (ref 150–400)
POIKILOCYTOSIS BLD QL AUTO: SIGNIFICANT CHANGE UP
POTASSIUM SERPL-MCNC: 3.7 MMOL/L — SIGNIFICANT CHANGE UP (ref 3.5–5.3)
POTASSIUM SERPL-SCNC: 3.7 MMOL/L — SIGNIFICANT CHANGE UP (ref 3.5–5.3)
PROT SERPL-MCNC: 6.2 G/DL — SIGNIFICANT CHANGE UP (ref 6–8.3)
RBC # BLD: 5.4 M/UL — SIGNIFICANT CHANGE UP (ref 4.2–5.8)
RBC # FLD: 15.1 % — HIGH (ref 10.3–14.5)
RBC BLD AUTO: ABNORMAL
SMUDGE CELLS # BLD: PRESENT — SIGNIFICANT CHANGE UP
SODIUM SERPL-SCNC: 142 MMOL/L — SIGNIFICANT CHANGE UP (ref 135–145)
SPHEROCYTES BLD QL SMEAR: SLIGHT — SIGNIFICANT CHANGE UP
WBC # BLD: 4.46 K/UL — SIGNIFICANT CHANGE UP (ref 3.8–10.5)
WBC # FLD AUTO: 4.46 K/UL — SIGNIFICANT CHANGE UP (ref 3.8–10.5)

## 2024-07-11 PROCEDURE — 99233 SBSQ HOSP IP/OBS HIGH 50: CPT

## 2024-07-11 RX ORDER — POTASSIUM CHLORIDE 600 MG/1
20 TABLET, FILM COATED, EXTENDED RELEASE ORAL ONCE
Refills: 0 | Status: COMPLETED | OUTPATIENT
Start: 2024-07-11 | End: 2024-07-11

## 2024-07-11 RX ORDER — POLYETHYLENE GLYCOL 3350 1 G/G
17 POWDER ORAL ONCE
Refills: 0 | Status: COMPLETED | OUTPATIENT
Start: 2024-07-11 | End: 2024-07-11

## 2024-07-11 RX ADMIN — APIXABAN 5 MILLIGRAM(S): 5 TABLET, FILM COATED ORAL at 13:35

## 2024-07-11 RX ADMIN — POLYETHYLENE GLYCOL 3350 17 GRAM(S): 1 POWDER ORAL at 19:33

## 2024-07-11 RX ADMIN — POTASSIUM CHLORIDE 20 MILLIEQUIVALENT(S): 600 TABLET, FILM COATED, EXTENDED RELEASE ORAL at 08:28

## 2024-07-11 RX ADMIN — APIXABAN 5 MILLIGRAM(S): 5 TABLET, FILM COATED ORAL at 23:02

## 2024-07-11 RX ADMIN — REMDESIVIR 200 MILLIGRAM(S): 5 INJECTION INTRAVENOUS at 22:37

## 2024-07-11 RX ADMIN — Medication 3 MILLIGRAM(S): at 22:38

## 2024-07-11 RX ADMIN — Medication 650 MILLIGRAM(S): at 23:03

## 2024-07-11 RX ADMIN — Medication 650 MILLIGRAM(S): at 22:13

## 2024-07-11 RX ADMIN — APIXABAN 5 MILLIGRAM(S): 5 TABLET, FILM COATED ORAL at 00:04

## 2024-07-11 RX ADMIN — DEXAMETHASONE 6 MILLIGRAM(S): 1 TABLET ORAL at 05:29

## 2024-07-11 NOTE — PROGRESS NOTE ADULT - PROBLEM SELECTOR PLAN 2
Etiology: NICM, ATTR cardiac amyloid   TTE (7/9/24): EF 30-35%, severe symmetric LVH, RVH present, mod-severely reduced RV systolic function, severe biatrial enlargement, small pericardial effusion without echo evidence of cardiac tamponade physiology  - GDMT: Entresto 24-26 bid, Farxiga 10mg qd, Toprol 50mg qd - ALL HELD i/s/o hypotension in ED  - Diuresis: holding torsemide 10mg in setting of hypotension to SBP 80, gave IV NS 250cc bolus over 2 hours on 7/10  - Advanced HF consulted, appreciate recs  - EKG showing V-paced at 80bpm  - strict I&Os, daily standing weights, fluid restriction Etiology: NICM, ATTR cardiac amyloid   TTE (7/9/24): EF 30-35%, severe symmetric LVH, RVH present, mod-severely reduced RV systolic function, severe biatrial enlargement, small pericardial effusion without echo evidence of cardiac tamponade physiology  - GDMT: Entresto 24-26 bid, Farxiga 10mg qd, Toprol 50mg qd - ALL HELD i/s/o hypotension in ED  - Diuresis: holding torsemide 10mg in setting of hypotension   - Advanced HF consulted, appreciate recs  - EKG showing V-paced at 80bpm  - strict I&Os, daily standing weights, fluid restriction

## 2024-07-11 NOTE — PROGRESS NOTE ADULT - PROBLEM SELECTOR PLAN 7
H/o thrombocytopenia, idiopathic,  this admission, improved from baseline   Prior heme workup on previous admission   -monitor PLT count   -transfuse PLT < 10 spontaneous, < 50 w/ active bleeding H/o thrombocytopenia, idiopathic,  this admission, improved from baseline   Prior heme workup on previous admission   - monitor PLT count   - transfuse PLT < 10 spontaneous, < 50 w/ active bleeding

## 2024-07-11 NOTE — PROGRESS NOTE ADULT - PROBLEM SELECTOR PLAN 5
TTR amyloid confirmed on Tc-PYP scan   - Continue home Vyndemax 61 mg qd (to bring from home) TTR amyloid confirmed on Tc-PYP scan   -Continue home Vyndemax 61 mg qd (to bring from home)

## 2024-07-11 NOTE — PROGRESS NOTE ADULT - ASSESSMENT
78yM w/ PMHx ATTR Cardiac Amyloid, HFrEF (EF 30-35%) s/p CRT-P (2/2022), multiple HFrEF admissions, AFib s/p ablation (6/2016 on eliquis), hx R sided Pleural Effusion s/p thoracentesis c/b PTX (2022), T2DM, idiopathic thrombocytopenia, prostate ca s/p chemotherapy p/w 2 weeks of weak, lethargy, SOB and decreased PO intake. Pt admitted to [facility] for presumed HFrEF exacerbation. Pt found to be COVID+ and GDMT held initially d/t hypotension. Advanced HF consulted for GDMT re-initation. ID consulted for assistance in COVID management. Palliative consulted, pt to remain full code. Sx likely 2/2 COVID PNA.  78yM w/ PMHx ATTR Cardiac Amyloid, HFrEF (EF 30-35%) s/p CRT-P (2/2022), multiple HFrEF admissions, AFib s/p ablation (6/2016 on eliquis), hx R sided Pleural Effusion s/p thoracentesis c/b PTX (2022), T2DM, idiopathic thrombocytopenia, prostate ca s/p chemotherapy p/w 2 weeks of weak, lethargy, SOB and decreased PO intake. Pt admitted to [facility] for presumed HFrEF exacerbation. Pt found to be COVID+ and GDMT held initially d/t hypotension. Advanced HF consulted for GDMT re-initation. ID consulted for assistance in COVID management. Palliative consulted, pt to remain full code. Sx likely 2/2 COVID PNA. MICU consulted on 7/10 for persistent hypotension, however determined  78yM w/ PMHx ATTR Cardiac Amyloid, HFrEF (EF 30-35%) s/p CRT-P (2/2022), multiple HFrEF admissions, AFib s/p ablation (6/2016 on eliquis), hx R sided Pleural Effusion s/p thoracentesis c/b PTX (2022), T2DM, idiopathic thrombocytopenia, prostate ca s/p chemotherapy p/w 2 weeks of weak, lethargy, SOB and decreased PO intake. Pt admitted to [facility] Cardiac [facility] for presumed HFrEF exacerbation. Pt found to be COVID+ and GDMT held initially d/t hypotension. Advanced HF consulted for GDMT re-initation. ID consulted for assistance in COVID management. Palliative consulted, pt to remain full code. Sx likely 2/2 COVID PNA. MICU consulted on 7/10 for persistent hypotension, however determined pt is currently stable for  cardiac telemetry with close monitoring.

## 2024-07-11 NOTE — PROGRESS NOTE ADULT - PROBLEM SELECTOR PLAN 8
F: None  E: replete K < 4, Mg <2  DVT ppx- Eliquis 5mg bid   Diet- DASH  Activity- ambulate as tolerated  Code- Full. F: None   E: replete K < 4, Mg <2  DVT ppx- Eliquis 5mg bid   Diet- DASH  Activity- ambulate as tolerated  Code- Full

## 2024-07-11 NOTE — PROGRESS NOTE ADULT - PROBLEM SELECTOR PLAN 3
Requiring 4L NC O2  - wean as tolerated, satting 91-97% Requiring 4L NC O2  - wean as tolerated, satting 91-97%  Lactate 1.9 -> 2.2 Requiring 4L NC O2 secondary to COVID-19  - wean as tolerated, satting 91-97%  - lactate 7/11: 2.2-->1.8  - AST/ALT 16/17  - continue to monitor for worsening respiratory status and hypotension

## 2024-07-11 NOTE — PROGRESS NOTE ADULT - SUBJECTIVE AND OBJECTIVE BOX
Interventional Cardiology PA Adult Progress Note    C.C.:     Subjective Assessment:      ROS Negative except as per Subjective and HPI  	  MEDICATIONS:    remdesivir  IVPB   IV Intermittent   remdesivir  IVPB 100 milliGRAM(s) IV Intermittent every 24 hours      acetaminophen     Tablet .. 650 milliGRAM(s) Oral every 6 hours PRN  melatonin 3 milliGRAM(s) Oral at bedtime PRN  ondansetron Injectable 4 milliGRAM(s) IV Push every 8 hours PRN    lactulose Syrup 10 Gram(s) Oral daily PRN  senna 2 Tablet(s) Oral at bedtime    dexAMETHasone     Tablet 6 milliGRAM(s) Oral daily  dextrose 50% Injectable 25 Gram(s) IV Push once  dextrose 50% Injectable 12.5 Gram(s) IV Push once  dextrose 50% Injectable 25 Gram(s) IV Push once  dextrose Oral Gel 15 Gram(s) Oral once PRN  glucagon  Injectable 1 milliGRAM(s) IntraMuscular once  insulin lispro (ADMELOG) corrective regimen sliding scale   SubCutaneous Before meals and at bedtime    apixaban 5 milliGRAM(s) Oral every 12 hours  dextrose 5%. 1000 milliLiter(s) IV Continuous <Continuous>  dextrose 5%. 1000 milliLiter(s) IV Continuous <Continuous>  potassium chloride   Powder 20 milliEquivalent(s) Oral once      	    [PHYSICAL EXAM:  TELEMETRY:  T(C): 36.7 (07-11-24 @ 05:26), Max: 36.7 (07-10-24 @ 20:16)  HR: 84 (07-11-24 @ 05:26) (65 - 84)  BP: 92/68 (07-11-24 @ 05:26) (81/54 - 94/66)  RR: 17 (07-11-24 @ 05:26) (17 - 18)  SpO2: 93% (07-11-24 @ 05:26) (91% - 95%)  Wt(kg): --  I&O's Summary    10 Jul 2024 07:01  -  11 Jul 2024 07:00  --------------------------------------------------------  IN: 120 mL / OUT: 575 mL / NET: -455 mL        Moore:  Central/PICC/Mid Line:                                         Appearance: Normal	  HEENT:   Normal oral mucosa, PERRL, EOMI	  Neck: Supple, + JVD/ - JVD; Carotid Bruit   Cardiovascular: Normal S1 S2, No JVD, No murmurs,   Respiratory: Lungs clear to auscultation/Decreased Breath Sounds/No Rales, Rhonchi, Wheezing	  Gastrointestinal:  Soft, Non-tender, + BS	  Skin: No rashes, No ecchymoses, No cyanosis  Extremities: Normal range of motion, No clubbing, cyanosis or edema  Vascular: Peripheral pulses palpable 2+ bilaterally  Neurologic: Non-focal  Psychiatry: A & O x 3, Mood & affect appropriate      	      LABS:	 	  CARDIAC MARKERS:                                  16.0   4.46  )-----------( 171      ( 11 Jul 2024 05:30 )             46.9     07-11    142  |  109<H>  |  30<H>  ----------------------------<  113<H>  3.7   |  23  |  0.94    Ca    8.4      11 Jul 2024 05:30  Mg     2.4     07-11    TPro  6.2  /  Alb  2.7<L>  /  TBili  0.8  /  DBili  x   /  AST  16  /  ALT  17  /  AlkPhos  86  07-11    proBNP:   Lipid Profile:   HgA1c:   TSH:       ASSESSMENT/PLAN: 	        DVT ppx:  Dispo:     Interventional Cardiology PA Adult Progress Note    C.C.: SOB    Subjective Assessment:  Patient seen and examined at bedside. Pt reports he continued to have intermittent coughs, otherwise no acute complaints. Pt denies any fever, chills, palpitations, sob, n/v/d, abdominal pain, dysuria, leg pain/edema.    ROS Negative except as per Subjective and HPI  	  MEDICATIONS:    remdesivir  IVPB   IV Intermittent   remdesivir  IVPB 100 milliGRAM(s) IV Intermittent every 24 hours      acetaminophen     Tablet .. 650 milliGRAM(s) Oral every 6 hours PRN  melatonin 3 milliGRAM(s) Oral at bedtime PRN  ondansetron Injectable 4 milliGRAM(s) IV Push every 8 hours PRN    lactulose Syrup 10 Gram(s) Oral daily PRN  senna 2 Tablet(s) Oral at bedtime    dexAMETHasone     Tablet 6 milliGRAM(s) Oral daily  dextrose 50% Injectable 25 Gram(s) IV Push once  dextrose 50% Injectable 12.5 Gram(s) IV Push once  dextrose 50% Injectable 25 Gram(s) IV Push once  dextrose Oral Gel 15 Gram(s) Oral once PRN  glucagon  Injectable 1 milliGRAM(s) IntraMuscular once  insulin lispro (ADMELOG) corrective regimen sliding scale   SubCutaneous Before meals and at bedtime    apixaban 5 milliGRAM(s) Oral every 12 hours  dextrose 5%. 1000 milliLiter(s) IV Continuous <Continuous>  dextrose 5%. 1000 milliLiter(s) IV Continuous <Continuous>  potassium chloride   Powder 20 milliEquivalent(s) Oral once      	    [PHYSICAL EXAM:  TELEMETRY:  T(C): 36.7 (07-11-24 @ 05:26), Max: 36.7 (07-10-24 @ 20:16)  HR: 84 (07-11-24 @ 05:26) (65 - 84)  BP: 92/68 (07-11-24 @ 05:26) (81/54 - 94/66)  RR: 17 (07-11-24 @ 05:26) (17 - 18)  SpO2: 93% (07-11-24 @ 05:26) (91% - 95%)  Wt(kg): --  I&O's Summary    10 Jul 2024 07:01  -  11 Jul 2024 07:00  --------------------------------------------------------  IN: 120 mL / OUT: 575 mL / NET: -455 mL                                    Appearance: Normal	  HEENT:   Normal oral mucosa, PERRL, EOMI	  Neck: Supple, no Carotid Bruit   Cardiovascular: Paced rhythm. Normal S1 S2, No JVD, No murmurs,   Respiratory: Breathing comfortably on 4L NC, no acute respiratory distress. Basilar crackles ausculted on L lung. R lung clear to auscultation.   Gastrointestinal:  Soft, Non-tender, + BS	  Skin: No rashes, No ecchymoses, No cyanosis  Extremities: Normal range of motion, No clubbing or cyanosis. Improved pedal edema.   Vascular: Extremities slightly cool to touch. 1+ DP/PT pulses bilaterally.   Neurologic: Non-focal  Psychiatry: A & O x 3, Mood & affect appropriate  	      LABS:	 	  CARDIAC MARKERS:                                  16.0   4.46  )-----------( 171      ( 11 Jul 2024 05:30 )             46.9     07-11    142  |  109<H>  |  30<H>  ----------------------------<  113<H>  3.7   |  23  |  0.94    Ca    8.4      11 Jul 2024 05:30  Mg     2.4     07-11    TPro  6.2  /  Alb  2.7<L>  /  TBili  0.8  /  DBili  x   /  AST  16  /  ALT  17  /  AlkPhos  86  07-11    proBNP:   Lipid Profile:   HgA1c:   TSH:       ASSESSMENT/PLAN: 	        DVT ppx:  Dispo:     Interventional Cardiology PA Adult Progress Note    C.C.: SOB    Subjective Assessment:  Patient seen and examined at bedside. Pt reports he continued to have intermittent coughs, otherwise no acute complaints. Pt denies any fever, chills, palpitations, sob, n/v/d, abdominal pain, dysuria, leg pain/edema.    ROS Negative except as per Subjective and HPI  	  MEDICATIONS:    remdesivir  IVPB   IV Intermittent   remdesivir  IVPB 100 milliGRAM(s) IV Intermittent every 24 hours      acetaminophen     Tablet .. 650 milliGRAM(s) Oral every 6 hours PRN  melatonin 3 milliGRAM(s) Oral at bedtime PRN  ondansetron Injectable 4 milliGRAM(s) IV Push every 8 hours PRN    lactulose Syrup 10 Gram(s) Oral daily PRN  senna 2 Tablet(s) Oral at bedtime    dexAMETHasone     Tablet 6 milliGRAM(s) Oral daily  dextrose 50% Injectable 25 Gram(s) IV Push once  dextrose 50% Injectable 12.5 Gram(s) IV Push once  dextrose 50% Injectable 25 Gram(s) IV Push once  dextrose Oral Gel 15 Gram(s) Oral once PRN  glucagon  Injectable 1 milliGRAM(s) IntraMuscular once  insulin lispro (ADMELOG) corrective regimen sliding scale   SubCutaneous Before meals and at bedtime    apixaban 5 milliGRAM(s) Oral every 12 hours  dextrose 5%. 1000 milliLiter(s) IV Continuous <Continuous>  dextrose 5%. 1000 milliLiter(s) IV Continuous <Continuous>  potassium chloride   Powder 20 milliEquivalent(s) Oral once      	    [PHYSICAL EXAM:  TELEMETRY:  T(C): 36.7 (07-11-24 @ 05:26), Max: 36.7 (07-10-24 @ 20:16)  HR: 84 (07-11-24 @ 05:26) (65 - 84)  BP: 92/68 (07-11-24 @ 05:26) (81/54 - 94/66)  RR: 17 (07-11-24 @ 05:26) (17 - 18)  SpO2: 93% (07-11-24 @ 05:26) (91% - 95%)  Wt(kg): --  I&O's Summary    10 Jul 2024 07:01  -  11 Jul 2024 07:00  --------------------------------------------------------  IN: 120 mL / OUT: 575 mL / NET: -455 mL                                    Appearance: Normal	  HEENT:   Normal oral mucosa, PERRL, EOMI	  Neck: Supple, no Carotid Bruit B/L  Cardiovascular: Paced rhythm. Normal S1 S2, No JVD, No murmurs  Respiratory: Breathing comfortably on 4L NC, no acute respiratory distress. Basilar crackles ausculted on L lung. R lung clear to auscultation.   Gastrointestinal:  Soft, Non-tender, + BS	  Skin: No rashes, No ecchymoses, No cyanosis  Extremities: Normal range of motion, No clubbing or cyanosis. Improved pedal edema.   Vascular: Extremities slightly cool to touch. 1+ DP/PT pulses bilaterally.   Neurologic: Non-focal  Psychiatry: A & O x 3, Mood & affect appropriate  	      LABS:	 	  CARDIAC MARKERS:                                  16.0   4.46  )-----------( 171      ( 11 Jul 2024 05:30 )             46.9     07-11    142  |  109<H>  |  30<H>  ----------------------------<  113<H>  3.7   |  23  |  0.94    Ca    8.4      11 Jul 2024 05:30  Mg     2.4     07-11    TPro  6.2  /  Alb  2.7<L>  /  TBili  0.8  /  DBili  x   /  AST  16  /  ALT  17  /  AlkPhos  86  07-11    proBNP:   Lipid Profile:   HgA1c:   TSH:       ASSESSMENT/PLAN: 	        DVT ppx:  Dispo:

## 2024-07-11 NOTE — PROGRESS NOTE ADULT - PROBLEM SELECTOR PLAN 1
CXR 7/8/24: Infiltrates/pulmonary vascular congestion and small right pleural effusion.   - IVPB Remdesivir 100 mg Q24 x 4 days (7/8/24-7/11/24) + PO Dex 6 mg Q 24 x 10 days  - ID consulted for assistance, appreciate recs CXR 7/8/24: Infiltrates/pulmonary vascular congestion and small right pleural effusion.   - Remained afebrile, WBC 4.46  - IVPB Remdesivir 100 mg Q24 x 4 days (7/8/24-7/11/24) + PO Dex 6 mg Q 24 x 10 days  - ID consulted for assistance, appreciate recs

## 2024-07-11 NOTE — PROGRESS NOTE ADULT - PROBLEM SELECTOR PLAN 4
s/p ablation in 2016, follows w/ Dr. Pemberton   Rate Control: Toprol 50 mg daily held i/s/o hypotension  AC: Eliquis 5mg twice daily s/p ablation in 2016, follows w/ Dr. Pemberton   Rate Control: Toprol 50 mg daily held i/s/o hypotension   AC: Eliquis 5mg twice daily

## 2024-07-12 ENCOUNTER — RESULT REVIEW (OUTPATIENT)
Age: 79
End: 2024-07-12

## 2024-07-12 DIAGNOSIS — I95.9 HYPOTENSION, UNSPECIFIED: ICD-10-CM

## 2024-07-12 DIAGNOSIS — R53.81 OTHER MALAISE: ICD-10-CM

## 2024-07-12 DIAGNOSIS — Z71.89 OTHER SPECIFIED COUNSELING: ICD-10-CM

## 2024-07-12 DIAGNOSIS — Z51.5 ENCOUNTER FOR PALLIATIVE CARE: ICD-10-CM

## 2024-07-12 DIAGNOSIS — K59.00 CONSTIPATION, UNSPECIFIED: ICD-10-CM

## 2024-07-12 LAB
ADD ON TEST-SPECIMEN IN LAB: SIGNIFICANT CHANGE UP
ALBUMIN SERPL ELPH-MCNC: 2.2 G/DL — LOW (ref 3.3–5)
ALBUMIN SERPL ELPH-MCNC: 2.4 G/DL — LOW (ref 3.3–5)
ALP SERPL-CCNC: 74 U/L — SIGNIFICANT CHANGE UP (ref 40–120)
ALP SERPL-CCNC: 80 U/L — SIGNIFICANT CHANGE UP (ref 40–120)
ALT FLD-CCNC: 14 U/L — SIGNIFICANT CHANGE UP (ref 10–45)
ALT FLD-CCNC: 15 U/L — SIGNIFICANT CHANGE UP (ref 10–45)
ANION GAP SERPL CALC-SCNC: 7 MMOL/L — SIGNIFICANT CHANGE UP (ref 5–17)
ANION GAP SERPL CALC-SCNC: 8 MMOL/L — SIGNIFICANT CHANGE UP (ref 5–17)
AST SERPL-CCNC: 15 U/L — SIGNIFICANT CHANGE UP (ref 10–40)
AST SERPL-CCNC: 15 U/L — SIGNIFICANT CHANGE UP (ref 10–40)
BILIRUB SERPL-MCNC: 0.7 MG/DL — SIGNIFICANT CHANGE UP (ref 0.2–1.2)
BILIRUB SERPL-MCNC: 0.8 MG/DL — SIGNIFICANT CHANGE UP (ref 0.2–1.2)
BUN SERPL-MCNC: 32 MG/DL — HIGH (ref 7–23)
BUN SERPL-MCNC: 33 MG/DL — HIGH (ref 7–23)
CALCIUM SERPL-MCNC: 7.8 MG/DL — LOW (ref 8.4–10.5)
CALCIUM SERPL-MCNC: 8 MG/DL — LOW (ref 8.4–10.5)
CHLORIDE SERPL-SCNC: 107 MMOL/L — SIGNIFICANT CHANGE UP (ref 96–108)
CHLORIDE SERPL-SCNC: 108 MMOL/L — SIGNIFICANT CHANGE UP (ref 96–108)
CO2 SERPL-SCNC: 22 MMOL/L — SIGNIFICANT CHANGE UP (ref 22–31)
CO2 SERPL-SCNC: 24 MMOL/L — SIGNIFICANT CHANGE UP (ref 22–31)
CREAT SERPL-MCNC: 0.92 MG/DL — SIGNIFICANT CHANGE UP (ref 0.5–1.3)
CREAT SERPL-MCNC: 0.93 MG/DL — SIGNIFICANT CHANGE UP (ref 0.5–1.3)
EGFR: 85 ML/MIN/1.73M2 — SIGNIFICANT CHANGE UP
EGFR: 86 ML/MIN/1.73M2 — SIGNIFICANT CHANGE UP
GLUCOSE BLDC GLUCOMTR-MCNC: 103 MG/DL — HIGH (ref 70–99)
GLUCOSE BLDC GLUCOMTR-MCNC: 111 MG/DL — HIGH (ref 70–99)
GLUCOSE BLDC GLUCOMTR-MCNC: 150 MG/DL — HIGH (ref 70–99)
GLUCOSE BLDC GLUCOMTR-MCNC: 151 MG/DL — HIGH (ref 70–99)
GLUCOSE SERPL-MCNC: 104 MG/DL — HIGH (ref 70–99)
GLUCOSE SERPL-MCNC: 140 MG/DL — HIGH (ref 70–99)
HCT VFR BLD CALC: 41.5 % — SIGNIFICANT CHANGE UP (ref 39–50)
HGB BLD-MCNC: 14.5 G/DL — SIGNIFICANT CHANGE UP (ref 13–17)
LACTATE SERPL-SCNC: 1.8 MMOL/L — SIGNIFICANT CHANGE UP (ref 0.5–2)
MAGNESIUM SERPL-MCNC: 2.3 MG/DL — SIGNIFICANT CHANGE UP (ref 1.6–2.6)
MCHC RBC-ENTMCNC: 30.4 PG — SIGNIFICANT CHANGE UP (ref 27–34)
MCHC RBC-ENTMCNC: 34.9 GM/DL — SIGNIFICANT CHANGE UP (ref 32–36)
MCV RBC AUTO: 87 FL — SIGNIFICANT CHANGE UP (ref 80–100)
NRBC # BLD: 0 /100 WBCS — SIGNIFICANT CHANGE UP (ref 0–0)
NT-PROBNP SERPL-SCNC: HIGH PG/ML (ref 0–300)
PLATELET # BLD AUTO: 180 K/UL — SIGNIFICANT CHANGE UP (ref 150–400)
POTASSIUM SERPL-MCNC: 4.1 MMOL/L — SIGNIFICANT CHANGE UP (ref 3.5–5.3)
POTASSIUM SERPL-MCNC: 4.3 MMOL/L — SIGNIFICANT CHANGE UP (ref 3.5–5.3)
POTASSIUM SERPL-SCNC: 4.1 MMOL/L — SIGNIFICANT CHANGE UP (ref 3.5–5.3)
POTASSIUM SERPL-SCNC: 4.3 MMOL/L — SIGNIFICANT CHANGE UP (ref 3.5–5.3)
PROCALCITONIN SERPL-MCNC: 0.13 NG/ML — HIGH (ref 0.02–0.1)
PROT SERPL-MCNC: 5.3 G/DL — LOW (ref 6–8.3)
PROT SERPL-MCNC: 5.7 G/DL — LOW (ref 6–8.3)
RBC # BLD: 4.77 M/UL — SIGNIFICANT CHANGE UP (ref 4.2–5.8)
RBC # FLD: 15 % — HIGH (ref 10.3–14.5)
SODIUM SERPL-SCNC: 138 MMOL/L — SIGNIFICANT CHANGE UP (ref 135–145)
SODIUM SERPL-SCNC: 138 MMOL/L — SIGNIFICANT CHANGE UP (ref 135–145)
WBC # BLD: 4.27 K/UL — SIGNIFICANT CHANGE UP (ref 3.8–10.5)
WBC # FLD AUTO: 4.27 K/UL — SIGNIFICANT CHANGE UP (ref 3.8–10.5)

## 2024-07-12 PROCEDURE — 99497 ADVNCD CARE PLAN 30 MIN: CPT | Mod: 25

## 2024-07-12 PROCEDURE — 99291 CRITICAL CARE FIRST HOUR: CPT

## 2024-07-12 PROCEDURE — 99233 SBSQ HOSP IP/OBS HIGH 50: CPT | Mod: 25

## 2024-07-12 PROCEDURE — 71045 X-RAY EXAM CHEST 1 VIEW: CPT | Mod: 26

## 2024-07-12 PROCEDURE — 93308 TTE F-UP OR LMTD: CPT | Mod: 26

## 2024-07-12 PROCEDURE — 99291 CRITICAL CARE FIRST HOUR: CPT | Mod: GC

## 2024-07-12 RX ORDER — MIDODRINE HYDROCHLORIDE 10 MG/1
10 TABLET ORAL ONCE
Refills: 0 | Status: COMPLETED | OUTPATIENT
Start: 2024-07-12 | End: 2024-07-12

## 2024-07-12 RX ORDER — BUMETANIDE 0.25 MG/ML
1 INJECTION INTRAMUSCULAR; INTRAVENOUS ONCE
Refills: 0 | Status: COMPLETED | OUTPATIENT
Start: 2024-07-12 | End: 2024-07-12

## 2024-07-12 RX ORDER — NOREPINEPHRINE BITARTRATE 1 MG/ML
0.05 INJECTION INTRAVENOUS
Qty: 8 | Refills: 0 | Status: DISCONTINUED | OUTPATIENT
Start: 2024-07-12 | End: 2024-07-13

## 2024-07-12 RX ORDER — MIDODRINE HYDROCHLORIDE 10 MG/1
20 TABLET ORAL THREE TIMES A DAY
Refills: 0 | Status: DISCONTINUED | OUTPATIENT
Start: 2024-07-12 | End: 2024-07-13

## 2024-07-12 RX ORDER — TORSEMIDE 20 MG/1
20 TABLET ORAL DAILY
Refills: 0 | Status: DISCONTINUED | OUTPATIENT
Start: 2024-07-12 | End: 2024-07-18

## 2024-07-12 RX ORDER — MIDODRINE HYDROCHLORIDE 10 MG/1
10 TABLET ORAL THREE TIMES A DAY
Refills: 0 | Status: DISCONTINUED | OUTPATIENT
Start: 2024-07-12 | End: 2024-07-12

## 2024-07-12 RX ADMIN — MIDODRINE HYDROCHLORIDE 10 MILLIGRAM(S): 10 TABLET ORAL at 00:28

## 2024-07-12 RX ADMIN — MIDODRINE HYDROCHLORIDE 20 MILLIGRAM(S): 10 TABLET ORAL at 12:00

## 2024-07-12 RX ADMIN — MIDODRINE HYDROCHLORIDE 10 MILLIGRAM(S): 10 TABLET ORAL at 20:21

## 2024-07-12 RX ADMIN — REMDESIVIR 200 MILLIGRAM(S): 5 INJECTION INTRAVENOUS at 23:02

## 2024-07-12 RX ADMIN — MIDODRINE HYDROCHLORIDE 20 MILLIGRAM(S): 10 TABLET ORAL at 18:55

## 2024-07-12 RX ADMIN — INSULIN LISPRO 1: 100 INJECTION, SOLUTION SUBCUTANEOUS at 17:17

## 2024-07-12 RX ADMIN — Medication 2 TABLET(S): at 23:02

## 2024-07-12 RX ADMIN — MIDODRINE HYDROCHLORIDE 10 MILLIGRAM(S): 10 TABLET ORAL at 05:41

## 2024-07-12 RX ADMIN — APIXABAN 5 MILLIGRAM(S): 5 TABLET, FILM COATED ORAL at 23:02

## 2024-07-12 RX ADMIN — APIXABAN 5 MILLIGRAM(S): 5 TABLET, FILM COATED ORAL at 12:01

## 2024-07-12 RX ADMIN — MIDODRINE HYDROCHLORIDE 10 MILLIGRAM(S): 10 TABLET ORAL at 00:19

## 2024-07-12 RX ADMIN — NOREPINEPHRINE BITARTRATE 6.55 MICROGRAM(S)/KG/MIN: 1 INJECTION INTRAVENOUS at 20:47

## 2024-07-12 RX ADMIN — TORSEMIDE 20 MILLIGRAM(S): 20 TABLET ORAL at 12:12

## 2024-07-12 RX ADMIN — DEXAMETHASONE 6 MILLIGRAM(S): 1 TABLET ORAL at 05:41

## 2024-07-12 NOTE — PROGRESS NOTE ADULT - SUBJECTIVE AND OBJECTIVE BOX
[facility]  Nahum Qureshi, Palliative Care Attending  Contact Info: Call [phone] (HEAL Line) or message on Microsoft Teams (Nahum Quresih)    SUBJECTIVE AND OBJECTIVE:  INTERVAL HPI/OVERNIGHT EVENTS: Interval events noted. Answering questions and eating lunch with assistance from family. Denies pain but endorses some heaviness with breathing. See patient's PRN use for the past 24hrs noted below. Comprehensive symptom assessment and GOC exploration as noted below. Extensive time spent discussing plan of care with patient/family.    ALLERGIES:  No Known Allergies    MEDICATIONS  (STANDING):  apixaban 5 milliGRAM(s) Oral every 12 hours  dexAMETHasone     Tablet 6 milliGRAM(s) Oral daily  dextrose 5%. 1000 milliLiter(s) (50 mL/Hr) IV Continuous <Continuous>  dextrose 5%. 1000 milliLiter(s) (100 mL/Hr) IV Continuous <Continuous>  dextrose 50% Injectable 25 Gram(s) IV Push once  dextrose 50% Injectable 25 Gram(s) IV Push once  dextrose 50% Injectable 12.5 Gram(s) IV Push once  glucagon  Injectable 1 milliGRAM(s) IntraMuscular once  insulin lispro (ADMELOG) corrective regimen sliding scale   SubCutaneous Before meals and at bedtime  midodrine. 20 milliGRAM(s) Oral three times a day  remdesivir  IVPB   IV Intermittent   remdesivir  IVPB 100 milliGRAM(s) IV Intermittent every 24 hours  senna 2 Tablet(s) Oral at bedtime  torsemide 20 milliGRAM(s) Oral daily  Vyndamax (Tafamidis) 61 milliGRAM(s) 61 milliGRAM(s) Oral daily    MEDICATIONS  (PRN):  acetaminophen     Tablet .. 650 milliGRAM(s) Oral every 6 hours PRN Temp greater or equal to 38C (100.4F), Mild Pain (1 - 3)  dextrose Oral Gel 15 Gram(s) Oral once PRN Blood Glucose LESS THAN 70 milliGRAM(s)/deciliter  lactulose Syrup 10 Gram(s) Oral daily PRN Constipation  melatonin 3 milliGRAM(s) Oral at bedtime PRN Insomnia  ondansetron Injectable 4 milliGRAM(s) IV Push every 8 hours PRN Nausea and/or Vomiting    Analgesic Use (Scheduled and PRNs) for past 24 hours:  acetaminophen     Tablet ..   650 milliGRAM(s) Oral (07-11-24 @ 22:13)  melatonin   3 milliGRAM(s) Oral (07-11-24 @ 22:38)  Vyndamax (Tafamidis) 61 milliGRAM(s)   61 milliGRAM(s) Oral (07-11-24 @ 14:14)    ITEMS UNCHECKED ARE NOT PRESENT  PRESENT SYMPTOMS/REVIEW OF SYSTEMS: []Unable to obtain due to poor mentation   Source if other than patient:  []Family   []Team     Pain: [] yes [x] no  QOL impact -   Location -                    Aggravating Factors -  Quality -  Radiation -  Timing -  Severity (0-10 scale) -   Minimal Acceptable Level (0-10 scale) -    Other Symptoms: See ESAS Section Below  [x]All other review of systems negative     GENERAL:  [x] NAD [x]Alert []Lethargic  []Cachexia  []Unarousable  [x]Verbal  []Non-Verbal  BEHAVIORAL:   []Anxiety  []Delirium []Agitation [x]Cooperative [x]Oriented x3  HEENT:  [x]Normal  [x] Moist Mucous Membranes []Dry mouth   []ET Tube/Trach  []Oral lesions  PULMONARY:   []Clear []Tachypnea  []Audible excessive secretions  [x]Normal Work of Breathing []Labored Breathing  [x]Rhonchi []Crackles []Wheezing  CARDIOVASCULAR:    [x]Regular Rate [x]Regular Rhythm []Irregular []Tachy  []Richard  GASTROINTESTINAL:  [x]Soft  []Distended   [x]+BS  [x]Non tender []Tender  []PEG []OGT/ NGT  Last BM:  GENITOURINARY:  [x]Normal [] Incontinent   []Oliguria/Anuria   []Moore  MUSCULOSKELETAL:   [x]Normal Extremities  [x]Weakness  []Bed/Wheelchair bound []Edema  NEUROLOGIC:   [x]No focal deficits  []Cognitive impairment  []Dysphagia []Dysarthria []Paresis []Encephalopathic  SKIN:   [x]Normal   []Pressure ulcer(s)  []Rash    Vital Signs Last 24 Hrs  T(C): 37 (12 Jul 2024 09:12), Max: 37 (12 Jul 2024 09:12)  T(F): 98.6 (12 Jul 2024 09:12), Max: 98.6 (12 Jul 2024 09:12)  HR: 80 (12 Jul 2024 09:12) (71 - 86)  BP: 99/77 (12 Jul 2024 09:12) (64/44 - 105/73)  BP(mean): 84 (12 Jul 2024 09:12) (55 - 85)  RR: 16 (12 Jul 2024 09:12) (16 - 19)  SpO2: 94% (12 Jul 2024 09:12) (91% - 96%)    Parameters below as of 12 Jul 2024 09:12  Patient On (Oxygen Delivery Method): nasal cannula  O2 Flow (L/min): 6    LABS: Personally reviewed and interpreted                      14.5   4.27  )-----------( 180      ( 12 Jul 2024 07:17 )             41.5   07-12    138  |  107  |  32<H>  ----------------------------<  104<H>  4.1   |  24  |  0.92    Ca    8.0<L>      12 Jul 2024 07:17  Mg     2.3     07-12  TPro  5.7<L>  /  Alb  2.4<L>  /  TBili  0.8  /  DBili  x   /  AST  15  /  ALT  15  /  AlkPhos  80  07-12    RADIOLOGY & ADDITIONAL STUDIES: Personally reviewed and interpreted  < from: Xray Chest 1 View- PORTABLE-Urgent (Xray Chest 1 View- PORTABLE-Urgent .) (07.12.24 @ 01:16) >  Left-sided implanted cardiac device in stable position. Similar appearance of pulmonary vascular congestion. No pneumothorax. Small right pleural effusion, unchanged. No acute osseous abnormality.    DISCUSSION OF CASE: Family - to provide updates and emotional support; Primary Team/RN - to discuss plan of care

## 2024-07-12 NOTE — DIETITIAN NUTRITION RISK NOTIFICATION - TREATMENT: THE FOLLOWING DIET HAS BEEN RECOMMENDED
Diet, DASH/TLC:   Sodium & Cholesterol Restricted  Consistent Carbohydrate {No Snacks} (CSTCHO)  1500mL Fluid Restriction ([id number]) (07-09-24 @ 16:07) [Active]

## 2024-07-12 NOTE — DIETITIAN INITIAL EVALUATION ADULT - PERTINENT LABORATORY DATA
07-12    138  |  107  |  32<H>  ----------------------------<  104<H>  4.1   |  24  |  0.92    Ca    8.0<L>      12 Jul 2024 07:17  Mg     2.3     07-12    TPro  5.7<L>  /  Alb  2.4<L>  /  TBili  0.8  /  DBili  x   /  AST  15  /  ALT  15  /  AlkPhos  80  07-12  POCT Blood Glucose.: 111 mg/dL (07-12-24 @ 11:34)  A1C with Estimated Average Glucose Result: 5.8 % (07-09-24 @ 05:30)

## 2024-07-12 NOTE — DIETITIAN INITIAL EVALUATION ADULT - PROBLEM SELECTOR PLAN 6
H/o thrombocytopenia, idiopathic,  this admission, improved from baseline   Prior heme workup on previous admission   Bone marrow biopsy performed on 11/21 but limited specimen    Plan:  -monitor PLT count   -transfuse PLT < 10 spontaneous, < 50 w/ active bleeding

## 2024-07-12 NOTE — PROGRESS NOTE ADULT - CONVERSATION DETAILS
Reviewed patient's hospital course and interval developments. Discussed advanced directives including code status, HCP completion, and hospice eligibility. Explained ongoing concerns regarding hypoxia and hypotension. Engaged in further exploration of advance directives. After discussion, patient and family in agreement that CPR and Intubation would not be within GOC. They are accepting of a trial of pressors and BIPAP if indicated in the context of supporting the patient through the self-limiting COVID infection.

## 2024-07-12 NOTE — DIETITIAN INITIAL EVALUATION ADULT - PERTINENT MEDS FT
MEDICATIONS  (STANDING):  apixaban 5 milliGRAM(s) Oral every 12 hours  dexAMETHasone     Tablet 6 milliGRAM(s) Oral daily  dextrose 5%. 1000 milliLiter(s) (50 mL/Hr) IV Continuous <Continuous>  dextrose 5%. 1000 milliLiter(s) (100 mL/Hr) IV Continuous <Continuous>  dextrose 50% Injectable 25 Gram(s) IV Push once  dextrose 50% Injectable 25 Gram(s) IV Push once  dextrose 50% Injectable 12.5 Gram(s) IV Push once  glucagon  Injectable 1 milliGRAM(s) IntraMuscular once  insulin lispro (ADMELOG) corrective regimen sliding scale   SubCutaneous Before meals and at bedtime  midodrine. 20 milliGRAM(s) Oral three times a day  remdesivir  IVPB 100 milliGRAM(s) IV Intermittent every 24 hours  remdesivir  IVPB   IV Intermittent   senna 2 Tablet(s) Oral at bedtime  torsemide 20 milliGRAM(s) Oral daily  Vyndamax (Tafamidis) 61 milliGRAM(s) 61 milliGRAM(s) Oral daily    MEDICATIONS  (PRN):  acetaminophen     Tablet .. 650 milliGRAM(s) Oral every 6 hours PRN Temp greater or equal to 38C (100.4F), Mild Pain (1 - 3)  dextrose Oral Gel 15 Gram(s) Oral once PRN Blood Glucose LESS THAN 70 milliGRAM(s)/deciliter  lactulose Syrup 10 Gram(s) Oral daily PRN Constipation  melatonin 3 milliGRAM(s) Oral at bedtime PRN Insomnia  ondansetron Injectable 4 milliGRAM(s) IV Push every 8 hours PRN Nausea and/or Vomiting

## 2024-07-12 NOTE — PROGRESS NOTE ADULT - PROBLEM SELECTOR PLAN 6
.  Complex medical decision making / symptom management in the setting of advanced illness.    Will continue to follow for ongoing GOC discussion / titration of palliative regimen. Emotional support provided, questions answered.  Active Psychosocial Referrals:  [x]Social Work/Case management [x]PT/OT []Chaplaincy []Hospice  []Patient/Family Support []Holistic RN []Massage Therapy []Music Therapy []Ethics  Coping: [] well [x] with difficulty [] poor coping [] unable to assess  Support system: [] strong [x] adequate [] inadequate    For new or uncontrolled symptoms, please call Palliative Care at [phone]). The service is available 24/7 (including nights & weekends) to provide symptom management recommendations over the phone as appropriate

## 2024-07-12 NOTE — PROGRESS NOTE ADULT - PROBLEM SELECTOR PLAN 4
.  Advanced TTR with LV Dysfunction  -Echo with EF = 30-35%  -c/w Vyndamax as per Cards  -hospice eligible but Vyndamax would be a barrier to enrollment due to cost  -not ready to discuss hospice yet, will continue conversation pending clinical course

## 2024-07-12 NOTE — DIETITIAN INITIAL EVALUATION ADULT - PROBLEM SELECTOR PLAN 1
p/w lethargy, weakness, decreased PO intake x2 weeks  Hypoxic to 88% on admission, placed on 4-5L NC w/ O2 sat 93%  Hypotensive in ED, improved w/o intervention   Positive for COVID    Plan:   -start remdesivir  -start decadron   -c/w NC, wean O2 as tolerated

## 2024-07-12 NOTE — PROGRESS NOTE ADULT - PROBLEM SELECTOR PLAN 1
.  PPSV = 50%, requires assistance for many ADLs  -PT Eval recommending COMFORT  -c/w supportive care, OOB, encourage movement

## 2024-07-12 NOTE — PROGRESS NOTE ADULT - PROBLEM SELECTOR PLAN 1
CXR 7/8/24: Infiltrates/pulmonary vascular congestion and small right pleural effusion.   - Remained afebrile, WBC 4.46  - IVPB Remdesivir 100 mg Q24 x 4 days (7/8/24-7/11/24) + PO Dex 6 mg Q 24 x 10 days  - procal now elevated 0.13  - ID consulted for assistance, appreciate recs

## 2024-07-12 NOTE — PROGRESS NOTE ADULT - PROBLEM SELECTOR PLAN 3
.  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

## 2024-07-12 NOTE — PROGRESS NOTE ADULT - PROBLEM SELECTOR PLAN 2
Etiology: NICM, ATTR cardiac amyloid   TTE (7/9/24): EF 30-35%, severe symmetric LVH, RVH present, mod-severely reduced RV systolic function, severe biatrial enlargement, small pericardial effusion without echo evidence of cardiac tamponade physiology  - GDMT: Entresto 24-26 bid, Farxiga 10mg qd, Toprol 50mg qd - ALL HELD i/s/o hypotension in ED  - Diuresis: holding torsemide 10mg in setting of hypotension   - Advanced HF consulted, appreciate recs  - EKG showing V-paced at 80bpm  - strict I&Os, daily standing weights, fluid restriction

## 2024-07-12 NOTE — PROGRESS NOTE ADULT - PROBLEM SELECTOR PLAN 7
H/o thrombocytopenia, idiopathic,  this admission, improved from baseline   Prior heme workup on previous admission   - monitor PLT count   - transfuse PLT < 10 spontaneous, < 50 w/ active bleeding

## 2024-07-12 NOTE — CHART NOTE - NSCHARTNOTEFT_GEN_A_CORE
RN notified me at 1205 AM patient was hypotensive at 60/40. Upon arrival into the patient room, patient was hypoxic and using accessory muscles to breathe, satting in the low 80's despite being on 4L NC. Patients head of bed was raised to 30 degrees. I places patient up at 90 degrees and increased the NC to 6LPM. Saturation increased to 89-91, however was consistently hypotensive at 65/41. Patient was but on 100% non-rebreather and satted 95-97%. Midodrine 10 mg x1 was immediately given. At this time BP was 72/51.   Cardiac fellow was immediately notified and case was reviewed. CXR and repeat labs were ordered. CXR in comparison to 7/8/24 showed fluid overload with increased opacities. Lactate remained stable at 1.8 compared to 9pm lactate of 1.7.     1:00 AM: RN notified this provider that BP was 60/40. Cardiac fellow and I at bedside. Patient mentating at baseline. Still warm to touch and satting well on 100% non rebreather. BP rechecked on left arm and improved to 82/51, which is baseline. Given Midodrine 10 mg x1.     Plan:   -CMP, Procalcitonin pending   -low threshold to upgrade to CCU with low dose pressors for diuresis  -q30 BP checks     Case discussed with Cardiac Fellow [place]   Will discuss with Cardiac Tele attending in AM

## 2024-07-12 NOTE — PROGRESS NOTE ADULT - SUBJECTIVE AND OBJECTIVE BOX
** INCOMPLETE **     INTERVAL HPI/OVERNIGHT EVENTS:    SUBJECTIVE: Patient seen and examined at bedside.    VITAL SIGNS:  ICU Vital Signs Last 24 Hrs  T(C): 36.3 (12 Jul 2024 05:34), Max: 36.4 (11 Jul 2024 16:50)  T(F): 97.4 (12 Jul 2024 05:34), Max: 97.6 (11 Jul 2024 20:28)  HR: 80 (12 Jul 2024 09:12) (70 - 86)  BP: 99/77 (12 Jul 2024 09:12) (64/44 - 105/74)  BP(mean): 84 (12 Jul 2024 09:12) (55 - 85)  ABP: --  ABP(mean): --  RR: 16 (12 Jul 2024 09:12) (16 - 19)  SpO2: 94% (12 Jul 2024 09:12) (90% - 96%)    O2 Parameters below as of 12 Jul 2024 09:12  Patient On (Oxygen Delivery Method): nasal cannula  O2 Flow (L/min): 6            07-11 @ 07:01 - 07-12 @ 07:00  --------------------------------------------------------  IN: 460 mL / OUT: 575 mL / NET: -115 mL    07-12 @ 07:01  -  07-12 @ 11:06  --------------------------------------------------------  IN: 180 mL / OUT: 0 mL / NET: 180 mL      CAPILLARY BLOOD GLUCOSE      POCT Blood Glucose.: 103 mg/dL (12 Jul 2024 07:58)      PHYSICAL EXAM:    Constitutional: NAD  HEENT: NC/AT; PERRL, anicteric sclera; MMM  Neck: supple, no JVD  Cardiovascular: +S1/S2, RRR  Respiratory: CTA B/L, no W/R/R  Gastrointestinal: abdomen soft, NT/ND; no rebound or guarding; +BSx4  Genitourinary: no suprapubic tenderness or fullness  Extremities: WWP; no LE edema; no clubbing or cyanosis  Vascular: 2+ radial, DP/PT and femoral pulses B/L  Dermatologic: normal color and turgor; no visible rashes  Neurological:     MEDICATIONS:  MEDICATIONS  (STANDING):  apixaban 5 milliGRAM(s) Oral every 12 hours  dexAMETHasone     Tablet 6 milliGRAM(s) Oral daily  dextrose 5%. 1000 milliLiter(s) (50 mL/Hr) IV Continuous <Continuous>  dextrose 5%. 1000 milliLiter(s) (100 mL/Hr) IV Continuous <Continuous>  dextrose 50% Injectable 25 Gram(s) IV Push once  dextrose 50% Injectable 25 Gram(s) IV Push once  dextrose 50% Injectable 12.5 Gram(s) IV Push once  glucagon  Injectable 1 milliGRAM(s) IntraMuscular once  insulin lispro (ADMELOG) corrective regimen sliding scale   SubCutaneous Before meals and at bedtime  midodrine. 20 milliGRAM(s) Oral three times a day  remdesivir  IVPB 100 milliGRAM(s) IV Intermittent every 24 hours  remdesivir  IVPB   IV Intermittent   senna 2 Tablet(s) Oral at bedtime  torsemide 20 milliGRAM(s) Oral daily  Vyndamax (Tafamidis) 61 milliGRAM(s) 61 milliGRAM(s) Oral daily    MEDICATIONS  (PRN):  acetaminophen     Tablet .. 650 milliGRAM(s) Oral every 6 hours PRN Temp greater or equal to 38C (100.4F), Mild Pain (1 - 3)  dextrose Oral Gel 15 Gram(s) Oral once PRN Blood Glucose LESS THAN 70 milliGRAM(s)/deciliter  lactulose Syrup 10 Gram(s) Oral daily PRN Constipation  melatonin 3 milliGRAM(s) Oral at bedtime PRN Insomnia  ondansetron Injectable 4 milliGRAM(s) IV Push every 8 hours PRN Nausea and/or Vomiting      ALLERGIES:  Allergies    No Known Allergies    Intolerances        LABS:                        14.5   4.27  )-----------( 180      ( 12 Jul 2024 07:17 )             41.5     07-12    138  |  107  |  32<H>  ----------------------------<  104<H>  4.1   |  24  |  0.92    Ca    8.0<L>      12 Jul 2024 07:17  Mg     2.3     07-12    TPro  5.7<L>  /  Alb  2.4<L>  /  TBili  0.8  /  DBili  x   /  AST  15  /  ALT  15  /  AlkPhos  80  07-12      Urinalysis Basic - ( 12 Jul 2024 07:17 )    Color: x / Appearance: x / SG: x / pH: x  Gluc: 104 mg/dL / Ketone: x  / Bili: x / Urobili: x   Blood: x / Protein: x / Nitrite: x   Leuk Esterase: x / RBC: x / WBC x   Sq Epi: x / Non Sq Epi: x / Bacteria: x        RADIOLOGY & ADDITIONAL TESTS: Reviewed. **TRANSFER FROM [facility] TO [facility]**    Hospital Course:  Mr. Jake Wise is a 78 y.o. 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 Per ED H&P, patient was previously able to walk several blocks without issues, but in the 2 weeks prior to his ED arrival he started to complain of weakness, SOB, and decreased PO intake from his lethargy. Pt had significant difficulty ambulating at home which prompted his evaluation in the ED. In the emergency department, patient was found to be hypotensive at 74/52 and hypoxic on RA at 88% oxygen saturation. He was started on a 4L NC which improved his saturation to 94% and repeat pressures had a normal MAP at 65. His initial labs were unremarkable with the exception [place] [address], venous oxygen saturation at 16% on VBG, and creatinine of 1.18 with a concentrated Hgb at 15, with the impression he may have a dry heart failure exacerbation from his late stage cardiac amyloidosis. As part of his ED evaluation, he was found to be COVID positive and a CXR also showed he had a RLL infiltrate and bilateral pleural effusions (R>>L) consistent with covid pneumonia. Given his history of AFib and continued respiratory failure 2/2 covid pneumonia, he was admitted to [facility] for management of his covid pneumonia and monitoring of his vitals and rhythm on telemetry.    On 7/12 at 00:40, patient was found to be hypotensive at 60/40s and hypoxic to the low 80s while on 4L NC. Patient was started on a nonrebreather and given Midodrine 10mg oral x2 which improved his blood pressure to SBP of 100s and oxygen to 96-97%. Patient was eventually transitioned off his NRB to 6L NC and was upgraded to the CCU for further management of his acute hypoxic respiratory failure and heart failure with closer monitoring and possible continued diuresis or vasopressor management.    SUBJECTIVE: Patient seen and examined at bedside, no acute respiratory distress. Patient denies any complaints at this time and is on 6L NC.    VITAL SIGNS:  ICU Vital Signs Last 24 Hrs  T(C): 36.3 (12 Jul 2024 05:34), Max: 36.4 (11 Jul 2024 16:50)  T(F): 97.4 (12 Jul 2024 05:34), Max: 97.6 (11 Jul 2024 20:28)  HR: 80 (12 Jul 2024 09:12) (70 - 86)  BP: 99/77 (12 Jul 2024 09:12) (64/44 - 105/74)  BP(mean): 84 (12 Jul 2024 09:12) (55 - 85)  ABP: --  ABP(mean): --  RR: 16 (12 Jul 2024 09:12) (16 - 19)  SpO2: 94% (12 Jul 2024 09:12) (90% - 96%)    O2 Parameters below as of 12 Jul 2024 09:12  Patient On (Oxygen Delivery Method): nasal cannula  O2 Flow (L/min): 6    07-11 @ 07:01  -  07-12 @ 07:00  --------------------------------------------------------  IN: 460 mL / OUT: 575 mL / NET: -115 mL    07-12 @ 07:01  -  07-12 @ 11:06  --------------------------------------------------------  IN: 180 mL / OUT: 0 mL / NET: 180 mL      CAPILLARY BLOOD GLUCOSE    POCT Blood Glucose.: 103 mg/dL (12 Jul 2024 07:58)    PHYSICAL EXAM:    Constitutional: NAD, Frail appearance  HEENT: NC/AT; PERRL, anicteric sclera; MMM  Neck: supple, No JVD present.  Cardiovascular: +S1/S2, RRR  Respiratory: Bilateral crackles, most pronounced in the RLL  Gastrointestinal: abdomen soft, NT/ND; no rebound or guarding  Genitourinary: no suprapubic tenderness or fullness.  Extremities: Warm distal lower extremities. No pedal edema.  Vascular: 2+ radial, DP/PT and femoral pulses B/L  Dermatologic: normal color and turgor; no visible rashes  Neurological: Alert and oriented x3.    MEDICATIONS:  MEDICATIONS  (STANDING):  apixaban 5 milliGRAM(s) Oral every 12 hours  dexAMETHasone     Tablet 6 milliGRAM(s) Oral daily  dextrose 5%. 1000 milliLiter(s) (50 mL/Hr) IV Continuous <Continuous>  dextrose 5%. 1000 milliLiter(s) (100 mL/Hr) IV Continuous <Continuous>  dextrose 50% Injectable 25 Gram(s) IV Push once  dextrose 50% Injectable 25 Gram(s) IV Push once  dextrose 50% Injectable 12.5 Gram(s) IV Push once  glucagon  Injectable 1 milliGRAM(s) IntraMuscular once  insulin lispro (ADMELOG) corrective regimen sliding scale   SubCutaneous Before meals and at bedtime  midodrine. 20 milliGRAM(s) Oral three times a day  remdesivir  IVPB 100 milliGRAM(s) IV Intermittent every 24 hours  remdesivir  IVPB   IV Intermittent   senna 2 Tablet(s) Oral at bedtime  torsemide 20 milliGRAM(s) Oral daily  Vyndamax (Tafamidis) 61 milliGRAM(s) 61 milliGRAM(s) Oral daily    MEDICATIONS  (PRN):  acetaminophen     Tablet .. 650 milliGRAM(s) Oral every 6 hours PRN Temp greater or equal to 38C (100.4F), Mild Pain (1 - 3)  dextrose Oral Gel 15 Gram(s) Oral once PRN Blood Glucose LESS THAN 70 milliGRAM(s)/deciliter  lactulose Syrup 10 Gram(s) Oral daily PRN Constipation  melatonin 3 milliGRAM(s) Oral at bedtime PRN Insomnia  ondansetron Injectable 4 milliGRAM(s) IV Push every 8 hours PRN Nausea and/or Vomiting      ALLERGIES:  Allergies    No Known Allergies    Intolerances        LABS:                        14.5   4.27  )-----------( 180      ( 12 Jul 2024 07:17 )             41.5     07-12    138  |  107  |  32<H>  ----------------------------<  104<H>  4.1   |  24  |  0.92    Ca    8.0<L>      12 Jul 2024 07:17  Mg     2.3     07-12    TPro  5.7<L>  /  Alb  2.4<L>  /  TBili  0.8  /  DBili  x   /  AST  15  /  ALT  15  /  AlkPhos  80  07-12      Urinalysis Basic - ( 12 Jul 2024 07:17 )    Color: x / Appearance: x / SG: x / pH: x  Gluc: 104 mg/dL / Ketone: x  / Bili: x / Urobili: x   Blood: x / Protein: x / Nitrite: x   Leuk Esterase: x / RBC: x / WBC x   Sq Epi: x / Non Sq Epi: x / Bacteria: x      RADIOLOGY & ADDITIONAL TESTS: Reviewed.

## 2024-07-12 NOTE — PROGRESS NOTE ADULT - ASSESSMENT
·	spoke with patient's son, expressed concerns about patient's clinical situation and recommended a family discussion regarding possible limitations in care  ·	family will be coming in to visit the patient and agreeable to a family meeting to discuss code status and treatment options; for now they are accepting of the escalation of care  ·	will follow-up with family today for further discussion 77yo M with PMH of Cardiac Amyloidosis, CHF, AFib, T2DM, and h/o Prostate CA p/w SOB and found to have COVID. Palliative consulted for complex medical decision making in the setting of advanced illness.    ·	met with patient and family, see GOC Note: DNR/DNI, accepting of trial of pressors and BIPAP if indicated, MOLST placed in chart

## 2024-07-12 NOTE — PROGRESS NOTE ADULT - ASSESSMENT
Patient is a 78 y.o. male with cardiac amyloidosis, HFrEF (30-35%), acute hypoxic respiratory failure 2/2 covid pneumonia, and AFib with prior ablation now being upgraded to the CCU from 5URIS for closer blood pressure monitoring during diuresis and possible initiation of inotropes/vasopressors during diuresis.    Neurologic:  - Normal mentation  - Alert and oriented x3  - No acute distress    Cardiovascular:  #Hypotension without shock  Patient has warm extremities, no signs of organ malperfusion  KAYLI resolving on CMP this morning  Normal mentation despite hypotension and increased oxygen requirements  Stat lactate drawn overnight 1.8, no liver enzyme elevations this morning    Plan:  - Continue to hold home GDMT given hypotension  - Increase midodrine to 20mg TID  - Hold off on IV vasopressors/inotropes  - No immediate plans for a [place]-Caryn catheter insertion    #ATTRv Cardiac Amyloidosis  Echo on 7/12 unchanged from 7/9  1. Moderate to severe symmetric left ventricular hypertrophy.  2. Moderately-to-severely reduced left ventricular systolic function with global hypokinesis.  3. Severe biatrial enlargement  4. Small pericardial effusion    Plan:  - Continue home Tafamadis 61mg oral qd    #HFrEF (30-35%)  EF (30-35%) from Echo today (7/12)  CXR with bilateral pleural effusions (R>>L)  BNP of 92838 (7/11)  - Net negative (-1500) since admission  - Given a bolus of 250cc NS on 7/10 0500, no other IV fluids given  - Previously received Torsemide 10mg oral once on 7/10, no other prior diuretics administered    Plan:  - Continue to hold GDMT at this time given continued hypotension  - Additional diuresis today with Torsemide 20mg oral  - Increase midodrine to 20mg TID during additional diuresis  - Continue low sodium diet and fluid restriction to 1500mL today    #Atrial Fibrillation s/p ablation  - On home apixaban 5mg oral q12h  - S/p ablation and CRT-P    Plan:  - Continue with apixaban 5mg q12h for anticoagulation    Pulmonary:  #Acute hypoxic respiratory failure 2/2 COVID pneumonia and HFrEF  Currently requiring 6L of NC to maintain saturation >94%  Overnight had hypoxic episode likely related to exacerbation of pulmonary shunting from low cardiac output  CXR today unchanged from ED admission CXR  Continued R>>L pleural effusion and RLL infiltrate    Plan:  - C/w 6L NC and wean as tolerated for goal saturation >94%  - Repeat CXR tomorrow to track trajectory of pneumonia  - C/w last day of Dexamethasone 6mg and Remdesivir  - Continue diuresis today with Torsemide 20mg oral    Renal:  #Acute Kidney Injury, possibly superimposed on CKD Stage 3  ED admission with creatinine of 1.18, context of frail older adult  Low PO intake over the last 2 weeks, likely prerenal  Initial bicarb of 26, unlikely clinically significant CKD  UOP unchanged day by day since admission  Net negative of -1500 since admission  Creatinine now improved to 0.90 this morning    Plan:  - Continue to monitor UOP with strict I&Os  - Continue to monitor changes in creatinine with morning CMP tomorrow    Gastrointestinal:    Infectious Disease:    Endocrine:    Heme/Onc: Patient is a 78 y.o. male with cardiac amyloidosis, HFrEF (30-35%), acute hypoxic respiratory failure 2/2 covid pneumonia, and AFib with prior ablation now being upgraded to the CCU from 5URIS for closer blood pressure monitoring during diuresis and possible initiation of inotropes/vasopressors during diuresis.    NEUROLOGIC:  - Normal mentation  - Alert and oriented x3  - No acute distress    CARDIOVASCULAR:  #Hypotension without signs of shock  Patient has warm extremities, no signs of organ malperfusion  KAYLI resolving on CMP this morning  Normal mentation despite hypotension and increased oxygen requirements  Stat lactate drawn overnight 1.8, no liver enzyme elevations this morning    Plan:  - Continue to hold home GDMT given hypotension  - Increase midodrine to 20mg TID  - Hold off on IV vasopressors/inotropes  - No immediate plans for a [place]-Caryn catheter insertion    #ATTRv Cardiac Amyloidosis  Echo on 7/12 unchanged from 7/9  1. Moderate to severe symmetric left ventricular hypertrophy.  2. Moderately-to-severely reduced left ventricular systolic function with global hypokinesis.  3. Severe biatrial enlargement  4. Small pericardial effusion    Plan:  - Continue home Tafamadis 61mg oral qd    #HFrEF (30-35%)  EF (30-35%) from Echo today (7/12), with systolic dysfunction signifying late stage cardiac amyloidosis  CXR with bilateral pleural effusions (R>>L)  BNP of 49413 (7/11)  Net negative (-1500) since admission  Given a bolus of 250cc NS on 7/10 0500, no other IV fluids given  Previously received Torsemide 10mg oral once on 7/10, no other prior diuretics administered    Plan:  - Family meeting to discuss goals of care and prognosis  - Continue to hold GDMT at this time given continued hypotension  - Additional diuresis today with Torsemide 20mg oral  - Increase midodrine to 20mg TID during diuresis  - Continue low sodium diet and fluid restriction to 1500mL today    #Atrial Fibrillation s/p ablation  On home apixaban 5mg oral q12h  S/p ablation and CRT-P    Plan:  - Continue with apixaban 5mg q12h for anticoagulation    PULMONARY:  #Acute hypoxic respiratory failure 2/2 COVID pneumonia and HFrEF  Currently requiring 6L of NC to maintain saturation >94%  Overnight had hypoxic episode likely related to exacerbation of pulmonary shunting from low cardiac output  CXR today unchanged from ED admission CXR  Continued R>>L pleural effusion and RLL infiltrate    Plan:  - C/w 6L NC and wean as tolerated for goal saturation >94%  - Repeat CXR tomorrow to track trajectory of pneumonia  - C/w last day of Dexamethasone 6mg and Remdesivir  - Continue diuresis today with Torsemide 20mg oral    RENAL:  #Acute Kidney Injury, possibly superimposed on CKD Stage 3  ED admission with creatinine of 1.18, context of frail older adult  Low PO intake over the last 2 weeks, likely prerenal  Initial bicarb of 26, unlikely clinically significant CKD  UOP unchanged day by day since admission  Net negative of -1500 since admission  Creatinine now improved to 0.90 this morning    Plan:  - Continue to monitor UOP with strict I&Os  - Continue to monitor changes in creatinine with morning CMP tomorrow    GASTROINTESTINAL:  Enteral nutrition with DASH/Sodium restricted diet  Not on stress ulcer prophylaxis prior to transfer    Plan:  - C/w diet  - Start pantoprazole 40mg oral qd given anticoagulation for AFib and critical illness    INFECTIOUS DISEASE:  #COVID-19 Pneumonia  PCR in the ED positive for COVID-19, CXR on admission with RLL infiltrate  ID consulted for COVID-19 management, dexamethasone 6mg qd and remdesivir started  Currently Day 4/5 of Remdesivir and Day 4/10 of Dexamethasone 6mg qd  S/p Ceftriaxone 1g IV x1 and Azithromycin 500mg IV x1, discontinued by ID  Afebrile since admission with normal WBC and procalcitonin of 0.13  BCx x2 drawn in the ED with no growth at 72 hours    Plan:  - C/w Remdesivir until day 4/5, or extend until day 10 if no respiratory improvement per ID consult  - C/w Dexamethasone 6mg qd until Day 10 or until hospital discharge, whichever comes first per ID consult  - C/w monitoring WBC daily    ENDOCRINE:  History of Type 2 DM, A1c of 5.8% on admission  FSG with normal readings, no hyperglycemia    Plan:  - C/w monitoring fingersticks given Dexamethasone therapy  - Hold off on sliding scale insulin as patient has low-normal glucose    HEME/ONC:  Hgb of 14.5 this morning  Likely due to concentration from decreased PO intake, and net negative of 1500 inpatient  Borderline erythrocytosis, unlikely to be true secondary erythrocytosis as patient without chronic hypoxia at home    #History of Idiopathic Thrombocytopenia  Mild thrombocytopenia on ED admission at 144  No signs of mucosal bleeding or purpura    Plan:  - C/w monitoring on AM CBC    Code Status: FULL CODE  DVT Prophylaxis: Apixaban 5mg oral qd for AFib  GI Prophylaxis: Pantoprazole 40mg oral qd  Diet: DASH, Fluid restriction to 1500mL  Lines: PIV Patient is a 78 y.o. male with cardiac amyloidosis, HFrEF (30-35%), acute hypoxic respiratory failure 2/2 covid pneumonia, and AFib with prior ablation now being upgraded to the CCU from 5URIS for closer blood pressure monitoring during diuresis and possible initiation of inotropes/vasopressors during diuresis.    NEUROLOGIC:  - Normal mentation  - Alert and oriented x3  - No acute distress    CARDIOVASCULAR:  #Hypotension without signs of shock  Patient has warm extremities, no signs of organ malperfusion  KAYLI resolving on CMP this morning  Normal mentation despite hypotension and increased oxygen requirements  Stat lactate drawn overnight 1.8, no liver enzyme elevations this morning    Plan:  - Continue to hold home GDMT given hypotension  - Increase midodrine to 20mg TID  - Hold off on IV vasopressors/inotropes  - No immediate plans for a [place]-Caryn catheter insertion    #ATTRv Cardiac Amyloidosis  Echo on 7/12 unchanged from 7/9  1. Moderate to severe symmetric left ventricular hypertrophy.  2. Moderately-to-severely reduced left ventricular systolic function with global hypokinesis.  3. Severe biatrial enlargement  4. Small pericardial effusion    Plan:  - Continue home Tafamadis 61mg oral qd    #HFrEF (30-35%)  EF (30-35%) from Echo today (7/12), with systolic dysfunction signifying late stage cardiac amyloidosis  CXR with bilateral pleural effusions (R>>L)  BNP of 52229 (7/11)  Net negative (-1500) since admission  Given a bolus of 250cc NS on 7/10 0500, no other IV fluids given  Previously received Torsemide 10mg oral once on 7/10, no other prior diuretics administered    Plan:  - Family meeting to discuss goals of care and prognosis  - Continue to hold GDMT at this time given continued hypotension  - Additional diuresis today with Torsemide 20mg oral  - Increase midodrine to 20mg TID during diuresis  - Continue low sodium diet and fluid restriction to 1500mL today  - Continue to appreciate heart failure team recommendations    #Atrial Fibrillation s/p ablation  On home apixaban 5mg oral q12h  S/p ablation and CRT-P    Plan:  - Continue with apixaban 5mg q12h for anticoagulation    PULMONARY:  #Acute hypoxic respiratory failure 2/2 COVID pneumonia and HFrEF  Currently requiring 6L of NC to maintain saturation >94%  Overnight had hypoxic episode likely related to exacerbation of pulmonary shunting from low cardiac output  CXR today unchanged from ED admission CXR  Continued R>>L pleural effusion and RLL infiltrate    Plan:  - C/w 6L NC and wean as tolerated for goal saturation >94%  - Repeat CXR tomorrow to track trajectory of pneumonia  - C/w Dexamethasone 6mg and Remdesivir  - Continue diuresis today with Torsemide 20mg oral    RENAL:  #Acute Kidney Injury, possibly superimposed on CKD Stage 3  ED admission with creatinine of 1.18, context of frail older adult  Low PO intake over the last 2 weeks, likely prerenal  Initial bicarb of 26, unlikely clinically significant CKD  UOP of 575mL in the last 24h, unchanged since admission  Net negative of -1500 since admission  Creatinine now improved to 0.90 this morning    Plan:  - Continue to monitor UOP with strict I&Os  - Continue to monitor changes in creatinine with morning CMP tomorrow    GASTROINTESTINAL:  Enteral nutrition with DASH/Sodium restricted diet  Not on stress ulcer prophylaxis prior to transfer    Plan:  - C/w diet  - Start pantoprazole 40mg oral qd given anticoagulation for AFib and critical illness    INFECTIOUS DISEASE:  #COVID-19 Pneumonia  PCR in the ED positive for COVID-19, CXR on admission with RLL infiltrate  ID consulted for COVID-19 management, dexamethasone 6mg qd and remdesivir started  Currently Day 4/5 of Remdesivir and Day 4/10 of Dexamethasone 6mg qd  S/p Ceftriaxone 1g IV x1 and Azithromycin 500mg IV x1, discontinued by ID  Afebrile since admission with normal WBC and procalcitonin of 0.13  BCx x2 drawn in the ED with no growth at 72 hours    Plan:  - C/w Remdesivir until day 4/5, or extend until day 10 if no respiratory improvement per ID consult  - C/w Dexamethasone 6mg qd until Day 10 or until hospital discharge, whichever comes first per ID consult  - C/w monitoring WBC daily    ENDOCRINE:  History of Type 2 DM, A1c of 5.8% on admission  FSG with normal readings, no hyperglycemia    Plan:  - C/w monitoring fingersticks given Dexamethasone therapy  - Hold off on sliding scale insulin as patient has low-normal glucose    HEME/ONC:  Hgb of 14.5 this morning  Likely due to concentration from decreased PO intake, and net negative of 1500 inpatient  Borderline erythrocytosis, unlikely to be true secondary erythrocytosis as patient without chronic hypoxia at home    #History of Idiopathic Thrombocytopenia  Mild thrombocytopenia on ED admission at 144  No signs of mucosal bleeding or purpura    Plan:  - C/w monitoring on AM CBC    Code Status: FULL CODE  DVT Prophylaxis: Apixaban 5mg oral qd for AFib  GI Prophylaxis: Pantoprazole 40mg oral qd  Diet: DASH, Fluid restriction to 1500mL  Lines: PIV Patient is a 78 y.o. male with cardiac amyloidosis, HFrEF (30-35%), acute hypoxic respiratory failure 2/2 covid pneumonia, and AFib with prior ablation now being upgraded to the CCU from 5URIS for closer blood pressure monitoring during diuresis and possible initiation of inotropes/vasopressors during diuresis.    NEUROLOGIC:  - Normal mentation  - Alert and oriented x3  - No acute distress    CARDIOVASCULAR:  #Hypotension likely from dysautonomia, without signs of shock  Patient has warm extremities, no signs of organ malperfusion  KAYLI resolving on CMP this morning  Normal mentation despite hypotension and increased oxygen requirements  Stat lactate drawn overnight 1.8, no liver enzyme elevations this morning    Plan:  - Continue to hold home GDMT given hypotension  - Increase midodrine to 20mg TID  - Hold off on IV vasopressors/inotropes  - No immediate plans for a [place]-Caryn catheter insertion    #ATTRv Cardiac Amyloidosis  Echo on 7/12 unchanged from 7/9  1. Moderate to severe symmetric left ventricular hypertrophy.  2. Moderately-to-severely reduced left ventricular systolic function with global hypokinesis.  3. Severe biatrial enlargement  4. Small pericardial effusion    Plan:  - Continue home Tafamadis 61mg oral qd    #HFrEF (30-35%)  EF (30-35%) from Echo today (7/12), with systolic dysfunction signifying late stage cardiac amyloidosis  CXR with bilateral pleural effusions (R>>L)  BNP of 03697 (7/11)  Net negative (-1500) since admission  Given a bolus of 250cc NS on 7/10 0500, no other IV fluids given  Previously received Torsemide 10mg oral once on 7/10, no other prior diuretics administered    Plan:  - Family meeting with palliative to make DNR/DNI with continued treatment of his acute issues  - Continue to hold GDMT at this time given continued hypotension  - Additional diuresis today with Torsemide 20mg oral  - Increase midodrine to 20mg TID during diuresis  - Continue low sodium diet and fluid restriction to 1500mL today  - Continue to appreciate heart failure team recommendations    #Atrial Fibrillation s/p ablation  On home apixaban 5mg oral q12h  S/p ablation and CRT-P    Plan:  - Continue with apixaban 5mg q12h for anticoagulation    PULMONARY:  #Acute hypoxic respiratory failure 2/2 COVID pneumonia and HFrEF  Currently requiring 6L of NC to maintain saturation >94%  Overnight had hypoxic episode likely related to exacerbation of pulmonary shunting from low cardiac output  CXR today unchanged from ED admission CXR  Continued R>>L pleural effusion and RLL infiltrate    Plan:  - C/w 6L NC and wean as tolerated for goal saturation >94%  - Repeat CXR tomorrow to track trajectory of pneumonia  - C/w Dexamethasone 6mg and Remdesivir  - Continue diuresis today with Torsemide 20mg oral    RENAL:  #Acute Kidney Injury, possibly superimposed on CKD Stage 3  ED admission with creatinine of 1.18, context of frail older adult  Low PO intake over the last 2 weeks, likely prerenal  Initial bicarb of 26, unlikely clinically significant CKD  UOP of 575mL in the last 24h, unchanged since admission  Net negative of -1500 since admission  Creatinine now improved to 0.90 this morning    Plan:  - Continue to monitor UOP with strict I&Os  - Continue to monitor changes in creatinine with morning CMP tomorrow    GASTROINTESTINAL:  Enteral nutrition with DASH/Sodium restricted diet  Not on stress ulcer prophylaxis prior to transfer    Plan:  - C/w diet  - Start pantoprazole 40mg oral qd given anticoagulation for AFib and critical illness    INFECTIOUS DISEASE:  #COVID-19 Pneumonia  PCR in the ED positive for COVID-19, CXR on admission with RLL infiltrate  ID consulted for COVID-19 management, dexamethasone 6mg qd and remdesivir started  Currently Day 4/5 of Remdesivir and Day 4/10 of Dexamethasone 6mg qd  S/p Ceftriaxone 1g IV x1 and Azithromycin 500mg IV x1, discontinued by ID  Afebrile since admission with normal WBC and procalcitonin of 0.13  BCx x2 drawn in the ED with no growth at 72 hours    Plan:  - C/w Remdesivir until day 4/5, or extend until day 10 if no respiratory improvement per ID consult  - C/w Dexamethasone 6mg qd until Day 10 or until hospital discharge, whichever comes first per ID consult  - C/w monitoring WBC daily    ENDOCRINE:  History of Type 2 DM, A1c of 5.8% on admission  FSG with normal readings, no hyperglycemia    Plan:  - C/w monitoring fingersticks given Dexamethasone therapy  - Hold off on sliding scale insulin as patient has low-normal glucose    HEME/ONC:  Hgb of 14.5 this morning  Likely due to concentration from decreased PO intake, and net negative of 1500 inpatient  Borderline erythrocytosis, unlikely to be true secondary erythrocytosis as patient without chronic hypoxia at home    #History of Idiopathic Thrombocytopenia  Mild thrombocytopenia on ED admission at 144  No signs of mucosal bleeding or purpura    Plan:  - C/w monitoring on AM CBC    Code Status: DNR/DNI  DVT Prophylaxis: Apixaban 5mg oral qd for AFib  GI Prophylaxis: Pantoprazole 40mg oral qd  Diet: DASH, Fluid restriction to 1500mL  Lines: PIV Patient is a 78 y.o. male with cardiac amyloidosis, HFrEF (30-35%), acute hypoxic respiratory failure 2/2 covid pneumonia, and AFib with prior ablation now being upgraded to the CCU from 5URIS for closer blood pressure monitoring during diuresis and possible initiation of inotropes/vasopressors during diuresis.    NEUROLOGIC:  - Normal mentation  - Alert and oriented x3  - No acute distress    CARDIOVASCULAR:  #Hypotension likely from dysautonomia, without signs of shock  Patient has warm extremities, no signs of organ malperfusion  KAYLI resolving on CMP this morning  Normal mentation despite hypotension and increased oxygen requirements  Stat lactate drawn overnight 1.8, no liver enzyme elevations this morning    Plan:  - Continue to hold home GDMT given hypotension  - Increase midodrine to 20mg TID  - Hold off on IV vasopressors/inotropes  - No immediate plans for a [place]-Caryn catheter insertion    #ATTRv Cardiac Amyloidosis  Echo on 7/12 unchanged from 7/9  1. Moderate to severe symmetric left ventricular hypertrophy.  2. Moderately-to-severely reduced left ventricular systolic function with global hypokinesis.  3. Severe biatrial enlargement  4. Small pericardial effusion    Plan:  - Continue home Tafamadis 61mg oral qd    #HFrEF (30-35%)  EF (30-35%) from Echo today (7/12), with systolic dysfunction signifying late stage cardiac amyloidosis  CXR with bilateral pleural effusions (R>>L)  BNP of 89370 (7/11)  Net negative (-1500) since admission  Given a bolus of 250cc NS on 7/10 0500, no other IV fluids given  Previously received Torsemide 10mg oral once on 7/10, no other prior diuretics administered    Plan:  - Family meeting with palliative to make DNR/DNI with continued treatment of his acute issues  - Continue to hold GDMT at this time given continued hypotension  - Additional diuresis today with Torsemide 20mg oral  - Increase midodrine to 20mg TID during diuresis  - Continue low sodium diet and fluid restriction to 1500mL today  - Continue to appreciate heart failure team recommendations    #Atrial Fibrillation s/p ablation  On home apixaban 5mg oral q12h  S/p ablation and CRT-P    Plan:  - Continue with apixaban 5mg q12h for anticoagulation    PULMONARY:  #Acute hypoxic respiratory failure 2/2 COVID pneumonia and HFrEF  Currently requiring 6L of NC to maintain saturation >94%  Overnight had hypoxic episode likely related to exacerbation of pulmonary shunting from low cardiac output  CXR today unchanged from ED admission CXR  Continued R>>L pleural effusion and RLL infiltrate    Plan:  - C/w 6L NC and wean as tolerated for goal saturation >94%  - Repeat CXR tomorrow to track trajectory of pneumonia  - C/w Dexamethasone 6mg and Remdesivir  - Continue diuresis today with Torsemide 20mg oral    RENAL:  #Acute Kidney Injury, possibly superimposed on CKD Stage 3  ED admission with creatinine of 1.18, context of frail older adult  Low PO intake over the last 2 weeks, likely prerenal  Initial bicarb of 26, unlikely clinically significant CKD  UOP of 575mL in the last 24h, unchanged since admission  Net negative of -1500 since admission  Creatinine now improved to 0.90 this morning    Plan:  - Continue to monitor UOP with strict I&Os  - Continue to monitor changes in creatinine with morning CMP tomorrow    GASTROINTESTINAL:  Enteral nutrition with DASH/Sodium restricted diet  Not on stress ulcer prophylaxis, not currently indicated    Plan:  - C/w DASH diet    INFECTIOUS DISEASE:  #COVID-19 Pneumonia  PCR in the ED positive for COVID-19, CXR on admission with RLL infiltrate  ID consulted for COVID-19 management, dexamethasone 6mg qd and remdesivir started  Currently Day 4/5 of Remdesivir and Day 4/10 of Dexamethasone 6mg qd  S/p Ceftriaxone 1g IV x1 and Azithromycin 500mg IV x1, discontinued by ID  Afebrile since admission with normal WBC and procalcitonin of 0.13  BCx x2 drawn in the ED with no growth at 72 hours    Plan:  - C/w Remdesivir until day 4/5, or extend until day 10 if no respiratory improvement per ID consult  - C/w Dexamethasone 6mg qd until Day 10 or until hospital discharge, whichever comes first per ID consult  - C/w monitoring WBC daily    ENDOCRINE:  History of Type 2 DM, A1c of 5.8% on admission  FSG with normal readings, no hyperglycemia    Plan:  - C/w monitoring fingersticks given Dexamethasone therapy  - Hold off on sliding scale insulin as patient has low-normal glucose    HEME/ONC:  Hgb of 14.5 this morning  Likely due to concentration from decreased PO intake, and net negative of 1500 inpatient  Borderline erythrocytosis, unlikely to be true secondary erythrocytosis as patient without chronic hypoxia at home    #History of Idiopathic Thrombocytopenia  Mild thrombocytopenia on ED admission at 144  No signs of mucosal bleeding or purpura    Plan:  - C/w monitoring on AM CBC    Code Status: DNR/DNI  DVT Prophylaxis: Apixaban 5mg oral qd for AFib  GI Prophylaxis: No current indication  Diet: DASH, Fluid restriction to 1500mL  Lines: PIV Patient is a 78 y.o. male with cardiac amyloidosis, HFrEF (30-35%), acute hypoxic respiratory failure 2/2 covid pneumonia, and AFib with prior ablation now being upgraded to the CCU from 5URIS for closer blood pressure monitoring during diuresis and possible initiation of inotropes/vasopressors during diuresis.    NEUROLOGIC:  - Normal mentation  - Alert and oriented x3  - No acute distress    CARDIOVASCULAR:  #Hypotension likely from dysautonomia, without signs of shock  Patient has warm extremities, no signs of organ malperfusion  KAYLI resolving on CMP this morning  Normal mentation despite hypotension and increased oxygen requirements  Stat lactate drawn overnight 1.8, no liver enzyme elevations this morning    Plan:  - Continue to hold home GDMT given hypotension  - Increase midodrine to 20mg TID  - Hold off on IV vasopressors/inotropes  - No immediate plans for a [place]-Caryn catheter insertion    #ATTRv Cardiac Amyloidosis  Echo on 7/12 unchanged from 7/9  1. Moderate to severe symmetric left ventricular hypertrophy.  2. Moderately-to-severely reduced left ventricular systolic function with global hypokinesis.  3. Severe biatrial enlargement  4. Small pericardial effusion    Plan:  - Continue home Tafamadis 61mg oral qd    #HFrEF (30-35%)  EF (30-35%) from Echo today (7/12), with systolic dysfunction signifying late stage cardiac amyloidosis  CXR with bilateral pleural effusions (R>>L)  [place]NP of 9[address] (7/11)  Net negative (-1500) since admission  Given a bolus of 250cc NS on 7/10 0500, no other IV fluids given  Previously received Torsemide 10mg oral once on 7/10, no other prior diuretics administered    Plan:  - Family meeting with palliative to make DNR/DNI with continued treatment of his acute issues  - Continue to hold GDMT at this time given continued hypotension  - Additional diuresis today with Torsemide 20mg oral  - Increase midodrine to 20mg TID during diuresis  - Continue low sodium diet and fluid restriction to 1500mL today  - Continue to appreciate heart failure team recommendations    #Atrial Fibrillation s/p ablation  On home apixaban 5mg oral q12h  S/p ablation and CRT-P    Plan:  - Continue with apixaban 5mg q12h for anticoagulation    PULMONARY:  #Acute hypoxic respiratory failure 2/2 Moderate COVID pneumonia and HFrEF  Currently requiring 6L of NC to maintain saturation >94%  Overnight had hypoxic episode likely related to exacerbation of pulmonary shunting from low cardiac output  CXR today unchanged from ED admission CXR  Continued R>>L pleural effusion and RLL infiltrate    Plan:  - C/w 6L NC and wean as tolerated for goal saturation >94%  - Repeat CXR tomorrow to track trajectory of pneumonia  - C/w Dexamethasone 6mg and Remdesivir  - Continue diuresis today with Torsemide 20mg oral    RENAL:  #Acute Kidney Injury, possibly superimposed on CKD Stage 3  ED admission with creatinine of 1.18, context of frail older adult  Low PO intake over the last 2 weeks, likely prerenal  Initial bicarb of 26, unlikely clinically significant CKD  UOP of 575mL in the last 24h, unchanged since admission  Net negative of -1500 since admission  Creatinine now improved to 0.90 this morning    Plan:  - Continue to monitor UOP with strict I&Os  - Continue to monitor changes in creatinine with morning CMP tomorrow    GASTROINTESTINAL:  Enteral nutrition with DASH/Sodium restricted diet  Not on stress ulcer prophylaxis, not currently indicated    Plan:  - C/w DASH diet    INFECTIOUS DISEASE:  #COVID-19 Pneumonia  PCR in the ED positive for COVID-19, CXR on admission with RLL infiltrate  ID consulted for COVID-19 management, dexamethasone 6mg qd and remdesivir started  Currently Day 4/5 of Remdesivir and Day 4/10 of Dexamethasone 6mg qd  S/p Ceftriaxone 1g IV x1 and Azithromycin 500mg IV x1, discontinued by ID  Afebrile since admission with normal WBC and procalcitonin of 0.13  BCx x2 drawn in the ED with no growth at 72 hours    Plan:  - C/w Remdesivir until day 4/5, or extend until day 10 if no respiratory improvement per ID consult  - C/w Dexamethasone 6mg qd until Day 10 or until hospital discharge, whichever comes first per ID consult  - C/w monitoring WBC daily    ENDOCRINE:  History of Type 2 DM, A1c of 5.8% on admission  FSG with normal readings, no hyperglycemia    Plan:  - C/w monitoring fingersticks given Dexamethasone therapy  - Hold off on sliding scale insulin as patient has low-normal glucose    HEME/ONC:  Hgb of 14.5 this morning  Likely due to concentration from decreased PO intake, and net negative of 1500 inpatient  Borderline erythrocytosis, unlikely to be true secondary erythrocytosis as patient without chronic hypoxia at home    #History of Idiopathic Thrombocytopenia  Mild thrombocytopenia on ED admission at 144  No signs of mucosal bleeding or purpura    Plan:  - C/w monitoring on AM CBC    Code Status: DNR/DNI  DVT Prophylaxis: Apixaban 5mg oral qd for AFib  GI Prophylaxis: No current indication  Diet: DASH, Fluid restriction to 1500mL  Lines: PIV

## 2024-07-12 NOTE — PROGRESS NOTE ADULT - SUBJECTIVE AND OBJECTIVE BOX
INTERVAL EVENTS: Overnight transient hypotension and worsening hypoxemia.    Subjective: Patient denies worsening dizziness, syncope, LE edema, dyspnea. Feels that his appetite and weakness are not improving but also not getting worse.    Cardiac medications administered in the last 48h:  midodrine.: 10 milliGRAM(s) Oral (07-12-24 @ 05:41)  midodrine.: 10 milliGRAM(s) Oral (07-12-24 @ 00:28)  midodrine.: 10 milliGRAM(s) Oral (07-12-24 @ 00:19)      MEDICATIONS  (STANDING):  apixaban 5 milliGRAM(s) Oral every 12 hours  dexAMETHasone     Tablet 6 milliGRAM(s) Oral daily  dextrose 5%. 1000 milliLiter(s) (50 mL/Hr) IV Continuous <Continuous>  dextrose 5%. 1000 milliLiter(s) (100 mL/Hr) IV Continuous <Continuous>  dextrose 50% Injectable 25 Gram(s) IV Push once  dextrose 50% Injectable 25 Gram(s) IV Push once  dextrose 50% Injectable 12.5 Gram(s) IV Push once  glucagon  Injectable 1 milliGRAM(s) IntraMuscular once  insulin lispro (ADMELOG) corrective regimen sliding scale   SubCutaneous Before meals and at bedtime  midodrine. 20 milliGRAM(s) Oral three times a day  remdesivir  IVPB   IV Intermittent   remdesivir  IVPB 100 milliGRAM(s) IV Intermittent every 24 hours  senna 2 Tablet(s) Oral at bedtime  torsemide 20 milliGRAM(s) Oral daily  Vyndamax (Tafamidis) 61 milliGRAM(s) 61 milliGRAM(s) Oral daily    MEDICATIONS  (PRN):  acetaminophen     Tablet .. 650 milliGRAM(s) Oral every 6 hours PRN Temp greater or equal to 38C (100.4F), Mild Pain (1 - 3)  dextrose Oral Gel 15 Gram(s) Oral once PRN Blood Glucose LESS THAN 70 milliGRAM(s)/deciliter  lactulose Syrup 10 Gram(s) Oral daily PRN Constipation  melatonin 3 milliGRAM(s) Oral at bedtime PRN Insomnia  ondansetron Injectable 4 milliGRAM(s) IV Push every 8 hours PRN Nausea and/or Vomiting      VITALS 24H  T(F): 97.4 (07-12-24 @ 05:34), Max: 97.6 (07-11-24 @ 20:28)  HR: 80 (07-12-24 @ 09:12) (70 - 86)  BP: 99/77 (07-12-24 @ 09:12) (64/44 - 105/74)  BP(mean): 84 (07-12-24 @ 09:12) (55 - 85)  ABP: --  ABP(mean): --  RR: 16 (07-12-24 @ 09:12) (16 - 19)  SpO2: 94% (07-12-24 @ 09:12) (90% - 96%)  CVP(mm Hg): --    I/O Detail 24H    11 Jul 2024 07:01  -  12 Jul 2024 07:00  --------------------------------------------------------  IN:    Oral Fluid: 460 mL  Total IN: 460 mL    OUT:    Voided (mL): 575 mL  Total OUT: 575 mL    Total NET: -115 mL      12 Jul 2024 07:01  -  12 Jul 2024 11:01  --------------------------------------------------------  IN:    Oral Fluid: 180 mL  Total IN: 180 mL    OUT:  Total OUT: 0 mL    Total NET: 180 mL        Daily Weight Trend (kg):   58.3 (07-09-24 @ 00:07)      PHYSICAL EXAM:  HEENT: Dry mucosa. No JVD.   RESP: Coarse crackles with some improvement with coughing  CV: RRR, normal s1/s2. No m/r/g.  ABD: Soft, NTND. BS+  EXT: Cold. No edema, clubbing, or cyanosis.   NEURO: Lethargic  	    LABS:  CBC 07-12-24 @ 07:17                        14.5   4.27  )-----------( 180                   41.5     Hgb trend: 14.5 <-- , 16.0 <-- , 16.8 <--   WBC trend: 4.27 <-- , 4.46 <-- , 4.05 <--       CMP 07-12-24 @ 07:17    138  |  107  |  32<H>  ----------------------------<  104<H>  4.1   |  24  |  0.92    Ca    8.0<L>      07-12-24 @ 07:17  Mg     2.3     07-12    TPro  5.7<L>  /  Alb  2.4<L>  /  TBili  0.8  /  DBili  x   /  AST  15  /  ALT  15  /  AlkPhos  80     07-12    Serum Cr (eGFR) trend: 0.92 (86) <-- , 0.93 (85) <-- , 0.94 (84) <-- , 0.91 (87) <-- , 0.90 (89) <--           Cardiac Markers

## 2024-07-12 NOTE — PROGRESS NOTE ADULT - PROBLEM SELECTOR PLAN 5
.  Patient is DNR/DNI, MOLST in chart  -family acting collectively to assist patient with decision making  -see GOC note    In addition to the E/M visit, an advance care planning meeting was performed. Start time: 12:40PM; End time: 12:56PM; Total time: 16min. A face to face meeting to discuss advance care planning was held today regarding: TINA ELISE. Primary decision maker: Patient is able to participate in decision making; Alternate/surrogate: Wife. Discussed advance directives including, but not limited to, healthcare proxy and code status. Decision regarding code status: DNR/DNI; Documentation completed today: MOLST GOC note

## 2024-07-12 NOTE — PROGRESS NOTE ADULT - PROBLEM SELECTOR PLAN 8
F: None   E: replete K < 4, Mg <2  DVT ppx- Eliquis 5mg bid   Diet- DASH  Activity- ambulate as tolerated  Code- Full

## 2024-07-12 NOTE — PROGRESS NOTE ADULT - SUBJECTIVE AND OBJECTIVE BOX
Interventional Cardiology PA Adult Progress Note    ***STEP UP TO CCU FROM CARDIAC TELEMETRY****  Hospital Course:   78yM w/ PMHx ATTR Cardiac Amyloid, HFrEF (EF 30-35%) s/p CRT-P (2/2022), multiple HFrEF admissions, AFib s/p ablation (6/2016 on eliquis), hx R sided Pleural Effusion s/p thoracentesis c/b PTX (2022), T2DM, idiopathic thrombocytopenia, prostate ca s/p chemotherapy p/w 2 weeks of weak, lethargy, SOB and decreased PO intake. Pt admitted to [facility] for presumed HFrEF exacerbation. Pt found to be COVID+ and GDMT held initially d/t hypotension. Advanced HF consulted for GDMT re-initation. ID consulted for assistance in COVID management. Palliative consulted, pt to remain full code. Sx likely 2/2 COVID PNA. MICU consulted on 7/10 for persistent hypotension, however determined pt was stable for telemetry. Overnight 7/12, pt was found to be hypotensive to 60/40 and hypoxic, put on 100% non re-breather and given midodrine 10mg x1. CXR reveals increased pulmonary edema concerning for COVID pnemonia vs fluid overload. Pt stepped up to the CCU for more frequent vital monitoring and pressors to allow room for diuresis.     C.C.: SOB    Subjective Assessment: Pt feels tired, denies chest pain, shortness of breath, nausea, vomiting.       ROS Negative except as per Subjective and HPI  	  MEDICATIONS:  midodrine. 10 milliGRAM(s) Oral three times a day    remdesivir  IVPB 100 milliGRAM(s) IV Intermittent every 24 hours  remdesivir  IVPB   IV Intermittent       acetaminophen     Tablet .. 650 milliGRAM(s) Oral every 6 hours PRN  melatonin 3 milliGRAM(s) Oral at bedtime PRN  ondansetron Injectable 4 milliGRAM(s) IV Push every 8 hours PRN    lactulose Syrup 10 Gram(s) Oral daily PRN  senna 2 Tablet(s) Oral at bedtime    dexAMETHasone     Tablet 6 milliGRAM(s) Oral daily  dextrose 50% Injectable 25 Gram(s) IV Push once  dextrose 50% Injectable 12.5 Gram(s) IV Push once  dextrose 50% Injectable 25 Gram(s) IV Push once  dextrose Oral Gel 15 Gram(s) Oral once PRN  glucagon  Injectable 1 milliGRAM(s) IntraMuscular once  insulin lispro (ADMELOG) corrective regimen sliding scale   SubCutaneous Before meals and at bedtime    apixaban 5 milliGRAM(s) Oral every 12 hours  dextrose 5%. 1000 milliLiter(s) IV Continuous <Continuous>  dextrose 5%. 1000 milliLiter(s) IV Continuous <Continuous>      	    [PHYSICAL EXAM:  TELEMETRY:  T(C): 36.3 (07-12-24 @ 05:34), Max: 36.6 (07-11-24 @ 08:56)  HR: 71 (07-12-24 @ 05:34) (70 - 86)  BP: 101/76 (07-12-24 @ 07:00) (64/44 - 105/74)  RR: 16 (07-12-24 @ 06:00) (16 - 19)  SpO2: 91% (07-12-24 @ 07:00) (90% - 96%)  Wt(kg): --  I&O's Summary    11 Jul 2024 07:01  -  12 Jul 2024 07:00  --------------------------------------------------------  IN: 460 mL / OUT: 575 mL / NET: -115 mL                                         Appearance: Normal	  HEENT:   Normal oral mucosa, PERRL, EOMI	  Neck: Supple  Cardiovascular: Normal S1 S2, No JVD, No murmurs,   Respiratory: Bilateral crackles B/L  Gastrointestinal:  Soft, Non-tender, + BS	  Skin: No rashes, No ecchymoses, No cyanosis  Extremities: Normal range of motion, No clubbing, cyanosis or edema  Vascular: warm  Neurologic: Non-focal  Psychiatry: A & O x 2-3, Mood & affect appropriate                                            14.5   4.27  )-----------( 180      ( 12 Jul 2024 07:17 )             41.5     07-12    138  |  107  |  32<H>  ----------------------------<  104<H>  4.1   |  24  |  0.92    Ca    8.0<L>      12 Jul 2024 07:17  Mg     2.3     07-12    TPro  5.7<L>  /  Alb  2.4<L>  /  TBili  0.8  /  DBili  x   /  AST  15  /  ALT  15  /  AlkPhos  80  07-12    proBNP:   Lipid Profile:   HgA1c:   TSH:       ASSESSMENT/PLAN: 	        DVT ppx:  Dispo:

## 2024-07-12 NOTE — PROGRESS NOTE ADULT - ASSESSMENT
78yM w/ PMHx ATTR Cardiac Amyloid, HFrEF (EF 30-35%) s/p CRT-P (2/2022), multiple HFrEF admissions, AFib s/p ablation (6/2016 on eliquis), hx R sided Pleural Effusion s/p thoracentesis c/b PTX (2022), T2DM, idiopathic thrombocytopenia, prostate ca s/p chemotherapy p/w 2 weeks of weak, lethargy, SOB and decreased PO intake. Pt admitted to [facility] for presumed HFrEF exacerbation. Pt found to be COVID+ and GDMT held initially d/t hypotension. Advanced HF consulted for GDMT re-initation. ID consulted for assistance in COVID management. Palliative consulted, pt to remain full code. Sx likely 2/2 COVID PNA. MICU consulted on 7/10 for persistent hypotension, however determined pt was stable for telemetry. Overnight 7/12, pt was found to be hypotensive to 60/40 and hypoxic, put on 100% non re-breather and given midodrine 10mg x1. CXR reveals increased pulmonary edema concerning for COVID pnemonia vs fluid overload. Pt stepped up to the CCU for more frequent vital monitoring and pressors to allow room for diuresis.

## 2024-07-12 NOTE — DIETITIAN INITIAL EVALUATION ADULT - OTHER CALCULATIONS
Estimated needs based on ABW; calorie/protein needs adjusted for repletion, covid19, clinical course; fluid per team

## 2024-07-12 NOTE — PROGRESS NOTE ADULT - PROBLEM SELECTOR PLAN 3
Requiring 4L NC O2 secondary to COVID-19  - wean as tolerated, satting 91-97%  - lactate 7/11: 2.2-->1.8  - AST/ALT 16/17  - continue to monitor for worsening respiratory status and hypotension with upgrade to CCU

## 2024-07-12 NOTE — DIETITIAN INITIAL EVALUATION ADULT - PROBLEM SELECTOR PLAN 3
s/p ablation in 2016, continued on toprol, eliquis, follows w/ Dr. Pemberton   last 2 doses of toprol held d/t hypotension     Plan:   -c/w eliquis 5 mg bid   -hold toprol i/s/o relative hypotension, r/s as appropriate

## 2024-07-12 NOTE — DIETITIAN INITIAL EVALUATION ADULT - OTHER INFO
78 y.o. male with cardiac amyloidosis, HFrEF (30-35%), acute hypoxic respiratory failure 2/2 covid pneumonia, and AFib with prior ablation now being upgraded to the CCU from 5URIS for closer blood pressure monitoring during diuresis and possible initiation of inotropes/vasopressors during diuresis. GOC discussion ongoing.    .....  Pt started on DASH/TLC, consistent carbohydrate diet with 1.5L fluid restriction.  6L NC   No pain noted  1+ edema b/l ankles  RD to follow, nutrition recommendations below. 78 y.o. male with cardiac amyloidosis, HFrEF (30-35%), acute hypoxic respiratory failure 2/2 covid pneumonia, and AFib with prior ablation now being upgraded to the CCU from 5URIS for closer blood pressure monitoring during diuresis and possible initiation of inotropes/vasopressors during diuresis. GOC discussion ongoing.    Pt assessed at bedside. Resting in bed on 6L NC satting 95-97%. Pt started on DASH/TLC, consistent carbohydrate diet with 1.5L fluid restriction. No GI distress noted. Pt with decreased PO intake now x ~2 weeks. Pt has had steady weight loss over the last 2 years. Reported UBW historically 190lb (86.3kg). Current weight loss to date 16.4kg (19%). Prolonged inadequate intake, significant weight loss and physical findings indicative of severe protein energy malnutrition. Reviewed adequate intake goals, focus on nutrient dense foods, diet parameters. Diet preferences reviewed. No pain noted. 1+ edema b/l ankles. RD to follow, nutrition recommendations below.

## 2024-07-12 NOTE — PROGRESS NOTE ADULT - ASSESSMENT
78y M w/ PMHx ATTR cardiac amyloid, diastolic HF (EF 30-35%) s/p CRT-P (2/22), AFib s/p ablation 6/16 on eliquis, DM2, idiopathic thrombycytopenia, prostate ca s/p chemotherapy with weakness, poor PO intake, SOB and lethargy as well as hypotensive and with acute hypoxemic respiratory failure most likely secondary Covid PNA . HF consulted to guide with HFrEF GDMT management in the setting of hypotension.    Diagnostics:   EKG 7/8/24: A fib, V paced 80 HR.  TTE 5/24/24: LVIDD 5.0. LVEF 30-35%. Grade 3 diastolic dysfunction. TAPSE 1.0 cm. no PH. Groundglass tissue texture suggestive of amyloid heart.  TTE 11/21/22: LVIDD 4.5, LVEF 20-25%. PH. PASP 42 mmhg. mild-mod MR    #ATTR cardiac amyloid  #HFrEF  #Acute hypoxemic respiratory failure  #Moderate Covid PNA  Elena is a 79 y/o M w/ known ATTR cardiac amyloid cardiomyopathy who initially presented with symptoms suggestive of FTT. This was initially concerning that he could have progression of his cardiac amyloid but he was found to be covid positive with acute hypoxemic respiratory failure and began treatment for covid. On initial assessment, he appeared cold and dry likely from hypovolemia rather than low output state. Overall, his initial presentation as well as his CXR findings can most likely be explained clinically by moderate covid pneumonia rather than cardiogenic pulm edema/progression of amyloid cardiomyopathy.  - Etiology: ATTR Cardiac amyloid  - ACC/AHA: Stage C  - GDMT: Will plan to hold all GDMT at this time due to his hypotension. Would continue midodrine 10 mg tid. His transient hypotension aside from poor PO intake/covid PNA may be explained by worsening dysautonomia 2/2 progression of his known amyloid diagnosis. It is not clear if there is truly benefit in this patient who's HFrEF is secondary to ATTR amyloid and may potentially cause more harm given he appears to always have borderline blood pressure even in the outpatient setting. Will plan to discharge only on SGLT2i.  Diuretics: Would start torsemide 20 mg q24  - Follow up palliative/GOC  - Cont vyndamax 61 mg q24   - Continue treatment of moderate covid PNA. Wean oxygen as tolerated   78y M w/ PMHx ATTR cardiac amyloid, diastolic HF (EF 30-35%) s/p CRT-P (2/22), AFib s/p ablation 6/16 on eliquis, DM2, idiopathic thrombycytopenia, prostate ca s/p chemotherapy with weakness, poor PO intake, SOB and lethargy as well as hypotensive and with acute hypoxemic respiratory failure most likely secondary Covid PNA . HF consulted to guide with HFrEF GDMT management in the setting of hypotension.    Diagnostics:   EKG 7/8/24: A fib, V paced 80 HR.  TTE 5/24/24: LVIDD 5.0. LVEF 30-35%. Grade 3 diastolic dysfunction. TAPSE 1.0 cm. no PH. Groundglass tissue texture suggestive of amyloid heart.  TTE 11/21/22: LVIDD 4.5, LVEF 20-25%. PH. PASP 42 mmhg. mild-mod MR    #ATTR cardiac amyloid  #HFrEF  #Acute hypoxemic respiratory failure  #Moderate Covid PNA  Elena is a 79 y/o M w/ known ATTR cardiac amyloid cardiomyopathy who initially presented with symptoms suggestive of FTT. This was initially concerning that he could have progression of his cardiac amyloid but he was found to be covid positive with acute hypoxemic respiratory failure and began treatment for covid. On initial assessment, he appeared cold and dry likely from hypovolemia rather than low output state. Overall, his initial presentation as well as his CXR findings can most likely be explained clinically by moderate covid pneumonia rather than cardiogenic pulm edema/progression of amyloid cardiomyopathy.  - Etiology: ATTR Cardiac amyloid  - ACC/AHA: Stage C  - Inotropes: Would hold off on inotropes unless there is clinical/biomarker signs of hypoperfusion  - GDMT: Will plan to hold all GDMT at this time due to his hypotension. It is not clear if there is truly benefit in this patient who's HFrEF is secondary to ATTR amyloid and may potentially cause more harm given he appears to always have borderline blood pressure even in the outpatient setting. Will plan to discharge only on SGLT2i.  Diuretics: Would start torsemide 20 mg q24  - Follow up palliative/GOC  - Cont vyndamax 61 mg q24   - Continue treatment of moderate covid PNA. Wean oxygen as tolerated    #Hypotension  -Would continue midodrine 10 mg tid. His transient hypotension aside from poor PO intake/covid PNA may be explained by worsening dysautonomia 2/2 progression of his known amyloid diagnosis.

## 2024-07-12 NOTE — DIETITIAN INITIAL EVALUATION ADULT - ADD RECOMMEND
1. Continue DASH/TLC, consistent carbohydrate diet  --Recommend add glucerna once/day  --Fluid restriction per team  2. Follow intake closely, adjust prn  3. Monitor electrolytes, adjust and replete prn  4. Pain and bowel regimen per team   5. RD diet edu prn

## 2024-07-12 NOTE — PROGRESS NOTE ADULT - NS ATTEND AMEND GEN_ALL_CORE FT
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 ATTR cardiac amyloid w/ chronic HFrEF  HFrEF 30-35%  Presented warm and dry, more decompensated on exam this morning with b/l rales  2. Hypoxic respiratory failure 2/2 COVID-19 pneumonia  3. Afib/flutter s/p ablation 2016  Tachy-skip syndrome   CRT-P  100% paced on tele  4. Worsening hypotension/shock  Lactate negative w/o KAYLI or elevated LFTs, cold extremities but mentating well so not too concerned for cardiogenic shock at this moment  Doesn't seem to have superimposed bacterial infection   Likely distributive shock from COVID-19 itself?    Plan  1. Remdesivir and dexamethasone as per ID recs  2. Holding GDMT and torsemide given low BP  3. Apixaban 5mg BID for systemic embolization prevention  4. Palliative consulted, most important management for this patient at this moment  5. Transfer to CCU given worsening hypotension and unclear etiology of shock    During non face-to-face time, I reviewed relevant portions of the patient’s medical record. During face-to-face time, I took a relevant history and examined the patient. I also explained differential diagnoses, relevant cardiac diagnoses, workup, and management plan, which required a high level of medical decision making on a critically ill patient with shock/hypotension. I answered all questions related to the patient's medical conditions.     Jhonatan Jang M.D.  Attending Cardiologist  [facility]. 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 ATTR cardiac amyloid w/ chronic HFrEF  HFrEF 30-35%  Presented warm and dry, remains euvolemic -JVD and w/o edema, rales on pulmonary exam from COVID?  2. Hypoxic respiratory failure 2/2 COVID-19 pneumonia  3. Afib/flutter s/p ablation 2016  Tachy-skip syndrome   CRT-P  100% paced on tele  4. Worsening hypotension/shock  Lactate negative w/o KAYLI or elevated LFTs, cold extremities but mentating well so not concerned for cardiogenic shock at this moment  Doesn't seem to have superimposed bacterial infection and no concern for sepsis  Likely distributive shock from COVID-19 itself?    Plan  1. Remdesivir and dexamethasone as per ID recs  2. Holding GDMT and torsemide given low BP  3. Apixaban 5mg BID for systemic embolization prevention  4. Palliative consulted, most important management for this patient at this moment  5. Will need ICU level of care for the time being, MICU consulted and not accepted for transfer, will transfer to CCU for now given worsening hypotension and hypoxic respiratory failure 2/2 COVID-19    During non face-to-face time, I reviewed relevant portions of the patient’s medical record. During face-to-face time, I took a relevant history and examined the patient. I also explained differential diagnoses, relevant cardiac diagnoses, workup, and management plan, which required a high level of medical decision making on a critically ill patient with shock/hypotension. I answered all questions related to the patient's medical conditions.     Jhonatan Jang M.D.  Attending Cardiologist  [facility]. 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 ATTR cardiac amyloid w/ chronic HFrEF  HFrEF 30-35%  Presented warm and dry, remains euvolemic -JVD and w/o edema, rales on pulmonary exam from COVID?  2. Hypoxic respiratory failure 2/2 COVID-19 pneumonia  3. Afib/flutter s/p ablation 2016  Tachy-skip syndrome   CRT-P  100% paced on tele  4. Worsening hypotension/shock  Lactate negative w/o KAYLI or elevated LFTs, cold extremities but mentating well so not concerned for cardiogenic shock at this moment  Doesn't seem to have superimposed bacterial infection and no concern for sepsis  Likely distributive shock from COVID-19 itself and progression of amyloid w/ worsening autonomic dysfunction    Plan  1. Remdesivir and dexamethasone as per ID recs  2. Holding GDMT and torsemide given low BP  3. Apixaban 5mg BID for systemic embolization prevention  4. Palliative consulted, most important management for this patient at this moment  5. Will need ICU level of care for the time being, MICU consulted and not accepted for transfer, will transfer to CCU for now given worsening hypotension and hypoxic respiratory failure 2/2 COVID-19    During non face-to-face time, I reviewed relevant portions of the patient’s medical record. During face-to-face time, I took a relevant history and examined the patient. I also explained differential diagnoses, relevant cardiac diagnoses, workup, and management plan, which required a high level of medical decision making on a critically ill patient with shock/hypotension. I answered all questions related to the patient's medical conditions.     Jhonatan Jang M.D.  Attending Cardiologist  [facility].

## 2024-07-12 NOTE — DIETITIAN INITIAL EVALUATION ADULT - PROBLEM SELECTOR PLAN 2
p/w lethargy, weakness, decreased PO intake x2 weeks, no recent changes in medications   Etiology: NICM, ATTR cardiac amyloid   TTE on 5/24 w/ ED 30-35%, global LV hypokinesis, mod LVH severe LV DD, LA severely dialted, RA mod dilated, mod MR, no PAH  Home med: entresto 24-26 bid, farxiga 10mg qd, spironolactone 25mg qd, toprol 50mg qd, torsemide 20mg qd 0.5 tab   Dry weight o/p: 153lbs on 5/2 visit, 154lbs this admission   EKG showing V-paced at 80bpm, BNP 56860 this admission, previous BNP 59493 on 11/22 during HF exacerbation   CXR w/ b/l pleural effusions, exam w/ bibasilar crackles, +JVD, b/l LE +1 pitting edema to mid shin R > L, slightly dry MM, reports taking Torsemide 10mg today, held Toprol last 2 days   Hypoxic in ED, placed on 5L NC w/ O2 sat 93%, hypotensive but spontaneous resolved   likely mild exacerbation i/s/o COVID     Plan:  -c/w COVID tx   -hold GDMT i/s/o hypotension   -consider HF consult in AM  -f/u b/l LE dopplers   -f/u Bcx  -strict I&Os, daily standing weights, dash diet   -med collateral in AM

## 2024-07-13 LAB
ALBUMIN SERPL ELPH-MCNC: 2.3 G/DL — LOW (ref 3.3–5)
ALBUMIN SERPL ELPH-MCNC: 2.4 G/DL — LOW (ref 3.3–5)
ALP SERPL-CCNC: 85 U/L — SIGNIFICANT CHANGE UP (ref 40–120)
ALP SERPL-CCNC: 90 U/L — SIGNIFICANT CHANGE UP (ref 40–120)
ALT FLD-CCNC: 15 U/L — SIGNIFICANT CHANGE UP (ref 10–45)
ALT FLD-CCNC: 15 U/L — SIGNIFICANT CHANGE UP (ref 10–45)
ANION GAP SERPL CALC-SCNC: 9 MMOL/L — SIGNIFICANT CHANGE UP (ref 5–17)
ANION GAP SERPL CALC-SCNC: 9 MMOL/L — SIGNIFICANT CHANGE UP (ref 5–17)
APTT BLD: 41.8 SEC — HIGH (ref 24.5–35.6)
AST SERPL-CCNC: 18 U/L — SIGNIFICANT CHANGE UP (ref 10–40)
AST SERPL-CCNC: 19 U/L — SIGNIFICANT CHANGE UP (ref 10–40)
BASOPHILS # BLD AUTO: 0.02 K/UL — SIGNIFICANT CHANGE UP (ref 0–0.2)
BASOPHILS NFR BLD AUTO: 0.4 % — SIGNIFICANT CHANGE UP (ref 0–2)
BILIRUB SERPL-MCNC: 0.8 MG/DL — SIGNIFICANT CHANGE UP (ref 0.2–1.2)
BILIRUB SERPL-MCNC: 1 MG/DL — SIGNIFICANT CHANGE UP (ref 0.2–1.2)
BUN SERPL-MCNC: 34 MG/DL — HIGH (ref 7–23)
BUN SERPL-MCNC: 35 MG/DL — HIGH (ref 7–23)
CALCIUM SERPL-MCNC: 8.1 MG/DL — LOW (ref 8.4–10.5)
CALCIUM SERPL-MCNC: 8.3 MG/DL — LOW (ref 8.4–10.5)
CHLORIDE SERPL-SCNC: 106 MMOL/L — SIGNIFICANT CHANGE UP (ref 96–108)
CHLORIDE SERPL-SCNC: 110 MMOL/L — HIGH (ref 96–108)
CO2 SERPL-SCNC: 22 MMOL/L — SIGNIFICANT CHANGE UP (ref 22–31)
CO2 SERPL-SCNC: 26 MMOL/L — SIGNIFICANT CHANGE UP (ref 22–31)
CREAT SERPL-MCNC: 0.87 MG/DL — SIGNIFICANT CHANGE UP (ref 0.5–1.3)
CREAT SERPL-MCNC: 0.9 MG/DL — SIGNIFICANT CHANGE UP (ref 0.5–1.3)
EGFR: 89 ML/MIN/1.73M2 — SIGNIFICANT CHANGE UP
EGFR: 89 ML/MIN/1.73M2 — SIGNIFICANT CHANGE UP
EOSINOPHIL # BLD AUTO: 0 K/UL — SIGNIFICANT CHANGE UP (ref 0–0.5)
EOSINOPHIL NFR BLD AUTO: 0 % — SIGNIFICANT CHANGE UP (ref 0–6)
GLUCOSE BLDC GLUCOMTR-MCNC: 112 MG/DL — HIGH (ref 70–99)
GLUCOSE BLDC GLUCOMTR-MCNC: 112 MG/DL — HIGH (ref 70–99)
GLUCOSE BLDC GLUCOMTR-MCNC: 140 MG/DL — HIGH (ref 70–99)
GLUCOSE BLDC GLUCOMTR-MCNC: 162 MG/DL — HIGH (ref 70–99)
GLUCOSE SERPL-MCNC: 126 MG/DL — HIGH (ref 70–99)
GLUCOSE SERPL-MCNC: 141 MG/DL — HIGH (ref 70–99)
HCT VFR BLD CALC: 47 % — SIGNIFICANT CHANGE UP (ref 39–50)
HCT VFR BLD CALC: 50.2 % — HIGH (ref 39–50)
HGB BLD-MCNC: 15.8 G/DL — SIGNIFICANT CHANGE UP (ref 13–17)
HGB BLD-MCNC: 17.2 G/DL — HIGH (ref 13–17)
IMM GRANULOCYTES NFR BLD AUTO: 0.8 % — SIGNIFICANT CHANGE UP (ref 0–0.9)
INR BLD: 2.34 — HIGH (ref 0.85–1.18)
LACTATE SERPL-SCNC: 1.3 MMOL/L — SIGNIFICANT CHANGE UP (ref 0.5–2)
LACTATE SERPL-SCNC: 2 MMOL/L — SIGNIFICANT CHANGE UP (ref 0.5–2)
LYMPHOCYTES # BLD AUTO: 0.65 K/UL — LOW (ref 1–3.3)
LYMPHOCYTES # BLD AUTO: 12.4 % — LOW (ref 13–44)
MAGNESIUM SERPL-MCNC: 2.2 MG/DL — SIGNIFICANT CHANGE UP (ref 1.6–2.6)
MCHC RBC-ENTMCNC: 29.5 PG — SIGNIFICANT CHANGE UP (ref 27–34)
MCHC RBC-ENTMCNC: 30.2 PG — SIGNIFICANT CHANGE UP (ref 27–34)
MCHC RBC-ENTMCNC: 33.6 GM/DL — SIGNIFICANT CHANGE UP (ref 32–36)
MCHC RBC-ENTMCNC: 34.3 GM/DL — SIGNIFICANT CHANGE UP (ref 32–36)
MCV RBC AUTO: 87.7 FL — SIGNIFICANT CHANGE UP (ref 80–100)
MCV RBC AUTO: 88.2 FL — SIGNIFICANT CHANGE UP (ref 80–100)
MONOCYTES # BLD AUTO: 0.2 K/UL — SIGNIFICANT CHANGE UP (ref 0–0.9)
MONOCYTES NFR BLD AUTO: 3.8 % — SIGNIFICANT CHANGE UP (ref 2–14)
NEUTROPHILS # BLD AUTO: 4.34 K/UL — SIGNIFICANT CHANGE UP (ref 1.8–7.4)
NEUTROPHILS NFR BLD AUTO: 82.6 % — HIGH (ref 43–77)
NRBC # BLD: 0 /100 WBCS — SIGNIFICANT CHANGE UP (ref 0–0)
PHOSPHATE SERPL-MCNC: 2.7 MG/DL — SIGNIFICANT CHANGE UP (ref 2.5–4.5)
PLATELET # BLD AUTO: 197 K/UL — SIGNIFICANT CHANGE UP (ref 150–400)
PLATELET # BLD AUTO: 250 K/UL — SIGNIFICANT CHANGE UP (ref 150–400)
POTASSIUM SERPL-MCNC: 4.1 MMOL/L — SIGNIFICANT CHANGE UP (ref 3.5–5.3)
POTASSIUM SERPL-MCNC: 4.2 MMOL/L — SIGNIFICANT CHANGE UP (ref 3.5–5.3)
POTASSIUM SERPL-SCNC: 4.1 MMOL/L — SIGNIFICANT CHANGE UP (ref 3.5–5.3)
POTASSIUM SERPL-SCNC: 4.2 MMOL/L — SIGNIFICANT CHANGE UP (ref 3.5–5.3)
PROT SERPL-MCNC: 5.9 G/DL — LOW (ref 6–8.3)
PROT SERPL-MCNC: 6.2 G/DL — SIGNIFICANT CHANGE UP (ref 6–8.3)
PROTHROM AB SERPL-ACNC: 26 SEC — HIGH (ref 9.5–13)
RBC # BLD: 5.36 M/UL — SIGNIFICANT CHANGE UP (ref 4.2–5.8)
RBC # BLD: 5.69 M/UL — SIGNIFICANT CHANGE UP (ref 4.2–5.8)
RBC # FLD: 14.8 % — HIGH (ref 10.3–14.5)
RBC # FLD: 14.9 % — HIGH (ref 10.3–14.5)
SODIUM SERPL-SCNC: 141 MMOL/L — SIGNIFICANT CHANGE UP (ref 135–145)
SODIUM SERPL-SCNC: 141 MMOL/L — SIGNIFICANT CHANGE UP (ref 135–145)
WBC # BLD: 5.25 K/UL — SIGNIFICANT CHANGE UP (ref 3.8–10.5)
WBC # BLD: 5.85 K/UL — SIGNIFICANT CHANGE UP (ref 3.8–10.5)
WBC # FLD AUTO: 5.25 K/UL — SIGNIFICANT CHANGE UP (ref 3.8–10.5)
WBC # FLD AUTO: 5.85 K/UL — SIGNIFICANT CHANGE UP (ref 3.8–10.5)

## 2024-07-13 PROCEDURE — 71045 X-RAY EXAM CHEST 1 VIEW: CPT | Mod: 26

## 2024-07-13 PROCEDURE — 99291 CRITICAL CARE FIRST HOUR: CPT

## 2024-07-13 RX ORDER — SOD PHOS DI, MONO/K PHOS MONO 250 MG
1 TABLET ORAL ONCE
Refills: 0 | Status: COMPLETED | OUTPATIENT
Start: 2024-07-13 | End: 2024-07-13

## 2024-07-13 RX ORDER — PANTOPRAZOLE SODIUM 40 MG/10ML
40 INJECTION, POWDER, FOR SOLUTION INTRAVENOUS ONCE
Refills: 0 | Status: COMPLETED | OUTPATIENT
Start: 2024-07-13 | End: 2024-07-13

## 2024-07-13 RX ORDER — MIDODRINE HYDROCHLORIDE 10 MG/1
30 TABLET ORAL EVERY 8 HOURS
Refills: 0 | Status: DISCONTINUED | OUTPATIENT
Start: 2024-07-13 | End: 2024-07-18

## 2024-07-13 RX ORDER — PANTOPRAZOLE SODIUM 40 MG/10ML
40 INJECTION, POWDER, FOR SOLUTION INTRAVENOUS
Refills: 0 | Status: DISCONTINUED | OUTPATIENT
Start: 2024-07-13 | End: 2024-07-18

## 2024-07-13 RX ADMIN — TORSEMIDE 20 MILLIGRAM(S): 20 TABLET ORAL at 07:00

## 2024-07-13 RX ADMIN — APIXABAN 5 MILLIGRAM(S): 5 TABLET, FILM COATED ORAL at 11:31

## 2024-07-13 RX ADMIN — MIDODRINE HYDROCHLORIDE 30 MILLIGRAM(S): 10 TABLET ORAL at 13:41

## 2024-07-13 RX ADMIN — MIDODRINE HYDROCHLORIDE 30 MILLIGRAM(S): 10 TABLET ORAL at 22:23

## 2024-07-13 RX ADMIN — DEXAMETHASONE 6 MILLIGRAM(S): 1 TABLET ORAL at 07:00

## 2024-07-13 RX ADMIN — MIDODRINE HYDROCHLORIDE 30 MILLIGRAM(S): 10 TABLET ORAL at 07:01

## 2024-07-13 RX ADMIN — PANTOPRAZOLE SODIUM 40 MILLIGRAM(S): 40 INJECTION, POWDER, FOR SOLUTION INTRAVENOUS at 11:31

## 2024-07-13 RX ADMIN — Medication 2 TABLET(S): at 22:23

## 2024-07-13 RX ADMIN — INSULIN LISPRO 1: 100 INJECTION, SOLUTION SUBCUTANEOUS at 11:41

## 2024-07-13 NOTE — PROGRESS NOTE ADULT - SUBJECTIVE AND OBJECTIVE BOX
**TRANSFER FROM [facility] TO [facility]**    Hospital Course:  Mr. Jake Wise is a 78 y.o. 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 Per ED H&P, patient was previously able to walk several blocks without issues, but in the 2 weeks prior to his ED arrival he started to complain of weakness, SOB, and decreased PO intake from his lethargy. Pt had significant difficulty ambulating at home which prompted his evaluation in the ED. In the emergency department, patient was found to be hypotensive at 74/52 and hypoxic on RA at 88% oxygen saturation. He was started on a 4L NC which improved his saturation to 94% and repeat pressures had a normal MAP at 65. His initial labs were unremarkable with the exception of [place]NP at [address], venous oxygen saturation at 16% on VBG, and creatinine of 1.18 with a concentrated Hgb at 15, with the impression he may have a dry heart failure exacerbation from his late stage cardiac amyloidosis. As part of his ED evaluation, he was found to be COVID positive and a CXR also showed he had a RLL infiltrate and bilateral pleural effusions (R>>L) consistent with covid pneumonia. Given his history of AFib and continued respiratory failure 2/2 covid pneumonia, he was admitted to [facility] for management of his covid pneumonia and monitoring of his vitals and rhythm on telemetry.    On 7/12 at 00:40, patient was found to be hypotensive at 60/40s and hypoxic to the low 80s while on 4L NC. Patient was started on a nonrebreather and given Midodrine 10mg oral x2 which improved his blood pressure to SBP of 100s and oxygen to 96-97%. Patient was eventually transitioned off his NRB to 6L NC and was upgraded to the CCU for further management of his acute hypoxic respiratory failure and heart failure with closer monitoring and possible continued diuresis or vasopressor management.    SUBJECTIVE: Patient seen and examined at bedside, no acute respiratory distress. Patient denies any complaints at this time and is on 6L NC.    VITAL SIGNS:  ICU Vital Signs Last 24 Hrs  T(C): 36.3 (12 Jul 2024 05:34), Max: 36.4 (11 Jul 2024 16:50)  T(F): 97.4 (12 Jul 2024 05:34), Max: 97.6 (11 Jul 2024 20:28)  HR: 80 (12 Jul 2024 09:12) (70 - 86)  BP: 99/77 (12 Jul 2024 09:12) (64/44 - 105/74)  BP(mean): 84 (12 Jul 2024 09:12) (55 - 85)  ABP: --  ABP(mean): --  RR: 16 (12 Jul 2024 09:12) (16 - 19)  SpO2: 94% (12 Jul 2024 09:12) (90% - 96%)    O2 Parameters below as of 12 Jul 2024 09:12  Patient On (Oxygen Delivery Method): nasal cannula  O2 Flow (L/min): 6    07-11 @ 07:01  -  07-12 @ 07:00  --------------------------------------------------------  IN: 460 mL / OUT: 575 mL / NET: -115 mL    07-12 @ 07:01  -  07-12 @ 11:06  --------------------------------------------------------  IN: 180 mL / OUT: 0 mL / NET: 180 mL      CAPILLARY BLOOD GLUCOSE    POCT Blood Glucose.: 103 mg/dL (12 Jul 2024 07:58)    PHYSICAL EXAM:    Constitutional: NAD, Frail appearance  HEENT: NC/AT; PERRL, anicteric sclera; MMM  Neck: supple, No JVD present.  Cardiovascular: +S1/S2, RRR  Respiratory: Bilateral crackles, most pronounced in the RLL  Gastrointestinal: abdomen soft, NT/ND; no rebound or guarding  Genitourinary: no suprapubic tenderness or fullness.  Extremities: Warm distal lower extremities. No pedal edema.  Vascular: 2+ radial, DP/PT and femoral pulses B/L  Dermatologic: normal color and turgor; no visible rashes  Neurological: Alert and oriented x3.    MEDICATIONS:  MEDICATIONS  (STANDING):  apixaban 5 milliGRAM(s) Oral every 12 hours  dexAMETHasone     Tablet 6 milliGRAM(s) Oral daily  dextrose 5%. 1000 milliLiter(s) (50 mL/Hr) IV Continuous <Continuous>  dextrose 5%. 1000 milliLiter(s) (100 mL/Hr) IV Continuous <Continuous>  dextrose 50% Injectable 25 Gram(s) IV Push once  dextrose 50% Injectable 25 Gram(s) IV Push once  dextrose 50% Injectable 12.5 Gram(s) IV Push once  glucagon  Injectable 1 milliGRAM(s) IntraMuscular once  insulin lispro (ADMELOG) corrective regimen sliding scale   SubCutaneous Before meals and at bedtime  midodrine. 20 milliGRAM(s) Oral three times a day  remdesivir  IVPB 100 milliGRAM(s) IV Intermittent every 24 hours  remdesivir  IVPB   IV Intermittent   senna 2 Tablet(s) Oral at bedtime  torsemide 20 milliGRAM(s) Oral daily  Vyndamax (Tafamidis) 61 milliGRAM(s) 61 milliGRAM(s) Oral daily    MEDICATIONS  (PRN):  acetaminophen     Tablet .. 650 milliGRAM(s) Oral every 6 hours PRN Temp greater or equal to 38C (100.4F), Mild Pain (1 - 3)  dextrose Oral Gel 15 Gram(s) Oral once PRN Blood Glucose LESS THAN 70 milliGRAM(s)/deciliter  lactulose Syrup 10 Gram(s) Oral daily PRN Constipation  melatonin 3 milliGRAM(s) Oral at bedtime PRN Insomnia  ondansetron Injectable 4 milliGRAM(s) IV Push every 8 hours PRN Nausea and/or Vomiting      ALLERGIES:  Allergies    No Known Allergies    Intolerances        LABS:                        14.5   4.27  )-----------( 180      ( 12 Jul 2024 07:17 )             41.5     07-12    138  |  107  |  32<H>  ----------------------------<  104<H>  4.1   |  24  |  0.92    Ca    8.0<L>      12 Jul 2024 07:17  Mg     2.3     07-12    TPro  5.7<L>  /  Alb  2.4<L>  /  TBili  0.8  /  DBili  x   /  AST  15  /  ALT  15  /  AlkPhos  80  07-12      Urinalysis Basic - ( 12 Jul 2024 07:17 )    Color: x / Appearance: x / SG: x / pH: x  Gluc: 104 mg/dL / Ketone: x  / Bili: x / Urobili: x   Blood: x / Protein: x / Nitrite: x   Leuk Esterase: x / RBC: x / WBC x   Sq Epi: x / Non Sq Epi: x / Bacteria: x      RADIOLOGY & ADDITIONAL TESTS: Reviewed. INTERVAL HPI/OVERNIGHT EVENTS:  Patient had an episode of hypotension which he was placed on levophed 0.05mcg for. Immediately after his SBP went to the 130s and the levophed was titrated off immediately to 0.01mcg. Levophed was off by 0100 on 7/13 and he remained back at his baseline until his morning midodrine dose. Midodrine was increased to 30mg TID during the night to be given in the morning. Oxygen requirements decreased to 3L NC from 6L NC.    SUBJECTIVE: Patient seen and examined at bedside without any complaints.    VITAL SIGNS:  ICU Vital Signs Last 24 Hrs  T(C): 35.9 (13 Jul 2024 09:00), Max: 36.2 (12 Jul 2024 17:43)  T(F): 96.6 (13 Jul 2024 09:00), Max: 97.1 (12 Jul 2024 17:43)  HR: 80 (13 Jul 2024 11:00) (80 - 88)  BP: 100/77 (13 Jul 2024 11:00) (72/51 - 118/80)  BP(mean): 85 (13 Jul 2024 11:00) (58 - 95)  ABP: --  ABP(mean): --  RR: 16 (13 Jul 2024 11:00) (0 - 36)  SpO2: 94% (13 Jul 2024 11:00) (93% - 99%)    O2 Parameters below as of 13 Jul 2024 11:00  Patient On (Oxygen Delivery Method): nasal cannula w/ humidification  O2 Flow (L/min): 3      07-12 @ 07:01  -  07-13 @ 07:00  --------------------------------------------------------  IN: 526.5 mL / OUT: 1350 mL / NET: -823.5 mL    07-13 @ 07:01  -  07-13 @ 12:06  --------------------------------------------------------  IN: 0 mL / OUT: 150 mL / NET: -150 mL      CAPILLARY BLOOD GLUCOSE      POCT Blood Glucose.: 162 mg/dL (13 Jul 2024 11:26)      PHYSICAL EXAM:    Constitutional: NAD  HEENT: NC/AT; PERRL, anicteric sclera; MMM  Neck: supple, no JVD  Cardiovascular: +S1/S2, RRR  Respiratory: CTA B/L, no W/R/R  Gastrointestinal: abdomen soft, NT/ND; no rebound or guarding; +BSx4  Genitourinary: no suprapubic tenderness or fullness  Extremities: WWP; no LE edema; no clubbing or cyanosis  Vascular: 2+ radial, DP/PT and femoral pulses B/L  Dermatologic: normal color and turgor; no visible rashes  Neurological:     MEDICATIONS:  MEDICATIONS  (STANDING):  apixaban 5 milliGRAM(s) Oral every 12 hours  dexAMETHasone     Tablet 6 milliGRAM(s) Oral daily  dextrose 5%. 1000 milliLiter(s) (50 mL/Hr) IV Continuous <Continuous>  dextrose 5%. 1000 milliLiter(s) (100 mL/Hr) IV Continuous <Continuous>  dextrose 50% Injectable 25 Gram(s) IV Push once  dextrose 50% Injectable 25 Gram(s) IV Push once  dextrose 50% Injectable 12.5 Gram(s) IV Push once  glucagon  Injectable 1 milliGRAM(s) IntraMuscular once  insulin lispro (ADMELOG) corrective regimen sliding scale   SubCutaneous Before meals and at bedtime  midodrine 30 milliGRAM(s) Oral every 8 hours  pantoprazole    Tablet 40 milliGRAM(s) Oral before breakfast  senna 2 Tablet(s) Oral at bedtime  torsemide 20 milliGRAM(s) Oral daily  Vyndamax (Tafamidis) 61 milliGRAM(s) 61 milliGRAM(s) Oral daily    MEDICATIONS  (PRN):  acetaminophen     Tablet .. 650 milliGRAM(s) Oral every 6 hours PRN Temp greater or equal to 38C (100.4F), Mild Pain (1 - 3)  dextrose Oral Gel 15 Gram(s) Oral once PRN Blood Glucose LESS THAN 70 milliGRAM(s)/deciliter  lactulose Syrup 10 Gram(s) Oral daily PRN Constipation  melatonin 3 milliGRAM(s) Oral at bedtime PRN Insomnia  ondansetron Injectable 4 milliGRAM(s) IV Push every 8 hours PRN Nausea and/or Vomiting      ALLERGIES:  Allergies    No Known Allergies    Intolerances        LABS:                        15.8   5.25  )-----------( 197      ( 13 Jul 2024 05:30 )             47.0     07-13    141  |  110<H>  |  35<H>  ----------------------------<  126<H>  4.2   |  22  |  0.90    Ca    8.1<L>      13 Jul 2024 05:30  Phos  2.7     07-13  Mg     2.2     07-13    TPro  5.9<L>  /  Alb  2.3<L>  /  TBili  0.8  /  DBili  x   /  AST  19  /  ALT  15  /  AlkPhos  85  07-13      Urinalysis Basic - ( 13 Jul 2024 05:30 )    Color: x / Appearance: x / SG: x / pH: x  Gluc: 126 mg/dL / Ketone: x  / Bili: x / Urobili: x   Blood: x / Protein: x / Nitrite: x   Leuk Esterase: x / RBC: x / WBC x   Sq Epi: x / Non Sq Epi: x / Bacteria: x        RADIOLOGY & ADDITIONAL TESTS: Reviewed.

## 2024-07-13 NOTE — PROGRESS NOTE ADULT - ASSESSMENT
Patient is a 78 y.o. male with cardiac amyloidosis, HFrEF (30-35%), acute hypoxic respiratory failure 2/2 covid pneumonia, and AFib with prior ablation now being upgraded to the CCU from 5URIS for closer blood pressure monitoring during diuresis and possible initiation of inotropes/vasopressors during diuresis.    NEUROLOGIC:  - Normal mentation  - Alert and oriented x3  - No acute distress    CARDIOVASCULAR:  #Hypotension likely from dysautonomia, without signs of shock  Patient has warm extremities, no signs of organ malperfusion  KAYLI resolving on CMP this morning  Normal mentation despite hypotension and increased oxygen requirements  Stat lactate drawn overnight 1.8, no liver enzyme elevations this morning    Plan:  - Continue to hold home GDMT given hypotension  - Increase midodrine to 20mg TID  - Hold off on IV vasopressors/inotropes  - No immediate plans for a Gardiner-Caryn catheter insertion    #ATTRv Cardiac Amyloidosis  Echo on 7/12 unchanged from 7/9  1. Moderate to severe symmetric left ventricular hypertrophy.  2. Moderately-to-severely reduced left ventricular systolic function with global hypokinesis.  3. Severe biatrial enlargement  4. Small pericardial effusion    Plan:  - Continue home Tafamadis 61mg oral qd    #HFrEF (30-35%)  EF (30-35%) from Echo today (7/12), with systolic dysfunction signifying late stage cardiac amyloidosis  CXR with bilateral pleural effusions (R>>L)  [place]NP of 28886 (7/11)  Net negative (-1500) since admission  Given a bolus of 250cc NS on 7/10 0500, no other IV fluids given  Previously received Torsemide 10mg oral once on 7/10, no other prior diuretics administered    Plan:  - Family meeting with palliative to make DNR/DNI with continued treatment of his acute issues  - Continue to hold GDMT at this time given continued hypotension  - Additional diuresis today with Torsemide 20mg oral  - Increase midodrine to 20mg TID during diuresis  - Continue low sodium diet and fluid restriction to 1500mL today  - Continue to appreciate heart failure team recommendations    #Atrial Fibrillation s/p ablation  On home apixaban 5mg oral q12h  S/p ablation and CRT-P    Plan:  - Continue with apixaban 5mg q12h for anticoagulation    PULMONARY:  #Acute hypoxic respiratory failure 2/2 Moderate COVID pneumonia and HFrEF  Currently requiring 6L of NC to maintain saturation >94%  Overnight had hypoxic episode likely related to exacerbation of pulmonary shunting from low cardiac output  CXR today unchanged from ED admission CXR  Continued R>>L pleural effusion and RLL infiltrate    Plan:  - C/w 6L NC and wean as tolerated for goal saturation >94%  - Repeat CXR tomorrow to track trajectory of pneumonia  - C/w Dexamethasone 6mg and Remdesivir  - Continue diuresis today with Torsemide 20mg oral    RENAL:  #Acute Kidney Injury, possibly superimposed on CKD Stage 3  ED admission with creatinine of 1.18, context of frail older adult  Low PO intake over the last 2 weeks, likely prerenal  Initial bicarb of 26, unlikely clinically significant CKD  UOP of 575mL in the last 24h, unchanged since admission  Net negative of -1500 since admission  Creatinine now improved to 0.90 this morning    Plan:  - Continue to monitor UOP with strict I&Os  - Continue to monitor changes in creatinine with morning CMP tomorrow    GASTROINTESTINAL:  Enteral nutrition with DASH/Sodium restricted diet  Not on stress ulcer prophylaxis, not currently indicated    Plan:  - C/w DASH diet    INFECTIOUS DISEASE:  #COVID-19 Pneumonia  PCR in the ED positive for COVID-19, CXR on admission with RLL infiltrate  ID consulted for COVID-19 management, dexamethasone 6mg qd and remdesivir started  Currently Day 4/5 of Remdesivir and Day 4/10 of Dexamethasone 6mg qd  S/p Ceftriaxone 1g IV x1 and Azithromycin 500mg IV x1, discontinued by ID  Afebrile since admission with normal WBC and procalcitonin of 0.13  BCx x2 drawn in the ED with no growth at 72 hours    Plan:  - C/w Remdesivir until day 4/5, or extend until day 10 if no respiratory improvement per ID consult  - C/w Dexamethasone 6mg qd until Day 10 or until hospital discharge, whichever comes first per ID consult  - C/w monitoring WBC daily    ENDOCRINE:  History of Type 2 DM, A1c of 5.8% on admission  FSG with normal readings, no hyperglycemia    Plan:  - C/w monitoring fingersticks given Dexamethasone therapy  - Hold off on sliding scale insulin as patient has low-normal glucose    HEME/ONC:  Hgb of 14.5 this morning  Likely due to concentration from decreased PO intake, and net negative of 1500 inpatient  Borderline erythrocytosis, unlikely to be true secondary erythrocytosis as patient without chronic hypoxia at home    #History of Idiopathic Thrombocytopenia  Mild thrombocytopenia on ED admission at 144  No signs of mucosal bleeding or purpura    Plan:  - C/w monitoring on AM CBC    Code Status: DNR/DNI  DVT Prophylaxis: Apixaban 5mg oral qd for AFib  GI Prophylaxis: No current indication  Diet: DASH, Fluid restriction to 1500mL  Lines: PIV Patient is a 78 y.o. male with cardiac amyloidosis, HFrEF (30-35%), acute hypoxic respiratory failure 2/2 covid pneumonia, and AFib with prior ablation now being upgraded to the CCU from 5URIS for closer blood pressure monitoring during diuresis and possible initiation of inotropes/vasopressors during diuresis.    NEUROLOGIC:  - Normal mentation  - Alert and oriented x3  - No acute distress    CARDIOVASCULAR:  #Hypotension likely from dysautonomia, without signs of shock  Patient has warm extremities, no signs of organ malperfusion  Midodrine increased to 30mg TID from 20mg TID due to continued hypotension overnight  All vasopressors are discontinued as of this morning despite short course overnight    Plan:  - Continue to hold home GDMT given hypotension  - Increase midodrine to 30mg TID  - Continue to hold off on any vasopressors/inotropes to attempt control with oral medications only    #ATTRv Cardiac Amyloidosis  Echo on 7/12 unchanged from 7/9  1. Moderate to severe symmetric left ventricular hypertrophy.  2. Moderately-to-severely reduced left ventricular systolic function with global hypokinesis.  3. Severe biatrial enlargement  4. Small pericardial effusion    Plan:  - Continue home Tafamadis 61mg oral qd    #HFrEF (30-35%)  EF (30-35%) from Echo today (7/12), with systolic dysfunction signifying late stage cardiac amyloidosis  CXR with bilateral pleural effusions (R>>L)  BNP of 94631 (7/11)  Patient now DNR/DNI after discussion with family  Fluids/Diuretics history:  Given a bolus of 250cc NS on 7/10 0500, no other IV fluids given  Previously received Torsemide 10mg oral once on 7/10, no other prior diuretics administered'  Torsemide 20mg oral given yesterday, continued daily      Plan:  - Continue to hold GDMT at this time given continued hypotension  - Continue Torsemide 20mg oral daily  - Remove diet and fluid restrictions, diurese as needed for intake  - Start SGLT2i upon discharge per heart failure team recommendations    #Atrial Fibrillation s/p ablation  On home apixaban 5mg oral q12h  S/p ablation and CRT-P    Plan:  - Continue with apixaban 5mg q12h for anticoagulation    PULMONARY:  #Acute hypoxic respiratory failure 2/2 Moderate COVID pneumonia and HFrEF  Currently requiring 3L NC, improved from 6L NC yesterday  No hypoxic episodes overnight  CXR today appears slightly worse in the right lobe compared to yesterday  Continued R>>L pleural effusion    Plan:  - Wean oxygen as tolerated  - Repeat CXR to track trajectory of his pneumonia  - C/w Dexamethasone 6mg oral daily until final dose 7/17 or until discharge whichever is first  - Continue diuresis today with Torsemide 20mg oral qd    RENAL:  #Acute Kidney Injury, possibly superimposed on CKD Stage 3  (RESOLVED)  ED admission with creatinine of 1.18, context of frail older adult  Low PO intake over the last 2 weeks, likely prerenal  Initial bicarb of 26, unlikely clinically significant CKD  UOP of 1200mL in the last 24h  Net negative of -2250 since admission  Creatinine stable at 0.90 this morning    Plan:  - Continue to monitor UOP with strict I&Os  - Continue to monitor changes in creatinine with morning CMP tomorrow    GASTROINTESTINAL:  Enteral nutrition with REGULAR DIET, NO FLUID RESTRICTION  Stress ulcer prophylaxis started given Dexamethasone and anticoagulation    Plan:  - C/w Regular diet  - C/w Pantoprazole 40mg oral qd    INFECTIOUS DISEASE:  #COVID-19 Pneumonia  PCR in the ED positive for COVID-19, CXR on admission with RLL infiltrate  ID consulted for COVID-19 management, dexamethasone 6mg qd and remdesivir started  Remdesivir 100mg IV (5/5) course completed and today is Day 6/10 of Dexamethasone 6mg qd  S/p Ceftriaxone 1g IV x1 and Azithromycin 500mg IV x1, discontinued by ID  Afebrile since admission with normal WBC and procalcitonin of 0.13  BCx x2 drawn in the ED with no growth at 72 hours    Plan:  - C/w Dexamethasone 6mg qd until Day 10 (7/17) or until hospital discharge, whichever comes first per ID consult  - C/w monitoring WBC daily    ENDOCRINE:  History of Type 2 DM, A1c of 5.8% on admission  FSG with normal readings, no hyperglycemia    Plan:  - C/w monitoring fingersticks given Dexamethasone therapy  - Hold off on sliding scale insulin as patient has low-normal glucose    HEME/ONC:  Hgb of 15.8 this morning  Likely due to concentration from decreased PO intake, and net negative of 2250 inpatient  Borderline erythrocytosis, unlikely to be true secondary erythrocytosis as patient without chronic hypoxia at home    #History of Idiopathic Thrombocytopenia  Mild thrombocytopenia on ED admission at 144  No signs of mucosal bleeding or purpura    Plan:  - C/w monitoring on AM CBC    PROPHYLACTIC MEASURES:  Electrolytes: K > 4, Mg > 2, Phos > 3  DVT Prophylaxis: Apixaban 5mg oral qd for AFib  GI Prophylaxis: Pantoprazole 40mg qd    Code Status: DNR/DNI  Lines: PIV

## 2024-07-14 DIAGNOSIS — I50.20 UNSPECIFIED SYSTOLIC (CONGESTIVE) HEART FAILURE: ICD-10-CM

## 2024-07-14 LAB
ACANTHOCYTES BLD QL SMEAR: SLIGHT — SIGNIFICANT CHANGE UP
ALBUMIN SERPL ELPH-MCNC: 2.4 G/DL — LOW (ref 3.3–5)
ALP SERPL-CCNC: 87 U/L — SIGNIFICANT CHANGE UP (ref 40–120)
ALT FLD-CCNC: 14 U/L — SIGNIFICANT CHANGE UP (ref 10–45)
ANION GAP SERPL CALC-SCNC: 10 MMOL/L — SIGNIFICANT CHANGE UP (ref 5–17)
ANISOCYTOSIS BLD QL: SIGNIFICANT CHANGE UP
ANISOCYTOSIS BLD QL: SIGNIFICANT CHANGE UP
AST SERPL-CCNC: 16 U/L — SIGNIFICANT CHANGE UP (ref 10–40)
BASOPHILS # BLD AUTO: 0 K/UL — SIGNIFICANT CHANGE UP (ref 0–0.2)
BASOPHILS # BLD AUTO: 0 K/UL — SIGNIFICANT CHANGE UP (ref 0–0.2)
BASOPHILS NFR BLD AUTO: 0 % — SIGNIFICANT CHANGE UP (ref 0–2)
BASOPHILS NFR BLD AUTO: 0 % — SIGNIFICANT CHANGE UP (ref 0–2)
BILIRUB SERPL-MCNC: 0.9 MG/DL — SIGNIFICANT CHANGE UP (ref 0.2–1.2)
BUN SERPL-MCNC: 35 MG/DL — HIGH (ref 7–23)
BURR CELLS BLD QL SMEAR: PRESENT — SIGNIFICANT CHANGE UP
BURR CELLS BLD QL SMEAR: PRESENT — SIGNIFICANT CHANGE UP
CALCIUM SERPL-MCNC: 8.3 MG/DL — LOW (ref 8.4–10.5)
CHLORIDE SERPL-SCNC: 109 MMOL/L — HIGH (ref 96–108)
CO2 SERPL-SCNC: 25 MMOL/L — SIGNIFICANT CHANGE UP (ref 22–31)
CREAT SERPL-MCNC: 0.94 MG/DL — SIGNIFICANT CHANGE UP (ref 0.5–1.3)
CULTURE RESULTS: SIGNIFICANT CHANGE UP
CULTURE RESULTS: SIGNIFICANT CHANGE UP
DACRYOCYTES BLD QL SMEAR: SLIGHT — SIGNIFICANT CHANGE UP
EGFR: 84 ML/MIN/1.73M2 — SIGNIFICANT CHANGE UP
EOSINOPHIL # BLD AUTO: 0 K/UL — SIGNIFICANT CHANGE UP (ref 0–0.5)
EOSINOPHIL # BLD AUTO: 0 K/UL — SIGNIFICANT CHANGE UP (ref 0–0.5)
EOSINOPHIL NFR BLD AUTO: 0 % — SIGNIFICANT CHANGE UP (ref 0–6)
EOSINOPHIL NFR BLD AUTO: 0 % — SIGNIFICANT CHANGE UP (ref 0–6)
GIANT PLATELETS BLD QL SMEAR: PRESENT — SIGNIFICANT CHANGE UP
GIANT PLATELETS BLD QL SMEAR: PRESENT — SIGNIFICANT CHANGE UP
GLUCOSE BLDC GLUCOMTR-MCNC: 122 MG/DL — HIGH (ref 70–99)
GLUCOSE BLDC GLUCOMTR-MCNC: 133 MG/DL — HIGH (ref 70–99)
GLUCOSE BLDC GLUCOMTR-MCNC: 162 MG/DL — HIGH (ref 70–99)
GLUCOSE SERPL-MCNC: 134 MG/DL — HIGH (ref 70–99)
HCT VFR BLD CALC: 47.7 % — SIGNIFICANT CHANGE UP (ref 39–50)
HGB BLD-MCNC: 16.1 G/DL — SIGNIFICANT CHANGE UP (ref 13–17)
HYPOCHROMIA BLD QL: SLIGHT — SIGNIFICANT CHANGE UP
LYMPHOCYTES # BLD AUTO: 0.41 K/UL — LOW (ref 1–3.3)
LYMPHOCYTES # BLD AUTO: 0.5 K/UL — LOW (ref 1–3.3)
LYMPHOCYTES # BLD AUTO: 6.9 % — LOW (ref 13–44)
LYMPHOCYTES # BLD AUTO: 8.5 % — LOW (ref 13–44)
MACROCYTES BLD QL: SIGNIFICANT CHANGE UP
MACROCYTES BLD QL: SIGNIFICANT CHANGE UP
MAGNESIUM SERPL-MCNC: 2.3 MG/DL — SIGNIFICANT CHANGE UP (ref 1.6–2.6)
MANUAL SMEAR VERIFICATION: SIGNIFICANT CHANGE UP
MANUAL SMEAR VERIFICATION: SIGNIFICANT CHANGE UP
MCHC RBC-ENTMCNC: 29.8 PG — SIGNIFICANT CHANGE UP (ref 27–34)
MCHC RBC-ENTMCNC: 33.8 GM/DL — SIGNIFICANT CHANGE UP (ref 32–36)
MCV RBC AUTO: 88.2 FL — SIGNIFICANT CHANGE UP (ref 80–100)
MONOCYTES # BLD AUTO: 0.2 K/UL — SIGNIFICANT CHANGE UP (ref 0–0.9)
MONOCYTES # BLD AUTO: 0.21 K/UL — SIGNIFICANT CHANGE UP (ref 0–0.9)
MONOCYTES NFR BLD AUTO: 3.4 % — SIGNIFICANT CHANGE UP (ref 2–14)
MONOCYTES NFR BLD AUTO: 3.5 % — SIGNIFICANT CHANGE UP (ref 2–14)
NEUTROPHILS # BLD AUTO: 5.15 K/UL — SIGNIFICANT CHANGE UP (ref 1.8–7.4)
NEUTROPHILS # BLD AUTO: 5.3 K/UL — SIGNIFICANT CHANGE UP (ref 1.8–7.4)
NEUTROPHILS NFR BLD AUTO: 88.1 % — HIGH (ref 43–77)
NEUTROPHILS NFR BLD AUTO: 88.7 % — HIGH (ref 43–77)
OVALOCYTES BLD QL SMEAR: SLIGHT — SIGNIFICANT CHANGE UP
OVALOCYTES BLD QL SMEAR: SLIGHT — SIGNIFICANT CHANGE UP
PHOSPHATE SERPL-MCNC: 3.2 MG/DL — SIGNIFICANT CHANGE UP (ref 2.5–4.5)
PLAT MORPH BLD: ABNORMAL
PLAT MORPH BLD: ABNORMAL
PLATELET # BLD AUTO: 223 K/UL — SIGNIFICANT CHANGE UP (ref 150–400)
POIKILOCYTOSIS BLD QL AUTO: SIGNIFICANT CHANGE UP
POIKILOCYTOSIS BLD QL AUTO: SIGNIFICANT CHANGE UP
POLYCHROMASIA BLD QL SMEAR: SLIGHT — SIGNIFICANT CHANGE UP
POTASSIUM SERPL-MCNC: 4.1 MMOL/L — SIGNIFICANT CHANGE UP (ref 3.5–5.3)
POTASSIUM SERPL-SCNC: 4.1 MMOL/L — SIGNIFICANT CHANGE UP (ref 3.5–5.3)
PROT SERPL-MCNC: 5.9 G/DL — LOW (ref 6–8.3)
RBC # BLD: 5.41 M/UL — SIGNIFICANT CHANGE UP (ref 4.2–5.8)
RBC # FLD: 14.7 % — HIGH (ref 10.3–14.5)
RBC BLD AUTO: ABNORMAL
RBC BLD AUTO: ABNORMAL
SCHISTOCYTES BLD QL AUTO: SLIGHT — SIGNIFICANT CHANGE UP
SMUDGE CELLS # BLD: PRESENT — SIGNIFICANT CHANGE UP
SMUDGE CELLS # BLD: PRESENT — SIGNIFICANT CHANGE UP
SODIUM SERPL-SCNC: 144 MMOL/L — SIGNIFICANT CHANGE UP (ref 135–145)
SPECIMEN SOURCE: SIGNIFICANT CHANGE UP
SPECIMEN SOURCE: SIGNIFICANT CHANGE UP
SPHEROCYTES BLD QL SMEAR: SLIGHT — SIGNIFICANT CHANGE UP
SPHEROCYTES BLD QL SMEAR: SLIGHT — SIGNIFICANT CHANGE UP
TARGETS BLD QL SMEAR: SLIGHT — SIGNIFICANT CHANGE UP
VARIANT LYMPHS # BLD: 0.9 % — SIGNIFICANT CHANGE UP (ref 0–6)
WBC # BLD: 5.98 K/UL — SIGNIFICANT CHANGE UP (ref 3.8–10.5)
WBC # FLD AUTO: 5.98 K/UL — SIGNIFICANT CHANGE UP (ref 3.8–10.5)

## 2024-07-14 PROCEDURE — 93010 ELECTROCARDIOGRAM REPORT: CPT

## 2024-07-14 PROCEDURE — 99291 CRITICAL CARE FIRST HOUR: CPT

## 2024-07-14 RX ADMIN — TORSEMIDE 20 MILLIGRAM(S): 20 TABLET ORAL at 06:46

## 2024-07-14 RX ADMIN — MIDODRINE HYDROCHLORIDE 30 MILLIGRAM(S): 10 TABLET ORAL at 06:46

## 2024-07-14 RX ADMIN — APIXABAN 5 MILLIGRAM(S): 5 TABLET, FILM COATED ORAL at 13:28

## 2024-07-14 RX ADMIN — PANTOPRAZOLE SODIUM 40 MILLIGRAM(S): 40 INJECTION, POWDER, FOR SOLUTION INTRAVENOUS at 06:46

## 2024-07-14 RX ADMIN — MIDODRINE HYDROCHLORIDE 30 MILLIGRAM(S): 10 TABLET ORAL at 13:29

## 2024-07-14 RX ADMIN — Medication 2 TABLET(S): at 22:09

## 2024-07-14 RX ADMIN — DEXAMETHASONE 6 MILLIGRAM(S): 1 TABLET ORAL at 06:46

## 2024-07-14 RX ADMIN — APIXABAN 5 MILLIGRAM(S): 5 TABLET, FILM COATED ORAL at 00:01

## 2024-07-14 RX ADMIN — MIDODRINE HYDROCHLORIDE 30 MILLIGRAM(S): 10 TABLET ORAL at 22:08

## 2024-07-14 NOTE — PROGRESS NOTE ADULT - PROBLEM SELECTOR PLAN 1
CXR 7/8/24: Infiltrates/pulmonary vascular congestion and small right pleural effusion.   - Remained afebrile, WBC 4.46  - IVPB Remdesivir 100 mg Q24 x 4 days (7/8/24-7/11/24) + PO Dex 6 mg Q 24 x 10 days  - procal elevated 0.13  - ID consulted for assistance, appreciate recs CXR 7/8/24: Infiltrates/pulmonary vascular congestion and small right pleural effusion.   - Pt now afebrile, WBC 5.98  - IVPB Remdesivir 100 mg Q24 x 4 days (7/8/24-7/11/24) + PO Dexamethasone 6 mg Q 24 x 10 days  - procal elevated 0.13  - ID consulted for assistance, appreciate recs

## 2024-07-14 NOTE — PROGRESS NOTE ADULT - PROBLEM SELECTOR PLAN 4
- pt was hypotensive and upgraded to CCU for pressors for further diuresis options  - BP now 90s/60s w/ MAP 70s  - s/p Midodrine 20 mg PO TID, transitioned to Midodrine 30 mg PO TID  - s/p Levophed gtt SBP dropped to 80s/50s. stopped torsemide 10 mg QD, gave IV 250cc over 2 hours. SBP persistently low in 80s/50s  - pt upgraded to CCU for pressors for further diuresis options  - BP now 90s/60s w/ MAP 70s  - s/p Midodrine 20 mg PO TID, transitioned to Midodrine 30 mg PO TID  - s/p Levophed gtt  - torsemide 20 mg QD per HF recs SBP dropped to 80s/50s. stopped torsemide 10 mg QD, gave IV 250cc over 2 hours. SBP persistently low in 80s/50s  - pt upgraded to CCU for pressors for further diuresis options  - BP now 90s/60s w/ MAP 70s  - s/p Midodrine 20 mg PO TID, transitioned to Midodrine 30 mg PO TID  - s/p Levophed gtt

## 2024-07-14 NOTE — PROGRESS NOTE ADULT - ASSESSMENT
78yM w/ PMHx ATTR Cardiac Amyloid, HFrEF (EF 30-35%) s/p CRT-P (2/2022), multiple HFrEF admissions, AFib s/p ablation (6/2016 on eliquis), hx R sided Pleural Effusion s/p thoracentesis c/b PTX (2022), T2DM, idiopathic thrombocytopenia, prostate ca s/p chemotherapy p/w 2 weeks of weak, lethargy, SOB and decreased PO intake. Pt admitted to [facility] Card[facility] for presumed HFrEF exacerbation. Pt found to be COVID+,  and GDMT held initially d/t hypotension, MICU consulted on 7/10 for persistent hypotension, however determined pt was stable for telemetry. Overnight 7/12, pt was found to be hypotensive to 60/40 and hypoxic, put on 100% non re-breather and given midodrine 10mg x1. CXR reveaed increased pulmonary edema concerning for COVID pnemonia vs fluid overload. Pt stepped up to the CCU for more frequent vital monitoring and pressors to allow room for diuresis. Blood pressure in now stable s/p Midodrine 20 mg PO TID --> Midodrine 30 mg PO TID and Levophed gtt. Pt has been stepped down to cardiac telemetry for further management of HFrEF exacerbation.  78yM, DNR/DNI, w/ PMHx ATTR Cardiac Amyloid, HFrEF (EF 30-35%) s/p CRT-P (2/2022), multiple HFrEF admissions, AFib s/p ablation (6/2016 on eliquis), hx R sided Pleural Effusion s/p thoracentesis c/b PTX (2022), T2DM, idiopathic thrombocytopenia, prostate ca s/p chemotherapy p/w 2 weeks of weak, lethargy, SOB and decreased PO intake. Pt admitted to [facility] for presumed HFrEF exacerbation. Pt found to be COVID+, and GDMT held d/t hypotension and pt was stepped up to CCU for pressors.  Blood pressure is now stable s/p Midodrine 20 mg PO TID and Levophed gtt. Palliative discussion ongoing w/ family for hospice d/t end stage amylolysis  Pt has been stepped down to cardiac telemetry for further management of HFrEF exacerbation.

## 2024-07-14 NOTE — PROGRESS NOTE ADULT - PROBLEM SELECTOR PLAN 3
Requiring 4L NC O2   - wean as tolerated, now SpO2 94-96% on 3L NC   - lactate 2.0 (7/13/24)  - AST/ALT 16/17  - continue to monitor SpO2 94-96% on 3L NC   - wean NC as tolerated  - lactate 2.0 (7/13/24)  - AST/ALT 16/14  - continue to monitor

## 2024-07-14 NOTE — PROGRESS NOTE ADULT - PROBLEM SELECTOR PLAN 5
s/p ablation in 2016, follows w/ Dr. Pemberton   Rate Control: Toprol 50 mg daily held i/s/o hypotension   AC: Eliquis 5mg BID

## 2024-07-14 NOTE — PROGRESS NOTE ADULT - SUBJECTIVE AND OBJECTIVE BOX
INTERVAL HPI/OVERNIGHT EVENTS:    SUBJECTIVE: Patient seen and examined at bedside.     ROS: All negative except as listed above.    VITAL SIGNS:  ICU Vital Signs Last 24 Hrs  T(C): 35.8 (14 Jul 2024 05:00), Max: 36.3 (13 Jul 2024 17:37)  T(F): 96.4 (14 Jul 2024 05:00), Max: 97.3 (13 Jul 2024 17:37)  HR: 80 (14 Jul 2024 08:00) (80 - 87)  BP: 88/61 (14 Jul 2024 08:00) (82/61 - 118/80)  BP(mean): 71 (14 Jul 2024 08:00) (66 - 96)  ABP: --  ABP(mean): --  RR: 28 (14 Jul 2024 08:00) (9 - 28)  SpO2: 91% (14 Jul 2024 08:00) (91% - 97%)    O2 Parameters below as of 14 Jul 2024 08:00  Patient On (Oxygen Delivery Method): nasal cannula w/ humidification  O2 Flow (L/min): 5          Plateau pressure:   P/F ratio:     07-13 @ 07:01  -  07-14 @ 07:00  --------------------------------------------------------  IN: 300 mL / OUT: 400 mL / NET: -100 mL      CAPILLARY BLOOD GLUCOSE      POCT Blood Glucose.: 122 mg/dL (14 Jul 2024 06:56)      ECG: reviewed.    PHYSICAL EXAM:    GENERAL: NAD, lying in bed comfortably  HEAD:  Atraumatic, normocephalic  EYES: EOMI, PERRLA, conjunctiva and sclera clear  NECK: Supple, trachea midline, no JVD  HEART: Regular rate and rhythm, no murmurs, rubs, or gallops  LUNGS: Unlabored respirations.  Clear to auscultation bilaterally, no crackles, wheezing, or rhonchi  ABDOMEN: Soft, nontender, nondistended, +BS  EXTREMITIES: 2+ peripheral pulses bilaterally, cap refill<2 secs. No clubbing, cyanosis, or edema  NERVOUS SYSTEM:  A&Ox3, following commands, moving all extremities, no focal deficits   SKIN: No rashes or lesions    MEDICATIONS:  MEDICATIONS  (STANDING):  apixaban 5 milliGRAM(s) Oral every 12 hours  dexAMETHasone     Tablet 6 milliGRAM(s) Oral daily  dextrose 5%. 1000 milliLiter(s) (50 mL/Hr) IV Continuous <Continuous>  dextrose 5%. 1000 milliLiter(s) (100 mL/Hr) IV Continuous <Continuous>  dextrose 50% Injectable 12.5 Gram(s) IV Push once  dextrose 50% Injectable 25 Gram(s) IV Push once  dextrose 50% Injectable 25 Gram(s) IV Push once  glucagon  Injectable 1 milliGRAM(s) IntraMuscular once  insulin lispro (ADMELOG) corrective regimen sliding scale   SubCutaneous Before meals and at bedtime  midodrine 30 milliGRAM(s) Oral every 8 hours  pantoprazole    Tablet 40 milliGRAM(s) Oral before breakfast  senna 2 Tablet(s) Oral at bedtime  torsemide 20 milliGRAM(s) Oral daily  Vyndamax (Tafamidis) 61 milliGRAM(s) 61 milliGRAM(s) Oral daily    MEDICATIONS  (PRN):  acetaminophen     Tablet .. 650 milliGRAM(s) Oral every 6 hours PRN Temp greater or equal to 38C (100.4F), Mild Pain (1 - 3)  dextrose Oral Gel 15 Gram(s) Oral once PRN Blood Glucose LESS THAN 70 milliGRAM(s)/deciliter  lactulose Syrup 10 Gram(s) Oral daily PRN Constipation  melatonin 3 milliGRAM(s) Oral at bedtime PRN Insomnia  ondansetron Injectable 4 milliGRAM(s) IV Push every 8 hours PRN Nausea and/or Vomiting      ALLERGIES:  Allergies    No Known Allergies    Intolerances        LABS:                        16.1   5.98  )-----------( 223      ( 14 Jul 2024 05:30 )             47.7     07-14    144  |  109<H>  |  35<H>  ----------------------------<  134<H>  4.1   |  25  |  0.94    Ca    8.3<L>      14 Jul 2024 05:30  Phos  3.2     07-14  Mg     2.3     07-14    TPro  5.9<L>  /  Alb  2.4<L>  /  TBili  0.9  /  DBili  x   /  AST  16  /  ALT  14  /  AlkPhos  87  07-14    PT/INR - ( 13 Jul 2024 20:15 )   PT: 26.0 sec;   INR: 2.34          PTT - ( 13 Jul 2024 20:15 )  PTT:41.8 sec  Urinalysis Basic - ( 14 Jul 2024 05:30 )    Color: x / Appearance: x / SG: x / pH: x  Gluc: 134 mg/dL / Ketone: x  / Bili: x / Urobili: x   Blood: x / Protein: x / Nitrite: x   Leuk Esterase: x / RBC: x / WBC x   Sq Epi: x / Non Sq Epi: x / Bacteria: x      ABG:      vBG:    Micro:    Culture - Blood (collected 07-08-24 @ 18:53)  Source: .Blood Blood  Final Report (07-14-24 @ 02:01):    No growth at 5 days    Culture - Blood (collected 07-08-24 @ 18:53)  Source: .Blood Blood  Final Report (07-14-24 @ 02:00):    No growth at 5 days       Urinalysis with Rflx Culture (collected 07-08-24 @ 21:22)         RADIOLOGY & ADDITIONAL TESTS: Reviewed. **********CCU TO [facility] STEPDOWN***********  Hospital Course:   78yM, DNR/DNI, w/ PMHx of ATTR Cardiac Amyloid, HFrEF (EF 30-35%) s/p CRT-P (2/2022), multiple HFrEF admissions, AFib s/p ablation (6/2016 on Eliquis, hx R sided Pleural Effusion s/p thoracentesis c/b PTX (2022), T2DM, idiopathic thrombocytopenia, prostate ca s/p chemotherapy p/w 2 weeks of weak, lethargy, SOB and decreased PO intake, admitted initially to [facility] Cardiac Tele 7/9/24 for presumed HFrEF exacerbation, f/t/h COVID PNA. Pt was then stepped up to the CCU for hypotension requiring pressors for further diuresis options. CXR at step up revealed increased pulmonary edema & was noted to be increasingly volume overloaded. He was placed on started on 10d dexamethasone course on 7/9 (currently d6/10), and then prior to transition to CCU on midodrine 20mg PO TID, with eventual need for levofed ggt 7/12, now weaned off levofed and transitioned to 30mg Midodrine PO TID. Patient was transitioned to DNR/DNI status after Palliative team discussion with patient & family, explaining poor prognostic signs. Given improved pressures off ggt, with new SBP goal >80 given limited heart capacity due to infiltrative cardiomyopathy, patient is hemodynamically optimized to step down to Cardiac Tele for further management. This includes further GoC discussion with Palliative team on board for potential home hospice given patient's advanced age & condition characterizing him as poor candidate for advanced therapies. Patient will continue midodrine high dose (30mg) PO TID, torsemide 20mg PO qd for volume status balancing, and completion of 10d 6mg decadron PO qd course. Additionally, he has remained off GDMT during course, and will need further advanced HF team visit for continued recs.     SUBJECTIVE: Patient seen and examined at bedside.     ROS: All negative except as listed above.    VITAL SIGNS:  ICU Vital Signs Last 24 Hrs  T(C): 35.8 (14 Jul 2024 05:00), Max: 36.3 (13 Jul 2024 17:37)  T(F): 96.4 (14 Jul 2024 05:00), Max: 97.3 (13 Jul 2024 17:37)  HR: 80 (14 Jul 2024 08:00) (80 - 87)  BP: 88/61 (14 Jul 2024 08:00) (82/61 - 118/80)  BP(mean): 71 (14 Jul 2024 08:00) (66 - 96)  ABP: --  ABP(mean): --  RR: 28 (14 Jul 2024 08:00) (9 - 28)  SpO2: 91% (14 Jul 2024 08:00) (91% - 97%)    O2 Parameters below as of 14 Jul 2024 08:00  Patient On (Oxygen Delivery Method): nasal cannula w/ humidification  O2 Flow (L/min): 5          Plateau pressure:   P/F ratio:     07-13 @ 07:01  -  07-14 @ 07:00  --------------------------------------------------------  IN: 300 mL / OUT: 400 mL / NET: -100 mL      CAPILLARY BLOOD GLUCOSE      POCT Blood Glucose.: 122 mg/dL (14 Jul 2024 06:56)      ECG: reviewed.    PHYSICAL EXAM:    GENERAL: NAD, lying in bed comfortably  HEAD:  Atraumatic, normocephalic  EYES: EOMI, PERRLA, conjunctiva and sclera clear  NECK: Supple, trachea midline, no JVD  HEART: Regular rate and rhythm, no murmurs, rubs, or gallops. Slightly cool to touch, dry.   LUNGS: Unlabored respirations.  Clear to auscultation bilaterally, no crackles, wheezing, or rhonchi  ABDOMEN: Soft, nontender, nondistended, +BS  EXTREMITIES: 2+ peripheral pulses bilaterally, cap refill<2 secs. No clubbing, cyanosis, or edema.   NERVOUS SYSTEM:  A&Ox3, following commands, moving all extremities, no focal deficits   SKIN: No rashes or lesions    MEDICATIONS:  MEDICATIONS  (STANDING):  apixaban 5 milliGRAM(s) Oral every 12 hours  dexAMETHasone     Tablet 6 milliGRAM(s) Oral daily  dextrose 5%. 1000 milliLiter(s) (50 mL/Hr) IV Continuous <Continuous>  dextrose 5%. 1000 milliLiter(s) (100 mL/Hr) IV Continuous <Continuous>  dextrose 50% Injectable 12.5 Gram(s) IV Push once  dextrose 50% Injectable 25 Gram(s) IV Push once  dextrose 50% Injectable 25 Gram(s) IV Push once  glucagon  Injectable 1 milliGRAM(s) IntraMuscular once  insulin lispro (ADMELOG) corrective regimen sliding scale   SubCutaneous Before meals and at bedtime  midodrine 30 milliGRAM(s) Oral every 8 hours  pantoprazole    Tablet 40 milliGRAM(s) Oral before breakfast  senna 2 Tablet(s) Oral at bedtime  torsemide 20 milliGRAM(s) Oral daily  Vyndamax (Tafamidis) 61 milliGRAM(s) 61 milliGRAM(s) Oral daily    MEDICATIONS  (PRN):  acetaminophen     Tablet .. 650 milliGRAM(s) Oral every 6 hours PRN Temp greater or equal to 38C (100.4F), Mild Pain (1 - 3)  dextrose Oral Gel 15 Gram(s) Oral once PRN Blood Glucose LESS THAN 70 milliGRAM(s)/deciliter  lactulose Syrup 10 Gram(s) Oral daily PRN Constipation  melatonin 3 milliGRAM(s) Oral at bedtime PRN Insomnia  ondansetron Injectable 4 milliGRAM(s) IV Push every 8 hours PRN Nausea and/or Vomiting      ALLERGIES:  Allergies    No Known Allergies    Intolerances        LABS:                        16.1   5.98  )-----------( 223      ( 14 Jul 2024 05:30 )             47.7     07-14    144  |  109<H>  |  35<H>  ----------------------------<  134<H>  4.1   |  25  |  0.94    Ca    8.3<L>      14 Jul 2024 05:30  Phos  3.2     07-14  Mg     2.3     07-14    TPro  5.9<L>  /  Alb  2.4<L>  /  TBili  0.9  /  DBili  x   /  AST  16  /  ALT  14  /  AlkPhos  87  07-14    PT/INR - ( 13 Jul 2024 20:15 )   PT: 26.0 sec;   INR: 2.34          PTT - ( 13 Jul 2024 20:15 )  PTT:41.8 sec  Urinalysis Basic - ( 14 Jul 2024 05:30 )    Color: x / Appearance: x / SG: x / pH: x  Gluc: 134 mg/dL / Ketone: x  / Bili: x / Urobili: x   Blood: x / Protein: x / Nitrite: x   Leuk Esterase: x / RBC: x / WBC x   Sq Epi: x / Non Sq Epi: x / Bacteria: x    Culture - Blood (collected 07-08-24 @ 18:53)  Source: .Blood Blood  Final Report (07-14-24 @ 02:01):    No growth at 5 days    Culture - Blood (collected 07-08-24 @ 18:53)  Source: .Blood Blood  Final Report (07-14-24 @ 02:00):    No growth at 5 days       Urinalysis with Rflx Culture (collected 07-08-24 @ 21:22)         RADIOLOGY & ADDITIONAL TESTS: Reviewed.

## 2024-07-14 NOTE — PROGRESS NOTE ADULT - ATTENDING COMMENTS
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 He had completed therapy for Covid PNA, but has had persistent infiltrates on CXR with concern for volume overload. Has been getting gentle diuresis in setting of his restrictive cardiomyopathy, but has developed labile blood pressures with new hypotension overnight 7/11, warranting CCU admission. Patient had brief vasopressor requirements which resolved with midodrine initiation and uptitration, and had improved respiratory status with gentle oral diuretics. Palliative care meeting yielded DNR/DNI code status change.     TTE 5/24/24: LVIDD 5.0. LVEF 30-35%. Grade 3 diastolic dysfunction. TAPSE 1.0 cm. no PH. Groundglass tissue texture suggestive of amyloid heart.    1. 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 Group 2 PH  3. AFib s/p ablation, CRT-P  4. Failure to thrive  5. COVID Pneumonia, resolving  6. Hypoxic respiratory failure, multifactorial (PNA + pulmonary edema)  7. Hypotension (mixed, low output heart failure and autonomic dysfunction of amyloidosis)  8. CKD stage 3a    - C/w high dose midodrine TID for hypotension  - C/w PO torsemide for volume management  - Complete steroid therapy for COVID per ID recs  - Hold all GDMT  - Wean NC as tolerated   - Palliative care discussion yielded DNR/DNI status given advanced HFrEF without candidacy for advanced therapies due to age/frailty, ongoing GOC discussion given lable BP - strong consideration for home hospice therapies as well, palliative care to assist   - Appreciate recommendations from [facility] team   - Transfer to floor     Alekgladys Thibodeaux MD  Interventional Cardiology .
77 YO M with a history of advanced TTR cardiac amyloid with LV dysfunction s/p CRT-P and AF s/p ablation who presented with failure to thrive and found to have COVID-19 pneumonia with lung infiltrates requiring supplemental oxygen. His course is notable for hypotension for which he was transferred to the CCU 7/12.    Despite his chest imaging he appears only mildly overloaded on exam. Despite his hypotension, he does not have clinical evidence of hypoperfusion and LVOT reasonable on TTE, suspect this is autonomic dysfunction related to amyloid and has improved with midodrine.    Suspect current decompensation is related to COVID-19 pneumonia but regardless has highly advanced TTR amyloid and failure to thrive picture with poor prognosis related to his end stage cardiomyopathy. Palliative care team is following.     - Agree with standing PO midodrine for hypotension. BP has now normalized. Continue to follow perfusion markers   - Will hold GDMT indefinitely given TTR amyloid with hypotension. Will likely resume SGLT2i on discharge  - Restart torsemide at higher dose 20 mg PO daily  - Currently receiving decadron/remdesivir for COVID-19 infection  - Continue palliative care team involvement appreciated given advanced baseline condition. I called daughter today and left a voicemail, will try back later. As of now he is full code with full escalation which I do not feel is appropriate.
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 He had completed therapy for Covid PNA, but has had persistent infiltrates on CXR with concern for volume overload. Has been getting gentle diuresis in setting of his restrictive cardiomyopathy, but has developed labile blood pressures with new hypotension overnight 7/11, warranting CCU admission.     TTE 5/24/24: LVIDD 5.0. LVEF 30-35%. Grade 3 diastolic dysfunction. TAPSE 1.0 cm. no PH. Groundglass tissue texture suggestive of amyloid heart.    1. 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 Group 2 PH  3. AFib s/p ablation, CRT-P  4. Failure to thrive  5. COVID Pneumonia, resolving  6. Hypoxic respiratory failure, multifactorial (PNA + pulmonary edema)  7. Hypotension (mixed, low output heart failure and autonomic dysfunction of amyloidosis)  8. CKD stage 3a    - C/w high dose midodrine TID for hypotension  - C/w PO torsemide for volume management  - Complete steroid therapy for COVID per ID recs  - Hold all GDMT  - Wean NC as tolerated   - Palliative care discussion yielded DNR/DNI status given advanced HFrEF without candidacy for advanced therapies due to age/frailty, ongoing GOC discussion given lable BP  - Appreciate recommendations from [facility] team     Alek Thibodeaux MD  Interventional Cardiology
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 He had completed therapy for Covid PNA, but has had persistent infiltrates on CXR with concern for volume overload. Has been getting gentle diuresis in setting of his restrictive cardiomyopathy, but has developed labile blood pressures with new hypotension overnight 7/11, warranting CCU admission.     TTE 5/24/24: LVIDD 5.0. LVEF 30-35%. Grade 3 diastolic dysfunction. TAPSE 1.0 cm. no PH. Groundglass tissue texture suggestive of amyloid heart.    1. 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 Group 2 PH  3. AFib s/p ablation, CRT-P  4. Failure to thrive  5. COVID Pneumonia, resolving  6. Hypoxic respiratory failure, multifactorial (PNA + pulmonary edema)  7. Hypotension (mixed, low output heart failure and autonomic dysfunction of amyloidosis)  8. CKD stage 3a    - Start midodrine TID for hypotension  - Can consider vasopressor assisted diuresis if necessary  - Hold all GDMT  - Torsemide dosing for volume control, no further IV dosing of diuretics given labile BP  - Complete COVID therapy per ID recs   - Wean NC as tolerated   - Palliative care discussion yielded DNR/DNI status given advanced HFrEF without candidacy for advanced therapies due to age/frailty  - Appreciate recommendations from [facility] team     Alek Thibodeaux MD  Interventional Cardiology

## 2024-07-14 NOTE — PROGRESS NOTE ADULT - PROBLEM SELECTOR PLAN 2
Etiology: NICM, ATTR cardiac amyloid   TTE (7/9/24): EF 30-35%, severe symmetric LVH, RVH present, mod-severely reduced RV systolic function, severe biatrial enlargement, small pericardial effusion without echo evidence of cardiac tamponade physiology  - GDMT: Entresto 24-26 bid, Farxiga 10mg qd, Toprol 50mg qd - ALL HELD i/s/o hypotension in ED  - Diuresis: holding torsemide 10mg in setting of hypotension   - Advanced HF consulted, appreciate recs  - EKG showing V-paced at 80bpm  - strict I&Os, daily standing weights, fluid restriction Etiology: NICM, ATTR cardiac amyloid   TTE (7/9/24): EF 30-35%, severe symmetric LVH, RVH present, mod-severely reduced RV systolic function, severe biatrial enlargement, small pericardial effusion without echo evidence of cardiac tamponade physiology  - GDMT: Entresto 24-26 bid, Farxiga 10mg qd, Toprol 50mg qd - ALL HELD i/s/o hypotension  - Diuresis: 20mg PO QD  - Advanced HF consulted, appreciate recs  - strict I&Os, daily standing weights, fluid restriction Etiology: NICM, ATTR cardiac amyloid   TTE (7/9/24): EF 30-35%, severe symmetric LVH, RVH present, mod-severely reduced RV systolic function, severe biatrial enlargement, small pericardial effusion without echo evidence of cardiac tamponade physiology  - GDMT held i/s/o hypotension  - Diuresis: s/p IV diuresis now on torsemide 30mg PO TID  - Advanced HF consulted, appreciate recs  - strict I&Os, daily standing weights, fluid restriction Etiology: NICM, ATTR cardiac amyloid   TTE (7/9/24): EF 30-35%, severe symmetric LVH, RVH present, mod-severely reduced RV systolic function, severe biatrial enlargement, small pericardial effusion without echo evidence of cardiac tamponade physiology  - GDMT held i/s/o hypotension  - Diuresis: s/p IV diuresis now on torsemide 20 mg PO QD   - Advanced HF consulted, appreciate recs  - strict I&Os, daily standing weights, fluid restriction

## 2024-07-14 NOTE — PROGRESS NOTE ADULT - ASSESSMENT
78yM, DNR/DNI, w/ PMHx of ATTR Cardiac Amyloid, HFrEF (EF 30-35%) s/p CRT-P (2/2022), multiple HFrEF admissions, AFib s/p ablation (6/2016 on Eliquis, hx R sided Pleural Effusion s/p thoracentesis c/b PTX (2022), T2DM, idiopathic thrombocytopenia, prostate ca s/p chemotherapy p/w 2 weeks of weak, lethargy, SOB and decreased PO intake, admitted initially to [facility] 7/9/24 for presumed HFrEF exacerbation, f/t/h COVID PNA, stepped up to the CCU for hypotension requiring pressors for further diuresis options, and now on consistent Midodrine 30mg needs and hemodynamically optimized for cardiac tele stepdown for continued management of decompensated heart function 2/2 infiltrative cardiomyopathy, currently on d6/10 of Decadron tx of COVID PNA.     NEURO  -STEPHEN    CARDIOVASCULAR:  #ATTR Amyloidosis HFrEF  #Hypotension   TTE 5/24/24: LVIDD 5.0. LVEF 30-35%. Grade 3 diastolic dysfunction. TAPSE 1.0 cm. no PH. Groundglass tissue texture suggestive of amyloid heart.  Overall warm, slightly cool LE, dry on exam.   Midodrine increased to 30mg TID from 20mg TID over CCU course. Off Levofed ggt.   Poor prognostic signs. Further GoC to consider home hospice if an option per support group.   Plan:  - Continue to hold home GDMT given hypotension, f/u AHFT for further recs  - midodrine 30mg TID PO   - Torsemide 20mg PO qd for volume status control   - Continue home Tafamadis 61mg oral qd  - Start SGLT2i upon discharge per heart failure team recommendations  - Monitor urinary consistency    #Atrial Fibrillation s/p ablation  On home apixaban 5mg oral q12h  S/p ablation and CRT-P  - Continue with apixaban 5mg q12h for anticoagulation    #Group 2 Pulmonary Hypertension  Per echo findings as reported above.   -C.w CVS plan as above    PULMONARY:  #AHRF #COVID pneumonia  Currently improved respiratory status, on NC.   Noted pleural effusions during course. Improved CXR congestion status, and on volume control agent Torsemide 20mg PO qd.   COVID+, with complete etiology multifactorial given infectious and volume overload both present.   Plan:  - Continue weaning oxygen as tolerated, prioritizing comfort as well   - Daily CXR  - Ensure Dexamethasone 6mg oral daily until final dose 7/17 (10 day course)   - Torsemide 20mg oral qd per volume status    RENAL:  #KAYLI on CKD Stage 3a  Acute resolution of KAYLI, with ongoing CKD component.   ED admission with creatinine of 1.18, context of frail older adult  Creatinine stable trend. Perfusion to kidneys improved, limited by diuresis needs as well.   Plan:  - Continue to monitor UOP  - C/w pressure & volume control agents as discussed above     GASTROINTESTINAL:  #FTT  Chronic disease and cachetic.   - C/w Regular diet  - C/w Pantoprazole 40mg oral qd  - GoC discussion again with Palliative team help     ID  #COVID-19 Pneumonia  PCR in the ED positive for COVID-19, CXR on admission with RLL infiltrate  Ceftriaxone 1g IV x1 and Azithromycin 500mg IV x1 broad coverage previously.   Per ID, Dexamethasone 6mg qd 10d course, with Remdesivir 100mg IV 5d course completed.   Afebrile since admission with normal WBC and procalcitonin of 0.13  BCx x2 drawn in the ED with no growth to date.   - C/w Dexamethasone 6mg qd until Day 10 (7/17)   - C/w respiratory status monitoring, currently NC    ENDO  #PreDM  A1c of 5.8% on admission.  FSG with normal readings, no hyperglycemic episodes.   - routine fingersticks given Dexamethasone therapy  - iSS    HEME/ONC:  #History of Idiopathic Thrombocytopenia  Stable Hb throughout admission.   Hemodilution contributory given net negative during admission.   Platelet levels stable. No signs of mucosal bleeding or purpura  - STEPHEN    MSK  #Deconditioned  -Regular PT needs, recontacted 7/14 for additional routine sessions for patient    PROPHYLACTIC MEASURES:  F: D5   E: K > 4, Mg > 2, Phos > 3  N: DASH/TLC, Na&Cholesterol restricted  DVT Prophylaxis: Apixaban 5mg PO q12h  GI Prophylaxis: Pantoprazole 40mg qd  Bowel Regimen: Senna 2 tab PO qhs

## 2024-07-14 NOTE — PROGRESS NOTE ADULT - SUBJECTIVE AND OBJECTIVE BOX
CARDIOLOGY PA STEP DOWN NOTE  78yM, DNR/DNI, w/ PMHx of ATTR Cardiac Amyloid, HFrEF (EF 30-35%) s/p CRT-P (2/2022), multiple HFrEF admissions, AFib s/p ablation (6/2016 on Eliquis, hx R sided Pleural Effusion s/p thoracentesis c/b PTX (2022), T2DM, idiopathic thrombocytopenia, prostate ca s/p chemotherapy p/w 2 weeks of weak, lethargy, SOB and decreased PO intake. Pt admitted was to [facility] 7/9/24 for presumed HFrEF exacerbation. Pt found to be COVID+ and GDMT held initially d/t hypotension. MICU consulted on 7/10 for persistent hypotension, however determined pt was stable for telemetry. Overnight 7/12, pt was found to be hypotensive to 60/40 and hypoxic, put on 100% non re-breather and given midodrine 10mg x1. CXR reveals increased pulmonary edema concerning for COVID pnemonia vs fluid overload. Pt stepped up to the CCU for more frequent vital monitoring and pressors to allow room for diuresis. In CCU pt, placed on midodrine gtt, dexamethasone and holding GDMT. Pt was weaned of midodrine and downgraded to cardiac telemetry for management of HFrEF exacerbation s/p BP stabilization.      HOSPITAL COURSE  	  MEDICATIONS:  midodrine 30 milliGRAM(s) Oral every 8 hours  torsemide 20 milliGRAM(s) Oral daily  acetaminophen     Tablet .. 650 milliGRAM(s) Oral every 6 hours PRN  melatonin 3 milliGRAM(s) Oral at bedtime PRN  ondansetron Injectable 4 milliGRAM(s) IV Push every 8 hours PRN  lactulose Syrup 10 Gram(s) Oral daily PRN  pantoprazole    Tablet 40 milliGRAM(s) Oral before breakfast  senna 2 Tablet(s) Oral at bedtime  dexAMETHasone     Tablet 6 milliGRAM(s) Oral daily  dextrose 50% Injectable 12.5 Gram(s) IV Push once  dextrose 50% Injectable 25 Gram(s) IV Push once  dextrose 50% Injectable 25 Gram(s) IV Push once  dextrose Oral Gel 15 Gram(s) Oral once PRN  glucagon  Injectable 1 milliGRAM(s) IntraMuscular once  insulin lispro (ADMELOG) corrective regimen sliding scale   SubCutaneous Before meals and at bedtime  apixaban 5 milliGRAM(s) Oral every 12 hours  dextrose 5%. 1000 milliLiter(s) IV Continuous <Continuous>  dextrose 5%. 1000 milliLiter(s) IV Continuous <Continuous>      PHYSICAL EXAM:  T(C): 36.1 (07-14-24 @ 09:00), Max: 36.3 (07-13-24 @ 17:37)  HR: 80 (07-14-24 @ 11:00) (80 - 87)  BP: 99/67 (07-14-24 @ 11:00) (82/61 - 114/86)  RR: 13 (07-14-24 @ 11:00) (9 - 28)  SpO2: 89% (07-14-24 @ 11:00) (89% - 97%)  Wt(kg): --  I&O's Summary    13 Jul 2024 07:01  -  14 Jul 2024 07:00  --------------------------------------------------------  IN: 300 mL / OUT: 400 mL / NET: -100 mL    14 Jul 2024 07:01  -  14 Jul 2024 12:36  --------------------------------------------------------  IN: 0 mL / OUT: 500 mL / NET: -500 mL          Appearance: Normal	  HEENT:   Normal oral mucosa, PERRL, EOMI	  Lymphatic: No lymphadenopathy  Cardiovascular: Normal S1 S2, No JVD, No murmurs, No edema  Respiratory: Lungs clear to auscultation	  Psychiatry: A & O x 3, Mood & affect appropriate  Gastrointestinal:  Soft, Non-tender, + BS	  Skin: No rashes, No ecchymoses, No cyanosis  Neurologic: Non-focal  Extremities: Normal range of motion, No clubbing, cyanosis or edema  Vascular: Peripheral pulses palpable 2+ bilaterally    TELEMETRY: 	    ECG:  	  RADIOLOGY:   DIAGNOSTIC TESTING:  [ ] Echocardiogram:  [ ]  Catheterization:  [ ] Stress Test:    OTHER: 	    LABS:	 	    CARDIAC MARKERS:                                  16.1   5.98  )-----------( 223      ( 14 Jul 2024 05:30 )             47.7     07-14    144  |  109<H>  |  35<H>  ----------------------------<  134<H>  4.1   |  25  |  0.94    Ca    8.3<L>      14 Jul 2024 05:30  Phos  3.2     07-14  Mg     2.3     07-14    TPro  5.9<L>  /  Alb  2.4<L>  /  TBili  0.9  /  DBili  x   /  AST  16  /  ALT  14  /  AlkPhos  87  07-14    proBNP:   Lipid Profile:   HgA1c:   TSH:        CARDIOLOGY PA STEP DOWN NOTE    78yM, DNR/DNI, w/ PMHx of ATTR Cardiac Amyloid, HFrEF (EF 30-35%) s/p CRT-P (2/2022), multiple HFrEF admissions, AFib s/p ablation (6/2016 on Eliquis, hx R sided Pleural Effusion s/p thoracentesis c/b PTX (2022), T2DM, idiopathic thrombocytopenia, prostate ca s/p chemotherapy p/w 2 weeks of weak, lethargy, SOB and decreased PO intake. Pt admitted was to [facility] Cardiac Tele 7/9/24 for presumed HFrEF exacerbation. Pt found to be COVID+ and GDMT held initially d/t hypotension. MICU consulted on 7/10 for persistent hypotension, however determined pt was stable for telemetry. Overnight 7/12, pt was found to be hypotensive to 60/40 and hypoxic, put on 100% non re-breather and given midodrine 10mg x1. CXR reveals increased pulmonary edema concerning for COVID pnemonia vs fluid overload. Pt stepped up to the CCU for more frequent vital monitoring and pressors to allow room for diuresis. In CCU pt, placed on midodrine gtt, dexamethasone and holding GDMT. Pt was weaned of midodrine and downgraded to cardiac telemetry for management of HFrEF exacerbation s/p BP stabilization.      HOSPITAL COURSE  	  MEDICATIONS:  midodrine 30 milliGRAM(s) Oral every 8 hours  torsemide 20 milliGRAM(s) Oral daily  acetaminophen     Tablet .. 650 milliGRAM(s) Oral every 6 hours PRN  melatonin 3 milliGRAM(s) Oral at bedtime PRN  ondansetron Injectable 4 milliGRAM(s) IV Push every 8 hours PRN  lactulose Syrup 10 Gram(s) Oral daily PRN  pantoprazole    Tablet 40 milliGRAM(s) Oral before breakfast  senna 2 Tablet(s) Oral at bedtime  dexAMETHasone     Tablet 6 milliGRAM(s) Oral daily  dextrose 50% Injectable 12.5 Gram(s) IV Push once  dextrose 50% Injectable 25 Gram(s) IV Push once  dextrose 50% Injectable 25 Gram(s) IV Push once  dextrose Oral Gel 15 Gram(s) Oral once PRN  glucagon  Injectable 1 milliGRAM(s) IntraMuscular once  insulin lispro (ADMELOG) corrective regimen sliding scale   SubCutaneous Before meals and at bedtime  apixaban 5 milliGRAM(s) Oral every 12 hours  dextrose 5%. 1000 milliLiter(s) IV Continuous <Continuous>  dextrose 5%. 1000 milliLiter(s) IV Continuous <Continuous>      PHYSICAL EXAM:  T(C): 36.1 (07-14-24 @ 09:00), Max: 36.3 (07-13-24 @ 17:37)  HR: 80 (07-14-24 @ 11:00) (80 - 87)  BP: 99/67 (07-14-24 @ 11:00) (82/61 - 114/86)  RR: 13 (07-14-24 @ 11:00) (9 - 28)  SpO2: 89% (07-14-24 @ 11:00) (89% - 97%)  Wt(kg): --  I&O's Summary    13 Jul 2024 07:01  -  14 Jul 2024 07:00  --------------------------------------------------------  IN: 300 mL / OUT: 400 mL / NET: -100 mL    14 Jul 2024 07:01  -  14 Jul 2024 12:36  --------------------------------------------------------  IN: 0 mL / OUT: 500 mL / NET: -500 mL          Appearance: Normal	  HEENT:   Normal oral mucosa, PERRL, EOMI	  Lymphatic: No lymphadenopathy  Cardiovascular: Normal S1 S2, No JVD, No murmurs, No edema  Respiratory: Lungs clear to auscultation	  Psychiatry: A & O x 3, Mood & affect appropriate  Gastrointestinal:  Soft, Non-tender, + BS	  Skin: No rashes, No ecchymoses, No cyanosis  Neurologic: Non-focal  Extremities: Normal range of motion, No clubbing, cyanosis or edema  Vascular: Peripheral pulses palpable 2+ bilaterally    TELEMETRY: 	    ECG:  	  RADIOLOGY:   DIAGNOSTIC TESTING:  [ ] Echocardiogram:  [ ]  Catheterization:  [ ] Stress Test:    OTHER: 	    LABS:	 	    CARDIAC MARKERS:                                  16.1   5.98  )-----------( 223      ( 14 Jul 2024 05:30 )             47.7     07-14    144  |  109<H>  |  35<H>  ----------------------------<  134<H>  4.1   |  25  |  0.94    Ca    8.3<L>      14 Jul 2024 05:30  Phos  3.2     07-14  Mg     2.3     07-14    TPro  5.9<L>  /  Alb  2.4<L>  /  TBili  0.9  /  DBili  x   /  AST  16  /  ALT  14  /  AlkPhos  87  07-14    proBNP:   Lipid Profile:   HgA1c:   TSH:        CARDIOLOGY PA STEP DOWN NOTE    78yM, DNR/DNI, w/ PMHx of ATTR Cardiac Amyloid, HFrEF (EF 30-35%) s/p CRT-P (2/2022), multiple HFrEF admissions, AFib s/p ablation (6/2016 on Eliquis, hx R sided Pleural Effusion s/p thoracentesis c/b PTX (2022), T2DM, idiopathic thrombocytopenia, prostate ca s/p chemotherapy p/w 2 weeks of weak, lethargy, SOB and decreased PO intake. Pt admitted was to [facility] 7/9/24 for presumed HFrEF exacerbation. Pt found to be COVID+ and started on dexamethasone 10 mg PO QD x 10 days. GDMT was held initially d/t hypotension. MICU consulted on 7/10 for persistent hypotension, however determined pt was stable for telemetry. Overnight 7/12, pt was found to be hypotensive to 60/40 and hypoxic, pt was put on 100% non re-breather and given midodrine 10mg x1. CXR revealing increased pulmonary edema concerning for COVID pneumonia vs fluid overload. Pt stepped up to the CCU for more frequent vital monitoring and pressors to allow room for diuresis. In CCU pt required midodrine 20 mg PO TID and Levophed ggt. Pt now weaned off Levophed and transitioned to Midodrine 30 mg PO TID. Pt was weaned of midodrine and downgraded to cardiac telemetry for management of HFrEF exacerbation s/p BP stabilization.      HOSPITAL COURSE  	  MEDICATIONS:  midodrine 30 milliGRAM(s) Oral every 8 hours  torsemide 20 milliGRAM(s) Oral daily  acetaminophen     Tablet .. 650 milliGRAM(s) Oral every 6 hours PRN  melatonin 3 milliGRAM(s) Oral at bedtime PRN  ondansetron Injectable 4 milliGRAM(s) IV Push every 8 hours PRN  lactulose Syrup 10 Gram(s) Oral daily PRN  pantoprazole    Tablet 40 milliGRAM(s) Oral before breakfast  senna 2 Tablet(s) Oral at bedtime  dexAMETHasone     Tablet 6 milliGRAM(s) Oral daily  dextrose 50% Injectable 12.5 Gram(s) IV Push once  dextrose 50% Injectable 25 Gram(s) IV Push once  dextrose 50% Injectable 25 Gram(s) IV Push once  dextrose Oral Gel 15 Gram(s) Oral once PRN  glucagon  Injectable 1 milliGRAM(s) IntraMuscular once  insulin lispro (ADMELOG) corrective regimen sliding scale   SubCutaneous Before meals and at bedtime  apixaban 5 milliGRAM(s) Oral every 12 hours  dextrose 5%. 1000 milliLiter(s) IV Continuous <Continuous>  dextrose 5%. 1000 milliLiter(s) IV Continuous <Continuous>      PHYSICAL EXAM:  T(C): 36.1 (07-14-24 @ 09:00), Max: 36.3 (07-13-24 @ 17:37)  HR: 80 (07-14-24 @ 11:00) (80 - 87)  BP: 99/67 (07-14-24 @ 11:00) (82/61 - 114/86)  RR: 13 (07-14-24 @ 11:00) (9 - 28)  SpO2: 89% (07-14-24 @ 11:00) (89% - 97%)  Wt(kg): --  I&O's Summary    13 Jul 2024 07:01  -  14 Jul 2024 07:00  --------------------------------------------------------  IN: 300 mL / OUT: 400 mL / NET: -100 mL    14 Jul 2024 07:01  -  14 Jul 2024 12:36  --------------------------------------------------------  IN: 0 mL / OUT: 500 mL / NET: -500 mL          Appearance: Normal	  HEENT:   Normal oral mucosa, PERRL, EOMI	  Lymphatic: No lymphadenopathy  Cardiovascular: Normal S1 S2, No JVD, No murmurs, No edema  Respiratory: Lungs clear to auscultation	  Psychiatry: A & O x 3, Mood & affect appropriate  Gastrointestinal:  Soft, Non-tender, + BS	  Skin: No rashes, No ecchymoses, No cyanosis  Neurologic: Non-focal  Extremities: Normal range of motion, No clubbing, cyanosis or edema  Vascular: Peripheral pulses palpable 2+ bilaterally    TELEMETRY: 	    ECG:  	  RADIOLOGY:   DIAGNOSTIC TESTING:  [ ] Echocardiogram:  [ ]  Catheterization:  [ ] Stress Test:    OTHER: 	    LABS:	 	    CARDIAC MARKERS:                                  16.1   5.98  )-----------( 223      ( 14 Jul 2024 05:30 )             47.7     07-14    144  |  109<H>  |  35<H>  ----------------------------<  134<H>  4.1   |  25  |  0.94    Ca    8.3<L>      14 Jul 2024 05:30  Phos  3.2     07-14  Mg     2.3     07-14    TPro  5.9<L>  /  Alb  2.4<L>  /  TBili  0.9  /  DBili  x   /  AST  16  /  ALT  14  /  AlkPhos  87  07-14    proBNP:   Lipid Profile:   HgA1c:   TSH:        CARDIOLOGY PA STEP DOWN NOTE    HOSPITAL COURSE  78yM, DNR/DNI, w/ PMHx of ATTR Cardiac Amyloid, HFrEF (EF 30-35%) s/p CRT-P (2/2022), multiple HFrEF admissions, AFib s/p ablation (6/2016 on Eliquis, hx R sided Pleural Effusion s/p thoracentesis c/b PTX (2022), T2DM, idiopathic thrombocytopenia, prostate ca s/p chemotherapy p/w 2 weeks of weak, lethargy, SOB and decreased PO intake. Pt admitted was to [facility] 7/9/24 for presumed HFrEF exacerbation. Pt found to be COVID+ and started on dexamethasone 10 mg PO QD x 10 days. GDMT was held initially d/t hypotension. MICU consulted on 7/10 for persistent hypotension, however determined pt was stable for telemetry. Overnight 7/12, pt was found to be hypotensive to 60/40 and hypoxic, pt was put on 100% non re-breather and given midodrine 10mg x1. CXR revealing increased pulmonary edema concerning for COVID pneumonia vs fluid overload. Pt stepped up to the CCU for more frequent vital monitoring and pressors to allow room for diuresis. In CCU pt required midodrine 20 mg PO TID and Levophed ggt. Pt now weaned off Levophed and transitioned to Midodrine 30 mg PO TID. Pt was weaned of midodrine and downgraded to cardiac telemetry for management of HFrEF exacerbation s/p BP stabilization.        	  MEDICATIONS:  midodrine 30 milliGRAM(s) Oral every 8 hours  torsemide 20 milliGRAM(s) Oral daily  acetaminophen     Tablet .. 650 milliGRAM(s) Oral every 6 hours PRN  melatonin 3 milliGRAM(s) Oral at bedtime PRN  ondansetron Injectable 4 milliGRAM(s) IV Push every 8 hours PRN  lactulose Syrup 10 Gram(s) Oral daily PRN  pantoprazole    Tablet 40 milliGRAM(s) Oral before breakfast  senna 2 Tablet(s) Oral at bedtime  dexAMETHasone     Tablet 6 milliGRAM(s) Oral daily  dextrose 50% Injectable 12.5 Gram(s) IV Push once  dextrose 50% Injectable 25 Gram(s) IV Push once  dextrose 50% Injectable 25 Gram(s) IV Push once  dextrose Oral Gel 15 Gram(s) Oral once PRN  glucagon  Injectable 1 milliGRAM(s) IntraMuscular once  insulin lispro (ADMELOG) corrective regimen sliding scale   SubCutaneous Before meals and at bedtime  apixaban 5 milliGRAM(s) Oral every 12 hours  dextrose 5%. 1000 milliLiter(s) IV Continuous <Continuous>  dextrose 5%. 1000 milliLiter(s) IV Continuous <Continuous>      PHYSICAL EXAM:  T(C): 36.1 (07-14-24 @ 09:00), Max: 36.3 (07-13-24 @ 17:37)  HR: 80 (07-14-24 @ 11:00) (80 - 87)  BP: 99/67 (07-14-24 @ 11:00) (82/61 - 114/86)  RR: 13 (07-14-24 @ 11:00) (9 - 28)  SpO2: 89% (07-14-24 @ 11:00) (89% - 97%)  Wt(kg): --  I&O's Summary    13 Jul 2024 07:01  -  14 Jul 2024 07:00  --------------------------------------------------------  IN: 300 mL / OUT: 400 mL / NET: -100 mL    14 Jul 2024 07:01  -  14 Jul 2024 12:36  --------------------------------------------------------  IN: 0 mL / OUT: 500 mL / NET: -500 mL          Appearance: Normal	  HEENT:   Normal oral mucosa, PERRL, EOMI	  Lymphatic: No lymphadenopathy  Cardiovascular: Normal S1 S2, No JVD, No murmurs, No edema  Respiratory: Lungs clear to auscultation	  Psychiatry: A & O x 3, Mood & affect appropriate  Gastrointestinal:  Soft, Non-tender, + BS	  Skin: No rashes, No ecchymoses, No cyanosis  Neurologic: Non-focal  Extremities: Normal range of motion, No clubbing, cyanosis or edema  Vascular: Peripheral pulses palpable 2+ bilaterally    TELEMETRY: 	    ECG:  	  RADIOLOGY:   DIAGNOSTIC TESTING:  [ ] Echocardiogram:  [ ]  Catheterization:  [ ] Stress Test:    OTHER: 	    LABS:	 	    CARDIAC MARKERS:                                  16.1   5.98  )-----------( 223      ( 14 Jul 2024 05:30 )             47.7     07-14    144  |  109<H>  |  35<H>  ----------------------------<  134<H>  4.1   |  25  |  0.94    Ca    8.3<L>      14 Jul 2024 05:30  Phos  3.2     07-14  Mg     2.3     07-14    TPro  5.9<L>  /  Alb  2.4<L>  /  TBili  0.9  /  DBili  x   /  AST  16  /  ALT  14  /  AlkPhos  87  07-14    proBNP:   Lipid Profile:   HgA1c:   TSH:        CARDIOLOGY PA STEP DOWN NOTE    HOSPITAL COURSE  78yM, DNR/DNI, w/ PMHx of ATTR Cardiac Amyloid, HFrEF (EF 30-35%) s/p CRT-P (2/2022), multiple HFrEF admissions, AFib s/p ablation (6/2016 on Eliquis, hx R sided Pleural Effusion s/p thoracentesis c/b PTX (2022), T2DM, idiopathic thrombocytopenia, prostate ca s/p chemotherapy p/w 2 weeks of weak, lethargy, SOB and decreased PO intake. Pt admitted was [facility]24 for presumed HFrEF exacerbation. Pt found to be COVID+ and started on dexamethasone 10 mg PO QD x 10 days. GDMT was held initially d/t hypotension. MICU consulted on 7/10 for persistent hypotension, however determined pt was stable for telemetry. Overnight 7/12, pt was found to be hypotensive to 60/40 and hypoxic, pt was put on 100% non re-breather and given midodrine 10mg x1. CXR revealing increased pulmonary edema concerning for COVID pneumonia vs fluid overload. Pt stepped up to the CCU for more frequent vital monitoring and pressors to allow room for diuresis. In CCU pt required midodrine 20 mg PO TID and Levophed ggt. 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 Pt now weaned off Levophed and transitioned to Midodrine 30 mg PO TID and downgraded to cardiac telemetry for management of HFrEF exacerbation s/p BP stabilization.      	  MEDICATIONS:  midodrine 30 milliGRAM(s) Oral every 8 hours  torsemide 20 milliGRAM(s) Oral daily  acetaminophen     Tablet .. 650 milliGRAM(s) Oral every 6 hours PRN  melatonin 3 milliGRAM(s) Oral at bedtime PRN  ondansetron Injectable 4 milliGRAM(s) IV Push every 8 hours PRN  lactulose Syrup 10 Gram(s) Oral daily PRN  pantoprazole    Tablet 40 milliGRAM(s) Oral before breakfast  senna 2 Tablet(s) Oral at bedtime  dexAMETHasone     Tablet 6 milliGRAM(s) Oral daily  dextrose 50% Injectable 12.5 Gram(s) IV Push once  dextrose 50% Injectable 25 Gram(s) IV Push once  dextrose 50% Injectable 25 Gram(s) IV Push once  dextrose Oral Gel 15 Gram(s) Oral once PRN  glucagon  Injectable 1 milliGRAM(s) IntraMuscular once  insulin lispro (ADMELOG) corrective regimen sliding scale   SubCutaneous Before meals and at bedtime  apixaban 5 milliGRAM(s) Oral every 12 hours  dextrose 5%. 1000 milliLiter(s) IV Continuous <Continuous>  dextrose 5%. 1000 milliLiter(s) IV Continuous <Continuous>      PHYSICAL EXAM:  T(C): 36.1 (07-14-24 @ 09:00), Max: 36.3 (07-13-24 @ 17:37)  HR: 80 (07-14-24 @ 11:00) (80 - 87)  BP: 99/67 (07-14-24 @ 11:00) (82/61 - 114/86)  RR: 13 (07-14-24 @ 11:00) (9 - 28)  SpO2: 89% (07-14-24 @ 11:00) (89% - 97%)  Wt(kg): --  I&O's Summary    13 Jul 2024 07:01  -  14 Jul 2024 07:00  --------------------------------------------------------  IN: 300 mL / OUT: 400 mL / NET: -100 mL    14 Jul 2024 07:01  -  14 Jul 2024 12:36  --------------------------------------------------------  IN: 0 mL / OUT: 500 mL / NET: -500 mL          Appearance: No acute distress	  HEENT: PERRL, EOMI	  Cardiovascular: RRR, No JVD, No murmurs  Respiratory: CTA b/l, no rales, no rhonchi, no wheezing	  Gastrointestinal:  Soft, Non-tender, + BS	  Skin: No rashes, No ecchymoses, No cyanosis  Neurologic: Non-focal  Extremities: No clubbing, cyanosis or edema  Vascular: Peripheral pulses palpable 2+ bilaterally    TELEMETRY: 	    ECG:  	  RADIOLOGY:   DIAGNOSTIC TESTING:  [ ] Echocardiogram:  [ ]  Catheterization:  [ ] Stress Test:    OTHER: 	    LABS:	 	    CARDIAC MARKERS:                                  16.1   5.98  )-----------( 223      ( 14 Jul 2024 05:30 )             47.7     07-14    144  |  109<H>  |  35<H>  ----------------------------<  134<H>  4.1   |  25  |  0.94    Ca    8.3<L>      14 Jul 2024 05:30  Phos  3.2     07-14  Mg     2.3     07-14    TPro  5.9<L>  /  Alb  2.4<L>  /  TBili  0.9  /  DBili  x   /  AST  16  /  ALT  14  /  AlkPhos  87  07-14    proBNP:   Lipid Profile:   HgA1c:   TSH:

## 2024-07-14 NOTE — PROGRESS NOTE ADULT - PROBLEM SELECTOR PLAN 8
H/o thrombocytopenia, idiopathic,  this admission, improved from baseline   Prior heme workup on previous admission   - monitor PLT count   - transfuse PLT < 10 spontaneous, < 50 w/ active bleeding    F: None   E: replete K < 4, Mg <2  DVT ppx- Eliquis 5mg bid   Diet- DASH  Activity- ambulate as tolerated  Code Status: DNR/DNI

## 2024-07-15 DIAGNOSIS — Z71.89 OTHER SPECIFIED COUNSELING: ICD-10-CM

## 2024-07-15 LAB
ALBUMIN SERPL ELPH-MCNC: 2.4 G/DL — LOW (ref 3.3–5)
ALP SERPL-CCNC: 87 U/L — SIGNIFICANT CHANGE UP (ref 40–120)
ALT FLD-CCNC: 14 U/L — SIGNIFICANT CHANGE UP (ref 10–45)
ANION GAP SERPL CALC-SCNC: 8 MMOL/L — SIGNIFICANT CHANGE UP (ref 5–17)
AST SERPL-CCNC: 21 U/L — SIGNIFICANT CHANGE UP (ref 10–40)
BASOPHILS # BLD AUTO: 0.01 K/UL — SIGNIFICANT CHANGE UP (ref 0–0.2)
BASOPHILS NFR BLD AUTO: 0.2 % — SIGNIFICANT CHANGE UP (ref 0–2)
BILIRUB SERPL-MCNC: 1 MG/DL — SIGNIFICANT CHANGE UP (ref 0.2–1.2)
BUN SERPL-MCNC: 38 MG/DL — HIGH (ref 7–23)
CALCIUM SERPL-MCNC: 8.8 MG/DL — SIGNIFICANT CHANGE UP (ref 8.4–10.5)
CHLORIDE SERPL-SCNC: 109 MMOL/L — HIGH (ref 96–108)
CO2 SERPL-SCNC: 24 MMOL/L — SIGNIFICANT CHANGE UP (ref 22–31)
CREAT SERPL-MCNC: 0.91 MG/DL — SIGNIFICANT CHANGE UP (ref 0.5–1.3)
EGFR: 87 ML/MIN/1.73M2 — SIGNIFICANT CHANGE UP
EOSINOPHIL # BLD AUTO: 0 K/UL — SIGNIFICANT CHANGE UP (ref 0–0.5)
EOSINOPHIL NFR BLD AUTO: 0 % — SIGNIFICANT CHANGE UP (ref 0–6)
GLUCOSE BLDC GLUCOMTR-MCNC: 126 MG/DL — HIGH (ref 70–99)
GLUCOSE BLDC GLUCOMTR-MCNC: 154 MG/DL — HIGH (ref 70–99)
GLUCOSE BLDC GLUCOMTR-MCNC: 157 MG/DL — HIGH (ref 70–99)
GLUCOSE SERPL-MCNC: 127 MG/DL — HIGH (ref 70–99)
HCT VFR BLD CALC: 42.3 % — SIGNIFICANT CHANGE UP (ref 39–50)
HGB BLD-MCNC: 15.6 G/DL — SIGNIFICANT CHANGE UP (ref 13–17)
IMM GRANULOCYTES NFR BLD AUTO: 0.9 % — SIGNIFICANT CHANGE UP (ref 0–0.9)
LYMPHOCYTES # BLD AUTO: 0.64 K/UL — LOW (ref 1–3.3)
LYMPHOCYTES # BLD AUTO: 13.8 % — SIGNIFICANT CHANGE UP (ref 13–44)
MAGNESIUM SERPL-MCNC: 2.3 MG/DL — SIGNIFICANT CHANGE UP (ref 1.6–2.6)
MCHC RBC-ENTMCNC: 31.8 PG — SIGNIFICANT CHANGE UP (ref 27–34)
MCHC RBC-ENTMCNC: 36.9 GM/DL — HIGH (ref 32–36)
MCV RBC AUTO: 86.3 FL — SIGNIFICANT CHANGE UP (ref 80–100)
MONOCYTES # BLD AUTO: 0.23 K/UL — SIGNIFICANT CHANGE UP (ref 0–0.9)
MONOCYTES NFR BLD AUTO: 5 % — SIGNIFICANT CHANGE UP (ref 2–14)
NEUTROPHILS # BLD AUTO: 3.72 K/UL — SIGNIFICANT CHANGE UP (ref 1.8–7.4)
NEUTROPHILS NFR BLD AUTO: 80.1 % — HIGH (ref 43–77)
NRBC # BLD: 0 /100 WBCS — SIGNIFICANT CHANGE UP (ref 0–0)
PHOSPHATE SERPL-MCNC: 3.2 MG/DL — SIGNIFICANT CHANGE UP (ref 2.5–4.5)
PLATELET # BLD AUTO: 233 K/UL — SIGNIFICANT CHANGE UP (ref 150–400)
POTASSIUM SERPL-MCNC: 4.7 MMOL/L — SIGNIFICANT CHANGE UP (ref 3.5–5.3)
POTASSIUM SERPL-SCNC: 4.7 MMOL/L — SIGNIFICANT CHANGE UP (ref 3.5–5.3)
PROT SERPL-MCNC: 6.1 G/DL — SIGNIFICANT CHANGE UP (ref 6–8.3)
RBC # BLD: 4.9 M/UL — SIGNIFICANT CHANGE UP (ref 4.2–5.8)
RBC # FLD: 15.4 % — HIGH (ref 10.3–14.5)
SODIUM SERPL-SCNC: 141 MMOL/L — SIGNIFICANT CHANGE UP (ref 135–145)
WBC # BLD: 4.64 K/UL — SIGNIFICANT CHANGE UP (ref 3.8–10.5)
WBC # FLD AUTO: 4.64 K/UL — SIGNIFICANT CHANGE UP (ref 3.8–10.5)

## 2024-07-15 PROCEDURE — 99233 SBSQ HOSP IP/OBS HIGH 50: CPT

## 2024-07-15 PROCEDURE — 99232 SBSQ HOSP IP/OBS MODERATE 35: CPT

## 2024-07-15 RX ADMIN — PANTOPRAZOLE SODIUM 40 MILLIGRAM(S): 40 INJECTION, POWDER, FOR SOLUTION INTRAVENOUS at 06:41

## 2024-07-15 RX ADMIN — MIDODRINE HYDROCHLORIDE 30 MILLIGRAM(S): 10 TABLET ORAL at 06:40

## 2024-07-15 RX ADMIN — DEXAMETHASONE 6 MILLIGRAM(S): 1 TABLET ORAL at 06:41

## 2024-07-15 RX ADMIN — MIDODRINE HYDROCHLORIDE 30 MILLIGRAM(S): 10 TABLET ORAL at 21:18

## 2024-07-15 RX ADMIN — APIXABAN 5 MILLIGRAM(S): 5 TABLET, FILM COATED ORAL at 00:40

## 2024-07-15 RX ADMIN — Medication 2 TABLET(S): at 21:17

## 2024-07-15 RX ADMIN — APIXABAN 5 MILLIGRAM(S): 5 TABLET, FILM COATED ORAL at 12:51

## 2024-07-15 RX ADMIN — TORSEMIDE 20 MILLIGRAM(S): 20 TABLET ORAL at 06:41

## 2024-07-15 NOTE — PROGRESS NOTE ADULT - PROBLEM SELECTOR PLAN 3
SpO2 94-99% on 3L NC   - wean NC as tolerated  - lactate 2.0 (7/13/24)  - AST/ALT 16/14  - continue to monitor

## 2024-07-15 NOTE — PROGRESS NOTE ADULT - NS ATTEST RISK PROBLEM GEN_ALL_CORE FT
Acute Illness That Poses A Threat To Life  Chronic Illness With Severe Exacerbation/Progression  Decision Made To Not Resuscitate (DNR)
Acute Illness That Poses A Threat To Life  Chronic Illness With Severe Exacerbation/Progression

## 2024-07-15 NOTE — PROGRESS NOTE ADULT - PROBLEM SELECTOR PLAN 4
SBP dropped to 80s/50s. stopped torsemide 10 mg QD, gave IV 250cc over 2 hours. SBP persistently low in 80s/50s  - pt upgraded to CCU for pressors for further diuresis options  - BP now 100s/70-80s w/ MAP 80-90s  - s/p Midodrine 20 mg PO TID, transitioned to Midodrine 30 mg PO TID  - s/p Levophed gtt

## 2024-07-15 NOTE — PROGRESS NOTE ADULT - PROBLEM SELECTOR PLAN 9
H/o thrombocytopenia, idiopathic,  this admission, improved from baseline   Prior heme workup on previous admission   - monitor PLT count   - transfuse PLT < 10 spontaneous, < 50 w/ active bleeding    F: None   E: replete K < 4, Mg <2  DVT ppx- Eliquis 5mg bid   Diet- DASH  PT: 2-3 x week, COMFORT on discharge    Code Status: DNR/DNI

## 2024-07-15 NOTE — PROGRESS NOTE ADULT - PROBLEM SELECTOR PLAN 7
- DNR/DNI, MOLST in chart  - family acting collectively to assist patient with decision making  - Palliative following

## 2024-07-15 NOTE — PROGRESS NOTE ADULT - SUBJECTIVE AND OBJECTIVE BOX
CARDIOLOGY PA PROGRESS NOTE    SUBJECTIVE ASSESSMENT:  Pt seen at bedside and examined this AM. Pt reports SOB at night. Denies CP, palpitations,  Offers no other acute complaints & ROS otherwise negative.     CARDIOLOGY PA PROGRESS NOTE    SUBJECTIVE ASSESSMENT:  Pt seen and examined at bedside this AM. Pt reports SOB at night. Denies CP, palpitations, lightheadedness, orthopnea and PND. Offers no other acute complaints & ROS otherwise negative.    	  MEDICATIONS:  midodrine 30 milliGRAM(s) Oral every 8 hours  torsemide 20 milliGRAM(s) Oral daily  acetaminophen     Tablet .. 650 milliGRAM(s) Oral every 6 hours PRN  melatonin 3 milliGRAM(s) Oral at bedtime PRN  ondansetron Injectable 4 milliGRAM(s) IV Push every 8 hours PRN  lactulose Syrup 10 Gram(s) Oral daily PRN  pantoprazole    Tablet 40 milliGRAM(s) Oral before breakfast  senna 2 Tablet(s) Oral at bedtime  dexAMETHasone     Tablet 6 milliGRAM(s) Oral daily  dextrose 50% Injectable 25 Gram(s) IV Push once  dextrose 50% Injectable 25 Gram(s) IV Push once  dextrose 50% Injectable 12.5 Gram(s) IV Push once  dextrose Oral Gel 15 Gram(s) Oral once PRN  glucagon  Injectable 1 milliGRAM(s) IntraMuscular once  insulin lispro (ADMELOG) corrective regimen sliding scale   SubCutaneous Before meals and at bedtime  apixaban 5 milliGRAM(s) Oral every 12 hours  dextrose 5%. 1000 milliLiter(s) IV Continuous <Continuous>  dextrose 5%. 1000 milliLiter(s) IV Continuous <Continuous>      PHYSICAL EXAM:  T(C): 36.1 (07-15-24 @ 08:43), Max: 36.3 (07-14-24 @ 21:07)  HR: 82 (07-15-24 @ 08:43) (79 - 88)  BP: 103/77 (07-15-24 @ 08:43) (80/57 - 109/81)  RR: 18 (07-15-24 @ 08:43) (16 - 23)  SpO2: 99% (07-15-24 @ 08:43) (92% - 99%)  Wt(kg): --  I&O's Summary    14 Jul 2024 07:01  -  15 Jul 2024 07:00  --------------------------------------------------------  IN: 350 mL / OUT: 2250 mL / NET: -1900 mL      Appearance: Normal	  HEENT:   Normal oral mucosa, PERRL, EOMI	  Lymphatic: No lymphadenopathy  Cardiovascular: Normal S1 S2, No JVD, No murmurs, No edema  Respiratory: Lungs clear to auscultation	  Psychiatry: A & O x 3, Mood & affect appropriate  Gastrointestinal:  Soft, Non-tender, + BS	  Skin: No rashes, No ecchymoses, No cyanosis  Neurologic: Non-focal  Extremities: Normal range of motion, No clubbing, cyanosis or edema  Vascular: Peripheral pulses palpable 2+ bilaterally    TELEMETRY: 	    ECG:  	  RADIOLOGY:   DIAGNOSTIC TESTING:  [ ] Echocardiogram:  [ ]  Catheterization:  [ ] Stress Test:    OTHER: 	    LABS:	 	    CARDIAC MARKERS:                                  15.6   4.64  )-----------( 233      ( 15 Jul 2024 08:30 )             42.3     07-15    141  |  109<H>  |  38<H>  ----------------------------<  127<H>  4.7   |  24  |  0.91    Ca    8.8      15 Jul 2024 08:30  Phos  3.2     07-15  Mg     2.3     07-15    TPro  6.1  /  Alb  2.4<L>  /  TBili  1.0  /  DBili  x   /  AST  21  /  ALT  14  /  AlkPhos  87  07-15    proBNP:   Lipid Profile:   HgA1c:   TSH:            CARDIOLOGY PA PROGRESS NOTE    SUBJECTIVE ASSESSMENT:  Pt seen and examined at bedside this AM. Pt reports SOB at night. Denies CP, palpitations, lightheadedness, orthopnea and PND. Offers no other acute complaints & ROS otherwise negative.    	  MEDICATIONS:  midodrine 30 milliGRAM(s) Oral every 8 hours  torsemide 20 milliGRAM(s) Oral daily  acetaminophen     Tablet .. 650 milliGRAM(s) Oral every 6 hours PRN  melatonin 3 milliGRAM(s) Oral at bedtime PRN  ondansetron Injectable 4 milliGRAM(s) IV Push every 8 hours PRN  lactulose Syrup 10 Gram(s) Oral daily PRN  pantoprazole    Tablet 40 milliGRAM(s) Oral before breakfast  senna 2 Tablet(s) Oral at bedtime  dexAMETHasone     Tablet 6 milliGRAM(s) Oral daily  dextrose 50% Injectable 25 Gram(s) IV Push once  dextrose 50% Injectable 25 Gram(s) IV Push once  dextrose 50% Injectable 12.5 Gram(s) IV Push once  dextrose Oral Gel 15 Gram(s) Oral once PRN  glucagon  Injectable 1 milliGRAM(s) IntraMuscular once  insulin lispro (ADMELOG) corrective regimen sliding scale   SubCutaneous Before meals and at bedtime  apixaban 5 milliGRAM(s) Oral every 12 hours  dextrose 5%. 1000 milliLiter(s) IV Continuous <Continuous>  dextrose 5%. 1000 milliLiter(s) IV Continuous <Continuous>      PHYSICAL EXAM:  T(C): 36.1 (07-15-24 @ 08:43), Max: 36.3 (07-14-24 @ 21:07)  HR: 82 (07-15-24 @ 08:43) (79 - 88)  BP: 103/77 (07-15-24 @ 08:43) (80/57 - 109/81)  RR: 18 (07-15-24 @ 08:43) (16 - 23)  SpO2: 99% (07-15-24 @ 08:43) (92% - 99%)  Wt(kg): --  I&O's Summary    14 Jul 2024 07:01  -  15 Jul 2024 07:00  --------------------------------------------------------  IN: 350 mL / OUT: 2250 mL / NET: -1900 mL      Appearance: No acute distress	  Cardiovascular: RRR, No JVD, No murmurs  Respiratory: CTA b/l, no rales, no rhonchi, no wheezing	  Psychiatry: A & O x 2, Mood & affect appropriate  Gastrointestinal:  Soft, Non-tender, + BS	  Skin: No rashes, No ecchymoses, No cyanosis  Neurologic: Non-focal  Extremities: No clubbing, cyanosis or edema  Vascular: Peripheral pulses palpable 2+ bilaterally    TELEMETRY: 	    ECG:  	  RADIOLOGY:   DIAGNOSTIC TESTING:  [ ] Echocardiogram:  [ ]  Catheterization:  [ ] Stress Test:    OTHER: 	    LABS:	 	    CARDIAC MARKERS:                                  15.6   4.64  )-----------( 233      ( 15 Jul 2024 08:30 )             42.3     07-15    141  |  109<H>  |  38<H>  ----------------------------<  127<H>  4.7   |  24  |  0.91    Ca    8.8      15 Jul 2024 08:30  Phos  3.2     07-15  Mg     2.3     07-15    TPro  6.1  /  Alb  2.4<L>  /  TBili  1.0  /  DBili  x   /  AST  21  /  ALT  14  /  AlkPhos  87  07-15    proBNP:   Lipid Profile:   HgA1c:   TSH:

## 2024-07-15 NOTE — PROGRESS NOTE ADULT - PROBLEM SELECTOR PLAN 5
.  Patient is DNR/DNI, MOLST in chart  -family acting collectively to assist patient with decision making  -patient would be eligible for home hospice but Vyndamax is cost-prohibitive and a barrier to hospice enrollment also question whether he will stabilize somewhat as COVID runs its course  -will discuss with family, they may elect for COMFORT regardless depending on patient's debility and whether they can manage him at home with his current functional status  -see GOC note from 7/12

## 2024-07-15 NOTE — PROGRESS NOTE ADULT - ASSESSMENT
Full Note to Follow    ·	incomplete Full Note to Follow    ·	BPs improving on high dose Midodrine  ·	patient would be eligible for home hospice but Vyndamax is cost-prohibitive and a barrier to hospice enrollment and question whether he will stabilize as COVID runs its course  ·	will discuss with family, they may elect for COMFORT regardless depending on patient's debility and whether they can manage him at home with his current functional status Full Note to Follow    ·	BPs improving on high dose Midodrine, no acute symptom management needs  ·	patient would be eligible for home hospice but Vyndamax is cost-prohibitive and a barrier to hospice enrollment also question whether he will stabilize somewhat as COVID runs its course  ·	will discuss with family, they may elect for COMFORT regardless depending on patient's debility and whether they can manage him at home with his current functional status 79yo M with PMH of Cardiac Amyloidosis, CHF, AFib, T2DM, and h/o Prostate CA p/w SOB and found to have COVID. Palliative consulted for complex medical decision making in the setting of advanced illness.    ·	BPs improving on high dose Midodrine, no acute symptom management needs  ·	patient would be eligible for home hospice but Vyndamax is cost-prohibitive and a barrier to hospice enrollment also question whether he will stabilize somewhat as COVID runs its course  ·	will discuss with family, they may elect for COMFORT regardless depending on patient's debility and whether they can manage him at home with his current functional status

## 2024-07-15 NOTE — PROGRESS NOTE ADULT - PROBLEM SELECTOR PLAN 2
Etiology: NICM, ATTR cardiac amyloid   TTE (7/9/24): EF 30-35%, severe symmetric LVH, RVH present, mod-severely reduced RV systolic function, severe biatrial enlargement, small pericardial effusion without echo evidence of cardiac tamponade physiology  - GDMT held i/s/o indefinitely given TTR amyloid with hypotension. Will likely resume SGLT2i on discharge.  - Diuresis: s/p IV diuresis now on torsemide 30mg PO TID  - Advanced HF consulted, appreciate recs  - Strict I&Os, daily standing weights, fluid restriction. Etiology: NICM, ATTR cardiac amyloid   TTE (7/9/24): EF 30-35%, severe symmetric LVH, RVH present, mod-severely reduced RV systolic function, severe biatrial enlargement, small pericardial effusion without echo evidence of cardiac tamponade physiology  - GDMT held i/s/o indefinitely given TTR amyloid with hypotension. Will likely resume SGLT2i on discharge.  - Diuresis: s/p IV diuresis now on torsemide 20 mg PO QD   - Advanced HF consulted, appreciate recs  - Strict I&Os, daily standing weights, fluid restriction.

## 2024-07-15 NOTE — PROGRESS NOTE ADULT - SUBJECTIVE AND OBJECTIVE BOX
[facility] Geriatrics and Palliative Care  Nahum Qureshi, Palliative Care Attending  Contact Info: Call [phone] (HEAL Line) or message on Microsoft Teams (Nahum Qureshi)    SUBJECTIVE AND OBJECTIVE:  INTERVAL HPI/OVERNIGHT EVENTS: Interval events noted. See patient's PRN use for the past 24hrs noted below. Comprehensive symptom assessment and GOC exploration as noted below. Extensive time spent discussing plan of care with patient/family.     ALLERGIES:  No Known Allergies    MEDICATIONS  (STANDING):  apixaban 5 milliGRAM(s) Oral every 12 hours  dexAMETHasone     Tablet 6 milliGRAM(s) Oral daily  dextrose 5%. 1000 milliLiter(s) (50 mL/Hr) IV Continuous <Continuous>  dextrose 5%. 1000 milliLiter(s) (100 mL/Hr) IV Continuous <Continuous>  dextrose 50% Injectable 25 Gram(s) IV Push once  dextrose 50% Injectable 25 Gram(s) IV Push once  dextrose 50% Injectable 12.5 Gram(s) IV Push once  glucagon  Injectable 1 milliGRAM(s) IntraMuscular once  insulin lispro (ADMELOG) corrective regimen sliding scale   SubCutaneous Before meals and at bedtime  midodrine 30 milliGRAM(s) Oral every 8 hours  pantoprazole    Tablet 40 milliGRAM(s) Oral before breakfast  senna 2 Tablet(s) Oral at bedtime  torsemide 20 milliGRAM(s) Oral daily  Vyndamax (Tafamidis) 61 milliGRAM(s) 61 milliGRAM(s) Oral daily    MEDICATIONS  (PRN):  acetaminophen     Tablet .. 650 milliGRAM(s) Oral every 6 hours PRN Temp greater or equal to 38C (100.4F), Mild Pain (1 - 3)  dextrose Oral Gel 15 Gram(s) Oral once PRN Blood Glucose LESS THAN 70 milliGRAM(s)/deciliter  lactulose Syrup 10 Gram(s) Oral daily PRN Constipation  melatonin 3 milliGRAM(s) Oral at bedtime PRN Insomnia  ondansetron Injectable 4 milliGRAM(s) IV Push every 8 hours PRN Nausea and/or Vomiting      Analgesic Use (Scheduled and PRNs) for past 24 hours:    Vyndamax (Tafamidis) 61 milliGRAM(s)   61 milliGRAM(s) Oral (07-15-24 @ 12:51)      ITEMS UNCHECKED ARE NOT PRESENT  PRESENT SYMPTOMS/REVIEW OF SYSTEMS: []Unable to obtain due to poor mentation   Source if other than patient:  []Family   []Team         Vital Signs Last 24 Hrs  T(C): 36.1 (15 Jul 2024 08:43), Max: 36.3 (14 Jul 2024 21:07)  T(F): 97 (15 Jul 2024 08:43), Max: 97.4 (14 Jul 2024 21:07)  HR: 80 (15 Jul 2024 13:07) (79 - 88)  BP: 110/74 (15 Jul 2024 13:07) (80/57 - 110/74)  BP(mean): 85 (15 Jul 2024 06:36) (63 - 93)  RR: 19 (15 Jul 2024 13:07) (17 - 20)  SpO2: 97% (15 Jul 2024 13:07) (94% - 99%)    Parameters below as of 15 Jul 2024 13:07  Patient On (Oxygen Delivery Method): nasal cannula  O2 Flow (L/min): 3      LABS: Personally reviewed and interpreted                        15.6   4.64  )-----------( 233      ( 15 Jul 2024 08:30 )             42.3   07-15    141  |  109<H>  |  38<H>  ----------------------------<  127<H>  4.7   |  24  |  0.91    Ca    8.8      15 Jul 2024 08:30  Phos  3.2     07-15  Mg     2.3     07-15    TPro  6.1  /  Alb  2.4<L>  /  TBili  1.0  /  DBili  x   /  AST  21  /  ALT  14  /  AlkPhos  87  07-15      RADIOLOGY & ADDITIONAL STUDIES: Personally reviewed and interpreted  None new    DISCUSSION OF CASE: Family - to provide updates and emotional support; Primary Team/RN - to discuss plan of care [facility] Geriatrics and Palliative Care  Nahum Qureshi, Palliative Care Attending  Contact Info: Call [phone] (HEAL Line) or message on [employer] Teams (Nahum Qureshi)    SUBJECTIVE AND OBJECTIVE:  INTERVAL HPI/OVERNIGHT EVENTS: Interval events noted. Feels better. Had some lightheadedness with PT. See patient's PRN use for the past 24hrs noted below. Comprehensive symptom assessment and GOC exploration as noted below. Extensive time spent discussing plan of care with patient.     ALLERGIES:  No Known Allergies    MEDICATIONS  (STANDING):  apixaban 5 milliGRAM(s) Oral every 12 hours  dexAMETHasone     Tablet 6 milliGRAM(s) Oral daily  dextrose 5%. 1000 milliLiter(s) (50 mL/Hr) IV Continuous <Continuous>  dextrose 5%. 1000 milliLiter(s) (100 mL/Hr) IV Continuous <Continuous>  dextrose 50% Injectable 25 Gram(s) IV Push once  dextrose 50% Injectable 25 Gram(s) IV Push once  dextrose 50% Injectable 12.5 Gram(s) IV Push once  glucagon  Injectable 1 milliGRAM(s) IntraMuscular once  insulin lispro (ADMELOG) corrective regimen sliding scale   SubCutaneous Before meals and at bedtime  midodrine 30 milliGRAM(s) Oral every 8 hours  pantoprazole    Tablet 40 milliGRAM(s) Oral before breakfast  senna 2 Tablet(s) Oral at bedtime  torsemide 20 milliGRAM(s) Oral daily  Vyndamax (Tafamidis) 61 milliGRAM(s) 61 milliGRAM(s) Oral daily    MEDICATIONS  (PRN):  acetaminophen     Tablet .. 650 milliGRAM(s) Oral every 6 hours PRN Temp greater or equal to 38C (100.4F), Mild Pain (1 - 3)  dextrose Oral Gel 15 Gram(s) Oral once PRN Blood Glucose LESS THAN 70 milliGRAM(s)/deciliter  lactulose Syrup 10 Gram(s) Oral daily PRN Constipation  melatonin 3 milliGRAM(s) Oral at bedtime PRN Insomnia  ondansetron Injectable 4 milliGRAM(s) IV Push every 8 hours PRN Nausea and/or Vomiting      Analgesic Use (Scheduled and PRNs) for past 24 hours:    Vyndamax (Tafamidis) 61 milliGRAM(s)   61 milliGRAM(s) Oral (07-15-24 @ 12:51)      ITEMS UNCHECKED ARE NOT PRESENT  PRESENT SYMPTOMS/REVIEW OF SYSTEMS: []Unable to obtain due to poor mentation   Source if other than patient:  []Family   []Team         Vital Signs Last 24 Hrs  T(C): 36.1 (15 Jul 2024 08:43), Max: 36.3 (14 Jul 2024 21:07)  T(F): 97 (15 Jul 2024 08:43), Max: 97.4 (14 Jul 2024 21:07)  HR: 80 (15 Jul 2024 13:07) (79 - 88)  BP: 110/74 (15 Jul 2024 13:07) (80/57 - 110/74)  BP(mean): 85 (15 Jul 2024 06:36) (63 - 93)  RR: 19 (15 Jul 2024 13:07) (17 - 20)  SpO2: 97% (15 Jul 2024 13:07) (94% - 99%)    Parameters below as of 15 Jul 2024 13:07  Patient On (Oxygen Delivery Method): nasal cannula  O2 Flow (L/min): 3      LABS: Personally reviewed and interpreted                        15.6   4.64  )-----------( 233      ( 15 Jul 2024 08:30 )             42.3   07-15    141  |  109<H>  |  38<H>  ----------------------------<  127<H>  4.7   |  24  |  0.91    Ca    8.8      15 Jul 2024 08:30  Phos  3.2     07-15  Mg     2.3     07-15    TPro  6.1  /  Alb  2.4<L>  /  TBili  1.0  /  DBili  x   /  AST  21  /  ALT  14  /  AlkPhos  87  07-15      RADIOLOGY & ADDITIONAL STUDIES: Personally reviewed and interpreted  None new    DISCUSSION OF CASE: Family - to provide updates and emotional support; Primary Team/RN - to discuss plan of care [facility] Geriatrics and Palliative Care  Nahum Qureshi, Palliative Care Attending  Contact Info: Call [phone] (HEAL Line) or message on [employer] Teams (Nahum Qureshi)    SUBJECTIVE AND OBJECTIVE:  INTERVAL HPI/OVERNIGHT EVENTS: Interval events noted. Feels better. Had some lightheadedness with PT. See patient's PRN use for the past 24hrs noted below. Comprehensive symptom assessment and GOC exploration as noted below. Extensive time spent discussing plan of care with patient.     ALLERGIES:  No Known Allergies    MEDICATIONS  (STANDING):  apixaban 5 milliGRAM(s) Oral every 12 hours  dexAMETHasone     Tablet 6 milliGRAM(s) Oral daily  dextrose 5%. 1000 milliLiter(s) (50 mL/Hr) IV Continuous <Continuous>  dextrose 5%. 1000 milliLiter(s) (100 mL/Hr) IV Continuous <Continuous>  dextrose 50% Injectable 25 Gram(s) IV Push once  dextrose 50% Injectable 25 Gram(s) IV Push once  dextrose 50% Injectable 12.5 Gram(s) IV Push once  glucagon  Injectable 1 milliGRAM(s) IntraMuscular once  insulin lispro (ADMELOG) corrective regimen sliding scale   SubCutaneous Before meals and at bedtime  midodrine 30 milliGRAM(s) Oral every 8 hours  pantoprazole    Tablet 40 milliGRAM(s) Oral before breakfast  senna 2 Tablet(s) Oral at bedtime  torsemide 20 milliGRAM(s) Oral daily  Vyndamax (Tafamidis) 61 milliGRAM(s) 61 milliGRAM(s) Oral daily    MEDICATIONS  (PRN):  acetaminophen     Tablet .. 650 milliGRAM(s) Oral every 6 hours PRN Temp greater or equal to 38C (100.4F), Mild Pain (1 - 3)  dextrose Oral Gel 15 Gram(s) Oral once PRN Blood Glucose LESS THAN 70 milliGRAM(s)/deciliter  lactulose Syrup 10 Gram(s) Oral daily PRN Constipation  melatonin 3 milliGRAM(s) Oral at bedtime PRN Insomnia  ondansetron Injectable 4 milliGRAM(s) IV Push every 8 hours PRN Nausea and/or Vomiting    Analgesic Use (Scheduled and PRNs) for past 24 hours:  Vyndamax (Tafamidis) 61 milliGRAM(s)   61 milliGRAM(s) Oral (07-15-24 @ 12:51)    ITEMS UNCHECKED ARE NOT PRESENT  PRESENT SYMPTOMS/REVIEW OF SYSTEMS: []Unable to obtain due to poor mentation   Source if other than patient:  []Family   []Team     Pain: [] yes [x] no  QOL impact -   Location -                    Aggravating Factors -  Quality -  Radiation -  Timing -  Severity (0-10 scale) -   Minimal Acceptable Level (0-10 scale) -    Other Symptoms: See ESAS Section Below  [x]All other review of systems negative     GENERAL:  [x] NAD [x]Alert []Lethargic  []Cachexia  []Unarousable  [x]Verbal  []Non-Verbal  BEHAVIORAL:   []Anxiety  []Delirium []Agitation [x]Cooperative [x]Oriented x3  HEENT:  [x]Normal  [x] Moist Mucous Membranes []Dry mouth   []ET Tube/Trach  []Oral lesions  PULMONARY:   []Clear []Tachypnea  []Audible excessive secretions  [x]Normal Work of Breathing []Labored Breathing  [x]Rhonchi []Crackles []Wheezing  CARDIOVASCULAR:    [x]Regular Rate [x]Regular Rhythm []Irregular []Tachy  []Richard  GASTROINTESTINAL:  [x]Soft  []Distended   [x]+BS  [x]Non tender []Tender  []PEG []OGT/ NGT  Last BM:  GENITOURINARY:  [x]Normal [] Incontinent   []Oliguria/Anuria   []Moore  MUSCULOSKELETAL:   [x]Normal Extremities  [x]Weakness  []Bed/Wheelchair bound []Edema  NEUROLOGIC:   [x]No focal deficits  []Cognitive impairment  []Dysphagia []Dysarthria []Paresis []Encephalopathic  SKIN:   [x]Normal   []Pressure ulcer(s)  []Rash    Vital Signs Last 24 Hrs  T(C): 36.1 (15 Jul 2024 08:43), Max: 36.3 (14 Jul 2024 21:07)  T(F): 97 (15 Jul 2024 08:43), Max: 97.4 (14 Jul 2024 21:07)  HR: 80 (15 Jul 2024 13:07) (79 - 88)  BP: 110/74 (15 Jul 2024 13:07) (80/57 - 110/74)  BP(mean): 85 (15 Jul 2024 06:36) (63 - 93)  RR: 19 (15 Jul 2024 13:07) (17 - 20)  SpO2: 97% (15 Jul 2024 13:07) (94% - 99%)    Parameters below as of 15 Jul 2024 13:07  Patient On (Oxygen Delivery Method): nasal cannula  O2 Flow (L/min): 3    LABS: Personally reviewed and interpreted                     15.6   4.64  )-----------( 233      ( 15 Jul 2024 08:30 )             42.3   07-15    141  |  109<H>  |  38<H>  ----------------------------<  127<H>  4.7   |  24  |  0.91    Ca    8.8      15 Jul 2024 08:30  Phos  3.2     07-15  Mg     2.3     07-15    TPro  6.1  /  Alb  2.4<L>  /  TBili  1.0  /  DBili  x   /  AST  21  /  ALT  14  /  AlkPhos  87  07-15    RADIOLOGY & ADDITIONAL STUDIES: Personally reviewed and interpreted  None new    DISCUSSION OF CASE: Family - to provide updates and emotional support; Primary Team/RN - to discuss plan of care

## 2024-07-15 NOTE — PROGRESS NOTE ADULT - PROBLEM SELECTOR PLAN 1
CXR 7/8/24: Infiltrates/pulmonary vascular congestion and small right pleural effusion.   - Pt now afebrile, WBC 5.98  - IVPB Remdesivir 100 mg Q24 x 4 days (7/8/24-7/11/24) + PO Dexamethasone 6 mg Q 24 x 10 days (7/9/24-7/18/24)  - procal elevated 0.13  - ID consulted, nenita recs CXR 7/8/24: Infiltrates/pulmonary vascular congestion and small right pleural effusion.   - Pt now afebrile, WBC 4.64  - IVPB Remdesivir 100 mg Q24 x 4 days (7/8/24-7/11/24) + Dexamethasone 6 mg PO QD x 10 days (7/9/24-7/18/24)  - procal elevated 0.13  - ID consulted, nenita recs

## 2024-07-15 NOTE — PROGRESS NOTE ADULT - PROBLEM SELECTOR PLAN 6
.  Complex medical decision making / symptom management in the setting of advanced illness.    Will continue to follow for ongoing GOC discussion / titration of palliative regimen. Emotional support provided, questions answered.  Active Psychosocial Referrals:  [x]Social Work/Case management [x]PT/OT []Chaplaincy []Hospice  []Patient/Family Support []Holistic RN []Massage Therapy []Music Therapy []Ethics  Coping: [] well [x] with difficulty [] poor coping [] unable to assess  Support system: [] strong [x] adequate [] inadequate    For new or uncontrolled symptoms, please call Palliative Care at [phone]). The service is available 24/7 (including nights & weekends) to provide symptom management recommendations over the phone as appropriate

## 2024-07-15 NOTE — PROGRESS NOTE ADULT - ASSESSMENT
78yM, DNR/DNI, w/ PMHx ATTR Cardiac Amyloid, HFrEF (EF 30-35%) s/p CRT-P (2/2022), multiple HFrEF admissions, AFib s/p ablation (6/2016 on Eliquis), hx R sided Pleural Effusion s/p thoracentesis c/b PTX (2022), T2DM, idiopathic thrombocytopenia, prostate ca s/p chemotherapy p/w 2 weeks of weak, lethargy, SOB and decreased PO intake. Pt admitted to [facility] Card[facility] for presumed HFrEF exacerbation. Pt found to be COVID+, and GDMT held d/t hypotension and pt was stepped up to CCU for pressors.  Blood pressure is now stable s/p Midodrine 20 mg PO TID and Levophed gtt. Palliative discussion ongoing w/ family for hospice d/t end stage amylolysis  Pt has been stepped down to cardiac telemetry for further management of HFrEF exacerbation.

## 2024-07-16 LAB
ALBUMIN SERPL ELPH-MCNC: 2.8 G/DL — LOW (ref 3.3–5)
ALP SERPL-CCNC: 97 U/L — SIGNIFICANT CHANGE UP (ref 40–120)
ALT FLD-CCNC: 15 U/L — SIGNIFICANT CHANGE UP (ref 10–45)
ANION GAP SERPL CALC-SCNC: 11 MMOL/L — SIGNIFICANT CHANGE UP (ref 5–17)
AST SERPL-CCNC: 23 U/L — SIGNIFICANT CHANGE UP (ref 10–40)
BASOPHILS # BLD AUTO: 0.01 K/UL — SIGNIFICANT CHANGE UP (ref 0–0.2)
BASOPHILS NFR BLD AUTO: 0.2 % — SIGNIFICANT CHANGE UP (ref 0–2)
BILIRUB SERPL-MCNC: 1.1 MG/DL — SIGNIFICANT CHANGE UP (ref 0.2–1.2)
BUN SERPL-MCNC: 43 MG/DL — HIGH (ref 7–23)
CALCIUM SERPL-MCNC: 8.8 MG/DL — SIGNIFICANT CHANGE UP (ref 8.4–10.5)
CHLORIDE SERPL-SCNC: 106 MMOL/L — SIGNIFICANT CHANGE UP (ref 96–108)
CO2 SERPL-SCNC: 25 MMOL/L — SIGNIFICANT CHANGE UP (ref 22–31)
CREAT SERPL-MCNC: 1 MG/DL — SIGNIFICANT CHANGE UP (ref 0.5–1.3)
EGFR: 78 ML/MIN/1.73M2 — SIGNIFICANT CHANGE UP
EOSINOPHIL # BLD AUTO: 0.01 K/UL — SIGNIFICANT CHANGE UP (ref 0–0.5)
EOSINOPHIL NFR BLD AUTO: 0.2 % — SIGNIFICANT CHANGE UP (ref 0–6)
GLUCOSE BLDC GLUCOMTR-MCNC: 102 MG/DL — HIGH (ref 70–99)
GLUCOSE BLDC GLUCOMTR-MCNC: 105 MG/DL — HIGH (ref 70–99)
GLUCOSE BLDC GLUCOMTR-MCNC: 135 MG/DL — HIGH (ref 70–99)
GLUCOSE BLDC GLUCOMTR-MCNC: 143 MG/DL — HIGH (ref 70–99)
GLUCOSE SERPL-MCNC: 110 MG/DL — HIGH (ref 70–99)
HCT VFR BLD CALC: 43.5 % — SIGNIFICANT CHANGE UP (ref 39–50)
HGB BLD-MCNC: 15.5 G/DL — SIGNIFICANT CHANGE UP (ref 13–17)
IMM GRANULOCYTES NFR BLD AUTO: 0.5 % — SIGNIFICANT CHANGE UP (ref 0–0.9)
LYMPHOCYTES # BLD AUTO: 0.64 K/UL — LOW (ref 1–3.3)
LYMPHOCYTES # BLD AUTO: 10.5 % — LOW (ref 13–44)
MAGNESIUM SERPL-MCNC: 2.2 MG/DL — SIGNIFICANT CHANGE UP (ref 1.6–2.6)
MCHC RBC-ENTMCNC: 30.3 PG — SIGNIFICANT CHANGE UP (ref 27–34)
MCHC RBC-ENTMCNC: 35.6 GM/DL — SIGNIFICANT CHANGE UP (ref 32–36)
MCV RBC AUTO: 85 FL — SIGNIFICANT CHANGE UP (ref 80–100)
MONOCYTES # BLD AUTO: 0.27 K/UL — SIGNIFICANT CHANGE UP (ref 0–0.9)
MONOCYTES NFR BLD AUTO: 4.4 % — SIGNIFICANT CHANGE UP (ref 2–14)
NEUTROPHILS # BLD AUTO: 5.14 K/UL — SIGNIFICANT CHANGE UP (ref 1.8–7.4)
NEUTROPHILS NFR BLD AUTO: 84.2 % — HIGH (ref 43–77)
NRBC # BLD: 0 /100 WBCS — SIGNIFICANT CHANGE UP (ref 0–0)
PLATELET # BLD AUTO: 277 K/UL — SIGNIFICANT CHANGE UP (ref 150–400)
POTASSIUM SERPL-MCNC: 4.4 MMOL/L — SIGNIFICANT CHANGE UP (ref 3.5–5.3)
POTASSIUM SERPL-SCNC: 4.4 MMOL/L — SIGNIFICANT CHANGE UP (ref 3.5–5.3)
PROT SERPL-MCNC: 6.5 G/DL — SIGNIFICANT CHANGE UP (ref 6–8.3)
RBC # BLD: 5.12 M/UL — SIGNIFICANT CHANGE UP (ref 4.2–5.8)
RBC # FLD: 15.3 % — HIGH (ref 10.3–14.5)
SODIUM SERPL-SCNC: 142 MMOL/L — SIGNIFICANT CHANGE UP (ref 135–145)
WBC # BLD: 6.1 K/UL — SIGNIFICANT CHANGE UP (ref 3.8–10.5)
WBC # FLD AUTO: 6.1 K/UL — SIGNIFICANT CHANGE UP (ref 3.8–10.5)

## 2024-07-16 PROCEDURE — 99232 SBSQ HOSP IP/OBS MODERATE 35: CPT

## 2024-07-16 RX ADMIN — PANTOPRAZOLE SODIUM 40 MILLIGRAM(S): 40 INJECTION, POWDER, FOR SOLUTION INTRAVENOUS at 06:02

## 2024-07-16 RX ADMIN — MIDODRINE HYDROCHLORIDE 30 MILLIGRAM(S): 10 TABLET ORAL at 21:23

## 2024-07-16 RX ADMIN — DEXAMETHASONE 6 MILLIGRAM(S): 1 TABLET ORAL at 06:05

## 2024-07-16 RX ADMIN — APIXABAN 5 MILLIGRAM(S): 5 TABLET, FILM COATED ORAL at 13:51

## 2024-07-16 RX ADMIN — APIXABAN 5 MILLIGRAM(S): 5 TABLET, FILM COATED ORAL at 01:30

## 2024-07-16 RX ADMIN — Medication 2 TABLET(S): at 21:23

## 2024-07-16 RX ADMIN — MIDODRINE HYDROCHLORIDE 30 MILLIGRAM(S): 10 TABLET ORAL at 13:50

## 2024-07-16 RX ADMIN — MIDODRINE HYDROCHLORIDE 30 MILLIGRAM(S): 10 TABLET ORAL at 06:04

## 2024-07-16 RX ADMIN — TORSEMIDE 20 MILLIGRAM(S): 20 TABLET ORAL at 06:02

## 2024-07-16 NOTE — PROGRESS NOTE ADULT - PROBLEM SELECTOR PLAN 2
Etiology: NICM, ATTR cardiac amyloid   TTE (7/9/24): EF 30-35%, severe symmetric LVH, RVH present, mod-severely reduced RV systolic function, severe biatrial enlargement, small pericardial effusion without echo evidence of cardiac tamponade physiology  - GDMT held i/s/o indefinitely given TTR amyloid with hypotension. Will likely resume SGLT2i on discharge.  - Diuresis: s/p IV diuresis now on torsemide 20 mg PO QD   - Advanced HF consulted, appreciate recs  - Strict I&Os, daily standing weights, fluid restriction.

## 2024-07-16 NOTE — ADVANCED PRACTICE NURSE CONSULT - RECOMMEDATIONS
Recommend moisture barrier ointment to gluteal cleft, spoke with CARO Valiente. Total time spent on encounter=20 minutes

## 2024-07-16 NOTE — ADVANCED PRACTICE NURSE CONSULT - ASSESSMENT
78yM, DNR/DNI, w/ PMHx ATTR Cardiac Amyloid, HFrEF (EF 30-35%) s/p CRT-P (2/2022), multiple HFrEF admissions, AFib s/p ablation (6/2016 on Eliquis), hx R sided Pleural Effusion s/p thoracentesis c/b PTX (2022), T2DM, idiopathic thrombocytopenia, prostate ca s/p chemotherapy p/w 2 weeks of weak, lethargy, SOB and decreased PO intake. Pt admitted to [facility] Cardiac [facility] for presumed HFrEF exacerbation. Pt found to be COVID+, and GDMT held d/t hypotension and pt was stepped up to CCU for pressors.  Blood pressure is now stable s/p Midodrine 20 mg PO TID and Levophed gtt. Palliative discussion ongoing w/ family for hospice d/t end stage amylolysis  Pt has been stepped down to cardiac telemetry for further management of HFrEF exacerbation. Pt with very slight denudment in gluteal cleft from moisture, not pressure-related.

## 2024-07-16 NOTE — PROGRESS NOTE ADULT - ASSESSMENT
78yM, DNR/DNI, w/ PMHx ATTR Cardiac Amyloid, HFrEF (EF 30-35%) s/p CRT-P (2/2022), multiple HFrEF admissions, AFib s/p ablation (6/2016 on Eliquis), hx R sided Pleural Effusion s/p thoracentesis c/b PTX (2022), T2DM, idiopathic thrombocytopenia, prostate ca s/p chemotherapy p/w 2 weeks of weak, lethargy, SOB and decreased PO intake. Pt admitted to [facility] Cardiac [facility] for presumed HFrEF exacerbation. Pt found to be COVID+, and GDMT held d/t hypotension and pt was stepped up to CCU for pressors.  Blood pressure is now stable s/p Midodrine 20 mg PO TID and Levophed gtt. Palliative discussion ongoing w/ family for hospice d/t end stage amylolysis  Pt has been stepped down to cardiac telemetry for further management of HFrEF exacerbation.

## 2024-07-16 NOTE — PROGRESS NOTE ADULT - PROBLEM SELECTOR PLAN 1
CXR 7/8/24: Infiltrates/pulmonary vascular congestion and small right pleural effusion.   - Pt now afebrile, WBC 4.64  - IVPB Remdesivir 100 mg Q24 x 4 days (7/8/24-7/11/24) + Dexamethasone 6 mg PO QD x 10 days (7/9/24-7/18/24)  - procal elevated 0.13  - ID consulted, nenita recs

## 2024-07-16 NOTE — PROGRESS NOTE ADULT - SUBJECTIVE AND OBJECTIVE BOX
CARDIOLOGY PA PROGRESS NOTE    SUBJECTIVE ASSESSMENT:   Pt seen am examined at bedside this AM. Pt states is feeling better. Denies CP, SOB, lightheadedness, dizziness, orthopnea, PND.  Offers no other acute complaints & ROS otherwise negative.      MEDICATIONS:  midodrine 30 milliGRAM(s) Oral every 8 hours  torsemide 20 milliGRAM(s) Oral daily  acetaminophen     Tablet .. 650 milliGRAM(s) Oral every 6 hours PRN  melatonin 3 milliGRAM(s) Oral at bedtime PRN  ondansetron Injectable 4 milliGRAM(s) IV Push every 8 hours PRN  lactulose Syrup 10 Gram(s) Oral daily PRN  pantoprazole    Tablet 40 milliGRAM(s) Oral before breakfast  senna 2 Tablet(s) Oral at bedtime  dexAMETHasone     Tablet 6 milliGRAM(s) Oral daily  dextrose 50% Injectable 25 Gram(s) IV Push once  dextrose 50% Injectable 25 Gram(s) IV Push once  dextrose 50% Injectable 12.5 Gram(s) IV Push once  dextrose Oral Gel 15 Gram(s) Oral once PRN  glucagon  Injectable 1 milliGRAM(s) IntraMuscular once  insulin lispro (ADMELOG) corrective regimen sliding scale   SubCutaneous Before meals and at bedtime  apixaban 5 milliGRAM(s) Oral every 12 hours  dextrose 5%. 1000 milliLiter(s) IV Continuous <Continuous>  dextrose 5%. 1000 milliLiter(s) IV Continuous <Continuous>          PHYSICAL EXAM:  T(C): 36.6 (07-16-24 @ 16:00), Max: 36.6 (07-16-24 @ 16:00)  HR: 80 (07-16-24 @ 16:00) (80 - 88)  BP: 88/61 (07-16-24 @ 16:00) (86/59 - 120/70)  RR: 19 (07-16-24 @ 16:00) (15 - 19)  SpO2: 94% (07-16-24 @ 16:00) (94% - 95%)  Wt(kg): --  I&O's Summary    15 Jul 2024 07:01  -  16 Jul 2024 07:00  --------------------------------------------------------  IN: 0 mL / OUT: 1300 mL / NET: -1300 mL    16 Jul 2024 07:01  -  16 Jul 2024 19:22  --------------------------------------------------------  IN: 380 mL / OUT: 1500 mL / NET: -1120 mL      Appearance: No acute distress	  Cardiovascular: RRR, No JVD, No murmurs  Respiratory: CTA b/l, no rales, no rhonchi, no wheezing	  Psychiatry: A & O x 2, Mood & affect appropriate  Gastrointestinal:  Soft, Non-tender, + BS	  Skin: No rashes, No ecchymoses, No cyanosis  Neurologic: Non-focal  Extremities: No clubbing, cyanosis or edema  Vascular: Peripheral pulses palpable 2+ bilaterally      TELEMETRY: 	    ECG:  	  RADIOLOGY:   DIAGNOSTIC TESTING:  [ ] Echocardiogram:  [ ]  Catheterization:  [ ] Stress Test:    OTHER: 	    LABS:	 	    CARDIAC MARKERS:                                  15.5   6.10  )-----------( 277      ( 16 Jul 2024 08:57 )             43.5     07-16    142  |  106  |  43<H>  ----------------------------<  110<H>  4.4   |  25  |  1.00    Ca    8.8      16 Jul 2024 08:57  Phos  3.2     07-15  Mg     2.2     07-16    TPro  6.5  /  Alb  2.8<L>  /  TBili  1.1  /  DBili  x   /  AST  23  /  ALT  15  /  AlkPhos  97  07-16    proBNP:   Lipid Profile:   HgA1c:   TSH:

## 2024-07-17 ENCOUNTER — TRANSCRIPTION ENCOUNTER (OUTPATIENT)
Age: 79
End: 2024-07-17

## 2024-07-17 LAB
ALBUMIN SERPL ELPH-MCNC: 2.5 G/DL — LOW (ref 3.3–5)
ALP SERPL-CCNC: 87 U/L — SIGNIFICANT CHANGE UP (ref 40–120)
ALT FLD-CCNC: 14 U/L — SIGNIFICANT CHANGE UP (ref 10–45)
ANION GAP SERPL CALC-SCNC: 10 MMOL/L — SIGNIFICANT CHANGE UP (ref 5–17)
AST SERPL-CCNC: 20 U/L — SIGNIFICANT CHANGE UP (ref 10–40)
BASOPHILS # BLD AUTO: 0 K/UL — SIGNIFICANT CHANGE UP (ref 0–0.2)
BASOPHILS NFR BLD AUTO: 0 % — SIGNIFICANT CHANGE UP (ref 0–2)
BILIRUB SERPL-MCNC: 1 MG/DL — SIGNIFICANT CHANGE UP (ref 0.2–1.2)
BUN SERPL-MCNC: 42 MG/DL — HIGH (ref 7–23)
CALCIUM SERPL-MCNC: 9.4 MG/DL — SIGNIFICANT CHANGE UP (ref 8.4–10.5)
CHLORIDE SERPL-SCNC: 105 MMOL/L — SIGNIFICANT CHANGE UP (ref 96–108)
CO2 SERPL-SCNC: 26 MMOL/L — SIGNIFICANT CHANGE UP (ref 22–31)
CREAT SERPL-MCNC: 1.05 MG/DL — SIGNIFICANT CHANGE UP (ref 0.5–1.3)
EGFR: 73 ML/MIN/1.73M2 — SIGNIFICANT CHANGE UP
EOSINOPHIL # BLD AUTO: 0.01 K/UL — SIGNIFICANT CHANGE UP (ref 0–0.5)
EOSINOPHIL NFR BLD AUTO: 0.2 % — SIGNIFICANT CHANGE UP (ref 0–6)
GLUCOSE BLDC GLUCOMTR-MCNC: 104 MG/DL — HIGH (ref 70–99)
GLUCOSE BLDC GLUCOMTR-MCNC: 109 MG/DL — HIGH (ref 70–99)
GLUCOSE BLDC GLUCOMTR-MCNC: 86 MG/DL — SIGNIFICANT CHANGE UP (ref 70–99)
GLUCOSE BLDC GLUCOMTR-MCNC: 92 MG/DL — SIGNIFICANT CHANGE UP (ref 70–99)
GLUCOSE SERPL-MCNC: 107 MG/DL — HIGH (ref 70–99)
HCT VFR BLD CALC: 43.4 % — SIGNIFICANT CHANGE UP (ref 39–50)
HGB BLD-MCNC: 14.9 G/DL — SIGNIFICANT CHANGE UP (ref 13–17)
IMM GRANULOCYTES NFR BLD AUTO: 1.1 % — HIGH (ref 0–0.9)
LYMPHOCYTES # BLD AUTO: 0.69 K/UL — LOW (ref 1–3.3)
LYMPHOCYTES # BLD AUTO: 14.8 % — SIGNIFICANT CHANGE UP (ref 13–44)
MAGNESIUM SERPL-MCNC: 2.1 MG/DL — SIGNIFICANT CHANGE UP (ref 1.6–2.6)
MCHC RBC-ENTMCNC: 29.6 PG — SIGNIFICANT CHANGE UP (ref 27–34)
MCHC RBC-ENTMCNC: 34.3 GM/DL — SIGNIFICANT CHANGE UP (ref 32–36)
MCV RBC AUTO: 86.3 FL — SIGNIFICANT CHANGE UP (ref 80–100)
MONOCYTES # BLD AUTO: 0.23 K/UL — SIGNIFICANT CHANGE UP (ref 0–0.9)
MONOCYTES NFR BLD AUTO: 4.9 % — SIGNIFICANT CHANGE UP (ref 2–14)
NEUTROPHILS # BLD AUTO: 3.67 K/UL — SIGNIFICANT CHANGE UP (ref 1.8–7.4)
NEUTROPHILS NFR BLD AUTO: 79 % — HIGH (ref 43–77)
NRBC # BLD: 0 /100 WBCS — SIGNIFICANT CHANGE UP (ref 0–0)
PLATELET # BLD AUTO: 236 K/UL — SIGNIFICANT CHANGE UP (ref 150–400)
POTASSIUM SERPL-MCNC: 4 MMOL/L — SIGNIFICANT CHANGE UP (ref 3.5–5.3)
POTASSIUM SERPL-SCNC: 4 MMOL/L — SIGNIFICANT CHANGE UP (ref 3.5–5.3)
PROT SERPL-MCNC: 6.1 G/DL — SIGNIFICANT CHANGE UP (ref 6–8.3)
RBC # BLD: 5.03 M/UL — SIGNIFICANT CHANGE UP (ref 4.2–5.8)
RBC # FLD: 15.3 % — HIGH (ref 10.3–14.5)
SODIUM SERPL-SCNC: 141 MMOL/L — SIGNIFICANT CHANGE UP (ref 135–145)
WBC # BLD: 4.65 K/UL — SIGNIFICANT CHANGE UP (ref 3.8–10.5)
WBC # FLD AUTO: 4.65 K/UL — SIGNIFICANT CHANGE UP (ref 3.8–10.5)

## 2024-07-17 PROCEDURE — 99232 SBSQ HOSP IP/OBS MODERATE 35: CPT

## 2024-07-17 RX ADMIN — TORSEMIDE 20 MILLIGRAM(S): 20 TABLET ORAL at 06:01

## 2024-07-17 RX ADMIN — MIDODRINE HYDROCHLORIDE 30 MILLIGRAM(S): 10 TABLET ORAL at 05:52

## 2024-07-17 RX ADMIN — APIXABAN 5 MILLIGRAM(S): 5 TABLET, FILM COATED ORAL at 00:30

## 2024-07-17 RX ADMIN — PANTOPRAZOLE SODIUM 40 MILLIGRAM(S): 40 INJECTION, POWDER, FOR SOLUTION INTRAVENOUS at 06:02

## 2024-07-17 RX ADMIN — APIXABAN 5 MILLIGRAM(S): 5 TABLET, FILM COATED ORAL at 13:10

## 2024-07-17 RX ADMIN — DEXAMETHASONE 6 MILLIGRAM(S): 1 TABLET ORAL at 05:51

## 2024-07-17 RX ADMIN — MIDODRINE HYDROCHLORIDE 30 MILLIGRAM(S): 10 TABLET ORAL at 21:57

## 2024-07-17 RX ADMIN — MIDODRINE HYDROCHLORIDE 30 MILLIGRAM(S): 10 TABLET ORAL at 13:11

## 2024-07-17 NOTE — DISCHARGE NOTE PROVIDER - HOSPITAL COURSE
78yM, DNR/DNI, w/ PMHx ATTR Cardiac Amyloid, HFrEF (EF 30-35%) s/p CRT-P (2/2022), multiple HFrEF admissions, AFib s/p ablation (6/2016 on Eliquis), hx R sided Pleural Effusion s/p thoracentesis c/b PTX (2022), T2DM, idiopathic thrombocytopenia, prostate ca s/p chemotherapy p/w 2 weeks of weak, lethargy, SOB and decreased PO intake.     Pt admitted to [facility] Cardiac Tele initially for presumed HFrEF exacerbation. Pt found to be COVID+ and was seen by ID team. He received IVPB Remdesivir 100 mg Q24 x 4 days (7/8/24-7/11/24) + Dexamethasone 6 mg PO QD x 10 days (7/9/24-7/18/24).    -CXR 7/8/24: Infiltrates/pulmonary vascular congestion and small right pleural effusion.  -TTE (7/9/24): EF 30-35%, severe symmetric LVH, RVH present, mod-severely reduced RV systolic function, severe biatrial enlargement, small pericardial effusion without echo evidence of cardiac tamponade physiology.    For patient's CHF, GDMT held d/t hypotension iso TTR amyloid. Due to worsening hypotension, patient was eventually stepped up to CCU for pressors. patient was started and increased dosage on Midodrine 30 mg PO TID and Levophed gtt which was eventually weaned off. Blood pressure eventually stabilized and patient was again stepped down to cardiac/tele floor. Patient continues on Torsemide 20mg qd??? Consideration of possibly resuming SGLT2i on discharge__________?    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 However, at this time, mutual discussion w/ family was for COMFORT placement and to allow patient to recover from the acute infection phase of covid. Plan for dc to COMFORT at 12pm tomorrow 7/18/24.     Patient will continue Vyndamax for Amyloid-------    Pt seen and examined at bedside this AM without any complaints or events overnight, VSS, labs and telemetry reviewed and pt stable for discharge as discussed with Dr. Barros. Pt has received appropriate discharge instructions, including medication regimen, access site management and follow up with Dr. __ in 1-2 weeks.         78yM, DNR/DNI, w/ PMHx ATTR Cardiac Amyloid, HFrEF (EF 30-35%) s/p CRT-P (2/2022), multiple HFrEF admissions, AFib s/p ablation (6/2016 on Eliquis), hx R sided Pleural Effusion s/p thoracentesis c/b PTX (2022), T2DM, idiopathic thrombocytopenia, prostate ca s/p chemotherapy p/w 2 weeks of weak, lethargy, SOB and decreased PO intake.     Pt admitted to [facility] Cardiac Tele initially for presumed HFrEF exacerbation. Pt found to be COVID+ and was seen by ID team. He received IVPB Remdesivir 100 mg Q24 x 4 days (7/8/24-7/11/24) + Dexamethasone 6 mg PO QD x 10 days (7/9/24-7/18/24).    -CXR 7/8/24: Infiltrates/pulmonary vascular congestion and small right pleural effusion.  -TTE (7/9/24): EF 30-35%, severe symmetric LVH, RVH present, mod-severely reduced RV systolic function, severe biatrial enlargement, small pericardial effusion without echo evidence of cardiac tamponade physiology.    For patient's CHF, GDMT held d/t hypotension iso TTR amyloid. Due to worsening hypotension, patient was eventually stepped up to CCU for pressors. patient was started and increased dosage on Midodrine 30 mg PO TID and Levophed gtt which was eventually weaned off. Blood pressure eventually stabilized and patient was again stepped down to cardiac/tele floor. Patient continues on Torsemide 20mg qd.     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 However, at this time, mutual discussion w/ family was for COMFORT placement and to allow patient to recover from the acute infection phase of covid. Plan for dc to COMFORT at 12pm tomorrow 7/18/24.     Patient will continue Vyndamax for Amyloid which patient brought from home and will bring it with him to the rehab???    Pt seen and examined at bedside this AM without any complaints or events overnight, VSS, labs and telemetry reviewed and pt stable for discharge as discussed with Dr. Barros. Pt has received appropriate discharge instructions, including medication regimen, access site management and follow up with  __ in 1-2 weeks.         78yM, DNR/DNI, w/ PMHx ATTR Cardiac Amyloid, HFrEF (EF 30-35%) s/p CRT-P (2/2022), multiple HFrEF admissions, AFib s/p ablation (6/2016 on Eliquis), hx R sided Pleural Effusion s/p thoracentesis c/b PTX (2022), T2DM, idiopathic thrombocytopenia, prostate ca s/p chemotherapy p/w 2 weeks of weak, lethargy, SOB and decreased PO intake.     Pt admitted to [facility] Cardiac [facility] initially for presumed HFrEF exacerbation. Pt found to be COVID+ and was seen by ID team. He received IVPB Remdesivir 100 mg Q24 x 4 days (7/8/24-7/11/24) + Dexamethasone 6 mg PO QD x 10 days (7/9/24-7/18/24).    -CXR 7/8/24: Infiltrates/pulmonary vascular congestion and small right pleural effusion.  -TTE (7/9/24): EF 30-35%, severe symmetric LVH, RVH present, mod-severely reduced RV systolic function, severe biatrial enlargement, small pericardial effusion without echo evidence of cardiac tamponade physiology.    For patient's CHF, GDMT held d/t hypotension iso TTR amyloid. Due to worsening hypotension, patient was eventually stepped up to CCU for pressors. patient was started and increased dosage on Midodrine 30 mg PO TID and Levophed gtt which was eventually weaned off. Blood pressure eventually stabilized and patient was again stepped down to cardiac/tele floor. Patient continues on Torsemide 20mg qd.     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 However, at this time, mutual discussion w/ family was for COMFORT placement and to allow patient to recover from the acute infection phase of covid. Plan for dc to COMFORT at 12pm tomorrow 7/18/24.     Patient will continue Vyndamax for Amyloid which patient brought from home and will bring it with him to the rehab???    Pt seen and examined at bedside this AM without any complaints or events overnight, VSS, labs and telemetry reviewed and pt stable for discharge as discussed with Dr. Barros. Pt has received appropriate discharge instructions, including medication regimen, access site management and follow up with Dr. Jones in 1-2 weeks.         78yM, DNR/DNI, w/ PMHx ATTR Cardiac Amyloid, HFrEF (EF 30-35%) s/p CRT-P (2/2022), multiple HFrEF admissions, AFib s/p ablation (6/2016 on Eliquis), hx R sided Pleural Effusion s/p thoracentesis c/b PTX (2022), T2DM, idiopathic thrombocytopenia, prostate ca s/p chemotherapy p/w 2 weeks of weak, lethargy, SOB and decreased PO intake.     Pt admitted to [facility] Cardiac [facility] initially for presumed HFrEF exacerbation. Pt found to be COVID+ and was seen by ID team. He received IVPB Remdesivir 100 mg Q24 x 4 days (7/8/24-7/11/24) + Dexamethasone 6 mg PO QD x 10 days (7/9/24-7/18/24).    -CXR 7/8/24: Infiltrates/pulmonary vascular congestion and small right pleural effusion.  -TTE (7/9/24): EF 30-35%, severe symmetric LVH, RVH present, mod-severely reduced RV systolic function, severe biatrial enlargement, small pericardial effusion without echo evidence of cardiac tamponade physiology.    For patient's CHF, GDMT held d/t hypotension iso TTR amyloid. Due to worsening hypotension, patient was eventually stepped up to CCU for pressors. patient was started and increased dosage on Midodrine 30 mg PO TID and Levophed gtt which was eventually weaned off. Blood pressure eventually stabilized and patient was again stepped down to cardiac/tele floor. Patient continues on Torsemide 20mg qd.     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 However, at this time, mutual discussion w/ family was for COMFORT placement and to allow patient to recover from the acute infection phase of covid. Plan for dc to COMFORT at 12pm tomorrow 7/18/24.     Patient will continue Vyndamax for Amyloid which patient brought from home and will bring it with him to the rehab.    Pt seen and examined at bedside this AM without any complaints or events overnight, VSS, labs and telemetry reviewed and pt stable for discharge as discussed with Dr. Barros. Pt has received appropriate discharge instructions, including medication regimen, access site management and follow up with Dr. Jones in 1-2 weeks.         78yM, DNR/DNI, w/ PMHx ATTR Cardiac Amyloid, HFrEF (EF 30-35%) s/p CRT-P (2/2022), multiple HFrEF admissions, AFib s/p ablation (6/2016 on Eliquis), hx R sided Pleural Effusion s/p thoracentesis c/b PTX (2022), T2DM, idiopathic thrombocytopenia, prostate ca s/p chemotherapy p/w 2 weeks of weak, lethargy, SOB and decreased PO intake.     Pt admitted to [facility] Cardiac Tele initially for presumed HFrEF exacerbation. Pt found to be COVID+ and was seen by ID team. He received IVPB Remdesivir 100 mg Q24 x 4 days (7/8/24-7/11/24) + Dexamethasone 6 mg PO QD x 10 days (7/9/24-7/18/24).    -CXR 7/8/24: Infiltrates/pulmonary vascular congestion and small right pleural effusion.  -TTE (7/9/24): EF 30-35%, severe symmetric LVH, RVH present, mod-severely reduced RV systolic function, severe biatrial enlargement, small pericardial effusion without echo evidence of cardiac tamponade physiology.    For patient's CHF, GDMT held d/t hypotension iso TTR amyloid. Due to worsening hypotension, patient was eventually stepped up to CCU for pressors. patient was started and increased dosage on Midodrine 30 mg PO TID and Levophed gtt which was eventually weaned off. Blood pressure eventually stabilized and patient was again stepped down to cardiac/tele floor. Patient continues on Torsemide 20mg qd.     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 However, at this time, mutual discussion w/ family was for COMFORT placement and to allow patient to recover from the acute infection phase of covid. Plan for dc to COMFORT 7/18/24 and observe progress and allow to declare self if will qualify for hospice care.    Patient will continue Vyndamax for Amyloid which patient brought from home and will bring it with him to the rehab.    Pt seen and examined at bedside this AM without any complaints or events overnight, VSS, labs and telemetry reviewed and pt stable for discharge as discussed with Dr. Barros. Pt has received appropriate discharge instructions, including medication regimen, access site management and follow up with Dr. Jones in 1-2 weeks.

## 2024-07-17 NOTE — PROGRESS NOTE ADULT - PROBLEM SELECTOR PLAN 9
H/o thrombocytopenia, idiopathic,  this admission, improved from baseline   Prior heme workup on previous admission   - monitor PLT count   - transfuse PLT < 10 spontaneous, < 50 w/ active bleeding    F: None   E: replete K < 4, Mg <2  DVT ppx- Eliquis 5mg bid   Diet- DASH  PT: 2-3 x week, COMFORT on discharge    Code Status: DNR/DNI H/o thrombocytopenia, idiopathic,  this admission, improved from baseline   Prior heme workup on previous admission   - monitor PLT count   - transfuse PLT < 10 spontaneous, < 50 w/ active bleeding    F: None   E: replete K < 4, Mg <2  DVT ppx- Eliquis 5mg bid   Diet- DASH  PT: 2-3 x week, COMFORT on discharge  Code Status: DNR/DNI  Dispo: dc to [facility] tomorrow 7/18/24

## 2024-07-17 NOTE — DISCHARGE NOTE PROVIDER - DETAILS OF MALNUTRITION DIAGNOSIS/DIAGNOSES
This patient has been assessed with a concern for Malnutrition and was treated during this hospitalization for the following Nutrition diagnosis/diagnoses:     -  07/12/2024: Severe protein-calorie malnutrition

## 2024-07-17 NOTE — PROGRESS NOTE ADULT - ASSESSMENT
78yM, DNR/DNI, w/ PMHx ATTR Cardiac Amyloid, HFrEF (EF 30-35%) s/p CRT-P (2/2022), multiple HFrEF admissions, AFib s/p ablation (6/2016 on Eliquis), hx R sided Pleural Effusion s/p thoracentesis c/b PTX (2022), T2DM, idiopathic thrombocytopenia, prostate ca s/p chemotherapy p/w 2 weeks of weak, lethargy, SOB and decreased PO intake. Pt admitted to [facility] Cardiac [facility] for presumed HFrEF exacerbation. Pt found to be COVID+, and GDMT held d/t hypotension and pt was stepped up to CCU for pressors.  Blood pressure is now stable s/p Midodrine 20 mg PO TID and Levophed gtt. Palliative discussion ongoing w/ family for hospice d/t end stage amylolysis  Pt has been stepped down to cardiac telemetry for further management of HFrEF exacerbation.  78yM, DNR/DNI, w/ PMHx ATTR Cardiac Amyloid, HFrEF (EF 30-35%) s/p CRT-P (2/2022), multiple HFrEF admissions, AFib s/p ablation (6/2016 on Eliquis), hx R sided Pleural Effusion s/p thoracentesis c/b PTX (2022), T2DM, idiopathic thrombocytopenia, prostate ca s/p chemotherapy p/w 2 weeks of weak, lethargy, SOB and decreased PO intake. Pt admitted to [facility] Cardiac [facility] for presumed HFrEF exacerbation. Pt found to be COVID+, and GDMT held d/t hypotension and pt was stepped up to CCU for pressors.  Blood pressure is now stable s/p Midodrine 20 mg PO TID and Levophed gtt. Palliative discussion w/ family for hospice d/t end stage amylolysis, however, at this time, family decided COMFORT and await his recovery from COVID. Pt has been stepped down to cardiac telemetry for further management of HFrEF exacerbation. Plan for dc to [facility] at 12pm tomorrow 7/18/24.

## 2024-07-17 NOTE — PROGRESS NOTE ADULT - PROVIDER SPECIALTY LIST ADULT
CCU
Cardiology
Heart Failure
Cardiology
Cardiology
Palliative Care
CCU
CCU
Cardiology
Palliative Care
Palliative Care
Cardiology
Cardiology

## 2024-07-17 NOTE — PROGRESS NOTE ADULT - NSPROGADDITIONALINFOA_GEN_ALL_CORE
Attending Attestation:  I was physically present for the key portions of the evaluation and management (E/M) service provided. I reviewed relevant data including imaging, labs and prior procedure reports available. I agree with the above history, physical, and plan.    Manny Barros MD   Cardiology
Attending Attestation:  I was physically present for the key portions of the evaluation and management (E/M) service provided. I reviewed relevant data including imaging, labs and prior procedure reports available. I agree with the above history, physical, and plan.    Discussed w [facility] hospice rec. Will observe post COVID recovery and re-discuss GOC if no-minimal improvement.    Manny Barros MD   Cardiology
Attending Attestation:  I was physically present for the key portions of the evaluation and management (E/M) service provided. I reviewed relevant data including imaging, labs and prior procedure reports available. I agree with the above history, physical, and plan.    Manny Barros MD   Cardiology

## 2024-07-17 NOTE — PROGRESS NOTE ADULT - PROBLEM SELECTOR PROBLEM 6
T2DM (type 2 diabetes mellitus)
Encounter for palliative care
T2DM (type 2 diabetes mellitus)
Cardiac amyloidosis
Cardiac amyloidosis
T2DM (type 2 diabetes mellitus)
Cardiac amyloidosis
T2DM (type 2 diabetes mellitus)
Encounter for palliative care
Cardiac amyloidosis
Encounter for palliative care

## 2024-07-17 NOTE — DISCHARGE NOTE PROVIDER - NSDCCPCAREPLAN_GEN_ALL_CORE_FT
PRINCIPAL DISCHARGE DIAGNOSIS  Diagnosis: Systolic CHF, acute on chronic  Assessment and Plan of Treatment:       SECONDARY DISCHARGE DIAGNOSES  Diagnosis: Cardiac amyloidosis  Assessment and Plan of Treatment:     Diagnosis: 2019 novel coronavirus disease (COVID-19)  Assessment and Plan of Treatment:     Diagnosis: Afib  Assessment and Plan of Treatment:     Diagnosis: Acute on chronic diastolic congestive heart failure  Assessment and Plan of Treatment:     Diagnosis: Hypoxia  Assessment and Plan of Treatment:      PRINCIPAL DISCHARGE DIAGNOSIS  Diagnosis: Systolic CHF, acute on chronic  Assessment and Plan of Treatment: You have a weak heart, also known as Congestive Heart Failure (CHF). Heart failure is a condition in which the heart does not pump or fill with blood well. As a result, the heart lags behind in its job of moving blood throughout the body. This can lead to symptoms such as swelling, trouble breathing, and feeling tired. Your Ejection Fraction (EF) is 30-35%, a normal EF is 55-60%.  -Please continue Torsemide 20mg once a day to prevent fluid build up in the body.  Due to low blood pressure, some of the medications were put on hold and midodrine 30mg three times a day was started.   -Avoid drinking more than 1.5L of fluid daily and maintain a low salt diet (max 2grams daily).  -Please weigh yourself daily, for any significant increases in daily weight of 2lbs/day or 5lbs/week with associated swelling in the legs or abdomen and/or shortness of breath, please call your doctor or go to the emergency room.  -Follow up with Dr. Jones in 1 week.        SECONDARY DISCHARGE DIAGNOSES  Diagnosis: Cardiac amyloidosis  Assessment and Plan of Treatment: You have diagnosed history of cardiac amyloid. Please continue Vyndamax 61mg once a day as your previous home medication. Please follow up with your cardiologist outpatient.    Diagnosis: 2019 novel coronavirus disease (COVID-19)  Assessment and Plan of Treatment: You were diagnosed with COVID infection    Diagnosis: Afib  Assessment and Plan of Treatment:     Diagnosis: Acute on chronic diastolic congestive heart failure  Assessment and Plan of Treatment:     Diagnosis: Hypoxia  Assessment and Plan of Treatment:      PRINCIPAL DISCHARGE DIAGNOSIS  Diagnosis: Systolic CHF, acute on chronic  Assessment and Plan of Treatment: You have a weak heart, also known as Congestive Heart Failure (CHF). Heart failure is a condition in which the heart does not pump or fill with blood well. As a result, the heart lags behind in its job of moving blood throughout the body. This can lead to symptoms such as swelling, trouble breathing, and feeling tired. Your Ejection Fraction (EF) is 30-35%, a normal EF is 55-60%.  -Please continue Torsemide 20mg once a day to prevent fluid build up in the body.  Due to low blood pressure, some of the medications were put on hold and midodrine 30mg three times a day was started.   -Avoid drinking more than 1.5L of fluid daily and maintain a low salt diet (max 2grams daily).  -Please weigh yourself daily, for any significant increases in daily weight of 2lbs/day or 5lbs/week with associated swelling in the legs or abdomen and/or shortness of breath, please call your doctor or go to the emergency room.  -Follow up with Dr. Jones in 1 week.        SECONDARY DISCHARGE DIAGNOSES  Diagnosis: Cardiac amyloidosis  Assessment and Plan of Treatment: You have diagnosed history of cardiac amyloid. Please continue Vyndamax 61mg once a day as your previous home medication. Please follow up with your cardiologist outpatient. in 1-2 weeks.    Diagnosis: 2019 novel coronavirus disease (COVID-19)  Assessment and Plan of Treatment: You were diagnosed with COVID infection this admission and received Remdesivir and Dexamethasone this admission for management. Please follow up with your PCP outpatient in next 1-2 weeks.    Diagnosis: Afib  Assessment and Plan of Treatment: You have a history of afib which is an irregular heart rhythm. Please continue Eliquis 5mg twice a day for lowering the risk of stroke due to afib.

## 2024-07-17 NOTE — PROGRESS NOTE ADULT - PROBLEM SELECTOR PROBLEM 7
Advance care planning
Advance care planning
Thrombocytopenia
Thrombocytopenia
T2DM (type 2 diabetes mellitus)
Advance care planning
Thrombocytopenia
Thrombocytopenia

## 2024-07-17 NOTE — PROGRESS NOTE ADULT - PROBLEM SELECTOR PLAN 3
SpO2 94-99% on 3L NC   - wean NC as tolerated  - lactate 2.0 (7/13/24)  - AST/ALT 16/14  - continue to monitor SpO2 94-99% on 2L NC   - wean NC as tolerated  - lactate 2.0 (7/13/24)  - AST/ALT 16/14  - continue to monitor

## 2024-07-17 NOTE — PROGRESS NOTE ADULT - PROBLEM SELECTOR PROBLEM 8
T2DM (type 2 diabetes mellitus)
Thrombocytopenia
Healthcare maintenance
T2DM (type 2 diabetes mellitus)
T2DM (type 2 diabetes mellitus)
Healthcare maintenance

## 2024-07-17 NOTE — PROGRESS NOTE ADULT - PROBLEM SELECTOR PROBLEM 5
Cardiac amyloidosis
Advanced care planning/counseling discussion
Afib
Cardiac amyloidosis
Afib
Advanced care planning/counseling discussion
Afib
Advanced care planning/counseling discussion
Afib

## 2024-07-17 NOTE — PROGRESS NOTE ADULT - PROBLEM SELECTOR PROBLEM 3
Acute hypoxic respiratory failure
Acute hypoxic respiratory failure
Hypotension
Acute hypoxic respiratory failure
COVID
Hypotension
Acute hypoxic respiratory failure
Acute hypoxic respiratory failure

## 2024-07-17 NOTE — CHART NOTE - NSCHARTNOTEFT_GEN_A_CORE
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      S/P TURP  for BPH          Current Nutrition Order: DASH/TLC, consistent carbohydrate, 1.5L fluid restriction    PO Intake: ~50% meals    GI Issues: No N/V/D, bowel regimen in place    Pain: No pain noted    Skin Integrity: 1+ edema b/l ankles; bill score 14    Labs:   07-17    141  |  105  |  42<H>  ----------------------------<  107<H>  4.0   |  26  |  1.05    Ca    9.4      17 Jul 2024 10:00  Mg     2.1     07-17    TPro  6.1  /  Alb  2.5<L>  /  TBili  1.0  /  DBili  x   /  AST  20  /  ALT  14  /  AlkPhos  87  07-17    CAPILLARY BLOOD GLUCOSE      POCT Blood Glucose.: 92 mg/dL (17 Jul 2024 17:26)  POCT Blood Glucose.: 109 mg/dL (17 Jul 2024 12:05)  POCT Blood Glucose.: 86 mg/dL (17 Jul 2024 08:41)  POCT Blood Glucose.: 105 mg/dL (16 Jul 2024 21:56)      Medications:  MEDICATIONS  (STANDING):  apixaban 5 milliGRAM(s) Oral every 12 hours  dexAMETHasone     Tablet 6 milliGRAM(s) Oral daily  dextrose 5%. 1000 milliLiter(s) (50 mL/Hr) IV Continuous <Continuous>  dextrose 5%. 1000 milliLiter(s) (100 mL/Hr) IV Continuous <Continuous>  dextrose 50% Injectable 12.5 Gram(s) IV Push once  dextrose 50% Injectable 25 Gram(s) IV Push once  dextrose 50% Injectable 25 Gram(s) IV Push once  glucagon  Injectable 1 milliGRAM(s) IntraMuscular once  insulin lispro (ADMELOG) corrective regimen sliding scale   SubCutaneous Before meals and at bedtime  midodrine 30 milliGRAM(s) Oral every 8 hours  pantoprazole    Tablet 40 milliGRAM(s) Oral before breakfast  senna 2 Tablet(s) Oral at bedtime  torsemide 20 milliGRAM(s) Oral daily  Vyndamax (Tafamidis) 61 milliGRAM(s) 61 milliGRAM(s) Oral daily    MEDICATIONS  (PRN):  acetaminophen     Tablet .. 650 milliGRAM(s) Oral every 6 hours PRN Temp greater or equal to 38C (100.4F), Mild Pain (1 - 3)  dextrose Oral Gel 15 Gram(s) Oral once PRN Blood Glucose LESS THAN 70 milliGRAM(s)/deciliter  lactulose Syrup 10 Gram(s) Oral daily PRN Constipation  melatonin 3 milliGRAM(s) Oral at bedtime PRN Insomnia  ondansetron Injectable 4 milliGRAM(s) IV Push every 8 hours PRN Nausea and/or Vomiting      Admission Anthropometrics:  Height for BMI (CENTIMETERS)	180.3 Centimeter(s)  Weight for BMI (lbs)	154.1 lb  Weight for BMI (kg)	69.9 kg  Body Mass Index	21.5    Estimated energy needs:   Estimated Energy Needs From (carolyn/kg)	25   Estimated Energy Needs To (carolyn/kg)	30   Estimated Energy Needs Calculated From (carolyn/kg)	1747   Estimated Energy Needs Calculated To (carolyn/kg)	2097     Estimated Protein Needs From (g/kg)	1.2   Estimated Protein Needs To (g/kg)	1.3   Estimated Protein Needs Calculated From (g/kg)	83.88   Estimated Protein Needs Calculated To (g/kg)	90.87   Other Calculations	Estimated needs based on ABW; calorie/protein needs adjusted for repletion, covid19, clinical course; fluid per team    Subjective:   78yM, DNR/DNI, w/ PMHx ATTR Cardiac Amyloid, HFrEF (EF 30-35%) s/p CRT-P (2/2022), multiple HFrEF admissions, AFib s/p ablation (6/2016 on Eliquis), hx R sided Pleural Effusion s/p thoracentesis c/b PTX (2022), T2DM, idiopathic thrombocytopenia, prostate ca s/p chemotherapy p/w 2 weeks of weak, lethargy, SOB and decreased PO intake. Pt admitted to [facility] for presumed HFrEF exacerbation. Pt found to be COVID+, and GDMT held d/t hypotension and pt was stepped up to CCU for pressors.  Blood pressure is now stable s/p Midodrine 20 mg PO TID and Levophed gtt. Palliative discussion w/ family for hospice d/t end stage amylolysis, however, at this time, family decided COMFORT and await his recovery from COVID. Pt has been stepped down to cardiac telemetry for further management of HFrEF exacerbation. Plan for dc to [facility] at 12pm tomorrow 7/18/24.     Pt assessed at bedside. Resting in bed. Continues with poor-fair PO intake, variable depending on the day. Noted GOC conversation. Briefly reviewed nutrition goals. RD to follow.    Previous Nutrition Diagnosis:  Malnutrition... Severe protein energy malnutrition related to cardiac history and history of poor nutrition as evidenced by prolonged inadequate PO intake <75% EER, 19% weight loss, physical findings    Active [ X ]  Resolved [   ]    Goal: Consistently meet >75% EER    Recommendations:  1. Continue DASH/TLC, consistent carbohydrate diet  --Recommend add glucerna once/day  --Fluid restriction per team  2. Follow intake closely, adjust prn  3. Monitor electrolytes, adjust and replete prn  4. Pain and bowel regimen per team   5. RD diet edu prn    Education: Reviewed nutrition goals    Risk Level: High [ X ] Moderate [   ] Low [   ]

## 2024-07-17 NOTE — DISCHARGE NOTE PROVIDER - ATTENDING DISCHARGE PHYSICAL EXAMINATION:
pt in good spirits. euvolemic. AO x 3. overall slow and frail but able to participate in decision making.

## 2024-07-17 NOTE — DISCHARGE NOTE PROVIDER - NSDCFUSCHEDAPPT_GEN_ALL_CORE_FT
[facility]  HEARTVASC [address]  Scheduled Appointment: 08/07/2024    Fortino Jones  [facility]  HEARTVASC [address]  Scheduled Appointment: 08/08/2024    Tanja Friedman  [facility]  HEARTFAIL [address]  Scheduled Appointment: 09/26/2024

## 2024-07-17 NOTE — PROGRESS NOTE ADULT - PROBLEM SELECTOR PLAN 7
- DNR/DNI, MOLST in chart  - family acting collectively to assist patient with decision making  - Palliative following - DNR/DNI, MOLST in chart  - family acting collectively to assist patient with decision making. for now COMFORT is the plan as family would like to see how patient is recovering from acute covid infection overtime.   - Palliative following

## 2024-07-17 NOTE — PROGRESS NOTE ADULT - NUTRITIONAL ASSESSMENT
This patient has been assessed with a concern for Malnutrition and has been determined to have a diagnosis/diagnoses of Severe protein-calorie malnutrition.    This patient is being managed with:   Diet DASH/TLC-  Sodium & Cholesterol Restricted  Consistent Carbohydrate {No Snacks} (CSTCHO)  1500mL Fluid Restriction ([id number])  Entered: Jul 9 2024  4:08PM  
This patient has been assessed with a concern for Malnutrition and has been determined to have a diagnosis/diagnoses of Severe protein-calorie malnutrition.    This patient is being managed with:   Diet DASH/TLC-  Sodium & Cholesterol Restricted  Consistent Carbohydrate {No Snacks} (CSTCHO)  1500mL Fluid Restriction ([id number])  Entered: Jul 9 2024  4:08PM  
This patient has been assessed with a concern for Malnutrition and has been determined to have a diagnosis/diagnoses of Severe protein-calorie malnutrition.    This patient is being managed with:   Diet DASH/TLC-  Sodium & Cholesterol Restricted  Consistent Carbohydrate {No Snacks} (CSTCHO)  1500mL Fluid Restriction ([id number])  Entered: Jul 9 2024  4:08PM  
This patient has been assessed with a concern for Malnutrition and has been determined to have a diagnosis/diagnoses of Severe protein-calorie malnutrition.    This patient is being managed with:   Diet DASH/TLC-  Sodium & Cholesterol Restricted  Consistent Carbohydrate {No Snacks} (CSTCHO)  1500mL Fluid Restriction ([id number])  Entered: Jul 9 2024  4:08PM  
This patient has been assessed with a concern for Malnutrition and has been determined to have a diagnosis/diagnoses of Severe protein-calorie malnutrition.    This patient is being managed with:   Diet DASH/TLC-  Sodium & Cholesterol Restricted  Consistent Carbohydrate {No Snacks} (CSTCHO)  1500mL Fluid Restriction ([id number])  Entered: Jul 9 2024  4:08PM  
This patient has been assessed with a concern for Malnutrition and has been determined to have a diagnosis/diagnoses of Severe protein-calorie malnutrition.    This patient is being managed with:   Diet DASH/TLC-  Sodium & Cholesterol Restricted  Consistent Carbohydrate {No Snacks} (CSTCHO)  1500mL Fluid Restriction ([id number])  Entered: Jul 9 2024  4:08PM

## 2024-07-17 NOTE — PROGRESS NOTE ADULT - PROBLEM SELECTOR PROBLEM 4
Afib
Cardiac amyloidosis
Cardiac amyloidosis
Hypotension
Afib
Hypotension
Afib
Hypotension
Hypotension
Afib
Cardiac amyloidosis

## 2024-07-17 NOTE — DISCHARGE NOTE PROVIDER - NSDCMRMEDTOKEN_GEN_ALL_CORE_FT
apixaban 5 mg oral tablet: 1 tab(s) orally 2 times a day.  dapagliflozin 10 mg oral tablet: 1 tab(s) orally every 24 hours  Lexapro 5 mg oral tablet: 1 tab(s) orally once a day  metoprolol succinate 25 mg oral tablet, extended release: 0.5 tab(s) orally once a day   potassium chloride 20 mEq oral tablet, extended release: 1 tab(s) orally once a day  sacubitril-valsartan 24 mg-26 mg oral tablet: 1 tab(s) orally every 12 hours  tamsulosin 0.4 mg oral capsule: 1 cap(s) orally once a day  torsemide 10 mg oral tablet: 1 tab(s) orally once a day   Vyndamax 61 mg oral capsule: 1 cap(s) orally once a day   apixaban 5 mg oral tablet: 1 tab(s) orally 2 times a day.  midodrine 10 mg oral tablet: 3 tab(s) orally every 8 hours  pantoprazole 40 mg oral delayed release tablet: 1 tab(s) orally once a day (before a meal)  torsemide 20 mg oral tablet: 1 tab(s) orally once a day  Vyndamax 61 mg oral capsule: 1 cap(s) orally once a day

## 2024-07-17 NOTE — PROGRESS NOTE ADULT - SUBJECTIVE AND OBJECTIVE BOX
Cardiology PA Adult Progress Note    SUBJECTIVE ASSESSMENT:  	  MEDICATIONS:  midodrine 30 milliGRAM(s) Oral every 8 hours  torsemide 20 milliGRAM(s) Oral daily        acetaminophen     Tablet .. 650 milliGRAM(s) Oral every 6 hours PRN  melatonin 3 milliGRAM(s) Oral at bedtime PRN  ondansetron Injectable 4 milliGRAM(s) IV Push every 8 hours PRN    lactulose Syrup 10 Gram(s) Oral daily PRN  pantoprazole    Tablet 40 milliGRAM(s) Oral before breakfast  senna 2 Tablet(s) Oral at bedtime    dexAMETHasone     Tablet 6 milliGRAM(s) Oral daily  dextrose 50% Injectable 25 Gram(s) IV Push once  dextrose 50% Injectable 12.5 Gram(s) IV Push once  dextrose 50% Injectable 25 Gram(s) IV Push once  dextrose Oral Gel 15 Gram(s) Oral once PRN  glucagon  Injectable 1 milliGRAM(s) IntraMuscular once  insulin lispro (ADMELOG) corrective regimen sliding scale   SubCutaneous Before meals and at bedtime    apixaban 5 milliGRAM(s) Oral every 12 hours  dextrose 5%. 1000 milliLiter(s) IV Continuous <Continuous>  dextrose 5%. 1000 milliLiter(s) IV Continuous <Continuous>    	  VITAL SIGNS:  T(C): 36.2 (07-17-24 @ 05:57), Max: 36.6 (07-16-24 @ 16:00)  HR: 88 (07-17-24 @ 05:57) (80 - 88)  BP: 106/71 (07-17-24 @ 05:57) (88/61 - 120/70)  RR: 19 (07-17-24 @ 05:57) (17 - 19)  SpO2: 94% (07-17-24 @ 05:57) (93% - 95%)  Wt(kg): --    I&O's Summary    16 Jul 2024 07:01  -  17 Jul 2024 07:00  --------------------------------------------------------  IN: 860 mL / OUT: 2729 mL / NET: -1869 mL                                           PHYSICAL EXAM:  Appearance: Normal	  HEENT: Normal oral mucosa, PERRL, EOMI	  Neck: Supple, + JVD/ - JVD; ___ Carotid Bruit   Cardiovascular: Normal S1 S2, No murmurs  Respiratory: Lungs clear to auscultation/Decreased Breath Sounds/No Rales, Rhonchi, Wheezing	  Gastrointestinal:  Soft, Non-tender, + BS	  Skin: No rashes, No ecchymoses, No cyanosis  Extremities: Normal range of motion, No clubbing, cyanosis or edema  Vascular: Peripheral pulses palpable 2+ bilaterally  Neurologic: Non-focal  Psychiatry: A & O x 3, Mood & affect appropriate    LABS:	 	                                  15.5   6.10  )-----------( 277      ( 16 Jul 2024 08:57 )             43.5     07-16    142  |  106  |  43<H>  ----------------------------<  110<H>  4.4   |  25  |  1.00    Ca    8.8      16 Jul 2024 08:57  Mg     2.2     07-16    TPro  6.5  /  Alb  2.8<L>  /  TBili  1.1  /  DBili  x   /  AST  23  /  ALT  15  /  AlkPhos  97  07-16    proBNP:   Lipid Profile:   HgA1c:   TSH:    Cardiology PA Adult Progress Note    SUBJECTIVE ASSESSMENT:    Pleasantly demented, no acute distress  	  MEDICATIONS:  midodrine 30 milliGRAM(s) Oral every 8 hours  torsemide 20 milliGRAM(s) Oral daily  acetaminophen     Tablet .. 650 milliGRAM(s) Oral every 6 hours PRN  melatonin 3 milliGRAM(s) Oral at bedtime PRN  ondansetron Injectable 4 milliGRAM(s) IV Push every 8 hours PRN  lactulose Syrup 10 Gram(s) Oral daily PRN  pantoprazole    Tablet 40 milliGRAM(s) Oral before breakfast  senna 2 Tablet(s) Oral at bedtime  dexAMETHasone     Tablet 6 milliGRAM(s) Oral daily  dextrose 50% Injectable 25 Gram(s) IV Push once  dextrose 50% Injectable 12.5 Gram(s) IV Push once  dextrose 50% Injectable 25 Gram(s) IV Push once  dextrose Oral Gel 15 Gram(s) Oral once PRN  glucagon  Injectable 1 milliGRAM(s) IntraMuscular once  insulin lispro (ADMELOG) corrective regimen sliding scale   SubCutaneous Before meals and at bedtime  apixaban 5 milliGRAM(s) Oral every 12 hours  dextrose 5%. 1000 milliLiter(s) IV Continuous <Continuous>  dextrose 5%. 1000 milliLiter(s) IV Continuous <Continuous>    	  VITAL SIGNS:  T(C): 36.2 (07-17-24 @ 05:57), Max: 36.6 (07-16-24 @ 16:00)  HR: 88 (07-17-24 @ 05:57) (80 - 88)  BP: 106/71 (07-17-24 @ 05:57) (88/61 - 120/70)  RR: 19 (07-17-24 @ 05:57) (17 - 19)  SpO2: 94% (07-17-24 @ 05:57) (93% - 95%)  Wt(kg): --    I&O's Summary    16 Jul 2024 07:01  -  17 Jul 2024 07:00  --------------------------------------------------------  IN: 860 mL / OUT: 2729 mL / NET: -1869 mL                                           PHYSICAL EXAM:  Appearance: Normal	  HEENT: EOMI	  Neck: Supple  Cardiovascular: Normal S1 S2, No murmurs  Respiratory: Lungs clear to auscultation  Gastrointestinal:  Soft, Non-tender  Skin: No rashes, No ecchymoses, No cyanosis  Extremities: Normal range of motion  Neurologic: Non-focal  Psychiatry: A & O x 3, Mood & affect appropriate    LABS:	 	                                  15.5   6.10  )-----------( 277      ( 16 Jul 2024 08:57 )             43.5     07-16    142  |  106  |  43<H>  ----------------------------<  110<H>  4.4   |  25  |  1.00    Ca    8.8      16 Jul 2024 08:57  Mg     2.2     07-16    TPro  6.5  /  Alb  2.8<L>  /  TBili  1.1  /  DBili  x   /  AST  23  /  ALT  15  /  AlkPhos  97  07-16

## 2024-07-18 ENCOUNTER — TRANSCRIPTION ENCOUNTER (OUTPATIENT)
Age: 79
End: 2024-07-18

## 2024-07-18 VITALS
HEART RATE: 82 BPM | SYSTOLIC BLOOD PRESSURE: 102 MMHG | RESPIRATION RATE: 18 BRPM | DIASTOLIC BLOOD PRESSURE: 76 MMHG | TEMPERATURE: 98 F | OXYGEN SATURATION: 95 %

## 2024-07-18 LAB
ALBUMIN SERPL ELPH-MCNC: 2.7 G/DL — LOW (ref 3.3–5)
ALP SERPL-CCNC: 94 U/L — SIGNIFICANT CHANGE UP (ref 40–120)
ALT FLD-CCNC: SIGNIFICANT CHANGE UP (ref 10–45)
ANION GAP SERPL CALC-SCNC: 13 MMOL/L — SIGNIFICANT CHANGE UP (ref 5–17)
AST SERPL-CCNC: SIGNIFICANT CHANGE UP (ref 10–40)
BILIRUB SERPL-MCNC: 1.3 MG/DL — HIGH (ref 0.2–1.2)
BUN SERPL-MCNC: 37 MG/DL — HIGH (ref 7–23)
CALCIUM SERPL-MCNC: 8.6 MG/DL — SIGNIFICANT CHANGE UP (ref 8.4–10.5)
CHLORIDE SERPL-SCNC: 104 MMOL/L — SIGNIFICANT CHANGE UP (ref 96–108)
CO2 SERPL-SCNC: 26 MMOL/L — SIGNIFICANT CHANGE UP (ref 22–31)
CREAT SERPL-MCNC: 1.05 MG/DL — SIGNIFICANT CHANGE UP (ref 0.5–1.3)
EGFR: 73 ML/MIN/1.73M2 — SIGNIFICANT CHANGE UP
GLUCOSE BLDC GLUCOMTR-MCNC: 86 MG/DL — SIGNIFICANT CHANGE UP (ref 70–99)
GLUCOSE SERPL-MCNC: 81 MG/DL — SIGNIFICANT CHANGE UP (ref 70–99)
HCT VFR BLD CALC: 47.5 % — SIGNIFICANT CHANGE UP (ref 39–50)
HGB BLD-MCNC: 16.3 G/DL — SIGNIFICANT CHANGE UP (ref 13–17)
MAGNESIUM SERPL-MCNC: 2.4 MG/DL — SIGNIFICANT CHANGE UP (ref 1.6–2.6)
MCHC RBC-ENTMCNC: 30.5 PG — SIGNIFICANT CHANGE UP (ref 27–34)
MCHC RBC-ENTMCNC: 34.3 GM/DL — SIGNIFICANT CHANGE UP (ref 32–36)
MCV RBC AUTO: 88.8 FL — SIGNIFICANT CHANGE UP (ref 80–100)
NRBC # BLD: 0 /100 WBCS — SIGNIFICANT CHANGE UP (ref 0–0)
PLATELET # BLD AUTO: 212 K/UL — SIGNIFICANT CHANGE UP (ref 150–400)
POTASSIUM SERPL-MCNC: SIGNIFICANT CHANGE UP (ref 3.5–5.3)
POTASSIUM SERPL-SCNC: SIGNIFICANT CHANGE UP (ref 3.5–5.3)
PROT SERPL-MCNC: 6.6 G/DL — SIGNIFICANT CHANGE UP (ref 6–8.3)
RBC # BLD: 5.35 M/UL — SIGNIFICANT CHANGE UP (ref 4.2–5.8)
RBC # FLD: 16.1 % — HIGH (ref 10.3–14.5)
SODIUM SERPL-SCNC: 143 MMOL/L — SIGNIFICANT CHANGE UP (ref 135–145)
WBC # BLD: 4.51 K/UL — SIGNIFICANT CHANGE UP (ref 3.8–10.5)
WBC # FLD AUTO: 4.51 K/UL — SIGNIFICANT CHANGE UP (ref 3.8–10.5)

## 2024-07-18 PROCEDURE — 86900 BLOOD TYPING SEROLOGIC ABO: CPT

## 2024-07-18 PROCEDURE — 86901 BLOOD TYPING SEROLOGIC RH(D): CPT

## 2024-07-18 PROCEDURE — 97161 PT EVAL LOW COMPLEX 20 MIN: CPT

## 2024-07-18 PROCEDURE — 99239 HOSP IP/OBS DSCHRG MGMT >30: CPT

## 2024-07-18 PROCEDURE — 83735 ASSAY OF MAGNESIUM: CPT

## 2024-07-18 PROCEDURE — 85730 THROMBOPLASTIN TIME PARTIAL: CPT

## 2024-07-18 PROCEDURE — 82962 GLUCOSE BLOOD TEST: CPT

## 2024-07-18 PROCEDURE — 84484 ASSAY OF TROPONIN QUANT: CPT

## 2024-07-18 PROCEDURE — 86850 RBC ANTIBODY SCREEN: CPT

## 2024-07-18 PROCEDURE — 81003 URINALYSIS AUTO W/O SCOPE: CPT

## 2024-07-18 PROCEDURE — 84132 ASSAY OF SERUM POTASSIUM: CPT

## 2024-07-18 PROCEDURE — 93321 DOPPLER ECHO F-UP/LMTD STD: CPT

## 2024-07-18 PROCEDURE — 82553 CREATINE MB FRACTION: CPT

## 2024-07-18 PROCEDURE — 97530 THERAPEUTIC ACTIVITIES: CPT

## 2024-07-18 PROCEDURE — 93970 EXTREMITY STUDY: CPT

## 2024-07-18 PROCEDURE — 85025 COMPLETE CBC W/AUTO DIFF WBC: CPT

## 2024-07-18 PROCEDURE — 84295 ASSAY OF SERUM SODIUM: CPT

## 2024-07-18 PROCEDURE — 36415 COLL VENOUS BLD VENIPUNCTURE: CPT

## 2024-07-18 PROCEDURE — 82550 ASSAY OF CK (CPK): CPT

## 2024-07-18 PROCEDURE — 83880 ASSAY OF NATRIURETIC PEPTIDE: CPT

## 2024-07-18 PROCEDURE — 0225U NFCT DS DNA&RNA 21 SARSCOV2: CPT

## 2024-07-18 PROCEDURE — 85610 PROTHROMBIN TIME: CPT

## 2024-07-18 PROCEDURE — 80053 COMPREHEN METABOLIC PANEL: CPT

## 2024-07-18 PROCEDURE — 97110 THERAPEUTIC EXERCISES: CPT

## 2024-07-18 PROCEDURE — 85027 COMPLETE CBC AUTOMATED: CPT

## 2024-07-18 PROCEDURE — 84100 ASSAY OF PHOSPHORUS: CPT

## 2024-07-18 PROCEDURE — 71045 X-RAY EXAM CHEST 1 VIEW: CPT

## 2024-07-18 PROCEDURE — 87040 BLOOD CULTURE FOR BACTERIA: CPT

## 2024-07-18 PROCEDURE — 99285 EMERGENCY DEPT VISIT HI MDM: CPT

## 2024-07-18 PROCEDURE — C8929: CPT

## 2024-07-18 PROCEDURE — 83605 ASSAY OF LACTIC ACID: CPT

## 2024-07-18 PROCEDURE — 82330 ASSAY OF CALCIUM: CPT

## 2024-07-18 PROCEDURE — 82803 BLOOD GASES ANY COMBINATION: CPT

## 2024-07-18 PROCEDURE — 97116 GAIT TRAINING THERAPY: CPT

## 2024-07-18 PROCEDURE — 83036 HEMOGLOBIN GLYCOSYLATED A1C: CPT

## 2024-07-18 PROCEDURE — 84145 PROCALCITONIN (PCT): CPT

## 2024-07-18 PROCEDURE — 93005 ELECTROCARDIOGRAM TRACING: CPT

## 2024-07-18 RX ORDER — TORSEMIDE 100 MG/1
0.5 TABLET ORAL
Qty: 30 | Refills: 0 | DISCHARGE
Start: 2024-07-18 | End: 2024-08-16

## 2024-07-18 RX ORDER — PANTOPRAZOLE SODIUM 40 MG/10ML
1 INJECTION, POWDER, FOR SOLUTION INTRAVENOUS
Qty: 0 | Refills: 0 | DISCHARGE
Start: 2024-07-18

## 2024-07-18 RX ORDER — MIDODRINE HYDROCHLORIDE 2.5 MG/1
1 TABLET ORAL
Qty: 270 | Refills: 0 | DISCHARGE
Start: 2024-07-18 | End: 2024-08-16

## 2024-07-18 RX ORDER — TORSEMIDE 20 MG/1
1 TABLET ORAL
Qty: 30 | Refills: 0
Start: 2024-07-18 | End: 2024-08-16

## 2024-07-18 RX ORDER — MIDODRINE HYDROCHLORIDE 10 MG/1
3 TABLET ORAL
Qty: 270 | Refills: 0
Start: 2024-07-18 | End: 2024-08-16

## 2024-07-18 RX ADMIN — APIXABAN 5 MILLIGRAM(S): 5 TABLET, FILM COATED ORAL at 12:17

## 2024-07-18 RX ADMIN — TORSEMIDE 20 MILLIGRAM(S): 20 TABLET ORAL at 07:37

## 2024-07-18 RX ADMIN — MIDODRINE HYDROCHLORIDE 30 MILLIGRAM(S): 10 TABLET ORAL at 05:29

## 2024-07-18 RX ADMIN — APIXABAN 5 MILLIGRAM(S): 5 TABLET, FILM COATED ORAL at 00:36

## 2024-07-18 RX ADMIN — DEXAMETHASONE 6 MILLIGRAM(S): 1 TABLET ORAL at 05:29

## 2024-07-18 RX ADMIN — PANTOPRAZOLE SODIUM 40 MILLIGRAM(S): 40 INJECTION, POWDER, FOR SOLUTION INTRAVENOUS at 05:28

## 2024-07-18 NOTE — DISCHARGE NOTE NURSING/CASE MANAGEMENT/SOCIAL WORK - NSDCPEFALRISK_GEN_ALL_CORE
For information on Fall & Injury Prevention, visit: [email or web address] OR  [email or web address] OR  https://www.cdc.gov/steadi/patient.html

## 2024-07-18 NOTE — DISCHARGE NOTE NURSING/CASE MANAGEMENT/SOCIAL WORK - PATIENT PORTAL LINK FT
You can access the FollowMyHealth Patient Portal offered by [facility] by registering at the following website: [email or web address]. By joining [employer]’s FollowMyHealth portal, you will also be able to view your health information using other applications (apps) compatible with our system.

## 2024-07-19 DIAGNOSIS — U07.1 COVID-19: ICD-10-CM

## 2024-07-23 DIAGNOSIS — E43 UNSPECIFIED SEVERE PROTEIN-CALORIE MALNUTRITION: ICD-10-CM

## 2024-07-23 DIAGNOSIS — J12.82 PNEUMONIA DUE TO CORONAVIRUS DISEASE 2019: ICD-10-CM

## 2024-07-23 DIAGNOSIS — U07.1 COVID-19: ICD-10-CM

## 2024-07-23 DIAGNOSIS — Z66 DO NOT RESUSCITATE: ICD-10-CM

## 2024-07-23 DIAGNOSIS — J96.01 ACUTE RESPIRATORY FAILURE WITH HYPOXIA: ICD-10-CM

## 2024-07-23 DIAGNOSIS — R62.7 ADULT FAILURE TO THRIVE: ICD-10-CM

## 2024-07-23 DIAGNOSIS — I48.91 UNSPECIFIED ATRIAL FIBRILLATION: ICD-10-CM

## 2024-07-23 DIAGNOSIS — N17.9 ACUTE KIDNEY FAILURE, UNSPECIFIED: ICD-10-CM

## 2024-07-23 DIAGNOSIS — Z92.21 PERSONAL HISTORY OF ANTINEOPLASTIC CHEMOTHERAPY: ICD-10-CM

## 2024-07-23 DIAGNOSIS — R06.02 SHORTNESS OF BREATH: ICD-10-CM

## 2024-07-23 DIAGNOSIS — I95.9 HYPOTENSION, UNSPECIFIED: ICD-10-CM

## 2024-07-23 DIAGNOSIS — D47.3 ESSENTIAL (HEMORRHAGIC) THROMBOCYTHEMIA: ICD-10-CM

## 2024-07-23 DIAGNOSIS — R57.8 OTHER SHOCK: ICD-10-CM

## 2024-07-23 DIAGNOSIS — E11.22 TYPE 2 DIABETES MELLITUS WITH DIABETIC CHRONIC KIDNEY DISEASE: ICD-10-CM

## 2024-07-23 DIAGNOSIS — E85.4 ORGAN-LIMITED AMYLOIDOSIS: ICD-10-CM

## 2024-07-23 DIAGNOSIS — N18.31 CHRONIC KIDNEY DISEASE, STAGE 3A: ICD-10-CM

## 2024-07-23 DIAGNOSIS — Z79.01 LONG TERM (CURRENT) USE OF ANTICOAGULANTS: ICD-10-CM

## 2024-07-23 DIAGNOSIS — I27.20 PULMONARY HYPERTENSION, UNSPECIFIED: ICD-10-CM

## 2024-07-23 DIAGNOSIS — I43 CARDIOMYOPATHY IN DISEASES CLASSIFIED ELSEWHERE: ICD-10-CM

## 2024-07-23 DIAGNOSIS — R53.81 OTHER MALAISE: ICD-10-CM

## 2024-07-23 DIAGNOSIS — K59.00 CONSTIPATION, UNSPECIFIED: ICD-10-CM

## 2024-07-23 DIAGNOSIS — I50.23 ACUTE ON CHRONIC SYSTOLIC (CONGESTIVE) HEART FAILURE: ICD-10-CM

## 2024-07-23 DIAGNOSIS — Z85.46 PERSONAL HISTORY OF MALIGNANT NEOPLASM OF PROSTATE: ICD-10-CM

## 2024-07-23 DIAGNOSIS — I49.5 SICK SINUS SYNDROME: ICD-10-CM

## 2024-08-07 ENCOUNTER — APPOINTMENT (OUTPATIENT)
Dept: HEART AND VASCULAR | Facility: CLINIC | Age: 79
End: 2024-08-07

## 2024-08-08 ENCOUNTER — APPOINTMENT (OUTPATIENT)
Dept: HEART AND VASCULAR | Facility: CLINIC | Age: 79
End: 2024-08-08

## 2024-08-24 NOTE — REASON FOR VISIT
[FreeTextEntry1] : ======================================================================================= Diagnostic Tests: -------------------------------------------------------------- EC22: electronic pacemaker, atrial fibrillation, Bi-V paced.  21: atrial fibrillation, SVR, low voltage QRS limb leads, possible old septal and inferior MI.   -------------------------------------------------------------- EP procedures: 22: insertion of CRT-P -------------------------------------------------------------- Echo: 24: EF 35%, moderate-to-severe increased LV wall thickness, moderately reduced RV function, severely dilated LA/RA, small pericardial effusion.  22: EF 37%, severely increased LV wall thickness, mildly decreased RV function, dilated LA, dilated RA, mild MR, mild TR, PASP 40 mmHg, small pericardial effusion. 21: EF 35%, severely increased LV wall thickness, mildly decreased RV function, dilated LA, dilated RA, mild MR, mild TR, PASP 38 mmHg, small pericardial effusion. 21: EF 50%, severely increased LV wall thickness, normal RV function, dilated LA, dilated RA, mild MR, mild-to-moderate TR, PASP 36 mmHg, small pericardial effusion.

## 2024-08-24 NOTE — HISTORY OF PRESENT ILLNESS
[FreeTextEntry1] : Mr. 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 Neeraj MD, at [facility] with recurrence, s/p CRT-P (02/16/22), CKD III, DM2, idiopathic thrombocytopenia, chronic venous insufficiency, and prostate cancer s/p chemotherapy.  He has had multiple admissions for decompensated heart failure.  On a prior admission he underwent a bone marrow biopsy to work up thrombocytopenia which was negative for a plasma cell dyscrasia or AL-amyloidosis.  On 02/10/22 he had a TC-99 PYP scan which revealed intense LV and mild RV uptake consistent with ATTR-cm.  On 02/16/22, he underwent implantation of a CRT-P ([employer]) secondary to bradycardia, the need for chronic ventricular pacing, and severely reduced LV systolic function. His lower rate limit was set to 80 bpm to improve hemodynamics given his restrictive cardiomyopathy.  His primary cardiologist is Yara Anton MD.  We again had an extensive discussion about the pathophysiology, natural history, and treatment of ATTR-CM.  He was last seen by me in the office on 09/12/22.  In the interim, he has been intermittently following with the heart failure team.  His amyloidosis has progressed over this time.

## 2024-08-24 NOTE — ASSESSMENT
[FreeTextEntry1] : 1.  Atrial fibrillation, s/p AF ablation at [facility] 06/2016, with recurrence, now persistent:       - continue systemic anticoagulation with Eliquis 5mg po bid      - discussed with patient avoidance of ASA and NSAIDS as well as need for bleeding surveillance       - follow up with heart rhythm specialist, Jimi Pemberton MD  2. Chronic systolic heart failure, NICM, secondary to amyloidosis:      - beta blocker: continue metoprolol succinate ER 50mg po qd (consider discontinuing given poor tolerance of heart rate slowing agents in restrictive cardiomyopathy)     - ACEi / ARB / ARNI: continue Entresto 24/26mg po bid (consider discontinuing given poor tolerance of this class in restrictive cardiomyopathy)     - MRA: continue spirololactone 25mg po qd      - [id number]: continue Farxiga 10mg po qd (discussed importance of urinary hygiene to prevent perineal infections)     - loop diuretics: cotninue torsemdeide 20mg 0.5 tab po qd      - heart failure implanted cardiac devices: s/p CRT-P (02/16/22)     - will follow left ventricular function with periodic echocardiograms     - follow up with heart failure team   3. CKD IIIa: Cr 1.24, eGFR 60 (03/29/24):       - avoid nephrotoxic agents      - will consider sending to a nephrologist next visit (he is reluctant at this time)   4. DM2: A1c 6.2% (05/19/22)      - discussed with patient therapeutic lifestyle changes to improve glucose metabolism   5. Thrombocytopenia:plt 63 (11/21/22):  6. 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 03/28/24..................................................................6,317 11/01/23..................................................................7,343 09/01/23................................................................10,359 07/07/23.................................................................6,429 04/06/23.................................................................8.399 03/03/23...............................................................10,044 05/17/22................................................................8,010 03/31/22................................................................6,276 11/18/21...................................0.01.....................10,433.................................................. 05/18/22:....16..........................0.10......................8,010.................................................. 03/31/22................................................................6,276.................................................. 02/06/22...................................0.11.....................11,635.................................................. 11/19/21...................................0.12.....................12,051..................................................   Amyloidosis Treatment:   - TTR stabilizer: continue Vyndamx (tafamadis) 61mg po qd    - gene silencer therapy: will initiate [place] (eplontersen, antisense oligonucleotide) 45mg sc q month, instructed to take a MVI with sufficient vitamin A   - TTR depleter therapy: not yet available   - MRA: continue spironolactone 25mg po qd    - SGLT2i: continue Farxiga 10mg po qd (discussed importance of urinary hygiene to prevent perineal infections)     - avoid heart rate slowing agents given poor tolerance in restrictive cardiomyopathy, curently on metoprolol 50mg po qd    - avoid ACE / ARB / ARNi given poor tolerance in cardiac amyloidosis, currently on Entresto   - will follow with serial serum biomarkers (NT-proBNP, creatinine, prealbumin)   - will follow with serial echocardiograms with assessment of LC global longitudinal strain   I spent > 60 minutes of total time on this visit, including time spent face-to-face and non-face-to-face.  During this time, I took a relevant history and examined the patient.  I reviewed relevant portions of the medical record and formulated a differential diagnosis and plan.  I explained the relevant cardiac diagnoses to the patient, as well as the work up and management plan.  I answered all questions related to the patient's cardiac conditions.

## 2024-08-24 NOTE — PHYSICAL EXAM
[de-identified] : + device generator anterior left chest  [de-identified] : LCTA  but mildly  RLL

## 2024-08-27 ENCOUNTER — APPOINTMENT (OUTPATIENT)
Dept: HEART AND VASCULAR | Facility: CLINIC | Age: 79
End: 2024-08-27
Payer: MEDICARE

## 2024-08-30 PROBLEM — I50.9 ACUTE DECOMPENSATED HEART FAILURE: Status: RESOLVED | Noted: 2022-11-18 | Resolved: 2024-08-30

## 2024-09-03 ENCOUNTER — APPOINTMENT (OUTPATIENT)
Dept: HEART AND VASCULAR | Facility: CLINIC | Age: 79
End: 2024-09-03
Payer: MEDICARE

## 2024-09-03 VITALS
DIASTOLIC BLOOD PRESSURE: 75 MMHG | HEART RATE: 75 BPM | HEIGHT: 67 IN | TEMPERATURE: 98.2 F | OXYGEN SATURATION: 98 % | SYSTOLIC BLOOD PRESSURE: 96 MMHG

## 2024-09-03 DIAGNOSIS — I48.91 UNSPECIFIED ATRIAL FIBRILLATION: ICD-10-CM

## 2024-09-03 DIAGNOSIS — I50.22 CHRONIC SYSTOLIC (CONGESTIVE) HEART FAILURE: ICD-10-CM

## 2024-09-03 DIAGNOSIS — I10 ESSENTIAL (PRIMARY) HYPERTENSION: ICD-10-CM

## 2024-09-03 DIAGNOSIS — E85.1 NEUROPATHIC HEREDOFAMILIAL AMYLOIDOSIS: ICD-10-CM

## 2024-09-03 DIAGNOSIS — Z95.0 PRESENCE OF CARDIAC PACEMAKER: ICD-10-CM

## 2024-09-03 DIAGNOSIS — E85.4 ORGAN-LIMITED AMYLOIDOSIS: ICD-10-CM

## 2024-09-03 DIAGNOSIS — G63 NEUROPATHIC HEREDOFAMILIAL AMYLOIDOSIS: ICD-10-CM

## 2024-09-03 DIAGNOSIS — I50.9 HEART FAILURE, UNSPECIFIED: ICD-10-CM

## 2024-09-03 DIAGNOSIS — N18.30 CHRONIC KIDNEY DISEASE, STAGE 3 UNSPECIFIED: ICD-10-CM

## 2024-09-03 DIAGNOSIS — I42.9 CARDIOMYOPATHY, UNSPECIFIED: ICD-10-CM

## 2024-09-03 DIAGNOSIS — E11.9 TYPE 2 DIABETES MELLITUS W/OUT COMPLICATIONS: ICD-10-CM

## 2024-09-03 DIAGNOSIS — I43 ORGAN-LIMITED AMYLOIDOSIS: ICD-10-CM

## 2024-09-03 PROCEDURE — G2211 COMPLEX E/M VISIT ADD ON: CPT

## 2024-09-03 PROCEDURE — 99215 OFFICE O/P EST HI 40 MIN: CPT

## 2024-09-03 RX ORDER — TAMSULOSIN HYDROCHLORIDE 0.4 MG/1
0.4 CAPSULE ORAL
Qty: 90 | Refills: 3 | Status: ACTIVE | COMMUNITY
Start: 2024-09-03

## 2024-09-03 RX ORDER — DAPAGLIFLOZIN 10 MG/1
10 TABLET, FILM COATED ORAL DAILY
Qty: 90 | Refills: 3 | Status: ACTIVE | COMMUNITY
Start: 1900-01-01 | End: 1900-01-01

## 2024-09-03 RX ORDER — MIDODRINE HYDROCHLORIDE 10 MG/1
10 TABLET ORAL 3 TIMES DAILY
Refills: 0 | Status: ACTIVE | COMMUNITY
Start: 2024-09-03

## 2024-09-03 RX ORDER — SENNOSIDES 8.6 MG TABLETS 8.6 MG/1
8.6 TABLET ORAL
Refills: 0 | Status: ACTIVE | COMMUNITY
Start: 2024-09-03

## 2024-09-03 RX ORDER — OMEPRAZOLE 40 MG/1
40 CAPSULE, DELAYED RELEASE ORAL
Qty: 90 | Refills: 3 | Status: ACTIVE | COMMUNITY
Start: 2024-09-03

## 2024-09-03 RX ORDER — VUTRISIRAN 25 MG/.5ML
25 INJECTION SUBCUTANEOUS
Refills: 0 | Status: ACTIVE | COMMUNITY

## 2024-09-03 NOTE — ASSESSMENT
[FreeTextEntry1] : ======================================================================================= 1.  Atrial fibrillation, s/p AF ablation at [facility] 06/2016, with recurrence, now persistent:       - continue systemic anticoagulation with Eliquis 5mg po bid      - discussed with patient avoidance of ASA and NSAIDS as well as need for bleeding surveillance       - follow up with heart rhythm specialist, Jimi Pemberton MD  2. Chronic systolic heart failure, NICM, secondary to cardiac amyloidosis:      - beta blocker: continue off given poor tolerance of heart rate slowing agents in restrictive cardiomyopathy     - ACEi / ARB / ARNI: continue off given poor tolerance in restrictive cardiomyopathy     - MRA: continue off given hypotension      - [id number]: continue Farxiga 10mg po qd (discussed importance of urinary hygiene to prevent perineal infections)     - loop diuretics: increase torsemdeide from 20mg 0.5 tab po qd to 1 tab po qd      - heart failure implanted cardiac devices: s/p CRT-P (02/16/22)     - will follow left ventricular function with periodic echocardiograms     - follow up with heart failure team   3. CKD II: Cr 0.93, eGFR 85 (07/12/24)      - avoid nephrotoxic agents      - will consider sending to a nephrologist next visit (he is reluctant at this time)   4. Thrombocytopenia: plt 180 (07/12/24):       - will pursue conservative management  5. 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 07/12/24................................................................15,030 03/28/24..................................................................6,317 11/01/23..................................................................7,343 09/01/23................................................................10,359 07/07/23.................................................................6,429 04/06/23.................................................................8.399 03/03/23...............................................................10,044 05/17/22................................................................8,010 03/31/22................................................................6,276 11/18/21...................................0.01.....................10,433.................................................. 05/18/22:....16..........................0.10......................8,010.................................................. 03/31/22................................................................6,276.................................................. 02/06/22...................................0.11.....................11,635.................................................. 11/19/21...................................0.12.....................12,051..................................................   Amyloidosis Treatment:   - TTR stabilizer: continue Vyndamx (tafamadis) 61mg po qd    - gene silencer therapy: will consider initiating Amvuttra 25mg sc q 3 months, instructed to take a MVI with sufficient vitamin A   - TTR depleter therapy: not yet available   - MRA: continue off given hypotension    - SGLT2i: continue Farxiga 10mg po qd (discussed importance of urinary hygiene to prevent perineal infections)     - avoid heart rate slowing agents given poor tolerance in restrictive cardiomyopathy   - avoid ACE / ARB / ARNi given poor tolerance in cardiac amyloidosis   - will follow with serial serum biomarkers (NT-proBNP, creatinine, prealbumin)   - will follow with serial echocardiograms with assessment of LC global longitudinal strain   - reiterated the importance of cascade screening of first-degree relatives  6. End-of-Life care:    - DNR / DNI     I spent > 60 minutes of total time on this visit, including time spent face-to-face and non-face-to-face.  During this time, I took a relevant history and examined the patient.  I reviewed relevant portions of the medical record and formulated a differential diagnosis and plan.  I explained the relevant cardiac diagnoses to the patient, as well as the work up and management plan.  I answered all questions related to the patient's cardiac conditions.

## 2024-09-03 NOTE — HISTORY OF PRESENT ILLNESS
[FreeTextEntry1] : Mr. 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 Neeraj MD, at [facility] with recurrence, s/p CRT-P (02/16/22), CKD III, DM2, idiopathic thrombocytopenia, chronic venous insufficiency, and prostate cancer s/p chemotherapy.  He has had multiple admissions for decompensated heart failure.  On a prior admission he underwent a bone marrow biopsy to work up thrombocytopenia which was negative for a plasma cell dyscrasia or AL-amyloidosis.  On 02/10/22 he had a TC-99 PYP scan which revealed intense LV and mild RV uptake consistent with ATTR-cm.  On 02/16/22, he underwent implantation of a CRT-P ([employer]) secondary to bradycardia, the need for chronic ventricular pacing, and severely reduced LV systolic function. His lower rate limit was set to 80 bpm to improve hemodynamics given his restrictive cardiomyopathy.  His primary cardiologist is Yara Anton MD.  We again had an extensive discussion about the pathophysiology, natural history, and treatment of ATTR-CM.  He was last seen by me in the office on 09/12/22.  In the interim, he has been intermittently following with the heart failure team.  His amyloidosis has progressed over this time. He was admitted to [facility] in July 2024 with COVID pneumonia resulting in an acute on chronic heart failure exacerbation.  He met with the palliative care team and agreed to DNR/DNI but not to home hospice at this time.  At present, he has mild-to-moderate lower extremity edema and is at his baseline MARTINEZ.

## 2024-09-03 NOTE — PHYSICAL EXAM
[de-identified] : + device generator anterior left chest  [de-identified] : LCTA  but mildly  RLL [de-identified] : uses a wheelchair

## 2024-09-03 NOTE — ASSESSMENT
[FreeTextEntry1] : ======================================================================================= 1.  Atrial fibrillation, s/p AF ablation at [facility] 06/2016, with recurrence, now persistent:       - continue systemic anticoagulation with Eliquis 5mg po bid      - discussed with patient avoidance of ASA and NSAIDS as well as need for bleeding surveillance       - follow up with heart rhythm specialist, Jimi Pemberton MD  2. Chronic systolic heart failure, NICM, secondary to cardiac amyloidosis:      - beta blocker: continue off given poor tolerance of heart rate slowing agents in restrictive cardiomyopathy     - ACEi / ARB / ARNI: continue off given poor tolerance in restrictive cardiomyopathy     - MRA: continue off given hypotension      - [id number]: continue Farxiga 10mg po qd (discussed importance of urinary hygiene to prevent perineal infections)     - loop diuretics: increase torsemdeide from 20mg 0.5 tab po qd to 1 tab po qd      - heart failure implanted cardiac devices: s/p CRT-P (02/16/22)     - will follow left ventricular function with periodic echocardiograms     - follow up with heart failure team   3. CKD II: Cr 0.93, eGFR 85 (07/12/24)      - avoid nephrotoxic agents      - will consider sending to a nephrologist next visit (he is reluctant at this time)   4. Thrombocytopenia: plt 180 (07/12/24):       - will pursue conservative management  5. 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 07/12/24................................................................15,030 03/28/24..................................................................6,317 11/01/23..................................................................7,343 09/01/23................................................................10,359 07/07/23.................................................................6,429 04/06/23.................................................................8.399 03/03/23...............................................................10,044 05/17/22................................................................8,010 03/31/22................................................................6,276 11/18/21...................................0.01.....................10,433.................................................. 05/18/22:....16..........................0.10......................8,010.................................................. 03/31/22................................................................6,276.................................................. 02/06/22...................................0.11.....................11,635.................................................. 11/19/21...................................0.12.....................12,051..................................................   Amyloidosis Treatment:   - TTR stabilizer: continue Vyndamx (tafamadis) 61mg po qd    - gene silencer therapy: will consider initiating Amvuttra 25mg sc q 3 months, instructed to take a MVI with sufficient vitamin A   - TTR depleter therapy: not yet available   - MRA: continue off given hypotension    - SGLT2i: continue Farxiga 10mg po qd (discussed importance of urinary hygiene to prevent perineal infections)     - avoid heart rate slowing agents given poor tolerance in restrictive cardiomyopathy   - avoid ACE / ARB / ARNi given poor tolerance in cardiac amyloidosis   - will follow with serial serum biomarkers (NT-proBNP, creatinine, prealbumin)   - will follow with serial echocardiograms with assessment of LC global longitudinal strain   - reiterated the importance of cascade screening of first-degree relatives  6. End-of-Life care:    - DNR / DNI     I spent > 60 minutes of total time on this visit, including time spent face-to-face and non-face-to-face.  During this time, I took a relevant history and examined the patient.  I reviewed relevant portions of the medical record and formulated a differential diagnosis and plan.  I explained the relevant cardiac diagnoses to the patient, as well as the work up and management plan.  I answered all questions related to the patient's cardiac conditions.

## 2024-09-03 NOTE — HISTORY OF PRESENT ILLNESS
[FreeTextEntry1] : Mr. 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 Neeraj MD, at [facility] with recurrence, s/p CRT-P (02/16/22), CKD III, DM2, idiopathic thrombocytopenia, chronic venous insufficiency, and prostate cancer s/p chemotherapy.  He has had multiple admissions for decompensated heart failure.  On a prior admission he underwent a bone marrow biopsy to work up thrombocytopenia which was negative for a plasma cell dyscrasia or AL-amyloidosis.  On 02/10/22 he had a TC-99 PYP scan which revealed intense LV and mild RV uptake consistent with ATTR-cm.  On 02/16/22, he underwent implantation of a CRT-P ([employer]) secondary to bradycardia, the need for chronic ventricular pacing, and severely reduced LV systolic function. His lower rate limit was set to 80 bpm to improve hemodynamics given his restrictive cardiomyopathy.  His primary cardiologist is Yara Anton MD.  We again had an extensive discussion about the pathophysiology, natural history, and treatment of ATTR-CM.  He was last seen by me in the office on 09/12/22.  In the interim, he has been intermittently following with the heart failure team.  His amyloidosis has progressed over this time. He was admitted to [facility] in July 2024 with COVID pneumonia resulting in an acute on chronic heart failure exacerbation.  He met with the palliative care team and agreed to DNR/DNI but not to home hospice at this time.  At present, he has mild-to-moderate lower extremity edema and is at his baseline MARTINEZ.

## 2024-09-03 NOTE — PHYSICAL EXAM
[de-identified] : + device generator anterior left chest  [de-identified] : LCTA  but mildly  RLL [de-identified] : uses a wheelchair

## 2024-09-03 NOTE — REASON FOR VISIT
[FreeTextEntry1] : ======================================================================================= Diagnostic Tests: -------------------------------------------------------------- EC24: electronic pacemaker, atrial fibrillation, Bi-V paced.  22: electronic pacemaker, atrial fibrillation, Bi-V paced.  21: atrial fibrillation, SVR, low voltage QRS limb leads, possible old septal and inferior MI.   -------------------------------------------------------------- EP procedures: 22: insertion of CRT-P -------------------------------------------------------------- Echo: 24: EF 35%, moderate-to-severe increased LV wall thickness, moderately reduced RV function, severely dilated LA/RA, small pericardial effusion.  22: EF 37%, severely increased LV wall thickness, mildly decreased RV function, dilated LA, dilated RA, mild MR, mild TR, PASP 40 mmHg, small pericardial effusion. 21: EF 35%, severely increased LV wall thickness, mildly decreased RV function, dilated LA, dilated RA, mild MR, mild TR, PASP 38 mmHg, small pericardial effusion. 21: EF 50%, severely increased LV wall thickness, normal RV function, dilated LA, dilated RA, mild MR, mild-to-moderate TR, PASP 36 mmHg, small pericardial effusion.

## 2024-09-11 ENCOUNTER — APPOINTMENT (OUTPATIENT)
Dept: HEART AND VASCULAR | Facility: CLINIC | Age: 79
End: 2024-09-11

## 2024-09-26 ENCOUNTER — NON-APPOINTMENT (OUTPATIENT)
Age: 79
End: 2024-09-26

## 2024-09-26 NOTE — HISTORY OF PRESENT ILLNESS
[FreeTextEntry1] : This is a 79-year-old man (DNR/DNI elected on 7/12/2024) with ATTR cardiac amyloid/ HFrEF w/ LVEF (now 30-35% on GDMT, from 20-25%, from 37%), AF/AFl s/p ablation 6/2016 (on Eliquis) s/p CRT-P (2/16/2022), CKD Stage 3 (b/l Cr 1.3-1.5), DMT2 (Ha1c 6.4% 2/2022), idiopathic thrombocytopenia (unrevealing bone marrow biopsy 11/2021), and prostate Ca s/p chemotherapy. He presents today for routine follow up for his cardiomyopathy.   ---- ----------------  He did well, for a period of time taking his medications regularly with the help of his family and returned to [facility] in 2024 without any issues. TTE in 5/2024 demonstrating an improved LVEF 30-35%.   In the interval admitted to [facility] 7/8-7/18/2024 for Covid infection c/b lung infiltrates presenting with failure to thrive. Now s/p decadron/remdesivir.  Hospital course marked by hypotension requiring midodrine. HF was consulted for management and recommending cessation of RAASI given advanced amyloid indefinitely. Recommended with resumption of farxiga 10 mg daily and torsemide 20 mg daily. During admission TTE demonstrated stable LVEF of 30-35% with marked LVH. Palliative was consulted and patient elected DNR/DNI status. He was discharged to [facility] off all GDMT given persistent hypotension requiring standing midodrine 10 mg TID.  On diuretic regimen of torsemide 20 mg daily (bed weights only). Decision to continue Vyndamax daily.   Seen by Dr. Finkelstein earlier this month with marginal blood pressures precluded resumptions of RAAS , presumed to be volume overloaded and torsemide was increased to 20 mg daily. Unclear if he is taking farxiga 10 mg daily __    He notes an improved appetite and no interim hospitalizations. He spent about 4 months in [facility] without issue. He now reports tingling in both feet that is increasingly bothersome. He never established care with Neurology but is followed by Dr. Bradley for his cardiac amyloid and remains on Vyndamax.  He is not physically active and uses a cane to ambulate. He had declined PT and cardiac rehab in the past, but now is willing to participate with his son's support. He manages his own ADLs and gets around at home without issue. He sleeps on 3-pillows without true symptoms of orthopnea (more for body comfort). He denies CP, SOB at rest, PND, abdominal discomfort, palpitations, and syncope. His appetite is much better now, and his bowel and bladder habits are his usual, including some urinary incontinence. He has a CRT-P. He has been limiting fluid and sodium in his diet and is now taking his medications as directed. He does not use NSAIDs.

## 2024-09-26 NOTE — PHYSICAL EXAM
[de-identified] : no perioral cyanosis, MMM [de-identified] : JVP 8 cm of H2O, no HJR [de-identified] : ambulates with cane for distance [de-identified] : warm and dry

## 2024-09-26 NOTE — DISCUSSION/SUMMARY
[FreeTextEntry2] : and his son [FreeTextEntry1] : 78 year old man with transthyretin cardiac amyloidosis, chronic systolic & diastolic HF, his LVEF has improved with medication adherence and by most recent TTE in 2024 now (30-35%, from 20-25%), s/p CRT-P (2022) and AFib/Fl s/p ablation 2016 (on Eliquis).  He is ACC/AHA Stage C with NYHA Class III HF, and euvolemic on exam. I have recommended the followin. Chronic systolic & diastolic HF - euvolemic and well compensated. Prevoiusly tolerating Entresto 24/26 mg BID, Farxiga 10 mg daily, spironolactone 25 mg daily, Toprol XL 50 mg daily. GDMT was stopped during his last hospital admission due to hypotension requiring midodrine. Maintaining euvolemia on torsemide on torsemide 20 mg daily. His HR is protected by CRT-P but he declines further BB escalation (c/o fatigue) and BP cannot tolerate more ARNI.  Labs at discharge 2024: Na 143, K*hemolyzed (prior K 4.0), Cl 104, CO2 26, BUN 37, Cr 1.05, proBNP on admission () 15K   2. Cardiac amyloidosis - TTR amyloid confirmed by Tc-PYP scan and genetics. Encouraged patient to follow routinely with Dr. Bradley as he was started on Vyndemax in 2022. 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 Blevins to arrange for Neurology referral. Potentially can be considered for Onpattro if amyloid neuropathy (rather than DM neuropathy)?   3. Atrial fibrillation/flutter - S/p ablation in . Following with Dr. Pemberton, EP. Continue BB as above and AC with Eliquis BID.   4. CKD Stage IIIa - Cr b/l 1.3-1.5. Continue with SGLT2i for cardiac and renal protection. Avoid NSAIDs. Recommended referral to Nephrology, but he remains hesitant. Labs today.  5. [facility]- Elected DNR/DNI status during 2024 admission. MOLST completed on 2024 with election of trial of pressors and BIPAP if indicated.

## 2024-09-26 NOTE — CARDIOLOGY SUMMARY
[de-identified] : 03/28/24 ECG: V-paced at 81 bpm. No sig change from 11/18/22. 11/18/22 ECG: V-paced at 81 bpm, no sig change from prior 03/17/22 ECG : electronic pacemaker, atrial fibrillation, Bi-V paced.  [de-identified] : 7/12/2024 TTE: LVIDd 4.5 cm, LVH (SW 1.75 cm, PW 1,74 cm), LVEF 30-35% w/gloabl HK, RV normal in size w/ mod reduced systolic function , severe HYACINTH, no sig valvular disease, IVC is normal, small pericardial effusion   5/2/2024 TTE: LVIDd 5 cm, RNCE32-62% (SW 1.2 cm, PW 1.3 cm), LVEF 30-35% w/ global HK, grade III DD w/ ground-glass tissue c/w amyloid heart, RV noted with TAPSE 1 cm, severe LAE, mod HOWARD, mod MR, mild AR, mod TR, no evidence of PH, small pericardial effusion  11/21/22 TTE: LVIDd 4.53 cm, LVEF 20-25% (SW 1.65 cm, PW 1.57 cm) w/ global HK, RV normal size w. reduced RVSF, TAPSE 14.20 mm, RVH present, HYACINTH, mild to mod MR, mild to mod TR, mild PH , PASP 42 mmHg, IVC dilated, small pericardial effusion  02/08/22 TTE: LVIDd 4.7 cm, LVEF 37%, LV hypertrophy (septum 1.6, PW 1.7), analysis of DD challenging due to AF, normal RV size with mildly reduced function, HYACINTH, mild MR, mild TR, est PASP 40 mmHg, small pericardial effusion, ascites present  06/29/21 TTE: LVIDd 4.4 cm, LVEF 50%, severe concentric LVH (septum 1.7, PW 1.8), normal RV size and function, HYACINTH, trace MR, mild-mod TR, est PASP 36 mmHg, small pericardial effusion [de-identified] : \par  02/10/22 TcPYP: Intense uptake in LV and part of the right ventricular myocardium in a pattern consistent with TTR-mediated amyloidosis.  [de-identified] : \par  02/24/22: insertion of CRT-P

## 2024-10-09 ENCOUNTER — APPOINTMENT (OUTPATIENT)
Dept: HEART AND VASCULAR | Facility: CLINIC | Age: 79
End: 2024-10-09

## 2024-10-10 VITALS
WEIGHT: 149.03 LBS | OXYGEN SATURATION: 99 % | SYSTOLIC BLOOD PRESSURE: 114 MMHG | HEIGHT: 69 IN | RESPIRATION RATE: 18 BRPM | DIASTOLIC BLOOD PRESSURE: 89 MMHG | HEART RATE: 81 BPM | TEMPERATURE: 97 F

## 2024-10-10 LAB
ALBUMIN SERPL ELPH-MCNC: 3.8 G/DL — SIGNIFICANT CHANGE UP (ref 3.3–5)
ALP SERPL-CCNC: 139 U/L — HIGH (ref 40–120)
ALT FLD-CCNC: 14 U/L — SIGNIFICANT CHANGE UP (ref 10–45)
ANION GAP SERPL CALC-SCNC: 12 MMOL/L — SIGNIFICANT CHANGE UP (ref 5–17)
APTT BLD: 58.8 SEC — HIGH (ref 24.5–35.6)
AST SERPL-CCNC: 18 U/L — SIGNIFICANT CHANGE UP (ref 10–40)
BILIRUB SERPL-MCNC: 1.4 MG/DL — HIGH (ref 0.2–1.2)
BUN SERPL-MCNC: 28 MG/DL — HIGH (ref 7–23)
CALCIUM SERPL-MCNC: 8.9 MG/DL — SIGNIFICANT CHANGE UP (ref 8.4–10.5)
CHLORIDE SERPL-SCNC: 102 MMOL/L — SIGNIFICANT CHANGE UP (ref 96–108)
CO2 SERPL-SCNC: 27 MMOL/L — SIGNIFICANT CHANGE UP (ref 22–31)
CREAT SERPL-MCNC: 1.62 MG/DL — HIGH (ref 0.5–1.3)
EGFR: 43 ML/MIN/1.73M2 — LOW
GLUCOSE SERPL-MCNC: 120 MG/DL — HIGH (ref 70–99)
INR BLD: 1.97 — HIGH (ref 0.85–1.16)
NT-PROBNP SERPL-SCNC: HIGH PG/ML (ref 0–300)
POTASSIUM SERPL-MCNC: 3.6 MMOL/L — SIGNIFICANT CHANGE UP (ref 3.5–5.3)
POTASSIUM SERPL-SCNC: 3.6 MMOL/L — SIGNIFICANT CHANGE UP (ref 3.5–5.3)
PROT SERPL-MCNC: 7 G/DL — SIGNIFICANT CHANGE UP (ref 6–8.3)
PROTHROM AB SERPL-ACNC: 22.5 SEC — HIGH (ref 9.9–13.4)
SODIUM SERPL-SCNC: 141 MMOL/L — SIGNIFICANT CHANGE UP (ref 135–145)
TROPONIN T, HIGH SENSITIVITY RESULT: 167 NG/L — CRITICAL HIGH (ref 0–51)

## 2024-10-10 PROCEDURE — 99285 EMERGENCY DEPT VISIT HI MDM: CPT | Mod: 25

## 2024-10-10 NOTE — ED ADULT TRIAGE NOTE - INTERNATIONAL TRAVEL
Social Work Assessment  [facility]     Patient Name: Kingsley Hunt  MRN: [id number]  Today's Date: 4/10/2023    Admit Date: 4/8/2023     Demographic Summary     Row Name 04/10/23 2750       General Information    Reason for Consult health care directive    General Information Comments SW was consulted re: ACP. EMMA met with pt and her 2 dtrs in room 4103 to discuss further. It was clarified that pt wants the form, but doesn't wish to execute any advanced directives today. EMMA provided pt with an ACP packet; however, her dtr had questions about how general the document was worded. SW directed them to pt's PCP to discuss completing a POST form. They thanked this writer and deny further needs at this time.              TRINIDAD Azul, [facility]  Medical Social Worker  Ph [phone]  Fax [phone]  [email or web address]     No

## 2024-10-10 NOTE — ED ADULT TRIAGE NOTE - CHIEF COMPLAINT QUOTE
Brought in by daughter for B/l legs & arms swelling & SOB since Friday. Pt recently discharged from rehab. O2 dependent at home

## 2024-10-10 NOTE — ED ADULT NURSE NOTE - OBJECTIVE STATEMENT
As per pts wife, "he got back from rehab on Friday and has been agitated, c/o sob and his legs are swollen."

## 2024-10-11 ENCOUNTER — NON-APPOINTMENT (OUTPATIENT)
Age: 79
End: 2024-10-11

## 2024-10-11 ENCOUNTER — RESULT REVIEW (OUTPATIENT)
Age: 79
End: 2024-10-11

## 2024-10-11 ENCOUNTER — APPOINTMENT (OUTPATIENT)
Dept: HEART AND VASCULAR | Facility: CLINIC | Age: 79
End: 2024-10-11

## 2024-10-11 ENCOUNTER — INPATIENT (INPATIENT)
Facility: HOSPITAL | Age: 79
LOS: 10 days | Discharge: HOSPICE | End: 2024-10-22
Attending: INTERNAL MEDICINE | Admitting: STUDENT IN AN ORGANIZED HEALTH CARE EDUCATION/TRAINING PROGRAM
Payer: MEDICARE

## 2024-10-11 DIAGNOSIS — E11.9 TYPE 2 DIABETES MELLITUS WITHOUT COMPLICATIONS: ICD-10-CM

## 2024-10-11 DIAGNOSIS — D69.6 THROMBOCYTOPENIA, UNSPECIFIED: ICD-10-CM

## 2024-10-11 DIAGNOSIS — R79.89 OTHER SPECIFIED ABNORMAL FINDINGS OF BLOOD CHEMISTRY: ICD-10-CM

## 2024-10-11 DIAGNOSIS — I50.23 ACUTE ON CHRONIC SYSTOLIC (CONGESTIVE) HEART FAILURE: ICD-10-CM

## 2024-10-11 DIAGNOSIS — I48.91 UNSPECIFIED ATRIAL FIBRILLATION: ICD-10-CM

## 2024-10-11 DIAGNOSIS — I27.20 PULMONARY HYPERTENSION, UNSPECIFIED: ICD-10-CM

## 2024-10-11 DIAGNOSIS — E85.4 ORGAN-LIMITED AMYLOIDOSIS: ICD-10-CM

## 2024-10-11 DIAGNOSIS — I48.92 UNSPECIFIED ATRIAL FLUTTER: Chronic | ICD-10-CM

## 2024-10-11 DIAGNOSIS — N18.30 CHRONIC KIDNEY DISEASE, STAGE 3 UNSPECIFIED: ICD-10-CM

## 2024-10-11 DIAGNOSIS — Z90.79 ACQUIRED ABSENCE OF OTHER GENITAL ORGAN(S): Chronic | ICD-10-CM

## 2024-10-11 DIAGNOSIS — Z98.890 OTHER SPECIFIED POSTPROCEDURAL STATES: Chronic | ICD-10-CM

## 2024-10-11 DIAGNOSIS — N40.0 BENIGN PROSTATIC HYPERPLASIA WITHOUT LOWER URINARY TRACT SYMPTOMS: ICD-10-CM

## 2024-10-11 LAB
A1C WITH ESTIMATED AVERAGE GLUCOSE RESULT: 5.7 % — HIGH (ref 4–5.6)
ADD ON TEST-SPECIMEN IN LAB: SIGNIFICANT CHANGE UP
ALBUMIN SERPL ELPH-MCNC: 3.3 G/DL — SIGNIFICANT CHANGE UP (ref 3.3–5)
ALBUMIN SERPL ELPH-MCNC: 3.6 G/DL — SIGNIFICANT CHANGE UP (ref 3.3–5)
ALP SERPL-CCNC: 128 U/L — HIGH (ref 40–120)
ALP SERPL-CCNC: 132 U/L — HIGH (ref 40–120)
ALT FLD-CCNC: 13 U/L — SIGNIFICANT CHANGE UP (ref 10–45)
ALT FLD-CCNC: 15 U/L — SIGNIFICANT CHANGE UP (ref 10–45)
ANION GAP SERPL CALC-SCNC: 10 MMOL/L — SIGNIFICANT CHANGE UP (ref 5–17)
ANION GAP SERPL CALC-SCNC: 11 MMOL/L — SIGNIFICANT CHANGE UP (ref 5–17)
ANISOCYTOSIS BLD QL: SLIGHT — SIGNIFICANT CHANGE UP
APPEARANCE UR: ABNORMAL
APPEARANCE UR: CLEAR — SIGNIFICANT CHANGE UP
AST SERPL-CCNC: 15 U/L — SIGNIFICANT CHANGE UP (ref 10–40)
AST SERPL-CCNC: 17 U/L — SIGNIFICANT CHANGE UP (ref 10–40)
BACTERIA # UR AUTO: ABNORMAL /HPF
BASOPHILS # BLD AUTO: 0 K/UL — SIGNIFICANT CHANGE UP (ref 0–0.2)
BASOPHILS NFR BLD AUTO: 0 % — SIGNIFICANT CHANGE UP (ref 0–2)
BILIRUB DIRECT SERPL-MCNC: 0.6 MG/DL — HIGH (ref 0–0.3)
BILIRUB INDIRECT FLD-MCNC: 0.6 MG/DL — SIGNIFICANT CHANGE UP (ref 0.2–1)
BILIRUB SERPL-MCNC: 1.2 MG/DL — SIGNIFICANT CHANGE UP (ref 0.2–1.2)
BILIRUB SERPL-MCNC: 1.5 MG/DL — HIGH (ref 0.2–1.2)
BILIRUB UR-MCNC: NEGATIVE — SIGNIFICANT CHANGE UP
BILIRUB UR-MCNC: NEGATIVE — SIGNIFICANT CHANGE UP
BUN SERPL-MCNC: 29 MG/DL — HIGH (ref 7–23)
BUN SERPL-MCNC: 30 MG/DL — HIGH (ref 7–23)
BURR CELLS BLD QL SMEAR: PRESENT — SIGNIFICANT CHANGE UP
CALCIUM SERPL-MCNC: 8.7 MG/DL — SIGNIFICANT CHANGE UP (ref 8.4–10.5)
CALCIUM SERPL-MCNC: 8.9 MG/DL — SIGNIFICANT CHANGE UP (ref 8.4–10.5)
CAST: 11 /LPF — HIGH (ref 0–4)
CHLORIDE SERPL-SCNC: 105 MMOL/L — SIGNIFICANT CHANGE UP (ref 96–108)
CHLORIDE SERPL-SCNC: 105 MMOL/L — SIGNIFICANT CHANGE UP (ref 96–108)
CHOLEST SERPL-MCNC: 136 MG/DL — SIGNIFICANT CHANGE UP
CK MB CFR SERPL CALC: 2.4 NG/ML — SIGNIFICANT CHANGE UP (ref 0–6.7)
CK MB CFR SERPL CALC: 2.5 NG/ML — SIGNIFICANT CHANGE UP (ref 0–6.7)
CK MB CFR SERPL CALC: 2.9 NG/ML — SIGNIFICANT CHANGE UP (ref 0–6.7)
CK MB CFR SERPL CALC: 3.9 NG/ML — SIGNIFICANT CHANGE UP (ref 0–6.7)
CK SERPL-CCNC: 53 U/L — SIGNIFICANT CHANGE UP (ref 30–200)
CK SERPL-CCNC: 56 U/L — SIGNIFICANT CHANGE UP (ref 30–200)
CK SERPL-CCNC: 60 U/L — SIGNIFICANT CHANGE UP (ref 30–200)
CK SERPL-CCNC: 84 U/L — SIGNIFICANT CHANGE UP (ref 30–200)
CO2 SERPL-SCNC: 26 MMOL/L — SIGNIFICANT CHANGE UP (ref 22–31)
CO2 SERPL-SCNC: 28 MMOL/L — SIGNIFICANT CHANGE UP (ref 22–31)
COLOR SPEC: SIGNIFICANT CHANGE UP
COLOR SPEC: SIGNIFICANT CHANGE UP
CREAT SERPL-MCNC: 1.51 MG/DL — HIGH (ref 0.5–1.3)
CREAT SERPL-MCNC: 1.59 MG/DL — HIGH (ref 0.5–1.3)
DIFF PNL FLD: ABNORMAL
DIFF PNL FLD: ABNORMAL
EGFR: 44 ML/MIN/1.73M2 — LOW
EGFR: 47 ML/MIN/1.73M2 — LOW
EOSINOPHIL # BLD AUTO: 0.06 K/UL — SIGNIFICANT CHANGE UP (ref 0–0.5)
EOSINOPHIL NFR BLD AUTO: 2.4 % — SIGNIFICANT CHANGE UP (ref 0–6)
ESTIMATED AVERAGE GLUCOSE: 117 MG/DL — HIGH (ref 68–114)
FLUAV AG NPH QL: SIGNIFICANT CHANGE UP
FLUBV AG NPH QL: SIGNIFICANT CHANGE UP
GIANT PLATELETS BLD QL SMEAR: PRESENT — SIGNIFICANT CHANGE UP
GLUCOSE SERPL-MCNC: 119 MG/DL — HIGH (ref 70–99)
GLUCOSE SERPL-MCNC: 95 MG/DL — SIGNIFICANT CHANGE UP (ref 70–99)
GLUCOSE UR QL: NEGATIVE MG/DL — SIGNIFICANT CHANGE UP
GLUCOSE UR QL: NEGATIVE MG/DL — SIGNIFICANT CHANGE UP
HCT VFR BLD CALC: 39.6 % — SIGNIFICANT CHANGE UP (ref 39–50)
HCT VFR BLD CALC: 44.9 % — SIGNIFICANT CHANGE UP (ref 39–50)
HDLC SERPL-MCNC: 37 MG/DL — LOW
HGB BLD-MCNC: 13.1 G/DL — SIGNIFICANT CHANGE UP (ref 13–17)
HGB BLD-MCNC: 14.4 G/DL — SIGNIFICANT CHANGE UP (ref 13–17)
HYALINE CASTS # UR AUTO: PRESENT
KETONES UR-MCNC: NEGATIVE MG/DL — SIGNIFICANT CHANGE UP
KETONES UR-MCNC: NEGATIVE MG/DL — SIGNIFICANT CHANGE UP
LACTATE SERPL-SCNC: 1.9 MMOL/L — SIGNIFICANT CHANGE UP (ref 0.5–2)
LACTATE SERPL-SCNC: 2.6 MMOL/L — HIGH (ref 0.5–2)
LACTATE SERPL-SCNC: 2.6 MMOL/L — HIGH (ref 0.5–2)
LEUKOCYTE ESTERASE UR-ACNC: NEGATIVE — SIGNIFICANT CHANGE UP
LEUKOCYTE ESTERASE UR-ACNC: NEGATIVE — SIGNIFICANT CHANGE UP
LIPID PNL WITH DIRECT LDL SERPL: 84 MG/DL — SIGNIFICANT CHANGE UP
LYMPHOCYTES # BLD AUTO: 1.03 K/UL — SIGNIFICANT CHANGE UP (ref 1–3.3)
LYMPHOCYTES # BLD AUTO: 39.5 % — SIGNIFICANT CHANGE UP (ref 13–44)
MACROCYTES BLD QL: SLIGHT — SIGNIFICANT CHANGE UP
MAGNESIUM SERPL-MCNC: 2.2 MG/DL — SIGNIFICANT CHANGE UP (ref 1.6–2.6)
MAGNESIUM SERPL-MCNC: 2.3 MG/DL — SIGNIFICANT CHANGE UP (ref 1.6–2.6)
MAGNESIUM SERPL-MCNC: 2.3 MG/DL — SIGNIFICANT CHANGE UP (ref 1.6–2.6)
MANUAL SMEAR VERIFICATION: SIGNIFICANT CHANGE UP
MCHC RBC-ENTMCNC: 28.7 PG — SIGNIFICANT CHANGE UP (ref 27–34)
MCHC RBC-ENTMCNC: 29.9 PG — SIGNIFICANT CHANGE UP (ref 27–34)
MCHC RBC-ENTMCNC: 32.1 GM/DL — SIGNIFICANT CHANGE UP (ref 32–36)
MCHC RBC-ENTMCNC: 33.1 GM/DL — SIGNIFICANT CHANGE UP (ref 32–36)
MCV RBC AUTO: 89.6 FL — SIGNIFICANT CHANGE UP (ref 80–100)
MCV RBC AUTO: 90.4 FL — SIGNIFICANT CHANGE UP (ref 80–100)
MICROCYTES BLD QL: SLIGHT — SIGNIFICANT CHANGE UP
MONOCYTES # BLD AUTO: 0.23 K/UL — SIGNIFICANT CHANGE UP (ref 0–0.9)
MONOCYTES NFR BLD AUTO: 8.9 % — SIGNIFICANT CHANGE UP (ref 2–14)
NEUTROPHILS # BLD AUTO: 1.28 K/UL — LOW (ref 1.8–7.4)
NEUTROPHILS NFR BLD AUTO: 49.2 % — SIGNIFICANT CHANGE UP (ref 43–77)
NITRITE UR-MCNC: NEGATIVE — SIGNIFICANT CHANGE UP
NITRITE UR-MCNC: NEGATIVE — SIGNIFICANT CHANGE UP
NON HDL CHOLESTEROL: 99 MG/DL — SIGNIFICANT CHANGE UP
NRBC # BLD: 0 /100 WBCS — SIGNIFICANT CHANGE UP (ref 0–0)
OVALOCYTES BLD QL SMEAR: SLIGHT — SIGNIFICANT CHANGE UP
PH UR: 5.5 — SIGNIFICANT CHANGE UP (ref 5–8)
PH UR: 5.5 — SIGNIFICANT CHANGE UP (ref 5–8)
PLAT MORPH BLD: ABNORMAL
PLATELET # BLD AUTO: 80 K/UL — LOW (ref 150–400)
PLATELET # BLD AUTO: 81 K/UL — LOW (ref 150–400)
POIKILOCYTOSIS BLD QL AUTO: SLIGHT — SIGNIFICANT CHANGE UP
POLYCHROMASIA BLD QL SMEAR: SLIGHT — SIGNIFICANT CHANGE UP
POTASSIUM SERPL-MCNC: 3.6 MMOL/L — SIGNIFICANT CHANGE UP (ref 3.5–5.3)
POTASSIUM SERPL-MCNC: 4.5 MMOL/L — SIGNIFICANT CHANGE UP (ref 3.5–5.3)
POTASSIUM SERPL-SCNC: 3.6 MMOL/L — SIGNIFICANT CHANGE UP (ref 3.5–5.3)
POTASSIUM SERPL-SCNC: 4.5 MMOL/L — SIGNIFICANT CHANGE UP (ref 3.5–5.3)
PROT SERPL-MCNC: 6.4 G/DL — SIGNIFICANT CHANGE UP (ref 6–8.3)
PROT SERPL-MCNC: 6.8 G/DL — SIGNIFICANT CHANGE UP (ref 6–8.3)
PROT UR-MCNC: SIGNIFICANT CHANGE UP MG/DL
PROT UR-MCNC: SIGNIFICANT CHANGE UP MG/DL
RBC # BLD: 4.38 M/UL — SIGNIFICANT CHANGE UP (ref 4.2–5.8)
RBC # BLD: 5.01 M/UL — SIGNIFICANT CHANGE UP (ref 4.2–5.8)
RBC # FLD: 15.9 % — HIGH (ref 10.3–14.5)
RBC # FLD: 16 % — HIGH (ref 10.3–14.5)
RBC BLD AUTO: ABNORMAL
RBC CASTS # UR COMP ASSIST: 71 /HPF — HIGH (ref 0–4)
RSV RNA NPH QL NAA+NON-PROBE: SIGNIFICANT CHANGE UP
SARS-COV-2 RNA SPEC QL NAA+PROBE: SIGNIFICANT CHANGE UP
SMUDGE CELLS # BLD: PRESENT — SIGNIFICANT CHANGE UP
SODIUM SERPL-SCNC: 142 MMOL/L — SIGNIFICANT CHANGE UP (ref 135–145)
SODIUM SERPL-SCNC: 143 MMOL/L — SIGNIFICANT CHANGE UP (ref 135–145)
SP GR SPEC: 1.01 — SIGNIFICANT CHANGE UP (ref 1–1.03)
SP GR SPEC: 1.01 — SIGNIFICANT CHANGE UP (ref 1–1.03)
SQUAMOUS # UR AUTO: 1 /HPF — SIGNIFICANT CHANGE UP (ref 0–5)
TRIGL SERPL-MCNC: 73 MG/DL — SIGNIFICANT CHANGE UP
TROPONIN T, HIGH SENSITIVITY RESULT: 138 NG/L — CRITICAL HIGH (ref 0–51)
TROPONIN T, HIGH SENSITIVITY RESULT: 150 NG/L — CRITICAL HIGH (ref 0–51)
TROPONIN T, HIGH SENSITIVITY RESULT: 150 NG/L — CRITICAL HIGH (ref 0–51)
TROPONIN T, HIGH SENSITIVITY RESULT: 159 NG/L — CRITICAL HIGH (ref 0–51)
UROBILINOGEN FLD QL: 1 MG/DL — SIGNIFICANT CHANGE UP (ref 0.2–1)
UROBILINOGEN FLD QL: 1 MG/DL — SIGNIFICANT CHANGE UP (ref 0.2–1)
WBC # BLD: 2.61 K/UL — LOW (ref 3.8–10.5)
WBC # BLD: 2.66 K/UL — LOW (ref 3.8–10.5)
WBC # FLD AUTO: 2.61 K/UL — LOW (ref 3.8–10.5)
WBC # FLD AUTO: 2.66 K/UL — LOW (ref 3.8–10.5)
WBC UR QL: 4 /HPF — SIGNIFICANT CHANGE UP (ref 0–5)

## 2024-10-11 PROCEDURE — 99223 1ST HOSP IP/OBS HIGH 75: CPT | Mod: GC

## 2024-10-11 PROCEDURE — 93306 TTE W/DOPPLER COMPLETE: CPT | Mod: 26

## 2024-10-11 PROCEDURE — 71045 X-RAY EXAM CHEST 1 VIEW: CPT | Mod: 26

## 2024-10-11 PROCEDURE — 99223 1ST HOSP IP/OBS HIGH 75: CPT

## 2024-10-11 PROCEDURE — 93971 EXTREMITY STUDY: CPT | Mod: 26,LT

## 2024-10-11 RX ORDER — FUROSEMIDE 40 MG
80 TABLET ORAL ONCE
Refills: 0 | Status: COMPLETED | OUTPATIENT
Start: 2024-10-11 | End: 2024-10-11

## 2024-10-11 RX ORDER — TAMSULOSIN HCL 0.4 MG
1 CAPSULE ORAL
Refills: 0 | DISCHARGE

## 2024-10-11 RX ORDER — PANTOPRAZOLE SODIUM 40 MG/1
40 TABLET, DELAYED RELEASE ORAL
Refills: 0 | Status: DISCONTINUED | OUTPATIENT
Start: 2024-10-11 | End: 2024-10-22

## 2024-10-11 RX ORDER — APIXABAN 5 MG/1
5 TABLET, FILM COATED ORAL EVERY 12 HOURS
Refills: 0 | Status: DISCONTINUED | OUTPATIENT
Start: 2024-10-11 | End: 2024-10-22

## 2024-10-11 RX ORDER — TAMSULOSIN HCL 0.4 MG
0.4 CAPSULE ORAL AT BEDTIME
Refills: 0 | Status: DISCONTINUED | OUTPATIENT
Start: 2024-10-11 | End: 2024-10-22

## 2024-10-11 RX ORDER — POTASSIUM CHLORIDE 10 MEQ
40 TABLET, EXTENDED RELEASE ORAL EVERY 4 HOURS
Refills: 0 | Status: COMPLETED | OUTPATIENT
Start: 2024-10-11 | End: 2024-10-11

## 2024-10-11 RX ORDER — MIDODRINE HYDROCHLORIDE 2.5 MG/1
10 TABLET ORAL EVERY 8 HOURS
Refills: 0 | Status: DISCONTINUED | OUTPATIENT
Start: 2024-10-11 | End: 2024-10-22

## 2024-10-11 RX ADMIN — MIDODRINE HYDROCHLORIDE 10 MILLIGRAM(S): 2.5 TABLET ORAL at 06:07

## 2024-10-11 RX ADMIN — Medication 40 MILLIEQUIVALENT(S): at 07:17

## 2024-10-11 RX ADMIN — Medication 80 MILLIGRAM(S): at 01:12

## 2024-10-11 RX ADMIN — Medication 0.4 MILLIGRAM(S): at 21:18

## 2024-10-11 RX ADMIN — Medication 10 MG/HR: at 21:18

## 2024-10-11 RX ADMIN — MIDODRINE HYDROCHLORIDE 10 MILLIGRAM(S): 2.5 TABLET ORAL at 13:49

## 2024-10-11 RX ADMIN — MIDODRINE HYDROCHLORIDE 10 MILLIGRAM(S): 2.5 TABLET ORAL at 21:18

## 2024-10-11 RX ADMIN — Medication 1 MILLIGRAM(S): at 09:29

## 2024-10-11 RX ADMIN — Medication 2 MILLIGRAM(S): at 18:38

## 2024-10-11 RX ADMIN — Medication 40 MILLIEQUIVALENT(S): at 03:23

## 2024-10-11 RX ADMIN — PANTOPRAZOLE SODIUM 40 MILLIGRAM(S): 40 TABLET, DELAYED RELEASE ORAL at 07:17

## 2024-10-11 RX ADMIN — Medication 2 MILLIGRAM(S): at 20:24

## 2024-10-11 RX ADMIN — APIXABAN 5 MILLIGRAM(S): 5 TABLET, FILM COATED ORAL at 18:39

## 2024-10-11 RX ADMIN — APIXABAN 5 MILLIGRAM(S): 5 TABLET, FILM COATED ORAL at 06:07

## 2024-10-11 NOTE — H&P ADULT - PROBLEM SELECTOR PLAN 8
continue HOME MED: Tamsulosin 0.4mg PO qhs.      N: DASH with consistent carb and 1.5L fluid restriction  E: Maintain K>4.0 and Mg>2.0  VTE PPx: on Eliquis  Code: DNR/DNI

## 2024-10-11 NOTE — PATIENT PROFILE ADULT - FALL HARM RISK - RISK INTERVENTIONS

## 2024-10-11 NOTE — H&P ADULT - PROBLEM SELECTOR PLAN 7
continue HOME MED: Tamsulosin 0.4mg PO qhs.      N: DASH with consistent carb and 1.5L fluid restriction  E: Maintain K>4.0 and Mg>2.0  VTE PPx: on Eliquis  Code: DNR/DNI HgbAIC 5.8 in 7/2024  --continue FS's and ICS PRN

## 2024-10-11 NOTE — ED PROVIDER NOTE - CLINICAL SUMMARY MEDICAL DECISION MAKING FREE TEXT BOX
Most likely acute CHF exacerbation, non-compliant with lasix. Low suspicion for pneumonia, ACS, pneumothorax, PE, tamponade, or aortic dissection based on clinical history and physical exam.    Plan: Labs including BNP, Trop, VBG; CXR & EKG, diuresis PRN, Bipap PRN    Patient is stable at this time but still symptomatic. Would benefit from admission to Cardiology for further monitoring and improvement of medication regimen to control symptoms and prevent future exacerbations. Most likely acute CHF exacerbation, non-compliant with lasix. Low suspicion for pneumonia, ACS, pneumothorax, PE, tamponade, or aortic dissection based on clinical history and physical exam.    Doppler US ordered for LUE swelling.    Plan: Labs including BNP, Trop, VBG; CXR & EKG, diuresis PRN, Bipap PRN    Patient is stable at this time but still symptomatic. Would benefit from admission to Cardiology for further monitoring and improvement of medication regimen to control symptoms and prevent future exacerbations.

## 2024-10-11 NOTE — CONSULT NOTE ADULT - ASSESSMENT
79 yr old M, DNR/DNI, with PMHx of advanced TTR amyloid, NICM w/ HFrEF (35%) s/p CRT-P, Group II PH, Afib s/p ablation (on Eliquis), borderline DM, Stage III CKD, idiopathic thrombocytopenia, prostate CA s/p chemo, pituitary adenoma (s/p transphenoidal treatment in 1990's), right sided pleural effusion s/p thoracentesis c/b PTX (2022), admitted for decompensated HF in the setting of FTT and medication noncompliance. HF consulted for further management.    Review of Studies:      Recommendations:    #Acute on Chronic Systolic Heart Failure  - ACC/AHA stage _   - Etiology:  - GDMT:         - ACE/ARB/ARNI:        - BB:        - MRA:        - SGLT2:  - Diuretics:  - Devices:  - Advanced therapies:  - strict I/O's  - daily weights        Recommendations above are preliminary pending discussion with an attending cardiologist    Keren Guerrero   Cardiovascular Disease Fellow         79 yr old M, DNR/DNI, with PMHx of advanced TTR cardiac amyloid, NICM w/ HFrEF (35%) s/p CRT-P, Group II PH, Afib s/p ablation (on Eliquis), borderline DM, Stage III CKD, idiopathic thrombocytopenia, prostate CA s/p chemo, pituitary adenoma (s/p transphenoidal treatment in 1990's), right sided pleural effusion s/p thoracentesis c/b PTX (2022), admitted for decompensated HF in the setting of FTT and medication noncompliance. HF consulted for further management.    Review of Studies:  07/12/24: EF 35%, moderate-to-severe increased LV wall thickness, moderately reduced RV function, severely   dilated LA/RA, small pericardial effusion  02/28/22: EF 37%, severely increased LV wall thickness, mildly decreased RV function, dilated LA, dilated RA,   mild MR, mild TR, PASP 40 mmHg, small pericardial effusion.    Recommendations:    #TTR amyloidosis   #Acute on Chronic Systolic Heart Failure  - ACC/AHA stage C-D  - Pt is severely overloaded, cold and wet on exam  - Etiology: amyloidosis  - GDMT:         - ACE/ARB/ARNI: hold off due to restrictive CM        - BB: hold off due to restrictive CM        - MRA: unable to tolerate 2/2 hypotension        - SGLT2: was on Farxiga 10mg qD outpt, will plan to resume this admission  - Diuretics: increase diuretics to Bumex 4mg IV BID  - Devices: s/p CRT-P  - strict I/O's  - daily weights  - pt would benefit from palliative care consult given advanced, end-stage CM and recent FTT    #AFib s/p ablation  - c/w ELiquis 5mg BID    #KAYLI on CKD  - bl Cr 0.9 per outpt cardiologist in 9/2024  - Cr up to 1.5 today      Recommendations above are preliminary pending discussion with an attending cardiologist    Keren Guerrero   Cardiovascular Disease Fellow         79 yr old M, DNR/DNI, with PMHx of advanced TTR cardiac amyloid, NICM w/ HFrEF (35%) s/p CRT-P, Group II PH, Afib s/p ablation (on Eliquis), borderline DM, Stage III CKD, idiopathic thrombocytopenia, prostate CA s/p chemo, pituitary adenoma (s/p transphenoidal treatment in 1990's), right sided pleural effusion s/p thoracentesis c/b PTX (2022), admitted for decompensated HF in the setting of FTT and medication noncompliance. HF consulted for further management.    Review of Studies:  07/12/24: EF 35%, moderate-to-severe increased LV wall thickness, moderately reduced RV function, severely   dilated LA/RA, small pericardial effusion  02/28/22: EF 37%, severely increased LV wall thickness, mildly decreased RV function, dilated LA, dilated RA,   mild MR, mild TR, PASP 40 mmHg, small pericardial effusion.    Recommendations:    #TTR amyloidosis   #Acute on Chronic Systolic Heart Failure  - ACC/AHA stage C-D  - Pt is severely overloaded, cold and wet on exam  - Etiology: amyloidosis  - GDMT:         - ACE/ARB/ARNI: hold off due to restrictive CM        - BB: hold off due to restrictive CM        - MRA: unable to tolerate 2/2 hypotension        - SGLT2: was on Farxiga 10mg qD outpt, will plan to resume this admission  - Diuretics: increase diuretics to Bumex 4mg IV BID (goal net -2-3L/day)  - Devices: s/p CRT-P  - strict I/O's  - daily weights  - pt would benefit from palliative care consult given advanced, end-stage CM and recent FTT, would qualify for hospice  - trend daily lactate/LFTs - if worsening lactate pt may need low dose inotrope ( 2.5mcg/kg/min) on tele (no need to transfer to CCU)    #AFib s/p ablation  - c/w ELiquis 5mg BID    #KAYLI on CKD  - bl Cr 0.9 per outpt cardiologist in 9/2024  - Cr up to 1.5 today      Recommendations above are preliminary pending discussion with an attending cardiologist    Keren Guerrero   Cardiovascular Disease Fellow

## 2024-10-11 NOTE — CONSULT NOTE ADULT - SUBJECTIVE AND OBJECTIVE BOX
HPI:  79 yr old M, DNR/DNI, with PMHx of advanced TTR amyloid, NICM w/ HFrEF (35%) s/p CRT-P, Group II PH, Afib s/p ablation (on Eliquis), borderline DM, Stage III CKD, idiopathic thrombocytopenia, prostate CA s/p chemo, pituitary adenoma (s/p transphenoidal treatment in 1990's), right sided pleural effusion s/p thoracentesis c/b PTX (2022), recent admission to [facility] 7/7-7/18/24 with AHRF 2/2 COVID PNA and HFrEF exacerbation s/p diuresis c/b hypotension requiring brief vasopressor support. Patient now returns to [facility] ED 10/10/24 with progressively worsening SOB and bilateral LE edema x few days. Patient states he cannot walk >10-15 feet prior to becoming winded. Per patients daughter, patient has been noncompliant with his Torsemide since discharge from [facility] ~1 week ago. Patient denies any N/V, diaphoresis, palpitations, dizziness, chest pain, syncope, recent travel or sick contacts.     In ED, BP: 114/89, HR: 80s, RR:18, Temp: 97.4F, O2 sat: 99% on 2L NC. EKG paced, no ischemic changes noted. CXR with pulmonary vascular congestion and right sided pleural effusion. Labs notable for: Platelets 81k, K 3.6, BUN/Cr 28/1.62, Alk phos 139, Trop T  167, BNP 96528 (15,030 in 7/2024), Trop T  167. COVID/Flu/RSV negative. Patient treated with Lasix 80mg IV x 1 dose and Kdur 40mEq PO x 2 doses in ED. Patient admitted to cardiac telemetry for management of acute on chronic HFrEF in setting of medication noncompliance.    Pt seen and examined at bedside, NAD but with significant LE edema.    ROS: Per HPI    PAST MEDICAL & SURGICAL HISTORY:  Atrial flutter  s/p Ablation 2016      Chronic venous insufficiency of lower extremity      Prostate CA      Stage 3 chronic kidney disease  1.2-1.2 baseline   On admission 1.2      Type 2 diabetes mellitus  not on medication      Thrombocytopenia  60-70's baseline      Acute on chronic systolic congestive heart failure      Chronic atrial fibrillation      Atrial flutter  s/p ablation in 2016      History of surgical removal of pituitary gland  1990s      S/P TURP  for BPH        SOCIAL HISTORY:  FAMILY HISTORY:    ALLERGIES: 	  No Known Allergies          MEDICATIONS:  apixaban 5 milliGRAM(s) Oral every 12 hours  buMETAnide IVPB 4 milliGRAM(s) IV Intermittent <User Schedule>  midodrine 10 milliGRAM(s) Oral every 8 hours  pantoprazole    Tablet 40 milliGRAM(s) Oral before breakfast  tamsulosin 0.4 milliGRAM(s) Oral at bedtime      T(C): 36.3 (10-11-24 @ 12:16), Max: 36.6 (10-11-24 @ 08:31)  HR: 81 (10-11-24 @ 13:41) (79 - 82)  BP: 104/82 (10-11-24 @ 13:41) (97/76 - 114/89)  RR: 18 (10-11-24 @ 13:41) (16 - 20)  SpO2: 100% (10-11-24 @ 13:41) (96% - 100%)    10-10-24 @ 07:01  -  10-11-24 @ 07:00  --------------------------------------------------------  IN: 0 mL / OUT: 500 mL / NET: -500 mL    10-11-24 @ 07:01  -  10-11-24 @ 15:15  --------------------------------------------------------  IN: 180 mL / OUT: 250 mL / NET: -70 mL        PHYSICAL EXAM:    Constitutional: resting in bed; NAD  HEENT: NC/AT,  MMM, +JVD   Respiratory: poor inspiratory effort  Cardiac: +S1/S2; RRR; no M/R/G  Gastrointestinal: soft  Extremities: significant 3+ b/l LE pitting edema up to mid-thighs; LE cold on exam  Neurologic: no gross focal deficits        I&O's Summary    10 Oct 2024 07:01  -  11 Oct 2024 07:00  --------------------------------------------------------  IN: 0 mL / OUT: 500 mL / NET: -500 mL    11 Oct 2024 07:01  -  11 Oct 2024 15:15  --------------------------------------------------------  IN: 180 mL / OUT: 250 mL / NET: -70 mL      Height (cm): 175.3 (10-11 @ 03:35)  Weight (kg): 67.6 (10-11 @ 03:35)  BMI (kg/m2): 22 (10-11 @ 03:35)  BSA (m2): 1.82 (10-11 @ 03:35)  LABS:	 	                        13.1   2.66  )-----------( 80       ( 11 Oct 2024 05:30 )             39.6     10-11    142  |  105  |  29[H]  ----------------------------<  119[H]  3.6   |  26  |  1.51[H]    Ca    8.7      11 Oct 2024 05:30  Mg     2.2     10-11    TPro  6.4  /  Alb  3.3  /  TBili  1.2  /  DBili  0.6[H]  /  AST  15  /  ALT  13  /  AlkPhos  128[H]  10-11    CARDIAC MARKERS ( 11 Oct 2024 12:00 )  x     / x     / x     / x     / 2.5 ng/mL  CARDIAC MARKERS ( 11 Oct 2024 05:30 )  x     / x     / x     / x     / 2.9 ng/mL  CARDIAC MARKERS ( 10 Oct 2024 23:20 )  x     / x     / x     / x     / 3.9 ng/mL     HPI:  79 yr old M, DNR/DNI, with PMHx of advanced TTR amyloid, NICM w/ HFrEF (35%) s/p CRT-P, Group II PH, Afib s/p ablation (on Eliquis), borderline DM, Stage III CKD, idiopathic thrombocytopenia, prostate CA s/p chemo, pituitary adenoma (s/p transphenoidal treatment in 1990's), right sided pleural effusion s/p thoracentesis c/b PTX (2022), recent admission to [facility] 7/7-7/18/24 with AHRF 2/2 COVID PNA and HFrEF exacerbation s/p diuresis c/b hypotension requiring brief vasopressor support. Patient now returns to [facility] ED 10/10/24 with progressively worsening SOB and bilateral LE edema x few days. Patient states he cannot walk >10-15 feet prior to becoming winded. Per patients daughter, patient has been noncompliant with his Torsemide since discharge from [facility] ~1 week ago. Patient denies any N/V, diaphoresis, palpitations, dizziness, chest pain, syncope, recent travel or sick contacts.     In ED, BP: 114/89, HR: 80s, RR:18, Temp: 97.4F, O2 sat: 99% on 2L NC. EKG paced, no ischemic changes noted. CXR with pulmonary vascular congestion and right sided pleural effusion. Labs notable for: Platelets 81k, K 3.6, BUN/Cr 28/1.62, Alk phos 139, Trop T  167, BNP 80851 (15,030 in 7/2024), Trop T 167. COVID/Flu/RSV negative. Patient treated with Lasix 80mg IV x 1 dose and Kdur 40mEq PO x 2 doses in ED. Patient admitted to cardiac telemetry for management of acute on chronic HFrEF in setting of medication noncompliance.    Pt seen and examined at bedside, NAD but with significant LE edema.    ROS: Per HPI    PAST MEDICAL & SURGICAL HISTORY:  Atrial flutter  s/p Ablation 2016      Chronic venous insufficiency of lower extremity      Prostate CA      Stage 3 chronic kidney disease  1.2-1.2 baseline   On admission 1.2      Type 2 diabetes mellitus  not on medication      Thrombocytopenia  60-70's baseline      Acute on chronic systolic congestive heart failure      Chronic atrial fibrillation      Atrial flutter  s/p ablation in 2016      History of surgical removal of pituitary gland  1990s      S/P TURP  for BPH        SOCIAL HISTORY:  FAMILY HISTORY:    ALLERGIES: 	  No Known Allergies          MEDICATIONS:  apixaban 5 milliGRAM(s) Oral every 12 hours  buMETAnide IVPB 4 milliGRAM(s) IV Intermittent <User Schedule>  midodrine 10 milliGRAM(s) Oral every 8 hours  pantoprazole    Tablet 40 milliGRAM(s) Oral before breakfast  tamsulosin 0.4 milliGRAM(s) Oral at bedtime      T(C): 36.3 (10-11-24 @ 12:16), Max: 36.6 (10-11-24 @ 08:31)  HR: 81 (10-11-24 @ 13:41) (79 - 82)  BP: 104/82 (10-11-24 @ 13:41) (97/76 - 114/89)  RR: 18 (10-11-24 @ 13:41) (16 - 20)  SpO2: 100% (10-11-24 @ 13:41) (96% - 100%)    10-10-24 @ 07:01  -  10-11-24 @ 07:00  --------------------------------------------------------  IN: 0 mL / OUT: 500 mL / NET: -500 mL    10-11-24 @ 07:01  -  10-11-24 @ 15:15  --------------------------------------------------------  IN: 180 mL / OUT: 250 mL / NET: -70 mL        PHYSICAL EXAM:    Constitutional: resting in bed; NAD  HEENT: NC/AT,  MMM, +JVD   Respiratory: poor inspiratory effort  Cardiac: +S1/S2; RRR; no M/R/G  Gastrointestinal: soft  Extremities: significant 3+ b/l LE pitting edema up to mid-thighs; LE cold on exam  Neurologic: no gross focal deficits        I&O's Summary    10 Oct 2024 07:01  -  11 Oct 2024 07:00  --------------------------------------------------------  IN: 0 mL / OUT: 500 mL / NET: -500 mL    11 Oct 2024 07:01  -  11 Oct 2024 15:15  --------------------------------------------------------  IN: 180 mL / OUT: 250 mL / NET: -70 mL      Height (cm): 175.3 (10-11 @ 03:35)  Weight (kg): 67.6 (10-11 @ 03:35)  BMI (kg/m2): 22 (10-11 @ 03:35)  BSA (m2): 1.82 (10-11 @ 03:35)  LABS:	 	                        13.1   2.66  )-----------( 80       ( 11 Oct 2024 05:30 )             39.6     10-11    142  |  105  |  29[H]  ----------------------------<  119[H]  3.6   |  26  |  1.51[H]    Ca    8.7      11 Oct 2024 05:30  Mg     2.2     10-11    TPro  6.4  /  Alb  3.3  /  TBili  1.2  /  DBili  0.6[H]  /  AST  15  /  ALT  13  /  AlkPhos  128[H]  10-11    CARDIAC MARKERS ( 11 Oct 2024 12:00 )  x     / x     / x     / x     / 2.5 ng/mL  CARDIAC MARKERS ( 11 Oct 2024 05:30 )  x     / x     / x     / x     / 2.9 ng/mL  CARDIAC MARKERS ( 10 Oct 2024 23:20 )  x     / x     / x     / x     / 3.9 ng/mL

## 2024-10-11 NOTE — H&P ADULT - PROBLEM SELECTOR PLAN 4
Baseline Cr 1.2-1.3  --BUN/Cr 28/1.62 upon arrival.  --avoid nephrotoxic agents. hx of ablation and [employer] CRT-P.  --continue HOME MED: Eliquis 5mg PO q 12hrs.

## 2024-10-11 NOTE — ED PROVIDER NOTE - WR INTERPRETATION 1
Right sided pleural effusion, pulmonary vascular congestion, no pneumothorax, no focal  infiltrate, no free air

## 2024-10-11 NOTE — H&P ADULT - PROBLEM SELECTOR PLAN 3
hx of ablation and [employer] CRT-P.  --continue HOME MED: Eliquis 5mg PO q 12hrs. continue HOME MED : Tafamadis 61mg PO daily (not on formulary pt's wife will bring in)

## 2024-10-11 NOTE — H&P ADULT - PROBLEM SELECTOR PLAN 1
+JVD, diffuse crackles, distended abdomen, LE cool with 2-3+ pitting LE edema extending into thighs.  --CXR w/ pulmonary vascular congestion and right pleural effusion, BNP 33477.  --received Lasix 80mg IV x 1 dose in ED with 500cc UOP.  --DIURESIS: will start Bumex 1mg IV q 12hrs (on Torsemide 10mg PO daily at home).  --GDMT: discontinued during prior admission 7/2024 in setting of hypotension.  --CORE MEASURES: strict I/Os and daily weights.  --F/U Lactate level this AM. (CCU fellow Dr. Kennedy informed given c/f low output state in setting of cool extremities, soft SBPs and elevated Alk phos)      **Prior TTE (7/9/24): EF 30-35%, severe symmetric LVH, RVH present, mod-severely reduced RV systolic function, severe biatrial enlargement, small pericardial effusion without echo evidence of cardiac tamponade physiology.

## 2024-10-11 NOTE — PROCEDURE NOTE - NSAPPROACH_GEN_A_CORE
70 year old female with PMH of HTN presenting to ED due to left leg pain posterior to knee area, states felt some stiffness waking up in AM - no trauma, then while going up the stairs noted knee give and then hit knee on stairs as well. Pain is noted on standing and walking at this time. no calf pain. via natural/artificial opening

## 2024-10-11 NOTE — ED PROVIDER NOTE - PHYSICAL EXAMINATION
CONSTITUTIONAL: Non-toxic; in no apparent distress  HEAD: Normocephalic; atraumatic  EYES: PERRL; EOM intact   ENMT: External appears normal  NECK: Supple; non-tender  CARD: Normal S1, S2; no murmurs, rubs, or gallops  RESP: Normal chest excursion with respiration; + decreased breath sounds of R lung base, + crackles BL  ABD: Soft, non-distended; non-tender  EXT: Normal ROM in all four extremities; non-tender to palpation, + BLE 2+ pitting edema, + 2 + pitting edema of LUE, no erythema, WWP, cap refill <2s, DP and PT pulses 2+ BL   SKIN: Warm, dry, no rash  NEURO:  No focal neurological deficiencies

## 2024-10-11 NOTE — H&P ADULT - PROBLEM SELECTOR PLAN 5
Platelets 81k (baseline 60-70k in the past).  --s/p bone marrow biopsy (11/23/21) for evaluation of thrombocytopenia; however, bone marrow specimen was limited.  --will continue to monitor closely, transfuse for signs of active bleeding with platelets <50. Baseline Cr 1.2-1.3  --BUN/Cr 28/1.62 upon arrival, likely cardiorenal in setting of CHF exacerbation.  --avoid nephrotoxic agents.

## 2024-10-11 NOTE — H&P ADULT - HISTORY OF PRESENT ILLNESS
79 yr old M, DNR/DNI, with PMHx of DM, Stage III CKD, idiopathic thrombocytopenia, prostate CA s/p chemo, advanced TTR amyloid, NICM w/ HFrEF (35%) s/p CRT-P, Group II PH, Afib s/p ablation (on Eliquis), right sided pleural effusion s/p thoracentesis c/b PTX (2022), recent admission to [facility] 7/7-7/18/24 with AHRF 2/2 COVID PNA and HFrEF exacerbation s/p diuresis c/b hypotension requiring brief vasopressor support.     Patient now returns to [facility] ED 10/10/24 with progressively worsening SOB and bilateral LE edema x few days.     Per patients daughter, patient has been noncompliant with his Torsemide since discharge from [facility].     In ED, BP: 114/89, HR: 80s, RR:18, Temp: 97.4F, O2 sat: 99% on 2L NC. EKG paced, no ischemic changes noted. CXR with pulmonary vascular congestion and right sided pleural effusion. Labs notable for: Platelets 81k, K 3.6, BUN/Cr 28/1.62, Alk phos 139, Trop T  167, BNP 42752 (15,030 in 7/2024), Trop T  167. COVID/Flu/RSV negative.    Patient treated with Lasix 80mg IV x 1 dose and Kdur 40mEq PO x 2 doses in ED.    Patient currently stable, admitted to cardiac telemetry for management of acute on chronic HFrEF in setting of medication noncompliance.              **TTE (7/9/24): EF 30-35%, severe symmetric LVH, RVH present, mod-severely reduced RV systolic function, severe biatrial enlargement, small pericardial effusion without echo evidence of cardiac tamponade physiology.   79 yr old M, DNR/DNI, with PMHx of borderline DM, Stage III CKD, idiopathic thrombocytopenia, prostate CA s/p chemo, pituitary adenoma (s/p transphenoidal treatment in 1990's), advanced TTR amyloid, NICM w/ HFrEF (35%) s/p CRT-P, Group II PH, Afib s/p ablation (on Eliquis), right sided pleural effusion s/p thoracentesis c/b PTX (2022), recent admission to [facility] 7/7-7/18/24 with AHRF 2/2 COVID PNA and HFrEF exacerbation s/p diuresis c/b hypotension requiring brief vasopressor support. Patient now returns to [facility] ED 10/10/24 with progressively worsening SOB and bilateral LE edema x few days. Patient states he cannot walk >10-15 feet prior to becoming winded. Per patients daughter, patient has been noncompliant with his Torsemide since discharge from [facility] ~1 week ago. Patient denies any N/V, diaphoresis, palpitations, dizziness, chest pain, syncope, recent travel or sick contacts.     In ED, BP: 114/89, HR: 80s, RR:18, Temp: 97.4F, O2 sat: 99% on 2L NC. EKG paced, no ischemic changes noted. CXR with pulmonary vascular congestion and right sided pleural effusion. Labs notable for: Platelets 81k, K 3.6, BUN/Cr 28/1.62, Alk phos 139, Trop T  167, BNP 24343 (15,030 in 7/2024), Trop T  167. COVID/Flu/RSV negative. Patient treated with Lasix 80mg IV x 1 dose and Kdur 40mEq PO x 2 doses in ED.    Patient currently stable, admitted to cardiac telemetry for management of acute on chronic HFrEF in setting of medication noncompliance.

## 2024-10-11 NOTE — CONSULT NOTE ADULT - ATTENDING COMMENTS
78 YO M with a history of ACC/AHA Stage D TTR cardiac amyloid with LV dysfunction s/p CRT-P, AF s/p ablation, and ITP who is presenting with ADHF after not taking torsemide since discharge from [facility] last week.     He appears to have a cold/wet clinical picture right now with marked volume overload, cold extremities, mild lactate elevation, and mild KAYLI though normal LFTs. He has end stage heart failure and an appropriate candidate for hospice evaluation. Will diurese to euvolemia with/without inotropes if needed and re-engage palliative care team who know the patient well     - Would augment bumex to 4 mg IV BID for now, titrate for negative 2-3 L daily. Daily standing weights, goal 150 lbs?  - Daily lactate/LFTs, if lactate does not resolved with decongestion or UOP minimal would start dobutamine 2.5 mcg/kg/min. Will defer PAC and CCU guided management given poor prognosis with no meaningful longterm strategy and appropriate DNR/DNI designation.   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 He is appropriately DNR/DNI

## 2024-10-11 NOTE — H&P ADULT - PROBLEM SELECTOR PLAN 6
HgbAIC 5.8 in 7/2024  --continue FS's and ICS PRN Platelets 81k (baseline 60-70k in the past).  --s/p bone marrow biopsy (11/23/21) for evaluation of thrombocytopenia; however, bone marrow specimen was limited.  --will continue to monitor closely, transfuse for signs of active bleeding with platelets <50.

## 2024-10-11 NOTE — H&P ADULT - NS ATTEND AMEND GEN_ALL_CORE FT
Mr. Wise is 79M with Hx of TTR amyloid with LV dysfunction, Atrial fibrillation and ITP presented for decompensated HF in setting of not taking torsemide.     Exam:  NAD  JVP elevated  Lungs decreased at bases  Cool on exam, 2-3+ edema. Mentating    ACute decompensated HF- consult HF. Moore catheter close follow up with IV diuretics, may need inotropic support of diuresis. On midodrine. Continue perfusion labs.   TTR amyloidosis as above- tafamadis.   Afib- one eliquis.  BPH- flomax. URology following

## 2024-10-11 NOTE — H&P ADULT - ASSESSMENT
79 yr old M, DNR/DNI, with PMHx of DM, Stage III CKD, idiopathic thrombocytopenia, prostate CA s/p chemo, advanced TTR amyloid w/ HFrEF (35%) s/p CRT-P, Group II PH, Afib s/p ablation (on Eliquis), right sided pleural effusion s/p thoracentesis c/b PTX (2022), recent admission to [facility] 7/7-7/18/24 with AHRF 2/2 COVID PNA and HFrEF exacerbation s/p diuresis c/b hypotension requiring brief vasopressor support. Patient now returns to [facility] ED 10/10/24 with progressively worsening SOB and bilateral LE edema x few days. Patient currently stable, admitted to cardiac telemetry for management of acute on chronic HFrEF in setting of medication noncompliance.

## 2024-10-11 NOTE — H&P ADULT - PROBLEM SELECTOR PLAN 2
continue HOME MED : Tafamadis 61mg PO daily (not on formulary pt's wife will bring in) Trop T 167, CK and CKMB normal.  --likely demand in setting of CHF exacerbation.  --pt w/ known NICM, nonobstructive coronaries by [email or web address] in 2014.  --F/U second set this AM.

## 2024-10-11 NOTE — ED PROVIDER NOTE - OBJECTIVE STATEMENT
78 yo F w PMH of ATTR Cardiac Amyloid, HFrEF (EF 30-35%) s/p CRT-P (2/2022), multiple HFrEF admissions, AFib s/p ablation (6/2016 on Eliquis), hx R sided Pleural Effusion s/p thoracentesis c/b PTX (2022), T2DM, idiopathic thrombocytopenia, prostate ca s/p chemotherapy, p/w SOB and BLE swelling. He also has LUE swelling since being in rehab for prior admission in July 2024 (prior doppler US of LUE reported neg). He has been non-compliant with his torsemide recently, per family. He has severe MARTINEZ and is using O2 more frequently. No chest pain, fever, cough, or hemoptysis.

## 2024-10-12 LAB
ALBUMIN SERPL ELPH-MCNC: 3.6 G/DL — SIGNIFICANT CHANGE UP (ref 3.3–5)
ALP SERPL-CCNC: 136 U/L — HIGH (ref 40–120)
ALT FLD-CCNC: 17 U/L — SIGNIFICANT CHANGE UP (ref 10–45)
ANION GAP SERPL CALC-SCNC: 10 MMOL/L — SIGNIFICANT CHANGE UP (ref 5–17)
ANISOCYTOSIS BLD QL: SIGNIFICANT CHANGE UP
AST SERPL-CCNC: 17 U/L — SIGNIFICANT CHANGE UP (ref 10–40)
BILIRUB SERPL-MCNC: 1.4 MG/DL — HIGH (ref 0.2–1.2)
BLD GP AB SCN SERPL QL: NEGATIVE — SIGNIFICANT CHANGE UP
BUN SERPL-MCNC: 29 MG/DL — HIGH (ref 7–23)
BURR CELLS BLD QL SMEAR: PRESENT — SIGNIFICANT CHANGE UP
CALCIUM SERPL-MCNC: 8.7 MG/DL — SIGNIFICANT CHANGE UP (ref 8.4–10.5)
CHLORIDE SERPL-SCNC: 106 MMOL/L — SIGNIFICANT CHANGE UP (ref 96–108)
CO2 SERPL-SCNC: 26 MMOL/L — SIGNIFICANT CHANGE UP (ref 22–31)
CREAT SERPL-MCNC: 1.42 MG/DL — HIGH (ref 0.5–1.3)
DACRYOCYTES BLD QL SMEAR: SIGNIFICANT CHANGE UP
EGFR: 51 ML/MIN/1.73M2 — LOW
GLUCOSE SERPL-MCNC: 82 MG/DL — SIGNIFICANT CHANGE UP (ref 70–99)
HCT VFR BLD CALC: 44.3 % — SIGNIFICANT CHANGE UP (ref 39–50)
HGB BLD-MCNC: 13.9 G/DL — SIGNIFICANT CHANGE UP (ref 13–17)
HYPOCHROMIA BLD QL: SLIGHT — SIGNIFICANT CHANGE UP
LACTATE SERPL-SCNC: 2.1 MMOL/L — HIGH (ref 0.5–2)
MACROCYTES BLD QL: SLIGHT — SIGNIFICANT CHANGE UP
MAGNESIUM SERPL-MCNC: 2.3 MG/DL — SIGNIFICANT CHANGE UP (ref 1.6–2.6)
MANUAL SMEAR VERIFICATION: SIGNIFICANT CHANGE UP
MCHC RBC-ENTMCNC: 28.8 PG — SIGNIFICANT CHANGE UP (ref 27–34)
MCHC RBC-ENTMCNC: 31.4 GM/DL — LOW (ref 32–36)
MCV RBC AUTO: 91.7 FL — SIGNIFICANT CHANGE UP (ref 80–100)
MICROCYTES BLD QL: SLIGHT — SIGNIFICANT CHANGE UP
NRBC # BLD: 0 /100 WBCS — SIGNIFICANT CHANGE UP (ref 0–0)
OVALOCYTES BLD QL SMEAR: SLIGHT — SIGNIFICANT CHANGE UP
PLAT MORPH BLD: ABNORMAL
PLATELET # BLD AUTO: 81 K/UL — LOW (ref 150–400)
POIKILOCYTOSIS BLD QL AUTO: SIGNIFICANT CHANGE UP
POLYCHROMASIA BLD QL SMEAR: SLIGHT — SIGNIFICANT CHANGE UP
POTASSIUM SERPL-MCNC: 3.9 MMOL/L — SIGNIFICANT CHANGE UP (ref 3.5–5.3)
POTASSIUM SERPL-SCNC: 3.9 MMOL/L — SIGNIFICANT CHANGE UP (ref 3.5–5.3)
PROT SERPL-MCNC: 7 G/DL — SIGNIFICANT CHANGE UP (ref 6–8.3)
RBC # BLD: 4.83 M/UL — SIGNIFICANT CHANGE UP (ref 4.2–5.8)
RBC # FLD: 16.3 % — HIGH (ref 10.3–14.5)
RBC BLD AUTO: ABNORMAL
RH IG SCN BLD-IMP: NEGATIVE — SIGNIFICANT CHANGE UP
SCHISTOCYTES BLD QL AUTO: SLIGHT — SIGNIFICANT CHANGE UP
SODIUM SERPL-SCNC: 142 MMOL/L — SIGNIFICANT CHANGE UP (ref 135–145)
TARGETS BLD QL SMEAR: SLIGHT — SIGNIFICANT CHANGE UP
WBC # BLD: 3.95 K/UL — SIGNIFICANT CHANGE UP (ref 3.8–10.5)
WBC # FLD AUTO: 3.95 K/UL — SIGNIFICANT CHANGE UP (ref 3.8–10.5)

## 2024-10-12 PROCEDURE — 99232 SBSQ HOSP IP/OBS MODERATE 35: CPT

## 2024-10-12 RX ORDER — POTASSIUM CHLORIDE 10 MEQ
10 TABLET, EXTENDED RELEASE ORAL ONCE
Refills: 0 | Status: COMPLETED | OUTPATIENT
Start: 2024-10-12 | End: 2024-10-12

## 2024-10-12 RX ADMIN — PANTOPRAZOLE SODIUM 40 MILLIGRAM(S): 40 TABLET, DELAYED RELEASE ORAL at 06:16

## 2024-10-12 RX ADMIN — APIXABAN 5 MILLIGRAM(S): 5 TABLET, FILM COATED ORAL at 06:18

## 2024-10-12 RX ADMIN — Medication 0.4 MILLIGRAM(S): at 21:02

## 2024-10-12 RX ADMIN — MIDODRINE HYDROCHLORIDE 10 MILLIGRAM(S): 2.5 TABLET ORAL at 06:15

## 2024-10-12 RX ADMIN — APIXABAN 5 MILLIGRAM(S): 5 TABLET, FILM COATED ORAL at 18:44

## 2024-10-12 RX ADMIN — Medication 10 MILLIEQUIVALENT(S): at 11:42

## 2024-10-12 RX ADMIN — MIDODRINE HYDROCHLORIDE 10 MILLIGRAM(S): 2.5 TABLET ORAL at 21:02

## 2024-10-12 RX ADMIN — MIDODRINE HYDROCHLORIDE 10 MILLIGRAM(S): 2.5 TABLET ORAL at 13:03

## 2024-10-12 NOTE — PROGRESS NOTE ADULT - PROBLEM SELECTOR PLAN 7
Continue Tamsulosin 0.4mg PO qhs.      N: DASH with consistent carb and 1.5L fluid restriction  E: Maintain K>4.0 and Mg>2.0  VTE PPx: on Eliquis  Code: DNR/DNI    Case discussed with Dr. Reyes

## 2024-10-12 NOTE — PROGRESS NOTE ADULT - PROBLEM SELECTOR PLAN 1
+JVD, bibasilar crackles, LE cool with 2-3+ pitting LE edema extending into thighs. SpO2 99% on 2L NC.   - Etiology: TTR amyloid, nonobs CAD by cath @ [facility] in 2014  - CXR pulm vasc congestion & R pleural effusion, BNP 26k   - TTE (7/9/24): EF 30-35%, severe symmetric LVH, RVH present, mod-severely reduced RVWF, severe HYACINTH, small pericardial effusion w/o evidence of tamponade  - Diuresis: Bumex gtt 2mg/hr with goal net -2-3L  - GDMT: Did not tolerate prior admission due to hypotension   - Core measures, daily weights, strict I&Os  - Daily CMP & lactate

## 2024-10-12 NOTE — PROGRESS NOTE ADULT - PROBLEM SELECTOR PLAN 4
Baseline Cr 1.2-1.3.   - BUN/Cr 28/1.62 on admission likely cardiorenal in setting of CHF exacerbation, downtrending with diuresis  - Avoid nephrotoxic agents, renally dose meds

## 2024-10-12 NOTE — PROGRESS NOTE ADULT - SUBJECTIVE AND OBJECTIVE BOX
INCOMPLETE Cardiology PA Adult Progress Note    SUBJECTIVE ASSESSMENT: Met with and examined the pt at bedside this morning. A&Ox2-3, states he is having some pain in his legs and is feeling weak. He otherwise denies any chest pain, SOB, dizziness, lightheadedness, palpitations     TELE: NSR 90s  	  MEDICATIONS:  buMETAnide Infusion 2 mG/Hr IV Continuous <Continuous>  midodrine 10 milliGRAM(s) Oral every 8 hours  pantoprazole    Tablet 40 milliGRAM(s) Oral before breakfast  apixaban 5 milliGRAM(s) Oral every 12 hours  tamsulosin 0.4 milliGRAM(s) Oral at bedtime    	  VITAL SIGNS:  T(C): 36.3 (10-12-24 @ 05:04), Max: 36.4 (10-11-24 @ 21:01)  HR: 73 (10-12-24 @ 06:20) (70 - 90)  BP: 115/87 (10-12-24 @ 05:04) (89/70 - 134/82)  RR: 16 (10-12-24 @ 06:20) (16 - 20)  SpO2: 99% (10-12-24 @ 06:20) (93% - 100%)  Wt(kg): --    I&O's Summary    11 Oct 2024 07:01  -  12 Oct 2024 07:00  --------------------------------------------------------  IN: 420 mL / OUT: 2025 mL / NET: -1605 mL    12 Oct 2024 07:01  -  12 Oct 2024 16:52  --------------------------------------------------------  IN: 240 mL / OUT: 1600 mL / NET: -1360 mL                                         PHYSICAL EXAM:  Appearance: Normal	  HEENT: Normal oral mucosa, PERRL, EOMI	  Neck: Supple, + JVD  Cardiovascular: Normal S1 S2, No murmurs  Respiratory: Poor inspiratory effort, bibasilar rales  Gastrointestinal:  Soft, Non-tender, + BS	  Skin: No rashes, No ecchymoses, No cyanosis  Extremities: 3+ pitting edema b/l to shins, 1-2+ pitting edema L mid arm/ elbow  Vascular: RAD 2+  Neurologic: Non-focal  Psychiatry: A & O x 3, Mood & affect appropriate    LABS:	 	                          13.9   3.95  )-----------( 81       ( 12 Oct 2024 07:00 )             44.3     10-12    142  |  106  |  29[H]  ----------------------------<  82  3.9   |  26  |  1.42[H]    Ca    8.7      12 Oct 2024 07:00  Mg     2.3     10-12    TPro  7.0  /  Alb  3.6  /  TBili  1.4[H]  /  DBili  x   /  AST  17  /  ALT  17  /  AlkPhos  136[H]  10-12  PT/INR - ( 10 Oct 2024 23:20 )   PT: 22.5 sec;   INR: 1.97     PTT - ( 10 Oct 2024 23:20 )  PTT:58.8 sec

## 2024-10-12 NOTE — PROGRESS NOTE ADULT - ASSESSMENT
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 Admitted to cardiac tele for HFrEF exacerbation i/s/o medication noncompliance. Currently on IV diuresis with bumex gtt.

## 2024-10-13 LAB
ALBUMIN SERPL ELPH-MCNC: 3.3 G/DL — SIGNIFICANT CHANGE UP (ref 3.3–5)
ALP SERPL-CCNC: 127 U/L — HIGH (ref 40–120)
ALT FLD-CCNC: 14 U/L — SIGNIFICANT CHANGE UP (ref 10–45)
ANION GAP SERPL CALC-SCNC: 10 MMOL/L — SIGNIFICANT CHANGE UP (ref 5–17)
ANION GAP SERPL CALC-SCNC: 14 MMOL/L — SIGNIFICANT CHANGE UP (ref 5–17)
AST SERPL-CCNC: 15 U/L — SIGNIFICANT CHANGE UP (ref 10–40)
BILIRUB SERPL-MCNC: 1.4 MG/DL — HIGH (ref 0.2–1.2)
BUN SERPL-MCNC: 25 MG/DL — HIGH (ref 7–23)
BUN SERPL-MCNC: 26 MG/DL — HIGH (ref 7–23)
CALCIUM SERPL-MCNC: 8.6 MG/DL — SIGNIFICANT CHANGE UP (ref 8.4–10.5)
CALCIUM SERPL-MCNC: 8.8 MG/DL — SIGNIFICANT CHANGE UP (ref 8.4–10.5)
CHLORIDE SERPL-SCNC: 105 MMOL/L — SIGNIFICANT CHANGE UP (ref 96–108)
CHLORIDE SERPL-SCNC: 107 MMOL/L — SIGNIFICANT CHANGE UP (ref 96–108)
CO2 SERPL-SCNC: 28 MMOL/L — SIGNIFICANT CHANGE UP (ref 22–31)
CO2 SERPL-SCNC: 28 MMOL/L — SIGNIFICANT CHANGE UP (ref 22–31)
CREAT SERPL-MCNC: 1.09 MG/DL — SIGNIFICANT CHANGE UP (ref 0.5–1.3)
CREAT SERPL-MCNC: 1.23 MG/DL — SIGNIFICANT CHANGE UP (ref 0.5–1.3)
EGFR: 60 ML/MIN/1.73M2 — SIGNIFICANT CHANGE UP
EGFR: 70 ML/MIN/1.73M2 — SIGNIFICANT CHANGE UP
GLUCOSE SERPL-MCNC: 117 MG/DL — HIGH (ref 70–99)
GLUCOSE SERPL-MCNC: 75 MG/DL — SIGNIFICANT CHANGE UP (ref 70–99)
HCT VFR BLD CALC: 43.8 % — SIGNIFICANT CHANGE UP (ref 39–50)
HGB BLD-MCNC: 14.3 G/DL — SIGNIFICANT CHANGE UP (ref 13–17)
LACTATE SERPL-SCNC: 1.2 MMOL/L — SIGNIFICANT CHANGE UP (ref 0.5–2)
MAGNESIUM SERPL-MCNC: 2.1 MG/DL — SIGNIFICANT CHANGE UP (ref 1.6–2.6)
MCHC RBC-ENTMCNC: 29.2 PG — SIGNIFICANT CHANGE UP (ref 27–34)
MCHC RBC-ENTMCNC: 32.6 GM/DL — SIGNIFICANT CHANGE UP (ref 32–36)
MCV RBC AUTO: 89.4 FL — SIGNIFICANT CHANGE UP (ref 80–100)
NRBC # BLD: 0 /100 WBCS — SIGNIFICANT CHANGE UP (ref 0–0)
PLATELET # BLD AUTO: 82 K/UL — LOW (ref 150–400)
POTASSIUM SERPL-MCNC: 3.1 MMOL/L — LOW (ref 3.5–5.3)
POTASSIUM SERPL-MCNC: 4.7 MMOL/L — SIGNIFICANT CHANGE UP (ref 3.5–5.3)
POTASSIUM SERPL-SCNC: 3.1 MMOL/L — LOW (ref 3.5–5.3)
POTASSIUM SERPL-SCNC: 4.7 MMOL/L — SIGNIFICANT CHANGE UP (ref 3.5–5.3)
PROT SERPL-MCNC: 6.5 G/DL — SIGNIFICANT CHANGE UP (ref 6–8.3)
RBC # BLD: 4.9 M/UL — SIGNIFICANT CHANGE UP (ref 4.2–5.8)
RBC # FLD: 16.1 % — HIGH (ref 10.3–14.5)
SODIUM SERPL-SCNC: 145 MMOL/L — SIGNIFICANT CHANGE UP (ref 135–145)
SODIUM SERPL-SCNC: 147 MMOL/L — HIGH (ref 135–145)
WBC # BLD: 3.43 K/UL — LOW (ref 3.8–10.5)
WBC # FLD AUTO: 3.43 K/UL — LOW (ref 3.8–10.5)

## 2024-10-13 PROCEDURE — 99232 SBSQ HOSP IP/OBS MODERATE 35: CPT

## 2024-10-13 RX ORDER — POTASSIUM CHLORIDE 10 MEQ
20 TABLET, EXTENDED RELEASE ORAL ONCE
Refills: 0 | Status: COMPLETED | OUTPATIENT
Start: 2024-10-13 | End: 2024-10-13

## 2024-10-13 RX ORDER — POLYETHYLENE GLYCOL 3350 17 G/17G
17 POWDER, FOR SOLUTION ORAL DAILY
Refills: 0 | Status: DISCONTINUED | OUTPATIENT
Start: 2024-10-13 | End: 2024-10-22

## 2024-10-13 RX ORDER — POTASSIUM CHLORIDE 10 MEQ
40 TABLET, EXTENDED RELEASE ORAL ONCE
Refills: 0 | Status: COMPLETED | OUTPATIENT
Start: 2024-10-13 | End: 2024-10-13

## 2024-10-13 RX ORDER — SENNA 187 MG
2 TABLET ORAL AT BEDTIME
Refills: 0 | Status: DISCONTINUED | OUTPATIENT
Start: 2024-10-13 | End: 2024-10-22

## 2024-10-13 RX ADMIN — MIDODRINE HYDROCHLORIDE 10 MILLIGRAM(S): 2.5 TABLET ORAL at 05:49

## 2024-10-13 RX ADMIN — MIDODRINE HYDROCHLORIDE 10 MILLIGRAM(S): 2.5 TABLET ORAL at 12:07

## 2024-10-13 RX ADMIN — APIXABAN 5 MILLIGRAM(S): 5 TABLET, FILM COATED ORAL at 05:49

## 2024-10-13 RX ADMIN — Medication 0.4 MILLIGRAM(S): at 21:21

## 2024-10-13 RX ADMIN — PANTOPRAZOLE SODIUM 40 MILLIGRAM(S): 40 TABLET, DELAYED RELEASE ORAL at 05:49

## 2024-10-13 RX ADMIN — Medication 5 MG/HR: at 11:40

## 2024-10-13 RX ADMIN — Medication 20 MILLIEQUIVALENT(S): at 09:21

## 2024-10-13 RX ADMIN — MIDODRINE HYDROCHLORIDE 10 MILLIGRAM(S): 2.5 TABLET ORAL at 21:21

## 2024-10-13 RX ADMIN — Medication 40 MILLIEQUIVALENT(S): at 09:21

## 2024-10-13 RX ADMIN — Medication 40 MILLIEQUIVALENT(S): at 12:08

## 2024-10-13 RX ADMIN — Medication 10 MG/HR: at 00:43

## 2024-10-13 RX ADMIN — APIXABAN 5 MILLIGRAM(S): 5 TABLET, FILM COATED ORAL at 18:59

## 2024-10-13 NOTE — PROGRESS NOTE ADULT - PROBLEM SELECTOR PLAN 4
Baseline Cr 1.2-1.3.   - BUN/Cr 28/1.62 on admission likely cardiorenal, now back to baseline baseline with diuresis  - Avoid nephrotoxic agents, renally dose meds Baseline Cr 1.2-1.3.   - BUN/Cr 28/1.62 on admission likely cardiorenal, now back to baseline with diuresis  - Avoid nephrotoxic agents, renally dose meds

## 2024-10-13 NOTE — DISCHARGE NOTE PROVIDER - NSDCCPCAREPLAN_GEN_ALL_CORE_FT
PRINCIPAL DISCHARGE DIAGNOSIS  Diagnosis: Acute on chronic HFrEF (heart failure with reduced ejection fraction)  Assessment and Plan of Treatment: -Heart failure is a condition that occurs when the heart cannot pump blood as well as it should; this leads to inadequate blood flow to vital organs such as the kidneys and congestion (buildup of fluid) in other vital organs such as the lungs.  This can lead to symptoms such as swelling, trouble breathing, and feeling tired.   -You have a history of weakened heart muscle called systolic congestive heart failure. When the heart pumps, it doesn't squeeze normally.  Your Ejection Fraction (EF) is 30-35%, a normal EF is 55-60%.   -Please continue _____ to prevent fluid build up in the body.  -Avoid drinking more than 1.5L of fluid daily and maintain a low salt diet (max 2grams daily).  -Please weigh yourself daily, for any significant increases in daily weight of 2lbs/day or 5lbs/week with associated swelling in the legs or abdomen and/or shortness of breath, please call your doctor or go to the emergency room.  -Please make sure you follow up with your cardiologist  ____ within one week of discharge.        SECONDARY DISCHARGE DIAGNOSES  Diagnosis: Cardiac amyloidosis  Assessment and Plan of Treatment: You have a known history of cardiac amyloidosis. Please continue your Tafamidis (Vyndamax) 61mg daily and follow up with your cardiologist.    Diagnosis: Atrial fibrillation  Assessment and Plan of Treatment: You have an abnormal heart rhythm (arrhythmia) called atrial fibrillation. With this condition, the hearts 2 upper chambers (the atria) quiver rather than squeeze the blood out in a normal pattern. This leads to an irregular and sometimes rapid heartbeat. Atrial fibrillation is serious condition as it affects the heart’s ability to fill with blood, and the blood can start to form clots.  These clots can travel to the brain through the blood vessels, and cause strokes.    -Please CONTINUE  ELIQUIS 5MG TWICE A DAY to prevent a stroke by helping to prevent clots from forming.   -Please follow up with your EP Dr. Pemberton.       Diagnosis: Stage 3 chronic kidney disease  Assessment and Plan of Treatment: Your day of discharge creatinine was  4.96. In order to prevent further disease progression, continue to follow recommendations made by your primary provider/nephrologist. Continue a diet that is low in sodium and avoid foods that are concentrated in potassium and phosphorus. Continue your medications/supplementations as directed and avoid over-the-counter drugs that are harmful to kidneys, such as, Non-Steroidal Anti-Inflammatory Drugs (NSAIDs). Follow-up as outpatient to monitor your kidney function, as well as, vitamin D, Calcium, potassium, and phosphorus levels.       PRINCIPAL DISCHARGE DIAGNOSIS  Diagnosis: Acute on chronic HFrEF (heart failure with reduced ejection fraction)  Assessment and Plan of Treatment: -Heart failure is a condition that occurs when the heart cannot pump blood as well as it should; this leads to inadequate blood flow to vital organs such as the kidneys and congestion (buildup of fluid) in other vital organs such as the lungs.  This can lead to symptoms such as swelling, trouble breathing, and feeling tired.   -You have a history of weakened heart muscle called systolic congestive heart failure. When the heart pumps, it doesn't squeeze normally.  Your Ejection Fraction (EF) is 20-25%, a normal EF is 55-60%.   -Avoid drinking more than 1.5L of fluid daily and maintain a low salt diet (max 2grams daily).  -Please continue Spironolactone 25 mg daily.  -Please weigh yourself daily, for any significant increases in daily weight of 2lbs/day or 5lbs/week with associated swelling in the legs or abdomen and/or shortness of breath, please call your doctor or go to the emergency room.  -Please make sure you follow up with your cardiologist Dr. Anton within one week of discharge.        SECONDARY DISCHARGE DIAGNOSES  Diagnosis: Cardiac amyloidosis  Assessment and Plan of Treatment: You have a known history of cardiac amyloidosis. Please continue with your Tafamidis (Vyndamax) 61mg daily and follow up with your cardiologist.    Diagnosis: Atrial fibrillation  Assessment and Plan of Treatment: You have an abnormal heart rhythm (arrhythmia) called atrial fibrillation. With this condition, the hearts 2 upper chambers (the atria) quiver rather than squeeze the blood out in a normal pattern. This leads to an irregular and sometimes rapid heartbeat. Atrial fibrillation is serious condition as it affects the heart’s ability to fill with blood, and the blood can start to form clots.  These clots can travel to the brain through the blood vessels, and cause strokes.    -Please CONTINUE  ELIQUIS 5MG TWICE A DAY to prevent a stroke by helping to prevent clots from forming.   -Please follow up with your EP Dr. Pemberton.       Diagnosis: Stage 3 chronic kidney disease  Assessment and Plan of Treatment: Your day of discharge creatinine was  4.96. In order to prevent further disease progression, continue to follow recommendations made by your primary provider/nephrologist. Continue a diet that is low in sodium and avoid foods that are concentrated in potassium and phosphorus. Continue your medications/supplementations as directed and avoid over-the-counter drugs that are harmful to kidneys, such as, Non-Steroidal Anti-Inflammatory Drugs (NSAIDs). Follow-up as outpatient to monitor your kidney function, as well as, vitamin D, Calcium, potassium, and phosphorus levels.      Diagnosis: Urinary retention  Assessment and Plan of Treatment: Please continue Tamsulosin 0.4mg daily at bedtime as well as Oxybutinin 5 mg twice daily.     PRINCIPAL DISCHARGE DIAGNOSIS  Diagnosis: Acute on chronic HFrEF (heart failure with reduced ejection fraction)  Assessment and Plan of Treatment: -Heart failure is a condition that occurs when the heart cannot pump blood as well as it should; this leads to inadequate blood flow to vital organs such as the kidneys and congestion (buildup of fluid) in other vital organs such as the lungs.  This can lead to symptoms such as swelling, trouble breathing, and feeling tired.   -You have a history of weakened heart muscle called systolic congestive heart failure. When the heart pumps, it doesn't squeeze normally.  Your Ejection Fraction (EF) is 20-25%, a normal EF is 55-60%.   -Avoid drinking more than 1.5L of fluid daily and maintain a low salt diet (max 2grams daily).  -Please continue Torsemide 10mg daily and Spironolactone 25 mg daily.  -Please weigh yourself daily, for any significant increases in daily weight of 2lbs/day or 5lbs/week with associated swelling in the legs or abdomen and/or shortness of breath, please call your doctor or go to the emergency room.  -Please make sure you follow up with your cardiologist Dr. Anton within one week of discharge.        SECONDARY DISCHARGE DIAGNOSES  Diagnosis: Cardiac amyloidosis  Assessment and Plan of Treatment: You have a known history of cardiac amyloidosis. Please continue with your Tafamidis (Vyndamax) 61mg daily and follow up with your cardiologist.    Diagnosis: Atrial fibrillation  Assessment and Plan of Treatment: You have an abnormal heart rhythm (arrhythmia) called atrial fibrillation. With this condition, the hearts 2 upper chambers (the atria) quiver rather than squeeze the blood out in a normal pattern. This leads to an irregular and sometimes rapid heartbeat. Atrial fibrillation is serious condition as it affects the heart’s ability to fill with blood, and the blood can start to form clots.  These clots can travel to the brain through the blood vessels, and cause strokes.    -Please CONTINUE  ELIQUIS 5MG TWICE A DAY to prevent a stroke by helping to prevent clots from forming.   -Please follow up with your EP Dr. Pemberton.       Diagnosis: Stage 3 chronic kidney disease  Assessment and Plan of Treatment: Your day of discharge creatinine was  4.96. In order to prevent further disease progression, continue to follow recommendations made by your primary provider/nephrologist. Continue a diet that is low in sodium and avoid foods that are concentrated in potassium and phosphorus. Continue your medications/supplementations as directed and avoid over-the-counter drugs that are harmful to kidneys, such as, Non-Steroidal Anti-Inflammatory Drugs (NSAIDs). Follow-up as outpatient to monitor your kidney function, as well as, vitamin D, Calcium, potassium, and phosphorus levels.      Diagnosis: Urinary retention  Assessment and Plan of Treatment: Please continue Tamsulosin 0.4mg daily at bedtime as well as Oxybutinin 5 mg twice daily.     PRINCIPAL DISCHARGE DIAGNOSIS  Diagnosis: Acute on chronic HFrEF (heart failure with reduced ejection fraction)  Assessment and Plan of Treatment: -Heart failure is a condition that occurs when the heart cannot pump blood as well as it should; this leads to inadequate blood flow to vital organs such as the kidneys and congestion (buildup of fluid) in other vital organs such as the lungs.  This can lead to symptoms such as swelling, trouble breathing, and feeling tired.   -You have a history of weakened heart muscle called systolic congestive heart failure. When the heart pumps, it doesn't squeeze normally.  Your Ejection Fraction (EF) is 20-25%, a normal EF is 55-60%.   -Avoid drinking more than 1.5L of fluid daily and maintain a low salt diet (max 2grams daily).  -Please continue Torsemide 10mg daily and Spironolactone 25 mg daily.  -Please weigh yourself daily, for any significant increases in daily weight of 2lbs/day or 5lbs/week with associated swelling in the legs or abdomen and/or shortness of breath, please call your doctor or go to the emergency room.  -Please make sure you follow up with your cardiologist Dr. Anton within one week of discharge.        SECONDARY DISCHARGE DIAGNOSES  Diagnosis: Cardiac amyloidosis  Assessment and Plan of Treatment: You have a known history of cardiac amyloidosis. Please continue with your Tafamidis (Vyndamax) 61mg daily and follow up with your cardiologist.    Diagnosis: Atrial fibrillation  Assessment and Plan of Treatment: You have an abnormal heart rhythm (arrhythmia) called atrial fibrillation. With this condition, the hearts 2 upper chambers (the atria) quiver rather than squeeze the blood out in a normal pattern. This leads to an irregular and sometimes rapid heartbeat. Atrial fibrillation is serious condition as it affects the heart’s ability to fill with blood, and the blood can start to form clots.  These clots can travel to the brain through the blood vessels, and cause strokes.    -Please CONTINUE  ELIQUIS 5MG TWICE A DAY to prevent a stroke by helping to prevent clots from forming.   -Please follow up with your EP Dr. Pemberton.       Diagnosis: Stage 3 chronic kidney disease  Assessment and Plan of Treatment: Your day of discharge creatinine was  4.96. In order to prevent further disease progression, continue to follow recommendations made by your primary provider/nephrologist. Continue a diet that is low in sodium and avoid foods that are concentrated in potassium and phosphorus. Continue your medications/supplementations as directed and avoid over-the-counter drugs that are harmful to kidneys, such as, Non-Steroidal Anti-Inflammatory Drugs (NSAIDs). Follow-up as outpatient to monitor your kidney function, as well as, vitamin D, Calcium, potassium, and phosphorus levels.      Diagnosis: Urinary retention  Assessment and Plan of Treatment: Please continue Tamsulosin 0.4mg daily at bedtime as well as Oxybutinin 5 mg twice daily.    Diagnosis: Encounter for palliative care  Assessment and Plan of Treatment: You were seen by our palliative care team and have been enrolled into home hospice. Please call [phone] (HEAL Line) if you have any questions or need any additional support.

## 2024-10-13 NOTE — DISCHARGE NOTE PROVIDER - HOSPITAL COURSE
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 Admitted to cardiac tele for HFrEF exacerbation i/s/o medication noncompliance.     Pt was diuresed to euvolemia with Bumex gtt > ____, net negative ____. Dry weight ___. Pt did not tolerate GDMT on prior admission du to hypotension, is now on Midodrine 10mg q8h. Course c/b KAYLI on admission, likely cardiorenal, that downtrended to baseline ___ on discharge. PT evaluated the pt prior to discharge and recommends _____.     Pt seen and examined at bedside this AM without any complaints or events overnight, VSS, labs and telemetry reviewed and pt stable for discharge as discussed with  ___. Pt has received appropriate discharge instructions, including medication regimen, access site management and follow up with  __ in 1-2 weeks.    Discharge cardiac medications:   -- GDMT:   -- Diuretic:    Pt discharge copies detail cardiovascular history, medications, testing/treatments, OR patient has created a portal account and instructed to provide their records at their 1st appointment.               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 7/7-7/18/24 with AHRF 2/2 COVID PNA and HFrEF exacerbation who presented with SOB and b/l LE edema x a few days. Admitted to cardiac tele for HFrEF exacerbation i/s/o medication noncompliance.     Pt was diuresed to euvolemia with Bumex gtt > ____, net negative ____. Dry weight ___. Pt did not tolerate GDMT on prior admission du to hypotension, is now on Midodrine 10mg q8h. Course c/b KAYLI on admission, likely cardiorenal, that downtrended to baseline ___ on discharge. PT evaluated the pt prior to discharge and recommends COMFORT. Palliative care consulted given advanced cardiac disease, patient made DNR/DNI with plan to discharge to home hospice.     Pt seen and examined at bedside this AM without any complaints or events overnight, VSS, labs and telemetry reviewed and pt stable for discharge as discussed with  ___. Pt has received appropriate discharge instructions, including medication regimen, access site management and follow up with  __ in 1-2 weeks.    Discharge cardiac medications:   -- GDMT:   -- Diuretic: Euvolemic   -- Discharge Wt:   -- Discharge Cr:     Pt discharge copies detail cardiovascular history, medications, testing/treatments, OR patient has created a portal account and instructed to provide their records at their 1st appointment.               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 Admitted to cardiac tele for HFrEF exacerbation i/s/o medication noncompliance.     Pt was diuresed to euvolemia with Bumex gtt then IVP Bumex 2 mg daily, net negative ~23L. Dry weight _____. Pt did not tolerate GDMT on prior admission due to hypotension, is now on Midodrine 10mg q8h. Course c/b KAYLI on admission, likely cardiorenal, that downtrended to baseline 1.09 on discharge. PT evaluated the pt prior to discharge and recommends COMFORT. Palliative care consulted given advanced cardiac disease, patient made DNR/DNI with plan to discharge to home hospice.     Pt seen and examined at bedside this AM without any complaints or events overnight, VSS, labs and telemetry reviewed and pt stable for discharge as discussed with  ___. Pt has received appropriate discharge instructions, including medication regimen, access site management and follow up with  __ in 1-2 weeks.    Discharged with [facility] as will make care at home more manageable.    Discharge cardiac medications:   -- GDMT: Spironolactone 25 mg daily  -- Vasopressor: Midodrine 10 mg Q8  -- Diuretic: Euvolemic   -- Discharge Wt: 168.9 lbs (weight on admission 177.4 lbs)  -- Discharge Cr: 1.09    Pt discharge copies detail cardiovascular history, medications, testing/treatments, OR patient has created a portal account and instructed to provide their records at their 1st appointment.               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 Admitted to cardiac tele for HFrEF exacerbation i/s/o medication noncompliance.     Pt was diuresed to euvolemia with Bumex gtt then IVP Bumex 2 mg daily, net negative ~23L. Pt did not tolerate GDMT on prior admission due to hypotension, is now on Midodrine 10mg q8h. Course c/b KAYLI on admission, likely cardiorenal, that downtrended to baseline 1.09 on discharge. PT evaluated the pt prior to discharge and recommends COMFORT. Palliative care consulted given advanced cardiac disease, patient made DNR/DNI with plan to discharge to home hospice.     Pt seen and examined at bedside this AM without any complaints or events overnight, VSS, labs and telemetry reviewed and pt stable for discharge as discussed with  ____________. Pt has received appropriate discharge instructions, including medication regimen, access site management and follow up with  __ in 1-2 weeks.    Discharged with [facility] as will make care at home more manageable.    Discharge cardiac medications:   -- GDMT: Spironolactone 25 mg daily  -- Vasopressor: Midodrine 10 mg Q8  -- Diuretic: Euvolemic   -- Discharge Wt: 168.9 lbs (weight on admission 177.4 lbs)  -- Discharge Cr: 1.09    Pt discharge copies detail cardiovascular history, medications, testing/treatments, OR patient has created a portal account and instructed to provide their records at their 1st appointment.               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 Admitted to cardiac tele for HFrEF exacerbation i/s/o medication noncompliance.     Pt was diuresed to euvolemia with Bumex gtt then IVP Bumex 2 mg daily, net negative ~23L. Pt did not tolerate GDMT on prior admission due to hypotension, is now on Midodrine 10mg q8h. Course c/b KAYLI on admission, likely cardiorenal, that downtrended to baseline 1.09 on discharge. PT evaluated the pt prior to discharge and recommends COMFORT. Palliative care consulted given advanced cardiac disease, patient made DNR/DNI with plan to discharge to home hospice.     Pt seen and examined at bedside this AM without any complaints or events overnight, VSS, labs and telemetry reviewed and pt stable for discharge as discussed with Dr. Barros. Pt has received appropriate discharge instructions, including medication regimen, access site management and follow up with Dr. Anton in 1-2 weeks.    Discharged with [facility] as will make care at home more manageable.    Discharge cardiac medications:   -- GDMT: Spironolactone 25 mg daily  -- Vasopressor: Midodrine 10 mg Q8  -- Diuretic: Torsemide 10mg QD    -- Discharge CV Medications: Apixaban 5mg BI  -- Discharge Home Medications: Flomax 0.4mg QHS, Pantoprazole 40mg QD   -- Discharge Wt: 168.9 lbs (weight on admission 177.4 lbs)  -- Discharge Cr: 1.09    Pt discharge copies detail cardiovascular history, medications, testing/treatments, OR patient has created a portal account and instructed to provide their records at their 1st appointment.               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 Admitted to cardiac tele for HFrEF exacerbation i/s/o medication noncompliance.     Pt was diuresed to euvolemia with Bumex gtt then IVP Bumex 2 mg daily, net negative ~23L. Pt did not tolerate GDMT on prior admission due to hypotension, is now on Midodrine 10mg q8h. Course c/b KAYLI on admission, likely cardiorenal, that downtrended to baseline 1.09 on discharge. PT evaluated the pt prior to discharge and recommends COMFORT. Palliative care consulted given advanced cardiac disease, patient made DNR/DNI with plan to discharge to home hospice.     Pt seen and examined at bedside this AM without any complaints or events overnight, VSS, labs and telemetry reviewed and pt stable for discharge as discussed with Dr. Barros. Pt has received appropriate discharge instructions, including medication regimen, access site management and follow up with Dr. Anton in 1-2 weeks.    Discharged with [facility] as will make care at home more manageable.     Discharge cardiac medications:   -- GDMT: Spironolactone 25 mg daily  -- Vasopressor: Midodrine 10 mg Q8  -- Diuretic: Torsemide 10mg QD    -- Discharge CV Medications: Apixaban 5mg BI  -- Discharge Home Medications: Flomax 0.4mg QHS, Pantoprazole 40mg QD   -- Discharge Wt: 148.1 lbs (weight on admission 177.4 lbs)  -- Discharge Cr: 1.09    Pt discharge copies detail cardiovascular history, medications, testing/treatments, OR patient has created a portal account and instructed to provide their records at their 1st appointment.               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 Admitted to cardiac tele for HFrEF exacerbation i/s/o medication noncompliance.     Pt was diuresed to euvolemia with Bumex gtt then IVP Bumex 2 mg daily, net negative ~23L. Pt did not tolerate GDMT on prior admission due to hypotension, is now on Midodrine 10mg q8h. Course c/b KAYLI on admission, likely cardiorenal, that downtrended to baseline 1.09 on discharge. PT evaluated the pt prior to discharge and recommends COMFORT. Palliative care consulted given advanced cardiac disease, patient made DNR/DNI with plan to discharge to home hospice.     Pt seen and examined at bedside this AM without any complaints or events overnight, VSS, labs and telemetry reviewed and pt stable for discharge as discussed with Dr. Barros. Pt has received appropriate discharge instructions, including medication regimen, access site management and follow up with Dr. Anton in 1-2 weeks.    Discharged with [facility] as will make care at home more manageable.     Discharge cardiac medications:   -- GDMT: Spironolactone 25 mg daily  -- Vasopressor: Midodrine 10 mg Q8  -- Diuretic: Torsemide 10mg QD    -- Discharge CV Medications: Apixaban 5mg BI  -- Discharge Home Medications: Flomax 0.4mg QHS, Pantoprazole 40mg QD   -- Discharge Wt: 148.1 lbs (weight on admission 177.4 lbs)  -- Discharge Cr: 1.09    Pt discharge copies detail cardiovascular history, medications, testing/treatments, OR patient has created a portal account and instructed to provide their records at their 1st appointment.    Cardiac Rehab (CHF): No, discharged home with home hospices.     Pt discharge copies detail cardiovascular history, medication testing/treatments OR has created a patient portal account and instructed to provider their records at their 1st appointment.  CVD (GLP-1 receptor agonist, SGLT2 inhibitor) meds discussed w/ patients and encouraged to discuss further with PMD or endo at next visit.             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 Admitted to cardiac tele for HFrEF exacerbation i/s/o medication noncompliance.     Pt was diuresed to euvolemia with Bumex gtt then IVP Bumex 2 mg daily, net negative ~23L. Pt did not tolerate GDMT on prior admission due to hypotension, is now on Midodrine 10mg q8h. Course c/b KAYLI on admission, likely cardiorenal, that downtrended to baseline 1.09 on discharge. PT evaluated the pt prior to discharge and recommends COMFORT. Palliative care consulted given advanced cardiac disease, patient made DNR/DNI with plan to discharge to home hospice after discussion with patient and family.    Pt seen and examined at bedside this AM without any complaints or events overnight, VSS, labs and telemetry reviewed and pt stable for discharge as discussed with Dr. Barros. Pt has received appropriate discharge instructions, including medication regimen, access site management and follow up with Dr. Anton in 1-2 weeks.    Discharged with [facility] as will make care at home more manageable (will be reassessed if needed by hospice team).     Discharge cardiac medications:   -- GDMT: Spironolactone 25 mg daily  -- Vasopressor: Midodrine 10 mg Q8  -- Diuretic: Torsemide 10mg QD    -- Discharge CV Medications: Apixaban 5mg BI  -- Discharge Home Medications: Flomax 0.4mg QHS, Pantoprazole 40mg QD   -- Discharge Wt: 148.1 lbs (weight on admission 177.4 lbs)  -- Discharge Cr: 1.09    Pt discharge copies detail cardiovascular history, medications, testing/treatments, OR patient has created a portal account and instructed to provide their records at their 1st appointment.    Cardiac Rehab (CHF): No, discharged home with home hospices.     Pt discharge copies detail cardiovascular history, medication testing/treatments OR has created a patient portal account and instructed to provider their records at their 1st appointment.  CVD (GLP-1 receptor agonist, SGLT2 inhibitor) meds discussed w/ patients and encouraged to discuss further with PMD or endo at next visit.

## 2024-10-13 NOTE — PROGRESS NOTE ADULT - SUBJECTIVE AND OBJECTIVE BOX
INCOMPLETE Cardiology PA Adult Progress Note    SUBJECTIVE ASSESSMENT: Met with and examined the pt at bedside this morning. He complains of some mild abd pain but otherwise states he is feeling similar to yesterday. Specifically denies any CP, SOB, palpitations, dizziness.     TELE: Paced 80's  	  MEDICATIONS:  buMETAnide Infusion 1 mG/Hr IV Continuous <Continuous>  midodrine 10 milliGRAM(s) Oral every 8 hours  pantoprazole    Tablet 40 milliGRAM(s) Oral before breakfast  polyethylene glycol 3350 17 Gram(s) Oral daily  senna 2 Tablet(s) Oral at bedtime  apixaban 5 milliGRAM(s) Oral every 12 hours  potassium chloride    Tablet ER 40 milliEquivalent(s) Oral once  tamsulosin 0.4 milliGRAM(s) Oral at bedtime    	  VITAL SIGNS:  T(C): 36.6 (10-13-24 @ 05:42), Max: 37.1 (10-12-24 @ 13:00)  HR: 78 (10-13-24 @ 06:20) (75 - 93)  BP: 103/66 (10-13-24 @ 05:42) (101/64 - 111/72)  RR: 16 (10-13-24 @ 05:42) (16 - 18)  SpO2: 96% (10-13-24 @ 06:20) (95% - 99%)  Wt(kg): --    I&O's Summary    12 Oct 2024 07:01  -  13 Oct 2024 07:00  --------------------------------------------------------  IN: 1040 mL / OUT: 6500 mL / NET: -5460 mL                                           PHYSICAL EXAM:  Appearance: Normal	  HEENT: Normal oral mucosa, PERRL, EOMI	  Neck: Supple, + JVD (improving)  Cardiovascular: Normal S1 S2, No murmurs  Respiratory: Improving bibasilar rales  Gastrointestinal:  Soft, Non-tender, + BS	  Skin: No rashes, No ecchymoses, No cyanosis  Extremities: 2+ pitting edema b/l LE to shins, 1+ edema L elbow  Vascular: Peripheral pulses palpable 2+ bilaterally  Neurologic: Non-focal  Psychiatry: A & O x 3, Mood & affect appropriate    LABS:	 	                          14.3   3.43  )-----------( 82       ( 13 Oct 2024 05:30 )             43.8     10-13    147[H]  |  105  |  25[H]  ----------------------------<  75  3.1[L]   |  28  |  1.23    Ca    8.8      13 Oct 2024 05:30  Mg     2.1     10-13    TPro  6.5  /  Alb  3.3  /  TBili  1.4[H]  /  DBili  x   /  AST  15  /  ALT  14  /  AlkPhos  127[H]  10-13   Cardiology PA Adult Progress Note    SUBJECTIVE ASSESSMENT: Met with and examined the pt at bedside this morning. He complains of some mild abd pain but otherwise states he is feeling similar to yesterday. Specifically denies any CP, SOB, palpitations, dizziness.     INTERVAL EVENTS: Net -5.4L/24hr on Bumex gtt 2mg/hr. K 3.1 this AM, repleted. Decreased Bumex gtt to 1mg/hr.     TELE: Paced 80's  	  MEDICATIONS:  buMETAnide Infusion 1 mG/Hr IV Continuous <Continuous>  midodrine 10 milliGRAM(s) Oral every 8 hours  pantoprazole    Tablet 40 milliGRAM(s) Oral before breakfast  polyethylene glycol 3350 17 Gram(s) Oral daily  senna 2 Tablet(s) Oral at bedtime  apixaban 5 milliGRAM(s) Oral every 12 hours  potassium chloride    Tablet ER 40 milliEquivalent(s) Oral once  tamsulosin 0.4 milliGRAM(s) Oral at bedtime    	  VITAL SIGNS:  T(C): 36.6 (10-13-24 @ 05:42), Max: 37.1 (10-12-24 @ 13:00)  HR: 78 (10-13-24 @ 06:20) (75 - 93)  BP: 103/66 (10-13-24 @ 05:42) (101/64 - 111/72)  RR: 16 (10-13-24 @ 05:42) (16 - 18)  SpO2: 96% (10-13-24 @ 06:20) (95% - 99%)  Wt(kg): 177.4 lbs (bed)    I&O's Summary    12 Oct 2024 07:01  -  13 Oct 2024 07:00  --------------------------------------------------------  IN: 1040 mL / OUT: 6500 mL / NET: -5460 mL                                         PHYSICAL EXAM:  Appearance: Normal	  HEENT: Normal oral mucosa, PERRL, EOMI	  Neck: Supple, + JVD (improved)  Cardiovascular: Normal S1 S2, No murmurs  Respiratory: Improving bibasilar rales  Gastrointestinal:  Soft, Non-tender, + BS	  Skin: No rashes, No ecchymoses, No cyanosis  Extremities: 2+ pitting edema b/l LE to shins, 1+ edema L elbow  Vascular: Peripheral pulses palpable 2+ bilaterally  Neurologic: Non-focal  Psychiatry: A & O x 3, Mood & affect appropriate    LABS:	 	                        14.3   3.43  )-----------( 82       ( 13 Oct 2024 05:30 )             43.8     10-13    147[H]  |  105  |  25[H]  ----------------------------<  75  3.1[L]   |  28  |  1.23    Ca    8.8      13 Oct 2024 05:30  Mg     2.1     10-13    TPro  6.5  /  Alb  3.3  /  TBili  1.4[H]  /  DBili  x   /  AST  15  /  ALT  14  /  AlkPhos  127[H]  10-13   Cardiology PA Adult Progress Note    SUBJECTIVE ASSESSMENT: Met with and examined the pt at bedside this morning. He complains of some mild abd pain but otherwise states he is feeling similar to yesterday. Specifically denies any CP, SOB, palpitations, dizziness.     INTERVAL EVENTS: Net -5.4L/24hr on Bumex gtt 2mg/hr. K 3.1 this AM, repleted. Decreased Bumex gtt to 1mg/hr. Cr improving with diuresis.     TELE: Paced 80's  	  MEDICATIONS:  buMETAnide Infusion 1 mG/Hr IV Continuous <Continuous>  midodrine 10 milliGRAM(s) Oral every 8 hours  pantoprazole    Tablet 40 milliGRAM(s) Oral before breakfast  polyethylene glycol 3350 17 Gram(s) Oral daily  senna 2 Tablet(s) Oral at bedtime  apixaban 5 milliGRAM(s) Oral every 12 hours  potassium chloride    Tablet ER 40 milliEquivalent(s) Oral once  tamsulosin 0.4 milliGRAM(s) Oral at bedtime    	  VITAL SIGNS:  T(C): 36.6 (10-13-24 @ 05:42), Max: 37.1 (10-12-24 @ 13:00)  HR: 78 (10-13-24 @ 06:20) (75 - 93)  BP: 103/66 (10-13-24 @ 05:42) (101/64 - 111/72)  RR: 16 (10-13-24 @ 05:42) (16 - 18)  SpO2: 96% (10-13-24 @ 06:20) (95% - 99%)  Wt(kg): 177.4 lbs (bed)    I&O's Summary    12 Oct 2024 07:01  -  13 Oct 2024 07:00  --------------------------------------------------------  IN: 1040 mL / OUT: 6500 mL / NET: -5460 mL                                         PHYSICAL EXAM:  Appearance: Normal	  HEENT: Normal oral mucosa, PERRL, EOMI	  Neck: Supple, + JVD (improved)  Cardiovascular: Normal S1 S2, No murmurs  Respiratory: Improving bibasilar rales  Gastrointestinal:  Soft, Non-tender, + BS	  Skin: No rashes, No ecchymoses, No cyanosis  Extremities: 2+ pitting edema b/l LE to shins, 1+ edema L elbow  Vascular: Peripheral pulses palpable 2+ bilaterally  Neurologic: Non-focal  Psychiatry: A & O x 3, Mood & affect appropriate    LABS:	 	                        14.3   3.43  )-----------( 82       ( 13 Oct 2024 05:30 )             43.8     10-13    147[H]  |  105  |  25[H]  ----------------------------<  75  3.1[L]   |  28  |  1.23    Ca    8.8      13 Oct 2024 05:30  Mg     2.1     10-13    TPro  6.5  /  Alb  3.3  /  TBili  1.4[H]  /  DBili  x   /  AST  15  /  ALT  14  /  AlkPhos  127[H]  10-13

## 2024-10-13 NOTE — DISCHARGE NOTE PROVIDER - NSDCMRMEDTOKEN_GEN_ALL_CORE_FT
apixaban 5 mg oral tablet: 1 tab(s) orally 2 times a day.  Flomax 0.4 mg oral capsule: 1 cap(s) orally once a day  midodrine 10 mg oral tablet: 1 tab(s) orally every 8 hours  pantoprazole 40 mg oral delayed release tablet: 1 tab(s) orally once a day (before a meal)  torsemide 20 mg oral tablet: 0.5 tab(s) orally once a day  Vyndamax 61 mg oral capsule: 1 cap(s) orally once a day   aluminum hydroxide-magnesium hydroxide 200 mg-200 mg/5 mL oral suspension: 30 milliliter(s) orally every 4 hours As needed Dyspepsia  apixaban 5 mg oral tablet: 1 tab(s) orally 2 times a day .  Flomax 0.4 mg oral capsule: 1 cap(s) orally once a day  midodrine 10 mg oral tablet: 1 tab(s) orally every 8 hours  oxyBUTYnin 5 mg oral tablet: 1 tab(s) orally 2 times a day  pantoprazole 40 mg oral delayed release tablet: 1 tab(s) orally once a day (before a meal)  polyethylene glycol 3350 oral powder for reconstitution: 17 gram(s) orally once a day  senna leaf extract oral tablet: 2 tab(s) orally once a day (at bedtime)  spironolactone 25 mg oral tablet: 1 tab(s) orally once a day  torsemide 10 mg oral tablet: 1 tab(s) orally once a day

## 2024-10-13 NOTE — DISCHARGE NOTE PROVIDER - CARE PROVIDER_API CALL
Yara Anton  Interventional Cardiology  [address] [place] [address], [address]  [place] [address]  Phone: [phone]  Fax: [phone]  Follow Up Time: 2 weeks

## 2024-10-13 NOTE — PROGRESS NOTE ADULT - PROBLEM SELECTOR PLAN 3
hx of ablation and s/p [employer] CRT-P.  - Currently paced rhythm  - AC: Eliquis 5mg PO BID hx of ablation and s/p [employer] CRT-P  - Currently paced rhythm  - AC: Eliquis 5mg PO BID

## 2024-10-13 NOTE — PROGRESS NOTE ADULT - PROBLEM SELECTOR PLAN 7
Continue Tamsulosin 0.4mg PO qhs.      N: DASH with consistent carb and 1.5L fluid restriction  E: Maintain K>4.0 and Mg>2.0  VTE PPx: on Eliquis  Code: DNR/DNI  PT: Recs pending    Case discussed with Dr. Reyes Continue Tamsulosin 0.4mg PO qhs.      N: DASH with consistent carb and 1.5L fluid restriction  E: Maintain K>4.0 and Mg>2.0  VTE PPx: Eliquis  Code: DNR/DNI  PT: Recs pending    Case discussed with Dr. Reyes

## 2024-10-13 NOTE — PROGRESS NOTE ADULT - ASSESSMENT
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 7/7-7/18/24 with AHRF 2/2 COVID PNA and HFrEF exacerbation who presented with SOB and b/l LE edema x a few days. Admitted to cardiac tele for HFrEF exacerbation i/s/o medication noncompliance. Currently on IV diuresis with bumex gtt.

## 2024-10-13 NOTE — PROGRESS NOTE ADULT - PROBLEM SELECTOR PLAN 1
+JVD, bibasilar crackles, LE cool with 2-3+ pitting LE edema to shins. SpO2 96% on RA.   - Etiology: TTR amyloid, nonobs CAD by cath @ [facility] in 2014  - CXR pulm vasc congestion & R pleural effusion, BNP 26k   - TTE (7/9/24): EF 30-35%, severe symmetric LVH, RVH present, mod-severely reduced RVWF, severe HYACINTH, small pericardial effusion w/o evidence of tamponade  - Diuresis: Bumex gtt 1mg/hr with goal net -2-3L  - GDMT: Did not tolerate prior admission due to hypotension   - Core measures, daily weights, strict I&Os  - Daily CMP & lactate +JVD, bibasilar crackles, LE cool with 2-3+ pitting LE edema to shins. SpO2 96% on RA.   - Etiology: TTR amyloid, nonobs CAD by cath @ [facility] in 2014  - CXR pulm vasc congestion & R pleural effusion, BNP 26k   - TTE (7/9/24): EF 30-35%, severe symmetric LVH, RVH present, mod-severely reduced RVSF, severe HYACINTH, small pericardial effusion w/o tamponade  - Diuresis: Bumex gtt 1mg/hr with goal net -2-3L  - GDMT: Did not tolerate prior admission due to hypotension, on midodrine 10mg Q8h  - Core measures, daily weights, strict I&Os  - Daily CMP & lactate

## 2024-10-13 NOTE — PROGRESS NOTE ADULT - PROBLEM SELECTOR PLAN 2
Continue Tafamidis 61mg PO daily (nonformulary) Continue Tafamidis 61mg PO daily (nonformulary)    ##Hypokalemia  - K 3.1 on 10/13 likely due to diuresis (neg 5L/24hr)  - Repleted w/ improvement

## 2024-10-13 NOTE — DISCHARGE NOTE PROVIDER - NSDCFUSCHEDAPPT_GEN_ALL_CORE_FT
[facility]  HEARTVASC [address]  Scheduled Appointment: 10/16/2024    Jimi Pemberton  [facility]  HEARTVASC [address]  Scheduled Appointment: 11/11/2024    Dominic Bradley  [facility]  HEARTVASC [address]  Scheduled Appointment: 12/10/2024    Tanja Friedman  [facility]  HEARTFAIL [address]  Scheduled Appointment: 12/13/2024     Jimi Pemberton  [facility] include 234 beds at the [facility]  HEARTVASC [address]  Scheduled Appointment: 11/11/2024    Dominic Bradley  [facility]  HEARTVASC [address]  Scheduled Appointment: 12/10/2024    Tanja Friedman  [facility]  HEARTFAIL [address]  Scheduled Appointment: 12/13/2024    [facility]  HEARTVASC [address]  Scheduled Appointment: 01/15/2025

## 2024-10-14 DIAGNOSIS — Z51.5 ENCOUNTER FOR PALLIATIVE CARE: ICD-10-CM

## 2024-10-14 DIAGNOSIS — R53.81 OTHER MALAISE: ICD-10-CM

## 2024-10-14 DIAGNOSIS — Z71.89 OTHER SPECIFIED COUNSELING: ICD-10-CM

## 2024-10-14 LAB
ALBUMIN SERPL ELPH-MCNC: 3.4 G/DL — SIGNIFICANT CHANGE UP (ref 3.3–5)
ALP SERPL-CCNC: 127 U/L — HIGH (ref 40–120)
ALT FLD-CCNC: 14 U/L — SIGNIFICANT CHANGE UP (ref 10–45)
ANION GAP SERPL CALC-SCNC: 14 MMOL/L — SIGNIFICANT CHANGE UP (ref 5–17)
AST SERPL-CCNC: 16 U/L — SIGNIFICANT CHANGE UP (ref 10–40)
BILIRUB SERPL-MCNC: 1.2 MG/DL — SIGNIFICANT CHANGE UP (ref 0.2–1.2)
BUN SERPL-MCNC: 26 MG/DL — HIGH (ref 7–23)
CALCIUM SERPL-MCNC: 8.7 MG/DL — SIGNIFICANT CHANGE UP (ref 8.4–10.5)
CHLORIDE SERPL-SCNC: 105 MMOL/L — SIGNIFICANT CHANGE UP (ref 96–108)
CO2 SERPL-SCNC: 28 MMOL/L — SIGNIFICANT CHANGE UP (ref 22–31)
CREAT SERPL-MCNC: 1.11 MG/DL — SIGNIFICANT CHANGE UP (ref 0.5–1.3)
EGFR: 68 ML/MIN/1.73M2 — SIGNIFICANT CHANGE UP
GLUCOSE SERPL-MCNC: 111 MG/DL — HIGH (ref 70–99)
HCT VFR BLD CALC: 45.8 % — SIGNIFICANT CHANGE UP (ref 39–50)
HGB BLD-MCNC: 14.5 G/DL — SIGNIFICANT CHANGE UP (ref 13–17)
LACTATE SERPL-SCNC: 2.7 MMOL/L — HIGH (ref 0.5–2)
MAGNESIUM SERPL-MCNC: 2.1 MG/DL — SIGNIFICANT CHANGE UP (ref 1.6–2.6)
MCHC RBC-ENTMCNC: 28.8 PG — SIGNIFICANT CHANGE UP (ref 27–34)
MCHC RBC-ENTMCNC: 31.7 GM/DL — LOW (ref 32–36)
MCV RBC AUTO: 90.9 FL — SIGNIFICANT CHANGE UP (ref 80–100)
NRBC # BLD: 0 /100 WBCS — SIGNIFICANT CHANGE UP (ref 0–0)
PLATELET # BLD AUTO: 75 K/UL — LOW (ref 150–400)
POTASSIUM SERPL-MCNC: 4 MMOL/L — SIGNIFICANT CHANGE UP (ref 3.5–5.3)
POTASSIUM SERPL-SCNC: 4 MMOL/L — SIGNIFICANT CHANGE UP (ref 3.5–5.3)
PROT SERPL-MCNC: 6.6 G/DL — SIGNIFICANT CHANGE UP (ref 6–8.3)
RBC # BLD: 5.04 M/UL — SIGNIFICANT CHANGE UP (ref 4.2–5.8)
RBC # FLD: 16 % — HIGH (ref 10.3–14.5)
SODIUM SERPL-SCNC: 147 MMOL/L — HIGH (ref 135–145)
WBC # BLD: 2.69 K/UL — LOW (ref 3.8–10.5)
WBC # FLD AUTO: 2.69 K/UL — LOW (ref 3.8–10.5)

## 2024-10-14 PROCEDURE — 99233 SBSQ HOSP IP/OBS HIGH 50: CPT | Mod: GC

## 2024-10-14 PROCEDURE — 99223 1ST HOSP IP/OBS HIGH 75: CPT

## 2024-10-14 PROCEDURE — 99233 SBSQ HOSP IP/OBS HIGH 50: CPT

## 2024-10-14 RX ORDER — ACETAMINOPHEN 500 MG
650 TABLET ORAL ONCE
Refills: 0 | Status: COMPLETED | OUTPATIENT
Start: 2024-10-14 | End: 2024-10-14

## 2024-10-14 RX ADMIN — APIXABAN 5 MILLIGRAM(S): 5 TABLET, FILM COATED ORAL at 05:59

## 2024-10-14 RX ADMIN — Medication 2 TABLET(S): at 21:28

## 2024-10-14 RX ADMIN — Medication 5 MG/HR: at 06:07

## 2024-10-14 RX ADMIN — Medication 0.4 MILLIGRAM(S): at 21:28

## 2024-10-14 RX ADMIN — Medication 5 MG/HR: at 17:32

## 2024-10-14 RX ADMIN — MIDODRINE HYDROCHLORIDE 10 MILLIGRAM(S): 2.5 TABLET ORAL at 21:28

## 2024-10-14 RX ADMIN — MIDODRINE HYDROCHLORIDE 10 MILLIGRAM(S): 2.5 TABLET ORAL at 17:34

## 2024-10-14 RX ADMIN — PANTOPRAZOLE SODIUM 40 MILLIGRAM(S): 40 TABLET, DELAYED RELEASE ORAL at 05:59

## 2024-10-14 RX ADMIN — APIXABAN 5 MILLIGRAM(S): 5 TABLET, FILM COATED ORAL at 17:34

## 2024-10-14 RX ADMIN — MIDODRINE HYDROCHLORIDE 10 MILLIGRAM(S): 2.5 TABLET ORAL at 05:59

## 2024-10-14 NOTE — CONSULT NOTE ADULT - PROBLEM SELECTOR RECOMMENDATION 3
.  Chronic, progressive disease  -hospice eligible and appropriate  -patient/family would need to forego further Tafamidis Rx's if enrolling in hospice (would be able to continue any tablets they have left but hospice will not refill)

## 2024-10-14 NOTE — PROGRESS NOTE ADULT - ASSESSMENT
79 yr old M, DNR/DNI, with PMHx of advanced TTR cardiac amyloid, NICM w/ HFrEF (35%) s/p CRT-P, Group II PH, Afib s/p ablation (on Eliquis), borderline DM, Stage III CKD, idiopathic thrombocytopenia, prostate CA s/p chemo, pituitary adenoma (s/p transphenoidal treatment in 1990's), right sided pleural effusion s/p thoracentesis c/b PTX (2022), admitted for decompensated HF in the setting of FTT and medication noncompliance. HF consulted for further management.    Review of Studies:  07/12/24: EF 35%, moderate-to-severe increased LV wall thickness, moderately reduced RV function, severely   dilated LA/RA, small pericardial effusion  02/28/22: EF 37%, severely increased LV wall thickness, mildly decreased RV function, dilated LA, dilated RA,   mild MR, mild TR, PASP 40 mmHg, small pericardial effusion.    Recommendations:    #TTR amyloidosis   #Acute on Chronic Systolic Heart Failure  - ACC/AHA stage C-D  - Etiology: amyloidosis  - GDMT:         - RAASi/MRA/BB: hold off due to restrictive CM and hypotension        - SGLT2: was on Farxiga 10mg qD outpt, will plan to resume this admission  - Diuretics: c/w Bumex gtt at 1mg/hr (goal net -2-3L/day)  - Devices: s/p CRT-P  - strict I/O's  - daily standing weights  - appreciate palliative care consult given advanced, end-stage CM and recent FTT - pt would qualify for hospice    #AFib s/p ablation  - c/w Eliquis 5mg BID    #KAYLI on CKD  - bl Cr 0.9 per outpt cardiologist in 9/2024  - Cr up to 1.5 on admission but now improving       Recommendations above are preliminary pending discussion with an attending cardiologist    Keren Guerrero   Cardiovascular Disease Fellow

## 2024-10-14 NOTE — PHYSICAL THERAPY INITIAL EVALUATION ADULT - PERTINENT HX OF CURRENT PROBLEM, REHAB EVAL
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 7/7-7/18/24 with AHRF 2/2 COVID PNA and HFrEF exacerbation who presented with SOB and b/l LE edema x a few days. Admitted to cardiac tele for HFrEF exacerbation i/s/o medication noncompliance. Currently on IV diuresis with bumex gtt.

## 2024-10-14 NOTE — CONSULT NOTE ADULT - PROBLEM SELECTOR RECOMMENDATION 5
.  Complex medical decision making / symptom management in the setting of advanced cardiac disease.    Will continue to follow for ongoing GOC discussion / titration of palliative regimen. Emotional support provided, questions answered.  Active Psychosocial Referrals:  [x]Social Work/Case management [x]PT/OT []Chaplaincy [x]Hospice  []Patient/Family Support []Holistic RN [x]Massage Therapy [x]Music Therapy []Ethics  Coping: [x] well [] with difficulty [] poor coping [] unable to assess  Support system: [] strong [x] adequate [] inadequate    For new or uncontrolled symptoms, please call Palliative Care at [phone]). The service is available 24/7 (including nights & weekends) to provide symptom management recommendations over the phone as appropriate

## 2024-10-14 NOTE — PROGRESS NOTE ADULT - PROBLEM SELECTOR PLAN 2
Continue Tafamidis 61mg PO daily (nonformulary)    ##Hypokalemia  - K 3.1 on 10/13 likely due to diuresis (neg 5L/24hr)  - Repleted w/ improvement Continue Tafamidis 61mg PO daily (nonformulary)    ##RESOLVED Hypokalemia  - Current K 4.7  - K 3.1 on 10/13 likely due to diuresis (neg 5L/24hr)  - Repleted w/ improvement

## 2024-10-14 NOTE — CONSULT NOTE ADULT - ASSESSMENT
Full Note to Follow    ·	no acute symptom management needs  ·	provided medical updates and emotional support to patient's family  ·	introduced the role of home hospice services, family is not inclined to pursue COMFORT  ·	family will discuss tonight and will plan for further conversation tomorrow  ·	patient/family would need to forego further Tafamidis Rx's if enrolling in hospice (would be able to continue any tablets they have left but hospice will not refill) 80yo M with PMH of Cardiac Amyloidosis, CHF, AFib, CKD, pHTN, T2DM, and h/o Prostate CA p/w SOB and found to have acute decompensated heart failure. Palliative consulted for complex medical decision making in the setting of advanced cardiac disease.    ·	no acute symptom management needs  ·	provided medical updates and emotional support to patient's family  ·	introduced the role of home hospice services, family is not inclined to pursue COMFORT  ·	family will discuss tonight and will plan for further conversation tomorrow  ·	patient/family would need to forego further Tafamidis Rx's if enrolling in hospice (would be able to continue any tablets they have left but hospice will not refill)  ·	would maintain delacruz for now pending decision regarding home hospice; if hospice referral is pursued then delacruz will help make care at home more manageable

## 2024-10-14 NOTE — PROGRESS NOTE ADULT - PROBLEM SELECTOR PLAN 4
Baseline Cr 1.2-1.3.   - BUN/Cr 28/1.62 on admission likely cardiorenal, now back to baseline with diuresis  - Avoid nephrotoxic agents, renally dose meds

## 2024-10-14 NOTE — CONSULT NOTE ADULT - SUBJECTIVE AND OBJECTIVE BOX
[facility] Palliative [facility]  Nahum Qureshi, Palliative Care Attending  Contact Info: Call [phone] (HEAL Line) or message on [employer] Teams (Nahum Qureshi)    HPI:  79 yr old M, DNR/DNI, with PMHx of borderline DM, Stage III CKD, idiopathic thrombocytopenia, prostate CA s/p chemo, pituitary adenoma (s/p transphenoidal treatment in 1990's), advanced TTR amyloid, NICM w/ HFrEF (35%) s/p CRT-P, Group II PH, Afib s/p ablation (on Eliquis), right sided pleural effusion s/p thoracentesis c/b PTX (2022), recent admission to [facility] 7/7-7/18/24 with AHRF 2/2 COVID PNA and HFrEF exacerbation s/p diuresis c/b hypotension requiring brief vasopressor support. Patient now returns to [facility] ED 10/10/24 with progressively worsening SOB and bilateral LE edema x few days. Patient states he cannot walk >10-15 feet prior to becoming winded. Per patients daughter, patient has been noncompliant with his Torsemide since discharge from [facility] ~1 week ago. Patient denies any N/V, diaphoresis, palpitations, dizziness, chest pain, syncope, recent travel or sick contacts. In ED, BP: 114/89, HR: 80s, RR:18, Temp: 97.4F, O2 sat: 99% on 2L NC. EKG paced, no ischemic changes noted. CXR with pulmonary vascular congestion and right sided pleural effusion. Labs notable for: Platelets 81k, K 3.6, BUN/Cr 28/1.62, Alk phos 139, Trop T  167, BNP 97712 (15,030 in 7/2024), Trop T  167. COVID/Flu/RSV negative. Patient treated with Lasix 80mg IV x 1 dose and Kdur 40mEq PO x 2 doses in ED. Patient currently stable, admitted to cardiac telemetry for management of acute on chronic HFrEF in setting of medication non-adherence.    Patient seen and examined at bedside. Appears overtly comfortable. Denies any significant complaint. Comprehensive symptom assessment and GOC exploration as noted below. Further collateral obtained from primary team, chart, and family. Known to service/provider from previous admission in July. During previous admission, patient participated in GOC discussion but deferred actual advance care planning decisions to his family. At the time, patient met criteria for home hospice but difficult to assess how much of his decompensation was acute related to COVID/sepsis vs ongoing chronic decline from CHF. Ultimately, family opted for COMFORT as patient was too debilitated to be managed at home but also was worthwhile to give the patient an opportunity to functionally recover from his infection.    PERTINENT PM/SXH:   Congestive heart failure (CHF)  Diabetes  Atrial flutter  Chronic venous insufficiency of lower extremity  Prostate CA  Stage 3 chronic kidney disease  Heart failure with mid-range ejection fraction  Type 2 diabetes mellitus  Thrombocytopenia  Acute on chronic systolic congestive heart failure  Chronic atrial fibrillation  Atrial flutter  History of surgical removal of pituitary gland  S/P TURP    FAMILY HISTORY:  No history of CHF in first degree relatives    ITEMS NOT CHECKED ARE NOT PRESENT  SOCIAL HISTORY:   Significant other/partner:  [x]  Children:  [x]  Substance hx:  []   Tobacco hx:  []   Alcohol hx: []   Home Opioid hx:  [] I-Stop Reference No:  - no active Rx's  Living Situation: [x]Home  []Long term care  []Rehab []Other  Sabianist/Spiritual practice: ; Role of organized Yarsanism [] important [] some [] unable to assess    ADVANCE DIRECTIVES:    [x]MOLST  []Living Will  DECISION MAKER(s):  [] Health Care Proxy(s)  [x] Surrogate(s)  [] Guardian           Name(s)/Phone Number(s): Wife - Mariluz, [phone] -> but family was collectively making decisions    BASELINE (I)ADLs (prior to admission):  Casey: []Total  [x] Moderate []Dependent    ALLERGIES:  No Known Allergies    MEDICATIONS  (STANDING):  apixaban 5 milliGRAM(s) Oral every 12 hours  buMETAnide Infusion 1 mG/Hr (5 mL/Hr) IV Continuous <Continuous>  midodrine 10 milliGRAM(s) Oral every 8 hours  pantoprazole    Tablet 40 milliGRAM(s) Oral before breakfast  polyethylene glycol 3350 17 Gram(s) Oral daily  senna 2 Tablet(s) Oral at bedtime  Tafamidis (Vyndamax) 61 milliGRAM(s) 61 milliGRAM(s) Oral daily  tamsulosin 0.4 milliGRAM(s) Oral at bedtime    MEDICATIONS  (PRN):    Analgesic Use (Scheduled and PRNs) for past 24 hours:  - none    PRESENT SYMPTOMS: []Unable to obtain due to poor mentation/encephalopathy  Source if other than patient:  []Family   []Team     Pain: [] yes [x] no  QOL impact -   Location -                    Aggravating Factors -  Quality -  Radiation -  Timing -  Severity (0-10 scale) -   Minimal Acceptable Level (0-10 scale) -    Other Symptoms: See ESAS Section Below  [x]All other review of systems negative     GENERAL:  [x] NAD []Alert [x]Lethargic  []Cachexia  []Unarousable  [x]Verbal  []Non-Verbal  BEHAVIORAL:   []Anxiety  []Delirium []Agitation [x]Cooperative [x]Oriented x  HEENT:  [x]Normal  [x] Moist Mucous Membranes []Dry mouth   []ET Tube/Trach  []Oral lesions  PULMONARY:   []Clear []Tachypnea  []Audible excessive secretions  [x]Normal Work of Breathing []Labored Breathing  []Rhonchi [x]Crackles []Wheezing  CARDIOVASCULAR:    [x]Regular Rate [x]Regular Rhythm []Irregular []Tachy  []Richard  GASTROINTESTINAL:  [x]Soft  []Distended   [x]+BS  [x]Non tender []Tender  []PEG []OGT/ NGT  Last BM:  GENITOURINARY:  []Normal [] Incontinent   []Oliguria/Anuria   [x]Moore  MUSCULOSKELETAL:   []Normal Extremities  [x]Weakness  []Bed/Wheelchair bound [x]Edema  NEUROLOGIC:   [x]No focal deficits  []Cognitive impairment  []Dysphagia []Dysarthria []Paresis []Encephalopathic  SKIN:   [x]Normal   []Pressure ulcer(s)  []Rash    Vital Signs Last 24 Hrs  T(C): 36.7 (14 Oct 2024 08:16), Max: 36.7 (13 Oct 2024 13:20)  T(F): 98 (14 Oct 2024 08:16), Max: 98 (13 Oct 2024 13:20)  HR: 82 (14 Oct 2024 10:16) (77 - 92)  BP: 95/66 (14 Oct 2024 10:16) (87/63 - 122/74)  BP(mean): 73 (14 Oct 2024 08:16) (73 - 91)  RR: 18 (14 Oct 2024 08:16) (16 - 18)  SpO2: 95% (14 Oct 2024 10:16) (95% - 100%)    Parameters below as of 14 Oct 2024 10:16  Patient On (Oxygen Delivery Method): nasal cannula  O2 Flow (L/min): 2    LABS: Personally reviewed and interpreted                      14.5   2.69  )-----------( 75       ( 14 Oct 2024 05:30 )             45.8   10-14    147[H]  |  105  |  26[H]  ----------------------------<  111[H]  4.0   |  28  |  1.11    Ca    8.7      14 Oct 2024 05:30  Mg     2.1     10-14  TPro  6.6  /  Alb  3.4  /  TBili  1.2  /  DBili  x   /  AST  16  /  ALT  14  /  AlkPhos  127[H]  10-14    RADIOLOGY & ADDITIONAL STUDIES: Personally reviewed and interpreted  < from: TTE Echo Complete w/o Contrast w/ Doppler (10.11.24 @ 15:41) >   1. Severe symmetric left ventricular hypertrophy.   2. Severely reduced left ventricular systolic function.   3. Severely reduced right ventricular systolic function.   4. Device leads are noted in the right heart.   5. Severe biatrial enlargement.   6. Mild mitral regurgitation.   7. Mild tricuspid regurgitation.   8. Pulmonary hypertension present, pulmonary artery systolic pressure is 48 mmHg.   9. Small pericardial effusion without echocardiographic evidence of cardiac tamponade physiology.  10. Bilateral pleural effusion.  11. Compared to the previous TTE performed on 7/9/2024, left and right ventricular function appears slightly worse.    DISCUSSION OF CASE: Family - to provide updates and emotional support; Primary Team/RN - to discuss plan of care

## 2024-10-14 NOTE — CONSULT NOTE ADULT - PROBLEM SELECTOR RECOMMENDATION 2
.  Acute exacerbation, TTE with worsening biventricular dysfunction  -c/w diuresis as per primary team/HF  -would maintain delacruz for now pending decision regarding home hospice; if hospice referral is pursued then delacruz will help make care at home more manageable

## 2024-10-14 NOTE — CONSULT NOTE ADULT - PROBLEM SELECTOR RECOMMENDATION 9
.  PPSV = 40%, requires assistance for most ADLs  -PT Eval recommending COMFORT  -c/w supportive care, OOB, encourage movement  -family's initial inclination is to forego COMFORT in favor of returning home

## 2024-10-14 NOTE — PHYSICAL THERAPY INITIAL EVALUATION ADULT - ADDITIONAL COMMENTS
As per pt, PTA he required assistance with functional mobility, ADLs, and IADLs with help from wife/daughter. Pt has a walker, cane, and shower chair. Pt with recent 3 month stay at subacute rehab, was discharged home however has been limited in mobility at home due to weakness.

## 2024-10-14 NOTE — CONSULT NOTE ADULT - CONSULT REASON
Symptom Management and Complex Medical Decision Making/GOC in the setting of Advanced Cardiomyopathy
HF

## 2024-10-14 NOTE — CONSULT NOTE ADULT - REASON FOR ADMISSION
acute on chronic HFrEF in setting of medication noncompliance.
acute on chronic HFrEF in setting of medication noncompliance.

## 2024-10-14 NOTE — CONSULT NOTE ADULT - CONVERSATION DETAILS
Spoke with patient's daughter to discuss post-hospital care and introduced the role of hospice services. Family is not inclined to have patient return to [facility] due to perceived lack of care. Delineated the benefits provided by hospice and explained the intended philosophy of care. Patient is eligible for home hospice at this time and family will consider referral. Plan for further discussion tomorrow.

## 2024-10-14 NOTE — PROGRESS NOTE ADULT - SUBJECTIVE AND OBJECTIVE BOX
HF Consult    Interval History/HPI: Pt seen and examined at bedside, NAD. Feels better overall. Denies CP, SOB.      OBJECTIVE  T(C): 36.7 (10-14-24 @ 08:16), Max: 36.7 (10-14-24 @ 08:16)  HR: 84 (10-14-24 @ 15:04) (77 - 92)  BP: 95/63 (10-14-24 @ 15:04) (87/63 - 122/74)  RR: 18 (10-14-24 @ 15:04) (17 - 18)  SpO2: 97% (10-14-24 @ 15:04) (95% - 100%)    10-13-24 @ 07:01  -  10-14-24 @ 07:00  --------------------------------------------------------  IN: 635 mL / OUT: 3325 mL / NET: -2690 mL    10-14-24 @ 07:01  -  10-14-24 @ 16:17  --------------------------------------------------------  IN: 420 mL / OUT: 970 mL / NET: -550 mL        PHYSICAL EXAM:    Constitutional: resting in bed; NAD  HEENT: NC/AT,  MMM, +JVD   Respiratory: poor inspiratory effort  Cardiac: +S1/S2; RRR; no M/R/G  Gastrointestinal: soft  Extremities: 2+ b/l LE pitting edema up to knees, warm   Neurologic: no gross focal deficits        LABS:                        14.5   2.69  )-----------( 75       ( 14 Oct 2024 05:30 )             45.8     10-14    147[H]  |  105  |  26[H]  ----------------------------<  111[H]  4.0   |  28  |  1.11    Ca    8.7      14 Oct 2024 05:30  Mg     2.1     10-14    TPro  6.6  /  Alb  3.4  /  TBili  1.2  /  DBili  x   /  AST  16  /  ALT  14  /  AlkPhos  127[H]  10-14        RADIOLOGY & ADDITIONAL TESTS:  Reviewed .    MEDICATIONS  (STANDING):  apixaban 5 milliGRAM(s) Oral every 12 hours  buMETAnide Infusion 1 mG/Hr (5 mL/Hr) IV Continuous <Continuous>  midodrine 10 milliGRAM(s) Oral every 8 hours  pantoprazole    Tablet 40 milliGRAM(s) Oral before breakfast  polyethylene glycol 3350 17 Gram(s) Oral daily  senna 2 Tablet(s) Oral at bedtime  Tafamidis (Vyndamax) 61 milliGRAM(s) 61 milliGRAM(s) Oral daily  tamsulosin 0.4 milliGRAM(s) Oral at bedtime    MEDICATIONS  (PRN):

## 2024-10-14 NOTE — CONSULT NOTE ADULT - PROBLEM SELECTOR RECOMMENDATION 4
.  Patient is DNR/DNI, MOLST in chart/Patient Window  -wife is surrogate decision maker in the absence of designated HCP but family is making decisions collectively  -plan for further discussion regarding home hospice referral tomorrow

## 2024-10-14 NOTE — PROGRESS NOTE ADULT - PROBLEM SELECTOR PLAN 7
Continue Tamsulosin 0.4mg PO qhs.      N: DASH with consistent carb and 1.5L fluid restriction  E: Maintain K>4.0 and Mg>2.0  VTE PPx: Eliquis  Code: DNR/DNI  PT: Recs pending    Case discussed with Dr. Reyes Continue Tamsulosin 0.4mg PO qhs.      N: DASH with consistent carb and 1.5L fluid restriction  E: Maintain K>4.0 and Mg>2.0  VTE PPx: Eliquis  Code: DNR/DNI  PT: Recs pending

## 2024-10-14 NOTE — PROGRESS NOTE ADULT - SUBJECTIVE AND OBJECTIVE BOX
Interventional Cardiology PA Adult Progress Note    Subjective Assessment:  	  MEDICATIONS:  buMETAnide Infusion 1 mG/Hr IV Continuous <Continuous>  midodrine 10 milliGRAM(s) Oral every 8 hours  pantoprazole    Tablet 40 milliGRAM(s) Oral before breakfast  polyethylene glycol 3350 17 Gram(s) Oral daily  senna 2 Tablet(s) Oral at bedtime  apixaban 5 milliGRAM(s) Oral every 12 hours  tamsulosin 0.4 milliGRAM(s) Oral at bedtime    	    [PHYSICAL EXAM:  TELEMETRY:  T(C): 36.7 (10-14-24 @ 08:16), Max: 36.7 (10-13-24 @ 13:20)  HR: 84 (10-14-24 @ 08:16) (73 - 92)  BP: 93/63 (10-14-24 @ 08:16) (90/65 - 122/74)  RR: 18 (10-14-24 @ 08:16) (16 - 18)  SpO2: 98% (10-14-24 @ 08:16) (95% - 100%)  Wt(kg): --  I&O's Summary    13 Oct 2024 07:01  -  14 Oct 2024 07:00  --------------------------------------------------------  IN: 635 mL / OUT: 3325 mL / NET: -2690 mL    14 Oct 2024 07:01  -  14 Oct 2024 09:56  --------------------------------------------------------  IN: 0 mL / OUT: 220 mL / NET: -220 mL        Moore:  Central/PICC/Mid Line:                                         Appearance: Normal	  HEENT:   Normal oral mucosa, PERRL, EOMI	  Neck: Supple, + JVD/ - JVD; Carotid Bruit   Cardiovascular: Normal S1 S2, No JVD, No murmurs,   Respiratory: Lungs clear to auscultation/Decreased Breath Sounds/No Rales, Rhonchi, Wheezing	  Gastrointestinal:  Soft, Non-tender, + BS	  Skin: No rashes, No ecchymoses, No cyanosis  Extremities: Normal range of motion, No clubbing, cyanosis or edema  Vascular: Peripheral pulses palpable 2+ bilaterally  Neurologic: Non-focal  Psychiatry: A & O x 3, Mood & affect appropriate                                14.5   2.69  )-----------( 75       ( 14 Oct 2024 05:30 )             45.8     10-13    145  |  107  |  26[H]  ----------------------------<  117[H]  4.7   |  28  |  1.09    Ca    8.6      13 Oct 2024 17:13  Mg     2.1     10-13    TPro  6.5  /  Alb  3.3  /  TBili  1.4[H]  /  DBili  x   /  AST  15  /  ALT  14  /  AlkPhos  127[H]  10-13         Interventional Cardiology PA Adult Progress Note    Subjective Assessment: Pt evaluated at beside this AM. Pt appears well and NAD. Denies CP, SOB, palpitation, n/v/d, fever, LOC, or headache.   	  MEDICATIONS:  buMETAnide Infusion 1 mG/Hr IV Continuous <Continuous>  midodrine 10 milliGRAM(s) Oral every 8 hours  pantoprazole    Tablet 40 milliGRAM(s) Oral before breakfast  polyethylene glycol 3350 17 Gram(s) Oral daily  senna 2 Tablet(s) Oral at bedtime  apixaban 5 milliGRAM(s) Oral every 12 hours  tamsulosin 0.4 milliGRAM(s) Oral at bedtime    	    [PHYSICAL EXAM:  TELEMETRY:  T(C): 36.7 (10-14-24 @ 08:16), Max: 36.7 (10-13-24 @ 13:20)  HR: 84 (10-14-24 @ 08:16) (73 - 92)  BP: 93/63 (10-14-24 @ 08:16) (90/65 - 122/74)  RR: 18 (10-14-24 @ 08:16) (16 - 18)  SpO2: 98% (10-14-24 @ 08:16) (95% - 100%)  Wt(kg): --  I&O's Summary    13 Oct 2024 07:01  -  14 Oct 2024 07:00  --------------------------------------------------------  IN: 635 mL / OUT: 3325 mL / NET: -2690 mL    14 Oct 2024 07:01  -  14 Oct 2024 09:56  --------------------------------------------------------  IN: 0 mL / OUT: 220 mL / NET: -220 mL        Moore:  Central/PICC/Mid Line:                                         Appearance: Normal	  HEENT:   Normal oral mucosa, PERRL, EOMI	  Neck: Supple, + JVD; Carotid Bruit   Cardiovascular: Normal S1 S2, No JVD, No murmurs,   Respiratory: Lungs clear to auscultation. bibasilar crackles  Gastrointestinal:  Soft, Non-tender, + BS	  Skin: No rashes, No ecchymoses, No cyanosis  Extremities: Normal range of motion/ 3+ pitting edema to b/l LE  Vascular: Peripheral pulses palpable 2+ bilaterally  Neurologic: Non-focal  Psychiatry: A & O x 3, Mood & affect appropriate                                14.5   2.69  )-----------( 75       ( 14 Oct 2024 05:30 )             45.8     10-13    145  |  107  |  26[H]  ----------------------------<  117[H]  4.7   |  28  |  1.09    Ca    8.6      13 Oct 2024 17:13  Mg     2.1     10-13    TPro  6.5  /  Alb  3.3  /  TBili  1.4[H]  /  DBili  x   /  AST  15  /  ALT  14  /  AlkPhos  127[H]  10-13

## 2024-10-14 NOTE — PROGRESS NOTE ADULT - PROBLEM SELECTOR PLAN 1
+JVD, bibasilar crackles, LE cool with 2-3+ pitting LE edema to shins. SpO2 96% on RA.   - Etiology: TTR amyloid, nonobs CAD by cath @ [facility] in 2014  - CXR pulm vasc congestion & R pleural effusion, BNP 26k   - TTE (7/9/24): EF 30-35%, severe symmetric LVH, RVH present, mod-severely reduced RVSF, severe HYACINTH, small pericardial effusion w/o tamponade  - Diuresis: Bumex gtt 1mg/hr with goal net -2-3L  - GDMT: Did not tolerate prior admission due to hypotension, on midodrine 10mg Q8h  - Core measures, daily weights, strict I&Os  - Daily CMP & lactate +JVD, bibasilar crackles, LE 2-3+ pitting LE edema to shins. SpO2 96% on RA.   - Etiology: TTR amyloid, nonobs CAD by cath @ [facility] in 2014  - CXR pulm vasc congestion & R pleural effusion, BNP 26k   - TTE (7/9/24): EF 30-35%, severe symmetric LVH, RVH present, mod-severely reduced RVSF, severe HYACINTH, small pericardial effusion w/o tamponade  - Diuresis: Bumex gtt 1mg/hr with goal net -2-3L  - GDMT: Did not tolerate prior admission due to hypotension, on midodrine 10mg Q8h  - Core measures, daily weights, strict I&Os  - Daily CMP & lactate

## 2024-10-14 NOTE — PROGRESS NOTE ADULT - PROBLEM SELECTOR PLAN 3
hx of ablation and s/p [employer] CRT-P  - Currently paced rhythm  - AC: Eliquis 5mg PO BID Hx of ablation and s/p [employer] CRT-P  - Currently paced rhythm  - AC: Eliquis 5mg PO BID

## 2024-10-14 NOTE — PROGRESS NOTE ADULT - ASSESSMENT
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 7/7-7/18/24 with AHRF 2/2 COVID PNA and HFrEF exacerbation who presented with SOB and b/l LE edema x a few days. Admitted to cardiac tele for HFrEF exacerbation i/s/o medication noncompliance. Currently on IV diuresis with bumex gtt.

## 2024-10-15 LAB
ALBUMIN SERPL ELPH-MCNC: 3.3 G/DL — SIGNIFICANT CHANGE UP (ref 3.3–5)
ALP SERPL-CCNC: 124 U/L — HIGH (ref 40–120)
ALT FLD-CCNC: 12 U/L — SIGNIFICANT CHANGE UP (ref 10–45)
ANION GAP SERPL CALC-SCNC: 8 MMOL/L — SIGNIFICANT CHANGE UP (ref 5–17)
AST SERPL-CCNC: 13 U/L — SIGNIFICANT CHANGE UP (ref 10–40)
BILIRUB SERPL-MCNC: 1.2 MG/DL — SIGNIFICANT CHANGE UP (ref 0.2–1.2)
BUN SERPL-MCNC: 22 MG/DL — SIGNIFICANT CHANGE UP (ref 7–23)
CALCIUM SERPL-MCNC: 8.6 MG/DL — SIGNIFICANT CHANGE UP (ref 8.4–10.5)
CHLORIDE SERPL-SCNC: 103 MMOL/L — SIGNIFICANT CHANGE UP (ref 96–108)
CO2 SERPL-SCNC: 34 MMOL/L — HIGH (ref 22–31)
CREAT SERPL-MCNC: 1.03 MG/DL — SIGNIFICANT CHANGE UP (ref 0.5–1.3)
EGFR: 75 ML/MIN/1.73M2 — SIGNIFICANT CHANGE UP
GLUCOSE SERPL-MCNC: 83 MG/DL — SIGNIFICANT CHANGE UP (ref 70–99)
HCT VFR BLD CALC: 45.1 % — SIGNIFICANT CHANGE UP (ref 39–50)
HGB BLD-MCNC: 14.1 G/DL — SIGNIFICANT CHANGE UP (ref 13–17)
LACTATE SERPL-SCNC: 1.9 MMOL/L — SIGNIFICANT CHANGE UP (ref 0.5–2)
MAGNESIUM SERPL-MCNC: 2 MG/DL — SIGNIFICANT CHANGE UP (ref 1.6–2.6)
MCHC RBC-ENTMCNC: 28.8 PG — SIGNIFICANT CHANGE UP (ref 27–34)
MCHC RBC-ENTMCNC: 31.3 GM/DL — LOW (ref 32–36)
MCV RBC AUTO: 92 FL — SIGNIFICANT CHANGE UP (ref 80–100)
NRBC # BLD: 0 /100 WBCS — SIGNIFICANT CHANGE UP (ref 0–0)
PLATELET # BLD AUTO: 84 K/UL — LOW (ref 150–400)
POTASSIUM SERPL-MCNC: 3.4 MMOL/L — LOW (ref 3.5–5.3)
POTASSIUM SERPL-SCNC: 3.4 MMOL/L — LOW (ref 3.5–5.3)
PROT SERPL-MCNC: 6.6 G/DL — SIGNIFICANT CHANGE UP (ref 6–8.3)
RBC # BLD: 4.9 M/UL — SIGNIFICANT CHANGE UP (ref 4.2–5.8)
RBC # FLD: 16.1 % — HIGH (ref 10.3–14.5)
SODIUM SERPL-SCNC: 145 MMOL/L — SIGNIFICANT CHANGE UP (ref 135–145)
WBC # BLD: 3.2 K/UL — LOW (ref 3.8–10.5)
WBC # FLD AUTO: 3.2 K/UL — LOW (ref 3.8–10.5)

## 2024-10-15 PROCEDURE — 99233 SBSQ HOSP IP/OBS HIGH 50: CPT

## 2024-10-15 PROCEDURE — 99233 SBSQ HOSP IP/OBS HIGH 50: CPT | Mod: GC

## 2024-10-15 PROCEDURE — 99232 SBSQ HOSP IP/OBS MODERATE 35: CPT

## 2024-10-15 RX ORDER — MAGNESIUM OXIDE 400 MG/1
400 TABLET ORAL ONCE
Refills: 0 | Status: DISCONTINUED | OUTPATIENT
Start: 2024-10-15 | End: 2024-10-15

## 2024-10-15 RX ORDER — POTASSIUM CHLORIDE 10 MEQ
20 TABLET, EXTENDED RELEASE ORAL ONCE
Refills: 0 | Status: COMPLETED | OUTPATIENT
Start: 2024-10-15 | End: 2024-10-15

## 2024-10-15 RX ORDER — POTASSIUM CHLORIDE 10 MEQ
20 TABLET, EXTENDED RELEASE ORAL ONCE
Refills: 0 | Status: DISCONTINUED | OUTPATIENT
Start: 2024-10-15 | End: 2024-10-15

## 2024-10-15 RX ORDER — POTASSIUM CHLORIDE 10 MEQ
40 TABLET, EXTENDED RELEASE ORAL ONCE
Refills: 0 | Status: COMPLETED | OUTPATIENT
Start: 2024-10-15 | End: 2024-10-15

## 2024-10-15 RX ORDER — POTASSIUM CHLORIDE 10 MEQ
40 TABLET, EXTENDED RELEASE ORAL ONCE
Refills: 0 | Status: DISCONTINUED | OUTPATIENT
Start: 2024-10-15 | End: 2024-10-15

## 2024-10-15 RX ADMIN — MIDODRINE HYDROCHLORIDE 10 MILLIGRAM(S): 2.5 TABLET ORAL at 22:00

## 2024-10-15 RX ADMIN — MIDODRINE HYDROCHLORIDE 10 MILLIGRAM(S): 2.5 TABLET ORAL at 13:04

## 2024-10-15 RX ADMIN — APIXABAN 5 MILLIGRAM(S): 5 TABLET, FILM COATED ORAL at 05:38

## 2024-10-15 RX ADMIN — APIXABAN 5 MILLIGRAM(S): 5 TABLET, FILM COATED ORAL at 17:52

## 2024-10-15 RX ADMIN — Medication 20 MILLIEQUIVALENT(S): at 13:05

## 2024-10-15 RX ADMIN — PANTOPRAZOLE SODIUM 40 MILLIGRAM(S): 40 TABLET, DELAYED RELEASE ORAL at 06:33

## 2024-10-15 RX ADMIN — MIDODRINE HYDROCHLORIDE 10 MILLIGRAM(S): 2.5 TABLET ORAL at 05:38

## 2024-10-15 RX ADMIN — Medication 0.4 MILLIGRAM(S): at 22:00

## 2024-10-15 RX ADMIN — Medication 40 MILLIEQUIVALENT(S): at 13:04

## 2024-10-15 NOTE — DIETITIAN INITIAL EVALUATION ADULT - NSFNSPHYEXAMSKINFT_GEN_A_CORE
Skin: No pressure ulcers noted at this time, 2+BL hip Edema, 3+ L arm Edeam, 3+R wrist Edema, 4+BL leg ankle and foot Edema

## 2024-10-15 NOTE — DIETITIAN INITIAL EVALUATION ADULT - PROBLEM SELECTOR PLAN 2
Trop T 167, CK and CKMB normal.  --likely demand in setting of CHF exacerbation.  --pt w/ known NICM, nonobstructive coronaries by [email or web address] in 2014.  --F/U second set this AM.

## 2024-10-15 NOTE — DIETITIAN INITIAL EVALUATION ADULT - NS FNS DIET ORDER
Diet, DASH/TLC:   Sodium & Cholesterol Restricted  Supplement Feeding Modality:  Oral  Ensure Enlive Cans or Servings Per Day:  1       Frequency:  Daily (10-14-24 @ 15:39)

## 2024-10-15 NOTE — PROGRESS NOTE ADULT - SUBJECTIVE AND OBJECTIVE BOX
[facility]  Nahum Qureshi, Palliative Care Attending  Contact Info: Call [phone] (HEAL Line) or message on Microsoft Teams (Nahum Qureshi)    SUBJECTIVE AND OBJECTIVE:  INTERVAL HPI/OVERNIGHT EVENTS: Interval events noted. See patient's PRN use for the past 24hrs noted below. Comprehensive symptom assessment and GOC exploration as noted below. Extensive time spent discussing plan of care with patient/family.     ALLERGIES:  No Known Allergies    MEDICATIONS  (STANDING):  apixaban 5 milliGRAM(s) Oral every 12 hours  buMETAnide Infusion 1 mG/Hr (5 mL/Hr) IV Continuous <Continuous>  midodrine 10 milliGRAM(s) Oral every 8 hours  pantoprazole    Tablet 40 milliGRAM(s) Oral before breakfast  polyethylene glycol 3350 17 Gram(s) Oral daily  senna 2 Tablet(s) Oral at bedtime  Tafamidis (Vyndamax) 61 milliGRAM(s) 61 milliGRAM(s) Oral daily  tamsulosin 0.4 milliGRAM(s) Oral at bedtime    MEDICATIONS  (PRN):      Analgesic Use (Scheduled and PRNs) for past 24 hours:    Tafamidis (Vyndamax) 61 milliGRAM(s)   61 milliGRAM(s) Oral (10-15-24 @ 13:00)      ITEMS UNCHECKED ARE NOT PRESENT  PRESENT SYMPTOMS/REVIEW OF SYSTEMS: []Unable to obtain due to poor mentation   Source if other than patient:  []Family   []Team         Vital Signs Last 24 Hrs  T(C): 36.3 (15 Oct 2024 10:53), Max: 36.3 (14 Oct 2024 20:47)  T(F): 97.3 (15 Oct 2024 10:53), Max: 97.3 (14 Oct 2024 20:47)  HR: 88 (15 Oct 2024 11:59) (81 - 88)  BP: 88/60 (15 Oct 2024 11:59) (88/60 - 102/70)  BP(mean): 69 (15 Oct 2024 11:59) (69 - 82)  RR: 18 (15 Oct 2024 11:59) (18 - 20)  SpO2: 94% (15 Oct 2024 11:59) (93% - 96%)    Parameters below as of 15 Oct 2024 11:59  Patient On (Oxygen Delivery Method): nasal cannula  O2 Flow (L/min): 1      LABS: Personally reviewed and interpreted                        14.1   3.20  )-----------( 84       ( 15 Oct 2024 05:30 )             45.1   10-15    145  |  103  |  22  ----------------------------<  83  3.4[L]   |  34[H]  |  1.03    Ca    8.6      15 Oct 2024 05:30  Mg     2.0     10-15    TPro  6.6  /  Alb  3.3  /  TBili  1.2  /  DBili  x   /  AST  13  /  ALT  12  /  AlkPhos  124[H]  10-15      RADIOLOGY & ADDITIONAL STUDIES: Personally reviewed and interpreted  None new    DISCUSSION OF CASE: Family - to provide updates and emotional support; Primary Team/RN - to discuss plan of care [facility]  Nahum Qureshi, Palliative Care Attending  Contact Info: Call [phone] (HEAL Line) or message on Microsoft Teams (Nahum Qureshi)    SUBJECTIVE AND OBJECTIVE:  INTERVAL HPI/OVERNIGHT EVENTS: Interval events noted. Patient offers no complaints. Diuresing well. See patient's PRN use for the past 24hrs noted below. Comprehensive symptom assessment and GOC exploration as noted below. Extensive time spent discussing plan of care with patient/family.     ALLERGIES:  No Known Allergies    MEDICATIONS  (STANDING):  apixaban 5 milliGRAM(s) Oral every 12 hours  buMETAnide Infusion 1 mG/Hr (5 mL/Hr) IV Continuous <Continuous>  midodrine 10 milliGRAM(s) Oral every 8 hours  pantoprazole    Tablet 40 milliGRAM(s) Oral before breakfast  polyethylene glycol 3350 17 Gram(s) Oral daily  senna 2 Tablet(s) Oral at bedtime  Tafamidis (Vyndamax) 61 milliGRAM(s) 61 milliGRAM(s) Oral daily  tamsulosin 0.4 milliGRAM(s) Oral at bedtime    MEDICATIONS  (PRN):      Analgesic Use (Scheduled and PRNs) for past 24 hours:    Tafamidis (Vyndamax) 61 milliGRAM(s)   61 milliGRAM(s) Oral (10-15-24 @ 13:00)      ITEMS UNCHECKED ARE NOT PRESENT  PRESENT SYMPTOMS/REVIEW OF SYSTEMS: []Unable to obtain due to poor mentation   Source if other than patient:  []Family   []Team     Pain: [] yes [x] no  QOL impact -   Location -                    Aggravating Factors -  Quality -  Radiation -  Timing -  Severity (0-10 scale) -   Minimal Acceptable Level (0-10 scale) -    Other Symptoms: See ESAS Section Below  [x]All other review of systems negative     GENERAL:  [x] NAD []Alert [x]Lethargic  []Cachexia  []Unarousable  [x]Verbal  []Non-Verbal  BEHAVIORAL:   []Anxiety  []Delirium []Agitation [x]Cooperative [x]Oriented x  HEENT:  [x]Normal  [x] Moist Mucous Membranes []Dry mouth   []ET Tube/Trach  []Oral lesions  PULMONARY:   []Clear []Tachypnea  []Audible excessive secretions  [x]Normal Work of Breathing []Labored Breathing  []Rhonchi [x]Crackles []Wheezing  CARDIOVASCULAR:    [x]Regular Rate [x]Regular Rhythm []Irregular []Tachy  []Richard  GASTROINTESTINAL:  [x]Soft  []Distended   [x]+BS  [x]Non tender []Tender  []PEG []OGT/ NGT  Last BM:  GENITOURINARY:  []Normal [] Incontinent   []Oliguria/Anuria   [x]Moore  MUSCULOSKELETAL:   []Normal Extremities  [x]Weakness  []Bed/Wheelchair bound [x]Edema  NEUROLOGIC:   [x]No focal deficits  []Cognitive impairment  []Dysphagia []Dysarthria []Paresis []Encephalopathic  SKIN:   [x]Normal   []Pressure ulcer(s)  []Rash    Vital Signs Last 24 Hrs  T(C): 36.3 (15 Oct 2024 10:53), Max: 36.3 (14 Oct 2024 20:47)  T(F): 97.3 (15 Oct 2024 10:53), Max: 97.3 (14 Oct 2024 20:47)  HR: 88 (15 Oct 2024 11:59) (81 - 88)  BP: 88/60 (15 Oct 2024 11:59) (88/60 - 102/70)  BP(mean): 69 (15 Oct 2024 11:59) (69 - 82)  RR: 18 (15 Oct 2024 11:59) (18 - 20)  SpO2: 94% (15 Oct 2024 11:59) (93% - 96%)    Parameters below as of 15 Oct 2024 11:59  Patient On (Oxygen Delivery Method): nasal cannula  O2 Flow (L/min): 1      LABS: Personally reviewed and interpreted                        14.1   3.20  )-----------( 84       ( 15 Oct 2024 05:30 )             45.1   10-15    145  |  103  |  22  ----------------------------<  83  3.4[L]   |  34[H]  |  1.03    Ca    8.6      15 Oct 2024 05:30  Mg     2.0     10-15    TPro  6.6  /  Alb  3.3  /  TBili  1.2  /  DBili  x   /  AST  13  /  ALT  12  /  AlkPhos  124[H]  10-15      RADIOLOGY & ADDITIONAL STUDIES: Personally reviewed and interpreted  None new    DISCUSSION OF CASE: Family - to provide updates and emotional support; Primary Team/RN - to discuss plan of care

## 2024-10-15 NOTE — DIETITIAN INITIAL EVALUATION ADULT - PROBLEM SELECTOR PLAN 1
+JVD, diffuse crackles, distended abdomen, LE cool with 2-3+ pitting LE edema extending into thighs.  --CXR w/ pulmonary vascular congestion and right pleural effusion, BNP 30067.  --received Lasix 80mg IV x 1 dose in ED with 500cc UOP.  --DIURESIS: will start Bumex 1mg IV q 12hrs (on Torsemide 10mg PO daily at home).  --GDMT: discontinued during prior admission 7/2024 in setting of hypotension.  --CORE MEASURES: strict I/Os and daily weights.  --F/U Lactate level this AM. (CCU fellow Dr. Kennedy informed given c/f low output state in setting of cool extremities, soft SBPs and elevated Alk phos)      **Prior TTE (7/9/24): EF 30-35%, severe symmetric LVH, RVH present, mod-severely reduced RV systolic function, severe biatrial enlargement, small pericardial effusion without echo evidence of cardiac tamponade physiology.

## 2024-10-15 NOTE — DIETITIAN INITIAL EVALUATION ADULT - PERTINENT LABORATORY DATA
10-15    145  |  103  |  22  ----------------------------<  83  3.4[L]   |  34[H]  |  1.03    Ca    8.6      15 Oct 2024 05:30  Mg     2.0     10-15    TPro  6.6  /  Alb  3.3  /  TBili  1.2  /  DBili  x   /  AST  13  /  ALT  12  /  AlkPhos  124[H]  10-15  A1C with Estimated Average Glucose Result: 5.7 % (10-11-24 @ 05:30)  A1C with Estimated Average Glucose Result: 5.8 % (07-09-24 @ 05:30)

## 2024-10-15 NOTE — PROGRESS NOTE ADULT - PROBLEM SELECTOR PLAN 7
Continue Tamsulosin 0.4mg PO qhs.      N: DASH with consistent carb and 1.5L fluid restriction  E: Maintain K>4.0 and Mg>2.0  VTE PPx: Eliquis  Code: DNR/DNI  PT: Recs pending

## 2024-10-15 NOTE — PROGRESS NOTE ADULT - PROBLEM SELECTOR PLAN 4
.  Patient is DNR/DNI, MOLST in chart/Patient Window  -wife is surrogate decision maker in the absence of designated HCP but family is making decisions collectively

## 2024-10-15 NOTE — DIETITIAN INITIAL EVALUATION ADULT - PERTINENT MEDS FT
MEDICATIONS  (STANDING):  apixaban 5 milliGRAM(s) Oral every 12 hours  buMETAnide Infusion 1 mG/Hr (5 mL/Hr) IV Continuous <Continuous>  midodrine 10 milliGRAM(s) Oral every 8 hours  pantoprazole    Tablet 40 milliGRAM(s) Oral before breakfast  polyethylene glycol 3350 17 Gram(s) Oral daily  senna 2 Tablet(s) Oral at bedtime  Tafamidis (Vyndamax) 61 milliGRAM(s) 61 milliGRAM(s) Oral daily  tamsulosin 0.4 milliGRAM(s) Oral at bedtime    MEDICATIONS  (PRN):

## 2024-10-15 NOTE — DIETITIAN INITIAL EVALUATION ADULT - LAB (SPECIFY)
monitor BMP, CBC, glucose, lytes (please check phosphorus) - Replete PRN, trend renal indices, LFTs

## 2024-10-15 NOTE — PROGRESS NOTE ADULT - PROBLEM SELECTOR PLAN 2
Continue Tafamidis 61mg PO daily (nonformulary)    ##RESOLVED Hypokalemia  - Current K 3.4  - Repleted

## 2024-10-15 NOTE — DIETITIAN INITIAL EVALUATION ADULT - PROBLEM SELECTOR PLAN 6
Platelets 81k (baseline 60-70k in the past).  --s/p bone marrow biopsy (11/23/21) for evaluation of thrombocytopenia; however, bone marrow specimen was limited.  --will continue to monitor closely, transfuse for signs of active bleeding with platelets <50.

## 2024-10-15 NOTE — OCCUPATIONAL THERAPY INITIAL EVALUATION ADULT - ADDITIONAL COMMENTS
Pt lives with his wife in an apartment with no ESPINOZA. Prior to admit, he required assistance with functional mobility, ADLs, and IADLs receiving help from wife/daughter. Pt has a walker, cane, and shower chair. Pt with recent 3 month stay at subacute rehab, was discharged home however has been limited in mobility at home due to weakness.

## 2024-10-15 NOTE — DIETITIAN INITIAL EVALUATION ADULT - OTHER INFO
80 YO M, DNR/DNI, T2DM, CKD3, idiopathic thrombocytopenia, prostate CA s/p chemo, advanced TTR amyloid/ HFrEF (35%, on Tafamadis) s/p CRT-P, Group II PH, Afib s/p ablation (on Eliquis), right pleural effusion s/p thoracentesis c/b PTX (2022), recent admission to [facility] 7/7-7/18/24 with AHRF 2/2 COVID PNA and HFrEF exacerbation who presented with SOB and b/l LE edema x a few days. Admitted to cardiac tele for HFrEF exacerbation i/s/o medication noncompliance. Currently on IV diuresis with bumex gtt.     Pt seen this afternoon on 5UR. Pt declined RD visit i/s/o RN care. Pt consulted to be seen for assessment. Per chart review pt Dx with Malnuttion during 7/2024 admission - pt presents with wasting/loss today, however NFPE not feasible (will attempt on f/u). Cannot DX malnutrtion toady, however can assume is @ High Risk for malnutrition. During recent admit pt with PO intake per RD notes of ~50%. Unsure of %PO intake at this time.     Weight Change:  -- Pt with [facility] admit 7/2024 with wt of 154 pound. Current admit wt is 149 pounds. This would suggest wt is up ~5 pounds PTA. Assume i/s/o fluid shifts at pt noted with significant edema at this time.   -- Daily wts: 10/15 176.5 pounds, 10/12 177.4 pounds; assume wts will varying during admit as pt remains ordered for IV diuresis    Labs: Na WNL (elevated prior), K 34.4 (ordered for KCL), No phos noted, Mg WNL, Lactate WNL (elevated prior), HDL 37, ALK Phos 124, BUN/Cr WNL.

## 2024-10-15 NOTE — PROGRESS NOTE ADULT - PROBLEM SELECTOR PLAN 1
+JVD, bibasilar crackles, LE 2-3+ pitting LE edema to shins. SpO2 96% on RA.   - Etiology: TTR amyloid, nonobs CAD by cath @ [facility] in 2014  - CXR pulm vasc congestion & R pleural effusion, BNP 26k   - TTE (7/9/24): EF 30-35%, severe symmetric LVH, RVH present, mod-severely reduced RVSF, severe HYACINTH, small pericardial effusion w/o tamponade  - Diuresis: Bumex gtt 1mg/hr with goal net -2-3L  - GDMT: Did not tolerate prior admission due to hypotension, on midodrine 10mg Q8h  - Core measures, daily weights, strict I&Os  - Daily CMP & lactate +JVD, bibasilar crackles, LE 2-3+ pitting LE edema to shins. SpO2 100% on RA.   - Etiology: TTR amyloid, nonobs CAD by cath @ [facility] in 2014  - CXR pulm vasc congestion & R pleural effusion, BNP 26k   - TTE (7/9/24): EF 30-35%, severe symmetric LVH, RVH present, mod-severely reduced RVSF, severe HYACINTH, small pericardial effusion w/o tamponade  - Diuresis: Bumex gtt 1mg/hr with goal net -2-3L  - GDMT: Did not tolerate prior admission due to hypotension, on midodrine 10mg Q8h  - Core measures, daily weights, strict I&Os  - Daily CMP & lactate

## 2024-10-15 NOTE — PROGRESS NOTE ADULT - SUBJECTIVE AND OBJECTIVE BOX
INTERVAL HPI/OVERNIGHT EVENTS:    SUBJECTIVE: Patient seen and examined at bedside.    OBJECTIVE:    VITAL SIGNS:  ICU Vital Signs Last 24 Hrs  T(C): 36.2 (15 Oct 2024 08:26), Max: 36.3 (14 Oct 2024 20:47)  T(F): 97.2 (15 Oct 2024 08:26), Max: 97.3 (14 Oct 2024 20:47)  HR: 82 (15 Oct 2024 08:26) (79 - 87)  BP: 97/59 (15 Oct 2024 08:26) (93/75 - 102/70)  BP(mean): 72 (15 Oct 2024 08:26) (72 - 82)  ABP: --  ABP(mean): --  RR: 18 (15 Oct 2024 08:26) (18 - 18)  SpO2: 96% (15 Oct 2024 08:26) (93% - 97%)    O2 Parameters below as of 15 Oct 2024 08:26  Patient On (Oxygen Delivery Method): room air              10-14 @ 07:01  -  10-15 @ 07:00  --------------------------------------------------------  IN: 710 mL / OUT: 3420 mL / NET: -2710 mL    10-15 @ 07:01  -  10-15 @ 12:02  --------------------------------------------------------  IN: 180 mL / OUT: 900 mL / NET: -720 mL      CAPILLARY BLOOD GLUCOSE          PHYSICAL EXAM:    General: WDWN ; NAD  HEENT: NC/AT; PERRL, anicteric sclera  Neck: supple, no JVD  Respiratory: CTA B/L; no W/R/R  Cardiovascular: +S1/S2; RRR; no M/R/G  Gastrointestinal: soft, NT/ND; +BS x4  Extremities: WWP; 2+ peripheral pulses B/L; no LE edema  Skin: normal color and turgor; no rash  Neurological:     MEDICATIONS:  MEDICATIONS  (STANDING):  apixaban 5 milliGRAM(s) Oral every 12 hours  buMETAnide Infusion 1 mG/Hr (5 mL/Hr) IV Continuous <Continuous>  midodrine 10 milliGRAM(s) Oral every 8 hours  pantoprazole    Tablet 40 milliGRAM(s) Oral before breakfast  polyethylene glycol 3350 17 Gram(s) Oral daily  potassium chloride    Tablet ER 40 milliEquivalent(s) Oral once  potassium chloride    Tablet ER 20 milliEquivalent(s) Oral once  senna 2 Tablet(s) Oral at bedtime  Tafamidis (Vyndamax) 61 milliGRAM(s) 61 milliGRAM(s) Oral daily  tamsulosin 0.4 milliGRAM(s) Oral at bedtime    MEDICATIONS  (PRN):      ALLERGIES:  Allergies    No Known Allergies    Intolerances        LABS:                        14.1   3.20  )-----------( 84       ( 15 Oct 2024 05:30 )             45.1     10-15    145  |  103  |  22  ----------------------------<  83  3.4[L]   |  34[H]  |  1.03    Ca    8.6      15 Oct 2024 05:30  Mg     2.0     10-15    TPro  6.6  /  Alb  3.3  /  TBili  1.2  /  DBili  x   /  AST  13  /  ALT  12  /  AlkPhos  124[H]  10-15    LIVER FUNCTIONS - ( 15 Oct 2024 05:30 )  Alb: 3.3 g/dL / Pro: 6.6 g/dL / ALK PHOS: 124 U/L / ALT: 12 U/L / AST: 13 U/L / GGT: x             Urinalysis Basic - ( 15 Oct 2024 05:30 )    Color: x / Appearance: x / SG: x / pH: x  Gluc: 83 mg/dL / Ketone: x  / Bili: x / Urobili: x   Blood: x / Protein: x / Nitrite: x   Leuk Esterase: x / RBC: x / WBC x   Sq Epi: x / Non Sq Epi: x / Bacteria: x            RADIOLOGY & ADDITIONAL TESTS: Reviewed.   INTERVAL HPI/OVERNIGHT EVENTS: RE    SUBJECTIVE: Patient seen and examined at bedside.    OBJECTIVE:    VITAL SIGNS:  ICU Vital Signs Last 24 Hrs  T(C): 36.2 (15 Oct 2024 08:26), Max: 36.3 (14 Oct 2024 20:47)  T(F): 97.2 (15 Oct 2024 08:26), Max: 97.3 (14 Oct 2024 20:47)  HR: 82 (15 Oct 2024 08:26) (79 - 87)  BP: 97/59 (15 Oct 2024 08:26) (93/75 - 102/70)  BP(mean): 72 (15 Oct 2024 08:26) (72 - 82)  RR: 18 (15 Oct 2024 08:26) (18 - 18)  SpO2: 96% (15 Oct 2024 08:26) (93% - 97%)    O2 Parameters below as of 15 Oct 2024 08:26  Patient On (Oxygen Delivery Method): room air      10-14 @ 07:01  -  10-15 @ 07:00  --------------------------------------------------------  IN: 710 mL / OUT: 3420 mL / NET: -2710 mL    10-15 @ 07:01  -  10-15 @ 12:02  --------------------------------------------------------  IN: 180 mL / OUT: 900 mL / NET: -720 mL      CAPILLARY BLOOD GLUCOSE    PHYSICAL EXAM:    General: resting in bed; NAD  HEENT: NC/AT,  MMM, +JVD   Respiratory: poor inspiratory effort  Cardiac: +S1/S2; RRR; no M/R/G  Gastrointestinal: soft  Extremities: 2+ b/l LE pitting edema up to knees, warm   Neurologic: no gross focal deficits    MEDICATIONS:  MEDICATIONS  (STANDING):  apixaban 5 milliGRAM(s) Oral every 12 hours  buMETAnide Infusion 1 mG/Hr (5 mL/Hr) IV Continuous <Continuous>  midodrine 10 milliGRAM(s) Oral every 8 hours  pantoprazole    Tablet 40 milliGRAM(s) Oral before breakfast  polyethylene glycol 3350 17 Gram(s) Oral daily  potassium chloride    Tablet ER 40 milliEquivalent(s) Oral once  potassium chloride    Tablet ER 20 milliEquivalent(s) Oral once  senna 2 Tablet(s) Oral at bedtime  Tafamidis (Vyndamax) 61 milliGRAM(s) 61 milliGRAM(s) Oral daily  tamsulosin 0.4 milliGRAM(s) Oral at bedtime    MEDICATIONS  (PRN):      ALLERGIES:  Allergies    No Known Allergies    Intolerances        LABS:                        14.1   3.20  )-----------( 84       ( 15 Oct 2024 05:30 )             45.1     10-15    145  |  103  |  22  ----------------------------<  83  3.4[L]   |  34[H]  |  1.03    Ca    8.6      15 Oct 2024 05:30  Mg     2.0     10-15    TPro  6.6  /  Alb  3.3  /  TBili  1.2  /  DBili  x   /  AST  13  /  ALT  12  /  AlkPhos  124[H]  10-15    LIVER FUNCTIONS - ( 15 Oct 2024 05:30 )  Alb: 3.3 g/dL / Pro: 6.6 g/dL / ALK PHOS: 124 U/L / ALT: 12 U/L / AST: 13 U/L / GGT: x             Urinalysis Basic - ( 15 Oct 2024 05:30 )    Color: x / Appearance: x / SG: x / pH: x  Gluc: 83 mg/dL / Ketone: x  / Bili: x / Urobili: x   Blood: x / Protein: x / Nitrite: x   Leuk Esterase: x / RBC: x / WBC x   Sq Epi: x / Non Sq Epi: x / Bacteria: x            RADIOLOGY & ADDITIONAL TESTS: Reviewed.

## 2024-10-15 NOTE — PROGRESS NOTE ADULT - ASSESSMENT
80yo M with PMH of Cardiac Amyloidosis, CHF, AFib, CKD, pHTN, T2DM, and h/o Prostate CA p/w SOB and found to have acute decompensated heart failure. Palliative consulted for complex medical decision making in the setting of advanced cardiac disease.    ·	family would prefer the patient to return home and are considering home hospice referral  ·	will have hospice liaison reach out tomorrow to provide further information  ·	c/w diuresis as per primary team to optimize patient

## 2024-10-15 NOTE — OCCUPATIONAL THERAPY INITIAL EVALUATION ADULT - PERTINENT HX OF CURRENT PROBLEM, REHAB EVAL
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 Admitted to cardiac tele for HFrEF exacerbation i/s/o medication noncompliance. Currently on IV diuresis with bumex gtt.

## 2024-10-15 NOTE — PROGRESS NOTE ADULT - ASSESSMENT
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 7/7-7/18/24 with AHRF 2/2 COVID PNA and HFrEF exacerbation who presented with SOB and b/l LE edema x a few days. Admitted to cardiac tele for HFrEF exacerbation i/s/o medication noncompliance. Currently on IV diuresis with bumex gtt.

## 2024-10-15 NOTE — DIETITIAN INITIAL EVALUATION ADULT - ADD RECOMMEND
1. Monitor PO intake/appetite, GI distress, diet tolerance (s/s of issues chewing or swallowing), labs, weights.  2. Honor pt food preferences as able.  3. RD to remain available for additional nutrition interventions as needed.  -- High Nutrition Risk.

## 2024-10-15 NOTE — DIETITIAN INITIAL EVALUATION ADULT - PROBLEM SELECTOR PLAN 5
Baseline Cr 1.2-1.3  --BUN/Cr 28/1.62 upon arrival, likely cardiorenal in setting of CHF exacerbation.  --avoid nephrotoxic agents.

## 2024-10-15 NOTE — DIETITIAN INITIAL EVALUATION ADULT - OTHER CALCULATIONS
5'9''  pounds +-10%  Wt 149 pounds BMI 22 %IBW93  IBW for EER D/t Edema in HF pt   Adjusted for age, HF, Hx of malnutrition / risk of this time, CKD   ** Fluids per team **

## 2024-10-15 NOTE — OCCUPATIONAL THERAPY INITIAL EVALUATION ADULT - GENERAL OBSERVATIONS, REHAB EVAL
Pt received semi-supine in bed, +delacruz, +IV, +tele, +1L O2 NC, in NAD and agreeable to OT. Cleared by CARO Ferreira to see. PT Jeaneth present.

## 2024-10-15 NOTE — OCCUPATIONAL THERAPY INITIAL EVALUATION ADULT - MODIFIED CLINICAL TEST OF SENSORY INTEGRATION IN BALANCE TEST
Pt able to take side steps along EOB ~8 steps and then ambulate forward/back ~5 steps with min x2 person assist using RW. Pt with initial retropulsion improved as session progressed and narrow MARIN.

## 2024-10-15 NOTE — PROGRESS NOTE ADULT - PROBLEM SELECTOR PLAN 1
.  PPSV = 40%, requires assistance for most ADLs  -PT Eval recommending COMFORT  -c/w supportive care, OOB, encourage movement  -family would prefer the patient to return home and are considering home hospice referral

## 2024-10-15 NOTE — PROGRESS NOTE ADULT - SUBJECTIVE AND OBJECTIVE BOX
Interventional Cardiology PA Adult Progress Note    Subjective Assessment: Pt evaluated at beside this AM. Pt appears well and NAD. Denies CP, SOB, palpitation, n/v/d, fever, LOC, or headache.   	  MEDICATIONS:  buMETAnide Infusion 1 mG/Hr IV Continuous <Continuous>  midodrine 10 milliGRAM(s) Oral every 8 hours  pantoprazole    Tablet 40 milliGRAM(s) Oral before breakfast  polyethylene glycol 3350 17 Gram(s) Oral daily  senna 2 Tablet(s) Oral at bedtime  apixaban 5 milliGRAM(s) Oral every 12 hours  potassium chloride    Tablet ER 20 milliEquivalent(s) Oral once  potassium chloride    Tablet ER 40 milliEquivalent(s) Oral once  tamsulosin 0.4 milliGRAM(s) Oral at bedtime      	    [PHYSICAL EXAM:  TELEMETRY:  T(C): 36.2 (10-15-24 @ 08:26), Max: 36.3 (10-14-24 @ 20:47)  HR: 82 (10-15-24 @ 08:26) (79 - 87)  BP: 97/59 (10-15-24 @ 08:26) (93/75 - 102/70)  RR: 18 (10-15-24 @ 08:26) (18 - 18)  SpO2: 96% (10-15-24 @ 08:26) (93% - 97%)  Wt(kg): --  I&O's Summary    14 Oct 2024 07:01  -  15 Oct 2024 07:00  --------------------------------------------------------  IN: 710 mL / OUT: 3420 mL / NET: -2710 mL    15 Oct 2024 07:01  -  15 Oct 2024 10:54  --------------------------------------------------------  IN: 180 mL / OUT: 900 mL / NET: -720 mL        Moore:  Central/PICC/Mid Line:                                         Appearance: Normal	  HEENT:   Normal oral mucosa, PERRL, EOMI	  Neck: Supple, + JVD; Carotid Bruit   Cardiovascular: Normal S1 S2, No JVD, No murmurs,   Respiratory: Lungs clear to auscultation. No Rales, Rhonchi, Wheezing	  Gastrointestinal:  Soft, Non-tender, + BS	  Skin: No rashes, No ecchymoses, No cyanosis  Extremities: 3+ B/L LE pitting edmea. Normal range of motion, No clubbing, cyanosis or edema  Vascular: Peripheral pulses palpable 2+ bilaterally  Neurologic: Non-focal  Psychiatry: A & O x 3, Mood & affect appropriate      	                            14.1   3.20  )-----------( 84       ( 15 Oct 2024 05:30 )             45.1     10-15    145  |  103  |  22  ----------------------------<  83  3.4[L]   |  34[H]  |  1.03    Ca    8.6      15 Oct 2024 05:30  Mg     2.0     10-15    TPro  6.6  /  Alb  3.3  /  TBili  1.2  /  DBili  x   /  AST  13  /  ALT  12  /  AlkPhos  124[H]  10-15

## 2024-10-15 NOTE — PROGRESS NOTE ADULT - ASSESSMENT
80 YO M with a history of ACC/AHA Stage D TTR cardiac amyloid with LV dysfunction s/p CRT-P, AF s/p ablation, and ITP who is presenting with ADHF after not taking torsemide since discharge from [facility] last week.    He initially appeared to be in a low output state with elevated lactate, cold extremities with significant volume overload, now diuresing well with intravenous bumex gtt.    Review of Studies:     TTE 10/11/24: LVIDD 4.6cm LVEF 20-25% severe RV dysfunction and dilation, severe biatrial dilation. mild MR, mild TR, PASP 48 mmHg, small pericardial effusion without tamponade.     TTE (7/9/24): EF 30-35%, severe symmetric LVH, RVH present, mod-severely reduced RV systolic function, severe biatrial enlargement, small pericardial effusion without echo evidence of cardiac tamponade physiology.     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 Palliative care consulted for hospice evaluation.      Case discussed with advanced heart failure attending, Dr Mackenzie.

## 2024-10-15 NOTE — OCCUPATIONAL THERAPY INITIAL EVALUATION ADULT - DIAGNOSIS, OT EVAL
Pt admitted for HFrEF exacerbation and BLE edema presents with impaired balance, generalized weakness, and decreased activity tolerance impacting overall ease of completing ADLs and functional mobility tasks at [facility].

## 2024-10-16 ENCOUNTER — APPOINTMENT (OUTPATIENT)
Dept: HEART AND VASCULAR | Facility: CLINIC | Age: 79
End: 2024-10-16

## 2024-10-16 LAB
ANION GAP SERPL CALC-SCNC: 7 MMOL/L — SIGNIFICANT CHANGE UP (ref 5–17)
BUN SERPL-MCNC: 27 MG/DL — HIGH (ref 7–23)
CALCIUM SERPL-MCNC: 8.6 MG/DL — SIGNIFICANT CHANGE UP (ref 8.4–10.5)
CHLORIDE SERPL-SCNC: 102 MMOL/L — SIGNIFICANT CHANGE UP (ref 96–108)
CO2 SERPL-SCNC: 36 MMOL/L — HIGH (ref 22–31)
CREAT SERPL-MCNC: 1.06 MG/DL — SIGNIFICANT CHANGE UP (ref 0.5–1.3)
EGFR: 72 ML/MIN/1.73M2 — SIGNIFICANT CHANGE UP
GLUCOSE SERPL-MCNC: 90 MG/DL — SIGNIFICANT CHANGE UP (ref 70–99)
HCT VFR BLD CALC: 44.5 % — SIGNIFICANT CHANGE UP (ref 39–50)
HGB BLD-MCNC: 14.1 G/DL — SIGNIFICANT CHANGE UP (ref 13–17)
LACTATE SERPL-SCNC: 2.4 MMOL/L — HIGH (ref 0.5–2)
MAGNESIUM SERPL-MCNC: 2 MG/DL — SIGNIFICANT CHANGE UP (ref 1.6–2.6)
MCHC RBC-ENTMCNC: 28.6 PG — SIGNIFICANT CHANGE UP (ref 27–34)
MCHC RBC-ENTMCNC: 31.7 GM/DL — LOW (ref 32–36)
MCV RBC AUTO: 90.3 FL — SIGNIFICANT CHANGE UP (ref 80–100)
NRBC # BLD: 0 /100 WBCS — SIGNIFICANT CHANGE UP (ref 0–0)
PLATELET # BLD AUTO: 86 K/UL — LOW (ref 150–400)
POTASSIUM SERPL-MCNC: 3.9 MMOL/L — SIGNIFICANT CHANGE UP (ref 3.5–5.3)
POTASSIUM SERPL-SCNC: 3.9 MMOL/L — SIGNIFICANT CHANGE UP (ref 3.5–5.3)
RBC # BLD: 4.93 M/UL — SIGNIFICANT CHANGE UP (ref 4.2–5.8)
RBC # FLD: 15.9 % — HIGH (ref 10.3–14.5)
SODIUM SERPL-SCNC: 145 MMOL/L — SIGNIFICANT CHANGE UP (ref 135–145)
WBC # BLD: 2.92 K/UL — LOW (ref 3.8–10.5)
WBC # FLD AUTO: 2.92 K/UL — LOW (ref 3.8–10.5)

## 2024-10-16 PROCEDURE — 99232 SBSQ HOSP IP/OBS MODERATE 35: CPT

## 2024-10-16 PROCEDURE — 93294 REM INTERROG EVL PM/LDLS PM: CPT

## 2024-10-16 PROCEDURE — 93296 REM INTERROG EVL PM/IDS: CPT

## 2024-10-16 RX ORDER — OXYBUTYNIN CHLORIDE 5 MG/1
5 TABLET ORAL DAILY
Refills: 0 | Status: DISCONTINUED | OUTPATIENT
Start: 2024-10-16 | End: 2024-10-16

## 2024-10-16 RX ORDER — OXYBUTYNIN CHLORIDE 5 MG/1
5 TABLET ORAL
Refills: 0 | Status: DISCONTINUED | OUTPATIENT
Start: 2024-10-16 | End: 2024-10-22

## 2024-10-16 RX ORDER — POTASSIUM CHLORIDE 10 MEQ
20 TABLET, EXTENDED RELEASE ORAL ONCE
Refills: 0 | Status: COMPLETED | OUTPATIENT
Start: 2024-10-16 | End: 2024-10-16

## 2024-10-16 RX ADMIN — MIDODRINE HYDROCHLORIDE 10 MILLIGRAM(S): 2.5 TABLET ORAL at 22:14

## 2024-10-16 RX ADMIN — Medication 5 MG/HR: at 05:40

## 2024-10-16 RX ADMIN — Medication 5 MG/HR: at 20:51

## 2024-10-16 RX ADMIN — Medication 2 TABLET(S): at 22:15

## 2024-10-16 RX ADMIN — MIDODRINE HYDROCHLORIDE 10 MILLIGRAM(S): 2.5 TABLET ORAL at 05:39

## 2024-10-16 RX ADMIN — MIDODRINE HYDROCHLORIDE 10 MILLIGRAM(S): 2.5 TABLET ORAL at 12:27

## 2024-10-16 RX ADMIN — OXYBUTYNIN CHLORIDE 5 MILLIGRAM(S): 5 TABLET ORAL at 12:27

## 2024-10-16 RX ADMIN — APIXABAN 5 MILLIGRAM(S): 5 TABLET, FILM COATED ORAL at 17:38

## 2024-10-16 RX ADMIN — Medication 0.4 MILLIGRAM(S): at 22:15

## 2024-10-16 RX ADMIN — OXYBUTYNIN CHLORIDE 5 MILLIGRAM(S): 5 TABLET ORAL at 22:15

## 2024-10-16 RX ADMIN — PANTOPRAZOLE SODIUM 40 MILLIGRAM(S): 40 TABLET, DELAYED RELEASE ORAL at 05:39

## 2024-10-16 RX ADMIN — APIXABAN 5 MILLIGRAM(S): 5 TABLET, FILM COATED ORAL at 05:39

## 2024-10-16 NOTE — PROGRESS NOTE ADULT - ASSESSMENT
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 Admitted to cardiac tele for HFrEF exacerbation i/s/o medication noncompliance. Currently on IV diuresis with bumex gtt.      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 Admitted to cardiac tele for HFrEF exacerbation i/s/o medication noncompliance. Currently on IV diuresis with bumex gtt. Family in on going discussion with hospice liaison and palliative.

## 2024-10-16 NOTE — PROGRESS NOTE ADULT - PROBLEM SELECTOR PLAN 3
Hx of ablation and s/p [employer] CRT-P  - Currently paced rhythm  - AC: Eliquis 5mg PO BID

## 2024-10-16 NOTE — PROGRESS NOTE ADULT - PROBLEM SELECTOR PLAN 1
+JVD, bibasilar crackles, LE 2-3+ pitting LE edema to shins. SpO2 100% on RA.   - Etiology: TTR amyloid, nonobs CAD by cath @ [facility] in 2014  - CXR pulm vasc congestion & R pleural effusion, BNP 26k   - TTE (7/9/24): EF 30-35%, severe symmetric LVH, RVH present, mod-severely reduced RVSF, severe HYACINTH, small pericardial effusion w/o tamponade  - Diuresis: Bumex gtt 1mg/hr with goal net -2-3L  - GDMT: Did not tolerate prior admission due to hypotension, on midodrine 10mg Q8h  - Core measures, daily weights, strict I&Os  - Daily CMP & lactate +JVD, bibasilar crackles, LE 2+ pitting LE edema to shins. SpO2 100% on 2L NC.   - Etiology: TTR amyloid, nonobs CAD by cath @ [facility] in 2014  - CXR pulm vasc congestion & R pleural effusion, BNP 26k   - TTE (7/9/24): EF 30-35%, severe symmetric LVH, RVH present, mod-severely reduced RVSF, severe HYACINTH, small pericardial effusion w/o tamponade  - Diuresis: Bumex gtt 1mg/hr with goal net -2-3L  - GDMT: Did not tolerate prior admission due to hypotension, on midodrine 10mg Q8h  - Core measures, daily weights, strict I&Os  - Daily CMP & lactate +JVD, bibasilar crackles, LE 2+ pitting LE edema to shins. SpO2 100% on 2L NC.   - Etiology: TTR amyloid, nonobs CAD by cath @ [facility] in 2014  - CXR pulm vasc congestion & R pleural effusion, BNP 26k   - TTE (7/9/24): EF 30-35%, severe symmetric LVH, RVH present, mod-severely reduced RVSF, severe HYACINTH, small pericardial effusion w/o tamponade  - Diuresis: Bumex gtt 1mg/hr with goal net -2-3L  - GDMT: Did not tolerate prior admission due to hypotension, on midodrine 10mg Q8h  - Core measures, daily weights, strict I&Os

## 2024-10-16 NOTE — PROGRESS NOTE ADULT - PROBLEM SELECTOR PLAN 7
Continue Tamsulosin 0.4mg PO qhs.      N: DASH with consistent carb and 1.5L fluid restriction  E: Maintain K>4.0 and Mg>2.0  VTE PPx: Eliquis  Code: DNR/DNI  PT: Recs pending Continue Tamsulosin 0.4mg PO qhs      N: DASH with consistent carb and 1.5L fluid restriction  E: Maintain K>4.0 and Mg>2.0  VTE PPx: Eliquis  Code: DNR/DNI  PT: Recs pending

## 2024-10-16 NOTE — PROGRESS NOTE ADULT - SUBJECTIVE AND OBJECTIVE BOX
Interventional Cardiology PA Adult Progress Note    Subjective Assessment: Pt evaluated at beside this AM. Pt appears well and NAD. Denies CP, SOB, palpitation, n/v/d, fever, LOC, or headache.   	  MEDICATIONS:  buMETAnide Infusion 1 mG/Hr IV Continuous <Continuous>  midodrine 10 milliGRAM(s) Oral every 8 hours  pantoprazole    Tablet 40 milliGRAM(s) Oral before breakfast  polyethylene glycol 3350 17 Gram(s) Oral daily  senna 2 Tablet(s) Oral at bedtime  apixaban 5 milliGRAM(s) Oral every 12 hours  potassium chloride    Tablet ER 20 milliEquivalent(s) Oral once  tamsulosin 0.4 milliGRAM(s) Oral at bedtime      	    [PHYSICAL EXAM:  TELEMETRY:  T(C): 36.4 (10-16-24 @ 05:37), Max: 36.4 (10-16-24 @ 05:37)  HR: 81 (10-16-24 @ 05:37) (80 - 90)  BP: 108/68 (10-16-24 @ 05:37) (88/60 - 109/68)  RR: 18 (10-16-24 @ 05:37) (18 - 20)  SpO2: 99% (10-16-24 @ 05:37) (91% - 99%)  Wt(kg): --  I&O's Summary    15 Oct 2024 07:01  -  16 Oct 2024 07:00  --------------------------------------------------------  IN: 675 mL / OUT: 4000 mL / NET: -3325 mL        Moore:  Central/PICC/Mid Line:                                         Appearance: Normal	  HEENT:   Normal oral mucosa, PERRL, EOMI	  Neck: Supple, - JVD; Carotid Bruit   Cardiovascular: Normal S1 S2, No JVD, No murmurs,   Respiratory: Lungs clear to auscultation. No Rales, Rhonchi, Wheezing	  Gastrointestinal:  Soft, Non-tender, + BS	  Skin: No rashes, No ecchymoses, No cyanosis  Extremities: Normal range of motion, No clubbing, cyanosis or edema  Vascular: Peripheral pulses palpable 2+ bilaterally  Neurologic: Non-focal  Psychiatry: A & O x 3, Mood & affect appropriate                           14.1   2.92  )-----------( 86       ( 16 Oct 2024 06:54 )             44.5     10-16    145  |  102  |  27[H]  ----------------------------<  90  3.9   |  36[H]  |  1.06    Ca    8.6      16 Oct 2024 06:54  Mg     2.0     10-16    TPro  6.6  /  Alb  3.3  /  TBili  1.2  /  DBili  x   /  AST  13  /  ALT  12  /  AlkPhos  124[H]  10-15       Interventional Cardiology PA Adult Progress Note    Subjective Assessment: Pt evaluated at beside this AM. Pt appears well and NAD. Denies CP, SOB, palpitation, n/v/d, fever, LOC, or headache.   	  MEDICATIONS:  buMETAnide Infusion 1 mG/Hr IV Continuous <Continuous>  midodrine 10 milliGRAM(s) Oral every 8 hours  pantoprazole    Tablet 40 milliGRAM(s) Oral before breakfast  polyethylene glycol 3350 17 Gram(s) Oral daily  senna 2 Tablet(s) Oral at bedtime  apixaban 5 milliGRAM(s) Oral every 12 hours  potassium chloride    Tablet ER 20 milliEquivalent(s) Oral once  tamsulosin 0.4 milliGRAM(s) Oral at bedtime      	    [PHYSICAL EXAM:  TELEMETRY:  T(C): 36.4 (10-16-24 @ 05:37), Max: 36.4 (10-16-24 @ 05:37)  HR: 81 (10-16-24 @ 05:37) (80 - 90)  BP: 108/68 (10-16-24 @ 05:37) (88/60 - 109/68)  RR: 18 (10-16-24 @ 05:37) (18 - 20)  SpO2: 99% (10-16-24 @ 05:37) (91% - 99%)  Wt(kg): --  I&O's Summary    15 Oct 2024 07:01  -  16 Oct 2024 07:00  --------------------------------------------------------  IN: 675 mL / OUT: 4000 mL / NET: -3325 mL        Moore:  Central/PICC/Mid Line:                                         Appearance: Normal	  HEENT:   Normal oral mucosa, PERRL, EOMI	  Neck: Supple, + JVD; Carotid Bruit   Cardiovascular: Normal S1 S2, No JVD, No murmurs,   Respiratory: Lungs clear to auscultation. Bibasilar rales 	  Gastrointestinal:  Soft, Non-tender, + BS	  Skin: No rashes, No ecchymoses, No cyanosis  Extremities: Normal range of motion, 2+ b/l LE pitting edema   Vascular: Peripheral pulses palpable 2+ bilaterally  Neurologic: Non-focal  Psychiatry: A & O x 3, Mood & affect appropriate                           14.1   2.92  )-----------( 86       ( 16 Oct 2024 06:54 )             44.5     10-16    145  |  102  |  27[H]  ----------------------------<  90  3.9   |  36[H]  |  1.06    Ca    8.6      16 Oct 2024 06:54  Mg     2.0     10-16    TPro  6.6  /  Alb  3.3  /  TBili  1.2  /  DBili  x   /  AST  13  /  ALT  12  /  AlkPhos  124[H]  10-15

## 2024-10-17 ENCOUNTER — TRANSCRIPTION ENCOUNTER (OUTPATIENT)
Age: 79
End: 2024-10-17

## 2024-10-17 LAB
ALBUMIN SERPL ELPH-MCNC: 3.1 G/DL — LOW (ref 3.3–5)
ALP SERPL-CCNC: 112 U/L — SIGNIFICANT CHANGE UP (ref 40–120)
ALT FLD-CCNC: 13 U/L — SIGNIFICANT CHANGE UP (ref 10–45)
ANION GAP SERPL CALC-SCNC: 8 MMOL/L — SIGNIFICANT CHANGE UP (ref 5–17)
AST SERPL-CCNC: 17 U/L — SIGNIFICANT CHANGE UP (ref 10–40)
BILIRUB SERPL-MCNC: 0.8 MG/DL — SIGNIFICANT CHANGE UP (ref 0.2–1.2)
BUN SERPL-MCNC: 22 MG/DL — SIGNIFICANT CHANGE UP (ref 7–23)
CALCIUM SERPL-MCNC: 8.2 MG/DL — LOW (ref 8.4–10.5)
CHLORIDE SERPL-SCNC: 100 MMOL/L — SIGNIFICANT CHANGE UP (ref 96–108)
CO2 SERPL-SCNC: 34 MMOL/L — HIGH (ref 22–31)
CREAT SERPL-MCNC: 0.96 MG/DL — SIGNIFICANT CHANGE UP (ref 0.5–1.3)
EGFR: 81 ML/MIN/1.73M2 — SIGNIFICANT CHANGE UP
GLUCOSE SERPL-MCNC: 78 MG/DL — SIGNIFICANT CHANGE UP (ref 70–99)
HCT VFR BLD CALC: 42.6 % — SIGNIFICANT CHANGE UP (ref 39–50)
HGB BLD-MCNC: 13.5 G/DL — SIGNIFICANT CHANGE UP (ref 13–17)
MAGNESIUM SERPL-MCNC: 2 MG/DL — SIGNIFICANT CHANGE UP (ref 1.6–2.6)
MCHC RBC-ENTMCNC: 28.6 PG — SIGNIFICANT CHANGE UP (ref 27–34)
MCHC RBC-ENTMCNC: 31.7 GM/DL — LOW (ref 32–36)
MCV RBC AUTO: 90.3 FL — SIGNIFICANT CHANGE UP (ref 80–100)
NRBC # BLD: 0 /100 WBCS — SIGNIFICANT CHANGE UP (ref 0–0)
PLATELET # BLD AUTO: 84 K/UL — LOW (ref 150–400)
POTASSIUM SERPL-MCNC: 3.5 MMOL/L — SIGNIFICANT CHANGE UP (ref 3.5–5.3)
POTASSIUM SERPL-SCNC: 3.5 MMOL/L — SIGNIFICANT CHANGE UP (ref 3.5–5.3)
PROT SERPL-MCNC: 6.2 G/DL — SIGNIFICANT CHANGE UP (ref 6–8.3)
RBC # BLD: 4.72 M/UL — SIGNIFICANT CHANGE UP (ref 4.2–5.8)
RBC # FLD: 15.8 % — HIGH (ref 10.3–14.5)
SODIUM SERPL-SCNC: 142 MMOL/L — SIGNIFICANT CHANGE UP (ref 135–145)
WBC # BLD: 2.62 K/UL — LOW (ref 3.8–10.5)
WBC # FLD AUTO: 2.62 K/UL — LOW (ref 3.8–10.5)

## 2024-10-17 PROCEDURE — 99233 SBSQ HOSP IP/OBS HIGH 50: CPT

## 2024-10-17 PROCEDURE — 74019 RADEX ABDOMEN 2 VIEWS: CPT | Mod: 26

## 2024-10-17 PROCEDURE — 99232 SBSQ HOSP IP/OBS MODERATE 35: CPT

## 2024-10-17 RX ORDER — POTASSIUM CHLORIDE 10 MEQ
40 TABLET, EXTENDED RELEASE ORAL ONCE
Refills: 0 | Status: DISCONTINUED | OUTPATIENT
Start: 2024-10-17 | End: 2024-10-17

## 2024-10-17 RX ORDER — SPIRONOLACTONE 100 MG
25 TABLET ORAL DAILY
Refills: 0 | Status: DISCONTINUED | OUTPATIENT
Start: 2024-10-17 | End: 2024-10-22

## 2024-10-17 RX ORDER — SIMETHICONE 80 MG/1
80 TABLET, CHEWABLE ORAL ONCE
Refills: 0 | Status: COMPLETED | OUTPATIENT
Start: 2024-10-17 | End: 2024-10-17

## 2024-10-17 RX ORDER — POTASSIUM CHLORIDE 10 MEQ
40 TABLET, EXTENDED RELEASE ORAL ONCE
Refills: 0 | Status: COMPLETED | OUTPATIENT
Start: 2024-10-17 | End: 2024-10-17

## 2024-10-17 RX ADMIN — PANTOPRAZOLE SODIUM 40 MILLIGRAM(S): 40 TABLET, DELAYED RELEASE ORAL at 06:09

## 2024-10-17 RX ADMIN — MIDODRINE HYDROCHLORIDE 10 MILLIGRAM(S): 2.5 TABLET ORAL at 06:09

## 2024-10-17 RX ADMIN — Medication 5 MG/HR: at 10:35

## 2024-10-17 RX ADMIN — APIXABAN 5 MILLIGRAM(S): 5 TABLET, FILM COATED ORAL at 06:09

## 2024-10-17 RX ADMIN — OXYBUTYNIN CHLORIDE 5 MILLIGRAM(S): 5 TABLET ORAL at 06:09

## 2024-10-17 RX ADMIN — SIMETHICONE 80 MILLIGRAM(S): 80 TABLET, CHEWABLE ORAL at 12:19

## 2024-10-17 NOTE — DISCHARGE NOTE NURSING/CASE MANAGEMENT/SOCIAL WORK - FINANCIAL ASSISTANCE
[facility] provides services at a reduced cost to those who are determined to be eligible through [facility]’s financial assistance program. Information regarding [facility]’s financial assistance program can be found by going to [email or web address] or by calling [phone].

## 2024-10-17 NOTE — PROGRESS NOTE ADULT - PROBLEM SELECTOR PLAN 1
+JVD, bibasilar crackles, LE 2+ pitting LE edema to shins. SpO2 100% on 2L NC.   - Etiology: TTR amyloid, nonobs CAD by cath @ [facility] in 2014  - CXR pulm vasc congestion & R pleural effusion, BNP 26k   - TTE (7/9/24): EF 30-35%, severe symmetric LVH, RVH present, mod-severely reduced RVSF, severe HYACINTH, small pericardial effusion w/o tamponade  - Diuresis: Bumex gtt 1mg/hr with goal net -2-3L  - GDMT: Did not tolerate prior admission due to hypotension, on midodrine 10mg Q8h  - Core measures, daily weights, strict I&Os +JVD, bibasilar crackles, LE 1+ pitting LE edema to shins. SpO2 100% on 2L NC.   - Etiology: TTR amyloid, nonobs CAD by cath @ [facility] in 2014  - CXR pulm vasc congestion & R pleural effusion, BNP 26k   - TTE (10/11/24): EF 20-25% w/ global hypokinesis. severe symmetric LVH, severely reduced RVSF, severe HYACINTH, mild MR/TR, small pericardial effusion w/o tamponade, B/L pleural effusion   - TTE (7/9/24): EF 30-35%, severe symmetric LVH, RVH present, mod-severely reduced RVSF, severe HYACINTH, small pericardial effusion w/o tamponade  - Diuresis: Bumex gtt 1mg/hr with goal net -2-3L  o Likely anticipate discontinue gtt within 1-2 days   - GDMT: Start Spironolactone 25mg QD. Unable to tolerate ARNI/ARB/BB due to hypotension, on midodrine 10mg Q8h  - Core measures, daily weights, strict I&Os

## 2024-10-17 NOTE — PROGRESS NOTE ADULT - PROBLEM SELECTOR PLAN 2
Continue Tafamidis 61mg PO daily (nonformulary)    ##RESOLVED Hypokalemia  - Current K 3.4  - Repleted Continue Tafamidis 61mg PO daily (nonformulary)    ##RESOLVED Hypokalemia  - Current K 3.4  - Repleted, will add lili to avoid hypokalemia while being diuresed

## 2024-10-17 NOTE — PROGRESS NOTE ADULT - SUBJECTIVE AND OBJECTIVE BOX
HF Consult    Interval History/HPI: Pt seen and examined at bedside, no complaints at this time.       OBJECTIVE  T(C): 36.1 (10-17-24 @ 08:39), Max: 36.4 (10-16-24 @ 20:51)  HR: 81 (10-17-24 @ 13:56) (80 - 97)  BP: 93/68 (10-17-24 @ 13:56) (83/56 - 101/82)  RR: 18 (10-17-24 @ 13:56) (17 - 18)  SpO2: 97% (10-17-24 @ 13:56) (93% - 100%)    10-16-24 @ 07:01  -  10-17-24 @ 07:00  --------------------------------------------------------  IN: 600 mL / OUT: 3100 mL / NET: -2500 mL    10-17-24 @ 07:01  -  10-17-24 @ 15:18  --------------------------------------------------------  IN: 360 mL / OUT: 1100 mL / NET: -740 mL        PHYSICAL EXAM:    Constitutional: resting in bed; NAD  HEENT: NC/AT,  MMM, +JVD   Respiratory: poor inspiratory effort  Cardiac: +S1/S2; RRR; no M/R/G  Gastrointestinal: soft  Extremities: 2+ b/l LE pitting edema up to knees, warm   Neurologic: no gross focal deficits      LABS:                        13.5   2.62  )-----------( 84       ( 17 Oct 2024 05:30 )             42.6     10-17    142  |  100  |  22  ----------------------------<  78  3.5   |  34[H]  |  0.96    Ca    8.2[L]      17 Oct 2024 05:30  Mg     2.0     10-17    TPro  6.2  /  Alb  3.1[L]  /  TBili  0.8  /  DBili  x   /  AST  17  /  ALT  13  /  AlkPhos  112  10-17        RADIOLOGY & ADDITIONAL TESTS:  Reviewed .    MEDICATIONS  (STANDING):  apixaban 5 milliGRAM(s) Oral every 12 hours  buMETAnide Infusion 1 mG/Hr (5 mL/Hr) IV Continuous <Continuous>  midodrine 10 milliGRAM(s) Oral every 8 hours  oxybutynin 5 milliGRAM(s) Oral two times a day  pantoprazole    Tablet 40 milliGRAM(s) Oral before breakfast  polyethylene glycol 3350 17 Gram(s) Oral daily  potassium chloride   Powder 40 milliEquivalent(s) Oral once  senna 2 Tablet(s) Oral at bedtime  spironolactone 25 milliGRAM(s) Oral daily  Tafamidis (Vyndamax) 61 milliGRAM(s) 61 milliGRAM(s) Oral daily  tamsulosin 0.4 milliGRAM(s) Oral at bedtime    MEDICATIONS  (PRN):     HF Consult    Interval History/HPI: Pt seen and examined at bedside, no complaints at this time. Sleepy but responds to questions, states he feels "a little better".      OBJECTIVE  T(C): 36.1 (10-17-24 @ 08:39), Max: 36.4 (10-16-24 @ 20:51)  HR: 81 (10-17-24 @ 13:56) (80 - 97)  BP: 93/68 (10-17-24 @ 13:56) (83/56 - 101/82)  RR: 18 (10-17-24 @ 13:56) (17 - 18)  SpO2: 97% (10-17-24 @ 13:56) (93% - 100%)    10-16-24 @ 07:01  -  10-17-24 @ 07:00  --------------------------------------------------------  IN: 600 mL / OUT: 3100 mL / NET: -2500 mL    10-17-24 @ 07:01  -  10-17-24 @ 15:18  --------------------------------------------------------  IN: 360 mL / OUT: 1100 mL / NET: -740 mL        PHYSICAL EXAM:    Constitutional: resting in bed; NAD  HEENT: NC/AT,  MMM, +JVD   Respiratory: poor inspiratory effort  Cardiac: +S1/S2; RRR; no M/R/G  Gastrointestinal: soft  Extremities: b/l LE wrapped in ACE bandage but 1+ pitting edema   Neurologic: no gross focal deficits      LABS:                        13.5   2.62  )-----------( 84       ( 17 Oct 2024 05:30 )             42.6     10-17    142  |  100  |  22  ----------------------------<  78  3.5   |  34[H]  |  0.96    Ca    8.2[L]      17 Oct 2024 05:30  Mg     2.0     10-17    TPro  6.2  /  Alb  3.1[L]  /  TBili  0.8  /  DBili  x   /  AST  17  /  ALT  13  /  AlkPhos  112  10-17        RADIOLOGY & ADDITIONAL TESTS:  Reviewed .    MEDICATIONS  (STANDING):  apixaban 5 milliGRAM(s) Oral every 12 hours  buMETAnide Infusion 1 mG/Hr (5 mL/Hr) IV Continuous <Continuous>  midodrine 10 milliGRAM(s) Oral every 8 hours  oxybutynin 5 milliGRAM(s) Oral two times a day  pantoprazole    Tablet 40 milliGRAM(s) Oral before breakfast  polyethylene glycol 3350 17 Gram(s) Oral daily  potassium chloride   Powder 40 milliEquivalent(s) Oral once  senna 2 Tablet(s) Oral at bedtime  spironolactone 25 milliGRAM(s) Oral daily  Tafamidis (Vyndamax) 61 milliGRAM(s) 61 milliGRAM(s) Oral daily  tamsulosin 0.4 milliGRAM(s) Oral at bedtime    MEDICATIONS  (PRN):

## 2024-10-17 NOTE — DISCHARGE NOTE NURSING/CASE MANAGEMENT/SOCIAL WORK - CAREGIVER RELATION TO PATIENT
Head, normocephalic, atraumatic, Face, Face within normal limits, Ears, External ears within normal limits, Nose/Nasopharynx, External nose  normal appearance, nares patent, no nasal discharge, Mouth and Throat, Oral cavity appearance normal, Breath odor normal, Lips, Appearance normal
Daughter

## 2024-10-17 NOTE — PROGRESS NOTE ADULT - SUBJECTIVE AND OBJECTIVE BOX
[facility] Geriatrics and Palliative Medicine  Nahum Qureshi, Palliative Care Attending  Contact Info: Call [phone] (HEAL Line) or message on Microsoft Teams (Nahum Qureshi)    SUBJECTIVE AND OBJECTIVE:  INTERVAL HPI/OVERNIGHT EVENTS: Interval events noted. See patient's PRN use for the past 24hrs noted below. Comprehensive symptom assessment and GOC exploration as noted below. Extensive time spent discussing plan of care with patient/family. No unexpected adverse effects of opiates noted: no sedation/dizziness/nausea.    ALLERGIES:  No Known Allergies    MEDICATIONS  (STANDING):  apixaban 5 milliGRAM(s) Oral every 12 hours  buMETAnide Infusion 1 mG/Hr (5 mL/Hr) IV Continuous <Continuous>  midodrine 10 milliGRAM(s) Oral every 8 hours  oxybutynin 5 milliGRAM(s) Oral two times a day  pantoprazole    Tablet 40 milliGRAM(s) Oral before breakfast  polyethylene glycol 3350 17 Gram(s) Oral daily  potassium chloride   Powder 40 milliEquivalent(s) Oral once  senna 2 Tablet(s) Oral at bedtime  spironolactone 25 milliGRAM(s) Oral daily  Tafamidis (Vyndamax) 61 milliGRAM(s) 61 milliGRAM(s) Oral daily  tamsulosin 0.4 milliGRAM(s) Oral at bedtime    MEDICATIONS  (PRN):      Analgesic Use (Scheduled and PRNs) for past 24 hours:      ITEMS UNCHECKED ARE NOT PRESENT  PRESENT SYMPTOMS/REVIEW OF SYSTEMS: []Unable to obtain due to poor mentation   Source if other than patient:  []Family   []Team         Vital Signs Last 24 Hrs  T(C): 36.1 (17 Oct 2024 08:39), Max: 36.4 (16 Oct 2024 20:51)  T(F): 97 (17 Oct 2024 08:39), Max: 97.5 (16 Oct 2024 20:51)  HR: 81 (17 Oct 2024 13:56) (80 - 97)  BP: 93/68 (17 Oct 2024 13:56) (83/56 - 101/82)  BP(mean): 75 (17 Oct 2024 13:56) (65 - 88)  RR: 18 (17 Oct 2024 13:56) (17 - 18)  SpO2: 97% (17 Oct 2024 13:56) (93% - 100%)    Parameters below as of 17 Oct 2024 13:56  Patient On (Oxygen Delivery Method): room air        LABS: Personally reviewed and interpreted                        13.5   2.62  )-----------( 84       ( 17 Oct 2024 05:30 )             42.6   10-17    142  |  100  |  22  ----------------------------<  78  3.5   |  34[H]  |  0.96    Ca    8.2[L]      17 Oct 2024 05:30  Mg     2.0     10-17    TPro  6.2  /  Alb  3.1[L]  /  TBili  0.8  /  DBili  x   /  AST  17  /  ALT  13  /  AlkPhos  112  10-17      RADIOLOGY & ADDITIONAL STUDIES: Personally reviewed and interpreted  None new    DISCUSSION OF CASE: Family - to provide updates and emotional support; Primary Team/RN - to discuss plan of care [facility] Geriatrics and Palliative Medicine  Nahum Qureshi, Palliative Care Attending  Contact Info: Call [phone] (HEAL Line) or message on [employer] Teams (Nahum Qureshi)    SUBJECTIVE AND OBJECTIVE:  INTERVAL HPI/OVERNIGHT EVENTS: Interval events noted. Denies pain or SOB. See patient's PRN use for the past 24hrs noted below. Comprehensive symptom assessment and GOC exploration as noted below. Extensive time spent discussing plan of care with daughter.    ALLERGIES:  No Known Allergies    MEDICATIONS  (STANDING):  apixaban 5 milliGRAM(s) Oral every 12 hours  buMETAnide Infusion 1 mG/Hr (5 mL/Hr) IV Continuous <Continuous>  midodrine 10 milliGRAM(s) Oral every 8 hours  oxybutynin 5 milliGRAM(s) Oral two times a day  pantoprazole    Tablet 40 milliGRAM(s) Oral before breakfast  polyethylene glycol 3350 17 Gram(s) Oral daily  potassium chloride   Powder 40 milliEquivalent(s) Oral once  senna 2 Tablet(s) Oral at bedtime  spironolactone 25 milliGRAM(s) Oral daily  Tafamidis (Vyndamax) 61 milliGRAM(s) 61 milliGRAM(s) Oral daily  tamsulosin 0.4 milliGRAM(s) Oral at bedtime    MEDICATIONS  (PRN):      Analgesic Use (Scheduled and PRNs) for past 24 hours:      ITEMS UNCHECKED ARE NOT PRESENT  PRESENT SYMPTOMS/REVIEW OF SYSTEMS: []Unable to obtain due to poor mentation   Source if other than patient:  []Family   []Team     Pain: [] yes [x] no  QOL impact -   Location -                    Aggravating Factors -  Quality -  Radiation -  Timing -  Severity (0-10 scale) -   Minimal Acceptable Level (0-10 scale) -    Other Symptoms: See ESAS Section Below  [x]All other review of systems negative     GENERAL:  [x] NAD []Alert [x]Lethargic  []Cachexia  []Unarousable  [x]Verbal  []Non-Verbal  BEHAVIORAL:   []Anxiety  []Delirium []Agitation [x]Cooperative [x]Oriented x  HEENT:  [x]Normal  [x] Moist Mucous Membranes []Dry mouth   []ET Tube/Trach  []Oral lesions  PULMONARY:   []Clear []Tachypnea  []Audible excessive secretions  [x]Normal Work of Breathing []Labored Breathing  []Rhonchi [x]Crackles []Wheezing  CARDIOVASCULAR:    [x]Regular Rate [x]Regular Rhythm []Irregular []Tachy  []Richard  GASTROINTESTINAL:  [x]Soft  []Distended   [x]+BS  [x]Non tender []Tender  []PEG []OGT/ NGT  Last BM:  GENITOURINARY:  []Normal [] Incontinent   []Oliguria/Anuria   [x]Moore  MUSCULOSKELETAL:   []Normal Extremities  [x]Weakness  []Bed/Wheelchair bound [x]Edema  NEUROLOGIC:   [x]No focal deficits  []Cognitive impairment  []Dysphagia []Dysarthria []Paresis []Encephalopathic  SKIN:   [x]Normal   []Pressure ulcer(s)  []Rash    Vital Signs Last 24 Hrs  T(C): 36.1 (17 Oct 2024 08:39), Max: 36.4 (16 Oct 2024 20:51)  T(F): 97 (17 Oct 2024 08:39), Max: 97.5 (16 Oct 2024 20:51)  HR: 81 (17 Oct 2024 13:56) (80 - 97)  BP: 93/68 (17 Oct 2024 13:56) (83/56 - 101/82)  BP(mean): 75 (17 Oct 2024 13:56) (65 - 88)  RR: 18 (17 Oct 2024 13:56) (17 - 18)  SpO2: 97% (17 Oct 2024 13:56) (93% - 100%)    Parameters below as of 17 Oct 2024 13:56  Patient On (Oxygen Delivery Method): room air        LABS: Personally reviewed and interpreted                        13.5   2.62  )-----------( 84       ( 17 Oct 2024 05:30 )             42.6   10-17    142  |  100  |  22  ----------------------------<  78  3.5   |  34[H]  |  0.96    Ca    8.2[L]      17 Oct 2024 05:30  Mg     2.0     10-17    TPro  6.2  /  Alb  3.1[L]  /  TBili  0.8  /  DBili  x   /  AST  17  /  ALT  13  /  AlkPhos  112  10-17      RADIOLOGY & ADDITIONAL STUDIES: Personally reviewed and interpreted  None new    DISCUSSION OF CASE: Daughter - to provide updates and emotional support; Primary Team/RN - to discuss plan of care

## 2024-10-17 NOTE — PROGRESS NOTE ADULT - SUBJECTIVE AND OBJECTIVE BOX
**INCOMPLETE**   Cardiology PA Adult Progress Note    Subjective Assessment:    	  MEDICATIONS:  buMETAnide Infusion 1 mG/Hr IV Continuous <Continuous>  midodrine 10 milliGRAM(s) Oral every 8 hours  pantoprazole    Tablet 40 milliGRAM(s) Oral before breakfast  polyethylene glycol 3350 17 Gram(s) Oral daily  senna 2 Tablet(s) Oral at bedtime  apixaban 5 milliGRAM(s) Oral every 12 hours  oxybutynin 5 milliGRAM(s) Oral two times a day  tamsulosin 0.4 milliGRAM(s) Oral at bedtime  	    [PHYSICAL EXAM:  TELEMETRY:  T(C): 36.4 (10-17-24 @ 06:08), Max: 36.4 (10-16-24 @ 20:51)  HR: 80 (10-17-24 @ 06:08) (80 - 97)  BP: 91/64 (10-17-24 @ 06:08) (86/58 - 110/71)  RR: 18 (10-17-24 @ 06:08) (17 - 18)  SpO2: 99% (10-17-24 @ 06:08) (93% - 100%)  Wt(kg): --  I&O's Summary    16 Oct 2024 07:01  -  17 Oct 2024 07:00  --------------------------------------------------------  IN: 600 mL / OUT: 3100 mL / NET: -2500 mL                                 Appearance: Normal	  HEENT:   Normal oral mucosa, PERRL, EOMI	  Neck: Supple, + JVD/ - JVD; Carotid Bruit   Cardiovascular: Normal S1 S2, No JVD, No murmurs,   Respiratory: Lungs clear to auscultation/Decreased Breath Sounds/No Rales, Rhonchi, Wheezing	  Gastrointestinal:  Soft, Non-tender, + BS	  Skin: No rashes, No ecchymoses, No cyanosis  Extremities: Normal range of motion, No clubbing, cyanosis or edema  Vascular: Peripheral pulses palpable 2+ bilaterally  Neurologic: Non-focal  Psychiatry: A & O x 3, Mood & affect appropriate      	    ECG:  	  RADIOLOGY:   DIAGNOSTIC TESTING:  [ ] Echocardiogram:  [ ]  Catheterization:  [ ] Stress Test:    [ ] KHUSHI  OTHER: 	    LABS:	 	  CARDIAC MARKERS:                        13.5   2.62  )-----------( 84       ( 17 Oct 2024 05:30 )             42.6     10-17    142  |  100  |  22  ----------------------------<  78  3.5   |  34[H]  |  0.96    Ca    8.2[L]      17 Oct 2024 05:30  Mg     2.0     10-17    TPro  6.2  /  Alb  3.1[L]  /  TBili  0.8  /  DBili  x   /  AST  17  /  ALT  13  /  AlkPhos  112  10-17    proBNP:   Lipid Profile:   HgA1c:   TSH:       ASSESSMENT/PLAN: 	         Cardiology PA Adult Progress Note    Subjective Assessment:  Patient seen and examined at bedside this AM.   Endorses SOB and B/L LE swelling that is improving and chronic orthopnea and PND.   Denies CP, palpitations. lightheadedness, dizziness, abd pain, N/V/D, signs of bleeding, fever, chills, or cough.  	  MEDICATIONS:  buMETAnide Infusion 1 mG/Hr IV Continuous <Continuous>  midodrine 10 milliGRAM(s) Oral every 8 hours  pantoprazole    Tablet 40 milliGRAM(s) Oral before breakfast  polyethylene glycol 3350 17 Gram(s) Oral daily  senna 2 Tablet(s) Oral at bedtime  apixaban 5 milliGRAM(s) Oral every 12 hours  oxybutynin 5 milliGRAM(s) Oral two times a day  tamsulosin 0.4 milliGRAM(s) Oral at bedtime  	    [PHYSICAL EXAM:TELEMETRY]   T(C): 36.4 (10-17-24 @ 06:08), Max: 36.4 (10-16-24 @ 20:51)  HR: 80 (10-17-24 @ 06:08) (80 - 97)  BP: 91/64 (10-17-24 @ 06:08) (86/58 - 110/71)  RR: 18 (10-17-24 @ 06:08) (17 - 18)  SpO2: 99% (10-17-24 @ 06:08) (93% - 100%)  Wt(kg): --  I&O's Summary    16 Oct 2024 07:01  -  17 Oct 2024 07:00  --------------------------------------------------------  IN: 600 mL / OUT: 3100 mL / NET: -2500 mL                                 Appearance: Elderly male resting in bed, NAD  HEENT:   Normal oral mucosa, PERRL, EOMI	  Neck: Supple, + JVD  Cardiovascular: Normal S1 S2  Respiratory: Rales in B/L bases   Gastrointestinal:  Soft, Non-tender, + BS	  Skin: No rashes, No ecchymoses, No cyanosis  Extremities: Normal range of motion, No clubbing or cyanosis   Vascular: cold extremities. DP faint B/L  B/L LE 1+ pitting edema to shins   Neurologic: Non-focal  Psychiatry: A & O x 3, Mood & affect appropriate   	  RADIOLOGY:  CXR 10/11/24:   1. Since 7/13/2024, worsening right basilar opacification concerning for worsening right-sided pleural effusion. Underlying pneumonia and/or atelectasis cannot be excluded.  2. Resolved patchy opacities over the right upper lung zone.  3. Interval improvement in left-sided pleural effusion with persistent   linear atelectasis.  4. Cardiomegaly unchanged.     DIAGNOSTIC TESTING:  [ x ] Echocardiogram 10/11/24:   1. Severe symmetric left ventricular hypertrophy.   2. Severely reduced left ventricular systolic function.   3. Severely reduced right ventricular systolic function.   4. Device leads are noted in the right heart.   5. Severe biatrial enlargement.   6. Mild mitral regurgitation.   7. Mild tricuspid regurgitation.   8. Pulmonary hypertension present, pulmonary artery systolic pressure is 48 mmHg.   9. Small pericardial effusion without echocardiographic evidence of cardiac tamponade physiology.  10. Bilateral pleural effusion.  11. Compared to the previous TTE performed on 7/9/2024, left and right ventricular function appears slightly worse.      [ ]  Catheterization:  [ ] Stress Test:    [ ] KHUSHI  OTHER: 	    LABS:	 	  CARDIAC MARKERS:                        13.5   2.62  )-----------( 84       ( 17 Oct 2024 05:30 )             42.6     10-17    142  |  100  |  22  ----------------------------<  78  3.5   |  34[H]  |  0.96    Ca    8.2[L]      17 Oct 2024 05:30  Mg     2.0     10-17    TPro  6.2  /  Alb  3.1[L]  /  TBili  0.8  /  DBili  x   /  AST  17  /  ALT  13  /  AlkPhos  112  10-17    proBNP:   Lipid Profile:   HgA1c:   TSH:       ASSESSMENT/PLAN:

## 2024-10-17 NOTE — PROGRESS NOTE ADULT - ASSESSMENT
·	family accepting of home hospice on discharge, tentative plan for Saturday as supplemental O2 will need to be delivered to the house prior  ·	continue IV diuresis as indicated by primary team, can adjust discharge date if necessary  ·	hospice agency asking that patient be sent with a Rx for generic apixaban on discharge rather than brand Eliquis 80yo M with PMH of Cardiac Amyloidosis, CHF, AFib, CKD, pHTN, T2DM, and h/o Prostate CA p/w SOB and found to have acute decompensated heart failure. Palliative consulted for complex medical decision making in the setting of advanced cardiac disease.    ·	family accepting of home hospice on discharge, tentative plan for Saturday as supplemental O2 will need to be delivered to the house prior  ·	continue IV diuresis as indicated by primary team, can adjust discharge date if necessary  ·	hospice agency asking that patient be sent with a Rx for generic apixaban on discharge rather than brand Eliquis

## 2024-10-17 NOTE — DISCHARGE NOTE NURSING/CASE MANAGEMENT/SOCIAL WORK - PATIENT PORTAL LINK FT
You can access the FollowMyHealth Patient Portal offered by [facility] by registering at the following website: [email or web address]. By joining [facility]’s FollowMyHealth portal, you will also be able to view your health information using other applications (apps) compatible with our system.

## 2024-10-17 NOTE — PROGRESS NOTE ADULT - ATTENDING COMMENTS
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     He initially had a cold/wet clinical picture with cold extremities and elevated lactate but this has improved. He remains markedly overloaded but diuresing well. He has end stage heart failure and an appropriate candidate for hospice evaluation. Will aim to diurese to euvolemia  and re-engage palliative care team who know the patient well     - Continue bumex 1 mg/ghr, goal negative 2-3 L daily. Daily standing weights, goal 150 lbs?  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 He is appropriately DNR/DNI.
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    He initially had a cold/wet clinical picture with cold extremities and elevated lactate but this has improved. He remains markedly overloaded but diuresing well. He has end stage heart failure and an appropriate candidate for hospice evaluation. Will aim to diurese to euvolemia. Discussions regarding hospice are ongoing    - Continue bumex 1 mg/ghr, goal negative 2-3 L daily. Daily standing weights, goal 150 lbs?  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 He is appropriately DNR/DNI.
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 last week.    He initially had a cold/wet clinical picture with cold extremities and elevated lactate but this has improved. He remains markedly overloaded but diuresing well. He has end stage heart failure and an appropriate candidate for hospice evaluation. Will aim to diurese to euvolemia  and re-engage palliative care team who know the patient well    - Continue bumex 1 mg/ghr, goal negative 2-3 L daily. Daily standing weights, goal 150 lbs?  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 He is appropriately DNR/DNI.

## 2024-10-17 NOTE — PROGRESS NOTE ADULT - ASSESSMENT
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 Admitted to cardiac tele for HFrEF exacerbation i/s/o medication noncompliance. Currently on IV diuresis with bumex gtt. Family in on going discussion with hospice liaison and palliative.     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 Admitted to cardiac tele for HFrEF exacerbation i/s/o medication noncompliance. Currently being diuresed w/ Bumex gtt with good UOP. Palliative and hospice liaison in discussion with family regarding dispo.

## 2024-10-17 NOTE — PROGRESS NOTE ADULT - ASSESSMENT
79 yr old M, DNR/DNI, with PMHx of advanced TTR cardiac amyloid, NICM w/ HFrEF (35%) s/p CRT-P, Group II PH, Afib s/p ablation (on Eliquis), borderline DM, Stage III CKD, idiopathic thrombocytopenia, prostate CA s/p chemo, pituitary adenoma (s/p transphenoidal treatment in 1990's), right sided pleural effusion s/p thoracentesis c/b PTX (2022), admitted for decompensated HF in the setting of FTT and medication noncompliance. HF consulted for further management.    Review of Studies:  07/12/24: EF 35%, moderate-to-severe increased LV wall thickness, moderately reduced RV function, severely   dilated LA/RA, small pericardial effusion  02/28/22: EF 37%, severely increased LV wall thickness, mildly decreased RV function, dilated LA, dilated RA,   mild MR, mild TR, PASP 40 mmHg, small pericardial effusion.    Recommendations:    #TTR amyloidosis   #Acute on Chronic Systolic Heart Failure  - ACC/AHA stage C-D  - Etiology: amyloidosis  - GDMT:         - RAASi/BB: hold off due to restrictive CM and hypotension        - MRA: start spironolactone 25mg qD        - SGLT2: was on Farxiga 10mg qD outpt, will plan to resume this admission  - Diuretics: c/w Bumex gtt at 1mg/hr (goal net -2-3L/day)  - Devices: s/p CRT-P  - strict I/O's  - daily standing weights  - appreciate palliative care consult given advanced, end-stage CM and recent FTT - pt qualifies for hospice and family is pending referral    #AFib s/p ablation  - c/w Eliquis 5mg BID      Recommendations above are preliminary pending discussion with an attending cardiologist    Keren Guerrero   Cardiovascular Disease Fellow

## 2024-10-17 NOTE — PROGRESS NOTE ADULT - PROBLEM SELECTOR PLAN 7
Continue Tamsulosin 0.4mg PO qhs      N: DASH with consistent carb and 1.5L fluid restriction  E: Maintain K>4.0 and Mg>2.0  VTE PPx: Eliquis  Code: DNR/DNI  PT: Recs pending Continue Tamsulosin 0.4mg PO qhs      N: DASH with consistent carb and 1.5L fluid restriction  E: Maintain K>4.0 and Mg>2.0  VTE PPx: Eliquis  Code: DNR/DNI  PT: COMFORT ongoing discussion with family about home hospice with pal care and hospice liaison     Case discussed w/ Dr. Em

## 2024-10-17 NOTE — PROGRESS NOTE ADULT - PROBLEM SELECTOR PLAN 4
.  Patient is DNR/DNI, MOLST in chart/Patient Window  -wife is surrogate decision maker in the absence of designated HCP but family is making decisions collectively  -family accepting of home hospice on discharge, tentative plan for Saturday as supplemental O2 will need to be delivered to the house prior

## 2024-10-17 NOTE — PROGRESS NOTE ADULT - TIME BILLING
Emotional Support/Supportive Care and Clarification of Potential Disease Trajectory related to Advanced Cardiac Disease  Assessment of Symptom [place] and Palliative regimen  Exploration of GOC/Advanced Directives including HCP designation, code status, and hospice eligibility    Time inclusive of chart review, medication ordering, discussion with primary team, clinical documentation, and communication with family/caregiver
Emotional Support/Supportive Care and Clarification of Potential Disease Trajectory related to Advanced Cardiac Disease  Assessment of Symptom [place] and Palliative regimen  Exploration of GOC/Advanced Directives including HCP designation, code status, and hospice eligibility    Time inclusive of chart review, medication ordering, discussion with primary team, clinical documentation, and communication with family/caregiver

## 2024-10-18 ENCOUNTER — APPOINTMENT (OUTPATIENT)
Dept: HEART AND VASCULAR | Facility: CLINIC | Age: 79
End: 2024-10-18

## 2024-10-18 LAB
ADD ON TEST-SPECIMEN IN LAB: SIGNIFICANT CHANGE UP
ALBUMIN SERPL ELPH-MCNC: 2.9 G/DL — LOW (ref 3.3–5)
ALP SERPL-CCNC: 178 U/L — HIGH (ref 40–120)
ALT FLD-CCNC: 18 U/L — SIGNIFICANT CHANGE UP (ref 10–45)
ANION GAP SERPL CALC-SCNC: 7 MMOL/L — SIGNIFICANT CHANGE UP (ref 5–17)
ANISOCYTOSIS BLD QL: SLIGHT — SIGNIFICANT CHANGE UP
AST SERPL-CCNC: 20 U/L — SIGNIFICANT CHANGE UP (ref 10–40)
BILIRUB SERPL-MCNC: 1 MG/DL — SIGNIFICANT CHANGE UP (ref 0.2–1.2)
BUN SERPL-MCNC: 25 MG/DL — HIGH (ref 7–23)
CALCIUM SERPL-MCNC: 8.6 MG/DL — SIGNIFICANT CHANGE UP (ref 8.4–10.5)
CHLORIDE SERPL-SCNC: 99 MMOL/L — SIGNIFICANT CHANGE UP (ref 96–108)
CO2 SERPL-SCNC: 37 MMOL/L — HIGH (ref 22–31)
CREAT SERPL-MCNC: 1.03 MG/DL — SIGNIFICANT CHANGE UP (ref 0.5–1.3)
EGFR: 75 ML/MIN/1.73M2 — SIGNIFICANT CHANGE UP
GLUCOSE SERPL-MCNC: 136 MG/DL — HIGH (ref 70–99)
HCT VFR BLD CALC: 43.9 % — SIGNIFICANT CHANGE UP (ref 39–50)
HGB BLD-MCNC: 14.6 G/DL — SIGNIFICANT CHANGE UP (ref 13–17)
MACROCYTES BLD QL: SLIGHT — SIGNIFICANT CHANGE UP
MAGNESIUM SERPL-MCNC: 2.2 MG/DL — SIGNIFICANT CHANGE UP (ref 1.6–2.6)
MANUAL SMEAR VERIFICATION: SIGNIFICANT CHANGE UP
MCHC RBC-ENTMCNC: 29.6 PG — SIGNIFICANT CHANGE UP (ref 27–34)
MCHC RBC-ENTMCNC: 33.3 GM/DL — SIGNIFICANT CHANGE UP (ref 32–36)
MCV RBC AUTO: 89 FL — SIGNIFICANT CHANGE UP (ref 80–100)
MICROCYTES BLD QL: SLIGHT — SIGNIFICANT CHANGE UP
NRBC # BLD: 0 /100 WBCS — SIGNIFICANT CHANGE UP (ref 0–0)
OVALOCYTES BLD QL SMEAR: SLIGHT — SIGNIFICANT CHANGE UP
PLAT MORPH BLD: ABNORMAL
PLATELET # BLD AUTO: 95 K/UL — LOW (ref 150–400)
POIKILOCYTOSIS BLD QL AUTO: SIGNIFICANT CHANGE UP
POTASSIUM SERPL-MCNC: 3.6 MMOL/L — SIGNIFICANT CHANGE UP (ref 3.5–5.3)
POTASSIUM SERPL-SCNC: 3.6 MMOL/L — SIGNIFICANT CHANGE UP (ref 3.5–5.3)
PROT SERPL-MCNC: 6.4 G/DL — SIGNIFICANT CHANGE UP (ref 6–8.3)
RBC # BLD: 4.93 M/UL — SIGNIFICANT CHANGE UP (ref 4.2–5.8)
RBC # FLD: 15.9 % — HIGH (ref 10.3–14.5)
RBC BLD AUTO: ABNORMAL
SODIUM SERPL-SCNC: 143 MMOL/L — SIGNIFICANT CHANGE UP (ref 135–145)
TARGETS BLD QL SMEAR: SIGNIFICANT CHANGE UP
WBC # BLD: 3.26 K/UL — LOW (ref 3.8–10.5)
WBC # FLD AUTO: 3.26 K/UL — LOW (ref 3.8–10.5)

## 2024-10-18 PROCEDURE — 99232 SBSQ HOSP IP/OBS MODERATE 35: CPT

## 2024-10-18 RX ORDER — SODIUM PHOSPHATE, DIBASIC AND SODIUM PHOSPHATE, MONOBASIC, UNSPECIFIED FORM 7; 19 G/118ML; G/118ML
1 ENEMA RECTAL ONCE
Refills: 0 | Status: COMPLETED | OUTPATIENT
Start: 2024-10-18 | End: 2024-10-18

## 2024-10-18 RX ORDER — POTASSIUM CHLORIDE 10 MEQ
40 TABLET, EXTENDED RELEASE ORAL ONCE
Refills: 0 | Status: COMPLETED | OUTPATIENT
Start: 2024-10-18 | End: 2024-10-18

## 2024-10-18 RX ADMIN — Medication 0.4 MILLIGRAM(S): at 21:45

## 2024-10-18 RX ADMIN — Medication 25 MILLIGRAM(S): at 12:50

## 2024-10-18 RX ADMIN — Medication 2 TABLET(S): at 21:45

## 2024-10-18 RX ADMIN — APIXABAN 5 MILLIGRAM(S): 5 TABLET, FILM COATED ORAL at 10:17

## 2024-10-18 RX ADMIN — Medication 2 MILLIGRAM(S): at 12:50

## 2024-10-18 RX ADMIN — Medication 2 MILLIGRAM(S): at 18:09

## 2024-10-18 RX ADMIN — MIDODRINE HYDROCHLORIDE 10 MILLIGRAM(S): 2.5 TABLET ORAL at 21:45

## 2024-10-18 RX ADMIN — POLYETHYLENE GLYCOL 3350 17 GRAM(S): 17 POWDER, FOR SOLUTION ORAL at 10:17

## 2024-10-18 RX ADMIN — APIXABAN 5 MILLIGRAM(S): 5 TABLET, FILM COATED ORAL at 21:46

## 2024-10-18 RX ADMIN — Medication 5 MG/HR: at 04:37

## 2024-10-18 RX ADMIN — OXYBUTYNIN CHLORIDE 5 MILLIGRAM(S): 5 TABLET ORAL at 21:45

## 2024-10-18 NOTE — PROGRESS NOTE ADULT - PROBLEM SELECTOR PLAN 2
Continue Tafamidis 61mg PO daily (nonformulary)    ##RESOLVED Hypokalemia  - Current K 3.4  - Repleted, will add lili to avoid hypokalemia while being diuresed

## 2024-10-18 NOTE — PROGRESS NOTE ADULT - PROBLEM SELECTOR PLAN 3
Hx of ablation and s/p [place] [employer] CRT-P  - Currently paced rhythm  - AC: Eliquis 5mg PO BID

## 2024-10-18 NOTE — PROGRESS NOTE ADULT - SUBJECTIVE AND OBJECTIVE BOX
Interventional Cardiology PA Adult Progress Note    Overnight Events:  Refusing medications.    Subjective Assessment:  Patient seen and examined at bedside. Patient eating breakfast. Patient denied any acute symptoms like chest pain, SOB, leg pain and does endorse abdominal pain.     ROS Negative except as per Subjective and HPI  	  MEDICATIONS:  buMETAnide gtt 1mg/hr  midodrine 10 milliGRAM(s) Oral every 8 hours  spironolactone 25 milliGRAM(s) Oral daily  bisacodyl Suppository 10 milliGRAM(s) Rectal daily PRN  pantoprazole    Tablet 40 milliGRAM(s) Oral before breakfast  polyethylene glycol 3350 17 Gram(s) Oral daily  senna 2 Tablet(s) Oral at bedtime  apixaban 5 milliGRAM(s) Oral every 12 hours  oxybutynin 5 milliGRAM(s) Oral two times a day  tamsulosin 0.4 milliGRAM(s) Oral at bedtime    [PHYSICAL EXAM:  TELEMETRY:  T(C): 36.3 (10-18-24 @ 08:55), Max: 36.4 (10-17-24 @ 20:38)  HR: 85 (10-18-24 @ 12:49) (80 - 87)  BP: 104/68 (10-18-24 @ 12:49) (93/65 - 106/74)  RR: 18 (10-18-24 @ 12:49) (16 - 18)  SpO2: 98% (10-18-24 @ 12:49) (95% - 99%)  Wt(kg): --  I&O's Summary    17 Oct 2024 07:01  -  18 Oct 2024 07:00  --------------------------------------------------------  IN: 420 mL / OUT: 2450 mL / NET: -2030 mL    18 Oct 2024 07:01  -  18 Oct 2024 14:14  --------------------------------------------------------  IN: 240 mL / OUT: 700 mL / NET: -460 mL      Appearance: Normal, not in acute distress	  HEENT:   Normal oral mucosa, PERRL, EOMI	  Neck: - JVD  Cardiovascular: Normal S1 S2, No JVD, No murmurs   Respiratory: Lungs clear to auscultation  Gastrointestinal:  distended, tender w/ palpation  Skin: No rashes, No ecchymoses, No cyanosis  Extremities: no edema  Vascular: Peripheral pulses palpable 2+ bilaterally  Neurologic: Non-focal  Psychiatry: A & O x 3, Mood & affect appropriate  	  CARDIAC MARKERS:                         14.6   3.26  )-----------( 95       ( 18 Oct 2024 10:00 )             43.9     10-18    143  |  99  |  25[H]  ----------------------------<  136[H]  3.6   |  37[H]  |  1.03    Ca    8.6      18 Oct 2024 10:00  Mg     2.2     10-18    TPro  6.4  /  Alb  2.9[L]  /  TBili  1.0  /  DBili  x   /  AST  20  /  ALT  18  /  AlkPhos  178[H]  10-18

## 2024-10-18 NOTE — PROGRESS NOTE ADULT - PROBLEM SELECTOR PLAN 7
Continue Tamsulosin 0.4mg PO qhs      N: DASH with consistent carb and 1.5L fluid restriction  E: Maintain K>4.0 and Mg>2.0  VTE PPx: Eliquis  Code: DNR/DNI    Case discussed w/ Dr. Em

## 2024-10-18 NOTE — PROGRESS NOTE ADULT - ASSESSMENT
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 Admitted to cardiac tele for HFrEF exacerbation i/s/o medication noncompliance. Transitioned from Bumex gtt today to IVP BID. Plan for discharge to home hospice 10/19/24.          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 Admitted to cardiac tele for HFrEF exacerbation i/s/o medication noncompliance. Transitioned from Bumex gtt today to IVP BID. Plan for discharge to home hospice 10/21/24.

## 2024-10-18 NOTE — CHART NOTE - NSCHARTNOTEFT_GEN_A_CORE
Family requesting more time to prepare patient's home in anticipation for discharge with home hospice. No acute symptom management needs. Goals are established: no escalation of care, plan for home hospice, c/w diuresis as indicated. Symptom-directed care.    Nahum Qureshi, Palliative Care Attending  Questions/Concerns: Call [phone] (including Nights/Weekend) or message on [employer] Teams (Nahum Qureshi)
I was not involved in this patient's care but took a critical value from chemistry lab for holding RN Adelaida Torres as she was busy with another call--troponin 159, RN informed.

## 2024-10-18 NOTE — PROGRESS NOTE ADULT - PROBLEM SELECTOR PLAN 1
Etiology: TTR amyloid, nonobs CAD by cath @ [facility] in 2014  CXR pulm vasc congestion & R pleural effusion, BNP 26k   TTE (10/11/24): EF 20-25% w/ global hypokinesis. severe symmetric LVH, severely reduced RVSF, severe HYACINTH, mild MR/TR, small pericardial effusion w/o tamponade, B/L pleural effusion   - Diuresis: -> Bumex 2 IV BID -> plan to transition to Bumex 2 mg PO BID  - GDMT: Spironolactone 25mg QD. Unable to tolerate ARNI/ARB/BB due to hypotension, on midodrine 10mg Q8h  -Core measures, daily weights, strict I&Os

## 2024-10-19 LAB
ADD ON TEST-SPECIMEN IN LAB: SIGNIFICANT CHANGE UP
ALBUMIN SERPL ELPH-MCNC: 3.1 G/DL — LOW (ref 3.3–5)
ALP SERPL-CCNC: 175 U/L — HIGH (ref 40–120)
ALT FLD-CCNC: 15 U/L — SIGNIFICANT CHANGE UP (ref 10–45)
ANION GAP SERPL CALC-SCNC: 6 MMOL/L — SIGNIFICANT CHANGE UP (ref 5–17)
AST SERPL-CCNC: 16 U/L — SIGNIFICANT CHANGE UP (ref 10–40)
BILIRUB SERPL-MCNC: 1.1 MG/DL — SIGNIFICANT CHANGE UP (ref 0.2–1.2)
BUN SERPL-MCNC: 28 MG/DL — HIGH (ref 7–23)
CALCIUM SERPL-MCNC: 8.7 MG/DL — SIGNIFICANT CHANGE UP (ref 8.4–10.5)
CHLORIDE SERPL-SCNC: 96 MMOL/L — SIGNIFICANT CHANGE UP (ref 96–108)
CO2 SERPL-SCNC: 41 MMOL/L — HIGH (ref 22–31)
CREAT SERPL-MCNC: 1.09 MG/DL — SIGNIFICANT CHANGE UP (ref 0.5–1.3)
EGFR: 70 ML/MIN/1.73M2 — SIGNIFICANT CHANGE UP
GLUCOSE SERPL-MCNC: 89 MG/DL — SIGNIFICANT CHANGE UP (ref 70–99)
HCT VFR BLD CALC: 42.1 % — SIGNIFICANT CHANGE UP (ref 39–50)
HGB BLD-MCNC: 13.7 G/DL — SIGNIFICANT CHANGE UP (ref 13–17)
MAGNESIUM SERPL-MCNC: 2.3 MG/DL — SIGNIFICANT CHANGE UP (ref 1.6–2.6)
MCHC RBC-ENTMCNC: 28.8 PG — SIGNIFICANT CHANGE UP (ref 27–34)
MCHC RBC-ENTMCNC: 32.5 GM/DL — SIGNIFICANT CHANGE UP (ref 32–36)
MCV RBC AUTO: 88.6 FL — SIGNIFICANT CHANGE UP (ref 80–100)
NRBC # BLD: 0 /100 WBCS — SIGNIFICANT CHANGE UP (ref 0–0)
NT-PROBNP SERPL-SCNC: HIGH PG/ML (ref 0–300)
PLATELET # BLD AUTO: 86 K/UL — LOW (ref 150–400)
POTASSIUM SERPL-MCNC: 3.8 MMOL/L — SIGNIFICANT CHANGE UP (ref 3.5–5.3)
POTASSIUM SERPL-SCNC: 3.8 MMOL/L — SIGNIFICANT CHANGE UP (ref 3.5–5.3)
PROT SERPL-MCNC: 6.6 G/DL — SIGNIFICANT CHANGE UP (ref 6–8.3)
RBC # BLD: 4.75 M/UL — SIGNIFICANT CHANGE UP (ref 4.2–5.8)
RBC # FLD: 15.4 % — HIGH (ref 10.3–14.5)
SODIUM SERPL-SCNC: 143 MMOL/L — SIGNIFICANT CHANGE UP (ref 135–145)
WBC # BLD: 3.83 K/UL — SIGNIFICANT CHANGE UP (ref 3.8–10.5)
WBC # FLD AUTO: 3.83 K/UL — SIGNIFICANT CHANGE UP (ref 3.8–10.5)

## 2024-10-19 PROCEDURE — 99233 SBSQ HOSP IP/OBS HIGH 50: CPT | Mod: 25

## 2024-10-19 PROCEDURE — 99497 ADVNCD CARE PLAN 30 MIN: CPT

## 2024-10-19 PROCEDURE — 71045 X-RAY EXAM CHEST 1 VIEW: CPT | Mod: 26

## 2024-10-19 RX ORDER — HYDROMORPHONE HCL/0.9% NACL/PF 6 MG/30 ML
1 PATIENT CONTROLLED ANALGESIA SYRINGE INTRAVENOUS EVERY 6 HOURS
Refills: 0 | Status: DISCONTINUED | OUTPATIENT
Start: 2024-10-19 | End: 2024-10-22

## 2024-10-19 RX ADMIN — APIXABAN 5 MILLIGRAM(S): 5 TABLET, FILM COATED ORAL at 10:40

## 2024-10-19 RX ADMIN — OXYBUTYNIN CHLORIDE 5 MILLIGRAM(S): 5 TABLET ORAL at 17:35

## 2024-10-19 RX ADMIN — APIXABAN 5 MILLIGRAM(S): 5 TABLET, FILM COATED ORAL at 21:45

## 2024-10-19 RX ADMIN — OXYBUTYNIN CHLORIDE 5 MILLIGRAM(S): 5 TABLET ORAL at 05:38

## 2024-10-19 RX ADMIN — PANTOPRAZOLE SODIUM 40 MILLIGRAM(S): 40 TABLET, DELAYED RELEASE ORAL at 05:39

## 2024-10-19 RX ADMIN — Medication 25 MILLIGRAM(S): at 05:38

## 2024-10-19 RX ADMIN — Medication 0.4 MILLIGRAM(S): at 21:45

## 2024-10-19 RX ADMIN — MIDODRINE HYDROCHLORIDE 10 MILLIGRAM(S): 2.5 TABLET ORAL at 13:07

## 2024-10-19 RX ADMIN — POLYETHYLENE GLYCOL 3350 17 GRAM(S): 17 POWDER, FOR SOLUTION ORAL at 12:44

## 2024-10-19 RX ADMIN — MIDODRINE HYDROCHLORIDE 10 MILLIGRAM(S): 2.5 TABLET ORAL at 05:39

## 2024-10-19 RX ADMIN — Medication 2 TABLET(S): at 21:45

## 2024-10-19 RX ADMIN — Medication 2 MILLIGRAM(S): at 12:44

## 2024-10-19 RX ADMIN — MIDODRINE HYDROCHLORIDE 10 MILLIGRAM(S): 2.5 TABLET ORAL at 21:45

## 2024-10-19 NOTE — PROGRESS NOTE ADULT - PROBLEM SELECTOR PLAN 7
Continue Tamsulosin 0.4mg PO qhs      N: DASH with consistent carb and 1.5L fluid restriction  E: Maintain K>4.0 and Mg>2.0  VTE PPx: Eliquis  Code: DNR/DNI    Case discussed w/ Dr. Jang

## 2024-10-19 NOTE — PROGRESS NOTE ADULT - SUBJECTIVE AND OBJECTIVE BOX
Interventional Cardiology PA Adult Progress Note    Overnight Events:  N/a    Subjective Assessment:  Patient seen and examined at bedside. Patient denied chest pain, SOB, palpitations, leg pain. Patient does endorse the abdominal pain although states that it is improved yesterday after BM.    ROS Negative except as per Subjective and HPI  	  MEDICATIONS:  buMETAnide Injectable 2 milliGRAM(s) IV Push once  midodrine 10 milliGRAM(s) Oral every 8 hours  spironolactone 25 milliGRAM(s) Oral daily  pantoprazole    Tablet 40 milliGRAM(s) Oral before breakfast  polyethylene glycol 3350 17 Gram(s) Oral daily  senna 2 Tablet(s) Oral at bedtime  apixaban 5 milliGRAM(s) Oral every 12 hours  oxybutynin 5 milliGRAM(s) Oral two times a day  tamsulosin 0.4 milliGRAM(s) Oral at bedtime    [PHYSICAL EXAM:  TELEMETRY:  T(C): 36.7 (10-19-24 @ 09:05), Max: 36.7 (10-19-24 @ 09:05)  HR: 80 (10-19-24 @ 09:05) (80 - 88)  BP: 95/68 (10-19-24 @ 09:05) (91/66 - 104/68)  RR: 17 (10-19-24 @ 09:05) (16 - 18)  SpO2: 96% (10-19-24 @ 09:05) (95% - 98%)  Wt(kg): --  I&O's Summary    18 Oct 2024 07:01  -  19 Oct 2024 07:00  --------------------------------------------------------  IN: 870 mL / OUT: 2250 mL / NET: -1380 mL    19 Oct 2024 07:01  -  19 Oct 2024 12:16  --------------------------------------------------------  IN: 180 mL / OUT: 0 mL / NET: 180 mL    Appearance: Normal	  HEENT:   Normal oral mucosa, PERRL, EOMI	  Neck: Supple, + JVD  Cardiovascular: Normal S1 S2, No JVD, No murmurs,   Respiratory: clear b/l anteriorly  Gastrointestinal:  Soft, Non-tender, + BS	  Skin: No rashes, No ecchymoses, No cyanosis  Extremities: cold to touch, no edema  Neurologic: Non-focal  Psychiatry: A & O x 3, Mood & affect appropriate  	  CARDIAC MARKERS:                     13.7   3.83  )-----------( 86       ( 19 Oct 2024 05:30 )             42.1     10-19    143  |  96  |  28[H]  ----------------------------<  89  3.8   |  41[H]  |  1.09    Ca    8.7      19 Oct 2024 05:30  Mg     2.3     10-19    TPro  6.6  /  Alb  3.1[L]  /  TBili  1.1  /  DBili  x   /  AST  16  /  ALT  15  /  AlkPhos  175[H]  10-19

## 2024-10-19 NOTE — PROGRESS NOTE ADULT - PROBLEM SELECTOR PLAN 3
Hx of ablation and s/p [employer] CRT-P  - Currently paced rhythm  - AC: Eliquis 5mg PO BID

## 2024-10-19 NOTE — PROGRESS NOTE ADULT - PROBLEM SELECTOR PLAN 2
Continue Tafamidis 61mg PO daily (nonformulary)    ##RESOLVED Hypokalemia  - Spironolactone as above

## 2024-10-19 NOTE — GOALS OF CARE CONVERSATION - ADVANCED CARE PLANNING - CONVERSATION DETAILS
Meeting the patient for the first time today so took time to review current goals of care plan. Son and the patient at the bedside voluntarily participated in the discussion.    They reconfirmed with me with plan for no escalation of care including intervention, surgery, intubation, resuscitation (DNR/DNI),  further labs, and ICU/CCU.    Home hospice currently being arranged, palliative care is involved, family okay with this plan. Okay to give IV Bumex today for symptomatic relief. Will continue to focus on pain and symptom directed care.

## 2024-10-19 NOTE — PROGRESS NOTE ADULT - ASSESSMENT
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 Admitted to cardiac tele for HFrEF exacerbation i/s/o medication noncompliance. Continued IV diuresis. Plan for discharge to home hospice 10/21/24.

## 2024-10-19 NOTE — PROGRESS NOTE ADULT - PROBLEM SELECTOR PLAN 1
Etiology: TTR amyloid, nonobs CAD by cath @ [facility] in 2014  CXR pulm vasc congestion & R pleural effusion, BNP 26k   TTE (10/11/24): EF 20-25% w/ global hypokinesis. severe symmetric LVH, severely reduced RVSF, severe HYACINTH, mild MR/TR, small pericardial effusion w/o tamponade, B/L pleural effusion   - Diuresis: -> Bumex 2 IV QD -> plan to transition to Bumex 2 mg PO BID on d/c  - Vasopressor: Midodrine 10 mg Q8  - GDMT: Spironolactone 25mg QD. Unable to tolerate ARNI/ARB/BB due to hypotension, on midodrine 10mg Q8h  -Core measures, daily weights, strict I&Os

## 2024-10-20 LAB — MAGNESIUM SERPL-MCNC: 2.3 MG/DL — SIGNIFICANT CHANGE UP (ref 1.6–2.6)

## 2024-10-20 PROCEDURE — 99233 SBSQ HOSP IP/OBS HIGH 50: CPT

## 2024-10-20 RX ADMIN — PANTOPRAZOLE SODIUM 40 MILLIGRAM(S): 40 TABLET, DELAYED RELEASE ORAL at 05:39

## 2024-10-20 RX ADMIN — Medication 0.4 MILLIGRAM(S): at 22:05

## 2024-10-20 RX ADMIN — MIDODRINE HYDROCHLORIDE 10 MILLIGRAM(S): 2.5 TABLET ORAL at 13:47

## 2024-10-20 RX ADMIN — MIDODRINE HYDROCHLORIDE 10 MILLIGRAM(S): 2.5 TABLET ORAL at 05:39

## 2024-10-20 RX ADMIN — APIXABAN 5 MILLIGRAM(S): 5 TABLET, FILM COATED ORAL at 09:41

## 2024-10-20 RX ADMIN — Medication 25 MILLIGRAM(S): at 05:40

## 2024-10-20 RX ADMIN — POLYETHYLENE GLYCOL 3350 17 GRAM(S): 17 POWDER, FOR SOLUTION ORAL at 13:47

## 2024-10-20 RX ADMIN — Medication 2 MILLIGRAM(S): at 13:47

## 2024-10-20 RX ADMIN — Medication 2 TABLET(S): at 22:05

## 2024-10-20 RX ADMIN — OXYBUTYNIN CHLORIDE 5 MILLIGRAM(S): 5 TABLET ORAL at 05:39

## 2024-10-20 RX ADMIN — MIDODRINE HYDROCHLORIDE 10 MILLIGRAM(S): 2.5 TABLET ORAL at 22:04

## 2024-10-20 RX ADMIN — APIXABAN 5 MILLIGRAM(S): 5 TABLET, FILM COATED ORAL at 22:04

## 2024-10-20 NOTE — PROGRESS NOTE ADULT - ASSESSMENT
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 Admitted to cardiac tele for HFrEF exacerbation i/s/o medication noncompliance. Now s/p IV diuresis w/ Bumex. Plan for discharge to home hospice tomorrow 10/21/24.

## 2024-10-20 NOTE — PROGRESS NOTE ADULT - SUBJECTIVE AND OBJECTIVE BOX
Interventional Cardiology PA Adult Progress Note    Overnight Events:  No events overnight    Subjective Assessment:  Patient seen and examined at bedside. Patient denied chest pain, SOB, abdominal pain and dysuria.    ROS Negative except as per Subjective and HPI  	  MEDICATIONS:  midodrine 10 milliGRAM(s) Oral every 8 hours  spironolactone 25 milliGRAM(s) Oral daily  HYDROmorphone   Tablet 1 milliGRAM(s) Oral every 6 hours PRN  pantoprazole    Tablet 40 milliGRAM(s) Oral before breakfast  polyethylene glycol 3350 17 Gram(s) Oral daily  senna 2 Tablet(s) Oral at bedtime  apixaban 5 milliGRAM(s) Oral every 12 hours  oxybutynin 5 milliGRAM(s) Oral two times a day  tamsulosin 0.4 milliGRAM(s) Oral at bedtime    [PHYSICAL EXAM:  TELEMETRY:  T(C): 36.6 (10-20-24 @ 09:34), Max: 36.7 (10-20-24 @ 05:45)  HR: 80 (10-20-24 @ 09:34) (77 - 91)  BP: 91/64 (10-20-24 @ 09:34) (91/64 - 95/72)  RR: 18 (10-20-24 @ 09:34) (16 - 18)  SpO2: 98% (10-20-24 @ 09:34) (94% - 99%)  Wt(kg): --  I&O's Summary    19 Oct 2024 07:01  -  20 Oct 2024 07:00  --------------------------------------------------------  IN: 480 mL / OUT: 1600 mL / NET: -1120 mL    Appearance: Normal	  HEENT:   Normal oral mucosa, PERRL, EOMI	  Neck: Supple, -JVD   Cardiovascular: Normal S1 S2, No JVD, No murmurs,   Respiratory: Lungs clear to auscultation	  Gastrointestinal:  Soft, Non-tender, + BS	  Skin: No rashes, No ecchymoses, No cyanosis  Extremities: Normal range of motion, No clubbing, cyanosis or edema  Vascular: Peripheral pulses palpable 2+ bilaterally  Neurologic: Non-focal  Psychiatry: A & O x 3, Mood & affect appropriate  	  CARDIAC MARKERS:                      13.7   3.83  )-----------( 86       ( 19 Oct 2024 05:30 )             42.1     10-19    143  |  96  |  28[H]  ----------------------------<  89  3.8   |  41[H]  |  1.09    Ca    8.7      19 Oct 2024 05:30  Mg     2.3     10-20    TPro  6.6  /  Alb  3.1[L]  /  TBili  1.1  /  DBili  x   /  AST  16  /  ALT  15  /  AlkPhos  175[H]  10-19

## 2024-10-20 NOTE — PROGRESS NOTE ADULT - PROBLEM SELECTOR PLAN 1
Etiology: TTR amyloid, nonobs CAD by cath @ [facility] in 2014  CXR pulm vasc congestion & R pleural effusion, BNP 26k   TTE (10/11/24): EF 20-25% w/ global hypokinesis. severe symmetric LVH, severely reduced RVSF, severe HYACINTH, mild MR/TR, small pericardial effusion w/o tamponade, B/L pleural effusion   - Diuresis: Bumex 2 mg PO x 1 this am, assess in AM  - Vasopressor: Midodrine 10 mg Q8  - GDMT: Spironolactone 25mg QD. Unable to tolerate ARNI/ARB/BB due to hypotension, on midodrine 10mg Q8h  -Core measures, daily weights, strict I&Os

## 2024-10-20 NOTE — PROGRESS NOTE ADULT - NS ATTEND AMEND GEN_ALL_CORE FT
80 yo man PMHx of HFrEF TTR amyloid, group 2 pulm HTN, paroxysmal Afib, and DM2 who was admitted for acute on chronic dec HFrEF exacerbation    Assessment  1. Acute on chronic dec HFrEF exacerbation - mildly decompensated on O2  2. TTR amyloid  3. Paroxysmal Afib  4. CRT-P paced rhythm    Plan  1. Apixaban 5mg BID for systemic embolization prevention  2. GDMT: Aldactone at current dose; no BP room for ARNI/BB. Give PO Bumex 2mg x 1 today  3. Tafamadis at home dose  4. GOC: family agreed to home hospice, tentative DC Monday    During non face-to-face time, I reviewed relevant portions of the patient’s medical record. During face-to-face time, I took a relevant history and examined the patient. I also explained differential diagnoses, relevant cardiac diagnoses, workup, and management plan, which required a high level of medical decision making. I answered all questions related to the patient's medical conditions.     Jhonatan Jang M.D.  Attending Cardiologist  [facility].
Mr. Wise is 79M with Hx of TTR amyloid with LV dysfunction, Atrial fibrillation and ITP presented for decompensated HF in setting of not taking torsemide.     Review of studies:  TTE (10/11/24): LVEF 20-25%, global hypokinesis, RV function severely reduced. LA severely dilated, RA severely dilated.     Exam:  NAD  JVP elevated but improved.  Lungs decreased at bases  Improved perfusion mildly cool. 1-2+ edema    ACute decompensated HF- Hx of TTR amlyoid with severe LV dysfunction. Started on bumex gtt, decreased to 1mg/hr with good response 2-3L negative. Continue diuresing. Palliative consulted. F/u HF recs. Daily perfusion labs.   TTR amyloidosis as above- tafamadis.   Afib- one eliquis.  BPH- flomax. Urology following.
Mr. Wise is 79M with Hx of TTR amyloid with LV dysfunction, Atrial fibrillation and ITP presented for decompensated HF in setting of not taking torsemide.     Review of studies:  TTE (10/11/24): LVEF 20-25%, global hypokinesis, RV function severely reduced. LA severely dilated, RA severely dilated.     Exam:  NAD  JVP improved.  Lungs decreased at bases  Trace edema, stable.    -Acute decompensated HF- Hx of TTR amlyoid with severe LV dysfunction. Robbie. Transition IV gtt to IV 2mg BID. Palliative consulted. F/u HF recs.   -TTR amyloidosis as above- tafamadis.   -Afib- one eliquis.  -BPH- flomax. Urology following.
78 yo man PMHx of HFrEF TTR amyloid, group 2 pulm HTN, paroxysmal Afib, and DM2 who was admitted for acute on chronic dec HFrEF exacerbation    Assessment  1. Acute on chronic dec HFrEF exacerbation - mildly decompensated on O2  2. TTR amyloid  3. Paroxysmal Afib  4. CRT-P paced rhythm    Plan  1. Apixaban 5mg BID for systemic embolization prevention  2. GDMT: Aldactone at current dose; no BP room for ARNI/BB. Give IV Bumex 2mg x 1 today  3. Tafamadis at home dose  4. GOC: family agreed to home hospice, tentative DC Monday    During non face-to-face time, I reviewed relevant portions of the patient’s medical record. During face-to-face time, I took a relevant history and examined the patient. I also explained differential diagnoses, relevant cardiac diagnoses, workup, and management plan, which required a high level of medical decision making. I answered all questions related to the patient's medical conditions.     Jhonatan Jang M.D.  Attending Cardiologist  [facility]
Mr. Wise is 79M with Hx of TTR amyloid with LV dysfunction, Atrial fibrillation and ITP presented for decompensated HF in setting of not taking torsemide.     Review of studies:  TTE (10/11/24): LVEF 20-25%, global hypokinesis, RV function severely reduced. LA severely dilated, RA severely dilated.     Exam:  NAD  JVP elevated but improved.  Lungs decreased at bases  1-2+ edema, mildly cool, stable.    -Acute decompensated HF- Hx of TTR amlyoid with severe LV dysfunction. Continue bumex gtt 1mg/hr with good response 2-3L negative. Continue diuresing. Palliative consulted. F/u HF recs.   -TTR amyloidosis as above- tafamadis.   -Afib- one eliquis.  -BPH- flomax. Urology following.
Mr. Wise is 79M with Hx of TTR amyloid with LV dysfunction, Atrial fibrillation and ITP presented for decompensated HF in setting of not taking torsemide.     Review of studies:  TTE (10/11/24): LVEF 20-25%, global hypokinesis, RV function severely reduced. LA severely dilated, RA severely dilated.     Exam:  NAD  JVP elevated but improved.  Lungs decreased at bases  1-2+ edema, mildly cool, stable.    -Acute decompensated HF- Hx of TTR amlyoid with severe LV dysfunction. Continue bumex gtt 1mg/hr with good response 2-3L negative. Continue diuresing. Palliative consulted. F/u HF recs. Daily perfusion labs.   -TTR amyloidosis as above- tafamadis.   -Afib- one eliquis.  -BPH- flomax. Urology following.
Mr. Wise is 79M with Hx of TTR amyloid with LV dysfunction, Atrial fibrillation and ITP presented for decompensated HF in setting of not taking torsemide.     Review of studies:  TTE (10/11/24): LVEF 20-25%, global hypokinesis, RV function severely reduced. LA severely dilated, RA severely dilated.     Exam:  NAD  JVP improved.  Lungs decreased at bases  1+ edema, stable.    -Acute decompensated HF- Hx of TTR amlyoid with severe LV dysfunction. Continue bumex gtt 1mg/hr with good response 2-3L negative.  Add Okay today. Continue diuresing. Palliative consulted. F/u HF recs.   -TTR amyloidosis as above- tafamadis.   -Afib- one eliquis.  -BPH- flomax. Urology following.

## 2024-10-20 NOTE — PROGRESS NOTE ADULT - PROBLEM SELECTOR PLAN 3
Hx of ablation and s/p [place] [employer] CRT-P  - Currently paced rhythm  - AC: Eliquis 5mg PO BID

## 2024-10-21 PROCEDURE — 99232 SBSQ HOSP IP/OBS MODERATE 35: CPT

## 2024-10-21 RX ORDER — MAGNESIUM, ALUMINUM HYDROXIDE 200-200 MG
30 TABLET,CHEWABLE ORAL EVERY 4 HOURS
Refills: 0 | Status: DISCONTINUED | OUTPATIENT
Start: 2024-10-21 | End: 2024-10-22

## 2024-10-21 RX ORDER — TORSEMIDE 100 MG/1
1 TABLET ORAL
Qty: 30 | Refills: 2
Start: 2024-10-21 | End: 2025-01-18

## 2024-10-21 RX ORDER — PANTOPRAZOLE SODIUM 40 MG/1
1 TABLET, DELAYED RELEASE ORAL
Qty: 30 | Refills: 2
Start: 2024-10-21 | End: 2025-01-18

## 2024-10-21 RX ORDER — MIDODRINE HYDROCHLORIDE 2.5 MG/1
1 TABLET ORAL
Qty: 90 | Refills: 2
Start: 2024-10-21 | End: 2025-01-18

## 2024-10-21 RX ORDER — SPIRONOLACTONE 100 MG
1 TABLET ORAL
Qty: 30 | Refills: 2
Start: 2024-10-21 | End: 2025-01-18

## 2024-10-21 RX ORDER — TORSEMIDE 100 MG/1
10 TABLET ORAL DAILY
Refills: 0 | Status: DISCONTINUED | OUTPATIENT
Start: 2024-10-21 | End: 2024-10-22

## 2024-10-21 RX ADMIN — MIDODRINE HYDROCHLORIDE 10 MILLIGRAM(S): 2.5 TABLET ORAL at 05:17

## 2024-10-21 RX ADMIN — Medication 0.4 MILLIGRAM(S): at 23:08

## 2024-10-21 RX ADMIN — MIDODRINE HYDROCHLORIDE 10 MILLIGRAM(S): 2.5 TABLET ORAL at 16:17

## 2024-10-21 RX ADMIN — APIXABAN 5 MILLIGRAM(S): 5 TABLET, FILM COATED ORAL at 11:00

## 2024-10-21 RX ADMIN — Medication 25 MILLIGRAM(S): at 05:19

## 2024-10-21 RX ADMIN — PANTOPRAZOLE SODIUM 40 MILLIGRAM(S): 40 TABLET, DELAYED RELEASE ORAL at 05:54

## 2024-10-21 RX ADMIN — Medication 30 MILLILITER(S): at 20:52

## 2024-10-21 RX ADMIN — MIDODRINE HYDROCHLORIDE 10 MILLIGRAM(S): 2.5 TABLET ORAL at 23:09

## 2024-10-21 RX ADMIN — OXYBUTYNIN CHLORIDE 5 MILLIGRAM(S): 5 TABLET ORAL at 05:18

## 2024-10-21 RX ADMIN — APIXABAN 5 MILLIGRAM(S): 5 TABLET, FILM COATED ORAL at 23:09

## 2024-10-21 RX ADMIN — OXYBUTYNIN CHLORIDE 5 MILLIGRAM(S): 5 TABLET ORAL at 18:10

## 2024-10-21 NOTE — PROGRESS NOTE ADULT - PROBLEM SELECTOR PROBLEM 2
Cardiac amyloidosis
Acute on chronic HFrEF (heart failure with reduced ejection fraction)
Cardiac amyloidosis
Acute on chronic HFrEF (heart failure with reduced ejection fraction)
Cardiac amyloidosis
Cardiac amyloidosis

## 2024-10-21 NOTE — PROGRESS NOTE ADULT - PROBLEM SELECTOR PROBLEM 1
Acute on chronic HFrEF (heart failure with reduced ejection fraction)
Debility
Acute on chronic HFrEF (heart failure with reduced ejection fraction)
Debility
Acute on chronic HFrEF (heart failure with reduced ejection fraction)

## 2024-10-21 NOTE — PROGRESS NOTE ADULT - NSPROGADDITIONALINFOA_GEN_ALL_CORE
Attending Attestation:  I was physically present for the key portions of the evaluation and management (E/M) service provided. I reviewed relevant data including imaging, labs and prior procedure reports available. I agree with the above history, physical, and plan which I have reviewed with the following edits/addendum:    79M with end-stage TTR amyloidosis, HFrEF admitted for ADHF, now euvolemic and qualifies for home hospice.  - resume home torsemide 10 mg to keep euvolemia for overall comfort care approach  - to go home with hospice 10/22    Manny Barros MD  Cardiology

## 2024-10-21 NOTE — PROGRESS NOTE ADULT - PROBLEM SELECTOR PLAN 1
Etiology: TTR amyloid, nonobs CAD by cath @ [facility] in 2014  CXR pulm vasc congestion & R pleural effusion, BNP 26k   TTE (10/11/24): EF 20-25% w/ global hypokinesis. severe symmetric LVH, severely reduced RVSF, severe HYACINTH, mild MR/TR, small pericardial effusion w/o tamponade, B/L pleural effusion   - Diuresis: Bumex 2 mg PO x 1 this am, assess in AM  - Vasopressor: Midodrine 10 mg Q8  - GDMT: Spironolactone 25mg QD. Unable to tolerate ARNI/ARB/BB due to hypotension, on midodrine 10mg Q8h  -Core measures, daily weights, strict I&Os

## 2024-10-21 NOTE — PROGRESS NOTE ADULT - PROBLEM SELECTOR PROBLEM 4
Stage 3 chronic kidney disease
Advanced care planning/counseling discussion
Stage 3 chronic kidney disease
Stage 3 chronic kidney disease
Advanced care planning/counseling discussion
Stage 3 chronic kidney disease
Stage 3 chronic kidney disease

## 2024-10-21 NOTE — PROGRESS NOTE ADULT - PROBLEM SELECTOR PLAN 3
Hx of ablation and s/p [employer] CRT-P  - Currently paced rhythm  - AC: Eliquis 5mg PO BID

## 2024-10-21 NOTE — PROGRESS NOTE ADULT - PROBLEM SELECTOR PLAN 7
Continue Tamsulosin 0.4mg PO qhs      N: DASH with consistent carb and 1.5L fluid restriction  E: Maintain K>4.0 and Mg>2.0  VTE PPx: Eliquis  Code: DNR/DNI    Case discussed w/ Dr. Jang Continue Tamsulosin 0.4mg PO qhs      N: DASH with consistent carb and 1.5L fluid restriction  E: Maintain K>4.0 and Mg>2.0  VTE PPx: Eliquis  Code: DNR/DNI  Dispo: Home Hospice     Case discussed w/

## 2024-10-21 NOTE — PROGRESS NOTE ADULT - PROBLEM SELECTOR PROBLEM 5
Thrombocytopenic
Encounter for palliative care
Thrombocytopenic
Encounter for palliative care
Thrombocytopenic

## 2024-10-21 NOTE — PROGRESS NOTE ADULT - PROBLEM SELECTOR PLAN 6
A1c 5.7  - Continue ISS and monitor FS
A1c 5.7  - Continue FS's and ICS PRN
A1c 5.7  - Continue ISS and monitor FS

## 2024-10-21 NOTE — PROGRESS NOTE ADULT - PROBLEM SELECTOR PLAN 5
Platelets ~80k (baseline 60-70k)  - S/p bone marrow biopsy (11/23/21) for evaluation of thrombocytopenia; however, bone marrow specimen was limited.  - Continue to monitor closely, transfuse for signs of active bleeding with platelets <50.
.  Complex medical decision making / symptom management in the setting of advanced cardiac disease.    Will continue to follow for ongoing GOC discussion / titration of palliative regimen. Emotional support provided, questions answered.  Active Psychosocial Referrals:  [x]Social Work/Case management [x]PT/OT []Chaplaincy [x]Hospice  []Patient/Family Support []Holistic RN [x]Massage Therapy [x]Music Therapy []Ethics  Coping: [x] well [] with difficulty [] poor coping [] unable to assess  Support system: [] strong [x] adequate [] inadequate    For new or uncontrolled symptoms, please call Palliative Care at [phone]). The service is available 24/7 (including nights & weekends) to provide symptom management recommendations over the phone as appropriate
Platelets ~80k (baseline 60-70k)  - S/p bone marrow biopsy (11/23/21) for evaluation of thrombocytopenia; however, bone marrow specimen was limited.  - Continue to monitor closely, transfuse for signs of active bleeding with platelets <50.
.  Complex medical decision making / symptom management in the setting of advanced cardiac disease.    Will continue to follow for ongoing GOC discussion / titration of palliative regimen. Emotional support provided, questions answered.  Active Psychosocial Referrals:  [x]Social Work/Case management [x]PT/OT []Chaplaincy [x]Hospice  []Patient/Family Support []Holistic RN [x]Massage Therapy [x]Music Therapy []Ethics  Coping: [x] well [] with difficulty [] poor coping [] unable to assess  Support system: [] strong [x] adequate [] inadequate    For new or uncontrolled symptoms, please call Palliative Care at [phone]). The service is available 24/7 (including nights & weekends) to provide symptom management recommendations over the phone as appropriate
Platelets ~80k (baseline 60-70k)  - S/p bone marrow biopsy (11/23/21) for evaluation of thrombocytopenia; however, bone marrow specimen was limited.  - Continue to monitor closely, transfuse for signs of active bleeding with platelets <50.
Platelets 81k (baseline 60-70k in the past).  - S/p bone marrow biopsy (11/23/21) for evaluation of thrombocytopenia; however, bone marrow specimen was limited.  - Continue to monitor closely, transfuse for signs of active bleeding with platelets <50.
Platelets ~80k (baseline 60-70k)  - S/p bone marrow biopsy (11/23/21) for evaluation of thrombocytopenia; however, bone marrow specimen was limited.  - Continue to monitor closely, transfuse for signs of active bleeding with platelets <50.

## 2024-10-21 NOTE — PROGRESS NOTE ADULT - ASSESSMENT
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 Admitted to cardiac tele for HFrEF exacerbation i/s/o medication noncompliance. Now s/p IV diuresis w/ Bumex. Plan for discharge to home hospice tomorrow 10/21/24.          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 Admitted to cardiac tele for HFrEF exacerbation i/s/o medication noncompliance. Now s/p IV diuresis w/ Bumex. Plan for discharge to home hospice tomorrow 10/22/24.

## 2024-10-21 NOTE — PROGRESS NOTE ADULT - PROBLEM SELECTOR PROBLEM 3
Atrial fibrillation
Cardiac amyloidosis
Cardiac amyloidosis
Atrial fibrillation

## 2024-10-22 VITALS
DIASTOLIC BLOOD PRESSURE: 71 MMHG | HEART RATE: 90 BPM | RESPIRATION RATE: 16 BRPM | SYSTOLIC BLOOD PRESSURE: 102 MMHG | OXYGEN SATURATION: 100 %

## 2024-10-22 PROCEDURE — 83735 ASSAY OF MAGNESIUM: CPT

## 2024-10-22 PROCEDURE — 99239 HOSP IP/OBS DSCHRG MGMT >30: CPT

## 2024-10-22 PROCEDURE — 82553 CREATINE MB FRACTION: CPT

## 2024-10-22 PROCEDURE — 83605 ASSAY OF LACTIC ACID: CPT

## 2024-10-22 PROCEDURE — 80076 HEPATIC FUNCTION PANEL: CPT

## 2024-10-22 PROCEDURE — 86901 BLOOD TYPING SEROLOGIC RH(D): CPT

## 2024-10-22 PROCEDURE — 85730 THROMBOPLASTIN TIME PARTIAL: CPT

## 2024-10-22 PROCEDURE — 84484 ASSAY OF TROPONIN QUANT: CPT

## 2024-10-22 PROCEDURE — 93971 EXTREMITY STUDY: CPT

## 2024-10-22 PROCEDURE — 96374 THER/PROPH/DIAG INJ IV PUSH: CPT

## 2024-10-22 PROCEDURE — 85027 COMPLETE CBC AUTOMATED: CPT

## 2024-10-22 PROCEDURE — 83880 ASSAY OF NATRIURETIC PEPTIDE: CPT

## 2024-10-22 PROCEDURE — 80053 COMPREHEN METABOLIC PANEL: CPT

## 2024-10-22 PROCEDURE — 80048 BASIC METABOLIC PNL TOTAL CA: CPT

## 2024-10-22 PROCEDURE — 94660 CPAP INITIATION&MGMT: CPT

## 2024-10-22 PROCEDURE — 83036 HEMOGLOBIN GLYCOSYLATED A1C: CPT

## 2024-10-22 PROCEDURE — 87637 SARSCOV2&INF A&B&RSV AMP PRB: CPT

## 2024-10-22 PROCEDURE — 97116 GAIT TRAINING THERAPY: CPT

## 2024-10-22 PROCEDURE — 93306 TTE W/DOPPLER COMPLETE: CPT

## 2024-10-22 PROCEDURE — 82550 ASSAY OF CK (CPK): CPT

## 2024-10-22 PROCEDURE — 99285 EMERGENCY DEPT VISIT HI MDM: CPT | Mod: 25

## 2024-10-22 PROCEDURE — 97161 PT EVAL LOW COMPLEX 20 MIN: CPT

## 2024-10-22 PROCEDURE — 85025 COMPLETE CBC W/AUTO DIFF WBC: CPT

## 2024-10-22 PROCEDURE — 74019 RADEX ABDOMEN 2 VIEWS: CPT

## 2024-10-22 PROCEDURE — 71045 X-RAY EXAM CHEST 1 VIEW: CPT

## 2024-10-22 PROCEDURE — 86850 RBC ANTIBODY SCREEN: CPT

## 2024-10-22 PROCEDURE — 97530 THERAPEUTIC ACTIVITIES: CPT

## 2024-10-22 PROCEDURE — 86900 BLOOD TYPING SEROLOGIC ABO: CPT

## 2024-10-22 PROCEDURE — 85610 PROTHROMBIN TIME: CPT

## 2024-10-22 PROCEDURE — 81001 URINALYSIS AUTO W/SCOPE: CPT

## 2024-10-22 PROCEDURE — 97110 THERAPEUTIC EXERCISES: CPT

## 2024-10-22 PROCEDURE — 36415 COLL VENOUS BLD VENIPUNCTURE: CPT

## 2024-10-22 PROCEDURE — 80061 LIPID PANEL: CPT

## 2024-10-22 RX ORDER — SENNA 187 MG
2 TABLET ORAL
Qty: 0 | Refills: 0 | DISCHARGE
Start: 2024-10-22

## 2024-10-22 RX ORDER — TORSEMIDE 100 MG/1
1 TABLET ORAL
Qty: 0 | Refills: 0 | DISCHARGE
Start: 2024-10-22

## 2024-10-22 RX ORDER — MAGNESIUM, ALUMINUM HYDROXIDE 200-200 MG
30 TABLET,CHEWABLE ORAL
Qty: 0 | Refills: 0 | DISCHARGE
Start: 2024-10-22

## 2024-10-22 RX ORDER — SPIRONOLACTONE 100 MG
1 TABLET ORAL
Qty: 0 | Refills: 0 | DISCHARGE
Start: 2024-10-22

## 2024-10-22 RX ORDER — POLYETHYLENE GLYCOL 3350 17 G/17G
17 POWDER, FOR SOLUTION ORAL
Qty: 0 | Refills: 0 | DISCHARGE
Start: 2024-10-22

## 2024-10-22 RX ORDER — OXYBUTYNIN CHLORIDE 5 MG/1
1 TABLET ORAL
Qty: 60 | Refills: 0
Start: 2024-10-22 | End: 2024-11-20

## 2024-10-22 RX ORDER — PANTOPRAZOLE SODIUM 40 MG/1
1 TABLET, DELAYED RELEASE ORAL
Qty: 0 | Refills: 0 | DISCHARGE
Start: 2024-10-22

## 2024-10-22 RX ORDER — MIDODRINE HYDROCHLORIDE 2.5 MG/1
1 TABLET ORAL
Qty: 0 | Refills: 0 | DISCHARGE
Start: 2024-10-22

## 2024-10-22 RX ADMIN — MIDODRINE HYDROCHLORIDE 10 MILLIGRAM(S): 2.5 TABLET ORAL at 06:09

## 2024-10-22 RX ADMIN — TORSEMIDE 10 MILLIGRAM(S): 100 TABLET ORAL at 06:09

## 2024-10-22 RX ADMIN — APIXABAN 5 MILLIGRAM(S): 5 TABLET, FILM COATED ORAL at 10:28

## 2024-10-22 RX ADMIN — OXYBUTYNIN CHLORIDE 5 MILLIGRAM(S): 5 TABLET ORAL at 06:08

## 2024-10-22 RX ADMIN — PANTOPRAZOLE SODIUM 40 MILLIGRAM(S): 40 TABLET, DELAYED RELEASE ORAL at 06:08

## 2024-10-22 NOTE — PROGRESS NOTE ADULT - REASON FOR ADMISSION
acute on chronic HFrEF in setting of medication noncompliance.

## 2024-10-27 DIAGNOSIS — E87.6 HYPOKALEMIA: ICD-10-CM
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
## 2024-10-27 DIAGNOSIS — Z86.16 PERSONAL HISTORY OF COVID-19: ICD-10-CM

## 2024-10-27 DIAGNOSIS — E87.20 ACIDOSIS, UNSPECIFIED: ICD-10-CM

## 2024-10-27 DIAGNOSIS — I50.23 ACUTE ON CHRONIC SYSTOLIC (CONGESTIVE) HEART FAILURE: ICD-10-CM

## 2024-10-27 DIAGNOSIS — Z51.5 ENCOUNTER FOR PALLIATIVE CARE: ICD-10-CM
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
## 2024-10-27 DIAGNOSIS — N40.1 BENIGN PROSTATIC HYPERPLASIA WITH LOWER URINARY TRACT SYMPTOMS: ICD-10-CM

## 2024-10-27 DIAGNOSIS — I13.0 HYPERTENSIVE HEART AND CHRONIC KIDNEY DISEASE WITH HEART FAILURE AND STAGE 1 THROUGH STAGE 4 CHRONIC KIDNEY DISEASE, OR UNSPECIFIED CHRONIC KIDNEY DISEASE: ICD-10-CM

## 2024-10-27 DIAGNOSIS — I42.8 OTHER CARDIOMYOPATHIES: ICD-10-CM

## 2024-10-27 DIAGNOSIS — Z66 DO NOT RESUSCITATE: ICD-10-CM

## 2024-10-27 DIAGNOSIS — Z95.0 PRESENCE OF CARDIAC PACEMAKER: ICD-10-CM

## 2024-10-27 DIAGNOSIS — T50.1X6A UNDERDOSING OF LOOP [HIGH-CEILING] DIURETICS, INITIAL ENCOUNTER: ICD-10-CM

## 2024-10-27 DIAGNOSIS — E11.22 TYPE 2 DIABETES MELLITUS WITH DIABETIC CHRONIC KIDNEY DISEASE: ICD-10-CM

## 2024-10-27 DIAGNOSIS — I48.0 PAROXYSMAL ATRIAL FIBRILLATION: ICD-10-CM

## 2024-10-27 DIAGNOSIS — R33.8 OTHER RETENTION OF URINE: ICD-10-CM

## 2024-10-27 DIAGNOSIS — Z85.46 PERSONAL HISTORY OF MALIGNANT NEOPLASM OF PROSTATE: ICD-10-CM

## 2024-10-27 DIAGNOSIS — N17.9 ACUTE KIDNEY FAILURE, UNSPECIFIED: ICD-10-CM

## 2024-10-27 DIAGNOSIS — Z91.148 PATIENT'S OTHER NONCOMPLIANCE WITH MEDICATION REGIMEN FOR OTHER REASON: ICD-10-CM

## 2024-10-27 DIAGNOSIS — D69.6 THROMBOCYTOPENIA, UNSPECIFIED: ICD-10-CM

## 2024-10-27 DIAGNOSIS — I27.22 PULMONARY HYPERTENSION DUE TO LEFT HEART DISEASE: ICD-10-CM
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
## 2024-10-27 DIAGNOSIS — E85.4 ORGAN-LIMITED AMYLOIDOSIS: ICD-10-CM

## 2024-10-27 DIAGNOSIS — R62.7 ADULT FAILURE TO THRIVE: ICD-10-CM

## 2024-10-27 DIAGNOSIS — I50.84 END STAGE HEART FAILURE: ICD-10-CM

## 2024-10-27 DIAGNOSIS — N18.30 CHRONIC KIDNEY DISEASE, STAGE 3 UNSPECIFIED: ICD-10-CM

## 2024-10-27 DIAGNOSIS — Z92.21 PERSONAL HISTORY OF ANTINEOPLASTIC CHEMOTHERAPY: ICD-10-CM

## 2024-10-27 DIAGNOSIS — Z79.01 LONG TERM (CURRENT) USE OF ANTICOAGULANTS: ICD-10-CM

## 2024-10-27 DIAGNOSIS — I87.2 VENOUS INSUFFICIENCY (CHRONIC) (PERIPHERAL): ICD-10-CM

## 2024-10-27 DIAGNOSIS — I43 CARDIOMYOPATHY IN DISEASES CLASSIFIED ELSEWHERE: ICD-10-CM

## 2024-10-27 NOTE — PATIENT PROFILE ADULT - NSPROGENSOURCEINFO_GEN_A_NUR
tablet      6. Post-op pain        7. Therapeutic opioid-induced constipation (OIC)        8. Carcinoma of urinary bladder neck (HCC)        9. Anorexia              PLAN:  Lab studies and imaging studies were personally reviewed.    Pertinent old records were reviewed from [facility] and recent surgery.     Cancer associated/chronic pain: Discussed options for adding long acting pain medication vs increasing oxycodone dose or frequency. Given that some of his pain might be due to constipation, we decided to slightly increase frequency (from every6 hours to every 4 hours) on oxycodone 10mg tabs, with option to take 2 if need be. Also discussed bowel regimen. If pain not improved after BM and increased frequency, next step would be to add long acting Fentanyl or increase oxycodone dose. Cont gabapentin.   OIC: Will Rx lactulose and Movantik. It appears his copay may be $200 for Movantik, so lactulose Rx'd as cheaper alternative. Discussed also trying Dulcolax.   Appetite impaired: Recently started Remeron. Agreeable to addition of low dose Zyprexa to aid in appetite stimulation given drastic weight loss.     Advanced Care Planning Discussed: None today. Introduced to role of palliative care.    Will follow up in: 11/22/24. Informed patient he can message via ePAR if pain not controlled.     All questions were asked and answered to the best of my ability.  In all, I spent 65 minutes in the care of Mr. Ramos today, over 50% of which was in direct counseling and coordination of care.    I have reviewed the patient's controlled substance prescription history, as maintained in the [place] prescription monitoring program, so that the prescriptions(s) for a controlled substance can be given.  Last Date Reviewed: 10/28/24             Cinthya Mcrae PA-C  [place] [place] [address]  Office : [phone]  Fax : [phone]   family

## 2024-11-11 ENCOUNTER — APPOINTMENT (OUTPATIENT)
Dept: HEART AND VASCULAR | Facility: CLINIC | Age: 79
End: 2024-11-11

## 2024-12-10 ENCOUNTER — APPOINTMENT (OUTPATIENT)
Dept: HEART AND VASCULAR | Facility: CLINIC | Age: 79
End: 2024-12-10

## 2024-12-10 DIAGNOSIS — E85.1 NEUROPATHIC HEREDOFAMILIAL AMYLOIDOSIS: ICD-10-CM

## 2024-12-10 DIAGNOSIS — I48.91 UNSPECIFIED ATRIAL FIBRILLATION: ICD-10-CM

## 2024-12-10 DIAGNOSIS — I50.22 CHRONIC SYSTOLIC (CONGESTIVE) HEART FAILURE: ICD-10-CM

## 2024-12-10 DIAGNOSIS — I10 ESSENTIAL (PRIMARY) HYPERTENSION: ICD-10-CM

## 2024-12-10 DIAGNOSIS — G63 NEUROPATHIC HEREDOFAMILIAL AMYLOIDOSIS: ICD-10-CM

## 2024-12-10 DIAGNOSIS — I42.9 CARDIOMYOPATHY, UNSPECIFIED: ICD-10-CM

## 2024-12-10 DIAGNOSIS — I43 ORGAN-LIMITED AMYLOIDOSIS: ICD-10-CM

## 2024-12-10 DIAGNOSIS — E11.9 TYPE 2 DIABETES MELLITUS W/OUT COMPLICATIONS: ICD-10-CM

## 2024-12-10 DIAGNOSIS — Z95.0 PRESENCE OF CARDIAC PACEMAKER: ICD-10-CM

## 2024-12-10 DIAGNOSIS — N18.30 CHRONIC KIDNEY DISEASE, STAGE 3 UNSPECIFIED: ICD-10-CM

## 2024-12-10 DIAGNOSIS — E85.4 ORGAN-LIMITED AMYLOIDOSIS: ICD-10-CM

## 2024-12-13 ENCOUNTER — NON-APPOINTMENT (OUTPATIENT)
Age: 79
End: 2024-12-13

## 2024-12-13 ENCOUNTER — APPOINTMENT (OUTPATIENT)
Dept: HEART FAILURE | Facility: CLINIC | Age: 79
End: 2024-12-13

## 2025-01-15 ENCOUNTER — APPOINTMENT (OUTPATIENT)
Dept: HEART AND VASCULAR | Facility: CLINIC | Age: 80
End: 2025-01-15

## 2025-02-25 ENCOUNTER — APPOINTMENT (OUTPATIENT)
Dept: HEART AND VASCULAR | Facility: CLINIC | Age: 80
End: 2025-02-25

## 2025-04-01 ENCOUNTER — APPOINTMENT (OUTPATIENT)
Dept: HEART AND VASCULAR | Facility: CLINIC | Age: 80
End: 2025-04-01

## 2025-06-10 NOTE — PHYSICAL THERAPY INITIAL EVALUATION ADULT - NSPTDISCHREC_GEN_A_CORE
BRII Wick / JUDE Weller informed/Sub-acute Rehab